# Patient Record
Sex: FEMALE | Race: WHITE | Employment: OTHER | URBAN - METROPOLITAN AREA
[De-identification: names, ages, dates, MRNs, and addresses within clinical notes are randomized per-mention and may not be internally consistent; named-entity substitution may affect disease eponyms.]

---

## 2017-01-04 ENCOUNTER — TELEPHONE (OUTPATIENT)
Dept: INTERNAL MEDICINE CLINIC | Age: 72
End: 2017-01-04

## 2017-01-04 RX ORDER — PIOGLITAZONEHYDROCHLORIDE 15 MG/1
30 TABLET ORAL DAILY
Qty: 60 TABLET | Refills: 0 | Status: SHIPPED | OUTPATIENT
Start: 2017-01-04 | End: 2017-01-25 | Stop reason: SDUPTHER

## 2017-01-04 RX ORDER — SPIRONOLACTONE 50 MG/1
50 TABLET, FILM COATED ORAL DAILY
Qty: 30 TABLET | Refills: 0 | Status: SHIPPED | OUTPATIENT
Start: 2017-01-04 | End: 2017-01-25 | Stop reason: SDUPTHER

## 2017-01-19 RX ORDER — LOSARTAN POTASSIUM 50 MG/1
TABLET ORAL
Qty: 30 TABLET | Refills: 2 | Status: SHIPPED | OUTPATIENT
Start: 2017-01-19 | End: 2017-04-19 | Stop reason: SDUPTHER

## 2017-01-25 ENCOUNTER — OFFICE VISIT (OUTPATIENT)
Dept: INTERNAL MEDICINE CLINIC | Age: 72
End: 2017-01-25

## 2017-01-25 VITALS
BODY MASS INDEX: 45.93 KG/M2 | HEIGHT: 64 IN | DIASTOLIC BLOOD PRESSURE: 80 MMHG | SYSTOLIC BLOOD PRESSURE: 138 MMHG | WEIGHT: 269 LBS

## 2017-01-25 DIAGNOSIS — K21.9 GASTROESOPHAGEAL REFLUX DISEASE WITHOUT ESOPHAGITIS: ICD-10-CM

## 2017-01-25 DIAGNOSIS — F32.A DEPRESSION, UNSPECIFIED DEPRESSION TYPE: ICD-10-CM

## 2017-01-25 DIAGNOSIS — M1A.09X0 CHRONIC GOUT OF MULTIPLE SITES, UNSPECIFIED CAUSE: ICD-10-CM

## 2017-01-25 DIAGNOSIS — M48.061 SPINAL STENOSIS OF LUMBAR REGION: ICD-10-CM

## 2017-01-25 DIAGNOSIS — E11.9 TYPE 2 DIABETES MELLITUS WITHOUT COMPLICATION, WITHOUT LONG-TERM CURRENT USE OF INSULIN (HCC): Primary | ICD-10-CM

## 2017-01-25 DIAGNOSIS — I10 ESSENTIAL HYPERTENSION: ICD-10-CM

## 2017-01-25 DIAGNOSIS — E11.9 TYPE 2 DIABETES MELLITUS WITHOUT COMPLICATION, WITHOUT LONG-TERM CURRENT USE OF INSULIN (HCC): ICD-10-CM

## 2017-01-25 LAB
A/G RATIO: 2 (ref 1.1–2.2)
ALBUMIN SERPL-MCNC: 4.5 G/DL (ref 3.4–5)
ALP BLD-CCNC: 78 U/L (ref 40–129)
ALT SERPL-CCNC: 11 U/L (ref 10–40)
ANION GAP SERPL CALCULATED.3IONS-SCNC: 14 MMOL/L (ref 3–16)
AST SERPL-CCNC: 10 U/L (ref 15–37)
BILIRUB SERPL-MCNC: 0.5 MG/DL (ref 0–1)
BUN BLDV-MCNC: 22 MG/DL (ref 7–20)
CALCIUM SERPL-MCNC: 9.9 MG/DL (ref 8.3–10.6)
CHLORIDE BLD-SCNC: 100 MMOL/L (ref 99–110)
CO2: 25 MMOL/L (ref 21–32)
CREAT SERPL-MCNC: 0.7 MG/DL (ref 0.6–1.2)
GFR AFRICAN AMERICAN: >60
GFR NON-AFRICAN AMERICAN: >60
GLOBULIN: 2.3 G/DL
GLUCOSE BLD-MCNC: 79 MG/DL (ref 70–99)
POTASSIUM SERPL-SCNC: 4.7 MMOL/L (ref 3.5–5.1)
SODIUM BLD-SCNC: 139 MMOL/L (ref 136–145)
TOTAL PROTEIN: 6.8 G/DL (ref 6.4–8.2)

## 2017-01-25 PROCEDURE — G8427 DOCREV CUR MEDS BY ELIG CLIN: HCPCS | Performed by: INTERNAL MEDICINE

## 2017-01-25 PROCEDURE — 3014F SCREEN MAMMO DOC REV: CPT | Performed by: INTERNAL MEDICINE

## 2017-01-25 PROCEDURE — G8399 PT W/DXA RESULTS DOCUMENT: HCPCS | Performed by: INTERNAL MEDICINE

## 2017-01-25 PROCEDURE — 3017F COLORECTAL CA SCREEN DOC REV: CPT | Performed by: INTERNAL MEDICINE

## 2017-01-25 PROCEDURE — 3044F HG A1C LEVEL LT 7.0%: CPT | Performed by: INTERNAL MEDICINE

## 2017-01-25 PROCEDURE — 1036F TOBACCO NON-USER: CPT | Performed by: INTERNAL MEDICINE

## 2017-01-25 PROCEDURE — G8419 CALC BMI OUT NRM PARAM NOF/U: HCPCS | Performed by: INTERNAL MEDICINE

## 2017-01-25 PROCEDURE — 99214 OFFICE O/P EST MOD 30 MIN: CPT | Performed by: INTERNAL MEDICINE

## 2017-01-25 PROCEDURE — 1123F ACP DISCUSS/DSCN MKR DOCD: CPT | Performed by: INTERNAL MEDICINE

## 2017-01-25 PROCEDURE — 1090F PRES/ABSN URINE INCON ASSESS: CPT | Performed by: INTERNAL MEDICINE

## 2017-01-25 PROCEDURE — 4040F PNEUMOC VAC/ADMIN/RCVD: CPT | Performed by: INTERNAL MEDICINE

## 2017-01-25 PROCEDURE — G8484 FLU IMMUNIZE NO ADMIN: HCPCS | Performed by: INTERNAL MEDICINE

## 2017-01-25 RX ORDER — SPIRONOLACTONE 50 MG/1
50 TABLET, FILM COATED ORAL 2 TIMES DAILY
Qty: 1 TABLET | Refills: 0
Start: 2017-01-25 | End: 2017-01-30 | Stop reason: SDUPTHER

## 2017-01-25 RX ORDER — PIOGLITAZONEHYDROCHLORIDE 30 MG/1
30 TABLET ORAL DAILY
Qty: 1 TABLET | Refills: 0
Start: 2017-01-25 | End: 2017-01-30 | Stop reason: SDUPTHER

## 2017-01-25 ASSESSMENT — ENCOUNTER SYMPTOMS
SHORTNESS OF BREATH: 0
ABDOMINAL PAIN: 0
NAUSEA: 0
CHEST TIGHTNESS: 0
COUGH: 1
BLOOD IN STOOL: 0
DIARRHEA: 0
WHEEZING: 0
VOMITING: 0

## 2017-01-26 LAB
ESTIMATED AVERAGE GLUCOSE: 139.9 MG/DL
HBA1C MFR BLD: 6.5 %

## 2017-01-30 RX ORDER — SPIRONOLACTONE 50 MG/1
50 TABLET, FILM COATED ORAL 2 TIMES DAILY
Qty: 60 TABLET | Refills: 0 | Status: SHIPPED | OUTPATIENT
Start: 2017-01-30 | End: 2017-02-28 | Stop reason: SDUPTHER

## 2017-01-30 RX ORDER — PIOGLITAZONEHYDROCHLORIDE 30 MG/1
30 TABLET ORAL DAILY
Qty: 90 TABLET | Refills: 0 | Status: SHIPPED | OUTPATIENT
Start: 2017-01-30 | End: 2017-04-20

## 2017-02-28 RX ORDER — GLIPIZIDE 10 MG/1
TABLET ORAL
Qty: 60 TABLET | Refills: 2 | Status: SHIPPED | OUTPATIENT
Start: 2017-02-28 | End: 2017-04-19 | Stop reason: SDUPTHER

## 2017-02-28 RX ORDER — SPIRONOLACTONE 50 MG/1
TABLET, FILM COATED ORAL
Qty: 60 TABLET | Refills: 1 | Status: SHIPPED | OUTPATIENT
Start: 2017-02-28 | End: 2017-04-19 | Stop reason: SDUPTHER

## 2017-03-01 DIAGNOSIS — R20.0 NUMBNESS OF FINGER: ICD-10-CM

## 2017-03-01 DIAGNOSIS — R20.0 NUMBNESS AND TINGLING OF LEFT THUMB: Primary | ICD-10-CM

## 2017-03-01 DIAGNOSIS — R20.2 NUMBNESS AND TINGLING OF LEFT THUMB: Primary | ICD-10-CM

## 2017-03-03 ENCOUNTER — OFFICE VISIT (OUTPATIENT)
Dept: ORTHOPEDIC SURGERY | Age: 72
End: 2017-03-03

## 2017-03-03 DIAGNOSIS — M18.12 PRIMARY OSTEOARTHRITIS OF FIRST CARPOMETACARPAL JOINT OF LEFT HAND: ICD-10-CM

## 2017-03-03 DIAGNOSIS — M79.642 LEFT HAND PAIN: Primary | ICD-10-CM

## 2017-03-03 DIAGNOSIS — G56.02 CARPAL TUNNEL SYNDROME OF LEFT WRIST: ICD-10-CM

## 2017-03-03 PROCEDURE — 73130 X-RAY EXAM OF HAND: CPT | Performed by: ORTHOPAEDIC SURGERY

## 2017-03-03 PROCEDURE — 3014F SCREEN MAMMO DOC REV: CPT | Performed by: ORTHOPAEDIC SURGERY

## 2017-03-03 PROCEDURE — 1090F PRES/ABSN URINE INCON ASSESS: CPT | Performed by: ORTHOPAEDIC SURGERY

## 2017-03-03 PROCEDURE — 1036F TOBACCO NON-USER: CPT | Performed by: ORTHOPAEDIC SURGERY

## 2017-03-03 PROCEDURE — G8484 FLU IMMUNIZE NO ADMIN: HCPCS | Performed by: ORTHOPAEDIC SURGERY

## 2017-03-03 PROCEDURE — 4040F PNEUMOC VAC/ADMIN/RCVD: CPT | Performed by: ORTHOPAEDIC SURGERY

## 2017-03-03 PROCEDURE — 3017F COLORECTAL CA SCREEN DOC REV: CPT | Performed by: ORTHOPAEDIC SURGERY

## 2017-03-03 PROCEDURE — 99214 OFFICE O/P EST MOD 30 MIN: CPT | Performed by: ORTHOPAEDIC SURGERY

## 2017-03-03 PROCEDURE — G8419 CALC BMI OUT NRM PARAM NOF/U: HCPCS | Performed by: ORTHOPAEDIC SURGERY

## 2017-03-03 PROCEDURE — G8427 DOCREV CUR MEDS BY ELIG CLIN: HCPCS | Performed by: ORTHOPAEDIC SURGERY

## 2017-03-06 ENCOUNTER — TELEPHONE (OUTPATIENT)
Dept: ORTHOPEDIC SURGERY | Age: 72
End: 2017-03-06

## 2017-03-07 RX ORDER — GABAPENTIN 300 MG/1
CAPSULE ORAL
Qty: 120 CAPSULE | Refills: 2 | Status: SHIPPED | OUTPATIENT
Start: 2017-03-07 | End: 2017-04-19 | Stop reason: SDUPTHER

## 2017-03-16 RX ORDER — OMEPRAZOLE 20 MG/1
20 CAPSULE, DELAYED RELEASE ORAL 2 TIMES DAILY
Qty: 60 CAPSULE | Refills: 2 | Status: SHIPPED | OUTPATIENT
Start: 2017-03-16 | End: 2017-12-11

## 2017-03-21 ENCOUNTER — OFFICE VISIT (OUTPATIENT)
Dept: ORTHOPEDIC SURGERY | Age: 72
End: 2017-03-21

## 2017-03-21 VITALS — WEIGHT: 268.96 LBS | HEIGHT: 64 IN | BODY MASS INDEX: 45.92 KG/M2

## 2017-03-21 DIAGNOSIS — M18.12 PRIMARY OSTEOARTHRITIS OF FIRST CARPOMETACARPAL JOINT OF LEFT HAND: ICD-10-CM

## 2017-03-21 DIAGNOSIS — G56.02 CARPAL TUNNEL SYNDROME OF LEFT WRIST: Primary | ICD-10-CM

## 2017-03-21 PROCEDURE — 99213 OFFICE O/P EST LOW 20 MIN: CPT | Performed by: ORTHOPAEDIC SURGERY

## 2017-03-21 PROCEDURE — G8399 PT W/DXA RESULTS DOCUMENT: HCPCS | Performed by: ORTHOPAEDIC SURGERY

## 2017-03-21 PROCEDURE — 1123F ACP DISCUSS/DSCN MKR DOCD: CPT | Performed by: ORTHOPAEDIC SURGERY

## 2017-03-21 PROCEDURE — G8417 CALC BMI ABV UP PARAM F/U: HCPCS | Performed by: ORTHOPAEDIC SURGERY

## 2017-03-21 PROCEDURE — 95908 NRV CNDJ TST 3-4 STUDIES: CPT | Performed by: PHYSICAL MEDICINE & REHABILITATION

## 2017-03-21 PROCEDURE — G8427 DOCREV CUR MEDS BY ELIG CLIN: HCPCS | Performed by: ORTHOPAEDIC SURGERY

## 2017-03-21 PROCEDURE — 3017F COLORECTAL CA SCREEN DOC REV: CPT | Performed by: ORTHOPAEDIC SURGERY

## 2017-03-21 PROCEDURE — 1036F TOBACCO NON-USER: CPT | Performed by: ORTHOPAEDIC SURGERY

## 2017-03-21 PROCEDURE — 3014F SCREEN MAMMO DOC REV: CPT | Performed by: ORTHOPAEDIC SURGERY

## 2017-03-21 PROCEDURE — 4040F PNEUMOC VAC/ADMIN/RCVD: CPT | Performed by: ORTHOPAEDIC SURGERY

## 2017-03-21 PROCEDURE — 1036F TOBACCO NON-USER: CPT | Performed by: PHYSICAL MEDICINE & REHABILITATION

## 2017-03-21 PROCEDURE — 1090F PRES/ABSN URINE INCON ASSESS: CPT | Performed by: ORTHOPAEDIC SURGERY

## 2017-03-21 PROCEDURE — 95886 MUSC TEST DONE W/N TEST COMP: CPT | Performed by: PHYSICAL MEDICINE & REHABILITATION

## 2017-03-21 PROCEDURE — G8484 FLU IMMUNIZE NO ADMIN: HCPCS | Performed by: ORTHOPAEDIC SURGERY

## 2017-03-28 RX ORDER — VERAPAMIL HYDROCHLORIDE 120 MG/1
120 TABLET, FILM COATED ORAL 2 TIMES DAILY
Qty: 60 TABLET | Refills: 2 | Status: SHIPPED | OUTPATIENT
Start: 2017-03-28 | End: 2017-04-19 | Stop reason: SDUPTHER

## 2017-04-10 RX ORDER — PAROXETINE 10 MG/1
TABLET, FILM COATED ORAL
Qty: 30 TABLET | Refills: 2 | Status: SHIPPED | OUTPATIENT
Start: 2017-04-10 | End: 2017-07-17 | Stop reason: SDUPTHER

## 2017-04-10 RX ORDER — ALLOPURINOL 300 MG/1
TABLET ORAL
Qty: 30 TABLET | Refills: 2 | Status: SHIPPED | OUTPATIENT
Start: 2017-04-10 | End: 2017-07-17 | Stop reason: SDUPTHER

## 2017-04-19 RX ORDER — GLIPIZIDE 10 MG/1
TABLET ORAL
Qty: 60 TABLET | Refills: 2 | Status: SHIPPED | OUTPATIENT
Start: 2017-04-19 | End: 2017-09-06 | Stop reason: SDUPTHER

## 2017-04-19 RX ORDER — OMEPRAZOLE 20 MG/1
20 CAPSULE, DELAYED RELEASE ORAL 2 TIMES DAILY
Qty: 30 CAPSULE | Refills: 5 | Status: SHIPPED | OUTPATIENT
Start: 2017-04-19 | End: 2017-11-16

## 2017-04-19 RX ORDER — VERAPAMIL HYDROCHLORIDE 120 MG/1
120 TABLET, FILM COATED ORAL 2 TIMES DAILY
Qty: 60 TABLET | Refills: 2 | Status: SHIPPED | OUTPATIENT
Start: 2017-04-19 | End: 2017-10-25 | Stop reason: SDUPTHER

## 2017-04-19 RX ORDER — GABAPENTIN 300 MG/1
CAPSULE ORAL
Qty: 120 CAPSULE | Refills: 2 | Status: SHIPPED | OUTPATIENT
Start: 2017-04-19 | End: 2018-01-26

## 2017-04-19 RX ORDER — LOSARTAN POTASSIUM 50 MG/1
TABLET ORAL
Qty: 30 TABLET | Refills: 2 | Status: SHIPPED | OUTPATIENT
Start: 2017-04-19 | End: 2017-04-20 | Stop reason: SDUPTHER

## 2017-04-19 RX ORDER — SPIRONOLACTONE 50 MG/1
TABLET, FILM COATED ORAL
Qty: 60 TABLET | Refills: 2 | Status: SHIPPED | OUTPATIENT
Start: 2017-04-19 | End: 2017-04-30 | Stop reason: SDUPTHER

## 2017-04-20 ENCOUNTER — OFFICE VISIT (OUTPATIENT)
Dept: INTERNAL MEDICINE CLINIC | Age: 72
End: 2017-04-20

## 2017-04-20 VITALS
HEIGHT: 64 IN | DIASTOLIC BLOOD PRESSURE: 82 MMHG | SYSTOLIC BLOOD PRESSURE: 156 MMHG | WEIGHT: 277 LBS | HEART RATE: 76 BPM | BODY MASS INDEX: 47.29 KG/M2

## 2017-04-20 DIAGNOSIS — M17.0 PRIMARY OSTEOARTHRITIS OF BOTH KNEES: ICD-10-CM

## 2017-04-20 DIAGNOSIS — I10 ESSENTIAL HYPERTENSION: ICD-10-CM

## 2017-04-20 DIAGNOSIS — F32.A DEPRESSION, UNSPECIFIED DEPRESSION TYPE: ICD-10-CM

## 2017-04-20 DIAGNOSIS — E11.9 TYPE 2 DIABETES MELLITUS WITHOUT COMPLICATION, WITHOUT LONG-TERM CURRENT USE OF INSULIN (HCC): ICD-10-CM

## 2017-04-20 DIAGNOSIS — M1A.09X0 CHRONIC GOUT OF MULTIPLE SITES, UNSPECIFIED CAUSE: ICD-10-CM

## 2017-04-20 DIAGNOSIS — E11.9 TYPE 2 DIABETES MELLITUS WITHOUT COMPLICATION, WITHOUT LONG-TERM CURRENT USE OF INSULIN (HCC): Primary | ICD-10-CM

## 2017-04-20 LAB
A/G RATIO: 2.2 (ref 1.1–2.2)
ALBUMIN SERPL-MCNC: 4.3 G/DL (ref 3.4–5)
ALP BLD-CCNC: 71 U/L (ref 40–129)
ALT SERPL-CCNC: 9 U/L (ref 10–40)
ANION GAP SERPL CALCULATED.3IONS-SCNC: 17 MMOL/L (ref 3–16)
AST SERPL-CCNC: 9 U/L (ref 15–37)
BILIRUB SERPL-MCNC: 0.5 MG/DL (ref 0–1)
BUN BLDV-MCNC: 21 MG/DL (ref 7–20)
CALCIUM SERPL-MCNC: 9.1 MG/DL (ref 8.3–10.6)
CHLORIDE BLD-SCNC: 101 MMOL/L (ref 99–110)
CHOLESTEROL, TOTAL: 179 MG/DL (ref 0–199)
CO2: 24 MMOL/L (ref 21–32)
CREAT SERPL-MCNC: 0.6 MG/DL (ref 0.6–1.2)
GFR AFRICAN AMERICAN: >60
GFR NON-AFRICAN AMERICAN: >60
GLOBULIN: 2 G/DL
GLUCOSE BLD-MCNC: 111 MG/DL (ref 70–99)
HDLC SERPL-MCNC: 52 MG/DL (ref 40–60)
LDL CHOLESTEROL CALCULATED: 104 MG/DL
POTASSIUM SERPL-SCNC: 4.5 MMOL/L (ref 3.5–5.1)
SODIUM BLD-SCNC: 142 MMOL/L (ref 136–145)
TOTAL PROTEIN: 6.3 G/DL (ref 6.4–8.2)
TRIGL SERPL-MCNC: 115 MG/DL (ref 0–150)
VLDLC SERPL CALC-MCNC: 23 MG/DL

## 2017-04-20 PROCEDURE — 3044F HG A1C LEVEL LT 7.0%: CPT | Performed by: INTERNAL MEDICINE

## 2017-04-20 PROCEDURE — G8427 DOCREV CUR MEDS BY ELIG CLIN: HCPCS | Performed by: INTERNAL MEDICINE

## 2017-04-20 PROCEDURE — 4040F PNEUMOC VAC/ADMIN/RCVD: CPT | Performed by: INTERNAL MEDICINE

## 2017-04-20 PROCEDURE — 1123F ACP DISCUSS/DSCN MKR DOCD: CPT | Performed by: INTERNAL MEDICINE

## 2017-04-20 PROCEDURE — 99214 OFFICE O/P EST MOD 30 MIN: CPT | Performed by: INTERNAL MEDICINE

## 2017-04-20 PROCEDURE — 1036F TOBACCO NON-USER: CPT | Performed by: INTERNAL MEDICINE

## 2017-04-20 PROCEDURE — G8399 PT W/DXA RESULTS DOCUMENT: HCPCS | Performed by: INTERNAL MEDICINE

## 2017-04-20 PROCEDURE — 3017F COLORECTAL CA SCREEN DOC REV: CPT | Performed by: INTERNAL MEDICINE

## 2017-04-20 PROCEDURE — G8417 CALC BMI ABV UP PARAM F/U: HCPCS | Performed by: INTERNAL MEDICINE

## 2017-04-20 PROCEDURE — 1090F PRES/ABSN URINE INCON ASSESS: CPT | Performed by: INTERNAL MEDICINE

## 2017-04-20 PROCEDURE — 3014F SCREEN MAMMO DOC REV: CPT | Performed by: INTERNAL MEDICINE

## 2017-04-20 RX ORDER — LOSARTAN POTASSIUM 100 MG/1
TABLET ORAL
Qty: 30 TABLET | Refills: 2 | Status: SHIPPED | OUTPATIENT
Start: 2017-04-20 | End: 2017-07-17 | Stop reason: SDUPTHER

## 2017-04-20 ASSESSMENT — ENCOUNTER SYMPTOMS
SHORTNESS OF BREATH: 0
BLOOD IN STOOL: 0
COUGH: 0
VOMITING: 0
WHEEZING: 0
DIARRHEA: 0
NAUSEA: 0
ABDOMINAL PAIN: 0
CHEST TIGHTNESS: 0

## 2017-04-21 LAB
ACANTHOCYTES: ABNORMAL
ANISOCYTOSIS: ABNORMAL
BASOPHILS ABSOLUTE: 0 K/UL (ref 0–0.2)
BASOPHILS RELATIVE PERCENT: 0.3 %
BURR CELLS: ABNORMAL
EOSINOPHILS ABSOLUTE: 0.3 K/UL (ref 0–0.6)
EOSINOPHILS RELATIVE PERCENT: 3.2 %
ESTIMATED AVERAGE GLUCOSE: 142.7 MG/DL
HBA1C MFR BLD: 6.6 %
HCT VFR BLD CALC: 32.5 % (ref 36–48)
HEMATOLOGY PATH CONSULT: NO
HEMOGLOBIN: 10.1 G/DL (ref 12–16)
LYMPHOCYTES ABSOLUTE: 1.9 K/UL (ref 1–5.1)
LYMPHOCYTES RELATIVE PERCENT: 23.9 %
MCH RBC QN AUTO: 18.9 PG (ref 26–34)
MCHC RBC AUTO-ENTMCNC: 30.9 G/DL (ref 31–36)
MCV RBC AUTO: 61.1 FL (ref 80–100)
MICROCYTES: ABNORMAL
MONOCYTES ABSOLUTE: 0.6 K/UL (ref 0–1.3)
MONOCYTES RELATIVE PERCENT: 7.4 %
NEUTROPHILS ABSOLUTE: 5.2 K/UL (ref 1.7–7.7)
NEUTROPHILS RELATIVE PERCENT: 65.2 %
OVALOCYTES: ABNORMAL
PDW BLD-RTO: 16.8 % (ref 12.4–15.4)
PLATELET # BLD: 229 K/UL (ref 135–450)
PLATELET SLIDE REVIEW: ADEQUATE
PMV BLD AUTO: 9.7 FL (ref 5–10.5)
POIKILOCYTES: ABNORMAL
RBC # BLD: 5.33 M/UL (ref 4–5.2)
SCHISTOCYTES: ABNORMAL
WBC # BLD: 8 K/UL (ref 4–11)

## 2017-04-21 RX ORDER — ATORVASTATIN CALCIUM 10 MG/1
TABLET, FILM COATED ORAL
Qty: 30 TABLET | Refills: 2 | Status: SHIPPED | OUTPATIENT
Start: 2017-04-21 | End: 2017-07-27 | Stop reason: SDUPTHER

## 2017-04-28 RX ORDER — TIOTROPIUM BROMIDE 18 UG/1
CAPSULE ORAL; RESPIRATORY (INHALATION)
Qty: 90 CAPSULE | Refills: 0 | Status: SHIPPED | OUTPATIENT
Start: 2017-04-28 | End: 2018-06-17 | Stop reason: SDUPTHER

## 2017-05-01 RX ORDER — SPIRONOLACTONE 50 MG/1
TABLET, FILM COATED ORAL
Qty: 60 TABLET | Refills: 2 | Status: SHIPPED | OUTPATIENT
Start: 2017-05-01 | End: 2017-08-09 | Stop reason: SDUPTHER

## 2017-07-18 RX ORDER — LOSARTAN POTASSIUM 100 MG/1
TABLET ORAL
Qty: 30 TABLET | Refills: 0 | Status: SHIPPED | OUTPATIENT
Start: 2017-07-18 | End: 2017-08-14 | Stop reason: SDUPTHER

## 2017-07-18 RX ORDER — ALLOPURINOL 300 MG/1
TABLET ORAL
Qty: 30 TABLET | Refills: 0 | Status: SHIPPED | OUTPATIENT
Start: 2017-07-18 | End: 2017-08-14 | Stop reason: SDUPTHER

## 2017-07-18 RX ORDER — PAROXETINE 10 MG/1
TABLET, FILM COATED ORAL
Qty: 30 TABLET | Refills: 0 | Status: SHIPPED | OUTPATIENT
Start: 2017-07-18 | End: 2017-08-14 | Stop reason: SDUPTHER

## 2017-07-27 ENCOUNTER — OFFICE VISIT (OUTPATIENT)
Dept: INTERNAL MEDICINE CLINIC | Age: 72
End: 2017-07-27

## 2017-07-27 VITALS
HEIGHT: 64 IN | WEIGHT: 261 LBS | HEART RATE: 76 BPM | SYSTOLIC BLOOD PRESSURE: 152 MMHG | DIASTOLIC BLOOD PRESSURE: 80 MMHG | BODY MASS INDEX: 44.56 KG/M2

## 2017-07-27 DIAGNOSIS — K21.9 GASTROESOPHAGEAL REFLUX DISEASE WITHOUT ESOPHAGITIS: ICD-10-CM

## 2017-07-27 DIAGNOSIS — F32.A DEPRESSION, UNSPECIFIED DEPRESSION TYPE: ICD-10-CM

## 2017-07-27 DIAGNOSIS — E78.5 HYPERLIPIDEMIA, UNSPECIFIED HYPERLIPIDEMIA TYPE: ICD-10-CM

## 2017-07-27 DIAGNOSIS — I10 ESSENTIAL HYPERTENSION: ICD-10-CM

## 2017-07-27 DIAGNOSIS — M1A.09X0 CHRONIC GOUT OF MULTIPLE SITES, UNSPECIFIED CAUSE: ICD-10-CM

## 2017-07-27 DIAGNOSIS — M48.061 SPINAL STENOSIS OF LUMBAR REGION: ICD-10-CM

## 2017-07-27 DIAGNOSIS — E11.9 TYPE 2 DIABETES MELLITUS WITHOUT COMPLICATION, WITHOUT LONG-TERM CURRENT USE OF INSULIN (HCC): Primary | ICD-10-CM

## 2017-07-27 DIAGNOSIS — E11.9 TYPE 2 DIABETES MELLITUS WITHOUT COMPLICATION, WITHOUT LONG-TERM CURRENT USE OF INSULIN (HCC): ICD-10-CM

## 2017-07-27 LAB
ALBUMIN SERPL-MCNC: 4.3 G/DL (ref 3.4–5)
ALP BLD-CCNC: 85 U/L (ref 40–129)
ALT SERPL-CCNC: 11 U/L (ref 10–40)
ANION GAP SERPL CALCULATED.3IONS-SCNC: 17 MMOL/L (ref 3–16)
AST SERPL-CCNC: 10 U/L (ref 15–37)
BILIRUB SERPL-MCNC: 0.7 MG/DL (ref 0–1)
BILIRUBIN DIRECT: <0.2 MG/DL (ref 0–0.3)
BILIRUBIN, INDIRECT: ABNORMAL MG/DL (ref 0–1)
BUN BLDV-MCNC: 15 MG/DL (ref 7–20)
CALCIUM SERPL-MCNC: 9.4 MG/DL (ref 8.3–10.6)
CHLORIDE BLD-SCNC: 101 MMOL/L (ref 99–110)
CHOLESTEROL, TOTAL: 130 MG/DL (ref 0–199)
CO2: 23 MMOL/L (ref 21–32)
CREAT SERPL-MCNC: 0.6 MG/DL (ref 0.6–1.2)
GFR AFRICAN AMERICAN: >60
GFR NON-AFRICAN AMERICAN: >60
GLUCOSE BLD-MCNC: 149 MG/DL (ref 70–99)
HDLC SERPL-MCNC: 49 MG/DL (ref 40–60)
LDL CHOLESTEROL CALCULATED: 51 MG/DL
POTASSIUM SERPL-SCNC: 4.7 MMOL/L (ref 3.5–5.1)
SODIUM BLD-SCNC: 141 MMOL/L (ref 136–145)
TOTAL PROTEIN: 6.5 G/DL (ref 6.4–8.2)
TRIGL SERPL-MCNC: 151 MG/DL (ref 0–150)
VLDLC SERPL CALC-MCNC: 30 MG/DL

## 2017-07-27 PROCEDURE — 1090F PRES/ABSN URINE INCON ASSESS: CPT | Performed by: INTERNAL MEDICINE

## 2017-07-27 PROCEDURE — G8417 CALC BMI ABV UP PARAM F/U: HCPCS | Performed by: INTERNAL MEDICINE

## 2017-07-27 PROCEDURE — 1036F TOBACCO NON-USER: CPT | Performed by: INTERNAL MEDICINE

## 2017-07-27 PROCEDURE — G8427 DOCREV CUR MEDS BY ELIG CLIN: HCPCS | Performed by: INTERNAL MEDICINE

## 2017-07-27 PROCEDURE — 99214 OFFICE O/P EST MOD 30 MIN: CPT | Performed by: INTERNAL MEDICINE

## 2017-07-27 PROCEDURE — 4040F PNEUMOC VAC/ADMIN/RCVD: CPT | Performed by: INTERNAL MEDICINE

## 2017-07-27 PROCEDURE — 3014F SCREEN MAMMO DOC REV: CPT | Performed by: INTERNAL MEDICINE

## 2017-07-27 PROCEDURE — 3017F COLORECTAL CA SCREEN DOC REV: CPT | Performed by: INTERNAL MEDICINE

## 2017-07-27 PROCEDURE — 3046F HEMOGLOBIN A1C LEVEL >9.0%: CPT | Performed by: INTERNAL MEDICINE

## 2017-07-27 PROCEDURE — 1123F ACP DISCUSS/DSCN MKR DOCD: CPT | Performed by: INTERNAL MEDICINE

## 2017-07-27 PROCEDURE — G8399 PT W/DXA RESULTS DOCUMENT: HCPCS | Performed by: INTERNAL MEDICINE

## 2017-07-27 RX ORDER — AMLODIPINE BESYLATE 5 MG/1
5 TABLET ORAL DAILY
Qty: 30 TABLET | Refills: 2 | Status: SHIPPED | OUTPATIENT
Start: 2017-07-27 | End: 2017-07-27

## 2017-07-27 RX ORDER — ATORVASTATIN CALCIUM 10 MG/1
TABLET, FILM COATED ORAL
Qty: 30 TABLET | Refills: 2 | Status: SHIPPED | OUTPATIENT
Start: 2017-07-27 | End: 2017-12-10 | Stop reason: SDUPTHER

## 2017-07-27 RX ORDER — FLUTICASONE FUROATE AND VILANTEROL 200; 25 UG/1; UG/1
POWDER RESPIRATORY (INHALATION)
COMMUNITY
End: 2018-05-18 | Stop reason: SDUPTHER

## 2017-07-27 ASSESSMENT — ENCOUNTER SYMPTOMS
SHORTNESS OF BREATH: 0
CHEST TIGHTNESS: 0
COUGH: 0
WHEEZING: 0
DIARRHEA: 0
BLOOD IN STOOL: 0
NAUSEA: 0
ABDOMINAL PAIN: 0
VOMITING: 0

## 2017-07-30 LAB — MISCELLANEOUS LAB TEST ORDER: ABNORMAL

## 2017-08-02 ENCOUNTER — TELEPHONE (OUTPATIENT)
Dept: INTERNAL MEDICINE CLINIC | Age: 72
End: 2017-08-02

## 2017-08-14 RX ORDER — ALLOPURINOL 300 MG/1
TABLET ORAL
Qty: 30 TABLET | Refills: 2 | Status: SHIPPED | OUTPATIENT
Start: 2017-08-14 | End: 2017-11-15 | Stop reason: SDUPTHER

## 2017-08-14 RX ORDER — LOSARTAN POTASSIUM 100 MG/1
TABLET ORAL
Qty: 30 TABLET | Refills: 2 | Status: SHIPPED | OUTPATIENT
Start: 2017-08-14 | End: 2017-11-15 | Stop reason: SDUPTHER

## 2017-08-14 RX ORDER — PAROXETINE 10 MG/1
TABLET, FILM COATED ORAL
Qty: 30 TABLET | Refills: 2 | Status: SHIPPED | OUTPATIENT
Start: 2017-08-14 | End: 2017-11-15 | Stop reason: SDUPTHER

## 2017-08-16 ENCOUNTER — TELEPHONE (OUTPATIENT)
Dept: INTERNAL MEDICINE CLINIC | Age: 72
End: 2017-08-16

## 2017-09-06 RX ORDER — GLIPIZIDE 10 MG/1
TABLET ORAL
Qty: 60 TABLET | Refills: 0 | Status: SHIPPED | OUTPATIENT
Start: 2017-09-06 | End: 2017-10-06 | Stop reason: SDUPTHER

## 2017-10-09 RX ORDER — GLIPIZIDE 10 MG/1
TABLET ORAL
Qty: 60 TABLET | Refills: 0 | Status: SHIPPED | OUTPATIENT
Start: 2017-10-09 | End: 2017-11-03 | Stop reason: SDUPTHER

## 2017-10-19 ENCOUNTER — OFFICE VISIT (OUTPATIENT)
Dept: INTERNAL MEDICINE CLINIC | Age: 72
End: 2017-10-19

## 2017-10-19 VITALS
HEART RATE: 80 BPM | WEIGHT: 257 LBS | SYSTOLIC BLOOD PRESSURE: 162 MMHG | HEIGHT: 64 IN | BODY MASS INDEX: 43.87 KG/M2 | DIASTOLIC BLOOD PRESSURE: 82 MMHG

## 2017-10-19 DIAGNOSIS — F32.A DEPRESSION, UNSPECIFIED DEPRESSION TYPE: ICD-10-CM

## 2017-10-19 DIAGNOSIS — M48.061 SPINAL STENOSIS OF LUMBAR REGION WITHOUT NEUROGENIC CLAUDICATION: ICD-10-CM

## 2017-10-19 DIAGNOSIS — I10 ESSENTIAL HYPERTENSION: ICD-10-CM

## 2017-10-19 DIAGNOSIS — K21.9 GASTROESOPHAGEAL REFLUX DISEASE WITHOUT ESOPHAGITIS: ICD-10-CM

## 2017-10-19 DIAGNOSIS — Z23 NEED FOR INFLUENZA VACCINATION: ICD-10-CM

## 2017-10-19 DIAGNOSIS — E11.9 TYPE 2 DIABETES MELLITUS WITHOUT COMPLICATION, WITHOUT LONG-TERM CURRENT USE OF INSULIN (HCC): Primary | ICD-10-CM

## 2017-10-19 DIAGNOSIS — J44.9 CHRONIC OBSTRUCTIVE PULMONARY DISEASE, UNSPECIFIED COPD TYPE (HCC): ICD-10-CM

## 2017-10-19 DIAGNOSIS — D64.9 ANEMIA, UNSPECIFIED TYPE: ICD-10-CM

## 2017-10-19 LAB
BILIRUBIN, POC: NORMAL
BLOOD URINE, POC: NORMAL
CLARITY, POC: NORMAL
COLOR, POC: NORMAL
CREATININE URINE: 33.6 MG/DL (ref 28–259)
GLUCOSE URINE, POC: NORMAL
KETONES, POC: NORMAL
LEUKOCYTE EST, POC: NORMAL
MICROALBUMIN UR-MCNC: <1.2 MG/DL
MICROALBUMIN/CREAT UR-RTO: NORMAL MG/G (ref 0–30)
NITRITE, POC: NORMAL
PH, POC: NORMAL
PROTEIN, POC: NORMAL
SPECIFIC GRAVITY, POC: NORMAL
UROBILINOGEN, POC: NORMAL

## 2017-10-19 PROCEDURE — 99214 OFFICE O/P EST MOD 30 MIN: CPT | Performed by: INTERNAL MEDICINE

## 2017-10-19 PROCEDURE — 1090F PRES/ABSN URINE INCON ASSESS: CPT | Performed by: INTERNAL MEDICINE

## 2017-10-19 PROCEDURE — 3017F COLORECTAL CA SCREEN DOC REV: CPT | Performed by: INTERNAL MEDICINE

## 2017-10-19 PROCEDURE — G8926 SPIRO NO PERF OR DOC: HCPCS | Performed by: INTERNAL MEDICINE

## 2017-10-19 PROCEDURE — 3014F SCREEN MAMMO DOC REV: CPT | Performed by: INTERNAL MEDICINE

## 2017-10-19 PROCEDURE — 3046F HEMOGLOBIN A1C LEVEL >9.0%: CPT | Performed by: INTERNAL MEDICINE

## 2017-10-19 PROCEDURE — 81002 URINALYSIS NONAUTO W/O SCOPE: CPT | Performed by: INTERNAL MEDICINE

## 2017-10-19 PROCEDURE — 90662 IIV NO PRSV INCREASED AG IM: CPT | Performed by: INTERNAL MEDICINE

## 2017-10-19 PROCEDURE — G8427 DOCREV CUR MEDS BY ELIG CLIN: HCPCS | Performed by: INTERNAL MEDICINE

## 2017-10-19 PROCEDURE — 3023F SPIROM DOC REV: CPT | Performed by: INTERNAL MEDICINE

## 2017-10-19 PROCEDURE — 1123F ACP DISCUSS/DSCN MKR DOCD: CPT | Performed by: INTERNAL MEDICINE

## 2017-10-19 PROCEDURE — G0008 ADMIN INFLUENZA VIRUS VAC: HCPCS | Performed by: INTERNAL MEDICINE

## 2017-10-19 PROCEDURE — G8417 CALC BMI ABV UP PARAM F/U: HCPCS | Performed by: INTERNAL MEDICINE

## 2017-10-19 PROCEDURE — G8399 PT W/DXA RESULTS DOCUMENT: HCPCS | Performed by: INTERNAL MEDICINE

## 2017-10-19 PROCEDURE — G8484 FLU IMMUNIZE NO ADMIN: HCPCS | Performed by: INTERNAL MEDICINE

## 2017-10-19 PROCEDURE — 4040F PNEUMOC VAC/ADMIN/RCVD: CPT | Performed by: INTERNAL MEDICINE

## 2017-10-19 PROCEDURE — 1036F TOBACCO NON-USER: CPT | Performed by: INTERNAL MEDICINE

## 2017-10-19 RX ORDER — CARVEDILOL 6.25 MG/1
6.25 TABLET ORAL 2 TIMES DAILY
Qty: 60 TABLET | Refills: 2 | Status: SHIPPED | OUTPATIENT
Start: 2017-10-19 | End: 2018-01-18 | Stop reason: SDUPTHER

## 2017-10-19 RX ORDER — ALBUTEROL SULFATE 90 UG/1
2 AEROSOL, METERED RESPIRATORY (INHALATION) EVERY 6 HOURS PRN
Qty: 1 INHALER | Refills: 2 | Status: SHIPPED | OUTPATIENT
Start: 2017-10-19 | End: 2018-12-26 | Stop reason: SDUPTHER

## 2017-10-19 ASSESSMENT — ENCOUNTER SYMPTOMS
BLOOD IN STOOL: 0
NAUSEA: 0
VOMITING: 0
SHORTNESS OF BREATH: 0
WHEEZING: 0
DIARRHEA: 0
ABDOMINAL PAIN: 0
COUGH: 0
CHEST TIGHTNESS: 0

## 2017-10-19 NOTE — PROGRESS NOTES
Subjective:      Patient ID: Praful Batres is a 67 y.o. female. HPI    She has Type 2 DM,HTN,GERD,spinal stenosis,asthma,gout and depression. Blood sugars are under good control. Has had it since 1999. Patient's BP is controlled on current medications. Depression is stable on paxil. Asthma/COPD- on Breo and proventil  Has been having a cough. Used to see Dr. Stanford Rios. C/o dysuria,increased urinary frequency. No fever,chills. Review of Systems   Constitutional: Negative for fatigue, fever and unexpected weight change. Respiratory: Negative for cough, chest tightness, shortness of breath and wheezing. Cardiovascular: Negative for chest pain, palpitations and leg swelling. Gastrointestinal: Negative for abdominal pain, blood in stool, diarrhea, nausea and vomiting. Genitourinary: Negative for dysuria and hematuria. Neurological: Negative for light-headedness. Objective:   Physical Exam   Constitutional: She is oriented to person, place, and time. She appears well-developed and well-nourished. HENT:   Head: Normocephalic and atraumatic. Eyes: Pupils are equal, round, and reactive to light. Neck: Normal range of motion. Neck supple. No thyromegaly present. Cardiovascular: Normal rate, regular rhythm and normal heart sounds. Exam reveals no gallop and no friction rub. No murmur heard. No carotid bruit   Pulmonary/Chest: Effort normal and breath sounds normal. No respiratory distress. She has no wheezes. She has no rales. She exhibits no tenderness. Abdominal: Soft. Bowel sounds are normal. She exhibits no distension and no mass. There is no tenderness. There is no rebound and no guarding. Musculoskeletal: She exhibits no edema. Neurological: She is alert and oriented to person, place, and time. Assessment:      1.  Type 2 diabetes mellitus without complication, without long-term current use of insulin (HCC)  Hemoglobin A1C    Microalbumin / Creatinine Urine Ratio 2. Essential hypertension  POCT urinalysis dipstick   3. Gastroesophageal reflux disease without esophagitis     4. Depression, unspecified depression type     5. Spinal stenosis of lumbar region without neurogenic claudication     6. Need for influenza vaccination  INFLUENZA, HIGH DOSE, 65 YRS +, IM, PF, PREFILL SYR, 0.5ML (FLUZONE HD)   7. Anemia, unspecified type     8. Chronic obstructive pulmonary disease, unspecified COPD type ([facility]             Plan:        DM- Under good control. Sees opthal every year. Strict diet.      HTN. Blood pressure is high. Continue current meds for now. Cannot take HCTZ 2 to high Ca. Coreg 6.25 mg bid. EKG next visit as she is also on verapamil  Walking. Weight loss. U/a negative    COPD. Stable on Breo and proventil     HLD. continue lipitor. Lipids are good      Gout- continue allopurinol  No recent flareups.      Depression. Stable. On paxil.      Spinal stenosis. Continue gabapentin.      GERD. Stable on PPI.     Anemia. H/o thal trait and B12 def. Check B12 levels. Normal.      Up to date on colonoscopy,mammogram and DEXA.

## 2017-10-20 ENCOUNTER — TELEPHONE (OUTPATIENT)
Dept: INTERNAL MEDICINE CLINIC | Age: 72
End: 2017-10-20

## 2017-10-20 LAB
ESTIMATED AVERAGE GLUCOSE: 151.3 MG/DL
HBA1C MFR BLD: 6.9 %

## 2017-10-20 NOTE — TELEPHONE ENCOUNTER
----- Message from Dedra Simmonds, MD sent at 10/20/2017  2:38 PM EDT -----  Unable to do it as I have already sent it to you  ----- Message -----  From: Luis Fernando Malin  Sent: 10/20/2017   2:28 PM  To: Dedra Simmonds, MD    Pt wanting a1c results released to [facility]

## 2017-10-25 ENCOUNTER — TELEPHONE (OUTPATIENT)
Dept: INTERNAL MEDICINE CLINIC | Age: 72
End: 2017-10-25

## 2017-10-25 RX ORDER — VERAPAMIL HYDROCHLORIDE 120 MG/1
120 TABLET, FILM COATED ORAL 2 TIMES DAILY
Qty: 60 TABLET | Refills: 2 | Status: SHIPPED | OUTPATIENT
Start: 2017-10-25 | End: 2018-02-22

## 2017-11-06 RX ORDER — SPIRONOLACTONE 50 MG/1
TABLET, FILM COATED ORAL
Qty: 60 TABLET | Refills: 0 | Status: SHIPPED | OUTPATIENT
Start: 2017-11-06 | End: 2017-11-16 | Stop reason: SDUPTHER

## 2017-11-06 RX ORDER — GLIPIZIDE 10 MG/1
TABLET ORAL
Qty: 60 TABLET | Refills: 0 | Status: SHIPPED | OUTPATIENT
Start: 2017-11-06 | End: 2017-11-06 | Stop reason: SDUPTHER

## 2017-11-06 RX ORDER — VERAPAMIL HYDROCHLORIDE 120 MG/1
TABLET, FILM COATED ORAL
Qty: 60 TABLET | Refills: 0 | Status: SHIPPED | OUTPATIENT
Start: 2017-11-06 | End: 2017-11-16

## 2017-11-07 RX ORDER — GLIPIZIDE 10 MG/1
TABLET ORAL
Qty: 60 TABLET | Refills: 0 | Status: SHIPPED | OUTPATIENT
Start: 2017-11-07 | End: 2017-11-16 | Stop reason: SDUPTHER

## 2017-11-15 RX ORDER — ALLOPURINOL 300 MG/1
TABLET ORAL
Qty: 30 TABLET | Refills: 2 | Status: SHIPPED | OUTPATIENT
Start: 2017-11-15 | End: 2018-02-19 | Stop reason: SDUPTHER

## 2017-11-15 RX ORDER — PAROXETINE 10 MG/1
TABLET, FILM COATED ORAL
Qty: 30 TABLET | Refills: 2 | Status: SHIPPED | OUTPATIENT
Start: 2017-11-15 | End: 2018-03-21 | Stop reason: SDUPTHER

## 2017-11-15 RX ORDER — LOSARTAN POTASSIUM 100 MG/1
TABLET ORAL
Qty: 30 TABLET | Refills: 2 | Status: SHIPPED | OUTPATIENT
Start: 2017-11-15 | End: 2018-02-19 | Stop reason: SDUPTHER

## 2017-11-16 ENCOUNTER — OFFICE VISIT (OUTPATIENT)
Dept: INTERNAL MEDICINE CLINIC | Age: 72
End: 2017-11-16

## 2017-11-16 VITALS
DIASTOLIC BLOOD PRESSURE: 82 MMHG | HEART RATE: 68 BPM | BODY MASS INDEX: 44.05 KG/M2 | SYSTOLIC BLOOD PRESSURE: 152 MMHG | HEIGHT: 64 IN | WEIGHT: 258 LBS

## 2017-11-16 DIAGNOSIS — J44.9 CHRONIC OBSTRUCTIVE PULMONARY DISEASE, UNSPECIFIED COPD TYPE (HCC): ICD-10-CM

## 2017-11-16 DIAGNOSIS — I10 ESSENTIAL HYPERTENSION: ICD-10-CM

## 2017-11-16 DIAGNOSIS — D64.9 ANEMIA, UNSPECIFIED TYPE: ICD-10-CM

## 2017-11-16 DIAGNOSIS — K21.9 GASTROESOPHAGEAL REFLUX DISEASE WITHOUT ESOPHAGITIS: ICD-10-CM

## 2017-11-16 DIAGNOSIS — E11.9 TYPE 2 DIABETES MELLITUS WITHOUT COMPLICATION, WITHOUT LONG-TERM CURRENT USE OF INSULIN (HCC): Primary | ICD-10-CM

## 2017-11-16 DIAGNOSIS — F32.A DEPRESSION, UNSPECIFIED DEPRESSION TYPE: ICD-10-CM

## 2017-11-16 DIAGNOSIS — M1A.09X0 CHRONIC GOUT OF MULTIPLE SITES, UNSPECIFIED CAUSE: ICD-10-CM

## 2017-11-16 PROCEDURE — G8428 CUR MEDS NOT DOCUMENT: HCPCS | Performed by: INTERNAL MEDICINE

## 2017-11-16 PROCEDURE — 3014F SCREEN MAMMO DOC REV: CPT | Performed by: INTERNAL MEDICINE

## 2017-11-16 PROCEDURE — G8417 CALC BMI ABV UP PARAM F/U: HCPCS | Performed by: INTERNAL MEDICINE

## 2017-11-16 PROCEDURE — G8926 SPIRO NO PERF OR DOC: HCPCS | Performed by: INTERNAL MEDICINE

## 2017-11-16 PROCEDURE — 3017F COLORECTAL CA SCREEN DOC REV: CPT | Performed by: INTERNAL MEDICINE

## 2017-11-16 PROCEDURE — 1036F TOBACCO NON-USER: CPT | Performed by: INTERNAL MEDICINE

## 2017-11-16 PROCEDURE — 3044F HG A1C LEVEL LT 7.0%: CPT | Performed by: INTERNAL MEDICINE

## 2017-11-16 PROCEDURE — G8399 PT W/DXA RESULTS DOCUMENT: HCPCS | Performed by: INTERNAL MEDICINE

## 2017-11-16 PROCEDURE — 1123F ACP DISCUSS/DSCN MKR DOCD: CPT | Performed by: INTERNAL MEDICINE

## 2017-11-16 PROCEDURE — 3023F SPIROM DOC REV: CPT | Performed by: INTERNAL MEDICINE

## 2017-11-16 PROCEDURE — 99214 OFFICE O/P EST MOD 30 MIN: CPT | Performed by: INTERNAL MEDICINE

## 2017-11-16 PROCEDURE — 4040F PNEUMOC VAC/ADMIN/RCVD: CPT | Performed by: INTERNAL MEDICINE

## 2017-11-16 PROCEDURE — 1090F PRES/ABSN URINE INCON ASSESS: CPT | Performed by: INTERNAL MEDICINE

## 2017-11-16 PROCEDURE — G8484 FLU IMMUNIZE NO ADMIN: HCPCS | Performed by: INTERNAL MEDICINE

## 2017-11-16 RX ORDER — VERAPAMIL HYDROCHLORIDE 120 MG/1
TABLET, FILM COATED ORAL
Qty: 60 TABLET | Refills: 11 | Status: SHIPPED | OUTPATIENT
Start: 2017-11-16 | End: 2018-09-17 | Stop reason: SDUPTHER

## 2017-11-16 RX ORDER — SPIRONOLACTONE 50 MG/1
TABLET, FILM COATED ORAL
Qty: 60 TABLET | Refills: 11 | Status: SHIPPED | OUTPATIENT
Start: 2017-11-16 | End: 2018-01-15 | Stop reason: SDUPTHER

## 2017-11-16 RX ORDER — GLIPIZIDE 10 MG/1
TABLET ORAL
Qty: 60 TABLET | Refills: 11 | Status: SHIPPED | OUTPATIENT
Start: 2017-11-16 | End: 2018-11-18 | Stop reason: SDUPTHER

## 2017-11-16 ASSESSMENT — ENCOUNTER SYMPTOMS
COUGH: 0
WHEEZING: 0
ABDOMINAL PAIN: 0
BLOOD IN STOOL: 0
SHORTNESS OF BREATH: 0
VOMITING: 0
CHEST TIGHTNESS: 0
NAUSEA: 0
DIARRHEA: 0

## 2017-11-16 NOTE — PROGRESS NOTES
Subjective:      Patient ID: Shabbir Dorsey is a 67 y.o. female. HPI  She has Type 2 DM,HTN,GERD,spinal stenosis,asthma,gout and depression. Blood sugars are under good control. Has had it since 1999. Patient's BP is controlled on current medications. Depression is stable on paxil. Asthma/COPD- on Breo and proventil.stable. Review of Systems   Constitutional: Negative for fatigue, fever and unexpected weight change. Respiratory: Negative for cough, chest tightness, shortness of breath and wheezing. Cardiovascular: Negative for chest pain, palpitations and leg swelling. Gastrointestinal: Negative for abdominal pain, blood in stool, diarrhea, nausea and vomiting. Genitourinary: Negative for dysuria and hematuria. Neurological: Negative for light-headedness and headaches. Objective:   Physical Exam   Constitutional: She is oriented to person, place, and time. She appears well-developed and well-nourished. HENT:   Head: Normocephalic and atraumatic. Eyes: Pupils are equal, round, and reactive to light. Neck: Normal range of motion. Neck supple. No thyromegaly present. Cardiovascular: Normal rate, regular rhythm and normal heart sounds. Exam reveals no gallop and no friction rub. No murmur heard. No carotid bruit   Pulmonary/Chest: Effort normal and breath sounds normal. No respiratory distress. She has no wheezes. She has no rales. She exhibits no tenderness. Abdominal: Soft. Bowel sounds are normal. She exhibits no distension and no mass. There is no tenderness. There is no rebound and no guarding. Musculoskeletal: She exhibits no edema. Neurological: She is alert and oriented to person, place, and time. Assessment:      1. Type 2 diabetes mellitus without complication, without long-term current use of insulin ([facility])  Hemoglobin A1C   2. Essential hypertension     3. Gastroesophageal reflux disease without esophagitis     4.  Chronic obstructive pulmonary

## 2017-12-11 RX ORDER — ATORVASTATIN CALCIUM 10 MG/1
TABLET, FILM COATED ORAL
Qty: 90 TABLET | Refills: 1 | Status: SHIPPED | OUTPATIENT
Start: 2017-12-11 | End: 2018-07-12 | Stop reason: SDUPTHER

## 2017-12-11 RX ORDER — OMEPRAZOLE 20 MG/1
CAPSULE, DELAYED RELEASE ORAL
Qty: 60 CAPSULE | Refills: 4 | Status: SHIPPED | OUTPATIENT
Start: 2017-12-11 | End: 2018-02-22

## 2018-01-05 ENCOUNTER — TELEPHONE (OUTPATIENT)
Dept: INTERNAL MEDICINE CLINIC | Age: 73
End: 2018-01-05

## 2018-01-05 NOTE — TELEPHONE ENCOUNTER
----- Message from [facility] ([facility]) sent at 1/5/2018  1:41 PM EST -----  Contact: pt [phone]  Needs a new script for a handicap mauraronnelllachelle. She would like it mailed to her please.      Last visit: 11-16-17  Future visit: 2-22-18

## 2018-01-19 RX ORDER — CARVEDILOL 6.25 MG/1
TABLET ORAL
Qty: 180 TABLET | Refills: 0 | Status: SHIPPED | OUTPATIENT
Start: 2018-01-19 | End: 2018-04-20 | Stop reason: SDUPTHER

## 2018-01-24 ENCOUNTER — OFFICE VISIT (OUTPATIENT)
Dept: ORTHOPEDIC SURGERY | Age: 73
End: 2018-01-24

## 2018-01-24 VITALS — WEIGHT: 257.94 LBS | BODY MASS INDEX: 44.04 KG/M2 | HEIGHT: 64 IN

## 2018-01-24 DIAGNOSIS — G56.02 CARPAL TUNNEL SYNDROME OF LEFT WRIST: ICD-10-CM

## 2018-01-24 DIAGNOSIS — M79.642 HAND PAIN, LEFT: Primary | ICD-10-CM

## 2018-01-24 PROCEDURE — 4040F PNEUMOC VAC/ADMIN/RCVD: CPT | Performed by: ORTHOPAEDIC SURGERY

## 2018-01-24 PROCEDURE — 3014F SCREEN MAMMO DOC REV: CPT | Performed by: ORTHOPAEDIC SURGERY

## 2018-01-24 PROCEDURE — G8428 CUR MEDS NOT DOCUMENT: HCPCS | Performed by: ORTHOPAEDIC SURGERY

## 2018-01-24 PROCEDURE — 1090F PRES/ABSN URINE INCON ASSESS: CPT | Performed by: ORTHOPAEDIC SURGERY

## 2018-01-24 PROCEDURE — 99213 OFFICE O/P EST LOW 20 MIN: CPT | Performed by: ORTHOPAEDIC SURGERY

## 2018-01-24 PROCEDURE — 1036F TOBACCO NON-USER: CPT | Performed by: ORTHOPAEDIC SURGERY

## 2018-01-24 PROCEDURE — G8484 FLU IMMUNIZE NO ADMIN: HCPCS | Performed by: ORTHOPAEDIC SURGERY

## 2018-01-24 PROCEDURE — 3017F COLORECTAL CA SCREEN DOC REV: CPT | Performed by: ORTHOPAEDIC SURGERY

## 2018-01-24 PROCEDURE — 1123F ACP DISCUSS/DSCN MKR DOCD: CPT | Performed by: ORTHOPAEDIC SURGERY

## 2018-01-24 PROCEDURE — 73130 X-RAY EXAM OF HAND: CPT | Performed by: ORTHOPAEDIC SURGERY

## 2018-01-24 PROCEDURE — G8399 PT W/DXA RESULTS DOCUMENT: HCPCS | Performed by: ORTHOPAEDIC SURGERY

## 2018-01-24 PROCEDURE — G8417 CALC BMI ABV UP PARAM F/U: HCPCS | Performed by: ORTHOPAEDIC SURGERY

## 2018-01-25 ENCOUNTER — OFFICE VISIT (OUTPATIENT)
Dept: INTERNAL MEDICINE CLINIC | Age: 73
End: 2018-01-25

## 2018-01-25 ENCOUNTER — TELEPHONE (OUTPATIENT)
Dept: ORTHOPEDIC SURGERY | Age: 73
End: 2018-01-25

## 2018-01-25 VITALS
DIASTOLIC BLOOD PRESSURE: 70 MMHG | HEIGHT: 64 IN | RESPIRATION RATE: 14 BRPM | HEART RATE: 70 BPM | BODY MASS INDEX: 42.68 KG/M2 | WEIGHT: 250 LBS | SYSTOLIC BLOOD PRESSURE: 144 MMHG

## 2018-01-25 DIAGNOSIS — M48.061 SPINAL STENOSIS OF LUMBAR REGION WITHOUT NEUROGENIC CLAUDICATION: ICD-10-CM

## 2018-01-25 DIAGNOSIS — K21.9 GASTROESOPHAGEAL REFLUX DISEASE WITHOUT ESOPHAGITIS: ICD-10-CM

## 2018-01-25 DIAGNOSIS — E78.5 HYPERLIPIDEMIA, UNSPECIFIED HYPERLIPIDEMIA TYPE: ICD-10-CM

## 2018-01-25 DIAGNOSIS — J44.9 CHRONIC OBSTRUCTIVE PULMONARY DISEASE, UNSPECIFIED COPD TYPE (HCC): ICD-10-CM

## 2018-01-25 DIAGNOSIS — I10 ESSENTIAL HYPERTENSION: ICD-10-CM

## 2018-01-25 DIAGNOSIS — G56.02 CARPAL TUNNEL SYNDROME OF LEFT WRIST: Primary | ICD-10-CM

## 2018-01-25 DIAGNOSIS — Z01.818 PRE-OPERATIVE EXAMINATION FOR INTERNAL MEDICINE: ICD-10-CM

## 2018-01-25 DIAGNOSIS — M17.0 PRIMARY OSTEOARTHRITIS OF BOTH KNEES: ICD-10-CM

## 2018-01-25 DIAGNOSIS — R09.89 BILATERAL CAROTID BRUITS: ICD-10-CM

## 2018-01-25 DIAGNOSIS — E11.9 TYPE 2 DIABETES MELLITUS WITHOUT COMPLICATION, WITHOUT LONG-TERM CURRENT USE OF INSULIN (HCC): ICD-10-CM

## 2018-01-25 DIAGNOSIS — F32.A DEPRESSION, UNSPECIFIED DEPRESSION TYPE: ICD-10-CM

## 2018-01-25 DIAGNOSIS — D56.9 THALASSEMIA, UNSPECIFIED TYPE: ICD-10-CM

## 2018-01-25 DIAGNOSIS — M1A.09X0 CHRONIC GOUT OF MULTIPLE SITES, UNSPECIFIED CAUSE: ICD-10-CM

## 2018-01-25 PROCEDURE — 1090F PRES/ABSN URINE INCON ASSESS: CPT | Performed by: NURSE PRACTITIONER

## 2018-01-25 PROCEDURE — 3023F SPIROM DOC REV: CPT | Performed by: NURSE PRACTITIONER

## 2018-01-25 PROCEDURE — 4040F PNEUMOC VAC/ADMIN/RCVD: CPT | Performed by: NURSE PRACTITIONER

## 2018-01-25 PROCEDURE — 3014F SCREEN MAMMO DOC REV: CPT | Performed by: NURSE PRACTITIONER

## 2018-01-25 PROCEDURE — G8399 PT W/DXA RESULTS DOCUMENT: HCPCS | Performed by: NURSE PRACTITIONER

## 2018-01-25 PROCEDURE — 3017F COLORECTAL CA SCREEN DOC REV: CPT | Performed by: NURSE PRACTITIONER

## 2018-01-25 PROCEDURE — G8926 SPIRO NO PERF OR DOC: HCPCS | Performed by: NURSE PRACTITIONER

## 2018-01-25 PROCEDURE — 1123F ACP DISCUSS/DSCN MKR DOCD: CPT | Performed by: NURSE PRACTITIONER

## 2018-01-25 PROCEDURE — G8484 FLU IMMUNIZE NO ADMIN: HCPCS | Performed by: NURSE PRACTITIONER

## 2018-01-25 PROCEDURE — 3046F HEMOGLOBIN A1C LEVEL >9.0%: CPT | Performed by: NURSE PRACTITIONER

## 2018-01-25 PROCEDURE — G8417 CALC BMI ABV UP PARAM F/U: HCPCS | Performed by: NURSE PRACTITIONER

## 2018-01-25 PROCEDURE — 99214 OFFICE O/P EST MOD 30 MIN: CPT | Performed by: NURSE PRACTITIONER

## 2018-01-25 PROCEDURE — 1036F TOBACCO NON-USER: CPT | Performed by: NURSE PRACTITIONER

## 2018-01-25 PROCEDURE — G8427 DOCREV CUR MEDS BY ELIG CLIN: HCPCS | Performed by: NURSE PRACTITIONER

## 2018-01-26 ENCOUNTER — PAT TELEPHONE (OUTPATIENT)
Dept: PREADMISSION TESTING | Age: 73
End: 2018-01-26

## 2018-01-26 VITALS — WEIGHT: 250 LBS | BODY MASS INDEX: 42.68 KG/M2 | HEIGHT: 64 IN

## 2018-01-26 RX ORDER — CETIRIZINE HYDROCHLORIDE 10 MG/1
10 TABLET ORAL DAILY
COMMUNITY

## 2018-02-05 ENCOUNTER — HOSPITAL ENCOUNTER (OUTPATIENT)
Dept: SURGERY | Age: 73
Discharge: HOME | End: 2018-02-05
Attending: ORTHOPAEDIC SURGERY | Admitting: ORTHOPAEDIC SURGERY

## 2018-02-05 VITALS
BODY MASS INDEX: 42.68 KG/M2 | WEIGHT: 250 LBS | TEMPERATURE: 97.3 F | HEIGHT: 64 IN | RESPIRATION RATE: 16 BRPM | OXYGEN SATURATION: 98 % | DIASTOLIC BLOOD PRESSURE: 84 MMHG | HEART RATE: 77 BPM | SYSTOLIC BLOOD PRESSURE: 148 MMHG

## 2018-02-05 DIAGNOSIS — G56.02 CARPAL TUNNEL SYNDROME OF LEFT WRIST: Primary | ICD-10-CM

## 2018-02-05 LAB
GLUCOSE BLD-MCNC: 147 MG/DL (ref 70–99)
GLUCOSE BLD-MCNC: 177 MG/DL (ref 70–99)
PERFORMED ON: ABNORMAL
PERFORMED ON: ABNORMAL

## 2018-02-05 RX ORDER — LABETALOL HYDROCHLORIDE 5 MG/ML
5 INJECTION, SOLUTION INTRAVENOUS EVERY 10 MIN PRN
Status: DISCONTINUED | OUTPATIENT
Start: 2018-02-05 | End: 2018-02-06 | Stop reason: HOSPADM

## 2018-02-05 RX ORDER — MEPERIDINE HYDROCHLORIDE 25 MG/ML
12.5 INJECTION INTRAMUSCULAR; INTRAVENOUS; SUBCUTANEOUS EVERY 5 MIN PRN
Status: DISCONTINUED | OUTPATIENT
Start: 2018-02-05 | End: 2018-02-06 | Stop reason: HOSPADM

## 2018-02-05 RX ORDER — SODIUM CHLORIDE, SODIUM LACTATE, POTASSIUM CHLORIDE, CALCIUM CHLORIDE 600; 310; 30; 20 MG/100ML; MG/100ML; MG/100ML; MG/100ML
INJECTION, SOLUTION INTRAVENOUS CONTINUOUS
Status: DISCONTINUED | OUTPATIENT
Start: 2018-02-05 | End: 2018-02-06 | Stop reason: HOSPADM

## 2018-02-05 RX ORDER — MORPHINE SULFATE 10 MG/ML
2 INJECTION, SOLUTION INTRAMUSCULAR; INTRAVENOUS EVERY 5 MIN PRN
Status: DISCONTINUED | OUTPATIENT
Start: 2018-02-05 | End: 2018-02-06 | Stop reason: HOSPADM

## 2018-02-05 RX ORDER — HYDROMORPHONE HCL 110MG/55ML
0.5 PATIENT CONTROLLED ANALGESIA SYRINGE INTRAVENOUS EVERY 5 MIN PRN
Status: DISCONTINUED | OUTPATIENT
Start: 2018-02-05 | End: 2018-02-06 | Stop reason: HOSPADM

## 2018-02-05 RX ORDER — HYDROMORPHONE HCL 110MG/55ML
0.25 PATIENT CONTROLLED ANALGESIA SYRINGE INTRAVENOUS EVERY 5 MIN PRN
Status: DISCONTINUED | OUTPATIENT
Start: 2018-02-05 | End: 2018-02-06 | Stop reason: HOSPADM

## 2018-02-05 RX ORDER — OXYCODONE HYDROCHLORIDE AND ACETAMINOPHEN 5; 325 MG/1; MG/1
2 TABLET ORAL PRN
Status: ACTIVE | OUTPATIENT
Start: 2018-02-05 | End: 2018-02-05

## 2018-02-05 RX ORDER — HYDROCODONE BITARTRATE AND ACETAMINOPHEN 5; 325 MG/1; MG/1
1 TABLET ORAL EVERY 8 HOURS PRN
Qty: 20 TABLET | Refills: 0 | Status: SHIPPED | OUTPATIENT
Start: 2018-02-05 | End: 2018-02-12

## 2018-02-05 RX ORDER — LIDOCAINE HYDROCHLORIDE 10 MG/ML
1 INJECTION, SOLUTION EPIDURAL; INFILTRATION; INTRACAUDAL; PERINEURAL
Status: COMPLETED | OUTPATIENT
Start: 2018-02-05 | End: 2018-02-05

## 2018-02-05 RX ORDER — DIPHENHYDRAMINE HYDROCHLORIDE 50 MG/ML
12.5 INJECTION INTRAMUSCULAR; INTRAVENOUS
Status: ACTIVE | OUTPATIENT
Start: 2018-02-05 | End: 2018-02-05

## 2018-02-05 RX ORDER — ONDANSETRON 2 MG/ML
4 INJECTION INTRAMUSCULAR; INTRAVENOUS
Status: ACTIVE | OUTPATIENT
Start: 2018-02-05 | End: 2018-02-05

## 2018-02-05 RX ORDER — OXYCODONE HYDROCHLORIDE AND ACETAMINOPHEN 5; 325 MG/1; MG/1
1 TABLET ORAL PRN
Status: ACTIVE | OUTPATIENT
Start: 2018-02-05 | End: 2018-02-05

## 2018-02-05 RX ORDER — PROMETHAZINE HYDROCHLORIDE 25 MG/ML
6.25 INJECTION, SOLUTION INTRAMUSCULAR; INTRAVENOUS
Status: ACTIVE | OUTPATIENT
Start: 2018-02-05 | End: 2018-02-05

## 2018-02-05 RX ORDER — MORPHINE SULFATE 10 MG/ML
1 INJECTION, SOLUTION INTRAMUSCULAR; INTRAVENOUS EVERY 5 MIN PRN
Status: DISCONTINUED | OUTPATIENT
Start: 2018-02-05 | End: 2018-02-06 | Stop reason: HOSPADM

## 2018-02-05 RX ORDER — HYDRALAZINE HYDROCHLORIDE 20 MG/ML
5 INJECTION INTRAMUSCULAR; INTRAVENOUS EVERY 10 MIN PRN
Status: DISCONTINUED | OUTPATIENT
Start: 2018-02-05 | End: 2018-02-06 | Stop reason: HOSPADM

## 2018-02-05 RX ORDER — SODIUM CHLORIDE, SODIUM LACTATE, POTASSIUM CHLORIDE, CALCIUM CHLORIDE 600; 310; 30; 20 MG/100ML; MG/100ML; MG/100ML; MG/100ML
INJECTION, SOLUTION INTRAVENOUS
Status: COMPLETED
Start: 2018-02-05 | End: 2018-02-05

## 2018-02-05 RX ORDER — LIDOCAINE HYDROCHLORIDE 10 MG/ML
INJECTION, SOLUTION EPIDURAL; INFILTRATION; INTRACAUDAL; PERINEURAL
Status: COMPLETED
Start: 2018-02-05 | End: 2018-02-05

## 2018-02-05 RX ADMIN — LIDOCAINE HYDROCHLORIDE 0.1 ML: 10 INJECTION, SOLUTION EPIDURAL; INFILTRATION; INTRACAUDAL; PERINEURAL at 06:31

## 2018-02-05 RX ADMIN — SODIUM CHLORIDE, SODIUM LACTATE, POTASSIUM CHLORIDE, CALCIUM CHLORIDE: 600; 310; 30; 20 INJECTION, SOLUTION INTRAVENOUS at 06:34

## 2018-02-05 ASSESSMENT — PAIN DESCRIPTION - DESCRIPTORS: DESCRIPTORS: NUMBNESS

## 2018-02-05 ASSESSMENT — ACTIVITIES OF DAILY LIVING (ADL): EFFECT OF PAIN ON DAILY ACTIVITIES: SLEEPING

## 2018-02-05 ASSESSMENT — PAIN - FUNCTIONAL ASSESSMENT: PAIN_FUNCTIONAL_ASSESSMENT: 0-10

## 2018-02-05 ASSESSMENT — COPD QUESTIONNAIRES: CAT_SEVERITY: MILD

## 2018-02-05 NOTE — ANESTHESIA PRE-OP
Department of Anesthesiology  Preprocedure Note       Name:  Bre Jara   Age:  67 y.o.  :  1945                                          MRN:  [id number]         Date:  2018      Surgeon:    Procedure:    Medications prior to admission:   Prior to Admission medications    Medication Sig Start Date End Date Taking?  Authorizing Provider   cetirizine (ZYRTEC) 10 MG tablet Take 10 mg by mouth daily   Yes Historical Provider, MD   carvedilol (COREG) 6.25 MG tablet TAKE ONE TABLET BY MOUTH TWICE A DAY 18  Yes Carlos Berumen MD   spironolactone (ALDACTONE) 50 MG tablet TAKE ONE TABLET BY MOUTH TWICE A DAY, TAKE A SECOND ONE IF NEEDED 18  Yes Carlos Berumen MD   omeprazole (PRILOSEC) 20 MG delayed release capsule TAKE ONE CAPSULE BY MOUTH TWICE A DAY 17  Yes Carlos Berumen MD   atorvastatin (LIPITOR) 10 MG tablet TAKE ONE TABLET BY MOUTH ONCE NIGHTLY 17  Yes Carlos Berumen MD   glipiZIDE (GLUCOTROL) 10 MG tablet TAKE ONE TABLET BY MOUTH TWICE A DAY BEFORE MEALS 17  Yes Carlos Berumen MD   metFORMIN (GLUCOPHAGE) 1000 MG tablet TAKE ONE TABLET BY MOUTH TWICE A DAY WITH MEALS 17  Yes Carlos Berumen MD   allopurinol (ZYLOPRIM) 300 MG tablet TAKE ONE TABLET BY MOUTH DAILY 11/15/17  Yes Carlos Berumen MD   PARoxetine (PAXIL) 10 MG tablet TAKE ONE TABLET BY MOUTH EVERY MORNING 11/15/17  Yes Carlos Berumen MD   losartan (COZAAR) 100 MG tablet TAKE ONE TABLET BY MOUTH DAILY 11/15/17  Yes Carlos Berumen MD   verapamil (CALAN) 120 MG tablet Take 1 tablet by mouth 2 times daily 10/25/17  Yes Carlos Berumen MD   albuterol sulfate HFA (PROVENTIL HFA) 108 (90 Base) MCG/ACT inhaler Inhale 2 puffs into the lungs every 6 hours as needed for Wheezing 10/19/17  Yes Carlos Berumen MD   Fluticasone Furoate-Vilanterol (BREO ELLIPTA) 200-25 MCG/INH AEPB Inhale into the lungs   Yes Historical Provider, MD   Umeclidinium Bromide (INCRUSE ELLIPTA) 62.5 MCG/INH AEPB Inhale into the lungs   Yes Historical Provider, MD   Cyanocobalamin (VITAMIN B 12 PO) Take 1,000 mcg by mouth daily. Yes Historical Provider, MD   Ascorbic Acid (VITAMIN C) 500 MG CAPS Take  by mouth daily. Yes Historical Provider, MD   vitamin E 400 UNIT capsule Take 400 Units by mouth daily. Yes Historical Provider, MD   Calcium Carbonate-Vitamin D (CALCIUM + D PO) Take  by mouth daily.    Yes Historical Provider, MD   verapamil (CALAN) 120 MG tablet TAKE ONE TABLET BY MOUTH TWICE A DAY 11/16/17   Miryam Quinteros MD   SPIRIVA HANDIHALER 18 MCG inhalation capsule INHALE THE ENTIRE CONTENTS OF 1 CAPSULE ONCE A DAY USING HANDIHALER DEVICE 4/28/17   Miryam Quinteros MD       Current medications:    Current Outpatient Prescriptions   Medication Sig Dispense Refill    cetirizine (ZYRTEC) 10 MG tablet Take 10 mg by mouth daily      carvedilol (COREG) 6.25 MG tablet TAKE ONE TABLET BY MOUTH TWICE A  tablet 0    spironolactone (ALDACTONE) 50 MG tablet TAKE ONE TABLET BY MOUTH TWICE A DAY, TAKE A SECOND ONE IF NEEDED 60 tablet 2    omeprazole (PRILOSEC) 20 MG delayed release capsule TAKE ONE CAPSULE BY MOUTH TWICE A DAY 60 capsule 4    atorvastatin (LIPITOR) 10 MG tablet TAKE ONE TABLET BY MOUTH ONCE NIGHTLY 90 tablet 1    glipiZIDE (GLUCOTROL) 10 MG tablet TAKE ONE TABLET BY MOUTH TWICE A DAY BEFORE MEALS 60 tablet 11    metFORMIN (GLUCOPHAGE) 1000 MG tablet TAKE ONE TABLET BY MOUTH TWICE A DAY WITH MEALS 60 tablet 11    allopurinol (ZYLOPRIM) 300 MG tablet TAKE ONE TABLET BY MOUTH DAILY 30 tablet 2    PARoxetine (PAXIL) 10 MG tablet TAKE ONE TABLET BY MOUTH EVERY MORNING 30 tablet 2    losartan (COZAAR) 100 MG tablet TAKE ONE TABLET BY MOUTH DAILY 30 tablet 2    verapamil (CALAN) 120 MG tablet Take 1 tablet by mouth 2 times daily 60 tablet 2    albuterol sulfate HFA (PROVENTIL HFA) 108 (90 Base) MCG/ACT inhaler Inhale 2 puffs into the lungs every 6 hours as needed for Wheezing 1 Inhaler 2    Fluticasone Furoate-Vilanterol (BREO ELLIPTA) 200-25 MCG/INH AEPB Inhale into the lungs      Umeclidinium Bromide (INCRUSE ELLIPTA) 62.5 MCG/INH AEPB Inhale into the lungs      Cyanocobalamin (VITAMIN B 12 PO) Take 1,000 mcg by mouth daily.  Ascorbic Acid (VITAMIN C) 500 MG CAPS Take  by mouth daily.  vitamin E 400 UNIT capsule Take 400 Units by mouth daily.  Calcium Carbonate-Vitamin D (CALCIUM + D PO) Take  by mouth daily.       verapamil (CALAN) 120 MG tablet TAKE ONE TABLET BY MOUTH TWICE A DAY 60 tablet 11    SPIRIVA HANDIHALER 18 MCG inhalation capsule INHALE THE ENTIRE CONTENTS OF 1 CAPSULE ONCE A DAY USING HANDIHALER DEVICE 90 capsule 0     Current Facility-Administered Medications   Medication Dose Route Frequency Provider Last Rate Last Dose    lactated ringers infusion   Intravenous Continuous Andra Calixto  mL/hr at 02/05/18 1561      vancomycin 1.5 g in dextrose 5% 300 mL IVPB  1,500 mg Intravenous Once Phi Marinelli  mL/hr at 02/05/18 0634 1.5 g at 02/05/18 9934    HYDROmorphone (DILAUDID) injection 0.25 mg  0.25 mg Intravenous Q5 Min PRN Jerry Allan MD        HYDROmorphone (DILAUDID) injection 0.5 mg  0.5 mg Intravenous Q5 Min PRN Jerry Allan MD        morphine (PF) injection 1 mg  1 mg Intravenous Q5 Min PRN Jerry Allan MD        morphine (PF) injection 2 mg  2 mg Intravenous Q5 Min PRN Jerry Allan MD        oxyCODONE-acetaminophen (PERCOCET) 5-325 MG per tablet 1 tablet  1 tablet Oral PRN Jerry Allan MD        Or    oxyCODONE-acetaminophen (PERCOCET) 5-325 MG per tablet 2 tablet  2 tablet Oral PRN Jerry Allan MD        diphenhydrAMINE (BENADRYL) injection 12.5 mg  12.5 mg Intravenous Once PRN Jerry Allan MD        [facility] injection 4 mg  4 mg

## 2018-02-05 NOTE — OP NOTE
Kelli Thakur ([birth year])    Date of Surgery- 2/5/2018    Pre-Op Diagnoses:  1.   left carpal tunnel syndrome  2. Left thumb basal joint arthritis         Post-op Diagnoses:   1. Same  2. Same        Procedure(s) Performed:  1.  left carpal tunnel release, mini open  2. Left thumb basal joint cortisone injection        Surgeon: Marco Gallego MD    Anesthesia Type:   Local/MAC    Blood Loss:   2 cc    Pathology:   None    Complications:   None    Assistant:  None    The left palm and left thumb basal joint were marked in preop holding by Dr Jenaro Elkins after discussing the surgical procedure once again with the patient. All questions and concerns were addressed. Informed consent was on the chart. The patient was brought to the operating and room and placed in the supine position. After the induction of anesthesia a standard time-out was performed. Description of the Procedure: We verified that antibiotics had been given. The left upper extremity was prepped and draped in normal sterile fashion. Formal timeout had been held before the injection and once again after the draping procedure. The upper extremity was exsanguinated and the tourniquet was inflated to 250 mmHg. A standard 1.5 cm incision was made in the mid palm. This was carried down through the subcutaneous tissues and palmar fascia to the transverse carpal ligament. The distal one half of the transverse carpal ligament was incised longitudinally under direct vision using a #15 blade. The carpal tunnel release guide was then placed under direct vision beneath the transverse carpal ligament and slid proximally. The guide was palpated into appropriate alignment longitudinally. Contact with the transverse carpal ligament was maintained throughout passing. The blade was engaged into the guide and the remaining portion of the transverse carpal ligament released completely.   No other abnormalities were

## 2018-02-14 ENCOUNTER — OFFICE VISIT (OUTPATIENT)
Dept: ORTHOPEDIC SURGERY | Age: 73
End: 2018-02-14

## 2018-02-14 VITALS — WEIGHT: 250 LBS | HEIGHT: 64 IN | BODY MASS INDEX: 42.68 KG/M2

## 2018-02-14 DIAGNOSIS — G56.02 CARPAL TUNNEL SYNDROME OF LEFT WRIST: Primary | ICD-10-CM

## 2018-02-14 PROCEDURE — 99024 POSTOP FOLLOW-UP VISIT: CPT | Performed by: ORTHOPAEDIC SURGERY

## 2018-02-14 PROCEDURE — L3908 WHO COCK-UP NONMOLDE PRE OTS: HCPCS | Performed by: ORTHOPAEDIC SURGERY

## 2018-02-14 NOTE — PROGRESS NOTES
Chief Complaint   Patient presents with    Post-Op Check     left carpal tunnel release, left basal joint cortisone injection       HISTORY OF PRESENT ILLNESS:  Stacey Angelucci is a 67 y.o.  patient here for post-op follow up after left carpal tunnel release. Surgery was now 9 days ago. The patient is doing quite well overall. Nocturnal symptoms are gone. Daytime numbness is better but not fully resolved. The palmar sensitivity is present with use of the cane. Overall, preoperative symptoms are much improved    ROS:  ROS neg     MEDICAL HISTORY:  Patient's medications, allergies, past medical, surgical, social and family histories were reviewed and updated as appropriate. PHYSICAL EXAMINATION:  Alert and pleasant, no distress. The left hand and wrist is examined. The wound is healing without signs of infection or dehiscense. Mild swelling noted. There is satisfactory range of motion of the wrist and fingers. Thumb abduction is present. Fingers are well perfused. IMPRESSION AND PLAN:  left carpal tunnel sydrome  Doing well after left carpal tunnel release. We will plan for suture removal and discuss appropriate wound care. We discussed scar and thenar massage, progressive ROM of hand and fingers. Activities may be advanced depending on comfort. If patient desires hand therapy at any point during recovery, we will be happy to place this order. Follow-up In 6-8 weeks to assess improvement. We did fit and provide a gel brace until palmar sensitivity resolves. Procedures    Gel Wrist Lori Colon Brace     Patient was prescribed a Lori Colon Gel Wrist Brace. The left wrist will require stabilization / immobilization from this semi-rigid / rigid orthosis to improve their function. The orthosis will assist in protecting the affected area, provide functional support and facilitate healing.     The patient was educated and fit by a healthcare professional with expert knowledge and

## 2018-02-20 RX ORDER — ALLOPURINOL 300 MG/1
TABLET ORAL
Qty: 30 TABLET | Refills: 0 | Status: SHIPPED | OUTPATIENT
Start: 2018-02-20 | End: 2018-03-21 | Stop reason: SDUPTHER

## 2018-02-20 RX ORDER — LOSARTAN POTASSIUM 100 MG/1
TABLET ORAL
Qty: 30 TABLET | Refills: 0 | Status: SHIPPED | OUTPATIENT
Start: 2018-02-20 | End: 2018-03-21 | Stop reason: SDUPTHER

## 2018-02-22 ENCOUNTER — OFFICE VISIT (OUTPATIENT)
Dept: INTERNAL MEDICINE CLINIC | Age: 73
End: 2018-02-22

## 2018-02-22 VITALS
WEIGHT: 245 LBS | SYSTOLIC BLOOD PRESSURE: 162 MMHG | HEIGHT: 64 IN | BODY MASS INDEX: 41.83 KG/M2 | HEART RATE: 84 BPM | DIASTOLIC BLOOD PRESSURE: 80 MMHG

## 2018-02-22 DIAGNOSIS — F32.A DEPRESSION, UNSPECIFIED DEPRESSION TYPE: ICD-10-CM

## 2018-02-22 DIAGNOSIS — I10 ESSENTIAL HYPERTENSION: ICD-10-CM

## 2018-02-22 DIAGNOSIS — F32.5 MAJOR DEPRESSION, SINGLE EPISODE, IN COMPLETE REMISSION (HCC): ICD-10-CM

## 2018-02-22 DIAGNOSIS — M17.0 PRIMARY OSTEOARTHRITIS OF BOTH KNEES: ICD-10-CM

## 2018-02-22 DIAGNOSIS — E78.5 HYPERLIPIDEMIA, UNSPECIFIED HYPERLIPIDEMIA TYPE: ICD-10-CM

## 2018-02-22 DIAGNOSIS — E66.01 MORBID OBESITY (HCC): ICD-10-CM

## 2018-02-22 DIAGNOSIS — M48.061 SPINAL STENOSIS OF LUMBAR REGION WITHOUT NEUROGENIC CLAUDICATION: ICD-10-CM

## 2018-02-22 DIAGNOSIS — E66.01 MORBID OBESITY WITH BMI OF 40.0-44.9, ADULT (HCC): ICD-10-CM

## 2018-02-22 DIAGNOSIS — E11.9 TYPE 2 DIABETES MELLITUS WITHOUT COMPLICATION, WITHOUT LONG-TERM CURRENT USE OF INSULIN (HCC): Primary | ICD-10-CM

## 2018-02-22 DIAGNOSIS — D64.9 ANEMIA, UNSPECIFIED TYPE: ICD-10-CM

## 2018-02-22 DIAGNOSIS — J45.40 MODERATE PERSISTENT ASTHMA WITHOUT COMPLICATION: ICD-10-CM

## 2018-02-22 PROBLEM — J44.9 CHRONIC OBSTRUCTIVE PULMONARY DISEASE (HCC): Status: RESOLVED | Noted: 2017-11-16 | Resolved: 2018-02-22

## 2018-02-22 PROCEDURE — G8427 DOCREV CUR MEDS BY ELIG CLIN: HCPCS | Performed by: INTERNAL MEDICINE

## 2018-02-22 PROCEDURE — 99214 OFFICE O/P EST MOD 30 MIN: CPT | Performed by: INTERNAL MEDICINE

## 2018-02-22 PROCEDURE — 4040F PNEUMOC VAC/ADMIN/RCVD: CPT | Performed by: INTERNAL MEDICINE

## 2018-02-22 PROCEDURE — G8399 PT W/DXA RESULTS DOCUMENT: HCPCS | Performed by: INTERNAL MEDICINE

## 2018-02-22 PROCEDURE — 1123F ACP DISCUSS/DSCN MKR DOCD: CPT | Performed by: INTERNAL MEDICINE

## 2018-02-22 PROCEDURE — 3014F SCREEN MAMMO DOC REV: CPT | Performed by: INTERNAL MEDICINE

## 2018-02-22 PROCEDURE — 1090F PRES/ABSN URINE INCON ASSESS: CPT | Performed by: INTERNAL MEDICINE

## 2018-02-22 PROCEDURE — 3017F COLORECTAL CA SCREEN DOC REV: CPT | Performed by: INTERNAL MEDICINE

## 2018-02-22 PROCEDURE — 1036F TOBACCO NON-USER: CPT | Performed by: INTERNAL MEDICINE

## 2018-02-22 PROCEDURE — 3046F HEMOGLOBIN A1C LEVEL >9.0%: CPT | Performed by: INTERNAL MEDICINE

## 2018-02-22 PROCEDURE — G8417 CALC BMI ABV UP PARAM F/U: HCPCS | Performed by: INTERNAL MEDICINE

## 2018-02-22 PROCEDURE — G8484 FLU IMMUNIZE NO ADMIN: HCPCS | Performed by: INTERNAL MEDICINE

## 2018-02-22 RX ORDER — METHOCARBAMOL 750 MG/1
750 TABLET, FILM COATED ORAL 3 TIMES DAILY
Qty: 25 TABLET | Refills: 0 | Status: SHIPPED | OUTPATIENT
Start: 2018-02-22 | End: 2018-05-18

## 2018-02-22 ASSESSMENT — ENCOUNTER SYMPTOMS
DIARRHEA: 0
COUGH: 0
BLOOD IN STOOL: 0
NAUSEA: 0
VOMITING: 0
CHEST TIGHTNESS: 0
ABDOMINAL PAIN: 0
WHEEZING: 0
SHORTNESS OF BREATH: 0

## 2018-02-22 NOTE — PROGRESS NOTES
Differential    Basic Metabolic Panel   3. Spinal stenosis of lumbar region without neurogenic claudication     4. Primary osteoarthritis of both knees     5. Chronic obstructive pulmonary disease, unspecified COPD type ([facility]     6. Hyperlipidemia, unspecified hyperlipidemia type     7. Morbid obesity ([facility]     8. Depression, unspecified depression type     9. Anemia, unspecified type             Plan:      DM- Under good control. Sees opthal every year. Strict diet.      HTN. Blood pressure is mildly elevated. Continue current meds for now. Cannot take HCTZ 2 to high Ca. Walking. Weight loss. Mid back pain. Try Robaxin 750 tid prn.     Asthma  Stable on Breo and proventil     HLD. continue lipitor. Lipids are good      Gout- continue allopurinol  No recent flareups.      Depression. Stable. On paxil.      Spinal stenosis. Continue gabapentin.      GERD. Stable on PPI. Taper and d/c       Anemia. H/o thal trait and B12 def. Check B12 levels. Normal.      Up to date on colonoscopy,mammogram and DEXA.

## 2018-02-23 LAB
ANION GAP SERPL CALCULATED.3IONS-SCNC: 17 MMOL/L (ref 3–16)
BASOPHILS ABSOLUTE: 0 K/UL (ref 0–0.2)
BASOPHILS RELATIVE PERCENT: 0.5 %
BUN BLDV-MCNC: 18 MG/DL (ref 7–20)
CALCIUM SERPL-MCNC: 9.2 MG/DL (ref 8.3–10.6)
CHLORIDE BLD-SCNC: 102 MMOL/L (ref 99–110)
CO2: 21 MMOL/L (ref 21–32)
CREAT SERPL-MCNC: 0.7 MG/DL (ref 0.6–1.2)
EOSINOPHILS ABSOLUTE: 0.2 K/UL (ref 0–0.6)
EOSINOPHILS RELATIVE PERCENT: 2.2 %
ESTIMATED AVERAGE GLUCOSE: 171.4 MG/DL
GFR AFRICAN AMERICAN: >60
GFR NON-AFRICAN AMERICAN: >60
GLUCOSE BLD-MCNC: 200 MG/DL (ref 70–99)
HBA1C MFR BLD: 7.6 %
HCT VFR BLD CALC: 33 % (ref 36–48)
HEMATOLOGY PATH CONSULT: NO
HEMOGLOBIN: 10.4 G/DL (ref 12–16)
LYMPHOCYTES ABSOLUTE: 1.7 K/UL (ref 1–5.1)
LYMPHOCYTES RELATIVE PERCENT: 22.4 %
MCH RBC QN AUTO: 19.2 PG (ref 26–34)
MCHC RBC AUTO-ENTMCNC: 31.5 G/DL (ref 31–36)
MCV RBC AUTO: 61 FL (ref 80–100)
MONOCYTES ABSOLUTE: 0.5 K/UL (ref 0–1.3)
MONOCYTES RELATIVE PERCENT: 6.5 %
NEUTROPHILS ABSOLUTE: 5.1 K/UL (ref 1.7–7.7)
NEUTROPHILS RELATIVE PERCENT: 68.4 %
PDW BLD-RTO: 16.5 % (ref 12.4–15.4)
PLATELET # BLD: 270 K/UL (ref 135–450)
PMV BLD AUTO: 10.9 FL (ref 5–10.5)
POTASSIUM SERPL-SCNC: 4.8 MMOL/L (ref 3.5–5.1)
RBC # BLD: 5.41 M/UL (ref 4–5.2)
SODIUM BLD-SCNC: 140 MMOL/L (ref 136–145)
WBC # BLD: 7.4 K/UL (ref 4–11)

## 2018-03-22 RX ORDER — LOSARTAN POTASSIUM 100 MG/1
TABLET ORAL
Qty: 30 TABLET | Refills: 2 | Status: SHIPPED | OUTPATIENT
Start: 2018-03-22 | End: 2018-05-18 | Stop reason: SDUPTHER

## 2018-03-22 RX ORDER — PAROXETINE 10 MG/1
TABLET, FILM COATED ORAL
Qty: 30 TABLET | Refills: 2 | Status: SHIPPED | OUTPATIENT
Start: 2018-03-22 | End: 2018-05-18 | Stop reason: SDUPTHER

## 2018-03-22 RX ORDER — ALLOPURINOL 300 MG/1
TABLET ORAL
Qty: 30 TABLET | Refills: 2 | Status: SHIPPED | OUTPATIENT
Start: 2018-03-22 | End: 2018-05-18 | Stop reason: SDUPTHER

## 2018-03-30 ENCOUNTER — HOSPITAL ENCOUNTER (OUTPATIENT)
Dept: MAMMOGRAPHY | Age: 73
Discharge: HOME | End: 2018-03-30
Attending: INTERNAL MEDICINE | Admitting: INTERNAL MEDICINE

## 2018-03-30 DIAGNOSIS — Z12.31 ENCOUNTER FOR SCREENING MAMMOGRAM FOR BREAST CANCER: ICD-10-CM

## 2018-04-04 ENCOUNTER — OFFICE VISIT (OUTPATIENT)
Dept: ORTHOPEDIC SURGERY | Age: 73
End: 2018-04-04

## 2018-04-04 VITALS — WEIGHT: 244.93 LBS | HEIGHT: 63 IN | BODY MASS INDEX: 43.4 KG/M2

## 2018-04-04 DIAGNOSIS — G56.02 CARPAL TUNNEL SYNDROME OF LEFT WRIST: Primary | ICD-10-CM

## 2018-04-04 PROCEDURE — 20526 THER INJECTION CARP TUNNEL: CPT | Performed by: ORTHOPAEDIC SURGERY

## 2018-04-04 PROCEDURE — 99024 POSTOP FOLLOW-UP VISIT: CPT | Performed by: ORTHOPAEDIC SURGERY

## 2018-05-16 ENCOUNTER — OFFICE VISIT (OUTPATIENT)
Dept: ORTHOPEDIC SURGERY | Age: 73
End: 2018-05-16

## 2018-05-16 DIAGNOSIS — G56.02 CARPAL TUNNEL SYNDROME OF LEFT WRIST: Primary | ICD-10-CM

## 2018-05-16 PROCEDURE — 99024 POSTOP FOLLOW-UP VISIT: CPT | Performed by: ORTHOPAEDIC SURGERY

## 2018-05-18 ENCOUNTER — OFFICE VISIT (OUTPATIENT)
Dept: INTERNAL MEDICINE CLINIC | Age: 73
End: 2018-05-18

## 2018-05-18 VITALS
WEIGHT: 244 LBS | DIASTOLIC BLOOD PRESSURE: 70 MMHG | SYSTOLIC BLOOD PRESSURE: 144 MMHG | HEIGHT: 64 IN | HEART RATE: 72 BPM | BODY MASS INDEX: 41.66 KG/M2

## 2018-05-18 DIAGNOSIS — M1A.09X0 CHRONIC GOUT OF MULTIPLE SITES, UNSPECIFIED CAUSE: ICD-10-CM

## 2018-05-18 DIAGNOSIS — J45.40 MODERATE PERSISTENT ASTHMA WITHOUT COMPLICATION: ICD-10-CM

## 2018-05-18 DIAGNOSIS — E11.9 TYPE 2 DIABETES MELLITUS WITHOUT COMPLICATION, WITHOUT LONG-TERM CURRENT USE OF INSULIN (HCC): ICD-10-CM

## 2018-05-18 DIAGNOSIS — I10 ESSENTIAL HYPERTENSION: ICD-10-CM

## 2018-05-18 DIAGNOSIS — E66.01 MORBID OBESITY (HCC): ICD-10-CM

## 2018-05-18 DIAGNOSIS — E78.5 HYPERLIPIDEMIA, UNSPECIFIED HYPERLIPIDEMIA TYPE: ICD-10-CM

## 2018-05-18 DIAGNOSIS — E11.9 TYPE 2 DIABETES MELLITUS WITHOUT COMPLICATION, WITHOUT LONG-TERM CURRENT USE OF INSULIN (HCC): Primary | ICD-10-CM

## 2018-05-18 LAB
ANION GAP SERPL CALCULATED.3IONS-SCNC: 15 MMOL/L (ref 3–16)
BUN BLDV-MCNC: 22 MG/DL (ref 7–20)
CALCIUM SERPL-MCNC: 9.3 MG/DL (ref 8.3–10.6)
CHLORIDE BLD-SCNC: 103 MMOL/L (ref 99–110)
CO2: 22 MMOL/L (ref 21–32)
CREAT SERPL-MCNC: 0.8 MG/DL (ref 0.6–1.2)
GFR AFRICAN AMERICAN: >60
GFR NON-AFRICAN AMERICAN: >60
GLUCOSE BLD-MCNC: 136 MG/DL (ref 70–99)
POTASSIUM SERPL-SCNC: 5.3 MMOL/L (ref 3.5–5.1)
SODIUM BLD-SCNC: 140 MMOL/L (ref 136–145)

## 2018-05-18 PROCEDURE — G8427 DOCREV CUR MEDS BY ELIG CLIN: HCPCS | Performed by: INTERNAL MEDICINE

## 2018-05-18 PROCEDURE — 1090F PRES/ABSN URINE INCON ASSESS: CPT | Performed by: INTERNAL MEDICINE

## 2018-05-18 PROCEDURE — G8417 CALC BMI ABV UP PARAM F/U: HCPCS | Performed by: INTERNAL MEDICINE

## 2018-05-18 PROCEDURE — 2022F DILAT RTA XM EVC RTNOPTHY: CPT | Performed by: INTERNAL MEDICINE

## 2018-05-18 PROCEDURE — 99214 OFFICE O/P EST MOD 30 MIN: CPT | Performed by: INTERNAL MEDICINE

## 2018-05-18 PROCEDURE — 3017F COLORECTAL CA SCREEN DOC REV: CPT | Performed by: INTERNAL MEDICINE

## 2018-05-18 PROCEDURE — 4040F PNEUMOC VAC/ADMIN/RCVD: CPT | Performed by: INTERNAL MEDICINE

## 2018-05-18 PROCEDURE — G8399 PT W/DXA RESULTS DOCUMENT: HCPCS | Performed by: INTERNAL MEDICINE

## 2018-05-18 PROCEDURE — 3045F PR MOST RECENT HEMOGLOBIN A1C LEVEL 7.0-9.0%: CPT | Performed by: INTERNAL MEDICINE

## 2018-05-18 PROCEDURE — 1123F ACP DISCUSS/DSCN MKR DOCD: CPT | Performed by: INTERNAL MEDICINE

## 2018-05-18 PROCEDURE — 1036F TOBACCO NON-USER: CPT | Performed by: INTERNAL MEDICINE

## 2018-05-18 RX ORDER — FLUTICASONE FUROATE AND VILANTEROL 200; 25 UG/1; UG/1
1 POWDER RESPIRATORY (INHALATION) DAILY
Qty: 1 EACH | Refills: 2 | Status: SHIPPED | OUTPATIENT
Start: 2018-05-18 | End: 2018-07-11 | Stop reason: SDUPTHER

## 2018-05-18 RX ORDER — LOSARTAN POTASSIUM 100 MG/1
TABLET ORAL
Qty: 30 TABLET | Refills: 2 | Status: SHIPPED | OUTPATIENT
Start: 2018-05-18 | End: 2018-06-17 | Stop reason: SDUPTHER

## 2018-05-18 RX ORDER — PAROXETINE 10 MG/1
TABLET, FILM COATED ORAL
Qty: 30 TABLET | Refills: 2 | Status: SHIPPED | OUTPATIENT
Start: 2018-05-18 | End: 2018-06-17 | Stop reason: SDUPTHER

## 2018-05-18 RX ORDER — ALLOPURINOL 300 MG/1
TABLET ORAL
Qty: 30 TABLET | Refills: 2 | Status: SHIPPED | OUTPATIENT
Start: 2018-05-18 | End: 2018-06-17 | Stop reason: SDUPTHER

## 2018-05-18 ASSESSMENT — ENCOUNTER SYMPTOMS
VOMITING: 0
ABDOMINAL PAIN: 0
BLOOD IN STOOL: 0
CHEST TIGHTNESS: 0
WHEEZING: 0
COUGH: 0
DIARRHEA: 0
NAUSEA: 0
SHORTNESS OF BREATH: 0

## 2018-05-19 LAB
ESTIMATED AVERAGE GLUCOSE: 151.3 MG/DL
HBA1C MFR BLD: 6.9 %

## 2018-06-18 RX ORDER — PAROXETINE 10 MG/1
TABLET, FILM COATED ORAL
Qty: 30 TABLET | Refills: 1 | Status: SHIPPED | OUTPATIENT
Start: 2018-06-18 | End: 2019-03-28

## 2018-06-18 RX ORDER — ALLOPURINOL 300 MG/1
TABLET ORAL
Qty: 30 TABLET | Refills: 1 | Status: SHIPPED | OUTPATIENT
Start: 2018-06-18 | End: 2018-09-17 | Stop reason: SDUPTHER

## 2018-06-18 RX ORDER — TIOTROPIUM BROMIDE 18 UG/1
CAPSULE ORAL; RESPIRATORY (INHALATION)
Qty: 90 CAPSULE | Refills: 0 | Status: SHIPPED | OUTPATIENT
Start: 2018-06-18 | End: 2019-06-05

## 2018-06-18 RX ORDER — LOSARTAN POTASSIUM 100 MG/1
TABLET ORAL
Qty: 30 TABLET | Refills: 1 | Status: SHIPPED | OUTPATIENT
Start: 2018-06-18 | End: 2018-09-17 | Stop reason: SDUPTHER

## 2018-07-11 ENCOUNTER — OFFICE VISIT (OUTPATIENT)
Dept: INTERNAL MEDICINE CLINIC | Age: 73
End: 2018-07-11

## 2018-07-11 VITALS
WEIGHT: 239 LBS | HEART RATE: 72 BPM | BODY MASS INDEX: 40.8 KG/M2 | SYSTOLIC BLOOD PRESSURE: 142 MMHG | HEIGHT: 64 IN | DIASTOLIC BLOOD PRESSURE: 70 MMHG

## 2018-07-11 DIAGNOSIS — Z01.818 PRE-OPERATIVE GENERAL PHYSICAL EXAMINATION: ICD-10-CM

## 2018-07-11 DIAGNOSIS — I10 ESSENTIAL HYPERTENSION: ICD-10-CM

## 2018-07-11 DIAGNOSIS — H26.9 CATARACT OF BOTH EYES, UNSPECIFIED CATARACT TYPE: Primary | ICD-10-CM

## 2018-07-11 DIAGNOSIS — E11.9 TYPE 2 DIABETES MELLITUS WITHOUT COMPLICATION, WITHOUT LONG-TERM CURRENT USE OF INSULIN (HCC): ICD-10-CM

## 2018-07-11 PROCEDURE — 99214 OFFICE O/P EST MOD 30 MIN: CPT | Performed by: INTERNAL MEDICINE

## 2018-07-11 PROCEDURE — 1036F TOBACCO NON-USER: CPT | Performed by: INTERNAL MEDICINE

## 2018-07-11 PROCEDURE — 4040F PNEUMOC VAC/ADMIN/RCVD: CPT | Performed by: INTERNAL MEDICINE

## 2018-07-11 PROCEDURE — 3017F COLORECTAL CA SCREEN DOC REV: CPT | Performed by: INTERNAL MEDICINE

## 2018-07-11 PROCEDURE — G8399 PT W/DXA RESULTS DOCUMENT: HCPCS | Performed by: INTERNAL MEDICINE

## 2018-07-11 PROCEDURE — 1101F PT FALLS ASSESS-DOCD LE1/YR: CPT | Performed by: INTERNAL MEDICINE

## 2018-07-11 PROCEDURE — G8417 CALC BMI ABV UP PARAM F/U: HCPCS | Performed by: INTERNAL MEDICINE

## 2018-07-11 PROCEDURE — 1090F PRES/ABSN URINE INCON ASSESS: CPT | Performed by: INTERNAL MEDICINE

## 2018-07-11 PROCEDURE — 1123F ACP DISCUSS/DSCN MKR DOCD: CPT | Performed by: INTERNAL MEDICINE

## 2018-07-11 PROCEDURE — 2022F DILAT RTA XM EVC RTNOPTHY: CPT | Performed by: INTERNAL MEDICINE

## 2018-07-11 PROCEDURE — G8427 DOCREV CUR MEDS BY ELIG CLIN: HCPCS | Performed by: INTERNAL MEDICINE

## 2018-07-11 PROCEDURE — 3044F HG A1C LEVEL LT 7.0%: CPT | Performed by: INTERNAL MEDICINE

## 2018-07-11 RX ORDER — SPIRONOLACTONE 50 MG/1
TABLET, FILM COATED ORAL
Qty: 1 TABLET | Refills: 0
Start: 2018-07-11 | End: 2020-01-13

## 2018-07-11 RX ORDER — CLOTRIMAZOLE AND BETAMETHASONE DIPROPIONATE 10; .64 MG/G; MG/G
CREAM TOPICAL
Qty: 1 TUBE | Refills: 2 | Status: SHIPPED | OUTPATIENT
Start: 2018-07-11 | End: 2020-10-21

## 2018-07-11 RX ORDER — FLUTICASONE FUROATE AND VILANTEROL 200; 25 UG/1; UG/1
1 POWDER RESPIRATORY (INHALATION) DAILY
Qty: 3 EACH | Refills: 1 | Status: SHIPPED | OUTPATIENT
Start: 2018-07-11 | End: 2018-07-25 | Stop reason: SDUPTHER

## 2018-07-11 NOTE — PROGRESS NOTES
Subjective:      Cheryle Curtis is a 67 y.o. female who presents to the office today for a preoperative consultation at the request of surgeon Dr Raul Souza who plans on performing cataract extraction and lens implantation . Planned anesthesia is topical..       Past Medical History:   Diagnosis Date    Anemia     thallasemia minor    Arthritis     osteo-arthritis    Asthma     Back pain     Back pain     Bronchitis chronic     Diabetes mellitus (HCC)     GERD (gastroesophageal reflux disease)     Gout     Hypertension     Melanoma of scalp or neck (HCC)     Osteoarthritis     Osteoporosis     Squamous cell carcinoma, arm      Family History   Problem Relation Age of Onset    Heart Failure Mother     Emphysema Mother     Stroke Mother     Heart Failure Father     Hypertension Father     Heart Disease Father     High Blood Pressure Father     Cancer Maternal Aunt     Cancer Paternal Aunt     Cancer Maternal Grandmother     Asthma Neg Hx     Diabetes Neg Hx      Social History     Social History    Marital status:      Spouse name: N/A    Number of children: N/A    Years of education: N/A     Social History Main Topics    Smoking status: Never Smoker    Smokeless tobacco: Never Used      Comment: exposed to passive smoke for 18 yrs    Alcohol use No    Drug use: No    Sexual activity: No     Other Topics Concern    None     Social History Narrative    None     Current Outpatient Prescriptions   Medication Sig Dispense Refill    Fluticasone Furoate-Vilanterol (BREO ELLIPTA) 200-25 MCG/INH AEPB Inhale 1 puff into the lungs daily 3 each 1    Umeclidinium Bromide (INCRUSE ELLIPTA) 62.5 MCG/INH AEPB Inhale 1 puff daily.  3 each 1    losartan (COZAAR) 100 MG tablet TAKE ONE TABLET BY MOUTH DAILY 30 tablet 1    PARoxetine (PAXIL) 10 MG tablet TAKE ONE TABLET BY MOUTH EVERY MORNING 30 tablet 1    allopurinol (ZYLOPRIM) 300 MG tablet TAKE ONE TABLET BY MOUTH DAILY 30

## 2018-07-12 RX ORDER — ATORVASTATIN CALCIUM 10 MG/1
TABLET, FILM COATED ORAL
Qty: 90 TABLET | Refills: 0 | Status: SHIPPED | OUTPATIENT
Start: 2018-07-12 | End: 2018-10-15 | Stop reason: SDUPTHER

## 2018-07-16 RX ORDER — CARVEDILOL 6.25 MG/1
TABLET ORAL
Qty: 180 TABLET | Refills: 0 | Status: SHIPPED | OUTPATIENT
Start: 2018-07-16 | End: 2018-10-15 | Stop reason: SDUPTHER

## 2018-07-17 ENCOUNTER — ANESTHESIA EVENT (OUTPATIENT)
Dept: OPERATING ROOM | Age: 73
End: 2018-07-17
Payer: MEDICARE

## 2018-07-18 ENCOUNTER — HOSPITAL ENCOUNTER (OUTPATIENT)
Age: 73
Setting detail: OUTPATIENT SURGERY
Discharge: HOME | End: 2018-07-18
Attending: OPHTHALMOLOGY | Admitting: OPHTHALMOLOGY
Payer: MEDICARE

## 2018-07-18 ENCOUNTER — ANESTHESIA (OUTPATIENT)
Dept: OPERATING ROOM | Age: 73
End: 2018-07-18
Payer: MEDICARE

## 2018-07-18 VITALS
TEMPERATURE: 96.8 F | HEIGHT: 64 IN | RESPIRATION RATE: 18 BRPM | SYSTOLIC BLOOD PRESSURE: 137 MMHG | OXYGEN SATURATION: 95 % | BODY MASS INDEX: 40.8 KG/M2 | WEIGHT: 239 LBS | HEART RATE: 70 BPM | DIASTOLIC BLOOD PRESSURE: 88 MMHG

## 2018-07-18 VITALS — DIASTOLIC BLOOD PRESSURE: 75 MMHG | OXYGEN SATURATION: 100 % | SYSTOLIC BLOOD PRESSURE: 144 MMHG

## 2018-07-18 LAB
GLUCOSE BLD-MCNC: 123 MG/DL (ref 70–99)
PERFORMED ON: ABNORMAL

## 2018-07-18 PROCEDURE — 6370000000 HC RX 637 (ALT 250 FOR IP)

## 2018-07-18 PROCEDURE — 2580000003 HC RX 258: Performed by: ANESTHESIOLOGY

## 2018-07-18 PROCEDURE — 6360000002 HC RX W HCPCS

## 2018-07-18 PROCEDURE — 3700000001 HC ADD 15 MINUTES (ANESTHESIA): Performed by: OPHTHALMOLOGY

## 2018-07-18 PROCEDURE — 2580000003 HC RX 258: Performed by: OPHTHALMOLOGY

## 2018-07-18 PROCEDURE — V2632 POST CHMBR INTRAOCULAR LENS: HCPCS | Performed by: OPHTHALMOLOGY

## 2018-07-18 PROCEDURE — 7100000010 HC PHASE II RECOVERY - FIRST 15 MIN: Performed by: OPHTHALMOLOGY

## 2018-07-18 PROCEDURE — 3600000003 HC SURGERY LEVEL 3 BASE: Performed by: OPHTHALMOLOGY

## 2018-07-18 PROCEDURE — 2709999900 HC NON-CHARGEABLE SUPPLY: Performed by: OPHTHALMOLOGY

## 2018-07-18 PROCEDURE — 3700000000 HC ANESTHESIA ATTENDED CARE: Performed by: OPHTHALMOLOGY

## 2018-07-18 PROCEDURE — 2500000003 HC RX 250 WO HCPCS

## 2018-07-18 PROCEDURE — 6360000002 HC RX W HCPCS: Performed by: NURSE ANESTHETIST, CERTIFIED REGISTERED

## 2018-07-18 PROCEDURE — 3600000013 HC SURGERY LEVEL 3 ADDTL 15MIN: Performed by: OPHTHALMOLOGY

## 2018-07-18 PROCEDURE — 7100000011 HC PHASE II RECOVERY - ADDTL 15 MIN: Performed by: OPHTHALMOLOGY

## 2018-07-18 PROCEDURE — 6370000000 HC RX 637 (ALT 250 FOR IP): Performed by: OPHTHALMOLOGY

## 2018-07-18 DEVICE — ACRYSOF(R) IQ ASPHERIC IOL SP ACRYLIC FOLDABLELENS WULTRASERT(TM) DELIVERY SYSTEM UV WBLUE LIGHT FILTER. 13.0MM LENGTH 6.0MM ANTERIORASYMMETRIC BICONVEX OPTIC PLANAR HAPTICS.
Type: IMPLANTABLE DEVICE | Status: FUNCTIONAL
Brand: ACRYSOF ULTRASERT

## 2018-07-18 RX ORDER — PROPOFOL 10 MG/ML
INJECTION, EMULSION INTRAVENOUS PRN
Status: DISCONTINUED | OUTPATIENT
Start: 2018-07-18 | End: 2018-07-18 | Stop reason: SDUPTHER

## 2018-07-18 RX ORDER — MAGNESIUM HYDROXIDE 1200 MG/15ML
LIQUID ORAL PRN
Status: DISCONTINUED | OUTPATIENT
Start: 2018-07-18 | End: 2018-07-18 | Stop reason: HOSPADM

## 2018-07-18 RX ORDER — SODIUM CHLORIDE 0.9 % (FLUSH) 0.9 %
10 SYRINGE (ML) INJECTION EVERY 12 HOURS SCHEDULED
Status: DISCONTINUED | OUTPATIENT
Start: 2018-07-18 | End: 2018-07-18 | Stop reason: HOSPADM

## 2018-07-18 RX ORDER — SODIUM CHLORIDE, SODIUM LACTATE, POTASSIUM CHLORIDE, CALCIUM CHLORIDE 600; 310; 30; 20 MG/100ML; MG/100ML; MG/100ML; MG/100ML
INJECTION, SOLUTION INTRAVENOUS CONTINUOUS
Status: DISCONTINUED | OUTPATIENT
Start: 2018-07-18 | End: 2018-07-18 | Stop reason: HOSPADM

## 2018-07-18 RX ORDER — LIDOCAINE HYDROCHLORIDE 10 MG/ML
1 INJECTION, SOLUTION EPIDURAL; INFILTRATION; INTRACAUDAL; PERINEURAL
Status: DISCONTINUED | OUTPATIENT
Start: 2018-07-18 | End: 2018-07-18 | Stop reason: HOSPADM

## 2018-07-18 RX ORDER — SODIUM CHLORIDE 0.9 % (FLUSH) 0.9 %
10 SYRINGE (ML) INJECTION PRN
Status: DISCONTINUED | OUTPATIENT
Start: 2018-07-18 | End: 2018-07-18 | Stop reason: HOSPADM

## 2018-07-18 RX ADMIN — Medication 0.3 ML: at 08:35

## 2018-07-18 RX ADMIN — SODIUM CHLORIDE, POTASSIUM CHLORIDE, SODIUM LACTATE AND CALCIUM CHLORIDE: 600; 310; 30; 20 INJECTION, SOLUTION INTRAVENOUS at 09:51

## 2018-07-18 RX ADMIN — Medication 0.3 ML: at 08:45

## 2018-07-18 RX ADMIN — PROPOFOL 90 MG: 10 INJECTION, EMULSION INTRAVENOUS at 09:51

## 2018-07-18 RX ADMIN — Medication 0.3 ML: at 08:55

## 2018-07-18 RX ADMIN — SODIUM CHLORIDE, POTASSIUM CHLORIDE, SODIUM LACTATE AND CALCIUM CHLORIDE: 600; 310; 30; 20 INJECTION, SOLUTION INTRAVENOUS at 08:59

## 2018-07-18 ASSESSMENT — PULMONARY FUNCTION TESTS
PIF_VALUE: 0
PIF_VALUE: 1
PIF_VALUE: 0

## 2018-07-18 ASSESSMENT — PAIN - FUNCTIONAL ASSESSMENT: PAIN_FUNCTIONAL_ASSESSMENT: 0-10

## 2018-07-18 ASSESSMENT — PAIN SCALES - GENERAL: PAINLEVEL_OUTOF10: 0

## 2018-07-18 NOTE — H&P
I have examined the patient and reviewed the history and physical and find no relevant changes. I have reviewed with the patient and/or family the risks, benefits, and alternatives to the procedure and they have agreed to proceed.     Carrie Donahue.

## 2018-07-18 NOTE — ANESTHESIA PRE PROCEDURE
HFA (PROVENTIL HFA) 108 (90 Base) MCG/ACT inhaler Inhale 2 puffs into the lungs every 6 hours as needed for Wheezing 10/19/17  Yes Edilberto Benitez MD   Cyanocobalamin (VITAMIN B 12 PO) Take 1,000 mcg by mouth daily. Yes Historical Provider, MD   Ascorbic Acid (VITAMIN C) 500 MG CAPS Take  by mouth daily. Yes Historical Provider, MD   vitamin E 400 UNIT capsule Take 400 Units by mouth daily. Yes Historical Provider, MD   Calcium Carbonate-Vitamin D (CALCIUM + D PO) Take  by mouth daily. Yes Historical Provider, MD   Umeclidinium Bromide (INCRUSE ELLIPTA) 62.5 MCG/INH AEPB Inhale 1 puff daily. 7/11/18   Edilberto Benitez MD   clotrimazole-betamethasone (LOTRISONE) 1-0.05 % cream Apply topically 2 times daily. 7/11/18   Edilberto Benitez MD       Current medications:    Current Facility-Administered Medications   Medication Dose Route Frequency Provider Last Rate Last Dose    lactated ringers infusion   Intravenous Continuous Aurelia Kraft  mL/hr at 07/18/18 0859      sodium chloride flush 0.9 % injection 10 mL  10 mL Intravenous 2 times per day Aurelia Kraft MD        sodium chloride flush 0.9 % injection 10 mL  10 mL Intravenous PRN Aurelia Kraft MD        lidocaine PF 1 % injection 1 mL  1 mL Intradermal Once PRN Aurelia Kraft MD        bupivacaine 0.75% and lidocaine 2% in hylenex injection (10.5 mL)   Intraocular Once Chiara Sears MD        bupivacaine 0.75% and lidocaine 2% in hylenex injection (4.5 mL)   Intraocular Once Chiara Sears MD           Allergies:     Allergies   Allergen Reactions    Hctz [Hydrochlorothiazide]      Increases calcium to unsafe level resulting in parathyroid surgery    Lisinopril Other (See Comments)     cough       Problem List:    Patient Active Problem List   Diagnosis Code    Knee pain M25.569    Chronic urinary tract infection N39.0    Osteoarthritis of both knees M17.0    Essential hypertension

## 2018-07-18 NOTE — PROGRESS NOTES
Discharge instructions reviewed with patient/responsible adult and understanding verbalized. Discharge instructions signed and copies given. Patient discharged home with belongings. Pt up to bathroom with assist. Voided without difficulty.   Assist to car

## 2018-07-18 NOTE — OP NOTE
[facility]                   SouleymaneVijay finch                                  OPERATIVE REPORT    PATIENT NAME: Ghada Graham              :        1945  MED REC NO:   [id number]                          ROOM:  ACCOUNT NO:   [de-identified]                           ADMIT DATE: 2018  PROVIDER:     Yani Ocampo MD    DATE OF PROCEDURE:  2018    PREOPERATIVE DIAGNOSIS:  Cataract, right eye. POSTOPERATIVE DIAGNOSIS:  Cataract, right eye. OPERATION:  Phacoemulsification of the cataract of the right eye with  implant. ANESTHESIA:  General / Monitored Anesthesia Care / Retrobulbar. A 2 mL  retrobulbar block and 10 mL modified [place] block using a 1:1 mixture of  0.75% Marcaine, 4% Xylocaine with epinephrine, and hyaluronidase. SURGICAL INDICATIONS:  The patient has had a painless progressive loss of  vision due to the cataractous degeneration of the lens and for that reason,  surgery is indicated. The patient is having visual problems with current  lifestyle. A new eyeglasses prescription was unable to adequately improve  functional vision. DETAILS OF PROCEDURE:  The patient was taken to the operating room and was  prepped and draped in the usual manner after obtaining satisfactory local  anesthesia. Attention was turned towards the operative eye and a lid speculum was  inserted. A 2.5 mm keratome was used to make a limbal incision at 180  degrees. Viscoat was then placed in the eye to fill the anterior chamber  and a side port incision was made with a Superblade through the clear  cornea. The incision was located about 2 o'clock to the left of the  original incision. A bent needle was then used to make a cut in the  anterior capsule and start a capsulorrhexis tear. This was then grasped  with Utrata forceps and a 360 degree tear was completed.   Belcher dissection  was then done with BSS to separate the

## 2018-07-25 RX ORDER — FLUTICASONE FUROATE AND VILANTEROL 200; 25 UG/1; UG/1
1 POWDER RESPIRATORY (INHALATION) DAILY
Qty: 3 EACH | Refills: 1 | Status: SHIPPED | OUTPATIENT
Start: 2018-07-25 | End: 2019-03-12 | Stop reason: SDUPTHER

## 2018-07-25 RX ORDER — FLUTICASONE FUROATE AND VILANTEROL 200; 25 UG/1; UG/1
1 POWDER RESPIRATORY (INHALATION) DAILY
Qty: 3 EACH | Refills: 1 | Status: SHIPPED | OUTPATIENT
Start: 2018-07-25 | End: 2018-07-25 | Stop reason: CLARIF

## 2018-08-08 ENCOUNTER — OFFICE VISIT (OUTPATIENT)
Dept: INTERNAL MEDICINE CLINIC | Age: 73
End: 2018-08-08

## 2018-08-08 VITALS
DIASTOLIC BLOOD PRESSURE: 76 MMHG | WEIGHT: 236 LBS | HEART RATE: 72 BPM | BODY MASS INDEX: 40.29 KG/M2 | SYSTOLIC BLOOD PRESSURE: 132 MMHG | HEIGHT: 64 IN

## 2018-08-08 DIAGNOSIS — E11.9 TYPE 2 DIABETES MELLITUS WITHOUT COMPLICATION, WITHOUT LONG-TERM CURRENT USE OF INSULIN (HCC): Primary | ICD-10-CM

## 2018-08-08 DIAGNOSIS — E11.9 TYPE 2 DIABETES MELLITUS WITHOUT COMPLICATION, WITHOUT LONG-TERM CURRENT USE OF INSULIN (HCC): ICD-10-CM

## 2018-08-08 DIAGNOSIS — E78.5 HYPERLIPIDEMIA, UNSPECIFIED HYPERLIPIDEMIA TYPE: ICD-10-CM

## 2018-08-08 DIAGNOSIS — D64.9 ANEMIA, UNSPECIFIED TYPE: ICD-10-CM

## 2018-08-08 DIAGNOSIS — I10 ESSENTIAL HYPERTENSION: ICD-10-CM

## 2018-08-08 DIAGNOSIS — J45.40 MODERATE PERSISTENT ASTHMA WITHOUT COMPLICATION: ICD-10-CM

## 2018-08-08 DIAGNOSIS — M1A.09X0 CHRONIC GOUT OF MULTIPLE SITES, UNSPECIFIED CAUSE: ICD-10-CM

## 2018-08-08 DIAGNOSIS — K21.9 GASTROESOPHAGEAL REFLUX DISEASE WITHOUT ESOPHAGITIS: ICD-10-CM

## 2018-08-08 DIAGNOSIS — E66.01 MORBID OBESITY (HCC): ICD-10-CM

## 2018-08-08 PROCEDURE — 1101F PT FALLS ASSESS-DOCD LE1/YR: CPT | Performed by: INTERNAL MEDICINE

## 2018-08-08 PROCEDURE — 1123F ACP DISCUSS/DSCN MKR DOCD: CPT | Performed by: INTERNAL MEDICINE

## 2018-08-08 PROCEDURE — 4040F PNEUMOC VAC/ADMIN/RCVD: CPT | Performed by: INTERNAL MEDICINE

## 2018-08-08 PROCEDURE — G8428 CUR MEDS NOT DOCUMENT: HCPCS | Performed by: INTERNAL MEDICINE

## 2018-08-08 PROCEDURE — G8399 PT W/DXA RESULTS DOCUMENT: HCPCS | Performed by: INTERNAL MEDICINE

## 2018-08-08 PROCEDURE — G8417 CALC BMI ABV UP PARAM F/U: HCPCS | Performed by: INTERNAL MEDICINE

## 2018-08-08 PROCEDURE — 99214 OFFICE O/P EST MOD 30 MIN: CPT | Performed by: INTERNAL MEDICINE

## 2018-08-08 PROCEDURE — 3017F COLORECTAL CA SCREEN DOC REV: CPT | Performed by: INTERNAL MEDICINE

## 2018-08-08 PROCEDURE — 1090F PRES/ABSN URINE INCON ASSESS: CPT | Performed by: INTERNAL MEDICINE

## 2018-08-08 PROCEDURE — 1036F TOBACCO NON-USER: CPT | Performed by: INTERNAL MEDICINE

## 2018-08-08 PROCEDURE — 2022F DILAT RTA XM EVC RTNOPTHY: CPT | Performed by: INTERNAL MEDICINE

## 2018-08-08 PROCEDURE — 3044F HG A1C LEVEL LT 7.0%: CPT | Performed by: INTERNAL MEDICINE

## 2018-08-08 ASSESSMENT — ENCOUNTER SYMPTOMS
CHEST TIGHTNESS: 0
SHORTNESS OF BREATH: 0
DIARRHEA: 0
COUGH: 0
VOMITING: 0
NAUSEA: 0
ABDOMINAL PAIN: 0
WHEEZING: 0
BLOOD IN STOOL: 0

## 2018-08-08 NOTE — PROGRESS NOTES
Subjective:      Patient ID: Francisco Wright is a 75 y.o. female. HPI    She has Type 2 DM,HTN,GERD,spinal stenosis,asthma,gout and depression. Blood sugars are under good control. Has had it since 1999. Patient's BP is controlled on current medications. Depression is stable on paxil. Asthma- on Breo and proventil.stable. She has osteoarthritis and lumbar spinal stenosis. Review of Systems   Constitutional: Negative for fatigue, fever and unexpected weight change. Respiratory: Negative for cough, chest tightness, shortness of breath and wheezing. Cardiovascular: Negative for chest pain, palpitations and leg swelling. Gastrointestinal: Negative for abdominal pain, blood in stool, diarrhea, nausea and vomiting. Genitourinary: Negative for dysuria and hematuria. Neurological: Negative for light-headedness. Objective:   Physical Exam   Constitutional: She is oriented to person, place, and time. She appears well-developed and well-nourished. HENT:   Head: Normocephalic and atraumatic. Eyes: Pupils are equal, round, and reactive to light. Neck: Normal range of motion. Neck supple. No thyromegaly present. Cardiovascular: Normal rate, regular rhythm and normal heart sounds. Exam reveals no gallop and no friction rub. No murmur heard. No carotid bruit   Pulmonary/Chest: Effort normal and breath sounds normal. No respiratory distress. She has no wheezes. She has no rales. She exhibits no tenderness. Abdominal: Soft. Bowel sounds are normal. She exhibits no distension and no mass. There is no tenderness. There is no rebound and no guarding. Musculoskeletal: She exhibits no edema. Neurological: She is alert and oriented to person, place, and time. Assessment:       Diagnosis Orders   1. Type 2 diabetes mellitus without complication, without long-term current use of insulin ([facility])  Hemoglobin A1C   2.  Essential hypertension  CBC Auto Differential    Comprehensive Metabolic

## 2018-08-09 LAB
A/G RATIO: 2.1 (ref 1.1–2.2)
ALBUMIN SERPL-MCNC: 4.5 G/DL (ref 3.4–5)
ALP BLD-CCNC: 79 U/L (ref 40–129)
ALT SERPL-CCNC: 10 U/L (ref 10–40)
ANION GAP SERPL CALCULATED.3IONS-SCNC: 14 MMOL/L (ref 3–16)
AST SERPL-CCNC: 10 U/L (ref 15–37)
BILIRUB SERPL-MCNC: 0.7 MG/DL (ref 0–1)
BUN BLDV-MCNC: 19 MG/DL (ref 7–20)
CALCIUM SERPL-MCNC: 9.8 MG/DL (ref 8.3–10.6)
CHLORIDE BLD-SCNC: 106 MMOL/L (ref 99–110)
CHOLESTEROL, TOTAL: 133 MG/DL (ref 0–199)
CO2: 20 MMOL/L (ref 21–32)
CREAT SERPL-MCNC: 0.7 MG/DL (ref 0.6–1.2)
ESTIMATED AVERAGE GLUCOSE: 142.7 MG/DL
GFR AFRICAN AMERICAN: >60
GFR NON-AFRICAN AMERICAN: >60
GLOBULIN: 2.1 G/DL
GLUCOSE BLD-MCNC: 132 MG/DL (ref 70–99)
HBA1C MFR BLD: 6.6 %
HDLC SERPL-MCNC: 43 MG/DL (ref 40–60)
HEMATOLOGY PATH CONSULT: NORMAL
LDL CHOLESTEROL CALCULATED: 60 MG/DL
POTASSIUM SERPL-SCNC: 5.1 MMOL/L (ref 3.5–5.1)
SODIUM BLD-SCNC: 140 MMOL/L (ref 136–145)
TOTAL PROTEIN: 6.6 G/DL (ref 6.4–8.2)
TRIGL SERPL-MCNC: 149 MG/DL (ref 0–150)
VLDLC SERPL CALC-MCNC: 30 MG/DL

## 2018-08-10 LAB
ANISOCYTOSIS: ABNORMAL
BASOPHILS ABSOLUTE: 0 K/UL (ref 0–0.2)
BASOPHILS RELATIVE PERCENT: 0.3 %
EOSINOPHILS ABSOLUTE: 0.1 K/UL (ref 0–0.6)
EOSINOPHILS RELATIVE PERCENT: 1.3 %
HCT VFR BLD CALC: 33.4 % (ref 36–48)
HEMATOLOGY PATH CONSULT: YES
HEMOGLOBIN: 10.5 G/DL (ref 12–16)
LYMPHOCYTES ABSOLUTE: 1.7 K/UL (ref 1–5.1)
LYMPHOCYTES RELATIVE PERCENT: 20 %
MCH RBC QN AUTO: 19 PG (ref 26–34)
MCHC RBC AUTO-ENTMCNC: 31.4 G/DL (ref 31–36)
MCV RBC AUTO: 60.5 FL (ref 80–100)
MONOCYTES ABSOLUTE: 0.5 K/UL (ref 0–1.3)
MONOCYTES RELATIVE PERCENT: 5.7 %
NEUTROPHILS ABSOLUTE: 6.2 K/UL (ref 1.7–7.7)
NEUTROPHILS RELATIVE PERCENT: 72.7 %
OVALOCYTES: ABNORMAL
PDW BLD-RTO: 16.8 % (ref 12.4–15.4)
PLATELET # BLD: 296 K/UL (ref 135–450)
PMV BLD AUTO: 10.8 FL (ref 5–10.5)
RBC # BLD: 5.53 M/UL (ref 4–5.2)
SCHISTOCYTES: ABNORMAL
WBC # BLD: 8.6 K/UL (ref 4–11)

## 2018-08-10 NOTE — PROGRESS NOTES
Obstructive Sleep Apnea (SORAIDA) Screening     Patient:  Yehuda Florian    YOB: 1945      Medical Record #:  [id number]                     Date:  8/10/2018     1. Are you a loud and/or regular snorer? []  Yes       [x] No    2. Have you been observed to gasp or stop breathing during sleep? []  Yes       [x] No    3. Do you feel tired or groggy upon awakening or do you awaken with a headache?           []  Yes       [] No    4. Are you often tired or fatigued during the wake time hours? []  Yes       [] No    5. Do you fall asleep sitting, reading, watching TV or driving? []  Yes       [] No    6. Do you often have problems with memory or concentration? []  Yes       [] No    **If patient's score is ? 3 they are considered high risk for SORAIDA. Notify the anesthesiologist of the high risk and document in focus note. Note:  If the patient's BMI is more than 35 kg m¯² , has neck circumference > 40 cm, and/or high blood pressure the risk is greater (© [employer], 2006).

## 2018-08-14 ENCOUNTER — ANESTHESIA EVENT (OUTPATIENT)
Dept: OPERATING ROOM | Age: 73
End: 2018-08-14
Payer: MEDICARE

## 2018-08-15 ENCOUNTER — HOSPITAL ENCOUNTER (OUTPATIENT)
Age: 73
Setting detail: OUTPATIENT SURGERY
Discharge: HOME | End: 2018-08-15
Attending: OPHTHALMOLOGY | Admitting: OPHTHALMOLOGY
Payer: MEDICARE

## 2018-08-15 ENCOUNTER — ANESTHESIA (OUTPATIENT)
Dept: OPERATING ROOM | Age: 73
End: 2018-08-15
Payer: MEDICARE

## 2018-08-15 VITALS
SYSTOLIC BLOOD PRESSURE: 140 MMHG | OXYGEN SATURATION: 97 % | HEIGHT: 64 IN | WEIGHT: 240 LBS | TEMPERATURE: 97.2 F | DIASTOLIC BLOOD PRESSURE: 71 MMHG | RESPIRATION RATE: 16 BRPM | BODY MASS INDEX: 40.97 KG/M2 | HEART RATE: 65 BPM

## 2018-08-15 VITALS — DIASTOLIC BLOOD PRESSURE: 76 MMHG | OXYGEN SATURATION: 100 % | SYSTOLIC BLOOD PRESSURE: 138 MMHG

## 2018-08-15 LAB
GLUCOSE BLD-MCNC: 111 MG/DL (ref 70–99)
PERFORMED ON: ABNORMAL

## 2018-08-15 PROCEDURE — 7100000011 HC PHASE II RECOVERY - ADDTL 15 MIN: Performed by: OPHTHALMOLOGY

## 2018-08-15 PROCEDURE — 3700000000 HC ANESTHESIA ATTENDED CARE: Performed by: OPHTHALMOLOGY

## 2018-08-15 PROCEDURE — 3600000013 HC SURGERY LEVEL 3 ADDTL 15MIN: Performed by: OPHTHALMOLOGY

## 2018-08-15 PROCEDURE — 3700000001 HC ADD 15 MINUTES (ANESTHESIA): Performed by: OPHTHALMOLOGY

## 2018-08-15 PROCEDURE — 2580000003 HC RX 258: Performed by: ANESTHESIOLOGY

## 2018-08-15 PROCEDURE — 6360000002 HC RX W HCPCS: Performed by: OPHTHALMOLOGY

## 2018-08-15 PROCEDURE — 6360000002 HC RX W HCPCS: Performed by: NURSE ANESTHETIST, CERTIFIED REGISTERED

## 2018-08-15 PROCEDURE — 7100000010 HC PHASE II RECOVERY - FIRST 15 MIN: Performed by: OPHTHALMOLOGY

## 2018-08-15 PROCEDURE — 2500000003 HC RX 250 WO HCPCS: Performed by: OPHTHALMOLOGY

## 2018-08-15 PROCEDURE — 2580000003 HC RX 258: Performed by: OPHTHALMOLOGY

## 2018-08-15 PROCEDURE — 3600000003 HC SURGERY LEVEL 3 BASE: Performed by: OPHTHALMOLOGY

## 2018-08-15 PROCEDURE — 2709999900 HC NON-CHARGEABLE SUPPLY: Performed by: OPHTHALMOLOGY

## 2018-08-15 PROCEDURE — 6370000000 HC RX 637 (ALT 250 FOR IP)

## 2018-08-15 PROCEDURE — V2632 POST CHMBR INTRAOCULAR LENS: HCPCS | Performed by: OPHTHALMOLOGY

## 2018-08-15 DEVICE — ACRYSOF(R) IQ ASPHERIC IOL SP ACRYLIC FOLDABLELENS WULTRASERT(TM) DELIVERY SYSTEM UV WBLUE LIGHT FILTER. 13.0MM LENGTH 6.0MM ANTERIORASYMMETRIC BICONVEX OPTIC PLANAR HAPTICS.
Type: IMPLANTABLE DEVICE | Status: FUNCTIONAL
Brand: ACRYSOF ULTRASERT

## 2018-08-15 RX ORDER — SODIUM CHLORIDE 0.9 % (FLUSH) 0.9 %
10 SYRINGE (ML) INJECTION PRN
Status: DISCONTINUED | OUTPATIENT
Start: 2018-08-15 | End: 2018-08-15 | Stop reason: HOSPADM

## 2018-08-15 RX ORDER — MAGNESIUM HYDROXIDE 1200 MG/15ML
LIQUID ORAL PRN
Status: DISCONTINUED | OUTPATIENT
Start: 2018-08-15 | End: 2018-08-15 | Stop reason: HOSPADM

## 2018-08-15 RX ORDER — LIDOCAINE HYDROCHLORIDE 10 MG/ML
1 INJECTION, SOLUTION EPIDURAL; INFILTRATION; INTRACAUDAL; PERINEURAL
Status: DISCONTINUED | OUTPATIENT
Start: 2018-08-15 | End: 2018-08-15 | Stop reason: HOSPADM

## 2018-08-15 RX ORDER — SODIUM CHLORIDE 0.9 % (FLUSH) 0.9 %
10 SYRINGE (ML) INJECTION EVERY 12 HOURS SCHEDULED
Status: DISCONTINUED | OUTPATIENT
Start: 2018-08-15 | End: 2018-08-15 | Stop reason: HOSPADM

## 2018-08-15 RX ORDER — PROPOFOL 10 MG/ML
INJECTION, EMULSION INTRAVENOUS PRN
Status: DISCONTINUED | OUTPATIENT
Start: 2018-08-15 | End: 2018-08-15 | Stop reason: SDUPTHER

## 2018-08-15 RX ORDER — SODIUM CHLORIDE, SODIUM LACTATE, POTASSIUM CHLORIDE, CALCIUM CHLORIDE 600; 310; 30; 20 MG/100ML; MG/100ML; MG/100ML; MG/100ML
INJECTION, SOLUTION INTRAVENOUS CONTINUOUS
Status: DISCONTINUED | OUTPATIENT
Start: 2018-08-15 | End: 2018-08-15 | Stop reason: HOSPADM

## 2018-08-15 RX ADMIN — Medication 0.3 ML: at 08:27

## 2018-08-15 RX ADMIN — PROPOFOL 80 MG: 10 INJECTION, EMULSION INTRAVENOUS at 10:07

## 2018-08-15 RX ADMIN — SODIUM CHLORIDE, POTASSIUM CHLORIDE, SODIUM LACTATE AND CALCIUM CHLORIDE: 600; 310; 30; 20 INJECTION, SOLUTION INTRAVENOUS at 08:33

## 2018-08-15 ASSESSMENT — PAIN - FUNCTIONAL ASSESSMENT: PAIN_FUNCTIONAL_ASSESSMENT: 0-10

## 2018-08-15 ASSESSMENT — PULMONARY FUNCTION TESTS
PIF_VALUE: 0

## 2018-08-15 ASSESSMENT — PAIN DESCRIPTION - DESCRIPTORS: DESCRIPTORS: SHARP;SHOOTING

## 2018-08-15 ASSESSMENT — PAIN SCALES - GENERAL: PAINLEVEL_OUTOF10: 0

## 2018-08-15 NOTE — OP NOTE
[facility]                                 OPERATIVE REPORT    PATIENT NAME: Ratna Russell              :        1945  MED REC NO:   [id number]                          ROOM:  ACCOUNT NO:   [de-identified]                           ADMIT DATE: 08/15/2018  PROVIDER:     Danay Castle MD    DATE OF PROCEDURE:  08/15/2018    PREOPERATIVE DIAGNOSIS:  Cataract, left eye. POSTOPERATIVE DIAGNOSIS:  Cataract, left eye. OPERATION:  Phacoemulsification of the cataract of the left eye with a  posterior chamber lens implant. ANESTHESIA:  General / Monitored Anesthesia Care / Retrobulbar. A 2 mL  retrobulbar block and 10 mL modified Greenleaf block using a 1:1 mixture of  0.75% Marcaine, 4% Xylocaine with epinephrine, and hyaluronidase. SURGICAL INDICATIONS:  The patient has had a painless progressive loss of  vision due to the cataractous degeneration of the lens and for that reason,  surgery is indicated. The patient is having visual problems with current  lifestyle. A new eyeglasses prescription was unable to adequately improve  functional vision. DETAILS OF PROCEDURE:  The patient was taken to the operating room and was  prepped and draped in the usual manner after obtaining satisfactory local  anesthesia. Attention was turned towards the operative eye and a lid speculum was  inserted. A 2.5 mm keratome was used to make a limbal incision at 180  degrees. Viscoat was then placed in the eye to fill the anterior chamber  and a side port incision was made with a Superblade through the clear  cornea. The incision was located about 2 o'clock to the left of the  original incision. A bent needle was then used to make a cut in the  anterior capsule and start a capsulorrhexis tear. This was then grasped  with Utrata forceps and a 360 degree tear was completed.   Orion dissection  was then done with

## 2018-08-15 NOTE — ANESTHESIA POSTPROCEDURE EVALUATION
Department of Anesthesiology  Postprocedure Note    Patient: Sarah Thomas  MRN: [id number]  YOB: 1945  Date of evaluation: 8/15/2018  Time:  12:57 PM     Procedure Summary     Date:  08/15/18 Room / Location:  [facility] / [facility]    Anesthesia Start:  1007 Anesthesia Stop:  7581    Procedure:  PHACOEMULSIFICATION OF CATARACT WITH INTRAOCULAR LENS IMPLANT LEFT EYE (Left ) Diagnosis:  (CATARACT LEFT EYE)    Surgeon:  Zurdo Portillo MD Responsible Provider:  Tracey Conde MD    Anesthesia Type:  general ASA Status:  3          Anesthesia Type: general    Amber Phase I: Amber Score: 10    Amber Phase II: Amber Score: 10    Last vitals: Reviewed and per EMR flowsheets.        Anesthesia Post Evaluation    Patient location during evaluation: PACU  Level of consciousness: awake and alert  Airway patency: patent  Nausea & Vomiting: no nausea and no vomiting  Complications: no  Cardiovascular status: blood pressure returned to baseline  Respiratory status: acceptable  Hydration status: euvolemic  Comments: Postoperative Anesthesia Note    Name:    Sarah Thomas  MRN:      [id number]    Patient Vitals in the past 12 hrs:  08/15/18 1042, BP:(!) 140/71, Temp:97.2 °F (36.2 °C), Temp src:Temporal, Pulse:65, Resp:16, SpO2:97 %  08/15/18 0840, BP:132/66  08/15/18 0825, BP:(!) 139/101, Temp:97.2 °F (36.2 °C), Pulse:67, Resp:18, SpO2:95 %, Height:5' 4\" (1.626 m), Weight:240 lb (108.9 kg)     LABS:    CBC  Lab Results       Component                Value               Date/Time                  WBC                      8.6                 08/08/2018 04:27 PM        HGB                      10.5 (L)            08/08/2018 04:27 PM        HCT                      33.4 (L)            08/08/2018 04:27 PM        PLT                      296                 08/08/2018 04:27 PM   RENAL  Lab Results       Component                Value               Date/Time                  NA

## 2018-08-15 NOTE — BRIEF OP NOTE
Brief Postoperative Note  ______________________________________________________________    Patient: Francisco Wright  YOB: 1945  MRN: [id number]  Date of Procedure: 8/15/2018    Pre-Op Diagnosis: CATARACT LEFT EYE    Post-Op Diagnosis: Same       Procedure(s):  PHACOEMULSIFICATION OF CATARACT WITH INTRAOCULAR LENS IMPLANT LEFT EYE    Anesthesia: General    Surgeon(s):  Arleth Frederick MD    Staff:  Scrub Person First: Jesús Mcqueen     Estimated Blood Loss: * No values recorded between 8/15/2018 10:15 AM and 8/15/2018 10:40 AM * None    Complications: None    Specimens:   * No specimens in log *    Implants:    Implant Name Type Inv.  Item Serial No.  Lot No. LRB No. Used   LENS IOL [id number] - [id number] Eye LENS IOL [id number]   Left 1         Drains:      Findings: none    Emerita Gomez MD  Date: 8/15/2018  Time: 10:45 AM

## 2018-08-15 NOTE — ANESTHESIA PRE PROCEDURE
Inhale 2 puffs into the lungs every 6 hours as needed for Wheezing 10/19/17  Yes Daquan Hilliard MD   clotrimazole-betamethasone (LOTRISONE) 1-0.05 % cream Apply topically 2 times daily. 7/11/18   Daquan Hilliard MD   SPIRIVA HANDIHALER 18 MCG inhalation capsule INHALE THE ENTIRE CONTENTS OF 1 CAPSULE ONCE A DAY USING HANDIHALER DEVICE 6/18/18   Daquan Hilliard MD   Cyanocobalamin (VITAMIN B 12 PO) Take 1,000 mcg by mouth daily. Historical Provider, MD   Ascorbic Acid (VITAMIN C) 500 MG CAPS Take  by mouth daily. Historical Provider, MD   vitamin E 400 UNIT capsule Take 400 Units by mouth daily. Historical Provider, MD   Calcium Carbonate-Vitamin D (CALCIUM + D PO) Take  by mouth daily. Historical Provider, MD       Current medications:    Current Facility-Administered Medications   Medication Dose Route Frequency Provider Last Rate Last Dose    lactated ringers infusion   Intravenous Continuous Nallely Gonzales  mL/hr at 08/15/18 8223      sodium chloride flush 0.9 % injection 10 mL  10 mL Intravenous 2 times per day Nallely Gonzales MD        sodium chloride flush 0.9 % injection 10 mL  10 mL Intravenous PRN Nallely Gonzales MD        lidocaine PF 1 % injection 1 mL  1 mL Intradermal Once PRN Nallely Gonzales MD           Allergies: Allergies   Allergen Reactions    Hctz [Hydrochlorothiazide]      Increases calcium to unsafe level resulting in parathyroid surgery    Lisinopril Other (See Comments)     cough       Problem List:    Patient Active Problem List   Diagnosis Code    Knee pain M25.569    Chronic urinary tract infection N39.0    Osteoarthritis of both knees M17.0    Essential hypertension I10    Type 2 diabetes mellitus without complication, without long-term current use of insulin (HCC) E11.9    Gastroesophageal reflux disease without esophagitis K21.9    Chronic gout of multiple sites M1A. 200X0    Depression F32.9    Spinal stenosis of lumbar region M48.061    Anemia D64.9    Carpal tunnel syndrome of left wrist G56.02    Hyperlipidemia E78.5    Morbid obesity (HCC) E66.01    Moderate persistent asthma without complication T16.79       Past Medical History:        Diagnosis Date    Anemia     thallasemia minor    Arthritis     osteo-arthritis    Asthma     Back pain     Back pain     Bronchitis chronic     Diabetes mellitus (HCC)     GERD (gastroesophageal reflux disease)     Gout     Hypertension     Melanoma of scalp or neck (HCC)     Osteoarthritis     Osteoporosis     Squamous cell carcinoma, arm        Past Surgical History:        Procedure Laterality Date    APPENDECTOMY      CARPAL TUNNEL RELEASE Left 02/05/2018    CATARACT REMOVAL WITH IMPLANT Right 07/18/2018    COLONOSCOPY  12/2014    KNEE ARTHROSCOPY Bilateral     PARATHYROIDECTOMY      PARTIAL HYSTERECTOMY      IL REMV CATARACT EXTRACAP,INSERT LENS Right 7/18/2018    PHACOEMULSIFICATION OF CATARACT WITH INTRAOCULAR LENS IMPLANT RIGHT EYE performed by Arleth Frederick MD at [address]         Social History:    Social History   Substance Use Topics    Smoking status: Never Smoker    Smokeless tobacco: Never Used      Comment: exposed to passive smoke for 18 yrs    Alcohol use No                                Counseling given: Not Answered      Vital Signs (Current):   Vitals:    08/10/18 1153 08/15/18 0825 08/15/18 0840   BP:  (!) 139/101 132/66   Pulse:  67    Resp:  18    Temp:  97.2 °F (36.2 °C)    SpO2:  95%    Weight: 236 lb (107 kg) 240 lb (108.9 kg)    Height: 5' 4\" (1.626 m) 5' 4\" (1.626 m)                                               BP Readings from Last 3 Encounters:   08/15/18 132/66   08/08/18 132/76   07/18/18 (!) 144/75       NPO Status: Time of last liquid consumption: 2130                        Time of last solid consumption: 2000                        Date of last liquid consumption: 08/14/18                        Date of last solid food consumption: 08/14/18    BMI:   Wt Readings from Last 3 Encounters:   08/15/18 240 lb (108.9 kg)   08/08/18 236 lb (107 kg)   07/18/18 239 lb (108.4 kg)     Body mass index is 41.2 kg/m². CBC:   Lab Results   Component Value Date    WBC 8.6 08/08/2018    RBC 5.53 08/08/2018    HGB 10.5 08/08/2018    HCT 33.4 08/08/2018    MCV 60.5 08/08/2018    RDW 16.8 08/08/2018     08/08/2018       CMP:   Lab Results   Component Value Date     08/08/2018    K 5.1 08/08/2018     08/08/2018    CO2 20 08/08/2018    BUN 19 08/08/2018    CREATININE 0.7 08/08/2018    GFRAA >60 08/08/2018    GFRAA 57 05/05/2013    AGRATIO 2.1 08/08/2018    LABGLOM >60 08/08/2018    GLUCOSE 132 08/08/2018    PROT 6.6 08/08/2018    CALCIUM 9.8 08/08/2018    BILITOT 0.7 08/08/2018    ALKPHOS 79 08/08/2018    AST 10 08/08/2018    ALT 10 08/08/2018       POC Tests: No results for input(s): POCGLU, POCNA, POCK, POCCL, POCBUN, POCHEMO, POCHCT in the last 72 hours. Coags:   Lab Results   Component Value Date    PROTIME 11.1 01/06/2015    INR 1.03 01/06/2015       HCG (If Applicable): No results found for: PREGTESTUR, PREGSERUM, HCG, HCGQUANT     ABGs:   Lab Results   Component Value Date    PHART 7.416 01/06/2015    PO2ART 99.8 01/06/2015    CXV7UPW 36.2 01/06/2015    WRP1UNA 22.7 01/06/2015    BEART -1.4 01/06/2015    O4BWWHHV 97.7 01/06/2015        Type & Screen (If Applicable):  No results found for: LABABO, LABRH    Anesthesia Evaluation    Airway: Mallampati: III  TM distance: >3 FB   Neck ROM: full  Mouth opening: > = 3 FB Dental:          Pulmonary:   (+) asthma:                            Cardiovascular:    (+) hypertension:,                   Neuro/Psych:   (+) neuromuscular disease:, psychiatric history:            GI/Hepatic/Renal:   (+) GERD:,           Endo/Other:    (+) Diabetes, .                  Abdominal:           Vascular:

## 2018-08-15 NOTE — PROGRESS NOTES
Pt did dial wash to operative side of face on arrival    Pre and post operative expectations and routines explained to patient, verbalized understanding.     POCT      Blood Glucose:111        (Normal Range 70-99)    PT:      (Normal Range 9.8 - 13)    INR:      (Normal Range 0.86-1.14)

## 2018-09-17 RX ORDER — VERAPAMIL HYDROCHLORIDE 120 MG/1
TABLET, FILM COATED ORAL
Qty: 180 TABLET | Refills: 0 | Status: SHIPPED | OUTPATIENT
Start: 2018-09-17 | End: 2018-12-26 | Stop reason: SDUPTHER

## 2018-09-17 RX ORDER — ALLOPURINOL 300 MG/1
TABLET ORAL
Qty: 90 TABLET | Refills: 0 | Status: SHIPPED | OUTPATIENT
Start: 2018-09-17 | End: 2018-12-26 | Stop reason: SDUPTHER

## 2018-09-17 RX ORDER — LOSARTAN POTASSIUM 100 MG/1
TABLET ORAL
Qty: 90 TABLET | Refills: 0 | Status: SHIPPED | OUTPATIENT
Start: 2018-09-17 | End: 2018-12-26 | Stop reason: SDUPTHER

## 2018-10-16 RX ORDER — ATORVASTATIN CALCIUM 10 MG/1
TABLET, FILM COATED ORAL
Qty: 90 TABLET | Refills: 0 | Status: SHIPPED | OUTPATIENT
Start: 2018-10-16 | End: 2019-01-22 | Stop reason: SDUPTHER

## 2018-10-16 RX ORDER — CARVEDILOL 6.25 MG/1
TABLET ORAL
Qty: 180 TABLET | Refills: 0 | Status: SHIPPED | OUTPATIENT
Start: 2018-10-16 | End: 2019-01-22 | Stop reason: SDUPTHER

## 2018-11-01 ENCOUNTER — OFFICE VISIT (OUTPATIENT)
Dept: INTERNAL MEDICINE CLINIC | Age: 73
End: 2018-11-01

## 2018-11-01 VITALS
HEART RATE: 72 BPM | SYSTOLIC BLOOD PRESSURE: 158 MMHG | WEIGHT: 238 LBS | BODY MASS INDEX: 40.63 KG/M2 | HEIGHT: 64 IN | DIASTOLIC BLOOD PRESSURE: 80 MMHG

## 2018-11-01 DIAGNOSIS — E78.5 HYPERLIPIDEMIA, UNSPECIFIED HYPERLIPIDEMIA TYPE: ICD-10-CM

## 2018-11-01 DIAGNOSIS — Z23 NEED FOR INFLUENZA VACCINATION: ICD-10-CM

## 2018-11-01 DIAGNOSIS — E66.01 MORBID OBESITY (HCC): ICD-10-CM

## 2018-11-01 DIAGNOSIS — E11.9 TYPE 2 DIABETES MELLITUS WITHOUT COMPLICATION, WITHOUT LONG-TERM CURRENT USE OF INSULIN (HCC): Primary | ICD-10-CM

## 2018-11-01 DIAGNOSIS — D64.9 ANEMIA, UNSPECIFIED TYPE: ICD-10-CM

## 2018-11-01 DIAGNOSIS — I10 ESSENTIAL HYPERTENSION: ICD-10-CM

## 2018-11-01 DIAGNOSIS — J45.40 MODERATE PERSISTENT ASTHMA WITHOUT COMPLICATION: ICD-10-CM

## 2018-11-01 PROCEDURE — G8399 PT W/DXA RESULTS DOCUMENT: HCPCS | Performed by: INTERNAL MEDICINE

## 2018-11-01 PROCEDURE — 4040F PNEUMOC VAC/ADMIN/RCVD: CPT | Performed by: INTERNAL MEDICINE

## 2018-11-01 PROCEDURE — G8427 DOCREV CUR MEDS BY ELIG CLIN: HCPCS | Performed by: INTERNAL MEDICINE

## 2018-11-01 PROCEDURE — 3044F HG A1C LEVEL LT 7.0%: CPT | Performed by: INTERNAL MEDICINE

## 2018-11-01 PROCEDURE — 1036F TOBACCO NON-USER: CPT | Performed by: INTERNAL MEDICINE

## 2018-11-01 PROCEDURE — 3017F COLORECTAL CA SCREEN DOC REV: CPT | Performed by: INTERNAL MEDICINE

## 2018-11-01 PROCEDURE — 99214 OFFICE O/P EST MOD 30 MIN: CPT | Performed by: INTERNAL MEDICINE

## 2018-11-01 PROCEDURE — 1123F ACP DISCUSS/DSCN MKR DOCD: CPT | Performed by: INTERNAL MEDICINE

## 2018-11-01 PROCEDURE — 2022F DILAT RTA XM EVC RTNOPTHY: CPT | Performed by: INTERNAL MEDICINE

## 2018-11-01 PROCEDURE — 90662 IIV NO PRSV INCREASED AG IM: CPT | Performed by: INTERNAL MEDICINE

## 2018-11-01 PROCEDURE — G0008 ADMIN INFLUENZA VIRUS VAC: HCPCS | Performed by: INTERNAL MEDICINE

## 2018-11-01 PROCEDURE — 1090F PRES/ABSN URINE INCON ASSESS: CPT | Performed by: INTERNAL MEDICINE

## 2018-11-01 PROCEDURE — G8417 CALC BMI ABV UP PARAM F/U: HCPCS | Performed by: INTERNAL MEDICINE

## 2018-11-01 PROCEDURE — G8482 FLU IMMUNIZE ORDER/ADMIN: HCPCS | Performed by: INTERNAL MEDICINE

## 2018-11-01 PROCEDURE — 1101F PT FALLS ASSESS-DOCD LE1/YR: CPT | Performed by: INTERNAL MEDICINE

## 2018-11-01 ASSESSMENT — ENCOUNTER SYMPTOMS
COUGH: 0
WHEEZING: 0
CHEST TIGHTNESS: 0
VOMITING: 0
BLOOD IN STOOL: 0
DIARRHEA: 0
ABDOMINAL PAIN: 0
SHORTNESS OF BREATH: 0
NAUSEA: 0

## 2018-11-01 NOTE — PROGRESS NOTES
hyperlipidemia type     5. Need for influenza vaccination     6. Moderate persistent asthma without complication     7. Anemia, unspecified type             Plan:      DM- Under good control. Sees opthal every year. Strict diet.      HTN. Blood pressure is elevated today  Continue current meds for now  Cannot take HCTZ 2 to high Ca. Walking. Weight loss.     Asthma  Stable on Breo and proventil     HLD. continue lipitor. Lipids are good      Gout- continue allopurinol  No recent flareups.      Depression. Stable. On paxil.      Spinal stenosis. Continue gabapentin.      GERD. Stable on PPI. Now takes once daily      Anemia. H/o thal trait and B12 def.      Up to date on colonoscopy,mammogram and DEXA.         Romana Passe, MD

## 2018-11-19 RX ORDER — GLIPIZIDE 10 MG/1
TABLET ORAL
Qty: 180 TABLET | Refills: 10 | Status: SHIPPED | OUTPATIENT
Start: 2018-11-19 | End: 2020-02-03

## 2018-11-21 ENCOUNTER — TELEPHONE (OUTPATIENT)
Dept: INTERNAL MEDICINE CLINIC | Age: 73
End: 2018-11-21

## 2018-12-31 RX ORDER — ALLOPURINOL 300 MG/1
TABLET ORAL
Qty: 90 TABLET | Refills: 0 | Status: SHIPPED | OUTPATIENT
Start: 2018-12-31 | End: 2019-05-14 | Stop reason: SDUPTHER

## 2018-12-31 RX ORDER — LOSARTAN POTASSIUM 100 MG/1
TABLET ORAL
Qty: 90 TABLET | Refills: 0 | Status: SHIPPED | OUTPATIENT
Start: 2018-12-31 | End: 2019-05-14 | Stop reason: SDUPTHER

## 2018-12-31 RX ORDER — VERAPAMIL HYDROCHLORIDE 120 MG/1
TABLET, FILM COATED ORAL
Qty: 180 TABLET | Refills: 0 | Status: SHIPPED | OUTPATIENT
Start: 2018-12-31 | End: 2019-03-27 | Stop reason: SDUPTHER

## 2018-12-31 RX ORDER — PAROXETINE 10 MG/1
TABLET, FILM COATED ORAL
Qty: 90 TABLET | Refills: 0 | Status: SHIPPED | OUTPATIENT
Start: 2018-12-31 | End: 2019-03-27 | Stop reason: SDUPTHER

## 2018-12-31 RX ORDER — OMEPRAZOLE 20 MG/1
CAPSULE, DELAYED RELEASE ORAL
Qty: 60 CAPSULE | Refills: 2 | Status: SHIPPED | OUTPATIENT
Start: 2018-12-31 | End: 2019-12-02 | Stop reason: SDUPTHER

## 2019-01-18 ENCOUNTER — TELEPHONE (OUTPATIENT)
Dept: INTERNAL MEDICINE CLINIC | Age: 74
End: 2019-01-18

## 2019-01-23 RX ORDER — ATORVASTATIN CALCIUM 10 MG/1
TABLET, FILM COATED ORAL
Qty: 90 TABLET | Refills: 0 | Status: SHIPPED | OUTPATIENT
Start: 2019-01-23 | End: 2019-04-29 | Stop reason: SDUPTHER

## 2019-01-23 RX ORDER — CARVEDILOL 6.25 MG/1
TABLET ORAL
Qty: 180 TABLET | Refills: 0 | Status: SHIPPED | OUTPATIENT
Start: 2019-01-23 | End: 2019-04-29 | Stop reason: SDUPTHER

## 2019-03-01 ENCOUNTER — OFFICE VISIT (OUTPATIENT)
Dept: INTERNAL MEDICINE CLINIC | Age: 74
End: 2019-03-01

## 2019-03-01 VITALS
WEIGHT: 230 LBS | HEART RATE: 76 BPM | HEIGHT: 64 IN | SYSTOLIC BLOOD PRESSURE: 136 MMHG | BODY MASS INDEX: 39.27 KG/M2 | DIASTOLIC BLOOD PRESSURE: 72 MMHG

## 2019-03-01 DIAGNOSIS — J45.40 MODERATE PERSISTENT ASTHMA WITHOUT COMPLICATION: ICD-10-CM

## 2019-03-01 DIAGNOSIS — F32.5 MAJOR DEPRESSIVE DISORDER WITH SINGLE EPISODE, IN FULL REMISSION (HCC): ICD-10-CM

## 2019-03-01 DIAGNOSIS — E66.01 MORBID OBESITY (HCC): ICD-10-CM

## 2019-03-01 DIAGNOSIS — E78.5 HYPERLIPIDEMIA ASSOCIATED WITH TYPE 2 DIABETES MELLITUS (HCC): ICD-10-CM

## 2019-03-01 DIAGNOSIS — E11.9 TYPE 2 DIABETES MELLITUS WITHOUT COMPLICATION, WITHOUT LONG-TERM CURRENT USE OF INSULIN (HCC): ICD-10-CM

## 2019-03-01 DIAGNOSIS — M1A.09X0 CHRONIC GOUT OF MULTIPLE SITES, UNSPECIFIED CAUSE: ICD-10-CM

## 2019-03-01 DIAGNOSIS — Z00.00 ROUTINE GENERAL MEDICAL EXAMINATION AT A HEALTH CARE FACILITY: Primary | ICD-10-CM

## 2019-03-01 DIAGNOSIS — I10 ESSENTIAL HYPERTENSION: ICD-10-CM

## 2019-03-01 DIAGNOSIS — K21.9 GASTROESOPHAGEAL REFLUX DISEASE WITHOUT ESOPHAGITIS: ICD-10-CM

## 2019-03-01 DIAGNOSIS — E11.69 HYPERLIPIDEMIA ASSOCIATED WITH TYPE 2 DIABETES MELLITUS (HCC): ICD-10-CM

## 2019-03-01 LAB
CREATININE URINE: 129 MG/DL (ref 28–259)
MICROALBUMIN UR-MCNC: 3.3 MG/DL
MICROALBUMIN/CREAT UR-RTO: 25.6 MG/G (ref 0–30)

## 2019-03-01 PROCEDURE — G8482 FLU IMMUNIZE ORDER/ADMIN: HCPCS | Performed by: INTERNAL MEDICINE

## 2019-03-01 PROCEDURE — 3046F HEMOGLOBIN A1C LEVEL >9.0%: CPT | Performed by: INTERNAL MEDICINE

## 2019-03-01 PROCEDURE — G0438 PPPS, INITIAL VISIT: HCPCS | Performed by: INTERNAL MEDICINE

## 2019-03-01 PROCEDURE — 4040F PNEUMOC VAC/ADMIN/RCVD: CPT | Performed by: INTERNAL MEDICINE

## 2019-03-02 LAB
ESTIMATED AVERAGE GLUCOSE: 134.1 MG/DL
HBA1C MFR BLD: 6.3 %

## 2019-03-28 RX ORDER — PAROXETINE 10 MG/1
TABLET, FILM COATED ORAL
Qty: 90 TABLET | Refills: 0 | Status: SHIPPED | OUTPATIENT
Start: 2019-03-28 | End: 2019-06-07 | Stop reason: SDUPTHER

## 2019-03-28 RX ORDER — VERAPAMIL HYDROCHLORIDE 120 MG/1
TABLET, FILM COATED ORAL
Qty: 180 TABLET | Refills: 0 | Status: SHIPPED | OUTPATIENT
Start: 2019-03-28 | End: 2019-06-07 | Stop reason: SDUPTHER

## 2019-04-01 ENCOUNTER — TELEPHONE (OUTPATIENT)
Dept: INTERNAL MEDICINE CLINIC | Age: 74
End: 2019-04-01

## 2019-04-01 NOTE — TELEPHONE ENCOUNTER
----- Message from Jacek Parker MD sent at 4/1/2019  5:07 PM EDT -----  Contact: Patient [phone]  We can stay on the same   ----- Message -----  From: Kanchan Connell MA  Sent: 4/1/2019   4:57 PM  To: Jacek Parker MD    Patient wanting to know if you can switch her losartan to something else because of all the recalls?    Ramakrishna Blandon

## 2019-04-16 ENCOUNTER — OFFICE VISIT (OUTPATIENT)
Dept: ORTHOPEDIC SURGERY | Age: 74
End: 2019-04-16
Payer: MEDICARE

## 2019-04-16 VITALS — BODY MASS INDEX: 39.26 KG/M2 | HEIGHT: 64 IN | WEIGHT: 229.94 LBS | RESPIRATION RATE: 10 BRPM

## 2019-04-16 DIAGNOSIS — M17.0 PRIMARY OSTEOARTHRITIS OF BOTH KNEES: Primary | ICD-10-CM

## 2019-04-16 DIAGNOSIS — R52 PAIN: ICD-10-CM

## 2019-04-16 PROCEDURE — 3017F COLORECTAL CA SCREEN DOC REV: CPT | Performed by: ORTHOPAEDIC SURGERY

## 2019-04-16 PROCEDURE — G8427 DOCREV CUR MEDS BY ELIG CLIN: HCPCS | Performed by: ORTHOPAEDIC SURGERY

## 2019-04-16 PROCEDURE — G8399 PT W/DXA RESULTS DOCUMENT: HCPCS | Performed by: ORTHOPAEDIC SURGERY

## 2019-04-16 PROCEDURE — 1123F ACP DISCUSS/DSCN MKR DOCD: CPT | Performed by: ORTHOPAEDIC SURGERY

## 2019-04-16 PROCEDURE — 99213 OFFICE O/P EST LOW 20 MIN: CPT | Performed by: ORTHOPAEDIC SURGERY

## 2019-04-16 PROCEDURE — 1090F PRES/ABSN URINE INCON ASSESS: CPT | Performed by: ORTHOPAEDIC SURGERY

## 2019-04-16 PROCEDURE — G8417 CALC BMI ABV UP PARAM F/U: HCPCS | Performed by: ORTHOPAEDIC SURGERY

## 2019-04-16 PROCEDURE — 1036F TOBACCO NON-USER: CPT | Performed by: ORTHOPAEDIC SURGERY

## 2019-04-16 PROCEDURE — 20610 DRAIN/INJ JOINT/BURSA W/O US: CPT | Performed by: ORTHOPAEDIC SURGERY

## 2019-04-16 PROCEDURE — 4040F PNEUMOC VAC/ADMIN/RCVD: CPT | Performed by: ORTHOPAEDIC SURGERY

## 2019-04-16 NOTE — PROGRESS NOTES
KNEE VISIT      HISTORY OF PRESENT ILLNESS    Kelli Caal is a 75 y.o. female who presents for evaluation of bilateral knee pain. She been treated for osteoarthritis in the past with Visco supplements. She's had difficulty over last 2 and half years because of arthritic degeneration and wasn't sure what she should do at this time. She's had no interval injuries. ROS    Well documented in patient history form dated 4/16/19  All other ROS negative except for above.     Past Surgical history    Past Surgical History:   Procedure Laterality Date    APPENDECTOMY      CARPAL TUNNEL RELEASE Left 02/05/2018    CATARACT REMOVAL WITH IMPLANT Right 07/18/2018    CATARACT REMOVAL WITH IMPLANT Left 08/15/2018    COLONOSCOPY  12/2014    KNEE ARTHROSCOPY Bilateral     PARATHYROIDECTOMY      PARTIAL HYSTERECTOMY      MO REMV CATARACT EXTRACAP,INSERT LENS Right 7/18/2018    PHACOEMULSIFICATION OF CATARACT WITH INTRAOCULAR LENS IMPLANT RIGHT EYE performed by Jeimy Stone MD at [address] Left 8/15/2018    PHACOEMULSIFICATION OF CATARACT WITH INTRAOCULAR LENS IMPLANT LEFT EYE performed by Jeimy Stone MD at [place] [address] ENDOSCOPY         PAST MEDICAL    Past Medical History:   Diagnosis Date    Anemia     thallasemia minor    Arthritis     osteo-arthritis    Asthma     Back pain     Back pain     Bronchitis chronic     Diabetes mellitus ([address]     GERD (gastroesophageal reflux disease)     Gout     Hypertension     Melanoma of scalp or neck (HCC)     Osteoarthritis     Osteoporosis     Squamous cell carcinoma, arm        Allergies    Allergies   Allergen Reactions    Hctz [Hydrochlorothiazide]      Increases calcium to unsafe level resulting in parathyroid surgery    Lisinopril Other (See Comments)     cough       Meds    Current Outpatient Medications   Medication Sig Dispense Refill    verapamil (CALAN) 120 MG tablet TAKE ONE TABLET BY MOUTH TWICE A  tablet 0    metFORMIN (GLUCOPHAGE) 1000 MG tablet TAKE ONE TABLET BY MOUTH TWICE A DAY WITH MEALS 180 tablet 0    PARoxetine (PAXIL) 10 MG tablet TAKE ONE TABLET BY MOUTH EVERY MORNING 90 tablet 0    BREO ELLIPTA 200-25 MCG/INH AEPB INHALE ONE DOSE BY MOUTH DAILY 3 each 0    Umeclidinium Bromide (INCRUSE ELLIPTA) 62.5 MCG/INH AEPB Inhale 1 puff daily. 3 each 0    atorvastatin (LIPITOR) 10 MG tablet TAKE ONE TABLET BY MOUTH ONCE NIGHTLY 90 tablet 0    carvedilol (COREG) 6.25 MG tablet TAKE ONE TABLET BY MOUTH TWICE A  tablet 0    allopurinol (ZYLOPRIM) 300 MG tablet TAKE ONE TABLET BY MOUTH DAILY 90 tablet 0    omeprazole (PRILOSEC) 20 MG delayed release capsule TAKE ONE CAPSULE BY MOUTH TWICE A DAY 60 capsule 2    PROAIR  (90 Base) MCG/ACT inhaler INHALE TWO PUFFS BY MOUTH EVERY 6 HOURS AS NEEDED FOR WHEEZING 1 Inhaler 1    losartan (COZAAR) 100 MG tablet TAKE ONE TABLET BY MOUTH DAILY 90 tablet 0    glipiZIDE (GLUCOTROL) 10 MG tablet TAKE ONE TABLET BY MOUTH TWICE A DAY BEFORE MEALS 180 tablet 10    clotrimazole-betamethasone (LOTRISONE) 1-0.05 % cream Apply topically 2 times daily. 1 Tube 2    spironolactone (ALDACTONE) 50 MG tablet Once daily 1 tablet 0    SPIRIVA HANDIHALER 18 MCG inhalation capsule INHALE THE ENTIRE CONTENTS OF 1 CAPSULE ONCE A DAY USING HANDIHALER DEVICE 90 capsule 0    cetirizine (ZYRTEC) 10 MG tablet Take 10 mg by mouth daily      Cyanocobalamin (VITAMIN B 12 PO) Take 1,000 mcg by mouth daily.  Ascorbic Acid (VITAMIN C) 500 MG CAPS Take  by mouth daily.  vitamin E 400 UNIT capsule Take 400 Units by mouth daily.  Calcium Carbonate-Vitamin D (CALCIUM + D PO) Take  by mouth daily. No current facility-administered medications for this visit.         Social    Social History     Socioeconomic History    Marital status:      Spouse name: Not on file    Number of children: Not on file    Years of education: Not on file    Highest education level: Not on file   Occupational History    Not on file   Social Needs    Financial resource strain: Not on file    Food insecurity:     Worry: Not on file     Inability: Not on file    Transportation needs:     Medical: Not on file     Non-medical: Not on file   Tobacco Use    Smoking status: Never Smoker    Smokeless tobacco: Never Used    Tobacco comment: exposed to passive smoke for 18 yrs   Substance and Sexual Activity    Alcohol use: No    Drug use: No    Sexual activity: Never   Lifestyle    Physical activity:     Days per week: Not on file     Minutes per session: Not on file    Stress: Not on file   Relationships    Social connections:     Talks on phone: Not on file     Gets together: Not on file     Attends [employer] service: Not on file     Active member of club or organization: Not on file     Attends meetings of clubs or organizations: Not on file     Relationship status: Not on file    Intimate partner violence:     Fear of current or ex partner: Not on file     Emotionally abused: Not on file     Physically abused: Not on file     Forced sexual activity: Not on file   Other Topics Concern    Not on file   Social History Narrative    Not on file       Family HISTORY    Family History   Problem Relation Age of Onset    Heart Failure Mother     Emphysema Mother     Stroke Mother     Heart Failure Father     Hypertension Father     Heart Disease Father     High Blood Pressure Father     Cancer Maternal Aunt     Cancer Paternal Aunt     Cancer Maternal Grandmother     Asthma Neg Hx     Diabetes Neg Hx        PHYSICAL EXAM    Vital Signs:  Resp 10   Ht 5' 4.02\" (1.626 m)   Wt 229 lb 15 oz (104.3 kg)   BMI 39.45 kg/m²   General Appearance:  Obese body habitus with a BMI of 39 Alert and oriented to person, place, and time. Affect:  Normal.   Gait: Antalgic. Good balance and coordination. Skin:  Intact.    Sensation: Intact. Strength:  Intact. Reflexes:  Intact. Pulses:  Intact. Knee Exam:    Effusion:  Negative    Range of Motion Right Left   Extension -5 -5   Flexion 110 110     Provocative Test Right Left    Positive Negative Positive Negative   Anterior drawer [] [x] [] [x]   Lachman [] [x] [] [x]   Posterior drawer [] [x] [] [x]   Varus testing [] [x] [] [x]   Valgus testing [x] [] [x] []   Joint line tenderness [x] [] [x] []     Additional Exam Comments:  Her neurocirculatory lymphatic exam is otherwise normal and symmetric to both lower extremities. She does have medial joint line tenderness in both knees with crepitus in both knees and pain with valgus and varus stress. IMAGING STUDIES    X-rays 3 views of her left knee which includes an AP view of the right knee in both of tunnel and AP projections weightbearing demonstrate grade 4 osteoarthritis which is tricompartmental    IMPRESSION    Osteoarthritis of both knees. Grade 4    PLAN      1. Conservative care options including physical therapy, NSAIDs, bracing, and activity modification were discussed. 2.  The indications for therapeutic injections were discussed. 3.  The indications for additional imaging studies were discussed. 4.  After considering the various options discussed, the patient elected to pursue a course that includes starting Visco supplementation with cortisone today. She says she cannot have surgery because of her home situation at this time. HISTORY OF PRESENT ILLNESS: Patient returns today for the first of a series of three Euflexxa injections done to the left knee that was performed under a sterile Betadine prep, using ethyl chloride as a topical refrigerant, for a diagnosis of osteoarthritis. The injection of 2 ml of Euflexxa was done from an anterolateral joint line approach using a 25-gauge needle. It was done without incident and the patient tolerated it well.   PLAN: The patient should return next week to obtain their second out of a series of three injections of Euflexxa. Recommendation is for a cortisone injection into the left knee. After informed consent was received from the patient, the left knee was injected with 1 mL( 40mg) Depo-Medrol and 4 mL  of 0.25% Marcaine in the syringe from an anterolateral joint line approach, using a 25-gauge needle, under sterile Betadine prep, using ethyl chloride as a topical refrigerant, for a diagnosis of osteoarthritis. The patient appeared to tolerate it well. The patient should return here periodically as needed. HISTORY OF PRESENT ILLNESS: Patient returns today for the first of a series of three Euflexxa injections done to the right knee that was performed under a sterile Betadine prep, using ethyl chloride as a topical refrigerant, for a diagnosis of osteoarthritis. The injection of 2 ml of Euflexxa was done from an anterolateral joint line approach using a 25-gauge needle. It was done without incident and the patient tolerated it well. PLAN: The patient should return next week to obtain their second out of a series of three injections of Euflexxa. Recommendation is for a cortisone injection into the right knee. After informed consent was received from the patient, the right knee was injected with 1 mL(40mg)Depo-Medrol and 4 mL of 0.25% Marcaine  in the syringe from an anterolateral joint line approach, using a 25-gauge needle, under sterile Betadine prep, using ethyl chloride as a topical refrigerant, for a diagnosis of osteoarthritis. The patient appeared to tolerate it well. The patient should return here periodically as needed.

## 2019-04-29 RX ORDER — CARVEDILOL 6.25 MG/1
TABLET ORAL
Qty: 180 TABLET | Refills: 0 | Status: SHIPPED | OUTPATIENT
Start: 2019-04-29 | End: 2019-07-18 | Stop reason: SDUPTHER

## 2019-04-29 RX ORDER — ATORVASTATIN CALCIUM 10 MG/1
TABLET, FILM COATED ORAL
Qty: 90 TABLET | Refills: 0 | Status: SHIPPED | OUTPATIENT
Start: 2019-04-29 | End: 2019-07-18 | Stop reason: SDUPTHER

## 2019-04-30 ENCOUNTER — OFFICE VISIT (OUTPATIENT)
Dept: ORTHOPEDIC SURGERY | Age: 74
End: 2019-04-30
Payer: MEDICARE

## 2019-04-30 DIAGNOSIS — M17.0 PRIMARY OSTEOARTHRITIS OF BOTH KNEES: Primary | ICD-10-CM

## 2019-04-30 PROCEDURE — 99999 PR OFFICE/OUTPT VISIT,PROCEDURE ONLY: CPT | Performed by: ORTHOPAEDIC SURGERY

## 2019-04-30 PROCEDURE — 20610 DRAIN/INJ JOINT/BURSA W/O US: CPT | Performed by: ORTHOPAEDIC SURGERY

## 2019-05-07 ENCOUNTER — OFFICE VISIT (OUTPATIENT)
Dept: ORTHOPEDIC SURGERY | Age: 74
End: 2019-05-07
Payer: MEDICARE

## 2019-05-07 DIAGNOSIS — M17.0 PRIMARY OSTEOARTHRITIS OF BOTH KNEES: Primary | ICD-10-CM

## 2019-05-07 PROCEDURE — 20610 DRAIN/INJ JOINT/BURSA W/O US: CPT | Performed by: ORTHOPAEDIC SURGERY

## 2019-05-07 NOTE — PROGRESS NOTES
HISTORY OF PRESENT ILLNESS: Patient returns today for their third out of a series of three Euflexxa injections done to the left knee that was performed under a sterile Betadine prep, using ethyl chloride as topical refrigerant, for a diagnosis of osteoarthritis. The injection of 2 ml of Euflexxa was done from an anterolateral joint line approach using a 25-gauge needle. It was done without incident and was tolerated well. PLAN: The patient should return in two months for followup if needed. HISTORY OF PRESENT ILLNESS: Patient returns today for their third out of a series of three Euflexxa injections done to the right knee that was performed under a sterile Betadine prep, using ethyl chloride as topical refrigerant, for a diagnosis of osteoarthritis. The injection of 2 ml of Euflexxa was done from an anterolateral joint line approach using a 25-gauge needle. It was done without incident and was tolerated well. PLAN: The patient should return in two months for followup if needed.

## 2019-05-14 RX ORDER — ALLOPURINOL 300 MG/1
TABLET ORAL
Qty: 90 TABLET | Refills: 0 | Status: SHIPPED | OUTPATIENT
Start: 2019-05-14 | End: 2019-12-02 | Stop reason: SDUPTHER

## 2019-05-14 RX ORDER — LOSARTAN POTASSIUM 100 MG/1
TABLET ORAL
Qty: 90 TABLET | Refills: 0 | Status: SHIPPED | OUTPATIENT
Start: 2019-05-14 | End: 2019-09-14 | Stop reason: SDUPTHER

## 2019-05-16 PROBLEM — R52 PAIN: Status: RESOLVED | Noted: 2019-04-16 | Resolved: 2019-05-16

## 2019-06-05 ENCOUNTER — HOSPITAL ENCOUNTER (OUTPATIENT)
Dept: OPERATING ROOM | Age: 74
Setting detail: OUTPATIENT SURGERY
Discharge: HOME | End: 2019-06-05
Payer: MEDICARE

## 2019-06-05 VITALS — SYSTOLIC BLOOD PRESSURE: 153 MMHG | DIASTOLIC BLOOD PRESSURE: 67 MMHG | HEART RATE: 62 BPM

## 2019-06-05 PROCEDURE — 6370000000 HC RX 637 (ALT 250 FOR IP): Performed by: OPHTHALMOLOGY

## 2019-06-05 PROCEDURE — 66821 AFTER CATARACT LASER SURGERY: CPT

## 2019-06-05 RX ORDER — PREDNISOLONE ACETATE 10 MG/ML
1 SUSPENSION/ DROPS OPHTHALMIC
Status: COMPLETED | OUTPATIENT
Start: 2019-06-05 | End: 2019-06-05

## 2019-06-05 RX ORDER — TROPICAMIDE 10 MG/ML
1 SOLUTION/ DROPS OPHTHALMIC EVERY 5 MIN PRN
Status: DISCONTINUED | OUTPATIENT
Start: 2019-06-05 | End: 2019-06-06 | Stop reason: HOSPADM

## 2019-06-05 RX ORDER — PROPARACAINE HYDROCHLORIDE 5 MG/ML
1 SOLUTION/ DROPS OPHTHALMIC
Status: COMPLETED | OUTPATIENT
Start: 2019-06-05 | End: 2019-06-05

## 2019-06-05 RX ORDER — PHENYLEPHRINE HCL 2.5 %
1 DROPS OPHTHALMIC (EYE) EVERY 5 MIN PRN
Status: DISCONTINUED | OUTPATIENT
Start: 2019-06-05 | End: 2019-06-06 | Stop reason: HOSPADM

## 2019-06-05 RX ORDER — DOXYCYCLINE HYCLATE 100 MG/1
100 CAPSULE ORAL 2 TIMES DAILY
COMMUNITY
Start: 2019-05-26 | End: 2019-06-06 | Stop reason: ALTCHOICE

## 2019-06-05 RX ADMIN — TROPICAMIDE 1 DROP: 10 SOLUTION/ DROPS OPHTHALMIC at 13:23

## 2019-06-05 RX ADMIN — PROPARACAINE HYDROCHLORIDE 1 DROP: 5 SOLUTION/ DROPS OPHTHALMIC at 14:12

## 2019-06-05 RX ADMIN — APRACLONIDINE HYDROCHLORIDE 1 DROP: 10 SOLUTION/ DROPS OPHTHALMIC at 13:15

## 2019-06-05 RX ADMIN — TROPICAMIDE 1 DROP: 10 SOLUTION/ DROPS OPHTHALMIC at 13:14

## 2019-06-05 RX ADMIN — PREDNISOLONE ACETATE 1 DROP: 10 SUSPENSION/ DROPS OPHTHALMIC at 14:13

## 2019-06-05 RX ADMIN — PHENYLEPHRINE HYDROCHLORIDE 1 DROP: 25 SOLUTION/ DROPS OPHTHALMIC at 13:23

## 2019-06-05 RX ADMIN — APRACLONIDINE HYDROCHLORIDE 1 DROP: 10 SOLUTION/ DROPS OPHTHALMIC at 14:13

## 2019-06-05 RX ADMIN — PHENYLEPHRINE HYDROCHLORIDE 1 DROP: 25 SOLUTION/ DROPS OPHTHALMIC at 13:14

## 2019-06-06 ENCOUNTER — HOSPITAL ENCOUNTER (EMERGENCY)
Age: 74
Discharge: HOME | End: 2019-06-06
Attending: EMERGENCY MEDICINE
Payer: MEDICARE

## 2019-06-06 ENCOUNTER — APPOINTMENT (OUTPATIENT)
Dept: CT IMAGING | Age: 74
End: 2019-06-06
Payer: MEDICARE

## 2019-06-06 VITALS
BODY MASS INDEX: 39.27 KG/M2 | OXYGEN SATURATION: 99 % | DIASTOLIC BLOOD PRESSURE: 81 MMHG | TEMPERATURE: 98 F | HEIGHT: 64 IN | WEIGHT: 230 LBS | HEART RATE: 71 BPM | SYSTOLIC BLOOD PRESSURE: 154 MMHG | RESPIRATION RATE: 14 BRPM
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
## 2019-06-06 DIAGNOSIS — M54.2 NECK PAIN: Primary | ICD-10-CM

## 2019-06-06 LAB
A/G RATIO: 1.7 (ref 1.1–2.2)
ALBUMIN SERPL-MCNC: 4.5 G/DL (ref 3.4–5)
ALP BLD-CCNC: 84 U/L (ref 40–129)
ALT SERPL-CCNC: 8 U/L (ref 10–40)
ANION GAP SERPL CALCULATED.3IONS-SCNC: 12 MMOL/L (ref 3–16)
AST SERPL-CCNC: 10 U/L (ref 15–37)
BASOPHILS ABSOLUTE: 0 K/UL (ref 0–0.2)
BASOPHILS RELATIVE PERCENT: 0.3 %
BILIRUB SERPL-MCNC: 0.9 MG/DL (ref 0–1)
BUN BLDV-MCNC: 19 MG/DL (ref 7–20)
CALCIUM SERPL-MCNC: 10.1 MG/DL (ref 8.3–10.6)
CHLORIDE BLD-SCNC: 106 MMOL/L (ref 99–110)
CO2: 24 MMOL/L (ref 21–32)
CREAT SERPL-MCNC: 0.7 MG/DL (ref 0.6–1.2)
EOSINOPHILS ABSOLUTE: 0.1 K/UL (ref 0–0.6)
EOSINOPHILS RELATIVE PERCENT: 0.5 %
GFR AFRICAN AMERICAN: >60
GFR NON-AFRICAN AMERICAN: >60
GLOBULIN: 2.6 G/DL
GLUCOSE BLD-MCNC: 111 MG/DL (ref 70–99)
HCT VFR BLD CALC: 34 % (ref 36–48)
HEMATOLOGY PATH CONSULT: YES
HEMOGLOBIN: 10.9 G/DL (ref 12–16)
HYPOCHROMIA: ABNORMAL
LYMPHOCYTES ABSOLUTE: 1.5 K/UL (ref 1–5.1)
LYMPHOCYTES RELATIVE PERCENT: 14.5 %
MCH RBC QN AUTO: 19.3 PG (ref 26–34)
MCHC RBC AUTO-ENTMCNC: 32 G/DL (ref 31–36)
MCV RBC AUTO: 60.3 FL (ref 80–100)
MICROCYTES: ABNORMAL
MONOCYTES ABSOLUTE: 0.8 K/UL (ref 0–1.3)
MONOCYTES RELATIVE PERCENT: 8.2 %
NEUTROPHILS ABSOLUTE: 7.6 K/UL (ref 1.7–7.7)
NEUTROPHILS RELATIVE PERCENT: 76.5 %
OVALOCYTES: ABNORMAL
PDW BLD-RTO: 16.9 % (ref 12.4–15.4)
PLATELET # BLD: 232 K/UL (ref 135–450)
PLATELET SLIDE REVIEW: ADEQUATE
PMV BLD AUTO: 9.3 FL (ref 5–10.5)
POTASSIUM SERPL-SCNC: 4.3 MMOL/L (ref 3.5–5.1)
RBC # BLD: 5.63 M/UL (ref 4–5.2)
SLIDE REVIEW: ABNORMAL
SODIUM BLD-SCNC: 142 MMOL/L (ref 136–145)
TOTAL PROTEIN: 7.1 G/DL (ref 6.4–8.2)
WBC # BLD: 10 K/UL (ref 4–11)

## 2019-06-06 PROCEDURE — 99284 EMERGENCY DEPT VISIT MOD MDM: CPT

## 2019-06-06 PROCEDURE — 86618 LYME DISEASE ANTIBODY: CPT

## 2019-06-06 PROCEDURE — 72125 CT NECK SPINE W/O DYE: CPT

## 2019-06-06 PROCEDURE — 6360000002 HC RX W HCPCS: Performed by: EMERGENCY MEDICINE

## 2019-06-06 PROCEDURE — 80053 COMPREHEN METABOLIC PANEL: CPT

## 2019-06-06 PROCEDURE — 85025 COMPLETE CBC W/AUTO DIFF WBC: CPT

## 2019-06-06 PROCEDURE — 70450 CT HEAD/BRAIN W/O DYE: CPT

## 2019-06-06 PROCEDURE — 96374 THER/PROPH/DIAG INJ IV PUSH: CPT

## 2019-06-06 RX ORDER — ORPHENADRINE CITRATE 30 MG/ML
60 INJECTION INTRAMUSCULAR; INTRAVENOUS ONCE
Status: COMPLETED | OUTPATIENT
Start: 2019-06-06 | End: 2019-06-06

## 2019-06-06 RX ORDER — CYCLOBENZAPRINE HCL 5 MG
5 TABLET ORAL 3 TIMES DAILY PRN
Qty: 20 TABLET | Refills: 0 | Status: SHIPPED | OUTPATIENT
Start: 2019-06-06 | End: 2019-06-16

## 2019-06-06 RX ORDER — HYDROCODONE BITARTRATE AND ACETAMINOPHEN 5; 325 MG/1; MG/1
1 TABLET ORAL EVERY 6 HOURS PRN
Qty: 10 TABLET | Refills: 0 | Status: SHIPPED | OUTPATIENT
Start: 2019-06-06 | End: 2019-06-10

## 2019-06-06 RX ADMIN — ORPHENADRINE CITRATE 60 MG: 30 INJECTION INTRAMUSCULAR; INTRAVENOUS at 18:03

## 2019-06-06 ASSESSMENT — PAIN DESCRIPTION - LOCATION: LOCATION: HEAD;NECK

## 2019-06-06 ASSESSMENT — PAIN SCALES - GENERAL
PAINLEVEL_OUTOF10: 8
PAINLEVEL_OUTOF10: 0

## 2019-06-06 ASSESSMENT — PAIN DESCRIPTION - PAIN TYPE: TYPE: ACUTE PAIN

## 2019-06-06 NOTE — ED PROVIDER NOTES
[facility]  ED  eMERGENCYdEPARTMENT eNCOUnter      Pt Name: Calin Ledezma  MRN: [id number]  Harleygfmable 1945  Date of evaluation: 6/6/2019  Provider:Nicholas Lonne Prader, MD    [address] [place] [address]       Chief Complaint   Patient presents with    Headache     may 24 found a tick and pulled it out of head. next day had a lump on head and went to urgent care. for the last few years occasionally gets spasms in neck/head. monday had head and neck spasms and tuesday it hurt all day. then last night started with headache in back of her head and neck pain/stiffness.  Neck Pain         HISTORY OF PRESENT ILLNESS    Kelli Young is a 75 y.o. female who presents to the emergency department with headache. On May 24, patient found a tick on the top of her head, pulled it out.  2 days later went to  and was started on 10 days of doxycycline. She never had a rash. No fevers. No weakness. Over the past 2 days she's had gradually worsening neck and posterior head pain. Hurts worse when she turns her neck. No confusion. No numbness or weakness. Walking normally. Energy is normal.  Pain is 8 out of 10 in severity, bilateral paracervical upper. Nursing Notes were reviewed. REVIEW OF SYSTEMS       Review of Systems    10 point review of systems was performed and was negative exceptas specifically noted in the HPI.       PAST MEDICAL HISTORY     Past Medical History:   Diagnosis Date    Anemia     thallasemia minor    Arthritis     osteo-arthritis    Asthma     Back pain     Back pain     Bronchitis chronic     Diabetes mellitus (HCC)     GERD (gastroesophageal reflux disease)     Gout     Hypertension     Melanoma of scalp or neck (HCC)     Osteoarthritis     Osteoporosis     Squamous cell carcinoma, arm          SURGICAL HISTORY       Past Surgical History:   Procedure Laterality Date    APPENDECTOMY      CARPAL TUNNEL RELEASE Left 02/05/2018    CATARACT REMOVAL WITH IMPLANT Right 07/18/2018    CATARACT REMOVAL WITH IMPLANT Left 08/15/2018    COLONOSCOPY  12/2014    KNEE ARTHROSCOPY Bilateral     PARATHYROIDECTOMY      PARTIAL HYSTERECTOMY      MO REMV CATARACT EXTRACAP,INSERT LENS Right 7/18/2018    PHACOEMULSIFICATION OF CATARACT WITH INTRAOCULAR LENS IMPLANT RIGHT EYE performed by Savannah Butcher MD at [address] Left 8/15/2018    PHACOEMULSIFICATION OF CATARACT WITH INTRAOCULAR LENS IMPLANT LEFT EYE performed by Savannah Butcher MD at [address] [place] [address]       Previous Medications    ALLOPURINOL (ZYLOPRIM) 300 MG TABLET    TAKE ONE TABLET BY MOUTH DAILY    ASCORBIC ACID (VITAMIN C) 500 MG CAPS    Take  by mouth daily. ATORVASTATIN (LIPITOR) 10 MG TABLET    TAKE ONE TABLET BY MOUTH ONCE NIGHTLY    BREO ELLIPTA 200-25 MCG/INH AEPB    INHALE ONE DOSE BY MOUTH DAILY    CALCIUM CARBONATE-VITAMIN D (CALCIUM + D PO)    Take  by mouth daily. CARVEDILOL (COREG) 6.25 MG TABLET    TAKE ONE TABLET BY MOUTH TWICE A DAY    CETIRIZINE (ZYRTEC) 10 MG TABLET    Take 10 mg by mouth daily    CLOTRIMAZOLE-BETAMETHASONE (LOTRISONE) 1-0.05 % CREAM    Apply topically 2 times daily. COENZYME Q10 (CO Q 10 PO)    Take by mouth    CYANOCOBALAMIN (VITAMIN B 12 PO)    Take 1,000 mcg by mouth daily.     GLIPIZIDE (GLUCOTROL) 10 MG TABLET    TAKE ONE TABLET BY MOUTH TWICE A DAY BEFORE MEALS    LOSARTAN (COZAAR) 100 MG TABLET    TAKE ONE TABLET BY MOUTH DAILY    METFORMIN (GLUCOPHAGE) 1000 MG TABLET    TAKE ONE TABLET BY MOUTH TWICE A DAY WITH MEALS    OMEPRAZOLE (PRILOSEC) 20 MG DELAYED RELEASE CAPSULE    TAKE ONE CAPSULE BY MOUTH TWICE A DAY    PAROXETINE (PAXIL) 10 MG TABLET    TAKE ONE TABLET BY MOUTH EVERY MORNING    PROAIR  (90 BASE) MCG/ACT INHALER    INHALE TWO PUFFS BY MOUTH EVERY 6 HOURS AS NEEDED FOR WHEEZING    SPIRONOLACTONE (ALDACTONE) 50 MG TABLET    Once daily UMECLIDINIUM BROMIDE (INCRUSE ELLIPTA) 62.5 MCG/INH AEPB    INHALE ONE PUFF BY MOUTH DAILY    VERAPAMIL (CALAN) 120 MG TABLET    TAKE ONE TABLET BY MOUTH TWICE A DAY    VITAMIN E 400 UNIT CAPSULE    Take 400 Units by mouth daily.        ALLERGIES     Hctz [hydrochlorothiazide] and Lisinopril    FAMILY HISTORY       Family History   Problem Relation Age of Onset    Heart Failure Mother     Emphysema Mother     Stroke Mother     Heart Failure Father     Hypertension Father     Heart Disease Father     High Blood Pressure Father     Cancer Maternal Aunt     Cancer Paternal Aunt     Cancer Maternal Grandmother     Asthma Neg Hx     Diabetes Neg Hx           SOCIAL HISTORY       Social History     Socioeconomic History    Marital status:      Spouse name: None    Number of children: None    Years of education: None    Highest education level: None   Occupational History    None   Social Needs    Financial resource strain: None    Food insecurity:     Worry: None     Inability: None    Transportation needs:     Medical: None     Non-medical: None   Tobacco Use    Smoking status: Passive Smoke Exposure - Never Smoker    Smokeless tobacco: Never Used    Tobacco comment: exposed to passive smoke for 18 yrs   Substance and Sexual Activity    Alcohol use: No    Drug use: No    Sexual activity: Never   Lifestyle    Physical activity:     Days per week: None     Minutes per session: None    Stress: None   Relationships    Social connections:     Talks on phone: None     Gets together: None     Attends [employer] witness service: None     Active member of club or organization: None     Attends meetings of clubs or organizations: None     Relationship status: None    Intimate partner violence:     Fear of current or ex partner: None     Emotionally abused: None     Physically abused: None     Forced sexual activity: None   Other Topics Concern    None   Social History Narrative    None SCREENINGS   @FLOW([id number])@         PHYSICAL EXAM       ED Triage Vitals   BP Temp Temp Source Pulse Resp SpO2 Height Weight   06/06/19 1402 06/06/19 1402 06/06/19 1402 06/06/19 1402 06/06/19 1402 06/06/19 1402 06/06/19 1406 06/06/19 1406   130/69 98 °F (36.7 °C) Oral 70 17 95 % 5' 4\" (1.626 m) 230 lb (104.3 kg)       Physical Exam  General appearance: Alert, cooperative, no distress, appears stated age. Head:  Normocephalic, without obvious abnormality, atraumatic. HEENT: Mucous membranes moist.  Neck: Limited range of motion of the neck both with rotation and flexion/extension, tenderness to palpation paraspinal muscles at the upper cervical spine. Lungs: No respiratory distress  Cardiovasular: Perfusing extremities  Abdomen: Nontender, no guarding  Extremities: Atraumatic, full ROM  Skin: No rashes or lesions to exposed skin  Neurologic: Alert and oriented x3, motor grossly normal, clear speech      DIAGNOSTIC RESULTS     EKG:     RADIOLOGY:   Non-plain film images such as CT, Ultrasound and MRI are read by the radiologist.Plain radiographic images are visualized and preliminarily interpreted by the emergency physician with the below findings:        Interpretation per the Radiologist below, if available at the time of this note:    CT Cervical Spine WO Contrast   Final Result   1. No acute abnormality. 2.  Facet arthropathy throughout the cervical spine. CT Head WO Contrast   Final Result   No acute intracranial abnormality.                EDBEDSIDE ULTRASOUND:   Performed by Ольга Willett - none    LABS:  Labs Reviewed   CBC WITH AUTO DIFFERENTIAL - Abnormal; Notable for the following components:       Result Value    RBC 5.63 (*)     Hemoglobin 10.9 (*)     Hematocrit 34.0 (*)     MCV 60.3 (*)     MCH 19.3 (*)     RDW 16.9 (*)     Microcytes 1+ (*)     Hypochromia 2+ (*)     Ovalocytes 2+ (*)     All other components within normal limits    Narrative:     Performed at:  [facility]) - excluding separately reportable procedures. There was a high probability of clinically significant/life threatening deteriorationin the patient's condition which required my urgent intervention. CONSULTS:  None     PROCEDURES:  Unless otherwise noted below, none     Procedures    FINAL IMPRESSION      1. Neck pain    2. Tick bite, initial encounter          DISPOSITION/PLAN   DISPOSITION Decision To Discharge 06/06/2019 07:41:52 PM      PATIENT REFERRED TO:  Caden Cooper MD  [facility] [address] [place] [address]  [phone]    Go to   on [address]:  New Prescriptions    CYCLOBENZAPRINE (FLEXERIL) 5 MG TABLET    Take 1 tablet by mouth 3 times daily as needed for Muscle spasms    HYDROCODONE-ACETAMINOPHEN (NORCO) 5-325 MG PER TABLET    Take 1 tablet by mouth every 6 hours as needed for Pain for up to 3 days.           (Please note that portions of this note were completed with a voicerecognition program.  Efforts were made to edit the dictations but occasionally words are mis-transcribed.)    Roosevelt Jamison MD (electronically signed)  Attending Emergency Physician           Roosevelt Jamison MD  06/06/19 1943

## 2019-06-06 NOTE — OP NOTE
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 Елена Osman M.D.

## 2019-06-07 LAB — HEMATOLOGY PATH CONSULT: NORMAL

## 2019-06-07 RX ORDER — PAROXETINE 10 MG/1
TABLET, FILM COATED ORAL
Qty: 90 TABLET | Refills: 0 | Status: SHIPPED | OUTPATIENT
Start: 2019-06-07 | End: 2019-09-14 | Stop reason: SDUPTHER

## 2019-06-07 RX ORDER — VERAPAMIL HYDROCHLORIDE 120 MG/1
TABLET, FILM COATED ORAL
Qty: 180 TABLET | Refills: 0 | Status: SHIPPED | OUTPATIENT
Start: 2019-06-07 | End: 2019-09-14 | Stop reason: SDUPTHER

## 2019-06-09 ENCOUNTER — HOSPITAL ENCOUNTER (EMERGENCY)
Age: 74
Discharge: HOME | End: 2019-06-09
Attending: EMERGENCY MEDICINE
Payer: MEDICARE

## 2019-06-09 ENCOUNTER — APPOINTMENT (OUTPATIENT)
Dept: GENERAL RADIOLOGY | Age: 74
End: 2019-06-09
Payer: MEDICARE

## 2019-06-09 VITALS
HEIGHT: 64 IN | BODY MASS INDEX: 39.27 KG/M2 | RESPIRATION RATE: 16 BRPM | OXYGEN SATURATION: 98 % | DIASTOLIC BLOOD PRESSURE: 83 MMHG | HEART RATE: 79 BPM | WEIGHT: 230 LBS | SYSTOLIC BLOOD PRESSURE: 145 MMHG | TEMPERATURE: 98.2 F

## 2019-06-09 DIAGNOSIS — K59.00 CONSTIPATION, UNSPECIFIED CONSTIPATION TYPE: Primary | ICD-10-CM

## 2019-06-09 LAB — LYME, EIA: 0.05 LIV (ref 0–1.2)

## 2019-06-09 PROCEDURE — 51701 INSERT BLADDER CATHETER: CPT

## 2019-06-09 PROCEDURE — 74018 RADEX ABDOMEN 1 VIEW: CPT

## 2019-06-09 PROCEDURE — 99283 EMERGENCY DEPT VISIT LOW MDM: CPT

## 2019-06-09 RX ORDER — BISACODYL 10 MG
10 SUPPOSITORY, RECTAL RECTAL DAILY
Qty: 30 SUPPOSITORY | Refills: 0 | Status: SHIPPED | OUTPATIENT
Start: 2019-06-09 | End: 2019-07-09

## 2019-06-09 NOTE — ED TRIAGE NOTES
Chief Complaint   Patient presents with    Constipation     Pt brought in by EMS for constipation. Pt reports that she has not had a BM for the last 3-4 days. Pt states that she did take some Exlax and tried to have a BM around 2200. Pt states that when she went to wipe \"things were hanging out. \" Pt reports no urine output since 2200 last night. Vince light within reach. Will continue to monitor.

## 2019-06-09 NOTE — ED NOTES
Pt reports having a substantial bowel movement and return of normal urinary function. Pt reports complete improvement of s/s. Findings and plan of care discussed with patient at bedside by provider. Pt verbalizes understanding of instructions as discussed by provider. Written discharge instructions, rxx  1 provided, pt in agreement with plan of care, denies other needs at this time. Pt independently ambulatory to exit to wait for her daughter who is coming to pick her up.      Deondre Caballero RN  06/09/19 2517

## 2019-06-09 NOTE — ED PROVIDER NOTES
Triage Chief Complaint:    Constipation (Pt brought in by EMS for constipation. Pt reports that she has not had a BM for the last 3-4 days. Pt states that she did take some Exlax and tried to have a BM around 2200. Pt states that when she went to wipe \"things were hanging out. \" Pt reports no urine output since 2200 last night. )    [place]:  Marcel Meza is a 75 y.o. female that presents to emergency Department with complaints of possible rectal prolapse. The patient states that she's never had this happen before. Patient states she does suffer from constipation. Patient states she was on the toilet trying to have a bowel movement, but is unable to have a successful movement. The patient states that she went to wipe, and said that she felt lumps that had not been there before. The patient states that she was concerned about the possibility prolapse, and call for 911. The patient denies any actual pain. Patient states that she still having difficulty passing her stool. Patient states that there is occasional diarrhea that does leak. ROS:  At least 10 systems reviewed and otherwise acutely negative except as in the [address]     Past Medical History:   Diagnosis Date    Anemia     thallasemia minor    Arthritis     osteo-arthritis    Asthma     Back pain     Back pain     Bronchitis chronic     Diabetes mellitus (HCC)     GERD (gastroesophageal reflux disease)     Gout     Hypertension     Melanoma of scalp or neck (HCC)     Osteoarthritis     Osteoporosis     Squamous cell carcinoma, arm      Past Surgical History:   Procedure Laterality Date    APPENDECTOMY      CARPAL TUNNEL RELEASE Left 02/05/2018    CATARACT REMOVAL WITH IMPLANT Right 07/18/2018    CATARACT REMOVAL WITH IMPLANT Left 08/15/2018    COLONOSCOPY  12/2014    KNEE ARTHROSCOPY Bilateral     PARATHYROIDECTOMY      PARTIAL HYSTERECTOMY      IA REMV CATARACT EXTRACAP,INSERT LENS Right 7/18/2018    PHACOEMULSIFICATION OF CATARACT WITH INTRAOCULAR LENS IMPLANT RIGHT EYE performed by Jesus Gonsalves MD at [address] Left 8/15/2018    PHACOEMULSIFICATION OF CATARACT WITH INTRAOCULAR LENS IMPLANT LEFT EYE performed by Jesus Gonsalves MD at [address]       Family History   Problem Relation Age of Onset    Heart Failure Mother     Emphysema Mother     Stroke Mother     Heart Failure Father     Hypertension Father     Heart Disease Father     High Blood Pressure Father     Cancer Maternal Aunt     Cancer Paternal Aunt     Cancer Maternal Grandmother     Asthma Neg Hx     Diabetes Neg Hx      Social History     Socioeconomic History    Marital status:      Spouse name: Not on file    Number of children: Not on file    Years of education: Not on file    Highest education level: Not on file   Occupational History    Not on file   Social Needs    Financial resource strain: Not on file    Food insecurity:     Worry: Not on file     Inability: Not on file    Transportation needs:     Medical: Not on file     Non-medical: Not on file   Tobacco Use    Smoking status: Passive Smoke Exposure - Never Smoker    Smokeless tobacco: Never Used    Tobacco comment: exposed to passive smoke for 18 yrs   Substance and Sexual Activity    Alcohol use: No    Drug use: No    Sexual activity: Never   Lifestyle    Physical activity:     Days per week: Not on file     Minutes per session: Not on file    Stress: Not on file   Relationships    Social connections:     Talks on phone: Not on file     Gets together: Not on file     Attends Zoroastrian service: Not on file     Active member of club or organization: Not on file     Attends meetings of clubs or organizations: Not on file     Relationship status: Not on file    Intimate partner violence:     Fear of current or ex partner: Not on file     Emotionally abused: Not on file     Physically abused: Not on file     Forced sexual activity: Not on file   Other Topics Concern    Not on file   Social History Narrative    Not on file     No current facility-administered medications for this encounter. Current Outpatient Medications   Medication Sig Dispense Refill    bisacodyl (BISAC-EVAC) 10 MG suppository Place 1 suppository rectally daily 30 suppository 0    BREO ELLIPTA 200-25 MCG/INH AEPB INHALE ONE DOSE BY MOUTH DAILY 3 each 0    metFORMIN (GLUCOPHAGE) 1000 MG tablet TAKE ONE TABLET BY MOUTH TWICE A DAY WITH MEALS 180 tablet 0    verapamil (CALAN) 120 MG tablet TAKE ONE TABLET BY MOUTH TWICE A  tablet 0    PARoxetine (PAXIL) 10 MG tablet TAKE ONE TABLET BY MOUTH EVERY MORNING 90 tablet 0    Coenzyme Q10 (CO Q 10 PO) Take by mouth      cyclobenzaprine (FLEXERIL) 5 MG tablet Take 1 tablet by mouth 3 times daily as needed for Muscle spasms 20 tablet 0    HYDROcodone-acetaminophen (NORCO) 5-325 MG per tablet Take 1 tablet by mouth every 6 hours as needed for Pain for up to 3 days.  10 tablet 0    losartan (COZAAR) 100 MG tablet TAKE ONE TABLET BY MOUTH DAILY 90 tablet 0    allopurinol (ZYLOPRIM) 300 MG tablet TAKE ONE TABLET BY MOUTH DAILY 90 tablet 0    Umeclidinium Bromide (INCRUSE ELLIPTA) 62.5 MCG/INH AEPB INHALE ONE PUFF BY MOUTH DAILY 3 each 0    atorvastatin (LIPITOR) 10 MG tablet TAKE ONE TABLET BY MOUTH ONCE NIGHTLY 90 tablet 0    carvedilol (COREG) 6.25 MG tablet TAKE ONE TABLET BY MOUTH TWICE A  tablet 0    omeprazole (PRILOSEC) 20 MG delayed release capsule TAKE ONE CAPSULE BY MOUTH TWICE A DAY (Patient taking differently: TAKE ONE CAPSULE BY MOUTH daily) 60 capsule 2    PROAIR  (90 Base) MCG/ACT inhaler INHALE TWO PUFFS BY MOUTH EVERY 6 HOURS AS NEEDED FOR WHEEZING 1 Inhaler 1    glipiZIDE (GLUCOTROL) 10 MG tablet TAKE ONE TABLET BY MOUTH TWICE A DAY BEFORE MEALS 180 tablet 10    clotrimazole-betamethasone (LOTRISONE) 1-0.05 % cream Apply topically 2 times daily. 1 Tube 2    spironolactone (ALDACTONE) 50 MG tablet Once daily (Patient taking differently: Take 50 mg by mouth daily Once daily) 1 tablet 0    cetirizine (ZYRTEC) 10 MG tablet Take 10 mg by mouth daily      Cyanocobalamin (VITAMIN B 12 PO) Take 1,000 mcg by mouth daily.  Ascorbic Acid (VITAMIN C) 500 MG CAPS Take  by mouth daily.  vitamin E 400 UNIT capsule Take 400 Units by mouth daily.  Calcium Carbonate-Vitamin D (CALCIUM + D PO) Take  by mouth daily. Allergies   Allergen Reactions    Hctz [Hydrochlorothiazide]      Increases calcium to unsafe level resulting in parathyroid surgery    Lisinopril Other (See Comments)     cough       Nursing Notes Reviewed    Physical Exam:  ED Triage Vitals [06/09/19 0521]   BP Temp Temp Source Pulse Resp SpO2 Height Weight   (!) 159/102 98.2 °F (36.8 °C) Oral 80 16 98 % 5' 4\" (1.626 m) 230 lb (104.3 kg)     GENERAL APPEARANCE: Awake and alert. Cooperative. No acute distress. HEAD:Normocephalic. Atraumatic. EYES: EOM's grossly intact. Sclera anicteric. ENT: Mucous membranes are moist. Tolerates saliva. No trismus. NECK: Supple. No meningismus. Trachea midline. HEART: RRR. LUNGS: Respirations unlabored. CTAB  ABDOMEN: Soft. Non-tender. No guarding or rebound. Rectal examination demonstrates slight hemorrhoids noted bilaterally, but no rectal prolapse or thrombosed hemorrhoids. EXTREMITIES: No acute deformities. SKIN: Warm and dry. NEUROLOGICAL: No gross facial drooping. Moves all 4 extremities spontaneously. Physical Exam    I have reviewed and interpreted all of the currently availablelab results from this visit (if applicable):  No results found for this visit on 06/09/19.      Radiographs (if obtained):  [] The following radiograph was interpreted by myself in the absence of a radiologist:  [x] Radiologist's Report Reviewed:  XR ABDOMEN (KUB) (SINGLE AP VIEW)   Final Result   Nonspecific but probably nonobstructive bowel gas pattern. EKG (if obtained): (All EKG's are interpreted by myself in theabsence of a cardiologist)    Procedures    MDM:  After my initial evaluation, the patient does not have a prolapsed rectum, nor does she have any significant thrombosed hemorrhoids. I am going to send the patient for a KUB to rule out the possibility of obstructive pattern, and also to see where the stool is that she is having trouble passing. Stools throughout the colon, so I do feel that the patient will most likely require milk of magnesia to help move her bowels. Patient however during her stay here in the having a very large bowel movement, now feels much better. I did advise the patient on possibly using milk of magnesia and a more regular basis nor to keep her self regular. Clinical Impression:  1.  Constipation, unspecified constipation type      (Please note that portions of this note [employer] been completed with a voice recognition program. Efforts were made to edit the dictations but occasionally words are mis-transcribed.)    MD Irineo Urias MD  06/09/19 9680

## 2019-06-10 ENCOUNTER — OFFICE VISIT (OUTPATIENT)
Dept: INTERNAL MEDICINE CLINIC | Age: 74
End: 2019-06-10

## 2019-06-10 VITALS — BODY MASS INDEX: 38.93 KG/M2 | HEIGHT: 64 IN | WEIGHT: 228 LBS

## 2019-06-10 DIAGNOSIS — D64.9 ANEMIA, UNSPECIFIED TYPE: ICD-10-CM

## 2019-06-10 DIAGNOSIS — J45.40 MODERATE PERSISTENT ASTHMA WITHOUT COMPLICATION: ICD-10-CM

## 2019-06-10 DIAGNOSIS — Z78.0 POSTMENOPAUSAL STATE: ICD-10-CM

## 2019-06-10 DIAGNOSIS — I10 ESSENTIAL HYPERTENSION: ICD-10-CM

## 2019-06-10 DIAGNOSIS — E11.9 TYPE 2 DIABETES MELLITUS WITHOUT COMPLICATION, WITHOUT LONG-TERM CURRENT USE OF INSULIN (HCC): Primary | ICD-10-CM

## 2019-06-10 DIAGNOSIS — M48.061 SPINAL STENOSIS OF LUMBAR REGION WITHOUT NEUROGENIC CLAUDICATION: ICD-10-CM

## 2019-06-10 DIAGNOSIS — E66.01 MORBID OBESITY (HCC): ICD-10-CM

## 2019-06-10 DIAGNOSIS — F32.5 MAJOR DEPRESSIVE DISORDER WITH SINGLE EPISODE, IN FULL REMISSION (HCC): ICD-10-CM

## 2019-06-10 PROCEDURE — 1123F ACP DISCUSS/DSCN MKR DOCD: CPT | Performed by: INTERNAL MEDICINE

## 2019-06-10 PROCEDURE — 4040F PNEUMOC VAC/ADMIN/RCVD: CPT | Performed by: INTERNAL MEDICINE

## 2019-06-10 PROCEDURE — G8427 DOCREV CUR MEDS BY ELIG CLIN: HCPCS | Performed by: INTERNAL MEDICINE

## 2019-06-10 PROCEDURE — G8399 PT W/DXA RESULTS DOCUMENT: HCPCS | Performed by: INTERNAL MEDICINE

## 2019-06-10 PROCEDURE — G8417 CALC BMI ABV UP PARAM F/U: HCPCS | Performed by: INTERNAL MEDICINE

## 2019-06-10 PROCEDURE — 1090F PRES/ABSN URINE INCON ASSESS: CPT | Performed by: INTERNAL MEDICINE

## 2019-06-10 PROCEDURE — 99214 OFFICE O/P EST MOD 30 MIN: CPT | Performed by: INTERNAL MEDICINE

## 2019-06-10 PROCEDURE — 3044F HG A1C LEVEL LT 7.0%: CPT | Performed by: INTERNAL MEDICINE

## 2019-06-10 PROCEDURE — 2022F DILAT RTA XM EVC RTNOPTHY: CPT | Performed by: INTERNAL MEDICINE

## 2019-06-10 PROCEDURE — 3017F COLORECTAL CA SCREEN DOC REV: CPT | Performed by: INTERNAL MEDICINE

## 2019-06-10 PROCEDURE — 1036F TOBACCO NON-USER: CPT | Performed by: INTERNAL MEDICINE

## 2019-06-10 RX ORDER — FLUTICASONE FUROATE AND VILANTEROL 200; 25 UG/1; UG/1
POWDER RESPIRATORY (INHALATION)
Qty: 3 EACH | Refills: 3 | Status: SHIPPED | OUTPATIENT
Start: 2019-06-10 | End: 2020-01-07 | Stop reason: SDUPTHER

## 2019-06-10 ASSESSMENT — ENCOUNTER SYMPTOMS
SHORTNESS OF BREATH: 0
ABDOMINAL PAIN: 0
DIARRHEA: 0
COUGH: 0
NAUSEA: 0
WHEEZING: 0
BLOOD IN STOOL: 0
VOMITING: 0
CHEST TIGHTNESS: 0

## 2019-06-10 NOTE — PROGRESS NOTES
Kelli received counseling on the following healthy behaviors: nutrition  Reviewed prior labs and health maintenance  Continue current medications, diet and exercise. Discussed use, benefit, and side effects of prescribed medications. Barriers to medication compliance addressed. Patient given educational materials - see patient instructions  Was a self-tracking handout given in paper form or via [employer]? Yes    Requested Prescriptions      No prescriptions requested or ordered in this encounter       All patient questions answered. Patient voiced understanding. Quality Measures    Body mass index is 39.14 kg/m². Elevated. Weight control planned discussed Healthy diet and regular exercise. . Blood pressure is normal. Treatment plan consists of No treatment change needed. Fall Risk 3/1/2019 2/22/2019 10/19/2016   2 or more falls in past year? no no no   Fall with injury in past year? no no no     The patient does not have a history of falls. I did , complete a risk assessment for falls. A plan of care for falls No Treatment plan indicated    Lab Results   Component Value Date    LDLCALC 60 08/08/2018    (goal LDL reduction with dx if diabetes is 50% LDL reduction)    No flowsheet data found.   Interpretation of Total Score Depression Severity: 1-4 = Minimal depression, 5-9 = Mild depression, 10-14 = Moderate depression, 15-19 = Moderately severe depression, 20-27 = Severe depression

## 2019-06-10 NOTE — PROGRESS NOTES
Subjective:      Patient ID: Betito Pritchett is a 75 y.o. female. HPI    She has Type 2 DM,HTN,GERD,spinal stenosis,asthma,gout and depression. Blood sugars are under good control. Has had it since 1999. Patient's BP is controlled on current medications. Depression is stable on paxil. Asthma- on Breo and proventil.stable. She has osteoarthritis and lumbar spinal stenosis. Review of Systems   Constitutional: Negative for fatigue, fever and unexpected weight change. Respiratory: Negative for cough, chest tightness, shortness of breath and wheezing. Cardiovascular: Negative for chest pain, palpitations and leg swelling. Gastrointestinal: Negative for abdominal pain, blood in stool, diarrhea, nausea and vomiting. Genitourinary: Negative for dysuria and hematuria. Neurological: Negative for light-headedness. Objective:   Physical Exam   Constitutional: She is oriented to person, place, and time. She appears well-developed and well-nourished. HENT:   Head: Normocephalic and atraumatic. Eyes: Pupils are equal, round, and reactive to light. Neck: Normal range of motion. Neck supple. No thyromegaly present. Cardiovascular: Normal rate, regular rhythm and normal heart sounds. Exam reveals no gallop and no friction rub. No murmur heard. No carotid bruit   Pulmonary/Chest: Effort normal and breath sounds normal. No respiratory distress. She has no wheezes. She has no rales. She exhibits no tenderness. Abdominal: Soft. Bowel sounds are normal. She exhibits no distension and no mass. There is no tenderness. There is no rebound and no guarding. Musculoskeletal: She exhibits no edema. Neurological: She is alert and oriented to person, place, and time. Assessment:       Diagnosis Orders   1. Type 2 diabetes mellitus without complication, without long-term current use of insulin ([facility])  Hemoglobin A1C     DIABETES FOOT EXAM   2. Essential hypertension     3.  Major depressive disorder with single episode, in full remission ([address]     4. Morbid obesity ([address]     5. Spinal stenosis of lumbar region without neurogenic claudication     6. Moderate persistent asthma without complication     7. Anemia, unspecified type     8. Postmenopausal state  DEXA AXIAL SKELETON W VERTEBRAL FX ASST           Plan:        DM- Under good control. Sees opthal every year. Strict diet.      HTN. Blood pressure is good. Continue current meds . Cannot take HCTZ 2 to high Ca. Walking. Weight loss.     Asthma  Stable on Breo and proventil     HLD. continue lipitor. Lipids are good      Gout- continue allopurinol  No recent flareups.      Depression. Stable. On paxil.      GERD. Stable on PPI. Now takes once daily      Anemia. H/o thal trait and B12 def.      Up to date on colonoscopy,mammogram and DEXA.           Logan Jimenez MD

## 2019-06-10 NOTE — PATIENT INSTRUCTIONS
Patient Self-Management Goal for Health Maintenance  Goal: I will schedule a yearly preventative care visit.   Barriers: none  Plan for overcoming my barriers: N/A  Confidence: 10/10  Anticipated Goal Completion Date: 09/10/19

## 2019-06-10 NOTE — PROGRESS NOTES
Chronic Disease Visit Information    BP Readings from Last 3 Encounters:   06/09/19 (!) 145/83   06/06/19 (!) 154/81   06/05/19 (!) 153/67          Hemoglobin A1C (%)   Date Value   03/01/2019 6.3   08/08/2018 6.6   05/18/2018 6.9     Microscopic Examination (no units)   Date Value   08/04/2016 YES     Microalbumin, Random Urine (mg/dL)   Date Value   03/01/2019 3.30 (H)     LDL Calculated (mg/dL)   Date Value   08/08/2018 60     HDL (mg/dL)   Date Value   08/08/2018 43     BUN (mg/dL)   Date Value   06/06/2019 19     CREATININE (mg/dL)   Date Value   06/06/2019 0.7     Glucose (mg/dL)   Date Value   06/06/2019 111 (H)            Have you changed or started any medications since your last visit including any over-the-counter medicines, vitamins, or herbal medicines? yes - started Co Q 10)   Are you having any side effects from any of your medications? -  no  Have you stopped taking any of your medications? Is so, why? -  no    Have you seen any other physician or provider since your last visit? No  Have you had any other diagnostic tests since your last visit? Yes - Records Obtained Dr will review in care everywhere  Have you been seen in the emergency room and/or had an admission to a hospital since we last saw you? Yes - Records Obtained Dr will review in care everywhere  Have you had your annual diabetic retinal (eye) exam? Yes - Records Obtained  Have you had your routine dental cleaning in the past 6 months? no    Have you activated your [employer] account? If not, what are your barriers?  Yes     Patient Care Team:  Luke Mcardle, MD as PCP - Sebastian Kurtz MD as PCP - [facility] Provider  Naeem Zapata MD (Orthopedic Surgery)  Shruthi Faith MD as Consulting Physician (Physical Medicine and Rehab)  Teresa Alvarado MD as Surgeon (Orthopedic Surgery)         Medical History Review  Past Medical, Family, and Social History reviewed and does not contribute to the patient presenting condition    Health Maintenance   Topic Date Due    Shingles Vaccine (1 of 2) 07/15/1994    Diabetic foot exam  05/18/2019    Diabetic retinal exam  05/22/2019    Lipid screen  08/08/2019    A1C test (Diabetic or Prediabetic)  03/01/2020    Diabetic microalbuminuria test  03/01/2020    Breast cancer screen  03/30/2020    Potassium monitoring  06/06/2020    Creatinine monitoring  06/06/2020    Colon cancer screen colonoscopy  12/12/2024    DTaP/Tdap/Td vaccine (2 - Td) 11/11/2025    DEXA (modify frequency per FRAX score)  Completed    Flu vaccine  Completed    Pneumococcal 65+ years Vaccine  Completed

## 2019-06-12 ENCOUNTER — HOSPITAL ENCOUNTER (OUTPATIENT)
Dept: OPERATING ROOM | Age: 74
Setting detail: OUTPATIENT SURGERY
Discharge: HOME | End: 2019-06-12
Payer: MEDICARE

## 2019-06-12 VITALS
SYSTOLIC BLOOD PRESSURE: 141 MMHG | HEART RATE: 71 BPM | RESPIRATION RATE: 16 BRPM | OXYGEN SATURATION: 97 % | DIASTOLIC BLOOD PRESSURE: 68 MMHG

## 2019-06-12 PROCEDURE — 66821 AFTER CATARACT LASER SURGERY: CPT

## 2019-06-12 PROCEDURE — 6370000000 HC RX 637 (ALT 250 FOR IP): Performed by: OPHTHALMOLOGY

## 2019-06-12 RX ORDER — PREDNISOLONE ACETATE 10 MG/ML
1 SUSPENSION/ DROPS OPHTHALMIC ONCE
Status: COMPLETED | OUTPATIENT
Start: 2019-06-12 | End: 2019-06-12

## 2019-06-12 RX ORDER — PROPARACAINE HYDROCHLORIDE 5 MG/ML
1 SOLUTION/ DROPS OPHTHALMIC ONCE
Status: COMPLETED | OUTPATIENT
Start: 2019-06-12 | End: 2019-06-12

## 2019-06-12 RX ORDER — PHENYLEPHRINE HCL 2.5 %
1 DROPS OPHTHALMIC (EYE)
Status: COMPLETED | OUTPATIENT
Start: 2019-06-12 | End: 2019-06-12

## 2019-06-12 RX ORDER — TROPICAMIDE 10 MG/ML
1 SOLUTION/ DROPS OPHTHALMIC
Status: COMPLETED | OUTPATIENT
Start: 2019-06-12 | End: 2019-06-12

## 2019-06-12 RX ADMIN — TROPICAMIDE 1 DROP: 10 SOLUTION/ DROPS OPHTHALMIC at 14:57

## 2019-06-12 RX ADMIN — TROPICAMIDE 1 DROP: 10 SOLUTION/ DROPS OPHTHALMIC at 14:49

## 2019-06-12 RX ADMIN — PHENYLEPHRINE HYDROCHLORIDE 1 DROP: 25 SOLUTION/ DROPS OPHTHALMIC at 14:44

## 2019-06-12 RX ADMIN — APRACLONIDINE HYDROCHLORIDE 1 DROP: 10 SOLUTION/ DROPS OPHTHALMIC at 14:53

## 2019-06-12 RX ADMIN — PROPARACAINE HYDROCHLORIDE 1 DROP: 5 SOLUTION/ DROPS OPHTHALMIC at 15:23

## 2019-06-12 RX ADMIN — PHENYLEPHRINE HYDROCHLORIDE 1 DROP: 25 SOLUTION/ DROPS OPHTHALMIC at 15:01

## 2019-06-12 RX ADMIN — PREDNISOLONE ACETATE 1 DROP: 10 SUSPENSION/ DROPS OPHTHALMIC at 15:24

## 2019-06-13 LAB — MISCELLANEOUS LAB TEST ORDER: ABNORMAL

## 2019-06-13 NOTE — OP NOTE
OPERATIVE NOTE        Surgery Date:   06/12/2019       Pre-operative Diagnosis:   Cataract Secondary  366.53    Procedure:   YAG Capsulotomy left eye    Anesthesia:  Topical    Complications:  None    The patient tolerated the procedure well and will follow up as an outpatient in my office as scheduled. Ellison Closs Erenest San, M.D.

## 2019-06-18 ENCOUNTER — HOSPITAL ENCOUNTER (OUTPATIENT)
Dept: MAMMOGRAPHY | Age: 74
Discharge: HOME | End: 2019-06-18
Payer: MEDICARE

## 2019-06-18 ENCOUNTER — HOSPITAL ENCOUNTER (OUTPATIENT)
Dept: GENERAL RADIOLOGY | Age: 74
Discharge: HOME | End: 2019-06-18
Payer: MEDICARE

## 2019-06-18 DIAGNOSIS — Z12.31 ENCOUNTER FOR SCREENING MAMMOGRAM FOR BREAST CANCER: ICD-10-CM

## 2019-06-18 DIAGNOSIS — Z78.0 POSTMENOPAUSAL STATE: ICD-10-CM

## 2019-06-18 PROCEDURE — 77063 BREAST TOMOSYNTHESIS BI: CPT

## 2019-06-18 PROCEDURE — 77080 DXA BONE DENSITY AXIAL: CPT

## 2019-06-19 RX ORDER — ALENDRONATE SODIUM 70 MG/1
70 TABLET ORAL
Qty: 5 TABLET | Refills: 2 | Status: SHIPPED | OUTPATIENT
Start: 2019-06-19 | End: 2019-09-14 | Stop reason: SDUPTHER

## 2019-06-19 NOTE — TELEPHONE ENCOUNTER
----- Message from Bria Pritchett MD sent at 6/19/2019 10:34 AM EDT -----  Bone density scan shows osteoporosis . Start fosomax x70 mg weekly # 5 2 rf .   Continue calcium and vit D

## 2019-07-19 RX ORDER — ATORVASTATIN CALCIUM 10 MG/1
TABLET, FILM COATED ORAL
Qty: 90 TABLET | Refills: 0 | Status: SHIPPED | OUTPATIENT
Start: 2019-07-19 | End: 2019-10-22 | Stop reason: SDUPTHER

## 2019-07-19 RX ORDER — CARVEDILOL 6.25 MG/1
TABLET ORAL
Qty: 180 TABLET | Refills: 0 | Status: SHIPPED | OUTPATIENT
Start: 2019-07-19 | End: 2019-10-22 | Stop reason: SDUPTHER

## 2019-07-19 RX ORDER — SPIRONOLACTONE 50 MG/1
TABLET, FILM COATED ORAL
Qty: 60 TABLET | Refills: 10 | Status: SHIPPED | OUTPATIENT
Start: 2019-07-19 | End: 2020-01-13

## 2019-09-16 RX ORDER — VERAPAMIL HYDROCHLORIDE 120 MG/1
TABLET, FILM COATED ORAL
Qty: 180 TABLET | Refills: 0 | Status: SHIPPED | OUTPATIENT
Start: 2019-09-16 | End: 2019-12-02 | Stop reason: SDUPTHER

## 2019-09-16 RX ORDER — LOSARTAN POTASSIUM 100 MG/1
TABLET ORAL
Qty: 90 TABLET | Refills: 0 | Status: SHIPPED | OUTPATIENT
Start: 2019-09-16 | End: 2019-12-02 | Stop reason: SDUPTHER

## 2019-09-16 RX ORDER — ALENDRONATE SODIUM 70 MG/1
TABLET ORAL
Qty: 4 TABLET | Refills: 1 | Status: SHIPPED | OUTPATIENT
Start: 2019-09-16 | End: 2019-11-06 | Stop reason: SDUPTHER

## 2019-09-16 RX ORDER — PAROXETINE 10 MG/1
TABLET, FILM COATED ORAL
Qty: 90 TABLET | Refills: 0 | Status: SHIPPED | OUTPATIENT
Start: 2019-09-16 | End: 2019-12-02 | Stop reason: SDUPTHER

## 2019-09-23 ENCOUNTER — OFFICE VISIT (OUTPATIENT)
Dept: INTERNAL MEDICINE CLINIC | Age: 74
End: 2019-09-23

## 2019-09-23 VITALS
HEIGHT: 64 IN | DIASTOLIC BLOOD PRESSURE: 72 MMHG | SYSTOLIC BLOOD PRESSURE: 138 MMHG | BODY MASS INDEX: 39.09 KG/M2 | WEIGHT: 229 LBS

## 2019-09-23 DIAGNOSIS — E66.01 MORBID OBESITY (HCC): ICD-10-CM

## 2019-09-23 DIAGNOSIS — I10 ESSENTIAL HYPERTENSION: ICD-10-CM

## 2019-09-23 DIAGNOSIS — E78.5 HYPERLIPIDEMIA ASSOCIATED WITH TYPE 2 DIABETES MELLITUS (HCC): ICD-10-CM

## 2019-09-23 DIAGNOSIS — J45.40 MODERATE PERSISTENT ASTHMA WITHOUT COMPLICATION: ICD-10-CM

## 2019-09-23 DIAGNOSIS — D64.9 ANEMIA, UNSPECIFIED TYPE: ICD-10-CM

## 2019-09-23 DIAGNOSIS — E11.9 TYPE 2 DIABETES MELLITUS WITHOUT COMPLICATION, WITHOUT LONG-TERM CURRENT USE OF INSULIN (HCC): Primary | ICD-10-CM

## 2019-09-23 DIAGNOSIS — K21.9 GASTROESOPHAGEAL REFLUX DISEASE WITHOUT ESOPHAGITIS: ICD-10-CM

## 2019-09-23 DIAGNOSIS — F32.5 MAJOR DEPRESSIVE DISORDER WITH SINGLE EPISODE, IN FULL REMISSION (HCC): ICD-10-CM

## 2019-09-23 DIAGNOSIS — Z23 NEED FOR INFLUENZA VACCINATION: ICD-10-CM

## 2019-09-23 DIAGNOSIS — E11.69 HYPERLIPIDEMIA ASSOCIATED WITH TYPE 2 DIABETES MELLITUS (HCC): ICD-10-CM

## 2019-09-23 DIAGNOSIS — E11.9 TYPE 2 DIABETES MELLITUS WITHOUT COMPLICATION, WITHOUT LONG-TERM CURRENT USE OF INSULIN (HCC): ICD-10-CM

## 2019-09-23 LAB
A/G RATIO: 2.6 (ref 1.1–2.2)
ALBUMIN SERPL-MCNC: 4.6 G/DL (ref 3.4–5)
ALP BLD-CCNC: 73 U/L (ref 40–129)
ALT SERPL-CCNC: 7 U/L (ref 10–40)
ANION GAP SERPL CALCULATED.3IONS-SCNC: 15 MMOL/L (ref 3–16)
AST SERPL-CCNC: 9 U/L (ref 15–37)
BILIRUB SERPL-MCNC: 0.5 MG/DL (ref 0–1)
BUN BLDV-MCNC: 25 MG/DL (ref 7–20)
CALCIUM SERPL-MCNC: 9.7 MG/DL (ref 8.3–10.6)
CHLORIDE BLD-SCNC: 105 MMOL/L (ref 99–110)
CHOLESTEROL, TOTAL: 115 MG/DL (ref 0–199)
CO2: 21 MMOL/L (ref 21–32)
CREAT SERPL-MCNC: 0.8 MG/DL (ref 0.6–1.2)
GFR AFRICAN AMERICAN: >60
GFR NON-AFRICAN AMERICAN: >60
GLOBULIN: 1.8 G/DL
GLUCOSE BLD-MCNC: 80 MG/DL (ref 70–99)
HDLC SERPL-MCNC: 43 MG/DL (ref 40–60)
LDL CHOLESTEROL CALCULATED: 48 MG/DL
POTASSIUM SERPL-SCNC: 5.5 MMOL/L (ref 3.5–5.1)
SODIUM BLD-SCNC: 141 MMOL/L (ref 136–145)
TOTAL PROTEIN: 6.4 G/DL (ref 6.4–8.2)
TRIGL SERPL-MCNC: 122 MG/DL (ref 0–150)
VLDLC SERPL CALC-MCNC: 24 MG/DL

## 2019-09-23 PROCEDURE — 90662 IIV NO PRSV INCREASED AG IM: CPT | Performed by: INTERNAL MEDICINE

## 2019-09-23 PROCEDURE — 3044F HG A1C LEVEL LT 7.0%: CPT | Performed by: INTERNAL MEDICINE

## 2019-09-23 PROCEDURE — 1123F ACP DISCUSS/DSCN MKR DOCD: CPT | Performed by: INTERNAL MEDICINE

## 2019-09-23 PROCEDURE — G0008 ADMIN INFLUENZA VIRUS VAC: HCPCS | Performed by: INTERNAL MEDICINE

## 2019-09-23 PROCEDURE — G8399 PT W/DXA RESULTS DOCUMENT: HCPCS | Performed by: INTERNAL MEDICINE

## 2019-09-23 PROCEDURE — 4040F PNEUMOC VAC/ADMIN/RCVD: CPT | Performed by: INTERNAL MEDICINE

## 2019-09-23 PROCEDURE — G8427 DOCREV CUR MEDS BY ELIG CLIN: HCPCS | Performed by: INTERNAL MEDICINE

## 2019-09-23 PROCEDURE — 1090F PRES/ABSN URINE INCON ASSESS: CPT | Performed by: INTERNAL MEDICINE

## 2019-09-23 PROCEDURE — 1036F TOBACCO NON-USER: CPT | Performed by: INTERNAL MEDICINE

## 2019-09-23 PROCEDURE — G8417 CALC BMI ABV UP PARAM F/U: HCPCS | Performed by: INTERNAL MEDICINE

## 2019-09-23 PROCEDURE — 2022F DILAT RTA XM EVC RTNOPTHY: CPT | Performed by: INTERNAL MEDICINE

## 2019-09-23 PROCEDURE — 99214 OFFICE O/P EST MOD 30 MIN: CPT | Performed by: INTERNAL MEDICINE

## 2019-09-23 PROCEDURE — 3017F COLORECTAL CA SCREEN DOC REV: CPT | Performed by: INTERNAL MEDICINE

## 2019-09-23 ASSESSMENT — ENCOUNTER SYMPTOMS
ABDOMINAL PAIN: 0
COUGH: 0
SHORTNESS OF BREATH: 0
DIARRHEA: 0
WHEEZING: 0
NAUSEA: 0
CHEST TIGHTNESS: 0
BLOOD IN STOOL: 0
VOMITING: 0

## 2019-09-23 NOTE — PROGRESS NOTES
vaccination  INFLUENZA, HIGH DOSE, 65 YRS +, IM, PF, PREFILL SYR, 0.5ML (FLUZONE HD)   3. Hyperlipidemia associated with type 2 diabetes mellitus (HCC)  Lipid Panel   4. Morbid obesity ([facility]     5. Essential hypertension  Comprehensive Metabolic Panel   6. Major depressive disorder with single episode, in full remission ([facility]     7. Anemia, unspecified type     8. Moderate persistent asthma without complication     9. Gastroesophageal reflux disease without esophagitis             Plan:     DM- Under good control. Sees opthal every year. Strict diet.      HTN. Blood pressure is good. Continue current meds .  Cannot take HCTZ 2 to high Ca. Walking. Weight loss.     Asthma  Stable on Breo and proventil     HLD. continue lipitor. Check lipids      Gout- continue allopurinol  No recent flareups.      Depression. Stable. On paxil.      GERD. Stable on PPI. Now takes once daily      Anemia. H/o thal trait and B12 def.      Up to date on colonoscopy,mammogram and DEXA.         Imer Rios MD

## 2019-09-24 DIAGNOSIS — E87.5 HYPERKALEMIA: Primary | ICD-10-CM

## 2019-09-24 LAB
ESTIMATED AVERAGE GLUCOSE: 134.1 MG/DL
HBA1C MFR BLD: 6.3 %

## 2019-09-26 ENCOUNTER — HOSPITAL ENCOUNTER (OUTPATIENT)
Age: 74
Discharge: HOME | End: 2019-09-26
Payer: MEDICARE

## 2019-09-26 DIAGNOSIS — E87.5 HYPERKALEMIA: ICD-10-CM

## 2019-09-26 LAB — POTASSIUM SERPL-SCNC: 4.8 MMOL/L (ref 3.5–5.1)

## 2019-09-26 PROCEDURE — 84132 ASSAY OF SERUM POTASSIUM: CPT

## 2019-09-26 PROCEDURE — 36415 COLL VENOUS BLD VENIPUNCTURE: CPT

## 2019-10-01 ENCOUNTER — TELEPHONE (OUTPATIENT)
Dept: INTERNAL MEDICINE CLINIC | Age: 74
End: 2019-10-01

## 2019-10-23 RX ORDER — CARVEDILOL 6.25 MG/1
TABLET ORAL
Qty: 180 TABLET | Refills: 0 | Status: SHIPPED | OUTPATIENT
Start: 2019-10-23 | End: 2020-01-13

## 2019-10-23 RX ORDER — ATORVASTATIN CALCIUM 10 MG/1
TABLET, FILM COATED ORAL
Qty: 90 TABLET | Refills: 0 | Status: SHIPPED | OUTPATIENT
Start: 2019-10-23 | End: 2020-01-13

## 2019-11-06 RX ORDER — ALENDRONATE SODIUM 70 MG/1
TABLET ORAL
Qty: 4 TABLET | Refills: 0 | Status: SHIPPED | OUTPATIENT
Start: 2019-11-06 | End: 2019-12-06 | Stop reason: SDUPTHER

## 2019-12-04 RX ORDER — VERAPAMIL HYDROCHLORIDE 120 MG/1
TABLET, FILM COATED ORAL
Qty: 180 TABLET | Refills: 0 | Status: SHIPPED | OUTPATIENT
Start: 2019-12-04 | End: 2020-03-04

## 2019-12-04 RX ORDER — PAROXETINE 10 MG/1
TABLET, FILM COATED ORAL
Qty: 90 TABLET | Refills: 0 | Status: SHIPPED | OUTPATIENT
Start: 2019-12-04 | End: 2020-03-04

## 2019-12-04 RX ORDER — LOSARTAN POTASSIUM 100 MG/1
TABLET ORAL
Qty: 90 TABLET | Refills: 0 | Status: SHIPPED | OUTPATIENT
Start: 2019-12-04 | End: 2020-03-04

## 2019-12-04 RX ORDER — OMEPRAZOLE 20 MG/1
CAPSULE, DELAYED RELEASE ORAL
Qty: 180 CAPSULE | Refills: 0 | Status: SHIPPED | OUTPATIENT
Start: 2019-12-04 | End: 2020-07-16 | Stop reason: SDUPTHER

## 2019-12-04 RX ORDER — ALLOPURINOL 300 MG/1
TABLET ORAL
Qty: 90 TABLET | Refills: 0 | Status: SHIPPED | OUTPATIENT
Start: 2019-12-04 | End: 2020-03-04

## 2019-12-06 RX ORDER — ALENDRONATE SODIUM 70 MG/1
TABLET ORAL
Qty: 4 TABLET | Refills: 2 | Status: SHIPPED | OUTPATIENT
Start: 2019-12-06 | End: 2020-03-02

## 2020-01-07 RX ORDER — FLUTICASONE FUROATE AND VILANTEROL 200; 25 UG/1; UG/1
POWDER RESPIRATORY (INHALATION)
Qty: 3 EACH | Refills: 0 | Status: SHIPPED | OUTPATIENT
Start: 2020-01-07 | End: 2020-04-08

## 2020-01-13 ENCOUNTER — OFFICE VISIT (OUTPATIENT)
Dept: INTERNAL MEDICINE CLINIC | Age: 75
End: 2020-01-13

## 2020-01-13 VITALS
DIASTOLIC BLOOD PRESSURE: 78 MMHG | BODY MASS INDEX: 40.12 KG/M2 | HEIGHT: 64 IN | WEIGHT: 235 LBS | SYSTOLIC BLOOD PRESSURE: 138 MMHG

## 2020-01-13 DIAGNOSIS — E11.9 TYPE 2 DIABETES MELLITUS WITHOUT COMPLICATION, WITHOUT LONG-TERM CURRENT USE OF INSULIN (HCC): ICD-10-CM

## 2020-01-13 LAB
ANION GAP SERPL CALCULATED.3IONS-SCNC: 14 MMOL/L (ref 3–16)
BUN BLDV-MCNC: 19 MG/DL (ref 7–20)
CALCIUM SERPL-MCNC: 9.3 MG/DL (ref 8.3–10.6)
CHLORIDE BLD-SCNC: 101 MMOL/L (ref 99–110)
CO2: 23 MMOL/L (ref 21–32)
CREAT SERPL-MCNC: 0.6 MG/DL (ref 0.6–1.2)
GFR AFRICAN AMERICAN: >60
GFR NON-AFRICAN AMERICAN: >60
GLUCOSE BLD-MCNC: 138 MG/DL (ref 70–99)
POTASSIUM SERPL-SCNC: 4.8 MMOL/L (ref 3.5–5.1)
SODIUM BLD-SCNC: 138 MMOL/L (ref 136–145)

## 2020-01-13 PROCEDURE — 99214 OFFICE O/P EST MOD 30 MIN: CPT | Performed by: INTERNAL MEDICINE

## 2020-01-13 PROCEDURE — 1090F PRES/ABSN URINE INCON ASSESS: CPT | Performed by: INTERNAL MEDICINE

## 2020-01-13 PROCEDURE — G8399 PT W/DXA RESULTS DOCUMENT: HCPCS | Performed by: INTERNAL MEDICINE

## 2020-01-13 PROCEDURE — 1123F ACP DISCUSS/DSCN MKR DOCD: CPT | Performed by: INTERNAL MEDICINE

## 2020-01-13 PROCEDURE — 3046F HEMOGLOBIN A1C LEVEL >9.0%: CPT | Performed by: INTERNAL MEDICINE

## 2020-01-13 PROCEDURE — 2022F DILAT RTA XM EVC RTNOPTHY: CPT | Performed by: INTERNAL MEDICINE

## 2020-01-13 PROCEDURE — 1036F TOBACCO NON-USER: CPT | Performed by: INTERNAL MEDICINE

## 2020-01-13 PROCEDURE — G8427 DOCREV CUR MEDS BY ELIG CLIN: HCPCS | Performed by: INTERNAL MEDICINE

## 2020-01-13 PROCEDURE — G8482 FLU IMMUNIZE ORDER/ADMIN: HCPCS | Performed by: INTERNAL MEDICINE

## 2020-01-13 PROCEDURE — 3017F COLORECTAL CA SCREEN DOC REV: CPT | Performed by: INTERNAL MEDICINE

## 2020-01-13 PROCEDURE — 4040F PNEUMOC VAC/ADMIN/RCVD: CPT | Performed by: INTERNAL MEDICINE

## 2020-01-13 PROCEDURE — G8417 CALC BMI ABV UP PARAM F/U: HCPCS | Performed by: INTERNAL MEDICINE

## 2020-01-13 RX ORDER — ACETAMINOPHEN 500 MG
500 TABLET ORAL EVERY 6 HOURS PRN
COMMUNITY

## 2020-01-13 RX ORDER — CARVEDILOL 6.25 MG/1
TABLET ORAL
Qty: 180 TABLET | Refills: 0 | Status: SHIPPED | OUTPATIENT
Start: 2020-01-13 | End: 2020-04-08

## 2020-01-13 RX ORDER — TURMERIC/TURMERIC EXT/PEPR EXT 900-100 MG
CAPSULE ORAL 2 TIMES DAILY
COMMUNITY
End: 2021-03-01

## 2020-01-13 RX ORDER — ATORVASTATIN CALCIUM 10 MG/1
TABLET, FILM COATED ORAL
Qty: 90 TABLET | Refills: 0 | Status: SHIPPED | OUTPATIENT
Start: 2020-01-13 | End: 2020-04-08

## 2020-01-13 ASSESSMENT — ENCOUNTER SYMPTOMS
ABDOMINAL PAIN: 0
VOMITING: 0
CHEST TIGHTNESS: 0
COUGH: 0
SHORTNESS OF BREATH: 0
BLOOD IN STOOL: 0
DIARRHEA: 0
WHEEZING: 0
NAUSEA: 0

## 2020-01-13 NOTE — PROGRESS NOTES
Hemoglobin S3L    Basic Metabolic Panel   2. Hyperlipidemia associated with type 2 diabetes mellitus ([address]     3. Essential hypertension     4. Morbid obesity ([facility]     5. Major depressive disorder with single episode, in full remission ([facility]     6. Moderate persistent asthma without complication     7. Primary osteoarthritis of both knees             Plan:      DM- Under good control. Sees opthal every year. Strict diet.      HTN. Blood pressure is good. Continue current meds .  Cannot take HCTZ 2 to high Ca. Aldactone stopped for high K   Walking. Weight loss.     Asthma  Stable on Breo and proventil     HLD. continue lipitor. Lipids are good       Gout- continue allopurinol  No recent flareups.      Depression. Stable. On paxil.      GERD. Stable on PPI. Now takes once daily      Anemia. H/o thal trait and B12 def.      Up to date on colonoscopy,mammogram and DEXA.         Omar Cesar MD

## 2020-01-14 LAB
ESTIMATED AVERAGE GLUCOSE: 134.1 MG/DL
HBA1C MFR BLD: 6.3 %

## 2020-02-03 RX ORDER — GLIPIZIDE 10 MG/1
TABLET ORAL
Qty: 180 TABLET | Refills: 0 | Status: SHIPPED | OUTPATIENT
Start: 2020-02-03 | End: 2020-05-07

## 2020-03-04 RX ORDER — PAROXETINE 10 MG/1
TABLET, FILM COATED ORAL
Qty: 90 TABLET | Refills: 0 | Status: SHIPPED
Start: 2020-03-04 | End: 2020-05-11 | Stop reason: DRUGHIGH

## 2020-03-04 RX ORDER — ALENDRONATE SODIUM 70 MG/1
TABLET ORAL
Qty: 12 TABLET | Refills: 0 | Status: SHIPPED | OUTPATIENT
Start: 2020-03-04 | End: 2020-06-01

## 2020-03-04 RX ORDER — VERAPAMIL HYDROCHLORIDE 120 MG/1
TABLET, FILM COATED ORAL
Qty: 180 TABLET | Refills: 0 | Status: SHIPPED | OUTPATIENT
Start: 2020-03-04 | End: 2020-06-01

## 2020-03-04 RX ORDER — LOSARTAN POTASSIUM 100 MG/1
TABLET ORAL
Qty: 90 TABLET | Refills: 0 | Status: SHIPPED | OUTPATIENT
Start: 2020-03-04 | End: 2020-06-01

## 2020-03-04 RX ORDER — ALLOPURINOL 300 MG/1
TABLET ORAL
Qty: 90 TABLET | Refills: 0 | Status: SHIPPED | OUTPATIENT
Start: 2020-03-04 | End: 2020-10-01

## 2020-04-08 RX ORDER — ATORVASTATIN CALCIUM 10 MG/1
TABLET, FILM COATED ORAL
Qty: 90 TABLET | Refills: 0 | Status: SHIPPED | OUTPATIENT
Start: 2020-04-08 | End: 2020-07-06

## 2020-04-08 RX ORDER — CARVEDILOL 6.25 MG/1
TABLET ORAL
Qty: 180 TABLET | Refills: 0 | Status: SHIPPED | OUTPATIENT
Start: 2020-04-08 | End: 2020-07-06

## 2020-05-07 RX ORDER — GLIPIZIDE 10 MG/1
TABLET ORAL
Qty: 180 TABLET | Refills: 0 | Status: SHIPPED | OUTPATIENT
Start: 2020-05-07 | End: 2020-08-05

## 2020-05-07 RX ORDER — UMECLIDINIUM 62.5 UG/1
AEROSOL, POWDER ORAL
Qty: 3 EACH | Refills: 0 | Status: SHIPPED | OUTPATIENT
Start: 2020-05-07 | End: 2020-06-04 | Stop reason: SDUPTHER

## 2020-05-11 ENCOUNTER — TELEPHONE (OUTPATIENT)
Dept: INTERNAL MEDICINE CLINIC | Age: 75
End: 2020-05-11

## 2020-05-11 RX ORDER — PAROXETINE HYDROCHLORIDE 20 MG/1
20 TABLET, FILM COATED ORAL DAILY
Qty: 30 TABLET | Refills: 2 | Status: SHIPPED | OUTPATIENT
Start: 2020-05-11 | End: 2020-07-16 | Stop reason: DRUGHIGH

## 2020-05-11 NOTE — TELEPHONE ENCOUNTER
----- Message from Benjamín Ruiz MD sent at 5/11/2020  3:17 PM EDT -----  Contact: [phone]  Increase to 20 mg daily # 30 2 rf   ----- Message -----  From: Dayron Fisher  Sent: 5/11/2020   3:00 PM EDT  To: Benjamín Ruiz MD    Pt called because she takes PARoxetine (PAXIL) 10 MG tablet and she had been more depressed lately. She believes its due to the virus. She states that she has been crying over nonsense and she has done this in the past. Her old doctor used to increase her dosage to 20mg than when she started feeling better decrease it again. She wanted to know if you would be able to increase it to 20mg. She is not able to do a televisit.      [facility]   [phone]    Past appt-1/13/2020  Future appt-7/16/2020

## 2020-06-01 RX ORDER — ALENDRONATE SODIUM 70 MG/1
TABLET ORAL
Qty: 12 TABLET | Refills: 0 | Status: SHIPPED | OUTPATIENT
Start: 2020-06-01 | End: 2020-09-01

## 2020-06-01 RX ORDER — VERAPAMIL HYDROCHLORIDE 120 MG/1
TABLET, FILM COATED ORAL
Qty: 180 TABLET | Refills: 0 | Status: SHIPPED | OUTPATIENT
Start: 2020-06-01 | End: 2020-09-01

## 2020-06-01 RX ORDER — LOSARTAN POTASSIUM 100 MG/1
TABLET ORAL
Qty: 90 TABLET | Refills: 0 | Status: SHIPPED | OUTPATIENT
Start: 2020-06-01 | End: 2020-09-01

## 2020-06-04 ENCOUNTER — TELEPHONE (OUTPATIENT)
Dept: INTERNAL MEDICINE CLINIC | Age: 75
End: 2020-06-04

## 2020-06-04 RX ORDER — UMECLIDINIUM 62.5 UG/1
AEROSOL, POWDER ORAL
Qty: 3 EACH | Refills: 0 | Status: SHIPPED | OUTPATIENT
Start: 2020-06-04 | End: 2021-02-08

## 2020-07-06 RX ORDER — CARVEDILOL 6.25 MG/1
TABLET ORAL
Qty: 180 TABLET | Refills: 0 | Status: SHIPPED | OUTPATIENT
Start: 2020-07-06 | End: 2020-10-01

## 2020-07-06 RX ORDER — ATORVASTATIN CALCIUM 10 MG/1
TABLET, FILM COATED ORAL
Qty: 90 TABLET | Refills: 0 | Status: SHIPPED | OUTPATIENT
Start: 2020-07-06 | End: 2020-10-01

## 2020-07-16 ENCOUNTER — OFFICE VISIT (OUTPATIENT)
Dept: INTERNAL MEDICINE CLINIC | Age: 75
End: 2020-07-16

## 2020-07-16 VITALS
HEART RATE: 72 BPM | DIASTOLIC BLOOD PRESSURE: 78 MMHG | SYSTOLIC BLOOD PRESSURE: 140 MMHG | BODY MASS INDEX: 41.66 KG/M2 | HEIGHT: 64 IN | WEIGHT: 244 LBS

## 2020-07-16 DIAGNOSIS — E11.9 TYPE 2 DIABETES MELLITUS WITHOUT COMPLICATION, WITHOUT LONG-TERM CURRENT USE OF INSULIN (HCC): ICD-10-CM

## 2020-07-16 DIAGNOSIS — I10 ESSENTIAL HYPERTENSION: ICD-10-CM

## 2020-07-16 DIAGNOSIS — M1A.09X0 CHRONIC GOUT OF MULTIPLE SITES, UNSPECIFIED CAUSE: ICD-10-CM

## 2020-07-16 LAB
ANION GAP SERPL CALCULATED.3IONS-SCNC: 19 MMOL/L (ref 3–16)
BASOPHILS ABSOLUTE: 0 K/UL (ref 0–0.2)
BASOPHILS RELATIVE PERCENT: 0.4 %
BUN BLDV-MCNC: 19 MG/DL (ref 7–20)
CALCIUM SERPL-MCNC: 9.3 MG/DL (ref 8.3–10.6)
CHLORIDE BLD-SCNC: 104 MMOL/L (ref 99–110)
CO2: 18 MMOL/L (ref 21–32)
CREAT SERPL-MCNC: 0.7 MG/DL (ref 0.6–1.2)
EOSINOPHILS ABSOLUTE: 0.1 K/UL (ref 0–0.6)
EOSINOPHILS RELATIVE PERCENT: 2.1 %
GFR AFRICAN AMERICAN: >60
GFR NON-AFRICAN AMERICAN: >60
GLUCOSE BLD-MCNC: 140 MG/DL (ref 70–99)
HCT VFR BLD CALC: 33.8 % (ref 36–48)
HEMATOLOGY PATH CONSULT: NO
HEMOGLOBIN: 10.5 G/DL (ref 12–16)
LYMPHOCYTES ABSOLUTE: 1.5 K/UL (ref 1–5.1)
LYMPHOCYTES RELATIVE PERCENT: 23.7 %
MCH RBC QN AUTO: 19.3 PG (ref 26–34)
MCHC RBC AUTO-ENTMCNC: 31.2 G/DL (ref 31–36)
MCV RBC AUTO: 61.7 FL (ref 80–100)
MONOCYTES ABSOLUTE: 0.6 K/UL (ref 0–1.3)
MONOCYTES RELATIVE PERCENT: 9.3 %
NEUTROPHILS ABSOLUTE: 4.2 K/UL (ref 1.7–7.7)
NEUTROPHILS RELATIVE PERCENT: 64.5 %
PDW BLD-RTO: 16.7 % (ref 12.4–15.4)
PLATELET # BLD: 185 K/UL (ref 135–450)
PMV BLD AUTO: 9.5 FL (ref 5–10.5)
POTASSIUM SERPL-SCNC: 4.6 MMOL/L (ref 3.5–5.1)
RBC # BLD: 5.47 M/UL (ref 4–5.2)
SODIUM BLD-SCNC: 141 MMOL/L (ref 136–145)
URIC ACID, SERUM: 6.7 MG/DL (ref 2.6–6)
WBC # BLD: 6.5 K/UL (ref 4–11)

## 2020-07-16 PROCEDURE — 1123F ACP DISCUSS/DSCN MKR DOCD: CPT | Performed by: INTERNAL MEDICINE

## 2020-07-16 PROCEDURE — G0439 PPPS, SUBSEQ VISIT: HCPCS | Performed by: INTERNAL MEDICINE

## 2020-07-16 PROCEDURE — 4040F PNEUMOC VAC/ADMIN/RCVD: CPT | Performed by: INTERNAL MEDICINE

## 2020-07-16 RX ORDER — PAROXETINE 10 MG/1
10 TABLET, FILM COATED ORAL DAILY
Qty: 1 TABLET | Refills: 0 | Status: SHIPPED
Start: 2020-07-16 | End: 2020-08-05

## 2020-07-16 RX ORDER — OMEPRAZOLE 20 MG/1
CAPSULE, DELAYED RELEASE ORAL
Qty: 180 CAPSULE | Refills: 1 | Status: SHIPPED | OUTPATIENT
Start: 2020-07-16 | End: 2021-03-04 | Stop reason: SDUPTHER

## 2020-07-16 ASSESSMENT — PATIENT HEALTH QUESTIONNAIRE - PHQ9
SUM OF ALL RESPONSES TO PHQ QUESTIONS 1-9: 0
SUM OF ALL RESPONSES TO PHQ QUESTIONS 1-9: 0

## 2020-07-16 ASSESSMENT — LIFESTYLE VARIABLES: HOW OFTEN DO YOU HAVE A DRINK CONTAINING ALCOHOL: 0

## 2020-07-16 NOTE — PROGRESS NOTES
Gena Alexander MD   allopurinol (ZYLOPRIM) 300 MG tablet TAKE ONE TABLET BY MOUTH DAILY  Cecil Qureshi MD   Black Pepper-Turmeric (TURMERIC CURCUMIN) 5-1000 MG CAPS Take by mouth 2 times daily  Historical Provider, MD   acetaminophen (TYLENOL) 500 MG tablet Take 500 mg by mouth every 6 hours as needed for Pain  Historical Provider, MD   blood glucose test strips (ASCENSIA AUTODISC VI;ONE TOUCH ULTRA TEST VI) strip Used to test once daily. DX: E11.9  Cecil Qureshi MD   omeprazole (PRILOSEC) 20 MG delayed release capsule TAKE ONE CAPSULE BY MOUTH TWICE Tonia Ennis MD   Coenzyme Q10 (CO Q 10 PO) Take by mouth  Historical Provider, MD   PROAIR  (90 Base) MCG/ACT inhaler INHALE TWO PUFFS BY MOUTH EVERY 6 HOURS AS NEEDED FOR WHEEZING  Regina Quijano MD   clotrimazole-betamethasone (LOTRISONE) 1-0.05 % cream Apply topically 2 times daily. Cecil Qureshi MD   cetirizine (ZYRTEC) 10 MG tablet Take 10 mg by mouth daily  Historical Provider, MD   Cyanocobalamin (VITAMIN B 12 PO) Take 1,000 mcg by mouth daily. Historical Provider, MD   Ascorbic Acid (VITAMIN C) 500 MG CAPS Take  by mouth daily. Historical Provider, MD   vitamin E 400 UNIT capsule Take 400 Units by mouth daily. Historical Provider, MD   Calcium Carbonate-Vitamin D (CALCIUM + D PO) Take  by mouth daily.   Historical Provider, MD       Past Medical History:   Diagnosis Date    Anemia     thallasemia minor    Arthritis     osteo-arthritis    Asthma     Back pain     Back pain     Bronchitis chronic     Diabetes mellitus ([address]     GERD (gastroesophageal reflux disease)     Gout     Hypertension     Melanoma of scalp or neck (HCC)     Osteoarthritis     Osteoporosis     Squamous cell carcinoma, arm     Thal trait        Past Surgical History:   Procedure Laterality Date    APPENDECTOMY      CARPAL TUNNEL RELEASE Left 02/05/2018    CATARACT REMOVAL WITH IMPLANT Right 07/18/2018    CATARACT REMOVAL WITH IMPLANT Left 08/15/2018    COLONOSCOPY  12/2014    KNEE ARTHROSCOPY Bilateral     PARATHYROIDECTOMY      PARTIAL HYSTERECTOMY      SC XCAPSL CTRC RMVL INSJ IO LENS PROSTH W/O ECP Right 7/18/2018    PHACOEMULSIFICATION OF CATARACT WITH INTRAOCULAR LENS IMPLANT RIGHT EYE [facility]ding [facility] [address] [facility] [address] INSJ IO LENS PROSTH W/O ECP Left 8/15/2018    PHACOEMULSIFICATION OF CATARACT WITH INTRAOCULAR LENS IMPLANT LEFT EYE performed by Ralph Redding MD at [address] ENDOSCOPY         Family History   Problem Relation Age of Onset    Heart Failure Mother     Emphysema Mother     Stroke Mother     Heart Failure Father     Hypertension Father     Heart Disease Father     High Blood Pressure Father     Cancer Maternal Aunt     Cancer Paternal Aunt     Cancer Maternal Grandmother     Asthma Neg Hx     Diabetes Neg Hx        CareTeam (Including outside providers/suppliers regularly involved in providing care):   Patient Care Team:  Bere Stevenson MD as PCP - Flo Bella MD as PCP - [address] [place] [address]  [facility]  Estephania Heath MD (Orthopedic Surgery)  Gilberto Dexter MD as Consulting Physician (Physical Medicine and Rehab)  Ayah Oquendo MD as Surgeon (Orthopedic Surgery)    Wt Readings from Last 3 Encounters:   07/16/20 244 lb (110.7 kg)   01/13/20 235 lb (106.6 kg)   09/23/19 229 lb (103.9 kg)     Vitals:    07/16/20 1048   BP: (!) 140/78   Pulse: 72   Weight: 244 lb (110.7 kg)   Height: 5' 4\" (1.626 m)     Body mass index is 41.88 kg/m². Based upon direct observation of the patient, evaluation of cognition reveals recent and remote memory intact.     General Appearance: alert and oriented to person, place and time, well developed and well- nourished, in no acute distress  Skin: warm and dry, no rash or erythema  Head: normocephalic and atraumatic  Pulmonary/Chest: clear to auscultation bilaterally- no wheezes, rales or rhonchi, normal air movement, no respiratory distress  Cardiovascular: normal rate, regular rhythm, normal S1 and S2, no murmurs, rubs, clicks, or gallops, distal pulses intact, no carotid bruits  Abdomen: soft, non-tender, non-distended, normal bowel sounds, no masses or organomegaly  Extremities: no cyanosis, clubbing or edema      Patient's complete Health Risk Assessment and screening values have been reviewed and are found in Flowsheets. The following problems were reviewed today and where indicated follow up appointments were made and/or referrals ordered. Positive Risk Factor Screenings with Interventions:     General Health:  General  In general, how would you say your health is?: Fair  In the past 7 days, have you experienced any of the following? New or Increased Pain, New or Increased Fatigue, Loneliness, Social Isolation, Stress or Anger?: None of These  Do you get the social and emotional support that you need?: Yes  Do you have a Living Will?: (!) No  General Health Risk Interventions:  · No Living Will: info provided     Health Habits/Nutrition:  Health Habits/Nutrition  Do you exercise for at least 20 minutes 2-3 times per week?: (!) No  Have you lost any weight without trying in the past 3 months?: No  Do you eat fewer than 2 meals per day?: No  Have you seen a dentist within the past year? : (just hasn't been. patient is afraid of the dentist)  Body mass index is 41.88 kg/m².   Health Habits/Nutrition Interventions:  · Inadequate physical activity:  patient is not ready to increase his/her physical activity level at this time    Hearing/Vision:  No exam data present  Hearing/Vision  Do you or your family notice any trouble with your hearing?: No  Do you have difficulty driving, watching TV, or doing any of your daily activities because of your eyesight?: No  Have you had an eye exam within the past year?: (!) No  Hearing/Vision Interventions:  · Vision concerns:  patient encouraged to make appointment with his/her eye specialist    Personalized Preventive Plan   Current Health Maintenance Status  Immunization History   Administered Date(s) Administered    Influenza A ([id number]) Vaccine IM 10/29/2010    Influenza Vaccine, unspecified formulation 10/15/2016    Influenza Virus Vaccine 10/20/2011    Influenza, High Dose (Fluzone 65 yrs and older) 10/19/2017, 11/01/2018, 09/23/2019    Pneumococcal Conjugate 13-valent ([id number]) 10/15/2015    Pneumococcal Polysaccharide ([id number]) 09/28/2012    Tdap (Boostrix, Adacel) 11/11/2015    Tetanus 07/14/2016        Health Maintenance   Topic Date Due    Shingles Vaccine (1 of 2) 07/15/1994    Annual Wellness Visit (AWV)  05/29/2019    Flu vaccine (1) 09/01/2020    Lipid screen  09/23/2020    Potassium monitoring  01/13/2021    Creatinine monitoring  01/13/2021    DTaP/Tdap/Td vaccine (3 - Td) 07/14/2026    DEXA (modify frequency per FRAX score)  Completed    Pneumococcal 65+ years Vaccine  Completed    Hepatitis A vaccine  Aged Out    Hib vaccine  Aged Out    Meningococcal (ACWY) vaccine  Aged Out     Recommendations for [employer] Due: see orders and patient instructions/AVS.  . Recommended screening schedule for the next 5-10 years is provided to the patient in written form: see Patient Instructions/AVS.    Maricarmen Guzman was seen today for medicare awv. Diagnoses and all orders for this visit:    Routine general medical examination at a health care facility    Type 2 diabetes mellitus without complication, without long-term current use of insulin (HCC)  -     Hemoglobin A1C; Future    Hyperlipidemia associated with type 2 diabetes mellitus (HCC)    Moderate persistent asthma without complication    Essential hypertension  -     CBC Auto Differential; Future  -     Basic Metabolic Panel;  Future    Major depressive disorder with single episode, in full remission ([facility] [address]    Chronic gout of multiple sites, unspecified cause  -     Uric Acid; Future    Other orders  -     blood glucose test strips (ASCENSIA AUTODISC VI;ONE TOUCH ULTRA TEST VI) strip; Used to test once daily. DX: E11.9  -     omeprazole (PRILOSEC) 20 MG delayed release capsule; TAKE ONE CAPSULE BY MOUTH TWICE A DAY  -     PARoxetine (PAXIL) 10 MG tablet; Take 1 tablet by mouth daily        AWV done    DM- Stable. Sees opthal every year. Strict diet.      HTN. Blood pressure is good. Continue current meds .  Cannot take HCTZ 2 to high Ca. Aldactone stopped for high K   Walking. Weight loss.     Asthma  Stable on Breo and proventil     HLD. continue lipitor. Lipids are good       Gout- continue allopurinol  No recent flareups.      Depression. Stable. On paxil 10 mg daily       GERD. Stable on PPI. Now takes once daily      Anemia. H/o thal trait and B12 def.      Up to date on colonoscopy,mammogram and DEXA. Advance Care Planning   Advanced Care Planning: Discussed the patients choices for care and treatment in case of a health event that adversely affects decision-making abilities. Also discussed the patients long-term treatment options. Reviewed with the patient the [address] [place] [address] of [address] [place] [address] Declaration forms  Reviewed the process of designating a competent adult as an Agent (or -in-fact) that could take make health care decisions for the patient if incompetent. Patient was asked to complete the declaration forms, either acknowledge the forms by a public notary or an eligible witness and provide a signed copy to the practice office.   Time spent (minutes): 2

## 2020-07-17 ENCOUNTER — TELEPHONE (OUTPATIENT)
Dept: INTERNAL MEDICINE CLINIC | Age: 75
End: 2020-07-17

## 2020-07-17 LAB
ESTIMATED AVERAGE GLUCOSE: 139.9 MG/DL
HBA1C MFR BLD: 6.5 %

## 2020-07-22 ENCOUNTER — TELEPHONE (OUTPATIENT)
Dept: INTERNAL MEDICINE CLINIC | Age: 75
End: 2020-07-22

## 2020-07-22 NOTE — TELEPHONE ENCOUNTER
----- Message from Sharon Simmons MD sent at 7/22/2020  2:13 PM EDT -----  Contact: pt- [phone]  She should go back  ----- Message -----  From: Anitha Brown  Sent: 7/22/2020   1:27 PM EDT  To: Sharon Simmons MD Dr v pt- her last blood work showed her uric acid was high- she had been off of the allopurinol for 2wks -wanted to know if she should go back on this since the results were high-she said she didn't remember why she went off of it ?-please let her know at [phone] appt- 7-16-20-next appt- 10-21-20-

## 2020-07-30 DIAGNOSIS — I10 ESSENTIAL HYPERTENSION: ICD-10-CM

## 2020-07-30 LAB
ANION GAP SERPL CALCULATED.3IONS-SCNC: 14 MMOL/L (ref 3–16)
BUN BLDV-MCNC: 18 MG/DL (ref 7–20)
CALCIUM SERPL-MCNC: 9.4 MG/DL (ref 8.3–10.6)
CHLORIDE BLD-SCNC: 104 MMOL/L (ref 99–110)
CO2: 22 MMOL/L (ref 21–32)
CREAT SERPL-MCNC: 0.7 MG/DL (ref 0.6–1.2)
GFR AFRICAN AMERICAN: >60
GFR NON-AFRICAN AMERICAN: >60
GLUCOSE BLD-MCNC: 202 MG/DL (ref 70–99)
POTASSIUM SERPL-SCNC: 4.5 MMOL/L (ref 3.5–5.1)
SODIUM BLD-SCNC: 140 MMOL/L (ref 136–145)

## 2020-07-31 ENCOUNTER — TELEPHONE (OUTPATIENT)
Dept: INTERNAL MEDICINE CLINIC | Age: 75
End: 2020-07-31

## 2020-07-31 NOTE — TELEPHONE ENCOUNTER
----- Message from Lisa Cesar MD sent at 7/31/2020  3:07 PM EDT -----  Contact: [phone]  It doesn't have t obe rechecked   ----- Message -----  From: Ansley Fisher  Sent: 7/31/2020   2:56 PM EDT  To: Lisa Cesar MD    Pt called stating that she was in 2 weeks ago and her uric acid was up. She was back in yesterday and checked her results on my chart and her uric acid was not checked. She wanted to know if she needs to have her uric acid rechecked. Please advise.     [phone]

## 2020-07-31 NOTE — TELEPHONE ENCOUNTER
----- Message from Elsa Ferrer MD sent at 7/31/2020  4:04 PM EDT -----  Contact: [phone]  Yes   ----- Message -----  From: Ezra Larose  Sent: 7/31/2020   3:17 PM EDT  To: Elsa Ferrer MD    Pt wants to know if she can go back on the metformin?  ----- Message -----  From: Elsa Ferrer MD  Sent: 7/31/2020   3:07 PM EDT  To: Ezra Larose    It doesn't have t obe rechecked   ----- Message -----  From: Wing Fisher  Sent: 7/31/2020   2:56 PM EDT  To: Elsa Ferrer MD    Pt called stating that she was in 2 weeks ago and her uric acid was up. She was back in yesterday and checked her results on my chart and her uric acid was not checked. She wanted to know if she needs to have her uric acid rechecked. Please advise.     [phone]

## 2020-08-05 RX ORDER — PAROXETINE 10 MG/1
TABLET, FILM COATED ORAL
Qty: 60 TABLET | Refills: 0 | Status: SHIPPED | OUTPATIENT
Start: 2020-08-05 | End: 2020-10-01

## 2020-08-05 RX ORDER — GLIPIZIDE 10 MG/1
TABLET ORAL
Qty: 180 TABLET | Refills: 0 | Status: SHIPPED | OUTPATIENT
Start: 2020-08-05 | End: 2020-11-02

## 2020-09-01 RX ORDER — VERAPAMIL HYDROCHLORIDE 120 MG/1
TABLET, FILM COATED ORAL
Qty: 180 TABLET | Refills: 0 | Status: SHIPPED | OUTPATIENT
Start: 2020-09-01 | End: 2020-11-30

## 2020-09-01 RX ORDER — ALENDRONATE SODIUM 70 MG/1
TABLET ORAL
Qty: 12 TABLET | Refills: 0 | Status: SHIPPED | OUTPATIENT
Start: 2020-09-01 | End: 2020-11-30

## 2020-09-01 RX ORDER — LOSARTAN POTASSIUM 100 MG/1
TABLET ORAL
Qty: 90 TABLET | Refills: 0 | Status: SHIPPED | OUTPATIENT
Start: 2020-09-01 | End: 2020-11-30

## 2020-10-21 ENCOUNTER — OFFICE VISIT (OUTPATIENT)
Dept: INTERNAL MEDICINE CLINIC | Age: 75
End: 2020-10-21

## 2020-10-21 VITALS
SYSTOLIC BLOOD PRESSURE: 138 MMHG | BODY MASS INDEX: 41.66 KG/M2 | HEIGHT: 64 IN | TEMPERATURE: 97.8 F | WEIGHT: 244 LBS | DIASTOLIC BLOOD PRESSURE: 78 MMHG | HEART RATE: 72 BPM

## 2020-10-21 DIAGNOSIS — E78.5 HYPERLIPIDEMIA ASSOCIATED WITH TYPE 2 DIABETES MELLITUS (HCC): ICD-10-CM

## 2020-10-21 DIAGNOSIS — I10 ESSENTIAL HYPERTENSION: ICD-10-CM

## 2020-10-21 DIAGNOSIS — E11.69 HYPERLIPIDEMIA ASSOCIATED WITH TYPE 2 DIABETES MELLITUS (HCC): ICD-10-CM

## 2020-10-21 DIAGNOSIS — E11.9 TYPE 2 DIABETES MELLITUS WITHOUT COMPLICATION, WITHOUT LONG-TERM CURRENT USE OF INSULIN (HCC): ICD-10-CM

## 2020-10-21 LAB
A/G RATIO: 2.5 (ref 1.1–2.2)
ALBUMIN SERPL-MCNC: 4.2 G/DL (ref 3.4–5)
ALP BLD-CCNC: 66 U/L (ref 40–129)
ALT SERPL-CCNC: 12 U/L (ref 10–40)
ANION GAP SERPL CALCULATED.3IONS-SCNC: 13 MMOL/L (ref 3–16)
AST SERPL-CCNC: 13 U/L (ref 15–37)
BASOPHILS ABSOLUTE: 0 K/UL (ref 0–0.2)
BASOPHILS RELATIVE PERCENT: 0.2 %
BILIRUB SERPL-MCNC: 0.7 MG/DL (ref 0–1)
BUN BLDV-MCNC: 20 MG/DL (ref 7–20)
CALCIUM SERPL-MCNC: 9.5 MG/DL (ref 8.3–10.6)
CHLORIDE BLD-SCNC: 106 MMOL/L (ref 99–110)
CHOLESTEROL, TOTAL: 123 MG/DL (ref 0–199)
CO2: 24 MMOL/L (ref 21–32)
CREAT SERPL-MCNC: 0.6 MG/DL (ref 0.6–1.2)
EOSINOPHILS ABSOLUTE: 0.1 K/UL (ref 0–0.6)
EOSINOPHILS RELATIVE PERCENT: 1.5 %
GFR AFRICAN AMERICAN: >60
GFR NON-AFRICAN AMERICAN: >60
GLOBULIN: 1.7 G/DL
GLUCOSE BLD-MCNC: 145 MG/DL (ref 70–99)
HCT VFR BLD CALC: 31.5 % (ref 36–48)
HDLC SERPL-MCNC: 50 MG/DL (ref 40–60)
HEMATOLOGY PATH CONSULT: NO
HEMOGLOBIN: 9.9 G/DL (ref 12–16)
LDL CHOLESTEROL CALCULATED: 50 MG/DL
LYMPHOCYTES ABSOLUTE: 1.3 K/UL (ref 1–5.1)
LYMPHOCYTES RELATIVE PERCENT: 20.1 %
MCH RBC QN AUTO: 18.8 PG (ref 26–34)
MCHC RBC AUTO-ENTMCNC: 31.5 G/DL (ref 31–36)
MCV RBC AUTO: 59.6 FL (ref 80–100)
MICROCYTES: ABNORMAL
MONOCYTES ABSOLUTE: 0.5 K/UL (ref 0–1.3)
MONOCYTES RELATIVE PERCENT: 8 %
NEUTROPHILS ABSOLUTE: 4.6 K/UL (ref 1.7–7.7)
NEUTROPHILS RELATIVE PERCENT: 70.2 %
OVALOCYTES: ABNORMAL
PDW BLD-RTO: 17 % (ref 12.4–15.4)
PLATELET # BLD: 199 K/UL (ref 135–450)
PLATELET SLIDE REVIEW: ADEQUATE
PMV BLD AUTO: 9.3 FL (ref 5–10.5)
POTASSIUM SERPL-SCNC: 4.7 MMOL/L (ref 3.5–5.1)
RBC # BLD: 5.29 M/UL (ref 4–5.2)
SCHISTOCYTES: ABNORMAL
SLIDE REVIEW: ABNORMAL
SODIUM BLD-SCNC: 143 MMOL/L (ref 136–145)
TOTAL PROTEIN: 5.9 G/DL (ref 6.4–8.2)
TRIGL SERPL-MCNC: 115 MG/DL (ref 0–150)
VLDLC SERPL CALC-MCNC: 23 MG/DL
WBC # BLD: 6.6 K/UL (ref 4–11)

## 2020-10-21 PROCEDURE — G8484 FLU IMMUNIZE NO ADMIN: HCPCS | Performed by: INTERNAL MEDICINE

## 2020-10-21 PROCEDURE — G8417 CALC BMI ABV UP PARAM F/U: HCPCS | Performed by: INTERNAL MEDICINE

## 2020-10-21 PROCEDURE — G0008 ADMIN INFLUENZA VIRUS VAC: HCPCS | Performed by: INTERNAL MEDICINE

## 2020-10-21 PROCEDURE — 90662 IIV NO PRSV INCREASED AG IM: CPT | Performed by: INTERNAL MEDICINE

## 2020-10-21 PROCEDURE — 4040F PNEUMOC VAC/ADMIN/RCVD: CPT | Performed by: INTERNAL MEDICINE

## 2020-10-21 PROCEDURE — G8427 DOCREV CUR MEDS BY ELIG CLIN: HCPCS | Performed by: INTERNAL MEDICINE

## 2020-10-21 PROCEDURE — 1123F ACP DISCUSS/DSCN MKR DOCD: CPT | Performed by: INTERNAL MEDICINE

## 2020-10-21 PROCEDURE — G8399 PT W/DXA RESULTS DOCUMENT: HCPCS | Performed by: INTERNAL MEDICINE

## 2020-10-21 PROCEDURE — 99214 OFFICE O/P EST MOD 30 MIN: CPT | Performed by: INTERNAL MEDICINE

## 2020-10-21 PROCEDURE — 1090F PRES/ABSN URINE INCON ASSESS: CPT | Performed by: INTERNAL MEDICINE

## 2020-10-21 PROCEDURE — 1036F TOBACCO NON-USER: CPT | Performed by: INTERNAL MEDICINE

## 2020-10-21 RX ORDER — ALBUTEROL SULFATE 90 UG/1
AEROSOL, METERED RESPIRATORY (INHALATION)
Qty: 1 INHALER | Refills: 2 | Status: SHIPPED | OUTPATIENT
Start: 2020-10-21 | End: 2021-11-12 | Stop reason: SDUPTHER

## 2020-10-21 ASSESSMENT — ENCOUNTER SYMPTOMS
DIARRHEA: 0
VOMITING: 0
BLOOD IN STOOL: 0
CHEST TIGHTNESS: 0
NAUSEA: 0
COUGH: 0
SHORTNESS OF BREATH: 0
WHEEZING: 0
ABDOMINAL PAIN: 0

## 2020-10-21 NOTE — PROGRESS NOTES
Subjective:      Patient ID: Phillip Gaytan is a 67 y.o. female. HPI  She has Type 2 DM,HTN,GERD,spinal stenosis,asthma,gout and depression. Blood sugars are under good control. Has had it since 1999. Patient's BP is controlled on current medications. Depression is stable on paxil. Asthma- mod persistent- on Breo and proventil.stable. She has osteoarthritis and lumbar spinal stenosis. Review of Systems   Constitutional: Negative for fatigue, fever and unexpected weight change. Respiratory: Negative for cough, chest tightness, shortness of breath and wheezing. Cardiovascular: Negative for chest pain, palpitations and leg swelling. Gastrointestinal: Negative for abdominal pain, blood in stool, diarrhea, nausea and vomiting. Genitourinary: Negative for dysuria and hematuria. Neurological: Negative for light-headedness. Objective:   Physical Exam  Constitutional:       Appearance: She is well-developed. HENT:      Head: Normocephalic and atraumatic. Eyes:      Pupils: Pupils are equal, round, and reactive to light. Neck:      Musculoskeletal: Normal range of motion and neck supple. Thyroid: No thyromegaly. Cardiovascular:      Rate and Rhythm: Normal rate and regular rhythm. Heart sounds: Normal heart sounds. No murmur. No friction rub. No gallop. Comments: No carotid bruit  Pulmonary:      Effort: Pulmonary effort is normal. No respiratory distress. Breath sounds: Normal breath sounds. No wheezing or rales. Chest:      Chest wall: No tenderness. Abdominal:      General: Bowel sounds are normal. There is no distension. Palpations: Abdomen is soft. There is no mass. Tenderness: There is no abdominal tenderness. There is no guarding or rebound. Neurological:      Mental Status: She is alert and oriented to person, place, and time. Assessment:       Diagnosis Orders   1.  Type 2 diabetes mellitus without complication, without long-term

## 2020-10-22 LAB
ESTIMATED AVERAGE GLUCOSE: 157.1 MG/DL
HBA1C MFR BLD: 7.1 %

## 2020-11-02 RX ORDER — GLIPIZIDE 10 MG/1
TABLET ORAL
Qty: 180 TABLET | Refills: 0 | Status: SHIPPED | OUTPATIENT
Start: 2020-11-02 | End: 2021-02-02

## 2020-11-20 ENCOUNTER — TELEPHONE (OUTPATIENT)
Dept: INTERNAL MEDICINE CLINIC | Age: 75
End: 2020-11-20

## 2020-11-20 NOTE — TELEPHONE ENCOUNTER
----- Message from Socrates Soto sent at 11/20/2020  1:59 PM EST -----  Contact: Poncho Patel [phone]  Patient states she had lab work done on last visit and has been trying to see results in her MyChart and they are not there. How can she get a copy of the results?  Thank you tsh

## 2020-11-30 RX ORDER — LOSARTAN POTASSIUM 100 MG/1
TABLET ORAL
Qty: 90 TABLET | Refills: 0 | Status: SHIPPED | OUTPATIENT
Start: 2020-11-30 | End: 2021-03-04 | Stop reason: SDUPTHER

## 2020-11-30 RX ORDER — VERAPAMIL HYDROCHLORIDE 120 MG/1
TABLET, FILM COATED ORAL
Qty: 180 TABLET | Refills: 0 | Status: SHIPPED | OUTPATIENT
Start: 2020-11-30 | End: 2021-03-04 | Stop reason: SDUPTHER

## 2020-11-30 RX ORDER — ALENDRONATE SODIUM 70 MG/1
TABLET ORAL
Qty: 12 TABLET | Refills: 0 | Status: SHIPPED | OUTPATIENT
Start: 2020-11-30 | End: 2021-03-04 | Stop reason: SDUPTHER

## 2021-01-25 ENCOUNTER — OFFICE VISIT (OUTPATIENT)
Dept: ORTHOPEDIC SURGERY | Age: 76
End: 2021-01-25
Payer: MEDICARE

## 2021-01-25 ENCOUNTER — TELEPHONE (OUTPATIENT)
Dept: ORTHOPEDIC SURGERY | Age: 76
End: 2021-01-25

## 2021-01-25 VITALS — BODY MASS INDEX: 41.67 KG/M2 | HEIGHT: 64 IN | RESPIRATION RATE: 16 BRPM | WEIGHT: 244.05 LBS

## 2021-01-25 DIAGNOSIS — M54.16 LUMBAR RADICULITIS: ICD-10-CM

## 2021-01-25 DIAGNOSIS — M48.061 SPINAL STENOSIS OF LUMBAR REGION WITHOUT NEUROGENIC CLAUDICATION: ICD-10-CM

## 2021-01-25 DIAGNOSIS — M54.50 LOW BACK PAIN, UNSPECIFIED BACK PAIN LATERALITY, UNSPECIFIED CHRONICITY, UNSPECIFIED WHETHER SCIATICA PRESENT: Primary | ICD-10-CM

## 2021-01-25 PROCEDURE — G8427 DOCREV CUR MEDS BY ELIG CLIN: HCPCS | Performed by: PHYSICIAN ASSISTANT

## 2021-01-25 PROCEDURE — 4040F PNEUMOC VAC/ADMIN/RCVD: CPT | Performed by: PHYSICIAN ASSISTANT

## 2021-01-25 PROCEDURE — G8399 PT W/DXA RESULTS DOCUMENT: HCPCS | Performed by: PHYSICIAN ASSISTANT

## 2021-01-25 PROCEDURE — 1123F ACP DISCUSS/DSCN MKR DOCD: CPT | Performed by: PHYSICIAN ASSISTANT

## 2021-01-25 PROCEDURE — 1090F PRES/ABSN URINE INCON ASSESS: CPT | Performed by: PHYSICIAN ASSISTANT

## 2021-01-25 PROCEDURE — L0642 LO SAG RI AN/POS PNL PRE OTS: HCPCS | Performed by: PHYSICIAN ASSISTANT

## 2021-01-25 PROCEDURE — G8484 FLU IMMUNIZE NO ADMIN: HCPCS | Performed by: PHYSICIAN ASSISTANT

## 2021-01-25 PROCEDURE — 99204 OFFICE O/P NEW MOD 45 MIN: CPT | Performed by: PHYSICIAN ASSISTANT

## 2021-01-25 PROCEDURE — G8417 CALC BMI ABV UP PARAM F/U: HCPCS | Performed by: PHYSICIAN ASSISTANT

## 2021-01-25 PROCEDURE — 1036F TOBACCO NON-USER: CPT | Performed by: PHYSICIAN ASSISTANT

## 2021-01-25 RX ORDER — METHYLPREDNISOLONE 4 MG/1
TABLET ORAL
Qty: 1 KIT | Refills: 0 | Status: SHIPPED | OUTPATIENT
Start: 2021-01-25 | End: 2021-01-31

## 2021-01-25 RX ORDER — GABAPENTIN 300 MG/1
300 CAPSULE ORAL 2 TIMES DAILY PRN
Qty: 60 CAPSULE | Refills: 0 | Status: SHIPPED | OUTPATIENT
Start: 2021-01-25 | End: 2021-06-10

## 2021-01-25 NOTE — PROGRESS NOTES
New Patient: SPINE    CHIEF COMPLAINT:    Chief Complaint   Patient presents with    Back Pain       HISTORY OF PRESENT ILLNESS:                The patient is a 67 y.o. female history of DM, thalassemia minor, melanoma, osteoporosis, last seen in 2016 here for worsening low back and right leg pain. She reports a 1 year history of aching atraumatic low back pain and a 3-month history of pain radiating into the right anterior lateral thigh to the knee. Symptoms are increased with any walking standing or activity. Relief with resting. Conservative care includes Tylenol, ice, heat. She has a history of ARTEMIO's in 2015 with significant benefit and prior treatment with gabapentin with benefit and no significant side effects. She denies any progressive extremity weakness or recent bowel or bladder dysfunction. Denies any recent injury or trauma. Denies any recent fever chills or infections. She also states that episodically she will have some left-sided neck and trap discomfort typically with cervical range of motion. Denies any distal upper extremity radiating pain. Denies any progressive numbness tingling or weakness. Pain Assessment  Location of Pain: Back  Severity of Pain: 2  Quality of Pain: Sharp, Dull, Aching  Duration of Pain: Persistent  Frequency of Pain: Constant  Aggravating Factors: Stairs, Walking, Standing, Squatting, Kneeling, Exercise, Straightening, Stretching, Bending  Limiting Behavior: Yes  Relieving Factors: Rest  Result of Injury: No  Work-Related Injury: No  Are there other pain locations you wish to document?: No    The pain assessment was noted & reviewed in the medical record today.      Current/Past Treatment:   · Physical Therapy: Past  · Chiropractic:     · Injection:   With benefit  6/23/15: Arsen L5/S1 TX ARTEMIO  10/6/15: Rt L5 TX ARTEMIO   Medications:            NSAIDS: GI upset            Muscle relaxer:              Steriods:              Neuropathic medications: Gabapentin Opioids:            Other:   · Surgery/Consult:    Past Medical History: Medical history form was reviewed today & scanned into the media tab  Past Medical History:   Diagnosis Date    Anemia     thallasemia minor    Arthritis     osteo-arthritis    Asthma     Back pain     Back pain     Bronchitis chronic     Diabetes mellitus (HCC)     GERD (gastroesophageal reflux disease)     Gout     Hypertension     Melanoma of scalp or neck (HCC)     Osteoarthritis     Osteoporosis     Squamous cell carcinoma, arm     Thal trait       Past Surgical History:     Past Surgical History:   Procedure Laterality Date    APPENDECTOMY      CARPAL TUNNEL RELEASE Left 02/05/2018    CATARACT REMOVAL WITH IMPLANT Right 07/18/2018    CATARACT REMOVAL WITH IMPLANT Left 08/15/2018    COLONOSCOPY  12/2014    KNEE ARTHROSCOPY Bilateral     PARATHYROIDECTOMY      PARTIAL HYSTERECTOMY      DC XCAPSL CTRC RMVL INSJ IO LENS PROSTH W/O ECP Right 7/18/2018    PHACOEMULSIFICATION OF CATARACT WITH INTRAOCULAR LENS IMPLANT RIGHT EYE performed by Yue Coello MD at [address] [place] [address] Drive IO LENS PROSTH W/O ECP Left 8/15/2018    PHACOEMULSIFICATION OF CATARACT WITH INTRAOCULAR LENS IMPLANT LEFT EYE performed by Yue Coello MD at [place] [address] ENDOSCOPY       Current Medications:     Current Outpatient Medications:     BREO ELLIPTA 200-25 MCG/INH AEPB inhaler, INHALE ONE DOSE BY MOUTH DAILY, Disp: 1 each, Rfl: 0    carvedilol (COREG) 6.25 MG tablet, TAKE ONE TABLET BY MOUTH TWICE A DAY, Disp: 60 tablet, Rfl: 0    atorvastatin (LIPITOR) 10 MG tablet, TAKE ONE TABLET BY MOUTH ONCE NIGHTLY, Disp: 30 tablet, Rfl: 0    allopurinol (ZYLOPRIM) 300 MG tablet, TAKE ONE TABLET BY MOUTH DAILY, Disp: 30 tablet, Rfl: 0    metFORMIN (GLUCOPHAGE) 1000 MG tablet, TAKE ONE TABLET BY MOUTH TWICE A DAY WITH A MEAL, Disp: 60 tablet, Rfl: 0   verapamil (CALAN) 120 MG tablet, TAKE ONE TABLET BY MOUTH TWICE A DAY, Disp: 180 tablet, Rfl: 0    losartan (COZAAR) 100 MG tablet, TAKE ONE TABLET BY MOUTH DAILY, Disp: 90 tablet, Rfl: 0    alendronate (FOSAMAX) 70 MG tablet, TAKE 1 TABLET BY MOUTH ONCE WEEKLY ON AN EMPTY STOMACH BEFORE BREAKFAST. REMAIN UPRIGHT FOR 30 MINUTES & TAKE WITH 8 OUNCES OF WATER, Disp: 12 tablet, Rfl: 0    glipiZIDE (GLUCOTROL) 10 MG tablet, TAKE ONE TABLET BY MOUTH TWICE A DAY BEFORE MEALS, Disp: 180 tablet, Rfl: 0    albuterol sulfate HFA (PROAIR HFA) 108 (90 Base) MCG/ACT inhaler, INHALE TWO PUFFS BY MOUTH EVERY 6 HOURS AS NEEDED FOR WHEEZING, Disp: 1 Inhaler, Rfl: 2    PARoxetine (PAXIL) 10 MG tablet, TAKE ONE TABLET BY MOUTH EVERY MORNING, Disp: 180 tablet, Rfl: 0    blood glucose test strips (ASCENSIA AUTODISC VI;ONE TOUCH ULTRA TEST VI) strip, Used to test once daily. DX: E11.9, Disp: 100 each, Rfl: 3    omeprazole (PRILOSEC) 20 MG delayed release capsule, TAKE ONE CAPSULE BY MOUTH TWICE A DAY, Disp: 180 capsule, Rfl: 1    Umeclidinium Bromide (INCRUSE ELLIPTA) 62.5 MCG/INH AEPB, INHALE ONE PUFF BY MOUTH DAILY, Disp: 3 each, Rfl: 0    Black Pepper-Turmeric (TURMERIC CURCUMIN) 5-1000 MG CAPS, Take by mouth 2 times daily, Disp: , Rfl:     acetaminophen (TYLENOL) 500 MG tablet, Take 500 mg by mouth every 6 hours as needed for Pain, Disp: , Rfl:     Coenzyme Q10 (CO Q 10 PO), Take by mouth, Disp: , Rfl:     cetirizine (ZYRTEC) 10 MG tablet, Take 10 mg by mouth daily, Disp: , Rfl:     Cyanocobalamin (VITAMIN B 12 PO), Take 1,000 mcg by mouth daily. , Disp: , Rfl:     Ascorbic Acid (VITAMIN C) 500 MG CAPS, Take  by mouth daily. , Disp: , Rfl:     vitamin E 400 UNIT capsule, Take 400 Units by mouth daily. , Disp: , Rfl:     Calcium Carbonate-Vitamin D (CALCIUM + D PO), Take  by mouth daily. , Disp: , Rfl:   Allergies:  Hctz [hydrochlorothiazide] and Lisinopril  Social History: reports that she is a non-smoker but has been exposed to tobacco smoke. She has never used smokeless tobacco. She reports that she does not drink alcohol or use drugs. Family History:   Family History   Problem Relation Age of Onset    Heart Failure Mother     Emphysema Mother     Stroke Mother     Heart Failure Father     Hypertension Father     Heart Disease Father     High Blood Pressure Father     Cancer Maternal Aunt     Cancer Paternal Aunt     Cancer Maternal Grandmother     Asthma Neg Hx     Diabetes Neg Hx        REVIEW OF SYSTEMS: Full ROS noted & scanned   CONSTITUTIONAL: Denies unexplained weight loss, fevers, chills or fatigue  NEUROLOGICAL: Denies unsteady gait or progressive weakness  MUSCULOSKELETAL: admits joint swelling or redness  PSYCHOLOGICAL: Denies anxiety, admits depression   SKIN: Denies skin changes, delayed healing, rash, itching   HEMATOLOGIC: Denies easy bleeding admits bruising  ENDOCRINE: Denies excessive thirst, urination, heat/cold  RESPIRATORY: Denies current dyspnea, cough  GI: Denies nausea, vomiting, diarrhea   : Denies bowel or bladder issues       PHYSICAL EXAM:    Vitals: Resp. rate 16, height 5' 4.02\" (1.626 m), weight 244 lb 0.8 oz (110.7 kg). GENERAL EXAM:  · General Apparence: Patient is adequately groomed with no evidence of malnutrition. · Orientation: The patient is oriented to time, place and person. · Mood & Affect:The patient's mood and affect are appropriate   · Lymphatic: The lymphatic examination bilaterally reveals all areas to be without enlargement or induration  · Sensation: Sensation is intact without deficit  CERVICAL EXAMINATION:  · Inspection: Local inspection shows no step-off or bruising. Cervical alignment is normal.     · Palpation: No evidence of tenderness at the midline.   Positive tenderness to palpation left trap  · Range of Motion: Intact flexion mild to moderate loss extension and lateral rotation · Strength: 5/5 bilateral upper extremities   · Special Tests:    ·   Spurling's, L'Hermitte's & Mason's negative bilaterally. · Skin:There are no rashes, ulcerations or lesions in right & left upper extremities. · Reflexes: Bilaterally triceps, biceps and brachioradialis are 1+. Clonus absent bilaterally at the feet. · Additional Examinations:       · RIGHT UPPER EXTREMITY:  Inspection/examination of the right upper extremity does not show any tenderness, deformity or injury. Range of motion is full. There is no gross instability. There are no rashes, ulcerations or lesions. Strength and tone are normal.  · LEFT UPPER EXTREMITY: Inspection/examination of the left upper extremity does not show any tenderness, deformity or injury. Range of motion is full. There is no gross instability. There are no rashes, ulcerations or lesions. Strength and tone are normal.    LUMBAR/SACRAL EXAMINATION:  · Inspection: Local inspection shows no step-off or bruising. Kyphosis   · Palpation:   No evidence of tenderness at the midline. · Range of Motion: Moderate loss both flexion and extension more pain with extension  · Strength:   Strength testing is 5/5 in all muscle groups tested. Exception right quads 5-/5  · Special Tests:   Straight leg raise and crossed SLR negative. Leg length and pelvis level.  0 out of 5 Kirstie's signs. · Skin: There are no rashes, ulcerations or lesions. · Reflexes: Reflexes are symmetrically trace with reinforcement at the patellar and ankle tendons. Clonus absent bilaterally at the feet. · Gait & station: Forward flexed with cane  · Additional Examinations:   · RIGHT LOWER EXTREMITY: Inspection/examination of the right lower extremity does not show any tenderness, deformity or injury. Range of motion is full. There is no gross instability. There are no rashes, ulcerations or lesions.     · · LEFT LOWER EXTREMITY:  Inspection/examination of the left lower extremity does not show any tenderness, deformity or injury. Range of motion is full. There is no gross instability. There are no rashes, ulcerations or lesions. Strength and tone are normal.    Diagnostic Testin views lumbar spine 2021 severe bone-on-bone DDD L5-S1, L4-5 spondylolisthesis, likely degenerative endplate changes L4, mild superior endplate compression deformity L4, T12 wedging, multilevel facet arthropathy, right SI sclerosis    Lumbar MRI scan and report reviewed from 2015 showing progressed mod-severe central stenosis L3 4, & L4 5, severe stenosis L5-S1, multilevel DDD    Hemoglobin A1c .1      Cervical CT   1.  No acute abnormality.       2.  Facet arthropathy throughout the cervical spine. Impression:  1) Chronic LBP, 3mo recurrent right sciatica  2) Mod-severe multilevel central stenosis, right SI sclerosis   3) DM, melanoma, osteoporosis  4) H/o ESIs w/benefit  5) ORT=2  6) Chronic episodic neck pain, cervical spondylosis       Plan:   1) Lumbar MRI   2) MDP  3) Quick draw brace  4) Gabapentin 300mg I po BID PRN   5) F/u to review L MRI         Procedures    Breg Quick Draw Lumbar Brace     Patient was prescribed an [employer] back brace. The lumbar spine will require stabilization / immobilization from this semi-rigid / rigid orthosis to improve their function. The orthosis will assist in protecting the affected area, provide functional support and facilitate healing.  This orthosis is required for the following reasons:    Reduce pain by restricting mobility of the trunk  Facilitate healing following an injury to the spine or related soft tissues  Facilitate healing following a surgical procedure on the spine or related soft tissue  Support weak spinal muscles The patient was educated and fit by a healthcare professional with expert knowledge and specialization in brace application while under the direct supervision of the physician. Verbal and written instructions for the use of and application of this item were provided. They were instructed to contact the office immediately should the brace result in increased pain, decreased sensation, increased swelling or worsening of the condition.          [facility]

## 2021-02-03 RX ORDER — GLIPIZIDE 10 MG/1
TABLET ORAL
Qty: 60 TABLET | Refills: 0 | Status: SHIPPED | OUTPATIENT
Start: 2021-02-03 | End: 2021-03-04 | Stop reason: SDUPTHER

## 2021-02-03 RX ORDER — PAROXETINE HYDROCHLORIDE 20 MG/1
TABLET, FILM COATED ORAL
Qty: 30 TABLET | Refills: 1 | Status: SHIPPED | OUTPATIENT
Start: 2021-02-03 | End: 2021-03-04 | Stop reason: SDUPTHER

## 2021-02-03 RX ORDER — ATORVASTATIN CALCIUM 10 MG/1
TABLET, FILM COATED ORAL
Qty: 30 TABLET | Refills: 0 | Status: SHIPPED | OUTPATIENT
Start: 2021-02-03 | End: 2021-03-04 | Stop reason: SDUPTHER

## 2021-02-03 RX ORDER — CARVEDILOL 6.25 MG/1
TABLET ORAL
Qty: 60 TABLET | Refills: 0 | Status: SHIPPED | OUTPATIENT
Start: 2021-02-03 | End: 2021-03-04 | Stop reason: SDUPTHER

## 2021-02-03 RX ORDER — ALLOPURINOL 300 MG/1
TABLET ORAL
Qty: 30 TABLET | Refills: 0 | Status: SHIPPED | OUTPATIENT
Start: 2021-02-03 | End: 2021-03-04 | Stop reason: SDUPTHER

## 2021-02-08 ENCOUNTER — HOSPITAL ENCOUNTER (OUTPATIENT)
Dept: MRI IMAGING | Age: 76
Discharge: HOME | End: 2021-02-08
Payer: MEDICARE

## 2021-02-08 DIAGNOSIS — M54.16 LUMBAR RADICULITIS: ICD-10-CM

## 2021-02-08 DIAGNOSIS — M48.061 SPINAL STENOSIS OF LUMBAR REGION WITHOUT NEUROGENIC CLAUDICATION: ICD-10-CM

## 2021-02-08 PROCEDURE — 72148 MRI LUMBAR SPINE W/O DYE: CPT

## 2021-02-12 ENCOUNTER — IMMUNIZATION (OUTPATIENT)
Dept: PRIMARY CARE CLINIC | Age: 76
End: 2021-02-12
Payer: MEDICARE

## 2021-02-12 PROCEDURE — 0011A COVID-19, MODERNA VACCINE 100MCG/0.5ML DOSE: CPT | Performed by: FAMILY MEDICINE

## 2021-02-12 PROCEDURE — 91301 COVID-19, MODERNA VACCINE 100MCG/0.5ML DOSE: CPT | Performed by: FAMILY MEDICINE

## 2021-03-01 ENCOUNTER — OFFICE VISIT (OUTPATIENT)
Dept: ORTHOPEDIC SURGERY | Age: 76
End: 2021-03-01
Payer: MEDICARE

## 2021-03-01 VITALS — BODY MASS INDEX: 41.66 KG/M2 | HEIGHT: 64 IN | WEIGHT: 244 LBS

## 2021-03-01 DIAGNOSIS — M54.50 LOW BACK PAIN, UNSPECIFIED BACK PAIN LATERALITY, UNSPECIFIED CHRONICITY, UNSPECIFIED WHETHER SCIATICA PRESENT: Primary | ICD-10-CM

## 2021-03-01 DIAGNOSIS — M48.061 SPINAL STENOSIS OF LUMBAR REGION WITHOUT NEUROGENIC CLAUDICATION: ICD-10-CM

## 2021-03-01 DIAGNOSIS — M43.16 LUMBAR ADJACENT SEGMENT DISEASE WITH SPONDYLOLISTHESIS: ICD-10-CM

## 2021-03-01 DIAGNOSIS — M54.16 LUMBAR RADICULITIS: ICD-10-CM

## 2021-03-01 DIAGNOSIS — M51.36 LUMBAR ADJACENT SEGMENT DISEASE WITH SPONDYLOLISTHESIS: ICD-10-CM

## 2021-03-01 PROCEDURE — G8484 FLU IMMUNIZE NO ADMIN: HCPCS | Performed by: PHYSICIAN ASSISTANT

## 2021-03-01 PROCEDURE — 1090F PRES/ABSN URINE INCON ASSESS: CPT | Performed by: PHYSICIAN ASSISTANT

## 2021-03-01 PROCEDURE — 4040F PNEUMOC VAC/ADMIN/RCVD: CPT | Performed by: PHYSICIAN ASSISTANT

## 2021-03-01 PROCEDURE — G8427 DOCREV CUR MEDS BY ELIG CLIN: HCPCS | Performed by: PHYSICIAN ASSISTANT

## 2021-03-01 PROCEDURE — G8417 CALC BMI ABV UP PARAM F/U: HCPCS | Performed by: PHYSICIAN ASSISTANT

## 2021-03-01 PROCEDURE — 1123F ACP DISCUSS/DSCN MKR DOCD: CPT | Performed by: PHYSICIAN ASSISTANT

## 2021-03-01 PROCEDURE — G8399 PT W/DXA RESULTS DOCUMENT: HCPCS | Performed by: PHYSICIAN ASSISTANT

## 2021-03-01 PROCEDURE — 1036F TOBACCO NON-USER: CPT | Performed by: PHYSICIAN ASSISTANT

## 2021-03-01 PROCEDURE — 99214 OFFICE O/P EST MOD 30 MIN: CPT | Performed by: PHYSICIAN ASSISTANT

## 2021-03-01 RX ORDER — GABAPENTIN 300 MG/1
300 CAPSULE ORAL 2 TIMES DAILY
Qty: 60 CAPSULE | Refills: 2 | Status: SHIPPED | OUTPATIENT
Start: 2021-03-01 | End: 2021-06-10

## 2021-03-03 ENCOUNTER — TELEPHONE (OUTPATIENT)
Dept: ORTHOPEDIC SURGERY | Age: 76
End: 2021-03-03

## 2021-03-04 ENCOUNTER — OFFICE VISIT (OUTPATIENT)
Dept: INTERNAL MEDICINE CLINIC | Age: 76
End: 2021-03-04

## 2021-03-04 VITALS
DIASTOLIC BLOOD PRESSURE: 78 MMHG | WEIGHT: 242 LBS | HEIGHT: 64 IN | SYSTOLIC BLOOD PRESSURE: 138 MMHG | BODY MASS INDEX: 41.32 KG/M2 | TEMPERATURE: 97.4 F

## 2021-03-04 DIAGNOSIS — I10 ESSENTIAL HYPERTENSION: Primary | ICD-10-CM

## 2021-03-04 DIAGNOSIS — E11.9 TYPE 2 DIABETES MELLITUS WITHOUT COMPLICATION, WITHOUT LONG-TERM CURRENT USE OF INSULIN (HCC): ICD-10-CM

## 2021-03-04 DIAGNOSIS — E78.5 HYPERLIPIDEMIA ASSOCIATED WITH TYPE 2 DIABETES MELLITUS (HCC): ICD-10-CM

## 2021-03-04 DIAGNOSIS — E11.69 HYPERLIPIDEMIA ASSOCIATED WITH TYPE 2 DIABETES MELLITUS (HCC): ICD-10-CM

## 2021-03-04 DIAGNOSIS — I10 ESSENTIAL HYPERTENSION: ICD-10-CM

## 2021-03-04 DIAGNOSIS — J45.40 MODERATE PERSISTENT ASTHMA WITHOUT COMPLICATION: ICD-10-CM

## 2021-03-04 DIAGNOSIS — D56.3 THALASSEMIA TRAIT: ICD-10-CM

## 2021-03-04 DIAGNOSIS — E53.8 VITAMIN B 12 DEFICIENCY: ICD-10-CM

## 2021-03-04 DIAGNOSIS — M1A.09X0 CHRONIC GOUT OF MULTIPLE SITES, UNSPECIFIED CAUSE: ICD-10-CM

## 2021-03-04 DIAGNOSIS — K21.9 GASTROESOPHAGEAL REFLUX DISEASE WITHOUT ESOPHAGITIS: ICD-10-CM

## 2021-03-04 DIAGNOSIS — M48.061 SPINAL STENOSIS OF LUMBAR REGION WITHOUT NEUROGENIC CLAUDICATION: ICD-10-CM

## 2021-03-04 LAB
BASOPHILS ABSOLUTE: 0 K/UL (ref 0–0.2)
BASOPHILS RELATIVE PERCENT: 0.2 %
EOSINOPHILS ABSOLUTE: 0.1 K/UL (ref 0–0.6)
EOSINOPHILS RELATIVE PERCENT: 1.7 %
HCT VFR BLD CALC: 30 % (ref 36–48)
HEMATOLOGY PATH CONSULT: NO
HEMOGLOBIN: 9.3 G/DL (ref 12–16)
LYMPHOCYTES ABSOLUTE: 1.2 K/UL (ref 1–5.1)
LYMPHOCYTES RELATIVE PERCENT: 23.1 %
MCH RBC QN AUTO: 18.8 PG (ref 26–34)
MCHC RBC AUTO-ENTMCNC: 31 G/DL (ref 31–36)
MCV RBC AUTO: 60.7 FL (ref 80–100)
MONOCYTES ABSOLUTE: 0.5 K/UL (ref 0–1.3)
MONOCYTES RELATIVE PERCENT: 8.5 %
NEUTROPHILS ABSOLUTE: 3.6 K/UL (ref 1.7–7.7)
NEUTROPHILS RELATIVE PERCENT: 66.5 %
PDW BLD-RTO: 16.6 % (ref 12.4–15.4)
PLATELET # BLD: 258 K/UL (ref 135–450)
PLATELET SLIDE REVIEW: ADEQUATE
PMV BLD AUTO: 11.5 FL (ref 5–10.5)
RBC # BLD: 4.94 M/UL (ref 4–5.2)
TSH REFLEX: 2.1 UIU/ML (ref 0.27–4.2)
WBC # BLD: 5.4 K/UL (ref 4–11)

## 2021-03-04 PROCEDURE — 1090F PRES/ABSN URINE INCON ASSESS: CPT | Performed by: INTERNAL MEDICINE

## 2021-03-04 PROCEDURE — G8427 DOCREV CUR MEDS BY ELIG CLIN: HCPCS | Performed by: INTERNAL MEDICINE

## 2021-03-04 PROCEDURE — G8399 PT W/DXA RESULTS DOCUMENT: HCPCS | Performed by: INTERNAL MEDICINE

## 2021-03-04 PROCEDURE — 1123F ACP DISCUSS/DSCN MKR DOCD: CPT | Performed by: INTERNAL MEDICINE

## 2021-03-04 PROCEDURE — 4040F PNEUMOC VAC/ADMIN/RCVD: CPT | Performed by: INTERNAL MEDICINE

## 2021-03-04 PROCEDURE — 99214 OFFICE O/P EST MOD 30 MIN: CPT | Performed by: INTERNAL MEDICINE

## 2021-03-04 PROCEDURE — G8484 FLU IMMUNIZE NO ADMIN: HCPCS | Performed by: INTERNAL MEDICINE

## 2021-03-04 PROCEDURE — G8417 CALC BMI ABV UP PARAM F/U: HCPCS | Performed by: INTERNAL MEDICINE

## 2021-03-04 PROCEDURE — 1036F TOBACCO NON-USER: CPT | Performed by: INTERNAL MEDICINE

## 2021-03-04 RX ORDER — UMECLIDINIUM 62.5 UG/1
AEROSOL, POWDER ORAL
Qty: 3 EACH | Refills: 0 | Status: SHIPPED | OUTPATIENT
Start: 2021-03-04 | End: 2021-05-13 | Stop reason: SDUPTHER

## 2021-03-04 RX ORDER — GLIPIZIDE 10 MG/1
TABLET ORAL
Qty: 180 TABLET | Refills: 0 | Status: SHIPPED | OUTPATIENT
Start: 2021-03-04 | End: 2021-05-05

## 2021-03-04 RX ORDER — PAROXETINE 10 MG/1
TABLET, FILM COATED ORAL
Qty: 90 TABLET | Refills: 0 | Status: SHIPPED | OUTPATIENT
Start: 2021-03-04 | End: 2021-05-05

## 2021-03-04 RX ORDER — LOSARTAN POTASSIUM 100 MG/1
TABLET ORAL
Qty: 90 TABLET | Refills: 0 | Status: SHIPPED | OUTPATIENT
Start: 2021-03-04 | End: 2021-05-05

## 2021-03-04 RX ORDER — VERAPAMIL HYDROCHLORIDE 120 MG/1
TABLET, FILM COATED ORAL
Qty: 180 TABLET | Refills: 0 | Status: SHIPPED | OUTPATIENT
Start: 2021-03-04 | End: 2021-05-05

## 2021-03-04 RX ORDER — ALENDRONATE SODIUM 70 MG/1
TABLET ORAL
Qty: 12 TABLET | Refills: 0 | Status: SHIPPED | OUTPATIENT
Start: 2021-03-04 | End: 2021-05-03

## 2021-03-04 RX ORDER — ALLOPURINOL 300 MG/1
TABLET ORAL
Qty: 90 TABLET | Refills: 0 | Status: SHIPPED | OUTPATIENT
Start: 2021-03-04 | End: 2021-05-05

## 2021-03-04 RX ORDER — CARVEDILOL 6.25 MG/1
TABLET ORAL
Qty: 180 TABLET | Refills: 0 | Status: SHIPPED | OUTPATIENT
Start: 2021-03-04 | End: 2021-05-05

## 2021-03-04 RX ORDER — OMEPRAZOLE 20 MG/1
CAPSULE, DELAYED RELEASE ORAL
Qty: 180 CAPSULE | Refills: 0 | Status: SHIPPED | OUTPATIENT
Start: 2021-03-04 | End: 2021-08-26

## 2021-03-04 RX ORDER — ATORVASTATIN CALCIUM 10 MG/1
TABLET, FILM COATED ORAL
Qty: 90 TABLET | Refills: 0 | Status: SHIPPED | OUTPATIENT
Start: 2021-03-04 | End: 2021-05-05

## 2021-03-04 ASSESSMENT — ENCOUNTER SYMPTOMS
ABDOMINAL PAIN: 0
BACK PAIN: 1
CHEST TIGHTNESS: 0
NAUSEA: 0
DIARRHEA: 0
COUGH: 0
WHEEZING: 0
VOMITING: 0
BLOOD IN STOOL: 0
SHORTNESS OF BREATH: 0

## 2021-03-04 NOTE — PROGRESS NOTES
Kelli Thakur (:  1945) is a 67 y.o. female,Established patient, here for evaluation of the following chief complaint(s):  Follow-up      ASSESSMENT/PLAN:     Diagnosis Orders   1. Essential hypertension  CBC Auto Differential   2. Type 2 diabetes mellitus without complication, without long-term current use of insulin (HCC)  Hemoglobin A1C   3. Hyperlipidemia associated with type 2 diabetes mellitus (HCC)  TSH with Reflex   4. Spinal stenosis of lumbar region without neurogenic claudication     5. Chronic gout of multiple sites, unspecified cause     6. Gastroesophageal reflux disease without esophagitis     7. Moderate persistent asthma without complication     8. Vitamin B 12 deficiency  Vitamin B12   9. Thalassemia trait           DM- Stable. continue metformin and  glipizide   Strict diet.      HTN. Blood pressure is good. Continue current meds .  Cannot take HCTZ 2 to high Ca. Aldactone stopped for high K   Walking. Weight loss.     Asthma  Stable on Breo and proventil     HLD. continue lipitor. Lipids are good       Gout- continue allopurinol  No recent flareups.      Depression. Stable. On paxil 10 mg daily       GERD. Stable on PPI. Now takes once daily    Spinal stenosis  Sees pain physician   takes gabapentin      Anemia. H/o thal trait and B12 def. Check CBC, B 12 levels       Up to date on colonoscopy,mammogram and DEXA. SUBJECTIVE/OBJECTIVE:  HPI    She has Type 2 DM,HTN,GERD,spinal stenosis,asthma,gout and depression. Blood sugars are under good control. Has had it since . Patient's BP is controlled on current medications. Depression is stable on paxil. Asthma- mod persistent- on Breo and proventil.stable. She has osteoarthritis and severe lumbar spinal stenosis. Review of Systems   Constitutional: Negative for fatigue, fever and unexpected weight change. Respiratory: Negative for cough, chest tightness, shortness of breath and wheezing. Cardiovascular: Negative for chest pain, palpitations and leg swelling. Gastrointestinal: Negative for abdominal pain, blood in stool, diarrhea, nausea and vomiting. Genitourinary: Negative for dysuria and hematuria. Musculoskeletal: Positive for back pain. Neurological: Negative for light-headedness. Physical Exam  Constitutional:       Appearance: She is well-developed. HENT:      Head: Normocephalic and atraumatic. Eyes:      Pupils: Pupils are equal, round, and reactive to light. Neck:      Musculoskeletal: Normal range of motion and neck supple. Thyroid: No thyromegaly. Cardiovascular:      Rate and Rhythm: Normal rate and regular rhythm. Heart sounds: Normal heart sounds. No murmur. No friction rub. No gallop. Comments: No carotid bruit  Pulmonary:      Effort: Pulmonary effort is normal. No respiratory distress. Breath sounds: Normal breath sounds. No wheezing or rales. Chest:      Chest wall: No tenderness. Abdominal:      General: Bowel sounds are normal. There is no distension. Palpations: Abdomen is soft. There is no mass. Tenderness: There is no abdominal tenderness. There is no guarding or rebound. Neurological:      Mental Status: She is alert and oriented to person, place, and time. An electronic signature was used to authenticate this note.     --Shabana Duval MD

## 2021-03-05 ENCOUNTER — TELEPHONE (OUTPATIENT)
Dept: INTERNAL MEDICINE CLINIC | Age: 76
End: 2021-03-05

## 2021-03-05 DIAGNOSIS — D64.9 ANEMIA, UNSPECIFIED TYPE: ICD-10-CM

## 2021-03-05 DIAGNOSIS — D64.9 ANEMIA, UNSPECIFIED TYPE: Primary | ICD-10-CM

## 2021-03-05 LAB
ESTIMATED AVERAGE GLUCOSE: 139.9 MG/DL
FERRITIN: 44.7 NG/ML (ref 15–150)
HBA1C MFR BLD: 6.5 %
IRON SATURATION: 29 % (ref 15–50)
IRON: 79 UG/DL (ref 37–145)
TOTAL IRON BINDING CAPACITY: 269 UG/DL (ref 260–445)
VITAMIN B-12: 803 PG/ML (ref 211–911)

## 2021-03-05 NOTE — TELEPHONE ENCOUNTER
----- Message from Debbie Carbajal MD sent at 3/5/2021  9:01 AM EST -----  Add iron studies, ferritin

## 2021-03-12 ENCOUNTER — IMMUNIZATION (OUTPATIENT)
Dept: PRIMARY CARE CLINIC | Age: 76
End: 2021-03-12
Payer: MEDICARE

## 2021-03-12 PROCEDURE — 0012A COVID-19, MODERNA VACCINE 100MCG/0.5ML DOSE: CPT | Performed by: FAMILY MEDICINE

## 2021-03-12 PROCEDURE — 91301 COVID-19, MODERNA VACCINE 100MCG/0.5ML DOSE: CPT | Performed by: FAMILY MEDICINE

## 2021-04-12 ENCOUNTER — OFFICE VISIT (OUTPATIENT)
Dept: ORTHOPEDIC SURGERY | Age: 76
End: 2021-04-12
Payer: MEDICARE

## 2021-04-12 VITALS — HEIGHT: 64 IN | WEIGHT: 235 LBS | BODY MASS INDEX: 40.12 KG/M2

## 2021-04-12 DIAGNOSIS — M48.061 SPINAL STENOSIS OF LUMBAR REGION WITHOUT NEUROGENIC CLAUDICATION: Primary | ICD-10-CM

## 2021-04-12 DIAGNOSIS — M51.36 LUMBAR ADJACENT SEGMENT DISEASE WITH SPONDYLOLISTHESIS: ICD-10-CM

## 2021-04-12 DIAGNOSIS — M54.16 LUMBAR RADICULITIS: ICD-10-CM

## 2021-04-12 DIAGNOSIS — M43.16 LUMBAR ADJACENT SEGMENT DISEASE WITH SPONDYLOLISTHESIS: ICD-10-CM

## 2021-04-12 PROCEDURE — G8417 CALC BMI ABV UP PARAM F/U: HCPCS | Performed by: PHYSICIAN ASSISTANT

## 2021-04-12 PROCEDURE — G8427 DOCREV CUR MEDS BY ELIG CLIN: HCPCS | Performed by: PHYSICIAN ASSISTANT

## 2021-04-12 PROCEDURE — 1123F ACP DISCUSS/DSCN MKR DOCD: CPT | Performed by: PHYSICIAN ASSISTANT

## 2021-04-12 PROCEDURE — G8399 PT W/DXA RESULTS DOCUMENT: HCPCS | Performed by: PHYSICIAN ASSISTANT

## 2021-04-12 PROCEDURE — 4040F PNEUMOC VAC/ADMIN/RCVD: CPT | Performed by: PHYSICIAN ASSISTANT

## 2021-04-12 PROCEDURE — 1036F TOBACCO NON-USER: CPT | Performed by: PHYSICIAN ASSISTANT

## 2021-04-12 PROCEDURE — 1090F PRES/ABSN URINE INCON ASSESS: CPT | Performed by: PHYSICIAN ASSISTANT

## 2021-04-12 PROCEDURE — 99213 OFFICE O/P EST LOW 20 MIN: CPT | Performed by: PHYSICIAN ASSISTANT

## 2021-04-12 NOTE — PROGRESS NOTES
Follow-up: SPINE    CHIEF COMPLAINT:    Chief Complaint   Patient presents with    Back Pain     F/U LUMBAR PAIN       HISTORY OF PRESENT ILLNESS:                The patient is a 67 y.o. female history of DM, thalassemia minor, melanoma, osteoporosis, here to follow-up after right L5-S1 LESI #1 3/30/2021 (Dr. Alfredo Saxena) for worsening low back and right leg pain. She reports a 1 year history of aching atraumatic low back pain and a 4.5-month history of pain radiating into the right anterolateral thigh to the knee with tingling. Symptoms were increased with any walking standing or activity. Relief with resting. She currently reports 60 to 80% improvement since the injection with improved functionality. She still has episodic right low back pain typically with bending or transition. Other conservative care includes Tylenol, ice, heat, MDP, gabapentin 300mg BID PRN. She denies any progressive extremity weakness or recent bowel or bladder dysfunction. Denies any recent fever chills or infections. Denies any side effects of the procedure. Current/Past Treatment:   · Physical Therapy: Past  · Chiropractic:     · Injection:   With benefit  6/23/15: Arsen L5/S1 TX ARTEMIO  10/6/15: Rt L5 TX ARTEMIO     3/30/2021 right L5-S1 LESI, Dr. Maryellen Tellez to 80% improved  Medications:            NSAIDS: GI upset            Muscle relaxer:              Steriods:              Neuropathic medications: Gabapentin             Opioids:            Other:   · Surgery/Consult: no    Function-Does the pain medication improve your ability to do:   · Personal care: Yes  · Housework: Yes   · Physical activity: Yes  · Social activity: Yes  · Improve quality of family life: Yes    Pain Scale: 1-10  With Meds:   3/10  Pain Scale: 1-10 Without Meds: 10+    Potential aberrant drug-related behavior:  · Aberrant behavior identified? NO  · Potential aberrant behavior identified? NO  · Reports loss are stolen prescriptions?  NO  · Insist on certain medications by name? NO  · Purposeful oversedation? NO  · Increased dose without authorization? NO    Objective Goals: Physically and mentally function, carry on ADLs and achieve quality of life     Rationale for medication choice and dosage: To improve physical functionality, perform ADLS and achieve quality of life.     Pain f/u information:  · ORT=2    Past Medical History: Medical history form was reviewed today & scanned into the media tab  Past Medical History:   Diagnosis Date    Anemia     thallasemia minor    Arthritis     osteo-arthritis    Asthma     Back pain     Back pain     Bronchitis chronic     Diabetes mellitus (HCC)     GERD (gastroesophageal reflux disease)     Gout     Hypertension     Melanoma of scalp or neck (HCC)     Osteoarthritis     Osteoporosis     Squamous cell carcinoma, arm     Thal trait       Past Surgical History:     Past Surgical History:   Procedure Laterality Date    APPENDECTOMY      CARPAL TUNNEL RELEASE Left 02/05/2018    CATARACT REMOVAL WITH IMPLANT Right 07/18/2018    CATARACT REMOVAL WITH IMPLANT Left 08/15/2018    COLONOSCOPY  12/2014    KNEE ARTHROSCOPY Bilateral     PARATHYROIDECTOMY      PARTIAL HYSTERECTOMY      AR XCAPSL CTRC RMVL INSJ IO LENS PROSTH W/O ECP Right 7/18/2018    PHACOEMULSIFICATION OF CATARACT WITH INTRAOCULAR LENS IMPLANT RIGHT EYE performed by Cristian Vasquez MD at [address] [place] [address]  RMVL INSJ IO LENS PROSTH W/O ECP Left 8/15/2018    PHACOEMULSIFICATION OF CATARACT WITH INTRAOCULAR LENS IMPLANT LEFT EYE performed by Cristian Vasquez MD at [facility] ENDOSCOPY       Current Medications:     Current Outpatient Medications:     atorvastatin (LIPITOR) 10 MG tablet, TAKE ONE TABLET BY MOUTH ONCE NIGHTLY, Disp: 90 tablet, Rfl: 0    allopurinol (ZYLOPRIM) 300 MG tablet, TAKE ONE TABLET BY MOUTH DAILY, Disp: 90 tablet, Rfl: 0    alendronate (FOSAMAX) 70 MG tablet, TAKE 1 TABLET BY MOUTH ONCE WEEKLY ON AN EMPTY STOMACH BEFORE BREAKFAST. REMAIN UPRIGHT FOR 30 MINUTES & TAKE WITH 8 OUNCES OF WATER, Disp: 12 tablet, Rfl: 0    carvedilol (COREG) 6.25 MG tablet, TAKE ONE TABLET BY MOUTH TWICE A DAY, Disp: 180 tablet, Rfl: 0    glipiZIDE (GLUCOTROL) 10 MG tablet, TAKE ONE TABLET BY MOUTH TWICE A DAY BEFORE MEALS, Disp: 180 tablet, Rfl: 0    losartan (COZAAR) 100 MG tablet, TAKE ONE TABLET BY MOUTH DAILY, Disp: 90 tablet, Rfl: 0    metFORMIN (GLUCOPHAGE) 1000 MG tablet, TAKE ONE TABLET BY MOUTH TWICE A DAY WITH A MEAL, Disp: 180 tablet, Rfl: 0    PARoxetine (PAXIL) 10 MG tablet, TAKE ONE TABLET BY MOUTH DAILY, Disp: 90 tablet, Rfl: 0    verapamil (CALAN) 120 MG tablet, TAKE ONE TABLET BY MOUTH TWICE A DAY, Disp: 180 tablet, Rfl: 0    Umeclidinium Bromide (INCRUSE ELLIPTA) 62.5 MCG/INH AEPB, INHALE ONE PUFF BY MOUTH DAILY, Disp: 3 each, Rfl: 0    Fluticasone furoate-vilanterol (BREO ELLIPTA) 200-25 MCG/INH AEPB inhaler, INHALE ONE DOSE BY MOUTH DAILY, Disp: 3 each, Rfl: 0    omeprazole (PRILOSEC) 20 MG delayed release capsule, TAKE ONE CAPSULE BY MOUTH TWICE A DAY, Disp: 180 capsule, Rfl: 0    TURMERIC PO, Take by mouth, Disp: , Rfl:     gabapentin (NEURONTIN) 300 MG capsule, Take 1 capsule by mouth 2 times daily for 30 days. , Disp: 60 capsule, Rfl: 2    gabapentin (NEURONTIN) 300 MG capsule, Take 1 capsule by mouth 2 times daily as needed (prn) for up to 30 days. Intended supply: 30 days, Disp: 60 capsule, Rfl: 0    albuterol sulfate HFA (PROAIR HFA) 108 (90 Base) MCG/ACT inhaler, INHALE TWO PUFFS BY MOUTH EVERY 6 HOURS AS NEEDED FOR WHEEZING, Disp: 1 Inhaler, Rfl: 2    blood glucose test strips (ASCENSIA AUTODISC VI;ONE TOUCH ULTRA TEST VI) strip, Used to test once daily.  DX: E11.9, Disp: 100 each, Rfl: 3    acetaminophen (TYLENOL) 500 MG tablet, Take 500 mg by mouth every 6 hours as needed for Pain, Disp: , Rfl:     Coenzyme Q10 (CO Q 10 PO), Take by mouth, Disp: , Rfl:     cetirizine (ZYRTEC) 10 MG tablet, Take 10 mg by mouth daily, Disp: , Rfl:     Cyanocobalamin (VITAMIN B 12 PO), Take 1,000 mcg by mouth daily. , Disp: , Rfl:     Ascorbic Acid (VITAMIN C) 500 MG CAPS, Take  by mouth daily. , Disp: , Rfl:     vitamin E 400 UNIT capsule, Take 400 Units by mouth daily. , Disp: , Rfl:     Calcium Carbonate-Vitamin D (CALCIUM + D PO), Take  by mouth daily. , Disp: , Rfl:   Allergies:  Hctz [hydrochlorothiazide] and Lisinopril  Social History:    reports that she is a non-smoker but has been exposed to tobacco smoke. She has never used smokeless tobacco. She reports that she does not drink alcohol or use drugs. Family History:   Family History   Problem Relation Age of Onset    Heart Failure Mother     Emphysema Mother     Stroke Mother     Heart Failure Father     Hypertension Father     Heart Disease Father     High Blood Pressure Father     Cancer Maternal Aunt     Cancer Paternal Aunt     Cancer Maternal Grandmother     Asthma Neg Hx     Diabetes Neg Hx        REVIEW OF SYSTEMS: Full ROS noted & scanned   CONSTITUTIONAL: Denies unexplained weight loss, fevers, chills or fatigue  NEUROLOGICAL: Denies unsteady gait or progressive weakness      PHYSICAL EXAM:    Vitals: Height 5' 4\" (1.626 m), weight 235 lb (106.6 kg). Pain today 3/10    GENERAL EXAM:  · General Apparence: Patient is adequately groomed with no evidence of malnutrition. · Orientation: The patient is oriented to time, place and person. · Mood & Affect:The patient's mood and affect are appropriate   · Lymphatic: The lymphatic examination bilaterally reveals all areas to be without enlargement or induration  · Sensation: Sensation is intact without deficit    LUMBAR/SACRAL EXAMINATION:  · Inspection: Local inspection shows no step-off or bruising. Kyphosis   · Palpation:   No evidence of tenderness at the midline.   · Range of Motion: Able to sit forward flexed without pain  · Strength:   Strength testing is 5/5 in all muscle groups tested. Exception right quads 5-/5  · Special Tests:   Straight leg raise and crossed SLR negative. Leg length and pelvis level.  0 out of 5 Kirstie's signs. · Skin: There are no rashes, ulcerations or lesions. · Reflexes: Reflexes are symmetrically trace with reinforcement at the patellar and ankle tendons. Clonus absent bilaterally at the feet. · Gait & station: Forward flexed with cane  · Additional Examinations: Right hip: Intact range of motion without pain nontender to bursa  · RIGHT LOWER EXTREMITY: Inspection/examination of the right lower extremity does not show any tenderness, deformity or injury. Range of motion is full. There is no gross instability. There are no rashes, ulcerations or lesions. ·   · LEFT LOWER EXTREMITY:  Inspection/examination of the left lower extremity does not show any tenderness, deformity or injury. Range of motion is full. There is no gross instability. There are no rashes, ulcerations or lesions. Strength and tone are normal.    Diagnostic Testing:    Lumbar MRI scan report independently reviewed from February 2021 showing L3-4 and L4-5 spondylolisthesis with moderate to severe central stenosis, multilevel DDD and facet arthropathy with varying degrees of foraminal stenosis. ,  Right L2-3 facet arthrosis with bone marrow edema.  (Large right sided fluid signal possibly related to renal cyst--informed to discuss w/PCP.   Prior CT abdomen and pelvis showed stable renal cyst)    2 views lumbar spine 1/25/2021 severe bone-on-bone DDD L5-S1, L4-5 spondylolisthesis, likely degenerative endplate changes L4, mild superior endplate compression deformity L4, T12 wedging, multilevel facet arthropathy, right SI sclerosis    Lumbar MRI scan and report reviewed from 7/22/2015 showing progressed mod-severe central stenosis L3 4, & L4 5, severe stenosis L5-S1, multilevel DDD    Hemoglobin A1c October 20 27.1      Cervical CT 2019  1.  No acute abnormality.       2.  Facet arthropathy throughout the cervical spine. Impression:  1) Chronic LBP, 4.5 mo recurrent right sciatica  2) L3-4, L4-5 spondylolisthesis with moderate to severe central stenosis   3) DM, melanoma, osteoporosis  4) H/o ESIs w/benefit  5) ORT=2  6) Chronic episodic neck pain, cervical spondylosis       Plan:   1) PT for HEP  2) Discussed calling for repeat right L5-S1 LESI #2 if symptoms return or worsen.   3) Cont Gabapentin PRN  4) Her brace was adjusted today      [facility]

## 2021-05-03 RX ORDER — ALENDRONATE SODIUM 70 MG/1
TABLET ORAL
Qty: 12 TABLET | Refills: 0 | Status: SHIPPED | OUTPATIENT
Start: 2021-05-03 | End: 2021-06-14

## 2021-05-05 RX ORDER — CARVEDILOL 6.25 MG/1
TABLET ORAL
Qty: 180 TABLET | Refills: 0 | Status: SHIPPED | OUTPATIENT
Start: 2021-05-05 | End: 2021-06-16

## 2021-05-05 RX ORDER — LOSARTAN POTASSIUM 100 MG/1
TABLET ORAL
Qty: 90 TABLET | Refills: 0 | Status: SHIPPED | OUTPATIENT
Start: 2021-05-05 | End: 2021-06-16

## 2021-05-05 RX ORDER — PAROXETINE 10 MG/1
TABLET, FILM COATED ORAL
Qty: 90 TABLET | Refills: 0 | Status: SHIPPED | OUTPATIENT
Start: 2021-05-05 | End: 2021-06-16

## 2021-05-05 RX ORDER — VERAPAMIL HYDROCHLORIDE 120 MG/1
TABLET, FILM COATED ORAL
Qty: 180 TABLET | Refills: 0 | Status: SHIPPED | OUTPATIENT
Start: 2021-05-05 | End: 2021-05-17 | Stop reason: DRUGHIGH

## 2021-05-05 RX ORDER — GLIPIZIDE 10 MG/1
TABLET ORAL
Qty: 180 TABLET | Refills: 0 | Status: SHIPPED | OUTPATIENT
Start: 2021-05-05 | End: 2021-06-16

## 2021-05-05 RX ORDER — ATORVASTATIN CALCIUM 10 MG/1
TABLET, FILM COATED ORAL
Qty: 90 TABLET | Refills: 0 | Status: SHIPPED | OUTPATIENT
Start: 2021-05-05 | End: 2021-06-16

## 2021-05-05 RX ORDER — ALLOPURINOL 300 MG/1
TABLET ORAL
Qty: 90 TABLET | Refills: 0 | Status: SHIPPED | OUTPATIENT
Start: 2021-05-05 | End: 2021-06-16

## 2021-05-11 ENCOUNTER — OFFICE VISIT (OUTPATIENT)
Dept: ORTHOPEDIC SURGERY | Age: 76
End: 2021-05-11
Payer: MEDICARE

## 2021-05-11 VITALS — WEIGHT: 235 LBS | HEIGHT: 64 IN | BODY MASS INDEX: 40.12 KG/M2

## 2021-05-11 DIAGNOSIS — M17.0 PRIMARY OSTEOARTHRITIS OF BOTH KNEES: Primary | ICD-10-CM

## 2021-05-11 PROCEDURE — 20610 DRAIN/INJ JOINT/BURSA W/O US: CPT | Performed by: ORTHOPAEDIC SURGERY

## 2021-05-11 RX ORDER — METHYLPREDNISOLONE ACETATE 40 MG/ML
80 INJECTION, SUSPENSION INTRA-ARTICULAR; INTRALESIONAL; INTRAMUSCULAR; SOFT TISSUE ONCE
Status: COMPLETED | OUTPATIENT
Start: 2021-05-11 | End: 2021-05-11

## 2021-05-11 RX ORDER — BUPIVACAINE HYDROCHLORIDE 2.5 MG/ML
14 INJECTION, SOLUTION INFILTRATION; PERINEURAL ONCE
Status: COMPLETED | OUTPATIENT
Start: 2021-05-11 | End: 2021-05-11

## 2021-05-11 RX ORDER — HYALURONATE SODIUM 10 MG/ML
40 SYRINGE (ML) INTRAARTICULAR ONCE
Status: COMPLETED | OUTPATIENT
Start: 2021-05-11 | End: 2021-05-11

## 2021-05-11 RX ADMIN — METHYLPREDNISOLONE ACETATE 80 MG: 40 INJECTION, SUSPENSION INTRA-ARTICULAR; INTRALESIONAL; INTRAMUSCULAR; SOFT TISSUE at 10:25

## 2021-05-11 RX ADMIN — BUPIVACAINE HYDROCHLORIDE 35 MG: 2.5 INJECTION, SOLUTION INFILTRATION; PERINEURAL at 10:24

## 2021-05-11 RX ADMIN — Medication 40 MG: at 10:25

## 2021-05-11 NOTE — PROGRESS NOTES
She presents today requesting Euflexxa and cortisone injections to both knees for diagnosis of osteoarthritis      HISTORY OF PRESENT ILLNESS: Patient returns today for the first of a series of three Euflexxa injections done to the right knee that was performed under a sterile Betadine prep, using ethyl chloride as a topical refrigerant, for a diagnosis of osteoarthritis. The injection of 2 ml of Euflexxa was done from an anterolateral joint line approach using a 25-gauge needle. It was done without incident and the patient tolerated it well. PLAN: The patient should return next week to obtain their second out of a series of three injections of Euflexxa. Encounter Diagnosis   Name Primary?  Primary osteoarthritis of both knees Yes      Orders Placed This Encounter   Procedures    PA ARTHROCENTESIS ASPIR&/INJ MAJOR JT/BURSA W/O US      Recommendation is for a cortisone injection into the right knee. After informed consent was received from the patient, the right knee was injected with 1 mL(40mg)Depo-Medrol and 4 mL of 0.25% Marcaine  in the syringe from an anterolateral joint line approach, using a 25-gauge needle, under sterile Betadine prep, using ethyl chloride as a topical refrigerant, for a diagnosis of osteoarthritis. The patient appeared to tolerate it well. The patient should return here periodically as needed. Encounter Diagnosis   Name Primary?  Primary osteoarthritis of both knees Yes        Orders Placed This Encounter   Procedures    PA ARTHROCENTESIS ASPIR&/INJ MAJOR JT/BURSA W/O US         HISTORY OF PRESENT ILLNESS: Patient returns today for the first of a series of three Euflexxa injections done to the left knee that was performed under a sterile Betadine prep, using ethyl chloride as a topical refrigerant, for a diagnosis of osteoarthritis. The injection of 2 ml of Euflexxa was done from an anterolateral joint line approach using a 25-gauge needle.  It was done without incident and the patient tolerated it well. PLAN: The patient should return next week to obtain their second out of a series of three injections of Euflexxa. Encounter Diagnosis   Name Primary?  Primary osteoarthritis of both knees Yes        Orders Placed This Encounter   Procedures    NV ARTHROCENTESIS ASPIR&/INJ MAJOR JT/BURSA W/O US      Recommendation is for a cortisone injection into the left knee. After informed consent was received from the patient, the left knee was injected with 1 mL( 40mg) Depo-Medrol and 4 mL  of 0.25% Marcaine in the syringe from an anterolateral joint line approach, using a 25-gauge needle, under sterile Betadine prep, using ethyl chloride as a topical refrigerant, for a diagnosis of osteoarthritis. The patient appeared to tolerate it well. The patient should return here periodically as needed. Encounter Diagnosis   Name Primary?     Primary osteoarthritis of both knees Yes        Orders Placed This Encounter   Procedures    NV ARTHROCENTESIS ASPIR&/INJ MAJOR JT/BURSA W/O US

## 2021-05-13 ENCOUNTER — TELEPHONE (OUTPATIENT)
Dept: INTERNAL MEDICINE CLINIC | Age: 76
End: 2021-05-13

## 2021-05-13 RX ORDER — UMECLIDINIUM 62.5 UG/1
AEROSOL, POWDER ORAL
Qty: 3 EACH | Refills: 3 | Status: SHIPPED | OUTPATIENT
Start: 2021-05-13 | End: 2022-03-16 | Stop reason: SDUPTHER

## 2021-05-13 NOTE — TELEPHONE ENCOUNTER
----- Message from Sue Bagley sent at 5/13/2021  3:40 PM EDT -----  Patient called and would like a refill on 2 of her medications. Requesting refills on Breo Ellipta 200-25 mcg and requesting Incruse Ellipta 62.5 mcg. She is no longer using [facility]. She is using [facility] and their telephone number is [phone].  She said any questions feel free to call her at [phone]

## 2021-05-17 RX ORDER — VERAPAMIL HYDROCHLORIDE 240 MG/1
240 TABLET, FILM COATED, EXTENDED RELEASE ORAL DAILY
Qty: 90 TABLET | Refills: 0 | Status: SHIPPED | OUTPATIENT
Start: 2021-05-17 | End: 2021-08-03

## 2021-05-18 ENCOUNTER — OFFICE VISIT (OUTPATIENT)
Dept: ORTHOPEDIC SURGERY | Age: 76
End: 2021-05-18
Payer: MEDICARE

## 2021-05-18 DIAGNOSIS — M17.0 PRIMARY OSTEOARTHRITIS OF BOTH KNEES: Primary | ICD-10-CM

## 2021-05-18 PROCEDURE — 20610 DRAIN/INJ JOINT/BURSA W/O US: CPT | Performed by: ORTHOPAEDIC SURGERY

## 2021-05-18 RX ORDER — HYALURONATE SODIUM 10 MG/ML
40 SYRINGE (ML) INTRAARTICULAR ONCE
Status: COMPLETED | OUTPATIENT
Start: 2021-05-18 | End: 2021-05-18

## 2021-05-18 RX ADMIN — Medication 40 MG: at 15:15

## 2021-05-18 NOTE — PROGRESS NOTES
She presents today requesting Euflexxa and cortisone injections to both knees for diagnosis of osteoarthritis      HISTORY OF PRESENT ILLNESS: Patient returns today for the second of a series of three Euflexxa injections done to the right and left  knee that was performed under a sterile Betadine prep, using ethyl chloride as a topical refrigerant, for a diagnosis of osteoarthritis. The injection of 2 ml of Euflexxa was done from an anterolateral joint line approach using a 25-gauge needle. It was done without incident and the patient tolerated it well. PLAN: The patient should return next week to obtain their third out of a series of three injections of Euflexxa.

## 2021-05-25 ENCOUNTER — OFFICE VISIT (OUTPATIENT)
Dept: ORTHOPEDIC SURGERY | Age: 76
End: 2021-05-25
Payer: MEDICARE

## 2021-05-25 DIAGNOSIS — M17.0 PRIMARY OSTEOARTHRITIS OF BOTH KNEES: Primary | ICD-10-CM

## 2021-05-25 PROCEDURE — 20610 DRAIN/INJ JOINT/BURSA W/O US: CPT | Performed by: ORTHOPAEDIC SURGERY

## 2021-05-25 RX ORDER — HYALURONATE SODIUM 10 MG/ML
40 SYRINGE (ML) INTRAARTICULAR ONCE
Status: COMPLETED | OUTPATIENT
Start: 2021-05-25 | End: 2021-05-25

## 2021-05-25 RX ADMIN — Medication 40 MG: at 11:36

## 2021-05-25 NOTE — PROGRESS NOTES
HISTORY OF PRESENT ILLNESS: Patient returns today for their third out of a series of three Euflexxa injections done to the right and left knee that was performed under a sterile Betadine prep, using ethyl chloride as topical refrigerant, for a diagnosis of osteoarthritis. The injection of 2 ml of Euflexxa was done from an anterolateral joint line approach using a 25-gauge needle. It was done without incident and was tolerated well. PLAN: The patient should return in two months for followup if needed. Encounter Diagnosis   Name Primary?  Primary osteoarthritis of both knees Yes      No orders of the defined types were placed in this encounter.

## 2021-06-10 ENCOUNTER — OFFICE VISIT (OUTPATIENT)
Dept: INTERNAL MEDICINE CLINIC | Age: 76
End: 2021-06-10

## 2021-06-10 ENCOUNTER — HOSPITAL ENCOUNTER (OUTPATIENT)
Dept: MAMMOGRAPHY | Age: 76
Discharge: HOME | End: 2021-06-10
Payer: MEDICARE

## 2021-06-10 VITALS
SYSTOLIC BLOOD PRESSURE: 142 MMHG | HEART RATE: 64 BPM | HEIGHT: 64 IN | WEIGHT: 236 LBS | BODY MASS INDEX: 40.29 KG/M2 | DIASTOLIC BLOOD PRESSURE: 78 MMHG

## 2021-06-10 DIAGNOSIS — E11.9 TYPE 2 DIABETES MELLITUS WITHOUT COMPLICATION, WITHOUT LONG-TERM CURRENT USE OF INSULIN (HCC): Primary | ICD-10-CM

## 2021-06-10 DIAGNOSIS — M1A.09X0 CHRONIC GOUT OF MULTIPLE SITES, UNSPECIFIED CAUSE: ICD-10-CM

## 2021-06-10 DIAGNOSIS — Z12.31 SCREENING MAMMOGRAM, ENCOUNTER FOR: ICD-10-CM

## 2021-06-10 DIAGNOSIS — E11.9 TYPE 2 DIABETES MELLITUS WITHOUT COMPLICATION, WITHOUT LONG-TERM CURRENT USE OF INSULIN (HCC): ICD-10-CM

## 2021-06-10 DIAGNOSIS — F32.5 MAJOR DEPRESSIVE DISORDER WITH SINGLE EPISODE, IN FULL REMISSION (HCC): ICD-10-CM

## 2021-06-10 DIAGNOSIS — I10 ESSENTIAL HYPERTENSION: ICD-10-CM

## 2021-06-10 DIAGNOSIS — K21.9 GASTROESOPHAGEAL REFLUX DISEASE WITHOUT ESOPHAGITIS: ICD-10-CM

## 2021-06-10 DIAGNOSIS — E89.2 S/P PARATHYROIDECTOMY (HCC): ICD-10-CM

## 2021-06-10 DIAGNOSIS — C43.59 MALIGNANT MELANOMA OF TORSO EXCLUDING BREAST (HCC): ICD-10-CM

## 2021-06-10 DIAGNOSIS — E66.01 MORBID OBESITY (HCC): ICD-10-CM

## 2021-06-10 DIAGNOSIS — E78.5 HYPERLIPIDEMIA ASSOCIATED WITH TYPE 2 DIABETES MELLITUS (HCC): ICD-10-CM

## 2021-06-10 DIAGNOSIS — E11.69 HYPERLIPIDEMIA ASSOCIATED WITH TYPE 2 DIABETES MELLITUS (HCC): ICD-10-CM

## 2021-06-10 LAB
ANION GAP SERPL CALCULATED.3IONS-SCNC: 12 MMOL/L (ref 3–16)
BUN BLDV-MCNC: 17 MG/DL (ref 7–20)
CALCIUM SERPL-MCNC: 9.7 MG/DL (ref 8.3–10.6)
CHLORIDE BLD-SCNC: 104 MMOL/L (ref 99–110)
CO2: 23 MMOL/L (ref 21–32)
CREAT SERPL-MCNC: 0.7 MG/DL (ref 0.6–1.2)
GFR AFRICAN AMERICAN: >60
GFR NON-AFRICAN AMERICAN: >60
GLUCOSE BLD-MCNC: 127 MG/DL (ref 70–99)
POTASSIUM SERPL-SCNC: 4.7 MMOL/L (ref 3.5–5.1)
SODIUM BLD-SCNC: 139 MMOL/L (ref 136–145)

## 2021-06-10 PROCEDURE — 1090F PRES/ABSN URINE INCON ASSESS: CPT | Performed by: INTERNAL MEDICINE

## 2021-06-10 PROCEDURE — G8399 PT W/DXA RESULTS DOCUMENT: HCPCS | Performed by: INTERNAL MEDICINE

## 2021-06-10 PROCEDURE — G8427 DOCREV CUR MEDS BY ELIG CLIN: HCPCS | Performed by: INTERNAL MEDICINE

## 2021-06-10 PROCEDURE — 1123F ACP DISCUSS/DSCN MKR DOCD: CPT | Performed by: INTERNAL MEDICINE

## 2021-06-10 PROCEDURE — G8417 CALC BMI ABV UP PARAM F/U: HCPCS | Performed by: INTERNAL MEDICINE

## 2021-06-10 PROCEDURE — 77063 BREAST TOMOSYNTHESIS BI: CPT

## 2021-06-10 PROCEDURE — 99214 OFFICE O/P EST MOD 30 MIN: CPT | Performed by: INTERNAL MEDICINE

## 2021-06-10 PROCEDURE — 4040F PNEUMOC VAC/ADMIN/RCVD: CPT | Performed by: INTERNAL MEDICINE

## 2021-06-10 PROCEDURE — 1036F TOBACCO NON-USER: CPT | Performed by: INTERNAL MEDICINE

## 2021-06-10 RX ORDER — CLOTRIMAZOLE AND BETAMETHASONE DIPROPIONATE 10; .64 MG/G; MG/G
CREAM TOPICAL
Qty: 1 TUBE | Refills: 1 | Status: SHIPPED | OUTPATIENT
Start: 2021-06-10 | End: 2022-10-06

## 2021-06-10 ASSESSMENT — ENCOUNTER SYMPTOMS
SHORTNESS OF BREATH: 0
NAUSEA: 0
CHEST TIGHTNESS: 0
COUGH: 0
VOMITING: 0
WHEEZING: 0
ABDOMINAL PAIN: 0
BLOOD IN STOOL: 0
DIARRHEA: 0

## 2021-06-11 ENCOUNTER — TELEPHONE (OUTPATIENT)
Dept: MAMMOGRAPHY | Age: 76
End: 2021-06-11

## 2021-06-11 ENCOUNTER — TELEPHONE (OUTPATIENT)
Dept: INTERNAL MEDICINE CLINIC | Age: 76
End: 2021-06-11

## 2021-06-11 LAB
ESTIMATED AVERAGE GLUCOSE: 145.6 MG/DL
HBA1C MFR BLD: 6.7 %

## 2021-06-16 RX ORDER — LOSARTAN POTASSIUM 100 MG/1
TABLET ORAL
Qty: 90 TABLET | Refills: 0 | Status: SHIPPED | OUTPATIENT
Start: 2021-06-16 | End: 2021-09-07

## 2021-06-16 RX ORDER — ALLOPURINOL 300 MG/1
TABLET ORAL
Qty: 90 TABLET | Refills: 0 | Status: SHIPPED | OUTPATIENT
Start: 2021-06-16 | End: 2021-09-07

## 2021-06-16 RX ORDER — GLIPIZIDE 10 MG/1
TABLET ORAL
Qty: 180 TABLET | Refills: 0 | Status: SHIPPED | OUTPATIENT
Start: 2021-06-16 | End: 2021-09-07

## 2021-06-16 RX ORDER — CARVEDILOL 6.25 MG/1
TABLET ORAL
Qty: 180 TABLET | Refills: 0 | Status: SHIPPED | OUTPATIENT
Start: 2021-06-16 | End: 2021-09-07

## 2021-06-16 RX ORDER — PAROXETINE 10 MG/1
TABLET, FILM COATED ORAL
Qty: 90 TABLET | Refills: 0 | Status: SHIPPED | OUTPATIENT
Start: 2021-06-16 | End: 2021-09-07

## 2021-06-16 RX ORDER — ATORVASTATIN CALCIUM 10 MG/1
TABLET, FILM COATED ORAL
Qty: 90 TABLET | Refills: 0 | Status: SHIPPED | OUTPATIENT
Start: 2021-06-16 | End: 2021-09-07

## 2021-07-08 NOTE — PROGRESS NOTES
HISTORY OF PRESENT ILLNESS: Patient returns today for their second out of a series of three Euflexxa injections done to the left knee that was performed under a sterile Betadine prep, using ethyl chloride as a topical refrigerant, for a diagnosis of osteoarthritis. The injection of 2 ml of Euflexxa was done from an anterolateral joint line approach using a 25-gauge needle. It was done without incident and tolerated it well. PLAN: The patient should return next week to obtain their third out of a series of three injections of Euflexxa. HISTORY OF PRESENT ILLNESS: Patient returns today for their second out of a series of three Euflexxa injections done to the right knee that was performed under a sterile Betadine prep, using ethyl chloride as a topical refrigerant, for a diagnosis of osteoarthritis. The injection of 2 ml of Euflexxa was done from an anterolateral joint line approach using a 25-gauge needle. It was done without incident and tolerated it well. PLAN: The patient should return next week to obtain their third out of a series of three injections of Euflexxa. MD notes reviewed, met with patients brother, Dr. Moncada, he states that he is waiting for MRI results and to see if patient makes any progress, but he is considering possible hospice placement if no improvement , Dr. Moncada states he would likely use [facility] inpatient unit in [place].

## 2021-08-26 RX ORDER — OMEPRAZOLE 20 MG/1
CAPSULE, DELAYED RELEASE ORAL
Qty: 180 CAPSULE | Refills: 0 | Status: SHIPPED | OUTPATIENT
Start: 2021-08-26 | End: 2021-12-17

## 2021-09-07 RX ORDER — LOSARTAN POTASSIUM 100 MG/1
TABLET ORAL
Qty: 90 TABLET | Refills: 3 | Status: SHIPPED | OUTPATIENT
Start: 2021-09-07

## 2021-09-07 RX ORDER — GLIPIZIDE 10 MG/1
TABLET ORAL
Qty: 180 TABLET | Refills: 3 | Status: SHIPPED | OUTPATIENT
Start: 2021-09-07

## 2021-09-07 RX ORDER — CARVEDILOL 6.25 MG/1
TABLET ORAL
Qty: 180 TABLET | Refills: 3 | Status: ON HOLD | OUTPATIENT
Start: 2021-09-07 | End: 2022-06-30 | Stop reason: SDUPTHER

## 2021-09-07 RX ORDER — ALLOPURINOL 300 MG/1
TABLET ORAL
Qty: 90 TABLET | Refills: 3 | Status: SHIPPED | OUTPATIENT
Start: 2021-09-07

## 2021-09-07 RX ORDER — ATORVASTATIN CALCIUM 10 MG/1
TABLET, FILM COATED ORAL
Qty: 90 TABLET | Refills: 3 | Status: SHIPPED | OUTPATIENT
Start: 2021-09-07

## 2021-09-07 RX ORDER — PAROXETINE 10 MG/1
TABLET, FILM COATED ORAL
Qty: 90 TABLET | Refills: 3 | Status: SHIPPED | OUTPATIENT
Start: 2021-09-07

## 2021-09-17 ASSESSMENT — LIFESTYLE VARIABLES
AUDIT-C TOTAL SCORE: INCOMPLETE
AUDIT TOTAL SCORE: INCOMPLETE
HOW OFTEN DO YOU HAVE A DRINK CONTAINING ALCOHOL: NEVER
HOW OFTEN DO YOU HAVE A DRINK CONTAINING ALCOHOL: 0

## 2021-09-17 ASSESSMENT — PATIENT HEALTH QUESTIONNAIRE - PHQ9
1. LITTLE INTEREST OR PLEASURE IN DOING THINGS: 0
SUM OF ALL RESPONSES TO PHQ9 QUESTIONS 1 & 2: 0
SUM OF ALL RESPONSES TO PHQ QUESTIONS 1-9: 0
2. FEELING DOWN, DEPRESSED OR HOPELESS: 0

## 2021-09-23 ENCOUNTER — OFFICE VISIT (OUTPATIENT)
Dept: INTERNAL MEDICINE CLINIC | Age: 76
End: 2021-09-23

## 2021-09-23 VITALS
BODY MASS INDEX: 39.27 KG/M2 | DIASTOLIC BLOOD PRESSURE: 82 MMHG | HEIGHT: 64 IN | SYSTOLIC BLOOD PRESSURE: 160 MMHG | WEIGHT: 230 LBS | HEART RATE: 88 BPM

## 2021-09-23 DIAGNOSIS — E11.9 TYPE 2 DIABETES MELLITUS WITHOUT COMPLICATION, WITHOUT LONG-TERM CURRENT USE OF INSULIN (HCC): ICD-10-CM

## 2021-09-23 DIAGNOSIS — E78.5 HYPERLIPIDEMIA ASSOCIATED WITH TYPE 2 DIABETES MELLITUS (HCC): ICD-10-CM

## 2021-09-23 DIAGNOSIS — F32.5 MAJOR DEPRESSIVE DISORDER WITH SINGLE EPISODE, IN FULL REMISSION (HCC): ICD-10-CM

## 2021-09-23 DIAGNOSIS — D56.3 THALASSEMIA TRAIT: ICD-10-CM

## 2021-09-23 DIAGNOSIS — Z00.00 ROUTINE GENERAL MEDICAL EXAMINATION AT A HEALTH CARE FACILITY: Primary | ICD-10-CM

## 2021-09-23 DIAGNOSIS — E66.01 MORBID OBESITY (HCC): ICD-10-CM

## 2021-09-23 DIAGNOSIS — J45.40 MODERATE PERSISTENT ASTHMA WITHOUT COMPLICATION: ICD-10-CM

## 2021-09-23 DIAGNOSIS — M48.061 SPINAL STENOSIS OF LUMBAR REGION WITHOUT NEUROGENIC CLAUDICATION: ICD-10-CM

## 2021-09-23 DIAGNOSIS — I10 ESSENTIAL HYPERTENSION: ICD-10-CM

## 2021-09-23 DIAGNOSIS — M51.9 LUMBAR DISC DISEASE: ICD-10-CM

## 2021-09-23 DIAGNOSIS — E11.69 HYPERLIPIDEMIA ASSOCIATED WITH TYPE 2 DIABETES MELLITUS (HCC): ICD-10-CM

## 2021-09-23 DIAGNOSIS — E53.8 VITAMIN B 12 DEFICIENCY: ICD-10-CM

## 2021-09-23 DIAGNOSIS — Z23 NEED FOR INFLUENZA VACCINATION: ICD-10-CM

## 2021-09-23 LAB
BILIRUBIN, POC: NORMAL
BLOOD URINE, POC: NORMAL
CLARITY, POC: NORMAL
COLOR, POC: NORMAL
GLUCOSE URINE, POC: NORMAL
KETONES, POC: NORMAL
LEUKOCYTE EST, POC: NORMAL
NITRITE, POC: NORMAL
PH, POC: NORMAL
PROTEIN, POC: NORMAL
SPECIFIC GRAVITY, POC: NORMAL
UROBILINOGEN, POC: NORMAL

## 2021-09-23 PROCEDURE — 1123F ACP DISCUSS/DSCN MKR DOCD: CPT | Performed by: INTERNAL MEDICINE

## 2021-09-23 PROCEDURE — G0439 PPPS, SUBSEQ VISIT: HCPCS | Performed by: INTERNAL MEDICINE

## 2021-09-23 PROCEDURE — 4040F PNEUMOC VAC/ADMIN/RCVD: CPT | Performed by: INTERNAL MEDICINE

## 2021-09-23 PROCEDURE — 81002 URINALYSIS NONAUTO W/O SCOPE: CPT | Performed by: INTERNAL MEDICINE

## 2021-09-23 PROCEDURE — 90662 IIV NO PRSV INCREASED AG IM: CPT | Performed by: INTERNAL MEDICINE

## 2021-09-23 PROCEDURE — G0008 ADMIN INFLUENZA VIRUS VAC: HCPCS | Performed by: INTERNAL MEDICINE

## 2021-09-23 NOTE — PROGRESS NOTES
Medicare Annual Wellness Visit  Name: Mumtaz Cardenas Date: 2021   MRN: [id number] Sex: Female   Age: 67 y.o. Ethnicity: Non- / Non    : 1945 Race: White (non-)      Ken Wood is here for Plays.IO for behavioral, psychosocial and functional/safety risks, and cognitive dysfunction are all negative except as indicated below. These results, as well as other patient data from the [address] form, are documented in Flowsheets linked to this Encounter. Allergies   Allergen Reactions    Hctz [Hydrochlorothiazide]      Increases calcium to unsafe level resulting in parathyroid surgery    Lisinopril Other (See Comments)     cough       Prior to Visit Medications    Medication Sig Taking? Authorizing Provider   metFORMIN (GLUCOPHAGE) 1000 MG tablet TAKE 1 TABLET BY MOUTH  TWICE DAILY WITH MEALS  Regina Quijano MD   PARoxetine (PAXIL) 10 MG tablet TAKE 1 TABLET BY MOUTH  DAILY  Regina Quijano MD   atorvastatin (LIPITOR) 10 MG tablet TAKE 1 TABLET BY MOUTH AT  NIGHT   MD Allan   allopurinol (ZYLOPRIM) 300 MG tablet TAKE 1 TABLET BY MOUTH  DAILY  Regina Quijano MD   losartan (COZAAR) 100 MG tablet TAKE 1 TABLET BY MOUTH  DAILY  Regina Quijano MD   glipiZIDE (GLUCOTROL) 10 MG tablet TAKE 1 TABLET BY MOUTH  TWICE DAILY BEFORE MEALS  Regina Quijano MD   carvedilol (COREG) 6.25 MG tablet TAKE 1 TABLET BY MOUTH  TWICE DAILY   MD Allan   omeprazole (PRILOSEC) 20 MG delayed release capsule TAKE ONE CAPSULE BY MOUTH TWICE A DAY   MD Allan   verapamil (CALAN SR) 240 MG extended release tablet TAKE 1 TABLET BY MOUTH  DAILY  Regina Quijano MD   alendronate (FOSAMAX) 70 MG tablet TAKE 1 TABLET BY MOUTH ONCE WEEKLY ON AN EMPTY STOMACH  BEFORE BREAKFAST.  REMAIN  UPRIGHT FOR 30 MINUTES AND  TAKE WITH 8 OUNCES OF WATER Donald Lemus MD   clotrimazole-betamethasone (LOTRISONE) 1-0.05 % cream Apply topically 2 times daily. Donaldsharona Lemus MD   Fluticasone furoate-vilanterol (BREO ELLIPTA) 200-25 MCG/INH AEPB inhaler INHALE ONE DOSE BY MOUTH DAILY  Donaldsharona Lemus MD   Umeclidinium Bromide (INCRUSE ELLIPTA) 62.5 MCG/INH AEPB INHALE ONE PUFF BY MOUTH DAILY  Sunday MD Allan   TURMERIC PO Take by mouth  Historical Provider, MD   albuterol sulfate HFA (PROAIR HFA) 108 (90 Base) MCG/ACT inhaler INHALE TWO PUFFS BY MOUTH EVERY 6 HOURS AS NEEDED FOR WHEEZING  Regina Quijano MD   blood glucose test strips (ASCENSIA AUTODISC VI;ONE TOUCH ULTRA TEST VI) strip Used to test once daily. DX: E11.9  Donaldsharona Lemus MD   acetaminophen (TYLENOL) 500 MG tablet Take 500 mg by mouth every 6 hours as needed for Pain  Historical Provider, MD   Coenzyme Q10 (CO Q 10 PO) Take by mouth  Historical Provider, MD   cetirizine (ZYRTEC) 10 MG tablet Take 10 mg by mouth daily  Historical Provider, MD   Cyanocobalamin (VITAMIN B 12 PO) Take 1,000 mcg by mouth daily. Historical Provider, MD   Ascorbic Acid (VITAMIN C) 500 MG CAPS Take  by mouth daily. Historical Provider, MD   vitamin E 400 UNIT capsule Take 400 Units by mouth daily. Historical Provider, MD   Calcium Carbonate-Vitamin D (CALCIUM + D PO) Take  by mouth daily.   Historical Provider, MD       Past Medical History:   Diagnosis Date    Anemia     thallasemia minor    Arthritis     osteo-arthritis    Asthma     Back pain     Back pain     Bronchitis chronic     Diabetes mellitus ([address]     GERD (gastroesophageal reflux disease)     Gout     Hypertension     Melanoma of scalp or neck (HCC)     Osteoarthritis     Osteoporosis     Squamous cell carcinoma, arm     Thal trait        Past Surgical History:   Procedure Laterality Date    APPENDECTOMY      CARPAL TUNNEL RELEASE Left 02/05/2018    CATARACT REMOVAL WITH IMPLANT Right 07/18/2018    CATARACT REMOVAL WITH IMPLANT Left 08/15/2018    COLONOSCOPY  12/2014    HYSTERECTOMY      KNEE ARTHROSCOPY Bilateral     PARATHYROIDECTOMY      PARTIAL HYSTERECTOMY      SC XCAPSL CTRC RMVL INSJ IO LENS PROSTH W/O ECP Right 7/18/2018    PHACOEMULSIFICATION OF CATARACT WITH INTRAOCULAR LENS IMPLANT RIGHT EYE performed by Caryn Taveras MD [address] RMVL INSJ IO LENS PROSTH W/O ECP Left 8/15/2018    PHACOEMULSIFICATION OF CATARACT WITH INTRAOCULAR LENS IMPLANT LEFT EYE performed by Caryn Taveras MD at [address] ENDOSCOPY         Family History   Problem Relation Age of Onset    Heart Failure Mother     Emphysema Mother     Stroke Mother     Heart Failure Father     Hypertension Father     Heart Disease Father     High Blood Pressure Father     Cancer Maternal Aunt     Cancer Paternal Aunt     Cancer Maternal Grandmother     Asthma Neg Hx     Diabetes Neg Hx        CareTeam (Including outside providers/suppliers regularly involved in providing care):   Patient Care Team:  Emili Peacock MD as PCP - Sylvester Morales MD as PCP - [facility] Empaneled Provider  Adriel De Oliveira MD (Orthopedic Surgery)  Kierra Rizzo MD as Consulting Physician (Physical Medicine and Rehab)  Jade Velez MD as Surgeon (Orthopedic Surgery)    Wt Readings from Last 3 Encounters:   09/23/21 230 lb (104.3 kg)   06/10/21 236 lb (107 kg)   05/11/21 235 lb (106.6 kg)     Vitals:    09/23/21 1256   Weight: 230 lb (104.3 kg)   Height: 5' 4\" (1.626 m)     Body mass index is 39.48 kg/m². Based upon direct observation of the patient, evaluation of cognition reveals recent and remote memory intact.     General Appearance: alert and oriented to person, place and time, well developed and well- nourished, in no acute distress  Skin: warm and dry, no rash or erythema  Head: normocephalic and atraumatic  Eyes: pupils equal, round, and reactive to light, extraocular eye movements intact, conjunctivae normal  ENT: tympanic membrane, external ear and ear canal normal bilaterally, nose without deformity  Neck: supple and non-tender without mass, no thyromegaly or thyroid nodules, no cervical lymphadenopathy  Pulmonary/Chest: clear to auscultation bilaterally- no wheezes, rales or rhonchi, normal air movement, no respiratory distress  Cardiovascular: normal rate, regular rhythm, normal S1 and S2, no murmurs, rubs, clicks, or gallops, distal pulses intact, no carotid bruits  Abdomen: soft, non-tender, non-distended, normal bowel sounds, no masses or organomegaly  Extremities: no cyanosis, clubbing or edema      Patient's complete Health Risk Assessment and screening values have been reviewed and are found in Flowsheets. The following problems were reviewed today and where indicated follow up appointments were made and/or referrals ordered. Positive Risk Factor Screenings with Interventions:          General Health and ACP:  General  In general, how would you say your health is?: Fair  In the past 7 days, have you experienced any of the following?  New or Increased Pain, New or Increased Fatigue, Loneliness, Social Isolation, Stress or Anger?: (!) New or Increased Pain  Do you get the social and emotional support that you need?: Yes  Do you have a Living Will?: (!) No  Advance Directives     Power of  Living Will ACP-Advance Directive ACP-Power of     Not on File Not on File Not on File Filed      General Health Risk Interventions:  · Pain issues: joint pains- seeing ortho soon    Health Habits/Nutrition:  Health Habits/Nutrition  Do you exercise for at least 20 minutes 2-3 times per week?: (!) No  Have you lost any weight without trying in the past 3 months?: No  Do you eat only one meal per day?: No  Have you seen the dentist within the past year?: (!) No  Body mass index: (!) 39.48  Health Habits/Nutrition Interventions:  · Inadequate physical activity:  patient is not ready to increase his/her physical activity level at this time     Safety:  Safety  Do you have working smoke detectors?: Yes  Have all throw rugs been removed or fastened?: (!) No  Do you have non-slip mats or surfaces in all bathtubs/showers?: (!) No  Do all of your stairways have a railing or banister?: Yes  Are your doorways, halls and stairs free of clutter?: Yes  Do you always fasten your seatbelt when you are in a car?: Yes  Safety Interventions:  · Home safety tips provided     Personalized Preventive Plan   Current Health Maintenance Status  Immunization History   Administered Date(s) Administered    COVID-19, Moderna, PF, 100mcg/0.5mL 02/12/2021, 03/12/2021    Influenza A (M1Y4-32) Vaccine IM 10/29/2010    Influenza Vaccine, unspecified formulation 10/15/2016    Influenza Virus Vaccine 10/20/2011    Influenza, High Dose (Fluzone 65 yrs and older) 10/19/2017, 11/01/2018, 09/23/2019    Influenza, High-dose, Quadv, 65 yrs +, IM (Fluzone) 10/21/2020, 09/23/2021    Pneumococcal Conjugate 13-valent ([id number]) 10/15/2015    Pneumococcal Polysaccharide (Wguzescsv22) 09/28/2012    Tdap (Boostrix, Adacel) 11/11/2015    Tetanus 07/14/2016        Health Maintenance   Topic Date Due    Annual Wellness Visit (AWV)  Never done    Shingles Vaccine (1 of 2) 10/21/2021 (Originally 7/15/1994)    Lipid screen  10/21/2021    Potassium monitoring  06/10/2022    Creatinine monitoring  06/10/2022    DTaP/Tdap/Td vaccine (3 - Td or Tdap) 07/14/2026    DEXA (modify frequency per FRAX score)  Completed    Flu vaccine  Completed    Pneumococcal 65+ years Vaccine  Completed    COVID-19 Vaccine  Completed    Hepatitis A vaccine  Aged Out    Hib vaccine  Aged Out    Meningococcal (ACWY) vaccine  Aged Out    Hepatitis C screen  Discontinued     Recommendations for Preventive Services Due: see orders and patient instructions/AVS.  . Recommended screening schedule for the next 5-10 years is provided to the patient in written form: see Patient Instructions/AVS.    Tamica Marsh was seen today for medicare awv. Diagnoses and all orders for this visit:    Routine general medical examination at a health care facility  -     TSH with Reflex; Future    Need for influenza vaccination  -     INFLUENZA, HIGH-DOSE, QUADV, 72 YRS +, IM, PF, 0.7ML (FLUZONE)    Type 2 diabetes mellitus without complication, without long-term current use of insulin (HCC)  -     Hemoglobin A1C; Future    Hyperlipidemia associated with type 2 diabetes mellitus (HCC)    Essential hypertension  -     POCT urinalysis dipstick  -     CBC Auto Differential; Future  -     Comprehensive Metabolic Panel; Future    Morbid obesity ([facility] [address]    Spinal stenosis of lumbar region without neurogenic claudication    Thalassemia trait    Vitamin B 12 deficiency    Moderate persistent asthma without complication    Lumbar disc disease    Major depressive disorder with single episode, in full remission ([facility] [address]             DM- Stable. continue metformin and  glipizide   Strict diet.      HTN. Blood pressure is elevated today   Continue calan,coreg,losartan for now   She says she did not take her BP meds today  Cannot take HCTZ 2 to high Ca. Aldactone stopped for high K   Walking. Weight loss.     Asthma  Stable on Breo,incruse and proventil     HLD. continue lipitor. Lipids are good     Diarrhea  Advised probiotics       Gout- continue allopurinol  No recent flareups.      Depression. Stable. Continue  paxil 10 mg daily       GERD. Stable on PPI. Now takes once daily- slowly going to come off      Lumbar Spinal stenosis  nav knee OA  Going to see ortho      Anemia.   2 to  thal trait     Osteoporosis  Continue fosamax,calcium and vit D     Advance Care Planning   Advanced Care Planning: Discussed the patients choices for care and treatment in case of a health event that adversely affects decision-making abilities. Also discussed the patients long-term treatment options. Reviewed with the patient the [facility] [place] [facility] Declaration forms  Reviewed the process of designating a competent adult as an Agent (or -in-fact) that could take make health care decisions for the patient if incompetent. Patient was asked to complete the declaration forms, either acknowledge the forms by a public notary or an eligible witness and provide a signed copy to the practice office.   Time spent (minutes): 2

## 2021-10-04 ENCOUNTER — TELEPHONE (OUTPATIENT)
Dept: ORTHOPEDIC SURGERY | Age: 76
End: 2021-10-04

## 2021-10-04 NOTE — TELEPHONE ENCOUNTER
General Question     Subject: WOULD LIKE TO SCHEDULE EUFLEXXA.  PLEASE CALL BACK AFTER 3 TO VERIFY APPROVAL  Patient Glenn Lawson  Contact Number: [phone]

## 2021-11-12 ENCOUNTER — TELEPHONE (OUTPATIENT)
Dept: INTERNAL MEDICINE CLINIC | Age: 76
End: 2021-11-12

## 2021-11-12 RX ORDER — ALBUTEROL SULFATE 90 UG/1
AEROSOL, METERED RESPIRATORY (INHALATION)
Qty: 1 EACH | Refills: 2 | Status: SHIPPED | OUTPATIENT
Start: 2021-11-12

## 2021-11-12 NOTE — TELEPHONE ENCOUNTER
----- Message from Abdirahman Shell MD sent at 11/12/2021  2:17 PM EST -----  Contact: Ladarius Chatman [phone]  [id number] [address]   ----- Message -----  From: Lisa Jesus  Sent: 11/12/2021   1:27 PM EST  To: Abdirahman Shell MD    Patient needs new scripts for:    blood glucose test strips (ASCENSIA AUTODISC VI;ONE TOUCH ULTRA TEST VI) strip     albuterol sulfate HFA (PROAIR HFA) 108 (90 Base) MCG/ACT inhaler    Luke North

## 2021-11-30 ENCOUNTER — OFFICE VISIT (OUTPATIENT)
Dept: ORTHOPEDIC SURGERY | Age: 76
End: 2021-11-30
Payer: MEDICARE

## 2021-11-30 VITALS — WEIGHT: 230 LBS | BODY MASS INDEX: 39.27 KG/M2 | HEIGHT: 64 IN

## 2021-11-30 DIAGNOSIS — M17.0 PRIMARY OSTEOARTHRITIS OF BOTH KNEES: Primary | ICD-10-CM

## 2021-11-30 PROCEDURE — 20610 DRAIN/INJ JOINT/BURSA W/O US: CPT | Performed by: ORTHOPAEDIC SURGERY

## 2021-11-30 RX ORDER — BUPIVACAINE HYDROCHLORIDE 2.5 MG/ML
14 INJECTION, SOLUTION INFILTRATION; PERINEURAL ONCE
Status: COMPLETED | OUTPATIENT
Start: 2021-11-30 | End: 2021-11-30

## 2021-11-30 RX ORDER — METHYLPREDNISOLONE ACETATE 40 MG/ML
80 INJECTION, SUSPENSION INTRA-ARTICULAR; INTRALESIONAL; INTRAMUSCULAR; SOFT TISSUE ONCE
Status: COMPLETED | OUTPATIENT
Start: 2021-11-30 | End: 2021-11-30

## 2021-11-30 RX ADMIN — METHYLPREDNISOLONE ACETATE 80 MG: 40 INJECTION, SUSPENSION INTRA-ARTICULAR; INTRALESIONAL; INTRAMUSCULAR; SOFT TISSUE at 14:10

## 2021-11-30 RX ADMIN — BUPIVACAINE HYDROCHLORIDE 35 MG: 2.5 INJECTION, SOLUTION INFILTRATION; PERINEURAL at 14:10

## 2021-11-30 NOTE — PROGRESS NOTES
She presents today requesting viscosupplementation and cortisone into both knees for diagnosis of osteoarthritis    Recommendation is for viscosupplementation using Durolane. The left and Right knee were injected with 3 ml of Durolane from an anterolateral joint line approach using a 22-gauge needle under sterile Betadine prep, using ethyl chloride as a topical refrigerant, for a diagnosis of osteoarthritis. The patient appeared to tolerate it well. The patient should return here in 2 months. Recommendation is for a cortisone injection into the right knee. After informed consent was received from the patient, the right knee was injected with 1 mL(40mg)Depo-Medrol and 4 mL of 0.25% Marcaine  in the syringe from an anterolateral joint line approach, using a 25-gauge needle, under sterile Betadine prep, using ethyl chloride as a topical refrigerant, for a diagnosis of osteoarthritis. The patient appeared to tolerate it well. The patient should return here periodically as needed. Encounter Diagnosis   Name Primary?  Primary osteoarthritis of both knees Yes        No orders of the defined types were placed in this encounter. Recommendation is for a cortisone injection into the left knee. After informed consent was received from the patient, the left knee was injected with 1 mL( 40mg) Depo-Medrol and 4 mL  of 0.25% Marcaine in the syringe from an anterolateral joint line approach, using a 25-gauge needle, under sterile Betadine prep, using ethyl chloride as a topical refrigerant, for a diagnosis of osteoarthritis. The patient appeared to tolerate it well. The patient should return here periodically as needed. Encounter Diagnosis   Name Primary?  Primary osteoarthritis of both knees Yes        No orders of the defined types were placed in this encounter.

## 2021-12-17 RX ORDER — OMEPRAZOLE 20 MG/1
CAPSULE, DELAYED RELEASE ORAL
Qty: 180 CAPSULE | Refills: 0 | Status: SHIPPED | OUTPATIENT
Start: 2021-12-17 | End: 2022-06-16

## 2022-03-16 ENCOUNTER — HOSPITAL ENCOUNTER (OUTPATIENT)
Age: 77
Discharge: HOME | End: 2022-03-16
Payer: MEDICARE

## 2022-03-16 ENCOUNTER — OFFICE VISIT (OUTPATIENT)
Dept: INTERNAL MEDICINE CLINIC | Age: 77
End: 2022-03-16

## 2022-03-16 VITALS
HEART RATE: 72 BPM | HEIGHT: 64 IN | BODY MASS INDEX: 39.27 KG/M2 | SYSTOLIC BLOOD PRESSURE: 156 MMHG | DIASTOLIC BLOOD PRESSURE: 82 MMHG | WEIGHT: 230 LBS

## 2022-03-16 DIAGNOSIS — E11.69 HYPERLIPIDEMIA ASSOCIATED WITH TYPE 2 DIABETES MELLITUS (HCC): ICD-10-CM

## 2022-03-16 DIAGNOSIS — E11.9 TYPE 2 DIABETES MELLITUS WITHOUT COMPLICATION, WITHOUT LONG-TERM CURRENT USE OF INSULIN (HCC): ICD-10-CM

## 2022-03-16 DIAGNOSIS — E89.2 S/P PARATHYROIDECTOMY (HCC): ICD-10-CM

## 2022-03-16 DIAGNOSIS — I10 ESSENTIAL HYPERTENSION: ICD-10-CM

## 2022-03-16 DIAGNOSIS — F32.5 MAJOR DEPRESSIVE DISORDER WITH SINGLE EPISODE, IN FULL REMISSION (HCC): ICD-10-CM

## 2022-03-16 DIAGNOSIS — M1A.09X0 CHRONIC GOUT OF MULTIPLE SITES, UNSPECIFIED CAUSE: ICD-10-CM

## 2022-03-16 DIAGNOSIS — E78.5 HYPERLIPIDEMIA ASSOCIATED WITH TYPE 2 DIABETES MELLITUS (HCC): ICD-10-CM

## 2022-03-16 DIAGNOSIS — K21.9 GASTROESOPHAGEAL REFLUX DISEASE WITHOUT ESOPHAGITIS: ICD-10-CM

## 2022-03-16 DIAGNOSIS — C43.59 MALIGNANT MELANOMA OF TORSO EXCLUDING BREAST (HCC): ICD-10-CM

## 2022-03-16 DIAGNOSIS — E66.01 SEVERE OBESITY (BMI 35.0-39.9) WITH COMORBIDITY (HCC): ICD-10-CM

## 2022-03-16 DIAGNOSIS — E11.9 TYPE 2 DIABETES MELLITUS WITHOUT COMPLICATION, WITHOUT LONG-TERM CURRENT USE OF INSULIN (HCC): Primary | ICD-10-CM

## 2022-03-16 LAB
ANION GAP SERPL CALCULATED.3IONS-SCNC: 16 MMOL/L (ref 3–16)
BUN BLDV-MCNC: 17 MG/DL (ref 7–20)
CALCIUM SERPL-MCNC: 9.9 MG/DL (ref 8.3–10.6)
CHLORIDE BLD-SCNC: 104 MMOL/L (ref 99–110)
CO2: 21 MMOL/L (ref 21–32)
CREAT SERPL-MCNC: 0.7 MG/DL (ref 0.6–1.2)
GFR AFRICAN AMERICAN: >60
GFR NON-AFRICAN AMERICAN: >60
GLUCOSE BLD-MCNC: 123 MG/DL (ref 70–99)
POTASSIUM SERPL-SCNC: 4.5 MMOL/L (ref 3.5–5.1)
SODIUM BLD-SCNC: 141 MMOL/L (ref 136–145)

## 2022-03-16 PROCEDURE — G8399 PT W/DXA RESULTS DOCUMENT: HCPCS | Performed by: INTERNAL MEDICINE

## 2022-03-16 PROCEDURE — 1123F ACP DISCUSS/DSCN MKR DOCD: CPT | Performed by: INTERNAL MEDICINE

## 2022-03-16 PROCEDURE — 1090F PRES/ABSN URINE INCON ASSESS: CPT | Performed by: INTERNAL MEDICINE

## 2022-03-16 PROCEDURE — 99214 OFFICE O/P EST MOD 30 MIN: CPT | Performed by: INTERNAL MEDICINE

## 2022-03-16 PROCEDURE — G8427 DOCREV CUR MEDS BY ELIG CLIN: HCPCS | Performed by: INTERNAL MEDICINE

## 2022-03-16 PROCEDURE — G8484 FLU IMMUNIZE NO ADMIN: HCPCS | Performed by: INTERNAL MEDICINE

## 2022-03-16 PROCEDURE — 80048 BASIC METABOLIC PNL TOTAL CA: CPT

## 2022-03-16 PROCEDURE — 83036 HEMOGLOBIN GLYCOSYLATED A1C: CPT

## 2022-03-16 PROCEDURE — 1036F TOBACCO NON-USER: CPT | Performed by: INTERNAL MEDICINE

## 2022-03-16 PROCEDURE — 4040F PNEUMOC VAC/ADMIN/RCVD: CPT | Performed by: INTERNAL MEDICINE

## 2022-03-16 PROCEDURE — 36415 COLL VENOUS BLD VENIPUNCTURE: CPT

## 2022-03-16 PROCEDURE — G8417 CALC BMI ABV UP PARAM F/U: HCPCS | Performed by: INTERNAL MEDICINE

## 2022-03-16 RX ORDER — UMECLIDINIUM 62.5 UG/1
AEROSOL, POWDER ORAL
Qty: 1 EACH | Refills: 5 | Status: SHIPPED | OUTPATIENT
Start: 2022-03-16

## 2022-03-16 RX ORDER — GABAPENTIN 300 MG/1
300 CAPSULE ORAL 2 TIMES DAILY
Qty: 60 CAPSULE | Refills: 2 | Status: SHIPPED | OUTPATIENT
Start: 2022-03-16 | End: 2022-09-12

## 2022-03-16 RX ORDER — TERAZOSIN 2 MG/1
2 CAPSULE ORAL NIGHTLY
Qty: 30 CAPSULE | Refills: 2 | Status: SHIPPED | OUTPATIENT
Start: 2022-03-16 | End: 2022-06-16

## 2022-03-16 ASSESSMENT — ENCOUNTER SYMPTOMS
VOMITING: 0
BLOOD IN STOOL: 0
COUGH: 0
WHEEZING: 0
CHEST TIGHTNESS: 0
SHORTNESS OF BREATH: 0
NAUSEA: 0
ABDOMINAL PAIN: 0
DIARRHEA: 0

## 2022-03-16 NOTE — PROGRESS NOTES
Kelli Thakur (:  1945) is a 67 y.o. female,Established patient, here for evaluation of the following chief complaint(s):  No chief complaint on file. ASSESSMENT/PLAN:      Diagnosis Orders   1. Type 2 diabetes mellitus without complication, without long-term current use of insulin ([facility] [address]     2. Major depressive disorder with single episode, in full remission ([facility] [address]     3. Hyperlipidemia associated with type 2 diabetes mellitus ([facility] [address]     4. S/P parathyroidectomy ([facility]     5. Malignant melanoma of torso excluding breast ([facility] [address]     6. Essential hypertension     7. Gastroesophageal reflux disease without esophagitis     8. Chronic gout of multiple sites, unspecified cause     9. Severe obesity (BMI 35.0-39. 9) with comorbidity ([facility]           DM- Stable. continue metformin and  glipizide   Strict diet. Check A 1 c       HTN. Blood pressure is elevated today   Continue calan,coreg,losartan for now   Cannot take HCTZ 2 to high Ca. Aldactone stopped for high K   Walking. Weight loss. Add terazosin 2 mg hs - warned about first dose hypotension and light headedness      Asthma  Stable on Breo,incruse and proventil     HLD. continue lipitor. Lipids are good      Diarrhea  Advised probiotics       Gout- continue allopurinol  No recent flareups.      Depression. Stable.   Continue paxil 10 mg daily       GERD. Stable on PPI.     Lumbar Spinal stenosis  nav knee OA  Has seen  Ortho  Also sees pain physician  gabapentin      Anemia. 2 to  thal trait     Osteoporosis  Continue fosamax,calcium and vit D    Subjective   SUBJECTIVE/OBJECTIVE:  HPI  She has Type 2 DM,HTN,GERD,spinal stenosis,asthma,gout and depression. Blood sugars are under good control. Has had DM since . Patient's BP is controlled on current medications. Depression is stable on paxil. Asthma- mod persistent- on Breo,incruse  and proventil.stable. She has osteoarthritis and severe lumbar spinal stenosis. seeing pain physician. Now on gabapentin  Has osteoposis- on fosamax. Seeing ortho for knee arthritis- gets cortisone shots     Review of Systems   Constitutional: Negative for fatigue, fever and unexpected weight change. Respiratory: Negative for cough, chest tightness, shortness of breath and wheezing. Cardiovascular: Negative for chest pain, palpitations and leg swelling. Gastrointestinal: Negative for abdominal pain, blood in stool, diarrhea, nausea and vomiting. Genitourinary: Negative for dysuria and hematuria. Neurological: Negative for light-headedness. Objective   Physical Exam  Constitutional:       Appearance: She is well-developed. HENT:      Head: Normocephalic and atraumatic. Eyes:      Pupils: Pupils are equal, round, and reactive to light. Neck:      Thyroid: No thyromegaly. Cardiovascular:      Rate and Rhythm: Normal rate and regular rhythm. Heart sounds: Normal heart sounds. No murmur heard. No friction rub. No gallop. Comments: No carotid bruit  Pulmonary:      Effort: Pulmonary effort is normal. No respiratory distress. Breath sounds: Normal breath sounds. No wheezing or rales. Chest:      Chest wall: No tenderness. Abdominal:      General: Bowel sounds are normal. There is no distension. Palpations: Abdomen is soft. There is no mass. Tenderness: There is no abdominal tenderness. There is no guarding or rebound. Musculoskeletal:      Cervical back: Normal range of motion and neck supple. Neurological:      Mental Status: She is alert and oriented to person, place, and time. An electronic signature was used to authenticate this note.     --Дмитрий Shea MD

## 2022-03-17 LAB
ESTIMATED AVERAGE GLUCOSE: 157.1 MG/DL
HBA1C MFR BLD: 7.1 %

## 2022-03-23 RX ORDER — ALENDRONATE SODIUM 70 MG/1
TABLET ORAL
Qty: 12 TABLET | Refills: 0 | Status: SHIPPED | OUTPATIENT
Start: 2022-03-23 | End: 2022-06-06

## 2022-05-20 RX ORDER — VERAPAMIL HYDROCHLORIDE 240 MG/1
240 TABLET, FILM COATED, EXTENDED RELEASE ORAL DAILY
Qty: 90 TABLET | Refills: 0 | Status: ON HOLD | OUTPATIENT
Start: 2022-05-20 | End: 2022-06-30 | Stop reason: HOSPADM

## 2022-06-06 RX ORDER — ALENDRONATE SODIUM 70 MG/1
TABLET ORAL
Qty: 12 TABLET | Refills: 0 | Status: SHIPPED | OUTPATIENT
Start: 2022-06-06

## 2022-06-16 RX ORDER — TERAZOSIN 2 MG/1
CAPSULE ORAL
Qty: 30 CAPSULE | Refills: 2 | Status: SHIPPED | OUTPATIENT
Start: 2022-06-16

## 2022-06-16 RX ORDER — OMEPRAZOLE 20 MG/1
CAPSULE, DELAYED RELEASE ORAL
Qty: 180 CAPSULE | Refills: 0 | Status: SHIPPED | OUTPATIENT
Start: 2022-06-16

## 2022-06-19 ENCOUNTER — APPOINTMENT (OUTPATIENT)
Dept: CT IMAGING | Age: 77
DRG: 480 | End: 2022-06-19
Payer: MEDICARE

## 2022-06-19 ENCOUNTER — APPOINTMENT (OUTPATIENT)
Dept: GENERAL RADIOLOGY | Age: 77
DRG: 480 | End: 2022-06-19
Payer: MEDICARE

## 2022-06-19 ENCOUNTER — HOSPITAL ENCOUNTER (INPATIENT)
Age: 77
LOS: 11 days | Discharge: NURSING, REHAB OR LONG-TERM CARE | DRG: 480 | End: 2022-06-30
Attending: STUDENT IN AN ORGANIZED HEALTH CARE EDUCATION/TRAINING PROGRAM | Admitting: INTERNAL MEDICINE
Payer: MEDICARE

## 2022-06-19 DIAGNOSIS — K92.2 GASTROINTESTINAL HEMORRHAGE, UNSPECIFIED GASTROINTESTINAL HEMORRHAGE TYPE: ICD-10-CM

## 2022-06-19 DIAGNOSIS — D64.9 ANEMIA, UNSPECIFIED TYPE: ICD-10-CM

## 2022-06-19 DIAGNOSIS — S72.141A CLOSED 2-PART INTERTROCHANTERIC FRACTURE OF RIGHT FEMUR, INITIAL ENCOUNTER (HCC): Primary | ICD-10-CM

## 2022-06-19 DIAGNOSIS — S72.002A HIP FRACTURE REQUIRING OPERATIVE REPAIR, LEFT, CLOSED, INITIAL ENCOUNTER (HCC): ICD-10-CM

## 2022-06-19 DIAGNOSIS — S72.001B: ICD-10-CM

## 2022-06-19 LAB
A/G RATIO: 2.3 (ref 1.1–2.2)
ALBUMIN SERPL-MCNC: 4.3 G/DL (ref 3.4–5)
ALP BLD-CCNC: 68 U/L (ref 40–129)
ALT SERPL-CCNC: 10 U/L (ref 10–40)
ANION GAP SERPL CALCULATED.3IONS-SCNC: 13 MMOL/L (ref 3–16)
ANISOCYTOSIS: ABNORMAL
AST SERPL-CCNC: 11 U/L (ref 15–37)
BASOPHILS ABSOLUTE: 0 K/UL (ref 0–0.2)
BASOPHILS RELATIVE PERCENT: 0.3 %
BILIRUB SERPL-MCNC: 0.8 MG/DL (ref 0–1)
BUN BLDV-MCNC: 21 MG/DL (ref 7–20)
CALCIUM SERPL-MCNC: 9.5 MG/DL (ref 8.3–10.6)
CHLORIDE BLD-SCNC: 104 MMOL/L (ref 99–110)
CO2: 22 MMOL/L (ref 21–32)
CREAT SERPL-MCNC: 0.6 MG/DL (ref 0.6–1.2)
EOSINOPHILS ABSOLUTE: 0 K/UL (ref 0–0.6)
EOSINOPHILS RELATIVE PERCENT: 0.2 %
GFR AFRICAN AMERICAN: >60
GFR NON-AFRICAN AMERICAN: >60
GLUCOSE BLD-MCNC: 210 MG/DL (ref 70–99)
GLUCOSE BLD-MCNC: 217 MG/DL (ref 70–99)
HCT VFR BLD CALC: 28.5 % (ref 36–48)
HEMATOLOGY PATH CONSULT: YES
HEMOGLOBIN: 8.9 G/DL (ref 12–16)
HYPOCHROMIA: ABNORMAL
LYMPHOCYTES ABSOLUTE: 0.7 K/UL (ref 1–5.1)
LYMPHOCYTES RELATIVE PERCENT: 6.8 %
MCH RBC QN AUTO: 18.7 PG (ref 26–34)
MCHC RBC AUTO-ENTMCNC: 31.3 G/DL (ref 31–36)
MCV RBC AUTO: 59.7 FL (ref 80–100)
MICROCYTES: ABNORMAL
MONOCYTES ABSOLUTE: 0.7 K/UL (ref 0–1.3)
MONOCYTES RELATIVE PERCENT: 6.7 %
NEUTROPHILS ABSOLUTE: 8.8 K/UL (ref 1.7–7.7)
NEUTROPHILS RELATIVE PERCENT: 86 %
OVALOCYTES: ABNORMAL
PDW BLD-RTO: 16.9 % (ref 12.4–15.4)
PERFORMED ON: ABNORMAL
PLATELET # BLD: 161 K/UL (ref 135–450)
PLATELET SLIDE REVIEW: ADEQUATE
PMV BLD AUTO: 9.5 FL (ref 5–10.5)
POTASSIUM REFLEX MAGNESIUM: 4.1 MMOL/L (ref 3.5–5.1)
RBC # BLD: 4.77 M/UL (ref 4–5.2)
SARS-COV-2, NAAT: NOT DETECTED
SCHISTOCYTES: ABNORMAL
SLIDE REVIEW: ABNORMAL
SODIUM BLD-SCNC: 139 MMOL/L (ref 136–145)
TARGET CELLS: ABNORMAL
TOTAL CK: 38 U/L (ref 26–192)
TOTAL PROTEIN: 6.2 G/DL (ref 6.4–8.2)
WBC # BLD: 10.3 K/UL (ref 4–11)

## 2022-06-19 PROCEDURE — 6370000000 HC RX 637 (ALT 250 FOR IP): Performed by: INTERNAL MEDICINE

## 2022-06-19 PROCEDURE — 99285 EMERGENCY DEPT VISIT HI MDM: CPT

## 2022-06-19 PROCEDURE — 85025 COMPLETE CBC W/AUTO DIFF WBC: CPT

## 2022-06-19 PROCEDURE — 75635 CT ANGIO ABDOMINAL ARTERIES: CPT

## 2022-06-19 PROCEDURE — 87635 SARS-COV-2 COVID-19 AMP PRB: CPT

## 2022-06-19 PROCEDURE — 2580000003 HC RX 258: Performed by: STUDENT IN AN ORGANIZED HEALTH CARE EDUCATION/TRAINING PROGRAM

## 2022-06-19 PROCEDURE — 94640 AIRWAY INHALATION TREATMENT: CPT

## 2022-06-19 PROCEDURE — 6360000004 HC RX CONTRAST MEDICATION: Performed by: STUDENT IN AN ORGANIZED HEALTH CARE EDUCATION/TRAINING PROGRAM

## 2022-06-19 PROCEDURE — 36415 COLL VENOUS BLD VENIPUNCTURE: CPT

## 2022-06-19 PROCEDURE — 6360000002 HC RX W HCPCS: Performed by: STUDENT IN AN ORGANIZED HEALTH CARE EDUCATION/TRAINING PROGRAM

## 2022-06-19 PROCEDURE — 93005 ELECTROCARDIOGRAM TRACING: CPT | Performed by: INTERNAL MEDICINE

## 2022-06-19 PROCEDURE — 73552 X-RAY EXAM OF FEMUR 2/>: CPT

## 2022-06-19 PROCEDURE — 94761 N-INVAS EAR/PLS OXIMETRY MLT: CPT

## 2022-06-19 PROCEDURE — 6360000002 HC RX W HCPCS: Performed by: INTERNAL MEDICINE

## 2022-06-19 PROCEDURE — 82550 ASSAY OF CK (CPK): CPT

## 2022-06-19 PROCEDURE — 96374 THER/PROPH/DIAG INJ IV PUSH: CPT

## 2022-06-19 PROCEDURE — 70450 CT HEAD/BRAIN W/O DYE: CPT

## 2022-06-19 PROCEDURE — 1200000000 HC SEMI PRIVATE

## 2022-06-19 PROCEDURE — 80053 COMPREHEN METABOLIC PANEL: CPT

## 2022-06-19 PROCEDURE — 70486 CT MAXILLOFACIAL W/O DYE: CPT

## 2022-06-19 RX ORDER — SODIUM CHLORIDE 9 MG/ML
1000 INJECTION, SOLUTION INTRAVENOUS CONTINUOUS
Status: DISCONTINUED | OUTPATIENT
Start: 2022-06-19 | End: 2022-06-21

## 2022-06-19 RX ORDER — ALBUTEROL SULFATE 90 UG/1
2 AEROSOL, METERED RESPIRATORY (INHALATION) EVERY 4 HOURS PRN
Status: DISCONTINUED | OUTPATIENT
Start: 2022-06-19 | End: 2022-06-30 | Stop reason: HOSPADM

## 2022-06-19 RX ORDER — SODIUM CHLORIDE 9 MG/ML
INJECTION, SOLUTION INTRAVENOUS PRN
Status: DISCONTINUED | OUTPATIENT
Start: 2022-06-19 | End: 2022-06-30 | Stop reason: HOSPADM

## 2022-06-19 RX ORDER — ONDANSETRON 2 MG/ML
4 INJECTION INTRAMUSCULAR; INTRAVENOUS EVERY 6 HOURS PRN
Status: DISCONTINUED | OUTPATIENT
Start: 2022-06-19 | End: 2022-06-30 | Stop reason: HOSPADM

## 2022-06-19 RX ORDER — SODIUM CHLORIDE 0.9 % (FLUSH) 0.9 %
5-40 SYRINGE (ML) INJECTION PRN
Status: DISCONTINUED | OUTPATIENT
Start: 2022-06-19 | End: 2022-06-30 | Stop reason: HOSPADM

## 2022-06-19 RX ORDER — DOXAZOSIN 2 MG/1
4 TABLET ORAL DAILY
Status: DISCONTINUED | OUTPATIENT
Start: 2022-06-19 | End: 2022-06-21

## 2022-06-19 RX ORDER — VERAPAMIL HYDROCHLORIDE 240 MG/1
240 TABLET, FILM COATED, EXTENDED RELEASE ORAL DAILY
Status: DISCONTINUED | OUTPATIENT
Start: 2022-06-19 | End: 2022-06-21

## 2022-06-19 RX ORDER — ENOXAPARIN SODIUM 100 MG/ML
30 INJECTION SUBCUTANEOUS 2 TIMES DAILY
Status: DISCONTINUED | OUTPATIENT
Start: 2022-06-19 | End: 2022-06-21

## 2022-06-19 RX ORDER — SODIUM CHLORIDE 0.9 % (FLUSH) 0.9 %
5-40 SYRINGE (ML) INJECTION EVERY 12 HOURS SCHEDULED
Status: DISCONTINUED | OUTPATIENT
Start: 2022-06-19 | End: 2022-06-30 | Stop reason: HOSPADM

## 2022-06-19 RX ORDER — ATORVASTATIN CALCIUM 10 MG/1
10 TABLET, FILM COATED ORAL NIGHTLY
Status: DISCONTINUED | OUTPATIENT
Start: 2022-06-19 | End: 2022-06-30 | Stop reason: HOSPADM

## 2022-06-19 RX ORDER — ALBUTEROL SULFATE 90 UG/1
2 AEROSOL, METERED RESPIRATORY (INHALATION) 2 TIMES DAILY
Status: DISCONTINUED | OUTPATIENT
Start: 2022-06-20 | End: 2022-06-20

## 2022-06-19 RX ORDER — ALLOPURINOL 100 MG/1
200 TABLET ORAL DAILY
Status: DISCONTINUED | OUTPATIENT
Start: 2022-06-19 | End: 2022-06-27

## 2022-06-19 RX ORDER — CETIRIZINE HYDROCHLORIDE 10 MG/1
10 TABLET ORAL DAILY
Status: DISCONTINUED | OUTPATIENT
Start: 2022-06-19 | End: 2022-06-30 | Stop reason: HOSPADM

## 2022-06-19 RX ORDER — MORPHINE SULFATE 4 MG/ML
4 INJECTION, SOLUTION INTRAMUSCULAR; INTRAVENOUS ONCE
Status: COMPLETED | OUTPATIENT
Start: 2022-06-19 | End: 2022-06-19

## 2022-06-19 RX ORDER — BUDESONIDE AND FORMOTEROL FUMARATE DIHYDRATE 80; 4.5 UG/1; UG/1
2 AEROSOL RESPIRATORY (INHALATION) 2 TIMES DAILY
Status: DISCONTINUED | OUTPATIENT
Start: 2022-06-19 | End: 2022-06-21

## 2022-06-19 RX ORDER — POLYETHYLENE GLYCOL 3350 17 G/17G
17 POWDER, FOR SOLUTION ORAL DAILY PRN
Status: DISCONTINUED | OUTPATIENT
Start: 2022-06-19 | End: 2022-06-23 | Stop reason: DRUGHIGH

## 2022-06-19 RX ORDER — CARVEDILOL 6.25 MG/1
6.25 TABLET ORAL 2 TIMES DAILY
Status: DISCONTINUED | OUTPATIENT
Start: 2022-06-19 | End: 2022-06-21

## 2022-06-19 RX ORDER — PAROXETINE HYDROCHLORIDE 20 MG/1
20 TABLET, FILM COATED ORAL DAILY
Status: DISCONTINUED | OUTPATIENT
Start: 2022-06-19 | End: 2022-06-30 | Stop reason: HOSPADM

## 2022-06-19 RX ORDER — ALBUTEROL SULFATE 90 UG/1
2 AEROSOL, METERED RESPIRATORY (INHALATION) EVERY 6 HOURS PRN
Status: DISCONTINUED | OUTPATIENT
Start: 2022-06-19 | End: 2022-06-19

## 2022-06-19 RX ORDER — 0.9 % SODIUM CHLORIDE 0.9 %
500 INTRAVENOUS SOLUTION INTRAVENOUS ONCE
Status: COMPLETED | OUTPATIENT
Start: 2022-06-19 | End: 2022-06-19

## 2022-06-19 RX ORDER — LOSARTAN POTASSIUM 100 MG/1
100 TABLET ORAL DAILY
Status: DISCONTINUED | OUTPATIENT
Start: 2022-06-19 | End: 2022-06-27

## 2022-06-19 RX ORDER — ONDANSETRON 4 MG/1
4 TABLET, ORALLY DISINTEGRATING ORAL EVERY 8 HOURS PRN
Status: DISCONTINUED | OUTPATIENT
Start: 2022-06-19 | End: 2022-06-30 | Stop reason: HOSPADM

## 2022-06-19 RX ORDER — GABAPENTIN 300 MG/1
300 CAPSULE ORAL 2 TIMES DAILY
Status: DISCONTINUED | OUTPATIENT
Start: 2022-06-19 | End: 2022-06-27

## 2022-06-19 RX ORDER — PANTOPRAZOLE SODIUM 40 MG/1
40 TABLET, DELAYED RELEASE ORAL
Status: DISCONTINUED | OUTPATIENT
Start: 2022-06-20 | End: 2022-06-21

## 2022-06-19 RX ORDER — HYDRALAZINE HYDROCHLORIDE 25 MG/1
25 TABLET, FILM COATED ORAL EVERY 6 HOURS PRN
Status: DISCONTINUED | OUTPATIENT
Start: 2022-06-19 | End: 2022-06-21

## 2022-06-19 RX ORDER — MORPHINE SULFATE 2 MG/ML
2 INJECTION, SOLUTION INTRAMUSCULAR; INTRAVENOUS
Status: DISCONTINUED | OUTPATIENT
Start: 2022-06-19 | End: 2022-06-21

## 2022-06-19 RX ADMIN — IOPAMIDOL 100 ML: 755 INJECTION, SOLUTION INTRAVENOUS at 14:23

## 2022-06-19 RX ADMIN — MORPHINE SULFATE 2 MG: 2 INJECTION, SOLUTION INTRAMUSCULAR; INTRAVENOUS at 17:59

## 2022-06-19 RX ADMIN — SODIUM CHLORIDE 500 ML: 9 INJECTION, SOLUTION INTRAVENOUS at 17:09

## 2022-06-19 RX ADMIN — SODIUM CHLORIDE 1000 ML: 9 INJECTION, SOLUTION INTRAVENOUS at 17:09

## 2022-06-19 RX ADMIN — CARVEDILOL 6.25 MG: 6.25 TABLET, FILM COATED ORAL at 20:09

## 2022-06-19 RX ADMIN — MORPHINE SULFATE 4 MG: 4 INJECTION, SOLUTION INTRAMUSCULAR; INTRAVENOUS at 14:51

## 2022-06-19 RX ADMIN — GABAPENTIN 300 MG: 300 CAPSULE ORAL at 20:09

## 2022-06-19 RX ADMIN — Medication 2 PUFF: at 19:43

## 2022-06-19 RX ADMIN — ATORVASTATIN CALCIUM 10 MG: 10 TABLET, FILM COATED ORAL at 20:10

## 2022-06-19 RX ADMIN — Medication 2 PUFF: at 19:44

## 2022-06-19 ASSESSMENT — PAIN DESCRIPTION - ORIENTATION: ORIENTATION: RIGHT

## 2022-06-19 ASSESSMENT — PAIN DESCRIPTION - LOCATION: LOCATION: HIP

## 2022-06-19 ASSESSMENT — PAIN SCALES - GENERAL
PAINLEVEL_OUTOF10: 10
PAINLEVEL_OUTOF10: 10

## 2022-06-19 ASSESSMENT — PAIN DESCRIPTION - DESCRIPTORS: DESCRIPTORS: SHARP;SHOOTING

## 2022-06-19 ASSESSMENT — PAIN DESCRIPTION - PAIN TYPE: TYPE: ACUTE PAIN

## 2022-06-19 ASSESSMENT — PAIN - FUNCTIONAL ASSESSMENT: PAIN_FUNCTIONAL_ASSESSMENT: PREVENTS OR INTERFERES WITH ALL ACTIVE AND SOME PASSIVE ACTIVITIES

## 2022-06-19 ASSESSMENT — PAIN DESCRIPTION - FREQUENCY: FREQUENCY: CONTINUOUS

## 2022-06-19 NOTE — PROGRESS NOTES
Consult has been called to Dr. Palomo Thornton on 6/19/22.  Spoke with perfect served dr Palomo Thornton . 6:33 PM    Babs Garcia  6/19/2022

## 2022-06-19 NOTE — ED PROVIDER NOTES
[address] [facility] emergency department      CHIEF COMPLAINT   Hip Injury       HISTORY OF PRESENT ILLNESS  Kelli Ervin is a 67 y.o. female with a past medical history of hypertension, diabetes, hyperlipidemia, lumbar disc disease, who presents to the ED complaining of evaluation after trauma. Mechanism of injury: 7a, mechanical fall due to abnormal walking due to pain in left foot that started on Friday. She denies any trauma preceding that pain. Hit chin, no LOC, not on blood thinners, denies vomiting. Does report some chin pain. Patient reports pain in her right femur. Severe pain with any attempt to move. She denies numbness. She is unable to ambulate. Old records reviewed: No pertinent information noted. No other complaints, modifying factors or associated symptoms. I have reviewed the following from the nursing documentation.     Past Medical History:   Diagnosis Date    Anemia     thallasemia minor    Arthritis     osteo-arthritis    Asthma     Back pain     Back pain     Bronchitis chronic     Diabetes mellitus (HCC)     GERD (gastroesophageal reflux disease)     Gout     Hypertension     Melanoma of scalp or neck (HCC)     Osteoarthritis     Osteoporosis     Squamous cell carcinoma, arm     Thal trait      Past Surgical History:   Procedure Laterality Date    APPENDECTOMY      CARPAL TUNNEL RELEASE Left 02/05/2018    CATARACT REMOVAL WITH IMPLANT Right 07/18/2018    CATARACT REMOVAL WITH IMPLANT Left 08/15/2018    COLONOSCOPY  12/2014    HYSTERECTOMY (CERVIX STATUS UNKNOWN)      KNEE ARTHROSCOPY Bilateral     PARATHYROIDECTOMY      PARTIAL HYSTERECTOMY (CERVIX NOT REMOVED)      AL XCAPSL CTRC RMVL INSJ IO LENS PROSTH W/O ECP Right 7/18/2018    PHACOEMULSIFICATION OF CATARACT WITH INTRAOCULAR LENS IMPLANT RIGHT EYE performed by Jimena Saldivar MD at [address] RMVL INSJ IO LENS PROSTH W/O ECP Left 8/15/2018    PHACOEMULSIFICATION OF CATARACT WITH INTRAOCULAR LENS IMPLANT LEFT EYE performed by Arleen Drew MD at [email or web address]       Family History   Problem Relation Age of Onset    Heart Failure Mother     Emphysema Mother     Stroke Mother     Heart Failure Father     Hypertension Father     Heart Disease Father     High Blood Pressure Father     Cancer Maternal Aunt     Cancer Paternal Aunt     Cancer Maternal Grandmother     Asthma Neg Hx     Diabetes Neg Hx      Social History     Socioeconomic History    Marital status:      Spouse name: Not on file    Number of children: Not on file    Years of education: Not on file    Highest education level: Not on file   Occupational History    Not on file   Tobacco Use    Smoking status: Passive Smoke Exposure - Never Smoker    Smokeless tobacco: Never Used    Tobacco comment: exposed to passive smoke for 18 yrs   Vaping Use    Vaping Use: Never used   Substance and Sexual Activity    Alcohol use: No    Drug use: No    Sexual activity: Never   Other Topics Concern    Not on file   Social History Narrative    Not on file     Social Determinants of Health     Financial Resource Strain:     Difficulty of Paying Living Expenses: Not on file   Food Insecurity:     Worried About Running Out of Food in the Last Year: Not on file    Eyad of Food in the Last Year: Not on file   Transportation Needs:     Lack of Transportation (Medical): Not on file    Lack of Transportation (Non-Medical):  Not on file   Physical Activity:     Days of Exercise per Week: Not on file    Minutes of Exercise per Session: Not on file   Stress:     Feeling of Stress : Not on file   Social Connections:     Frequency of Communication with Friends and Family: Not on file    Frequency of Social Gatherings with Friends and Family: Not on file    Attends Cheondoism Services: Not on file    Active Member of Clubs or Organizations: Not on file    Attends Club or Organization Meetings: Not on file    Marital Status: Not on file   Intimate Partner Violence:     Fear of Current or Ex-Partner: Not on file    Emotionally Abused: Not on file    Physically Abused: Not on file    Sexually Abused: Not on file   Housing Stability:     Unable to Pay for Housing in the Last Year: Not on file    Number of Eliud in the Last Year: Not on file    Unstable Housing in the Last Year: Not on file     No current facility-administered medications for this encounter. Current Outpatient Medications   Medication Sig Dispense Refill    terazosin (HYTRIN) 2 MG capsule TAKE ONE CAPSULE BY MOUTH ONCE NIGHTLY 30 capsule 2    omeprazole (PRILOSEC) 20 MG delayed release capsule TAKE ONE CAPSULE BY MOUTH TWICE A  capsule 0    alendronate (FOSAMAX) 70 MG tablet TAKE 1 TABLET BY MOUTH  WEEKLY WITH 8 OZ OF PLAIN  WATER 30 MINUTES BEFORE  FIRST FOOD, DRINK OR MEDS. STAY UPRIGHT FOR 30 MINS 12 tablet 0    verapamil (CALAN SR) 240 MG extended release tablet TAKE 1 TABLET BY MOUTH  DAILY 90 tablet 0    Fluticasone furoate-vilanterol (BREO ELLIPTA) 200-25 MCG/INH AEPB inhaler INHALE ONE DOSE BY MOUTH DAILY 1 each 5    Umeclidinium Bromide (INCRUSE ELLIPTA) 62.5 MCG/INH AEPB INHALE ONE PUFF BY MOUTH DAILY 1 each 5    gabapentin (NEURONTIN) 300 MG capsule Take 1 capsule by mouth 2 times daily for 180 days. Intended supply: 90 days 60 capsule 2    blood glucose test strips (ASCENSIA AUTODISC VI;ONE TOUCH ULTRA TEST VI) strip Used to test once daily.  DX: E11.9 100 each 3    albuterol sulfate HFA (PROAIR HFA) 108 (90 Base) MCG/ACT inhaler INHALE TWO PUFFS BY MOUTH EVERY 6 HOURS AS NEEDED FOR WHEEZING 1 each 2    metFORMIN (GLUCOPHAGE) 1000 MG tablet TAKE 1 TABLET BY MOUTH  TWICE DAILY WITH MEALS 180 tablet 3    PARoxetine (PAXIL) 10 MG tablet TAKE 1 TABLET BY MOUTH  DAILY 90 tablet 3    atorvastatin (LIPITOR) 10 MG tablet TAKE 1 TABLET BY MOUTH AT  NIGHT 90 tablet 3    allopurinol (ZYLOPRIM) 300 MG tablet TAKE 1 TABLET BY MOUTH  DAILY 90 tablet 3    losartan (COZAAR) 100 MG tablet TAKE 1 TABLET BY MOUTH  DAILY 90 tablet 3    glipiZIDE (GLUCOTROL) 10 MG tablet TAKE 1 TABLET BY MOUTH  TWICE DAILY BEFORE MEALS 180 tablet 3    carvedilol (COREG) 6.25 MG tablet TAKE 1 TABLET BY MOUTH  TWICE DAILY 180 tablet 3    clotrimazole-betamethasone (LOTRISONE) 1-0.05 % cream Apply topically 2 times daily. 1 Tube 1    TURMERIC PO Take by mouth      acetaminophen (TYLENOL) 500 MG tablet Take 500 mg by mouth every 6 hours as needed for Pain      Coenzyme Q10 (CO Q 10 PO) Take by mouth      cetirizine (ZYRTEC) 10 MG tablet Take 10 mg by mouth daily      Cyanocobalamin (VITAMIN B 12 PO) Take 1,000 mcg by mouth daily.  Ascorbic Acid (VITAMIN C) 500 MG CAPS Take  by mouth daily.  vitamin E 400 UNIT capsule Take 400 Units by mouth daily.  Calcium Carbonate-Vitamin D (CALCIUM + D PO) Take  by mouth daily. Allergies   Allergen Reactions    Hctz [Hydrochlorothiazide]      Increases calcium to unsafe level resulting in parathyroid surgery    Lisinopril Other (See Comments)     cough       REVIEW OF SYSTEMS  All systems reviewed, pertinent positives per HPI otherwise noted to be negative. PHYSICAL EXAM  GENERAL APPEARANCE: Kelli Thakur is in no acute respiratory distress. Awake and alert. Answers questions appropriately. VITAL SIGNS: BP (!) 189/91   Pulse 97   Temp 97.9 °F (36.6 °C) (Oral)   Resp 19   Ht 5' 3\" (1.6 m)   Wt 224 lb (101.6 kg)   SpO2 98%   BMI 39.68 kg/m²   HEENT: Normocephalic, traumatic (bruising on chin with tenderness to palpation). No Hands sign or Raccoon Eyes. No depressed skull fractures. Extraocular muscles are intact. Pupils equal round and reactive to light. Conjunctivas are pink. Negative scleral icterus. No epistaxis. No septal hematomas. No hemotympanum. Mucous membranes are moist. Tongue in the midline.  Pharynx without erythema or exudates. No uvular deviation. NECK: Nontender and supple. No stridor or meningismus. Trachea in the midine. Cervical spine is non-tender to palpation in the midline. No abrasions. CHEST: Nontender to palpation. Clear to auscultation bilaterally. No rales, rhonchi, or wheezing. No abrasions. HEART: Regular rate and rhythm. No murmurs, gallops or rubs. Strong and equal pulses in the upper and lower extremities. ABDOMEN: Soft, nontender, nondistended, positive bowel sounds, no palpable pulsatile masses. No abrasions. MUSCULOSKELETAL: Right lower extremity shortened and externally rotated. Skin is cooler to the touch than the left lower extremity. Pulses are present but weaker than right lower extremity. Cervical, thoracic, and lumbosacral spines are non-tender to palpation. Theres no edema, bruising, or step-offs. Upper and lower extremities have no deformities and they are non-tender to palpation. The calves are nontender to palpation. Active range of motion. Negative bilateral straight leg raises. NEUROLOGICAL: Right lower extremity shortened and externally rotated, tenderness to palpation over the right femur. Awake, alert and oriented x 3. Power intact in the upper and lower extremities. Sensation is intact to light touch in the upper and lower extremities. GCS 15  IMMUNOLOGICAL: No palpable lymphadenopathy or lymphatic streaking. DERMATOLOGIC: No petechiae, rashes, or ecchymoses. No lacerations or abrasions. LABS  I have reviewed all labs for this visit.    Results for orders placed or performed during the hospital encounter of 06/19/22   COVID-19, Rapid    Specimen: Nasopharyngeal Swab   Result Value Ref Range    SARS-CoV-2, NAAT Not Detected Not Detected   CBC with Auto Differential   Result Value Ref Range    WBC 10.3 4.0 - 11.0 K/uL    RBC 4.77 4.00 - 5.20 M/uL    Hemoglobin 8.9 (L) 12.0 - 16.0 g/dL    Hematocrit 28.5 (L) 36.0 - 48.0 %    MCV 59.7 (L) 80.0 - 100.0 fL    MCH 18.7 (L) 26.0 - 34.0 pg    MCHC 31.3 31.0 - 36.0 g/dL    RDW 16.9 (H) 12.4 - 15.4 %    Platelets 845 815 - 111 K/uL    MPV 9.5 5.0 - 10.5 fL    PLATELET SLIDE REVIEW Adequate     SLIDE REVIEW see below     Path Consult Yes     Neutrophils % 86.0 %    Lymphocytes % 6.8 %    Monocytes % 6.7 %    Eosinophils % 0.2 %    Basophils % 0.3 %    Neutrophils Absolute 8.8 (H) 1.7 - 7.7 K/uL    Lymphocytes Absolute 0.7 (L) 1.0 - 5.1 K/uL    Monocytes Absolute 0.7 0.0 - 1.3 K/uL    Eosinophils Absolute 0.0 0.0 - 0.6 K/uL    Basophils Absolute 0.0 0.0 - 0.2 K/uL    Anisocytosis 2+ (A)     Microcytes 3+ (A)     Hypochromia 2+ (A)     Schistocytes 3+ (A)     Ovalocytes 3+ (A)     Target Cells Occasional (A)    Comprehensive Metabolic Panel w/ Reflex to MG   Result Value Ref Range    Sodium 139 136 - 145 mmol/L    Potassium reflex Magnesium 4.1 3.5 - 5.1 mmol/L    Chloride 104 99 - 110 mmol/L    CO2 22 21 - 32 mmol/L    Anion Gap 13 3 - 16    Glucose 210 (H) 70 - 99 mg/dL    BUN 21 (H) 7 - 20 mg/dL    CREATININE 0.6 0.6 - 1.2 mg/dL    GFR Non-African American >60 >60    GFR African American >60 >60    Calcium 9.5 8.3 - 10.6 mg/dL    Total Protein 6.2 (L) 6.4 - 8.2 g/dL    Albumin 4.3 3.4 - 5.0 g/dL    Albumin/Globulin Ratio 2.3 (H) 1.1 - 2.2    Total Bilirubin 0.8 0.0 - 1.0 mg/dL    Alkaline Phosphatase 68 40 - 129 U/L    ALT 10 10 - 40 U/L    AST 11 (L) 15 - 37 U/L   CK   Result Value Ref Range    Total CK 38 26 - 192 U/L         ECG  The Ekg interpreted by me shows  sinus tachycardia, wanr=777  Axis is   Left axis deviation  QTc is  within an acceptable range  Intervals and Durations are unremarkable. ST Segments: nonspecific changes  No significant change from prior EKG dated 1/6/15        RADIOLOGY    CTA ABDOMINAL AORTA W BILAT RUNOFF W CONTRAST   Final Result   1. Traumatic, acute right hip intertrochanteric fracture with varus   angulation and small surrounding hematoma.    2. No pseudoaneurysm or active extravasation is identified adjacent to the   fracture. 3. Aortoiliac inflow is widely patent. There is a mild amount of fibro   calcified plaque. 4. Femoropopliteal segments are patent bilaterally, noting fibro calcified   plaque. There is 3 vessel runoff bilaterally. However, on the right side   there is only 1 primary runoff vessel, the posterior tibial.   5. No acute abdominopelvic abnormality. Hepatic steatosis. CT FACIAL BONES WO CONTRAST   Final Result   No acute intracranial abnormality. No acute facial fractures. CT Head WO Contrast   Final Result   No acute intracranial abnormality. No acute facial fractures. XR FEMUR RIGHT (MIN 2 VIEWS)   Final Result   Traumatic, acute intertrochanteric fracture of the right hip. Advanced osteoarthritis of the knee. Moderate-sized knee joint effusion. ED COURSE / MDM  Patient seen and evaluated. Old records reviewed and pertinent information included in HPI. Labs and imaging reviewed and results discussed with patient. Overall well appearing patient, in no acute distress, presenting for right hip pain after mechanical fall. Physical exam remarkable for right lower extremity externally rotated and shortened. Also cooler with decreased pulses compared to left lower extremity but pulses are still present. Also evidence of facial trauma. Differential diagnosis includes but is not limited to: Hip fracture, hip dislocation, vascular injury, intracranial hemorrhage, facial fracture, mechanical fall    Is this patient to be included in the SEP-1 Core Measure due to severe sepsis or septic shock? No   Exclusion criteria - the patient is NOT to be included for SEP-1 Core Measure due to:  2+ SIRS criteria are not met      Workup showed:    Imaging remarkable for right femur fracture. No evidence of vascular injury. No evidence of intracranial trauma or facial fracture.   No other injuries identified on history or exam.    ED Course as of 06/20/22 2134   [place] Jun 19, 2022   1553 COVID swab negative [ER]   1554 Anemia to 8.9. Patient denies any bleeding. No leukocytosis or thrombocytopenia [ER]   1554 CK within normal limits, low suspicion for rhabdomyolysis [ER]   1554 No electrolyte abnormalities or evidence of significant kidney dysfunction [ER]   1555 Liver function testing unremarkable [ER]   1555 The Ekg interpreted by me shows  sinus tachycardia, tdgq=635  Axis is   Left axis deviation  QTc is  within an acceptable range  Intervals and Durations are unremarkable. ST Segments: nonspecific changes  No significant change from prior EKG dated 1/6/15 [ER]   1556 Orthopedics consulted, recommended n.p.o. at midnight. [ER]      ED Course User Index  [ER] Sin Gonzalez MD          During the patient's ED course, the patient was given:  Medications   0.9 % sodium chloride infusion (has no administration in time range)   morphine sulfate (PF) injection 4 mg (4 mg IntraVENous Given 6/19/22 1451)      At this time, do feel the patient requires admission for further work-up and management. Discussed the patient with hospital team, patient to be admitted to [facility]. CLINICAL IMPRESSION  1. Closed 2-part intertrochanteric fracture of right femur, initial encounter ([facility] [address]    2. Anemia, unspecified type    3. Hip fx, right, open type I or II, initial encounter ([facility] [address]        Blood pressure (!) 149/80, pulse 98, temperature 99.2 °F (37.3 °C), temperature source Oral, resp. rate 18, height 5' 3\" (1.6 m), weight 223 lb 8 oz (101.4 kg), SpO2 96 %, not currently breastfeeding. DISPOSITION  Kelli Thakur was admitted in stable condition. DISCLAIMER: This chart was created using Dragon dictation software. Efforts were made by me to ensure accuracy, however some errors may be present due to limitations of this technology and occasionally words are not transcribed correctly.           Sin Gonzalez MD  06/20/22 [address]

## 2022-06-19 NOTE — PROGRESS NOTES
Patient admitted to room 222 from ER. Side rails up x2. Bed is locked an in lowest position. Call light placed in patient reach. Explained to patient the routine of the hospital, including but not limited to lab work, vital signs, hourly rounding, etc. Care plans and education updated, CHG wipes completed at time of admission. BP (!) 190/107   Pulse (!) 104   Temp 98.7 °F (37.1 °C) (Oral)   Resp 18   Ht 5' 3\" (1.6 m)   Wt 223 lb 8 oz (101.4 kg)   SpO2 96%   BMI 39.59 kg/m²     Most recent set of vitals as shown. Patient has an LDA that does require CHG wipes, including possible a surgery incision, bacon catheter, or a central line. 4 Eyes Skin Assessment     The patient is being assess for   Admission    I agree that 2 RN's have performed a thorough Head to Toe Skin Assessment on the patient. ALL assessment sites listed below have been assessed. Areas assessed for pressure by both nurses:   [x]   Head, Face, and Ears   [x]   Shoulders, Back, and Chest, Abdomen  [x]   Arms, Elbows, and Hands   [x]   Coccyx, Sacrum, and Ischium  [x]   Legs, Feet, and Heels        Skin Assessed Under all Medical Devices by both nurses:  n/a            Scattered bruising, burn to left index finger  All Mepilex Borders were peeled back and area peeked at by both nurses:  No: n/a  Please list where Mepilex Borders are located:  n/a             **SHARE this note so that the co-signing nurse is able to place an eSignature**    Co-signer eSignature: Electronically signed by Binh Howell RN on 3/14/43 at 6:33 PM EDT    Does the Patient have Skin Breakdown related to pressure?   No            Mauricio Prevention initiated:  Yes   Wound Care Orders initiated:  No      WOC nurse consulted for Pressure Injury (Stage 3,4, Unstageable, DTI, NWPT, Complex wounds)and New or Established Ostomies:  No      Primary Nurse eSignature: Electronically signed by Xochitl Ulrich RN on 6/19/22 at 6:33 PM EDT      Bedside Mobility Assessment Tool (BMAT):     Assessment Level 1- Sit and Shake    1. From a semi-reclined position, ask patient to sit up and rotate to a seated position at the side of the bed. Can use the bedrail. 2. Ask patient to reach out and grab your hand and shake making sure patient reaches across his/her midline. Fail- Patient is unable to perform tasks, patient is MOBILITY LEVEL 1. Assessment Level 2- Stretch and Point   1. With patient in seated position at the side of the bed, have patient place both feet on the floor (or stool) with knees no higher than hips. 2. Ask patient to stretch one leg and straighten the knee, then bend the ankle/flex and point the toes. If appropriate, repeat with the other leg. Fail- Patient is unable to complete task. Patient is MOBILITY LEVEL 2. Assessment Level 3- Stand   1. Ask patient to elevate off the bed or chair (seated to standing) using an assistive device (cane, bedrail). 2. Patient should be able to raise buttocks off be and hold for a count of five. May repeat once. Fail- Patient unable to demonstrate standing stability. Patient is MOBILITY LEVEL 3. Assessment Level 4- Walk   1. Ask patient to march in place at bedside. 2. Then ask patient to advance step and return each foot. Some medical conditions may render a patient from stepping backwards, use your best clinical judgement. Fail- Patient not able to complete tasks OR requires use of assistive device. Patient is MOBILITY LEVEL 3. Mobility Level- 1    Patient is not able to demonstrate the ability to move from a reclining position to an upright position within the recliner due to right hip fracture.

## 2022-06-19 NOTE — PLAN OF CARE
Ortho Plan of Care    Received a call regarding the patient's right hip IT fracture after a fall earlier today. History of DM and chronic anemia.     Plan  - NPO after midnight  - IM evaluation and risk stratification please, greatly appreciated  - Plan for surgical fixation tomorrow pending OR availability, R hip IMN  - Full consult to follow    Davina Cardenas DO  06/19/22 3:46 PM

## 2022-06-19 NOTE — ED NOTES
[phone] Call placed to orthopedic surgery spoke to [place], to set up consult with Dr. Treasure Watters  06/19/22 1539 [phone] consult completed with call back from Dr. Candis Oseguera spoke with Dr. Katlyn Rios  06/19/22 [phone]

## 2022-06-19 NOTE — ED NOTES
Rosa Mariar inserted bacon catheter using sterile technique at this time. Patient tolerated well and 400mL of yellow and clear urine was returned.       Andreas Merida RN  06/19/22 9474

## 2022-06-19 NOTE — PROGRESS NOTES
RT Inhaler-Nebulizer Bronchodilator Protocol Note    There is a bronchodilator order in the chart from a provider indicating to follow the RT Bronchodilator Protocol and there is an Initiate RT Inhaler-Nebulizer Bronchodilator Protocol order as well (see protocol at bottom of note). CXR Findings:  No results found. The findings from the last RT Protocol Assessment were as follows:   History Pulmonary Disease: Chronic pulmonary disease  Respiratory Pattern: Regular pattern and RR 12-20 bpm  Breath Sounds: Slightly diminished and/or crackles  Cough: Strong, spontaneous, non-productive  Indication for Bronchodilator Therapy: Decreased or absent breath sounds  Bronchodilator Assessment Score: 4    Aerosolized bronchodilator medication orders have been revised according to the RT Inhaler-Nebulizer Bronchodilator Protocol below. Respiratory Therapist to perform RT Therapy Protocol Assessment initially then follow the protocol. Repeat RT Therapy Protocol Assessment PRN for score 0-3 or on second treatment, BID, and PRN for scores above 3. No Indications - adjust the frequency to every 6 hours PRN wheezing or bronchospasm, if no treatments needed after 48 hours then discontinue using Per Protocol order mode. If indication present, adjust the RT bronchodilator orders based on the Bronchodilator Assessment Score as indicated below. Use Inhaler orders unless patient has one or more of the following: on home nebulizer, not able to hold breath for 10 seconds, is not alert and oriented, cannot activate and use MDI correctly, or respiratory rate 25 breaths per minute or more, then use the equivalent nebulizer order(s) with same Frequency and PRN reasons based on the score. If a patient is on this medication at home then do not decrease Frequency below that used at home.     0-3 - enter or revise RT bronchodilator order(s) to equivalent RT Bronchodilator order with Frequency of every 4 hours PRN for wheezing or increased work of breathing using Per Protocol order mode. 4-6 - enter or revise RT Bronchodilator order(s) to two equivalent RT bronchodilator orders with one order with BID Frequency and one order with Frequency of every 4 hours PRN wheezing or increased work of breathing using Per Protocol order mode. 7-10 - enter or revise RT Bronchodilator order(s) to two equivalent RT bronchodilator orders with one order with TID Frequency and one order with Frequency of every 4 hours PRN wheezing or increased work of breathing using Per Protocol order mode. 11-13 - enter or revise RT Bronchodilator order(s) to one equivalent RT bronchodilator order with QID Frequency and an Albuterol order with Frequency of every 4 hours PRN wheezing or increased work of breathing using Per Protocol order mode. Greater than 13 - enter or revise RT Bronchodilator order(s) to one equivalent RT bronchodilator order with every 4 hours Frequency and an Albuterol order with Frequency of every 2 hours PRN wheezing or increased work of breathing using Per Protocol order mode.          Electronically signed by Jordyn Carbajal RCP on 6/19/2022 at 7:50 PM

## 2022-06-20 ENCOUNTER — ANESTHESIA (OUTPATIENT)
Dept: OPERATING ROOM | Age: 77
DRG: 480 | End: 2022-06-20
Payer: MEDICARE

## 2022-06-20 ENCOUNTER — APPOINTMENT (OUTPATIENT)
Dept: GENERAL RADIOLOGY | Age: 77
DRG: 480 | End: 2022-06-20
Payer: MEDICARE

## 2022-06-20 ENCOUNTER — ANESTHESIA EVENT (OUTPATIENT)
Dept: OPERATING ROOM | Age: 77
DRG: 480 | End: 2022-06-20
Payer: MEDICARE

## 2022-06-20 PROBLEM — S72.141A CLOSED DISPLACED INTERTROCHANTERIC FRACTURE OF RIGHT FEMUR (HCC): Status: ACTIVE | Noted: 2022-06-19

## 2022-06-20 LAB
ABO/RH: NORMAL
ANION GAP SERPL CALCULATED.3IONS-SCNC: 9 MMOL/L (ref 3–16)
ANISOCYTOSIS: ABNORMAL
ANTIBODY SCREEN: NORMAL
BANDED NEUTROPHILS RELATIVE PERCENT: 1 % (ref 0–7)
BASOPHILS ABSOLUTE: 0 K/UL (ref 0–0.2)
BASOPHILS RELATIVE PERCENT: 0 %
BILIRUBIN URINE: NEGATIVE
BLOOD, URINE: ABNORMAL
BUN BLDV-MCNC: 14 MG/DL (ref 7–20)
CALCIUM SERPL-MCNC: 8.7 MG/DL (ref 8.3–10.6)
CHLORIDE BLD-SCNC: 105 MMOL/L (ref 99–110)
CLARITY: ABNORMAL
CO2: 26 MMOL/L (ref 21–32)
COLOR: YELLOW
COMMENT UA: ABNORMAL
CREAT SERPL-MCNC: 0.6 MG/DL (ref 0.6–1.2)
EKG ATRIAL RATE: 101 BPM
EKG DIAGNOSIS: NORMAL
EKG P AXIS: 31 DEGREES
EKG P-R INTERVAL: 158 MS
EKG Q-T INTERVAL: 344 MS
EKG QRS DURATION: 96 MS
EKG QTC CALCULATION (BAZETT): 446 MS
EKG R AXIS: -55 DEGREES
EKG T AXIS: 77 DEGREES
EKG VENTRICULAR RATE: 101 BPM
EOSINOPHILS ABSOLUTE: 0.2 K/UL (ref 0–0.6)
EOSINOPHILS RELATIVE PERCENT: 2 %
FERRITIN: 100 NG/ML (ref 15–150)
FOLATE: 13.05 NG/ML (ref 4.78–24.2)
GFR AFRICAN AMERICAN: >60
GFR NON-AFRICAN AMERICAN: >60
GLUCOSE BLD-MCNC: 143 MG/DL (ref 70–99)
GLUCOSE BLD-MCNC: 152 MG/DL (ref 70–99)
GLUCOSE BLD-MCNC: 156 MG/DL (ref 70–99)
GLUCOSE BLD-MCNC: 172 MG/DL (ref 70–99)
GLUCOSE BLD-MCNC: 184 MG/DL (ref 70–99)
GLUCOSE URINE: NEGATIVE MG/DL
HCT VFR BLD CALC: 24.8 % (ref 36–48)
HEMATOLOGY PATH CONSULT: NO
HEMATOLOGY PATH CONSULT: NORMAL
HEMOGLOBIN: 7.9 G/DL (ref 12–16)
HYPOCHROMIA: ABNORMAL
IRON SATURATION: 9 % (ref 15–50)
IRON: 20 UG/DL (ref 37–145)
KETONES, URINE: 15 MG/DL
LEUKOCYTE ESTERASE, URINE: ABNORMAL
LYMPHOCYTES ABSOLUTE: 1.2 K/UL (ref 1–5.1)
LYMPHOCYTES RELATIVE PERCENT: 16 %
MCH RBC QN AUTO: 18.9 PG (ref 26–34)
MCHC RBC AUTO-ENTMCNC: 31.7 G/DL (ref 31–36)
MCV RBC AUTO: 59.6 FL (ref 80–100)
MICROSCOPIC EXAMINATION: YES
MONOCYTES ABSOLUTE: 0.4 K/UL (ref 0–1.3)
MONOCYTES RELATIVE PERCENT: 5 %
NEUTROPHILS ABSOLUTE: 6 K/UL (ref 1.7–7.7)
NEUTROPHILS RELATIVE PERCENT: 76 %
NITRITE, URINE: POSITIVE
OVALOCYTES: ABNORMAL
PDW BLD-RTO: 17 % (ref 12.4–15.4)
PERFORMED ON: ABNORMAL
PH UA: 5.5 (ref 5–8)
PLATELET # BLD: 272 K/UL (ref 135–450)
PLATELET SLIDE REVIEW: ADEQUATE
PMV BLD AUTO: 12.2 FL (ref 5–10.5)
POIKILOCYTES: ABNORMAL
POLYCHROMASIA: ABNORMAL
POTASSIUM REFLEX MAGNESIUM: 4.3 MMOL/L (ref 3.5–5.1)
PROTEIN UA: 30 MG/DL
RBC # BLD: 4.16 M/UL (ref 4–5.2)
RBC UA: ABNORMAL /HPF (ref 0–4)
SLIDE REVIEW: ABNORMAL
SODIUM BLD-SCNC: 140 MMOL/L (ref 136–145)
SPECIFIC GRAVITY UA: >=1.03 (ref 1–1.03)
TOTAL IRON BINDING CAPACITY: 215 UG/DL (ref 260–445)
URIC ACID, SERUM: 4.1 MG/DL (ref 2.6–6)
URINE TYPE: ABNORMAL
UROBILINOGEN, URINE: 0.2 E.U./DL
VITAMIN B-12: 377 PG/ML (ref 211–911)
WBC # BLD: 7.8 K/UL (ref 4–11)
WBC UA: >100 /HPF (ref 0–5)

## 2022-06-20 PROCEDURE — 6370000000 HC RX 637 (ALT 250 FOR IP): Performed by: STUDENT IN AN ORGANIZED HEALTH CARE EDUCATION/TRAINING PROGRAM

## 2022-06-20 PROCEDURE — 73502 X-RAY EXAM HIP UNI 2-3 VIEWS: CPT

## 2022-06-20 PROCEDURE — 6360000002 HC RX W HCPCS: Performed by: STUDENT IN AN ORGANIZED HEALTH CARE EDUCATION/TRAINING PROGRAM

## 2022-06-20 PROCEDURE — 2700000000 HC OXYGEN THERAPY PER DAY

## 2022-06-20 PROCEDURE — 87186 SC STD MICRODIL/AGAR DIL: CPT

## 2022-06-20 PROCEDURE — 7100000001 HC PACU RECOVERY - ADDTL 15 MIN: Performed by: STUDENT IN AN ORGANIZED HEALTH CARE EDUCATION/TRAINING PROGRAM

## 2022-06-20 PROCEDURE — 71045 X-RAY EXAM CHEST 1 VIEW: CPT

## 2022-06-20 PROCEDURE — 82728 ASSAY OF FERRITIN: CPT

## 2022-06-20 PROCEDURE — 3209999900 FLUORO FOR SURGICAL PROCEDURES

## 2022-06-20 PROCEDURE — 2580000003 HC RX 258: Performed by: STUDENT IN AN ORGANIZED HEALTH CARE EDUCATION/TRAINING PROGRAM

## 2022-06-20 PROCEDURE — 86900 BLOOD TYPING SEROLOGIC ABO: CPT

## 2022-06-20 PROCEDURE — 2500000003 HC RX 250 WO HCPCS

## 2022-06-20 PROCEDURE — 2580000003 HC RX 258: Performed by: NURSE ANESTHETIST, CERTIFIED REGISTERED

## 2022-06-20 PROCEDURE — 94640 AIRWAY INHALATION TREATMENT: CPT

## 2022-06-20 PROCEDURE — 2720000010 HC SURG SUPPLY STERILE: Performed by: STUDENT IN AN ORGANIZED HEALTH CARE EDUCATION/TRAINING PROGRAM

## 2022-06-20 PROCEDURE — 6370000000 HC RX 637 (ALT 250 FOR IP): Performed by: INTERNAL MEDICINE

## 2022-06-20 PROCEDURE — 83550 IRON BINDING TEST: CPT

## 2022-06-20 PROCEDURE — 7100000000 HC PACU RECOVERY - FIRST 15 MIN: Performed by: STUDENT IN AN ORGANIZED HEALTH CARE EDUCATION/TRAINING PROGRAM

## 2022-06-20 PROCEDURE — 36415 COLL VENOUS BLD VENIPUNCTURE: CPT

## 2022-06-20 PROCEDURE — 2500000003 HC RX 250 WO HCPCS: Performed by: ANESTHESIOLOGY

## 2022-06-20 PROCEDURE — 3700000001 HC ADD 15 MINUTES (ANESTHESIA): Performed by: STUDENT IN AN ORGANIZED HEALTH CARE EDUCATION/TRAINING PROGRAM

## 2022-06-20 PROCEDURE — 73630 X-RAY EXAM OF FOOT: CPT

## 2022-06-20 PROCEDURE — 93010 ELECTROCARDIOGRAM REPORT: CPT | Performed by: INTERNAL MEDICINE

## 2022-06-20 PROCEDURE — 85025 COMPLETE CBC W/AUTO DIFF WBC: CPT

## 2022-06-20 PROCEDURE — 6360000002 HC RX W HCPCS: Performed by: ANESTHESIOLOGY

## 2022-06-20 PROCEDURE — C1713 ANCHOR/SCREW BN/BN,TIS/BN: HCPCS | Performed by: STUDENT IN AN ORGANIZED HEALTH CARE EDUCATION/TRAINING PROGRAM

## 2022-06-20 PROCEDURE — 83540 ASSAY OF IRON: CPT

## 2022-06-20 PROCEDURE — 6360000002 HC RX W HCPCS

## 2022-06-20 PROCEDURE — 2500000003 HC RX 250 WO HCPCS: Performed by: NURSE ANESTHETIST, CERTIFIED REGISTERED

## 2022-06-20 PROCEDURE — 87086 URINE CULTURE/COLONY COUNT: CPT

## 2022-06-20 PROCEDURE — 6360000002 HC RX W HCPCS: Performed by: NURSE ANESTHETIST, CERTIFIED REGISTERED

## 2022-06-20 PROCEDURE — 84550 ASSAY OF BLOOD/URIC ACID: CPT

## 2022-06-20 PROCEDURE — 0QS606Z REPOSITION RIGHT UPPER FEMUR WITH INTRAMEDULLARY INTERNAL FIXATION DEVICE, OPEN APPROACH: ICD-10-PCS | Performed by: STUDENT IN AN ORGANIZED HEALTH CARE EDUCATION/TRAINING PROGRAM

## 2022-06-20 PROCEDURE — 3600000014 HC SURGERY LEVEL 4 ADDTL 15MIN: Performed by: STUDENT IN AN ORGANIZED HEALTH CARE EDUCATION/TRAINING PROGRAM

## 2022-06-20 PROCEDURE — 6360000002 HC RX W HCPCS: Performed by: INTERNAL MEDICINE

## 2022-06-20 PROCEDURE — 80048 BASIC METABOLIC PNL TOTAL CA: CPT

## 2022-06-20 PROCEDURE — 81001 URINALYSIS AUTO W/SCOPE: CPT

## 2022-06-20 PROCEDURE — 82746 ASSAY OF FOLIC ACID SERUM: CPT

## 2022-06-20 PROCEDURE — 2500000003 HC RX 250 WO HCPCS: Performed by: STUDENT IN AN ORGANIZED HEALTH CARE EDUCATION/TRAINING PROGRAM

## 2022-06-20 PROCEDURE — 3700000000 HC ANESTHESIA ATTENDED CARE: Performed by: STUDENT IN AN ORGANIZED HEALTH CARE EDUCATION/TRAINING PROGRAM

## 2022-06-20 PROCEDURE — 86923 COMPATIBILITY TEST ELECTRIC: CPT

## 2022-06-20 PROCEDURE — 86901 BLOOD TYPING SEROLOGIC RH(D): CPT

## 2022-06-20 PROCEDURE — 99223 1ST HOSP IP/OBS HIGH 75: CPT | Performed by: STUDENT IN AN ORGANIZED HEALTH CARE EDUCATION/TRAINING PROGRAM

## 2022-06-20 PROCEDURE — 87088 URINE BACTERIA CULTURE: CPT

## 2022-06-20 PROCEDURE — 94761 N-INVAS EAR/PLS OXIMETRY MLT: CPT

## 2022-06-20 PROCEDURE — 99223 1ST HOSP IP/OBS HIGH 75: CPT | Performed by: INTERNAL MEDICINE

## 2022-06-20 PROCEDURE — 3600000004 HC SURGERY LEVEL 4 BASE: Performed by: STUDENT IN AN ORGANIZED HEALTH CARE EDUCATION/TRAINING PROGRAM

## 2022-06-20 PROCEDURE — 1200000000 HC SEMI PRIVATE

## 2022-06-20 PROCEDURE — 82607 VITAMIN B-12: CPT

## 2022-06-20 PROCEDURE — P9016 RBC LEUKOCYTES REDUCED: HCPCS

## 2022-06-20 PROCEDURE — 2709999900 HC NON-CHARGEABLE SUPPLY: Performed by: STUDENT IN AN ORGANIZED HEALTH CARE EDUCATION/TRAINING PROGRAM

## 2022-06-20 PROCEDURE — 2780000010 HC IMPLANT OTHER: Performed by: STUDENT IN AN ORGANIZED HEALTH CARE EDUCATION/TRAINING PROGRAM

## 2022-06-20 PROCEDURE — 86850 RBC ANTIBODY SCREEN: CPT

## 2022-06-20 PROCEDURE — C9290 INJ, BUPIVACAINE LIPOSOME: HCPCS | Performed by: STUDENT IN AN ORGANIZED HEALTH CARE EDUCATION/TRAINING PROGRAM

## 2022-06-20 PROCEDURE — C1769 GUIDE WIRE: HCPCS | Performed by: STUDENT IN AN ORGANIZED HEALTH CARE EDUCATION/TRAINING PROGRAM

## 2022-06-20 PROCEDURE — 27245 TREAT THIGH FRACTURE: CPT | Performed by: STUDENT IN AN ORGANIZED HEALTH CARE EDUCATION/TRAINING PROGRAM

## 2022-06-20 PROCEDURE — A4217 STERILE WATER/SALINE, 500 ML: HCPCS | Performed by: STUDENT IN AN ORGANIZED HEALTH CARE EDUCATION/TRAINING PROGRAM

## 2022-06-20 DEVICE — IMPLANTABLE DEVICE: Type: IMPLANTABLE DEVICE | Site: HIP | Status: FUNCTIONAL

## 2022-06-20 DEVICE — SCREW BNE L40MM DIA5MM ST TIB LT GRN TI ST CANN LOK FULL: Type: IMPLANTABLE DEVICE | Site: HIP | Status: FUNCTIONAL

## 2022-06-20 RX ORDER — MIDAZOLAM HYDROCHLORIDE 1 MG/ML
1 INJECTION INTRAMUSCULAR; INTRAVENOUS EVERY 5 MIN PRN
Status: DISCONTINUED | OUTPATIENT
Start: 2022-06-20 | End: 2022-06-20 | Stop reason: HOSPADM

## 2022-06-20 RX ORDER — ONDANSETRON 2 MG/ML
INJECTION INTRAMUSCULAR; INTRAVENOUS PRN
Status: DISCONTINUED | OUTPATIENT
Start: 2022-06-20 | End: 2022-06-20 | Stop reason: SDUPTHER

## 2022-06-20 RX ORDER — PROPOFOL 10 MG/ML
INJECTION, EMULSION INTRAVENOUS PRN
Status: DISCONTINUED | OUTPATIENT
Start: 2022-06-20 | End: 2022-06-20 | Stop reason: SDUPTHER

## 2022-06-20 RX ORDER — ONDANSETRON 2 MG/ML
4 INJECTION INTRAMUSCULAR; INTRAVENOUS EVERY 10 MIN PRN
Status: DISCONTINUED | OUTPATIENT
Start: 2022-06-20 | End: 2022-06-20 | Stop reason: HOSPADM

## 2022-06-20 RX ORDER — ROCURONIUM BROMIDE 10 MG/ML
INJECTION, SOLUTION INTRAVENOUS PRN
Status: DISCONTINUED | OUTPATIENT
Start: 2022-06-20 | End: 2022-06-20 | Stop reason: SDUPTHER

## 2022-06-20 RX ORDER — INSULIN LISPRO 100 [IU]/ML
0-6 INJECTION, SOLUTION INTRAVENOUS; SUBCUTANEOUS EVERY 4 HOURS
Status: DISCONTINUED | OUTPATIENT
Start: 2022-06-20 | End: 2022-06-22

## 2022-06-20 RX ORDER — ACETAMINOPHEN 650 MG/1
650 SUPPOSITORY RECTAL ONCE
Status: COMPLETED | OUTPATIENT
Start: 2022-06-20 | End: 2022-06-20

## 2022-06-20 RX ORDER — OXYCODONE HYDROCHLORIDE 5 MG/1
10 TABLET ORAL PRN
Status: DISCONTINUED | OUTPATIENT
Start: 2022-06-20 | End: 2022-06-20 | Stop reason: HOSPADM

## 2022-06-20 RX ORDER — OXYCODONE HYDROCHLORIDE 5 MG/1
5 TABLET ORAL EVERY 4 HOURS PRN
Status: DISCONTINUED | OUTPATIENT
Start: 2022-06-20 | End: 2022-06-27

## 2022-06-20 RX ORDER — DEXTROSE MONOHYDRATE 50 MG/ML
100 INJECTION, SOLUTION INTRAVENOUS PRN
Status: DISCONTINUED | OUTPATIENT
Start: 2022-06-20 | End: 2022-06-30 | Stop reason: HOSPADM

## 2022-06-20 RX ORDER — ESMOLOL HYDROCHLORIDE 10 MG/ML
INJECTION INTRAVENOUS PRN
Status: DISCONTINUED | OUTPATIENT
Start: 2022-06-20 | End: 2022-06-20 | Stop reason: SDUPTHER

## 2022-06-20 RX ORDER — MORPHINE SULFATE 2 MG/ML
2 INJECTION, SOLUTION INTRAMUSCULAR; INTRAVENOUS EVERY 4 HOURS PRN
Status: DISCONTINUED | OUTPATIENT
Start: 2022-06-20 | End: 2022-06-21

## 2022-06-20 RX ORDER — OXYCODONE HYDROCHLORIDE 5 MG/1
10 TABLET ORAL EVERY 4 HOURS PRN
Status: DISCONTINUED | OUTPATIENT
Start: 2022-06-20 | End: 2022-06-27

## 2022-06-20 RX ORDER — DIPHENHYDRAMINE HYDROCHLORIDE 50 MG/ML
12.5 INJECTION INTRAMUSCULAR; INTRAVENOUS
Status: DISCONTINUED | OUTPATIENT
Start: 2022-06-20 | End: 2022-06-20 | Stop reason: HOSPADM

## 2022-06-20 RX ORDER — SODIUM CHLORIDE 0.9 % (FLUSH) 0.9 %
5-40 SYRINGE (ML) INJECTION PRN
Status: DISCONTINUED | OUTPATIENT
Start: 2022-06-20 | End: 2022-06-20 | Stop reason: HOSPADM

## 2022-06-20 RX ORDER — OXYCODONE HYDROCHLORIDE 5 MG/1
5 TABLET ORAL PRN
Status: DISCONTINUED | OUTPATIENT
Start: 2022-06-20 | End: 2022-06-20 | Stop reason: HOSPADM

## 2022-06-20 RX ORDER — SODIUM CHLORIDE 9 MG/ML
25 INJECTION, SOLUTION INTRAVENOUS PRN
Status: DISCONTINUED | OUTPATIENT
Start: 2022-06-20 | End: 2022-06-20 | Stop reason: HOSPADM

## 2022-06-20 RX ORDER — HYDRALAZINE HYDROCHLORIDE 20 MG/ML
5 INJECTION INTRAMUSCULAR; INTRAVENOUS
Status: DISCONTINUED | OUTPATIENT
Start: 2022-06-20 | End: 2022-06-20 | Stop reason: HOSPADM

## 2022-06-20 RX ORDER — MAGNESIUM HYDROXIDE 1200 MG/15ML
LIQUID ORAL CONTINUOUS PRN
Status: COMPLETED | OUTPATIENT
Start: 2022-06-20 | End: 2022-06-20

## 2022-06-20 RX ORDER — SODIUM CHLORIDE 0.9 % (FLUSH) 0.9 %
5-40 SYRINGE (ML) INJECTION EVERY 12 HOURS SCHEDULED
Status: DISCONTINUED | OUTPATIENT
Start: 2022-06-20 | End: 2022-06-20 | Stop reason: HOSPADM

## 2022-06-20 RX ORDER — MEPERIDINE HYDROCHLORIDE 25 MG/ML
12.5 INJECTION INTRAMUSCULAR; INTRAVENOUS; SUBCUTANEOUS EVERY 5 MIN PRN
Status: DISCONTINUED | OUTPATIENT
Start: 2022-06-20 | End: 2022-06-20 | Stop reason: HOSPADM

## 2022-06-20 RX ORDER — FENTANYL CITRATE 50 UG/ML
INJECTION, SOLUTION INTRAMUSCULAR; INTRAVENOUS PRN
Status: DISCONTINUED | OUTPATIENT
Start: 2022-06-20 | End: 2022-06-20 | Stop reason: SDUPTHER

## 2022-06-20 RX ORDER — LIDOCAINE HYDROCHLORIDE 20 MG/ML
INJECTION, SOLUTION INFILTRATION; PERINEURAL PRN
Status: DISCONTINUED | OUTPATIENT
Start: 2022-06-20 | End: 2022-06-20 | Stop reason: SDUPTHER

## 2022-06-20 RX ORDER — SODIUM CHLORIDE, SODIUM LACTATE, POTASSIUM CHLORIDE, CALCIUM CHLORIDE 600; 310; 30; 20 MG/100ML; MG/100ML; MG/100ML; MG/100ML
INJECTION, SOLUTION INTRAVENOUS CONTINUOUS PRN
Status: DISCONTINUED | OUTPATIENT
Start: 2022-06-20 | End: 2022-06-20 | Stop reason: SDUPTHER

## 2022-06-20 RX ADMIN — SUGAMMADEX 200 MG: 100 INJECTION, SOLUTION INTRAVENOUS at 18:41

## 2022-06-20 RX ADMIN — GABAPENTIN 300 MG: 300 CAPSULE ORAL at 21:13

## 2022-06-20 RX ADMIN — FENTANYL CITRATE 25 MCG: 50 INJECTION INTRAMUSCULAR; INTRAVENOUS at 17:49

## 2022-06-20 RX ADMIN — FENTANYL CITRATE 25 MCG: 50 INJECTION INTRAMUSCULAR; INTRAVENOUS at 17:35

## 2022-06-20 RX ADMIN — ACETAMINOPHEN 650 MG: 650 SUPPOSITORY RECTAL at 16:30

## 2022-06-20 RX ADMIN — ROCURONIUM BROMIDE 50 MG: 10 INJECTION, SOLUTION INTRAVENOUS at 17:23

## 2022-06-20 RX ADMIN — MORPHINE SULFATE 2 MG: 2 INJECTION, SOLUTION INTRAMUSCULAR; INTRAVENOUS at 02:56

## 2022-06-20 RX ADMIN — ESMOLOL HYDROCHLORIDE 30 MG: 10 INJECTION, SOLUTION INTRAVENOUS at 18:06

## 2022-06-20 RX ADMIN — Medication 2 PUFF: at 08:41

## 2022-06-20 RX ADMIN — Medication 2 PUFF: at 08:43

## 2022-06-20 RX ADMIN — TIOTROPIUM BROMIDE INHALATION SPRAY 2 PUFF: 3.12 SPRAY, METERED RESPIRATORY (INHALATION) at 08:42

## 2022-06-20 RX ADMIN — CEFAZOLIN 2000 MG: 2 INJECTION, POWDER, FOR SOLUTION INTRAMUSCULAR; INTRAVENOUS at 17:10

## 2022-06-20 RX ADMIN — CARVEDILOL 6.25 MG: 6.25 TABLET, FILM COATED ORAL at 21:13

## 2022-06-20 RX ADMIN — ATORVASTATIN CALCIUM 10 MG: 10 TABLET, FILM COATED ORAL at 21:13

## 2022-06-20 RX ADMIN — SODIUM CHLORIDE, POTASSIUM CHLORIDE, SODIUM LACTATE AND CALCIUM CHLORIDE: 600; 310; 30; 20 INJECTION, SOLUTION INTRAVENOUS at 17:18

## 2022-06-20 RX ADMIN — FENTANYL CITRATE 25 MCG: 50 INJECTION INTRAMUSCULAR; INTRAVENOUS at 18:01

## 2022-06-20 RX ADMIN — PANTOPRAZOLE SODIUM 40 MG: 40 TABLET, DELAYED RELEASE ORAL at 06:14

## 2022-06-20 RX ADMIN — ENOXAPARIN SODIUM 30 MG: 100 INJECTION SUBCUTANEOUS at 21:13

## 2022-06-20 RX ADMIN — INSULIN LISPRO 1 UNITS: 100 INJECTION, SOLUTION INTRAVENOUS; SUBCUTANEOUS at 21:19

## 2022-06-20 RX ADMIN — LIDOCAINE HYDROCHLORIDE 20 MG: 20 INJECTION, SOLUTION INFILTRATION; PERINEURAL at 17:23

## 2022-06-20 RX ADMIN — FENTANYL CITRATE 25 MCG: 50 INJECTION INTRAMUSCULAR; INTRAVENOUS at 17:29

## 2022-06-20 RX ADMIN — MORPHINE SULFATE 2 MG: 2 INJECTION, SOLUTION INTRAMUSCULAR; INTRAVENOUS at 09:05

## 2022-06-20 RX ADMIN — PROPOFOL 80 MG: 10 INJECTION, EMULSION INTRAVENOUS at 17:23

## 2022-06-20 RX ADMIN — ONDANSETRON HYDROCHLORIDE 4 MG: 2 INJECTION, SOLUTION INTRAMUSCULAR; INTRAVENOUS at 18:36

## 2022-06-20 RX ADMIN — SODIUM CHLORIDE, PRESERVATIVE FREE 10 ML: 5 INJECTION INTRAVENOUS at 21:14

## 2022-06-20 RX ADMIN — SODIUM CHLORIDE 1000 ML: 9 INJECTION, SOLUTION INTRAVENOUS at 02:53

## 2022-06-20 RX ADMIN — HYDROMORPHONE HYDROCHLORIDE 0.5 MG: 1 INJECTION, SOLUTION INTRAMUSCULAR; INTRAVENOUS; SUBCUTANEOUS at 19:12

## 2022-06-20 ASSESSMENT — PAIN SCALES - GENERAL
PAINLEVEL_OUTOF10: 3
PAINLEVEL_OUTOF10: 0
PAINLEVEL_OUTOF10: 5
PAINLEVEL_OUTOF10: 10
PAINLEVEL_OUTOF10: 5

## 2022-06-20 ASSESSMENT — PAIN DESCRIPTION - DESCRIPTORS
DESCRIPTORS: OTHER (COMMENT)
DESCRIPTORS: ACHING
DESCRIPTORS: ACHING;SORE;PRESSURE
DESCRIPTORS: ACHING;SHARP

## 2022-06-20 ASSESSMENT — PAIN DESCRIPTION - ORIENTATION
ORIENTATION: RIGHT

## 2022-06-20 ASSESSMENT — PAIN DESCRIPTION - LOCATION
LOCATION: LEG
LOCATION: HIP
LOCATION: HIP
LOCATION: KNEE;LEG
LOCATION: HIP

## 2022-06-20 ASSESSMENT — PAIN DESCRIPTION - ONSET: ONSET: ON-GOING

## 2022-06-20 ASSESSMENT — PAIN DESCRIPTION - PAIN TYPE
TYPE: SURGICAL PAIN
TYPE: OTHER (COMMENT)

## 2022-06-20 ASSESSMENT — PAIN DESCRIPTION - FREQUENCY
FREQUENCY: OTHER (COMMENT)
FREQUENCY: CONTINUOUS

## 2022-06-20 NOTE — CONSULTS
Consultant: Tamiko Zapata DO  From: Dr. Greg Stephen  Reason: R hip pain    Chief Complaint   Patient presents with    Hip Injury     Patient fell this am going to the bathroom. Patient states that her R leg from her hip to her knee. Patient's R leg is noticed to be shortned and rotated. History of Present Illness      Kelli Rivera is a 67 y.o. female who presents with RIGHT hip pain. Patient sustained a mechanical fall resulting in the injury. Her foot got tangled under her leg when she turned away from the sink. She reports that her left foot contributed to the fall due to a few days of worsening left foot pain, no injury. Unfortunately the patient sustained a R hip IT fx from the fall. She lives alone but has family in the area. She denies numbness down the leg. She is a diabetic. She denies blood thinner use. She does have chronic anemia from thalassemia minor.        Baseline functional level: independent ambulator        Past History       Past Medical History:   Diagnosis Date    Anemia     thallasemia minor    Arthritis     osteo-arthritis    Asthma     Back pain     Back pain     Bronchitis chronic     Diabetes mellitus (HCC)     GERD (gastroesophageal reflux disease)     Gout     Hypertension     Melanoma of scalp or neck (HCC)     Osteoarthritis     Osteoporosis     Squamous cell carcinoma, arm     Thal trait      Past Surgical History:   Procedure Laterality Date    APPENDECTOMY      CARPAL TUNNEL RELEASE Left 02/05/2018    CATARACT REMOVAL WITH IMPLANT Right 07/18/2018    CATARACT REMOVAL WITH IMPLANT Left 08/15/2018    COLONOSCOPY  12/2014    HYSTERECTOMY (CERVIX STATUS UNKNOWN)      KNEE ARTHROSCOPY Bilateral     PARATHYROIDECTOMY      PARTIAL HYSTERECTOMY (CERVIX NOT REMOVED)      TX XCAPSL CTRC RMVL INSJ IO LENS PROSTH W/O ECP Right 7/18/2018    PHACOEMULSIFICATION OF CATARACT WITH INTRAOCULAR LENS IMPLANT RIGHT EYE performed by Vandana Carbajal MD at [facility] OR    IL XCAPSL CTRC RMVL INSJ IO LENS PROSTH W/O ECP Left 8/15/2018    PHACOEMULSIFICATION OF CATARACT WITH INTRAOCULAR LENS IMPLANT LEFT EYE performed by Jacquelyn Jules MD at [address] [place] [address]       Family History   Problem Relation Age of Onset    Heart Failure Mother     Emphysema Mother     Stroke Mother     Heart Failure Father     Hypertension Father     Heart Disease Father     High Blood Pressure Father     Cancer Maternal Aunt     Cancer Paternal Aunt     Cancer Maternal Grandmother     Asthma Neg Hx     Diabetes Neg Hx      Social History     Tobacco Use    Smoking status: Passive Smoke Exposure - Never Smoker    Smokeless tobacco: Never Used    Tobacco comment: exposed to passive smoke for 18 yrs   Substance Use Topics    Alcohol use: No      No current facility-administered medications on file prior to encounter. Current Outpatient Medications on File Prior to Encounter   Medication Sig Dispense Refill    terazosin (HYTRIN) 2 MG capsule TAKE ONE CAPSULE BY MOUTH ONCE NIGHTLY 30 capsule 2    omeprazole (PRILOSEC) 20 MG delayed release capsule TAKE ONE CAPSULE BY MOUTH TWICE A  capsule 0    alendronate (FOSAMAX) 70 MG tablet TAKE 1 TABLET BY MOUTH  WEEKLY WITH 8 OZ OF PLAIN  WATER 30 MINUTES BEFORE  FIRST FOOD, DRINK OR MEDS. STAY UPRIGHT FOR 30 MINS 12 tablet 0    verapamil (CALAN SR) 240 MG extended release tablet TAKE 1 TABLET BY MOUTH  DAILY 90 tablet 0    Fluticasone furoate-vilanterol (BREO ELLIPTA) 200-25 MCG/INH AEPB inhaler INHALE ONE DOSE BY MOUTH DAILY 1 each 5    Umeclidinium Bromide (INCRUSE ELLIPTA) 62.5 MCG/INH AEPB INHALE ONE PUFF BY MOUTH DAILY 1 each 5    gabapentin (NEURONTIN) 300 MG capsule Take 1 capsule by mouth 2 times daily for 180 days. Intended supply: 90 days 60 capsule 2    blood glucose test strips (ASCENSIA AUTODISC VI;ONE TOUCH ULTRA TEST VI) strip Used to test once daily.  DX: E11.9 100 each 3    albuterol sulfate HFA (PROAIR HFA) 108 (90 Base) MCG/ACT inhaler INHALE TWO PUFFS BY MOUTH EVERY 6 HOURS AS NEEDED FOR WHEEZING 1 each 2    metFORMIN (GLUCOPHAGE) 1000 MG tablet TAKE 1 TABLET BY MOUTH  TWICE DAILY WITH MEALS 180 tablet 3    PARoxetine (PAXIL) 10 MG tablet TAKE 1 TABLET BY MOUTH  DAILY 90 tablet 3    atorvastatin (LIPITOR) 10 MG tablet TAKE 1 TABLET BY MOUTH AT  NIGHT 90 tablet 3    allopurinol (ZYLOPRIM) 300 MG tablet TAKE 1 TABLET BY MOUTH  DAILY 90 tablet 3    losartan (COZAAR) 100 MG tablet TAKE 1 TABLET BY MOUTH  DAILY 90 tablet 3    glipiZIDE (GLUCOTROL) 10 MG tablet TAKE 1 TABLET BY MOUTH  TWICE DAILY BEFORE MEALS 180 tablet 3    carvedilol (COREG) 6.25 MG tablet TAKE 1 TABLET BY MOUTH  TWICE DAILY 180 tablet 3    clotrimazole-betamethasone (LOTRISONE) 1-0.05 % cream Apply topically 2 times daily. 1 Tube 1    TURMERIC PO Take by mouth      acetaminophen (TYLENOL) 500 MG tablet Take 500 mg by mouth every 6 hours as needed for Pain      Coenzyme Q10 (CO Q 10 PO) Take by mouth      cetirizine (ZYRTEC) 10 MG tablet Take 10 mg by mouth daily      Cyanocobalamin (VITAMIN B 12 PO) Take 1,000 mcg by mouth daily.  Ascorbic Acid (VITAMIN C) 500 MG CAPS Take  by mouth daily.  vitamin E 400 UNIT capsule Take 400 Units by mouth daily.  Calcium Carbonate-Vitamin D (CALCIUM + D PO) Take  by mouth daily. Hctz [hydrochlorothiazide] and Lisinopril    Review of Systems      10-point ROS is negative other than what's mentioned in HPI. Physical Exam    Based off 1997 Exam Criteria    BP (!) 167/87   Pulse 97   Temp 99 °F (37.2 °C) (Oral)   Resp 16   Ht 5' 3\" (1.6 m)   Wt 223 lb 8 oz (101.4 kg)   SpO2 93%   BMI 39.59 kg/m²      Constitutional:       General: He is not in acute distress. Appearance: Normal appearance. Cardiovascular:      Rate and Rhythm: Normal rate and regular rhythm. Pulses: Normal pulses.    Pulmonary:      Effort: Pulmonary effort is normal. No respiratory distress. Neurological:      Mental Status: Patient is alert and oriented to person, place, and time. Mental status is at baseline. Musculoskeletal:  R Hip: Leg resting short and ER, tender to palpation at hip. Compartments soft. EHL/FHL/TA/GS complex motor intact. Sural, saphenous, superificial/deep peroneal, and plantar nerve distribution sensation grossly intact. Dorsalis pedis/posterior tibial pulses 2+ with BCR. L foot exam: No swelling or ecchymosis noted. tender to palpation about the plantar aspect of the medial midfoot. Pain with midfoot varus and valgus stress        Otherwise extremities atraumatic    Imaging       Right Hip  Radiographs: R hip IT fracture with mild shortening    XR L foot demonstrates OA changes throughout the midfoot. No obvious lisfranc injury or gapping. Awaiting final read      Assessment and Plan      I discussed with Mimi Khan that her history, symptoms, signs and imaging are most consistent with   S72.141A Displaced intertrochanteric hip fracture  Osteoporosis/Osteopenia    We reviewed the natural history of these conditions and treatment options ranging from conservative measures to surgical options. This is clearly an operative injury due to its acute nature and level of disability associated with nonoperative treatment. I think she is an appropriate candidate for surgery due to her ongoing symptoms. The procedure would be  79303 Open Treatment Pertrochanteric Femur fracture w/ intramedullary nail    Perioperative considerations include: Optimization by Hospitalist - completed, Coagulopathy and Pre-operative clearance from medical subspecialty. We reviewed the risks, benefits, alternatives of this approach.  We discussed risks including, but not limited to, bleeding, pain, infection, scarring, damage to the neurovascular structures, blood clots, pulmonary embolus, stiffness, implant instability or loosening, implant failure, incomplete relief of pain, and incomplete return of function. Patient is at particularly higher risk perioperatively due to age, frailty, medical comorbidities, thalassemia, diabetes. Plan for OR this afternoon for R hip IMN. NPO. Abx OCTOR. Type and screen as the patient is chronically anemic (Hg 7.9 this morning). We also reviewed the surgical details, expected recovery, and rehabilitation (6-9 months). She expressed understanding and will undergo preoperative medical evaluation and optimization. Osteoporosis/osteopenia management:  I discussed the patient's postoperative fracture care. Patient likely has osteoporosis/osteopenia evidenced by the current fragility fracture from low-energy trauma. Patient is at additional risk for having another break. We reviewed evidence based guidelines. I recommend endocrinology work-up and follow-up. Patient needs extensive bone health work-up/DEXA scan to determine any concomitant risks or predisposing factors worsening osteoporosis/osteopenia. I recommend calcium and vitamin D supplementation in the meantime. She may be a candidate for additional bone density modifying medications. Electronically signed by Maggie Sin DO on 6/20/2022 at 12:27 PM  This dictation was generated by voice recognition computer software. Although all attempts are made to edit the dictation for accuracy, there may be errors in the transcription that are not intended.

## 2022-06-20 NOTE — FLOWSHEET NOTE
06/20/22 0845   Vital Signs   Temp 99 °F (37.2 °C)   Temp Source Oral   Heart Rate 97   Heart Rate Source Monitor   Resp 16   BP (!) 167/87   BP Location Left upper arm   BP Method Automatic   MAP (Calculated) 113.67   Patient Position Semi fowlers   Level of Consciousness Alert (0)   MEWS Score 1   Pain Assessment   Pain Assessment 0-10   Pain Level 5   Oxygen Therapy   SpO2 93 %   O2 Device None (Room air)   Shift assessment complete. See flow sheet. Scheduled medications given, See MAR. Head to toe complete. Vital signs logged and active bowel sounds in all 4 quadrants. Pt awake in bed. Po medications not given due to NPO status. Stat lock replaced. No further needs noted at this time. Call light and bedside table within reach. Bed in lowest position, wheels locked and side rails up x2.      Amanda Leyva RN

## 2022-06-20 NOTE — ANESTHESIA PRE PROCEDURE
Department of Anesthesiology  Preprocedure Note       Name:  Aurora Loving   Age:  67 y.o.  :  1945                                          MRN:  [id number]         Date:  2022      Surgeon: Julia Cesar):  Caitlin Cotter DO    Procedure: Procedure(s):  RIGHT INTRAMEDULLARY NAILING    Medications prior to admission:   Prior to Admission medications    Medication Sig Start Date End Date Taking? Authorizing Provider   terazosin (HYTRIN) 2 MG capsule TAKE ONE CAPSULE BY MOUTH ONCE NIGHTLY 22   Radha Rod MD   omeprazole (PRILOSEC) 20 MG delayed release capsule TAKE ONE CAPSULE BY MOUTH TWICE A DAY 22   Radha Rod MD   alendronate (FOSAMAX) 70 MG tablet TAKE 1 TABLET BY MOUTH  WEEKLY WITH 8 OZ OF PLAIN  WATER 30 MINUTES BEFORE  FIRST FOOD, DRINK OR MEDS. STAY UPRIGHT FOR 30 MINS 22   Radha Rod MD   verapamil (CALAN SR) 240 MG extended release tablet TAKE 1 TABLET BY MOUTH  DAILY 22   Radha Rod MD   Fluticasone furoate-vilanterol (BREO ELLIPTA) 200-25 MCG/INH AEPB inhaler INHALE ONE DOSE BY MOUTH DAILY 3/16/22   Radha Rod MD   Umeclidinium Bromide (INCRUSE ELLIPTA) 62.5 MCG/INH AEPB INHALE ONE PUFF BY MOUTH DAILY 3/16/22   Radha Rod MD   gabapentin (NEURONTIN) 300 MG capsule Take 1 capsule by mouth 2 times daily for 180 days. Intended supply: 90 days 3/16/22 9/12/22  Radha Rod MD   blood glucose test strips (ASCENSIA AUTODISC VI;ONE TOUCH ULTRA TEST VI) strip Used to test once daily.  DX: E11.9 3/16/22   Radha Rod MD   albuterol sulfate HFA (PROAIR HFA) 108 (90 Base) MCG/ACT inhaler INHALE TWO PUFFS BY MOUTH EVERY 6 HOURS AS NEEDED FOR WHEEZING 21   Radha Rod MD   metFORMIN (GLUCOPHAGE) 1000 MG tablet TAKE 1 TABLET BY MOUTH  TWICE DAILY WITH MEALS 21   Radha Rod MD   PARoxetine (PAXIL) 10 MG tablet TAKE 1 TABLET BY MOUTH  DAILY 9/7/21   Tato Lorenzo MD   atorvastatin (LIPITOR) 10 MG tablet TAKE 1 TABLET BY MOUTH AT  NIGHT 9/7/21   Tato Lorenzo MD   allopurinol (ZYLOPRIM) 300 MG tablet TAKE 1 TABLET BY MOUTH  DAILY 9/7/21   Tato Lorenzo MD   losartan (COZAAR) 100 MG tablet TAKE 1 TABLET BY MOUTH  DAILY 9/7/21   Tato Lorenzo MD   glipiZIDE (GLUCOTROL) 10 MG tablet TAKE 1 TABLET BY MOUTH  TWICE DAILY BEFORE MEALS 9/7/21   Tato Lorenzo MD   carvedilol (COREG) 6.25 MG tablet TAKE 1 TABLET BY MOUTH  TWICE DAILY 9/7/21   Tato Lorenzo MD   clotrimazole-betamethasone (LOTRISONE) 1-0.05 % cream Apply topically 2 times daily. 6/10/21   Tato Lorenzo MD   TURMERIC PO Take by mouth    Historical Provider, MD   acetaminophen (TYLENOL) 500 MG tablet Take 500 mg by mouth every 6 hours as needed for Pain    Historical Provider, MD   Coenzyme Q10 (CO Q 10 PO) Take by mouth    Historical Provider, MD   cetirizine (ZYRTEC) 10 MG tablet Take 10 mg by mouth daily    Historical Provider, MD   Cyanocobalamin (VITAMIN B 12 PO) Take 1,000 mcg by mouth daily. Historical Provider, MD   Ascorbic Acid (VITAMIN C) 500 MG CAPS Take  by mouth daily. Historical Provider, MD   vitamin E 400 UNIT capsule Take 400 Units by mouth daily. Historical Provider, MD   Calcium Carbonate-Vitamin D (CALCIUM + D PO) Take  by mouth daily.     Historical Provider, MD       Current medications:    Current Facility-Administered Medications   Medication Dose Route Frequency Provider Last Rate Last Admin    glucose chewable tablet 16 g  4 tablet Oral PRN Claybon Melena, PA        dextrose bolus 10% 125 mL  125 mL IntraVENous PRN Claybon Melmichael, PA        Or    dextrose bolus 10% 250 mL  250 mL IntraVENous PRN Claybon Melmichael, PA        glucagon (rDNA) injection 1 mg  1 mg IntraMUSCular PRN Claybon Marianela, PA        dextrose 5 % solution  100 mL/hr IntraVENous PRN Yann Huston        insulin lispro (HUMALOG) injection vial 0-6 Units  0-6 Units SubCUTAneous Q4H Yann Huston        acetaminophen (TYLENOL) suppository 505 mg  505 mg Rectal Once Steffanie Berman MD        allopurinol (ZYLOPRIM) tablet 200 mg  200 mg Oral Daily Negro Barry MD        atorvastatin (LIPITOR) tablet 10 mg  10 mg Oral Nightly Negro Barry MD   10 mg at 06/19/22 2010    carvedilol (COREG) tablet 6.25 mg  6.25 mg Oral BID Negro Barry MD   6.25 mg at 06/19/22 2009    cetirizine (ZYRTEC) tablet 10 mg  10 mg Oral Daily Negro Barry MD        budesonide-formoterol (SYMBICORT) 80-4.5 MCG/ACT inhaler 2 puff  2 puff Inhalation BID Ngero Barry MD   2 puff at 06/20/22 0843    gabapentin (NEURONTIN) capsule 300 mg  300 mg Oral BID Negro Barry MD   300 mg at 06/19/22 2009    losartan (COZAAR) tablet 100 mg  100 mg Oral Daily Negro Barry MD        metFORMIN (GLUCOPHAGE) tablet 1,000 mg  1,000 mg Oral BID WC Negro Barry MD        pantoprazole (PROTONIX) tablet 40 mg  40 mg Oral QAM AC Negro Barry MD   40 mg at 06/20/22 1078    doxazosin (CARDURA) tablet 4 mg  4 mg Oral Daily Negro Barry MD        PARoxetine (PAXIL) tablet 20 mg  20 mg Oral Daily Negro Barry MD        tiotropium (SPIRIVA RESPIMAT) 2.5 MCG/ACT inhaler 2 puff  2 puff Inhalation Daily Negro Barry MD   2 puff at 06/20/22 0842    verapamil (CALAN SR) extended release tablet 240 mg  240 mg Oral Daily Negro Barry MD        sodium chloride flush 0.9 % injection 5-40 mL  5-40 mL IntraVENous 2 times per day Negro Barry MD        sodium chloride flush 0.9 % injection 5-40 mL  5-40 mL IntraVENous PRN Negro Barry MD        0.9 % sodium chloride infusion   IntraVENous PRN MD Sharri Mckee ondansetron (ZOFRAN-ODT) disintegrating tablet 4 mg  4 mg Oral Q8H PRN Juan José Redding MD        Or    ondansetron (ZOFRAN) injection 4 mg  4 mg IntraVENous Q6H PRN Juan José Redding MD        polyethylene glycol (GLYCOLAX) packet 17 g  17 g Oral Daily PRN Juan José Redding MD        enoxaparin Sodium (LOVENOX) injection 30 mg  30 mg SubCUTAneous BID Juan José Redding MD        morphine (PF) injection 2 mg  2 mg IntraVENous Q3H PRN Juan José Redding MD   2 mg at 06/20/22 0905    hydrALAZINE (APRESOLINE) tablet 25 mg  25 mg Oral Q6H PRN Juan José Redidng MD        0.9 % sodium chloride infusion  1,000 mL IntraVENous Continuous Milan Shirley  mL/hr at 06/20/22 0253 1,000 mL at 06/20/22 0253    albuterol sulfate HFA (PROVENTIL;VENTOLIN;PROAIR) 108 (90 Base) MCG/ACT inhaler 2 puff  2 puff Inhalation Q4H PRN Marylin Correia MD           Allergies: Allergies   Allergen Reactions    Hctz [Hydrochlorothiazide]      Increases calcium to unsafe level resulting in parathyroid surgery    Lisinopril Other (See Comments)     cough       Problem List:    Patient Active Problem List   Diagnosis Code    Knee pain M25.569    Chronic urinary tract infection N39.0    Osteoarthritis of both knees M17.0    Benign essential HTN I10    Type 2 diabetes mellitus without complication, without long-term current use of insulin (HCC) E11.9    Gastroesophageal reflux disease without esophagitis K21.9    Chronic gout of multiple sites M1A. 93W9    Spinal stenosis of lumbar region M48.061    Anemia, chronic disease D63.8    Carpal tunnel syndrome of left wrist G56.02    Morbid obesity (HCC) E66.01    Moderate persistent asthma without complication F22.49    Hyperlipidemia associated with type 2 diabetes mellitus (HCC) E11.69, E78.5    Major depressive disorder with single episode, in full remission ([facility] F32.5    Asthma J45.909    Diabetes ([facility] E11.9    DJD (degenerative joint disease) M19.90    Edema R60.9    Gout M10.9    Lumbar disc disease M51.9    Melanoma ([facility] C43.9    Vitamin B 12 deficiency E53.8    Osteoporosis M81.0    S/P hysterectomy Z90.710    S/P parathyroidectomy ([facility] E89.2    Sciatic nerve pain M54.30    Thalassemia trait D56.3    Hip fracture requiring operative repair, left, closed, initial encounter ([facility] S72.002A       Past Medical History:        Diagnosis Date    Anemia     thallasemia minor    Arthritis     osteo-arthritis    Asthma     Back pain     Back pain     Bronchitis chronic     Diabetes mellitus ([facility]     GERD (gastroesophageal reflux disease)     Gout     Hypertension     Melanoma of scalp or neck (HCC)     Osteoarthritis     Osteoporosis     Squamous cell carcinoma, arm     Thal trait        Past Surgical History:        Procedure Laterality Date    APPENDECTOMY      CARPAL TUNNEL RELEASE Left 02/05/2018    CATARACT REMOVAL WITH IMPLANT Right 07/18/2018    CATARACT REMOVAL WITH IMPLANT Left 08/15/2018    COLONOSCOPY  12/2014    HYSTERECTOMY (CERVIX STATUS UNKNOWN)      KNEE ARTHROSCOPY Bilateral     PARATHYROIDECTOMY      PARTIAL HYSTERECTOMY (CERVIX NOT REMOVED)      HI XCAPSL CTRC RMVL INSJ IO LENS PROSTH W/O ECP Right 7/18/2018    PHACOEMULSIFICATION OF CATARACT WITH INTRAOCULAR LENS IMPLANT RIGHT EYE performed by Marian Taveras MD at [address] [place] [address]  RMVL INSJ IO LENS PROSTH W/O ECP Left 8/15/2018    PHACOEMULSIFICATION OF CATARACT WITH INTRAOCULAR LENS IMPLANT LEFT EYE performed by Marian Taveras MD at [place] [address]         Social History:    Social History     Tobacco Use    Smoking status: Passive Smoke Exposure - Never Smoker    Smokeless tobacco: Never Used    Tobacco comment: exposed to passive smoke for 18 yrs   Substance Use Topics    Alcohol use:  No                                Counseling given: Not Answered  Comment: exposed to passive smoke for 18 yrs      Vital Signs (Current):   Vitals:    06/20/22 0845 06/20/22 0905 06/20/22 0935 06/20/22 1436   BP: (!) 167/87   (!) 154/83   Pulse: 97   (!) 104   Resp: 16 16 16 16   Temp: 99 °F (37.2 °C)   (!) 100.8 °F (38.2 °C)   TempSrc: Oral   Oral   SpO2: 93%   90%   Weight:       Height:                                                  BP Readings from Last 3 Encounters:   06/20/22 (!) 154/83   03/16/22 (!) 156/82   09/23/21 (!) 160/82       NPO Status:                                                                                 BMI:   Wt Readings from Last 3 Encounters:   06/19/22 223 lb 8 oz (101.4 kg)   03/16/22 230 lb (104.3 kg)   11/30/21 230 lb (104.3 kg)     Body mass index is 39.59 kg/m².     CBC:   Lab Results   Component Value Date    WBC 7.8 06/20/2022    RBC 4.16 06/20/2022    HGB 7.9 06/20/2022    HCT 24.8 06/20/2022    MCV 59.6 06/20/2022    RDW 17.0 06/20/2022     06/20/2022       CMP:   Lab Results   Component Value Date     06/20/2022    K 4.3 06/20/2022     06/20/2022    CO2 26 06/20/2022    BUN 14 06/20/2022    CREATININE 0.6 06/20/2022    GFRAA >60 06/20/2022    GFRAA 57 05/05/2013    AGRATIO 2.3 06/19/2022    LABGLOM >60 06/20/2022    GLUCOSE 143 06/20/2022    PROT 6.2 06/19/2022    CALCIUM 8.7 06/20/2022    BILITOT 0.8 06/19/2022    ALKPHOS 68 06/19/2022    AST 11 06/19/2022    ALT 10 06/19/2022       POC Tests:   Recent Labs     06/20/22  1156   POCGLU 152*       Coags:   Lab Results   Component Value Date    PROTIME 11.1 01/06/2015    INR 1.03 01/06/2015       HCG (If Applicable): No results found for: PREGTESTUR, PREGSERUM, HCG, HCGQUANT     ABGs:   Lab Results   Component Value Date    PHART 7.416 01/06/2015    PO2ART 99.8 01/06/2015    FFT4DIC 36.2 01/06/2015    OTM4HQM 22.7 01/06/2015    BEART -1.4 01/06/2015    T5KDBXJC 97.7 01/06/2015        Type & Screen (If Applicable):  No results found for: LABABO, LABRH    Drug/Infectious Status (If Applicable):  No results found for: HIV, HEPCAB    COVID-19 Screening (If Applicable):   Lab Results   Component Value Date    COVID19 Not Detected 06/19/2022           Anesthesia Evaluation  Patient summary reviewed and Nursing notes reviewed no history of anesthetic complications:   Airway: Mallampati: II  TM distance: >3 FB   Neck ROM: full  Mouth opening: > = 3 FB   Dental: normal exam         Pulmonary:   (+) asthma:                            Cardiovascular:    (+) hypertension:,                   Neuro/Psych:   (+) neuromuscular disease:, psychiatric history:            GI/Hepatic/Renal:   (+) GERD: well controlled, morbid obesity     (-) liver disease and no renal disease       Endo/Other:    (+) DiabetesType II DM, , .                 Abdominal:             Vascular: negative vascular ROS. Other Findings:           Anesthesia Plan      general     ASA 3     (I discussed with the patient the risks and benefits of PIV, general anesthesia, IV Narcotics, PACU. All questions were answered the patient agrees with the plan)  Induction: intravenous. MIPS: Prophylactic antiemetics administered. Anesthetic plan and risks discussed with patient. Plan discussed with CRNA.                     Joleen Galloway MD   6/20/2022

## 2022-06-20 NOTE — PROGRESS NOTES
RT Inhaler-Nebulizer Bronchodilator Protocol Note    There is a bronchodilator order in the chart from a provider indicating to follow the RT Bronchodilator Protocol and there is an Initiate RT Inhaler-Nebulizer Bronchodilator Protocol order as well (see protocol at bottom of note). CXR Findings:  No results found. The findings from the last RT Protocol Assessment were as follows:   History Pulmonary Disease: (P) Chronic pulmonary disease  Respiratory Pattern: (P) Regular pattern and RR 12-20 bpm  Breath Sounds: (P) Slightly diminished and/or crackles  Cough: (P) Strong, spontaneous, non-productive  Indication for Bronchodilator Therapy: (P) Decreased or absent breath sounds  Bronchodilator Assessment Score: (P) 4    Aerosolized bronchodilator medication orders have been revised according to the RT Inhaler-Nebulizer Bronchodilator Protocol below. Respiratory Therapist to perform RT Therapy Protocol Assessment initially then follow the protocol. Repeat RT Therapy Protocol Assessment PRN for score 0-3 or on second treatment, BID, and PRN for scores above 3. No Indications - adjust the frequency to every 6 hours PRN wheezing or bronchospasm, if no treatments needed after 48 hours then discontinue using Per Protocol order mode. If indication present, adjust the RT bronchodilator orders based on the Bronchodilator Assessment Score as indicated below. Use Inhaler orders unless patient has one or more of the following: on home nebulizer, not able to hold breath for 10 seconds, is not alert and oriented, cannot activate and use MDI correctly, or respiratory rate 25 breaths per minute or more, then use the equivalent nebulizer order(s) with same Frequency and PRN reasons based on the score. If a patient is on this medication at home then do not decrease Frequency below that used at home.     0-3 - enter or revise RT bronchodilator order(s) to equivalent RT Bronchodilator order with Frequency of every 4 hours PRN for wheezing or increased work of breathing using Per Protocol order mode. 4-6 - enter or revise RT Bronchodilator order(s) to two equivalent RT bronchodilator orders with one order with BID Frequency and one order with Frequency of every 4 hours PRN wheezing or increased work of breathing using Per Protocol order mode. 7-10 - enter or revise RT Bronchodilator order(s) to two equivalent RT bronchodilator orders with one order with TID Frequency and one order with Frequency of every 4 hours PRN wheezing or increased work of breathing using Per Protocol order mode. 11-13 - enter or revise RT Bronchodilator order(s) to one equivalent RT bronchodilator order with QID Frequency and an Albuterol order with Frequency of every 4 hours PRN wheezing or increased work of breathing using Per Protocol order mode. Greater than 13 - enter or revise RT Bronchodilator order(s) to one equivalent RT bronchodilator order with every 4 hours Frequency and an Albuterol order with Frequency of every 2 hours PRN wheezing or increased work of breathing using Per Protocol order mode.          Electronically signed by Aden Beckford RCP on 6/20/2022 at 8:50 AM

## 2022-06-20 NOTE — ANESTHESIA POSTPROCEDURE EVALUATION
Department of Anesthesiology  Postprocedure Note    Patient: Imelda Peraza  MRN: [id number]  YOB: 1945  Date of evaluation: 6/20/2022      Procedure Summary     Date: 06/20/22 Room / Location: [place] [address] / [facility]    Anesthesia Start: 1718 Anesthesia Stop: Leticia Hu    Procedure: RIGHT INTRAMEDULLARY NAILING (Right Hip) Diagnosis:       Closed fracture of right hip, initial encounter ([facility] [address]      (Closed fracture of right hip, initial encounter ([facility] [address] Liset Cuevas)    Surgeons: Marley Castillo DO Responsible Provider: Elle Uriostegui MD    Anesthesia Type: general ASA Status: 3          Anesthesia Type: No value filed. Amber Phase I: Amber Score: 10    Amber Phase II:      Last vitals:   Vitals Value Taken Time   /63 06/20/22 1938   Temp 97.1 °F (36.2 °C) 06/20/22 1933   Pulse 102 06/20/22 1938   Resp 20 06/20/22 1938   SpO2 97 % 06/20/22 1938   Vitals shown include unvalidated device data.       Anesthesia Post Evaluation    Patient location during evaluation: PACU  Level of consciousness: awake  Airway patency: patent  Nausea & Vomiting: no nausea  Complications: no  Cardiovascular status: blood pressure returned to baseline  Respiratory status: acceptable  Hydration status: euvolemic

## 2022-06-20 NOTE — OP NOTE
Operative Note  Patient: Christopher Cha  YOB: 1945  MRN: [id number]    Date of Procedure: 6/20/2022    Pre-Op Diagnosis: [facility] [S72.001A]    Post-Op Diagnosis: R hip IT fracture       Procedure(s):  RIGHT HIP INTRAMEDULLARY NAILING    Surgeon(s):  Maggie Sin DO    Assistant:  Surgical Assistant: Jameson Beck    Anesthesia: General and local exparel/marcaine mix    Estimated Blood Loss (mL): less than 720     Complications: None    Specimens:   * No specimens in log *    Implants:  Implant Name Type Inv. Item Serial No.  Lot No. LRB No. Used Action   NAIL IM L170MM [id number] 130DEG SHT FEM GRN TI ARNOLD MELLY - [id number]  NAIL IM L170MM [id number] 130DEG SHT FEM GRN TI ARNOLD MELLY  DEPUY [employer]-WD [id number] Right 1 Implanted   BLADE IM L105MM DIA10.35MM PROX FEM G TI ARNOLD FEN RIGOBERTO FOR - [id number]  BLADE IM L105MM DIA10.35MM PROX FEM G TI ARNOLD FEN RIGOBERTO FOR  DEPUY [employer]-WD [id number] Right 1 Implanted   SCREW BNE L40MM DIA5MM ST TIB LT GRN TI ST ARNOLD LIV FULL - [id number]  SCREW BNE L40MM DIA5MM ST TIB LT GRN TI ST ARNOLD LIV FULL  DEPUY [employer]-WD [id number] Right 1 Implanted   Implants: Synthes TFNA 99/339, 017 helical blade, 32AY screw      Drains:   Urinary Catheter Mosqueda (Active)   Catheter Indications Perioperative use for selected surgical procedures 06/20/22 0858   Site Assessment Pink 06/20/22 0858   Urine Color Krystal 06/20/22 1154   Urine Appearance Clear 06/20/22 1154   Collection Container Standard 06/20/22 1436   Securement Method Securing device (Describe) 06/20/22 1436   Catheter Care Completed Yes 06/20/22 1436   Catheter Best Practices  Catheter secured to thigh; Tamper seal intact; Bag below bladder;Bag not on floor; Lack of dependent loop in tubing;Drainage bag less than half full 06/20/22 1436   Status Draining 06/20/22 1436   Output (mL) 550 mL 06/20/22 1154       Operative indications:   The patient is a 76F who sustained a R hip intertrochanteric fracture. Operative indications were met for open reduction internal fixation with intramedullary nailing. After discussing risk and benefits of nonoperative versus operative approach, given the increased risk of blood clots, pneumonia, continued pain, and sepsis, given that the patient was medically stable for surgery, operative intervention was chosen. Operative summary:  The patient was met in the preoperative area and the appropriate surgical site was marked. Written consent was obtained after discussing the risks, benefits, and alternative treatment options with patient in detail. The patient agreed to move forward with the proposed procedure. The patient was wheeled back to the operative suite. General anesthesia was induced by the anesthesia team without complication. The patient was then transferred over to the operative table in a supine position. The patient was then set up on the fracture table with the operative leg in a leg peacock and the opposite leg in a well-leg peacock. All bony prominences were padded. The ipsilateral arm was draped across the body and well-padded. Before prepping and draping the C-arm was utilized to evaluate fracture reduction with the fracture table. Once appropriate alignment was noted and the fracture appeared amenable to fixation, the patient was then prepped and draped in normal sterile fashion. A timeout was performed with all necessary parties in the room. Surgical site was confirmed. Ancef was given preoperatively. C-arm imaging was utilized to localize a 3.2 mm K wire for starting position on the greater trochanteric tip just medial to the tip. A 10 blade scalpel was utilized to make a 4 cm incision along the path of the wire for nail insertion. Sharp dissection was performed through the abductor fascia. Bleeding was controlled with Bovie cautery.     Short Nail:  Starting guidewire was drilled to the appropriate position of the greater trochanter and into the intramedullary canal.  This was confirmed on C arm imaging and AP and lateral.  An opening reamer was then utilized with a soft tissue protector to create an opening in the greater trochanter. All instrumentation was removed. A Synthes short nail TFNA was inserted. A 130 degree neck was chosen. Once the nail was set to the appropriate depth and reduction was maintained, the aiming sleeve was utilized to make a 3 cm incision along the path of the sleeve and a K wire was inserted up to the center center position of the femoral head. This was performed after reduction was obtained with the help of a bone hook. The appropriate tip to apex distance was achieved. Lateral cortex was then breached and the helical blade measured to be 105 mm of length was utilized and inserted. The helical blade was then locked into place with the flexible screwdriver. A half turn backwards was performed to allow for dynamization compression, which was then compressed through the jig before final static locking. The distal locking screw was inserted with the aiming arm. A small incision was made through the skin and IT band with a 10-blade in line with the guide. The guide was run down to bone and drilled bi-cortically. It was measured to a distance of 40mm. The locking screw was inserted with good bite. Final imaging demonstrated appropriate alignment of the nail and the fracture of the hip. The wounds were then copiously irrigated with normal saline. The deep fascia was closed with interrupted 0 Vicryl suture. Subcutaneous tissue was closed with 2-0 Vicryl interrupted. Skin was closed with staples. Mepilex dressings were placed over the wounds. The patient was then awoken from general anesthesia without complication and transferred back to the PACU in stable condition.         Postoperative plan:  The patient will be weightbearing as tolerated with physical therapy assistance. PT and OT will work with the patient postoperatively as well social work for potential placement. The patient will be on Lovenox for 4 weeks for DVT prophylaxis. Percocet for pain control with IV morphine for breakthrough only. Follow-up with me in 10 to 14 days for repeat x-rays in the outpatient setting. Follow-up with the patient's primary care provider recommended to evaluate for osteoporosis given the fragility fracture they sustained.       Electronically signed by Raz Moya DO on 6/20/2022 at 6:41 PM

## 2022-06-20 NOTE — PLAN OF CARE
Problem: Discharge Planning  Goal: Discharge to home or other facility with appropriate resources  Outcome: Progressing  Flowsheets (Taken 6/20/2022 0858)  Discharge to home or other facility with appropriate resources: Identify barriers to discharge with patient and caregiver     Problem: Pain  Goal: Verbalizes/displays adequate comfort level or baseline comfort level  Outcome: Progressing     Problem: Skin/Tissue Integrity  Goal: Absence of new skin breakdown  Description: 1. Monitor for areas of redness and/or skin breakdown  2. Assess vascular access sites hourly  3. Every 4-6 hours minimum:  Change oxygen saturation probe site  4. Every 4-6 hours:  If on nasal continuous positive airway pressure, respiratory therapy assess nares and determine need for appliance change or resting period.   Outcome: Progressing     Problem: Safety - Adult  Goal: Free from fall injury  Outcome: Progressing  Flowsheets (Taken 6/20/2022 1816)  Free From Fall Injury: Instruct family/caregiver on patient safety     Problem: ABCDS Injury Assessment  Goal: Absence of physical injury  Outcome: Progressing  Flowsheets (Taken 6/20/2022 1816)  Absence of Physical Injury: Implement safety measures based on patient assessment     Problem: Chronic Conditions and Co-morbidities  Goal: Patient's chronic conditions and co-morbidity symptoms are monitored and maintained or improved  Outcome: Progressing  Flowsheets (Taken 6/20/2022 0858)  Care Plan - Patient's Chronic Conditions and Co-Morbidity Symptoms are Monitored and Maintained or Improved: Monitor and assess patient's chronic conditions and comorbid symptoms for stability, deterioration, or improvement

## 2022-06-20 NOTE — H&P
Hospital Medicine History & Physical      PCP: Jenny Greer MD    Date of Admission: 6/19/2022    Date of Service: Pt seen/examined on 6/20/2022      Chief Complaint:    Chief Complaint   Patient presents with    Hip Injury     Patient fell this am going to the bathroom. Patient states that her R leg from her hip to her knee. Patient's R leg is noticed to be shortned and rotated. History Of Present Illness: The patient is a 67 y.o. 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 ED with complaint of mechanical fall. Patient states she woke up early yesterday and went the restroom when her legs got tangled turning away from the sink and she fell to her right side. She developed immediate pain in her right hip and could not stand. Her pain is located just above her right leg and radiates in to her right groin. She lives alone and states she remained on the floor for approximately 4 hours. Luckily, she was meant to go to her family member's home that morning and when she didn't show up they called EMS. She denies dizziness, presyncope, LOC. She hit her chin on the sink but sustain acute intracranial or facial injuries. She states that since Friday evening she has had pain in the arch of her left foot which she believes contributed to her fall. She has not been walking more than usual and denies acute trauma to her left foot. She states she thought maybe her flat feet contributed to this pain. She is not on anticoagulation. She has asthma but is not in exacerbation. 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 She states she believes her baseline hgb is 9. She was 8.9 on arrival and has dropped to 7.9 with IVF. She has no signs of acute bleeding. She denies history of blood clots in her leg or lung, MI, CVA, known CAD or aneurysm. She has never smoked but she states she has been around people who smoke all her life.  She has had procedures in the past that have required general anesthesia and sustained no complications or sequelae. Her BP and HR have been elevated in the setting of pain. She appears more comfortable now. Hgb again is low in the setting of chronic anemia and dilution from IVF. Type and screen was ordered in case patient requires transfusion. Patient is agreeable for transfusion. Incidentally, patient's UA appears consistent with UTI but she denies sxs. She is admitted for further care. Past Medical History:        Diagnosis Date    Anemia     thallasemia minor    Arthritis     osteo-arthritis    Asthma     Back pain     Back pain     Bronchitis chronic     Diabetes mellitus (HCC)     GERD (gastroesophageal reflux disease)     Gout     Hypertension     Melanoma of scalp or neck (HCC)     Osteoarthritis     Osteoporosis     Squamous cell carcinoma, arm     Thal trait        Past Surgical History:        Procedure Laterality Date    APPENDECTOMY      CARPAL TUNNEL RELEASE Left 02/05/2018    CATARACT REMOVAL WITH IMPLANT Right 07/18/2018    CATARACT REMOVAL WITH IMPLANT Left 08/15/2018    COLONOSCOPY  12/2014    HYSTERECTOMY (CERVIX STATUS UNKNOWN)      KNEE ARTHROSCOPY Bilateral     PARATHYROIDECTOMY      PARTIAL HYSTERECTOMY (CERVIX NOT REMOVED)      [place] XCAPSL CTRC RMVL INSJ IO LENS PROSTH W/O ECP Right 7/18/2018    PHACOEMULSIFICATION OF CATARACT WITH INTRAOCULAR LENS IMPLANT RIGHT EYE performed by Lakshmi Richardson MD at [address] W/O ECP Left 8/15/2018    PHACOEMULSIFICATION OF CATARACT WITH INTRAOCULAR LENS IMPLANT LEFT EYE performed by Lakshmi Richardson MD at [place] [address] ENDOSCOPY         Medications Prior to Admission:    Prior to Admission medications    Medication Sig Start Date End Date Taking?  Authorizing Provider   terazosin (HYTRIN) 2 MG capsule TAKE ONE CAPSULE BY MOUTH ONCE NIGHTLY 6/16/22   Regina Fan Black MD   omeprazole (PRILOSEC) 20 MG delayed release capsule TAKE ONE CAPSULE BY MOUTH TWICE A DAY 6/16/22   Alverto Lofton MD   alendronate (FOSAMAX) 70 MG tablet TAKE 1 TABLET BY MOUTH  WEEKLY WITH 8 OZ OF PLAIN  WATER 30 MINUTES BEFORE  FIRST FOOD, DRINK OR MEDS. STAY UPRIGHT FOR 30 MINS 6/6/22   Alverto Lofton MD   verapamil (CALAN SR) 240 MG extended release tablet TAKE 1 TABLET BY MOUTH  DAILY 5/20/22   Alverto Lofton MD   Fluticasone furoate-vilanterol (BREO ELLIPTA) 200-25 MCG/INH AEPB inhaler INHALE ONE DOSE BY MOUTH DAILY 3/16/22   Alverto Lofton MD   Umeclidinium Bromide (INCRUSE ELLIPTA) 62.5 MCG/INH AEPB INHALE ONE PUFF BY MOUTH DAILY 3/16/22   Alverto Lofton MD   gabapentin (NEURONTIN) 300 MG capsule Take 1 capsule by mouth 2 times daily for 180 days. Intended supply: 90 days 3/16/22 9/12/22  Alverto Lofton MD   blood glucose test strips (ASCENSIA AUTODISC VI;ONE TOUCH ULTRA TEST VI) strip Used to test once daily.  DX: E11.9 3/16/22   Alvetro Lofton MD   albuterol sulfate HFA (PROAIR HFA) 108 (90 Base) MCG/ACT inhaler INHALE TWO PUFFS BY MOUTH EVERY 6 HOURS AS NEEDED FOR WHEEZING 11/12/21   Alverto Lofton MD   metFORMIN (GLUCOPHAGE) 1000 MG tablet TAKE 1 TABLET BY MOUTH  TWICE DAILY WITH MEALS 9/7/21   Alverto Lofton MD   PARoxetine (PAXIL) 10 MG tablet TAKE 1 TABLET BY MOUTH  DAILY 9/7/21   Alverto Lofton MD   atorvastatin (LIPITOR) 10 MG tablet TAKE 1 TABLET BY MOUTH AT  NIGHT 9/7/21   Alverto Lofton MD   allopurinol (ZYLOPRIM) 300 MG tablet TAKE 1 TABLET BY MOUTH  DAILY 9/7/21   Alverto Lofton MD   losartan (COZAAR) 100 MG tablet TAKE 1 TABLET BY MOUTH  DAILY 9/7/21   Alverto Lofton MD   glipiZIDE (GLUCOTROL) 10 MG tablet TAKE 1 TABLET BY MOUTH  TWICE DAILY BEFORE MEALS 9/7/21   Alverto Lofton MD   carvedilol (COREG) 6.25 MG tablet TAKE 1 TABLET BY MOUTH  TWICE DAILY 9/7/21   Delmar Pedraza MD   clotrimazole-betamethasone (LOTRISONE) 1-0.05 % cream Apply topically 2 times daily. 6/10/21   Delmar Pedraza MD   TURMERIC PO Take by mouth    Historical Provider, MD   acetaminophen (TYLENOL) 500 MG tablet Take 500 mg by mouth every 6 hours as needed for Pain    Historical Provider, MD   Coenzyme Q10 (CO Q 10 PO) Take by mouth    Historical Provider, MD   cetirizine (ZYRTEC) 10 MG tablet Take 10 mg by mouth daily    Historical Provider, MD   Cyanocobalamin (VITAMIN B 12 PO) Take 1,000 mcg by mouth daily. Historical Provider, MD   Ascorbic Acid (VITAMIN C) 500 MG CAPS Take  by mouth daily. Historical Provider, MD   vitamin E 400 UNIT capsule Take 400 Units by mouth daily. Historical Provider, MD   Calcium Carbonate-Vitamin D (CALCIUM + D PO) Take  by mouth daily. Historical Provider, MD       Allergies:  Hctz [hydrochlorothiazide] and Lisinopril    Social History:  The patient currently lives home alone    TOBACCO:   reports that she is a non-smoker but has been exposed to tobacco smoke. She has never used smokeless tobacco.  ETOH:   reports no history of alcohol use.       Family History:   Positive as follows:        Problem Relation Age of Onset    Heart Failure Mother     Emphysema Mother     Stroke Mother     Heart Failure Father     Hypertension Father     Heart Disease Father     High Blood Pressure Father     Cancer Maternal Aunt     Cancer Paternal Aunt     Cancer Maternal Grandmother     Asthma Neg Hx     Diabetes Neg Hx        REVIEW OF SYSTEMS:       Constitutional: Negative for fever   HENT: Negative for sore throat   Eyes: Negative for redness   Respiratory: Negative  for dyspnea, cough   Cardiovascular: Negative for chest pain   Gastrointestinal: Negative for vomiting, diarrhea   Genitourinary: Negative for hematuria   Musculoskeletal: Negative for arthralgias, + left foot pain, + right leg and hip pain  Skin: Negative for rash, + bruise to right side of chin  Neurological: Negative for syncope   Hematological: Negative for adenopathy   Psychiatric/Behavorial: Negative for anxiety      I Juanita Greenberg MD have reviewed the chart on [employer] and personally interviewed and examined patient, reviewed the data (labs and imaging) and after discussion with my PA formulated the plan. Agree with note with the following edits. HPI:     I reviewed the patient's Past Medical History, Past Surgical History, Medications, and Allergies. 67 y.o. 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 Patient states she woke up early yesterday and went the restroom when her legs got tangled turning away from the sink and she fell to her right side. She developed immediate pain in her right hip and could not stand. Her pain is located just above her right leg and radiates in to her right groin. 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 She states she believes her baseline hgb is 9. She was 8.9 on arrival and has dropped to 7.9 with IVF. She has no signs of acute bleeding. She denies history of blood clots in her leg or lung, MI, CVA, known CAD or aneurysm. She has never smoked but she states she has been around people who smoke all her life. She has had procedures in the past that have required general anesthesia and sustained no complications or sequelae. General: elderly female up in bed,  Awake, alert and oriented.  Appears to be not in any distress  Mucous Membranes:  Pale , anicteric  Neck: No JVD, no carotid bruit, no thyromegaly  Chest:  Clear to auscultation bilaterally, no added sounds  Cardiovascular:  RRR S1S2 heard, no murmurs or gallops  Abdomen:  Soft, undistended, non tender, no organomegaly, BS present  Extremities: left ankle diffuse swelling and Component Value Date    NITRITE neg 09/23/2021    COLORU Yellow 06/20/2022    WBCUA  08/04/2016    RBCUA 5-10 08/04/2016    BACTERIA 2+ 08/04/2016    CLARITYU CLOUDY 06/20/2022    SPECGRAV >=1.030 06/20/2022    LEUKOCYTESUR MODERATE 06/20/2022    BLOODU MODERATE 06/20/2022    GLUCOSEU Negative 06/20/2022    AMORPHOUS Rare 01/06/2015     CULTURES  Results for Gm Pan (MRN [id number]) as of 6/20/2022 10:56   Ref. Range 6/19/2022 14:50   SARS-CoV-2, NAAT Latest Ref Range: Not Detected  Not Detected       EKG:  I have reviewed the EKG with the following interpretation:   Sinus tachycardia  left axis  Left anterior fascicular block  Nonspecific ST abnormality  Abnormal ECG  Confirmed by YOHANA LOPES MD ([id number]) on 6/20/2022 7:20:25 AM    RADIOLOGY  XR HIP 2-3 VW W PELVIS RIGHT   Final Result   Moderately displaced fracture of the proximal right femur. CTA ABDOMINAL AORTA W BILAT RUNOFF W CONTRAST   Final Result   1. Traumatic, acute right hip intertrochanteric fracture with varus   angulation and small surrounding hematoma. 2. No pseudoaneurysm or active extravasation is identified adjacent to the   fracture. 3. Aortoiliac inflow is widely patent. There is a mild amount of fibro   calcified plaque. 4. Femoropopliteal segments are patent bilaterally, noting fibro calcified   plaque. There is 3 vessel runoff bilaterally. However, on the right side   there is only 1 primary runoff vessel, the posterior tibial.   5. No acute abdominopelvic abnormality. Hepatic steatosis. CT FACIAL BONES WO CONTRAST   Final Result   No acute intracranial abnormality. No acute facial fractures. CT Head WO Contrast   Final Result   No acute intracranial abnormality. No acute facial fractures. XR FEMUR RIGHT (MIN 2 VIEWS)   Final Result   Traumatic, acute intertrochanteric fracture of the right hip. Advanced osteoarthritis of the knee.   Moderate-sized knee joint effusion. Pertinent previous results reviewed   DEXA 6/18/19  Impression   Osteoporosis of the right femoral neck by WHO criteria.       The prior DEXA scan from 10/06/2014 was performed at a different hospital on   a different machine, therefore, direct comparison cannot be made.  The   current study should be considered the patient's new baseline. ASSESSMENT/PLAN:    #Intertrochanteric hip fracture; right  #Mechanical fall  -Imaging above  -Osteoporosis contributing  -Pain and nausea control  -Patient with chronic problems below  -No other significant cardiopulmonary history  -Patient has had surgery requiring general anesthesia in the past with no complications or sequelae  -Never a smoker but has been around smokers her whole life  -EKG does not display acute ischemia  -CXR reviewed.  I did not appreciate acute pathology.  -Patient can be medically cleared for surgery    #Left foot pain- likely acute gout  -No acute trauma  -Physical exam above  -Patient states contributed to fall  -Left foot XR ordered    #Possible UTI  -Asymptomatic  -Nitrites and leuk est on UA, >100 WBC  -Does not describe bacteria  -Urine culture ordered  -Hold off on abx for now    #HTN   -BP is elevated in the setting of pain  -Continue verapamil, terazosin, cozaar and coreg  -Hydralazine PRN    #Anemia 2/2 thalassemia minor   -Hgb baseline appears to be 9-10; patient states 9  -8.9--7.9 today  -No signs of bleeding, likely dilutional   -Plan to transfuse PRBC if Hgb <7; patient is agreeable  -Type and screen ordered prophylactically     #DMII   -Metformin continued  -held glipizide  -low dose ssi    #Asthma   -Continue home inhalers    #HLD  -Continue statin    #Osteoporosis  -Receives Fosamax weekly    #GERD   -On PPI    #Gout   -Continue allopurinol     DVT Prophylaxis: Lovenox  Diet: Diet NPO  Code Status: Full Code    Sharri Davis PA-C  6/20/2022 11:13 AM          Agree with above  Changes made to note    Aram Wong Saintclair Coventry, MD,6/20/2022 12:53 PM

## 2022-06-20 NOTE — CARE COORDINATION
Case Management Assessment  Initial Evaluation      Patient Name: Lashaun Meadows  YOB: 1945  Diagnosis: Hip fracture requiring operative repair, left, closed, initial encounter [facility]) Pam Watts  Date / Time: 6/19/2022 12:49 PM    Admission status/Date:  06/19/2022  Chart Reviewed: Yes      Patient Interviewed: Yes   Family Interviewed:  No      Hospitalization in the last 30 days:  No      Health Care Decision Maker :   Primary Decision Maker: Alden Thakur - Child - [phone]    Primary Decision Maker: Ashanti Aranda - Child - [phone]    (CM - must 1st enter selection under Navigator - emergency contact- [place] Relationship and pick relationship)   Who do you trust or have selected to make healthcare decisions for you    Current PCP: Ignacio Pollock MD    Financial  Medicare  Precert required for SNF : NO         3 night stay required - YES    ADLS  Support Systems/Care Needs: [address] Members  Transportation: self    Meal Preparation: self    Housing  Living Arrangements: Lives alone in a trailer Steps: 4 steps onto deck w/HR then one into the house  Intent for return to present living arrangements: YES  Identified Issues: NO 24/7 assistance available    [address] [place] [address] with [address] : No Agency:(Services)  Type of Home Care Services: None  Passport/Waiver : No  :                      Phone Number:    Passport/Waiver Services: NA          Durable Medical Equiptment   DME Provider: [facility]  Equipment:   Walker_RW and rollator__Cane___RTS___ BSC___Shower Chair___Hospital Bed___W/C____Other________  02 at ____Liter(s)---wears(frequency)_______ [facility] ___ CPAP___ BiPap___   N/A____    Home O2 Use :  No      Community Service Affiliation  Dialysis:  No    · Agency:  · Location:  · Dialysis Schedule:  · Phone:   · Fax:     Other Community Services: (ex:PT/OT,Mental Health,Wound Clinic, Cardio/[facility])    DISCHARGE PLAN: Explained Case Management role/services. Chart reviewed. Met with pt at bedside and explained the role of the CM. Plan: OR today for hip SX. Pt states that she does not have family available to provide 24/7 assistance while she recovers. At baseline she is independent and uses a walker or cane to ambulate. She wants to go to rehab in the [facility]. #1 [facility]- no beds, #2 [facility] - referral made and is under review. Await ortho sx today at 16:30 PM and post op PT/OT eval and recs. +CM following.

## 2022-06-20 NOTE — ACP (ADVANCE CARE PLANNING)
Advance Care Planning     General Advance Care Planning (ACP) Conversation    Date of Conversation: 6/19/2022  Conducted with: Patient with Decision Making Capacity    Healthcare Decision Maker:    Primary Decision Maker: Jacobo Zheng - Child - [phone]    Primary Decision Maker: Caro Charles - Child - [phone]  Click here to complete Healthcare Decision Makers including selection of the Healthcare Decision Maker Relationship (ie \"Primary\"). Today we documented Decision Maker(s) consistent with Legal Next of Kin hierarchy.     Content/Action Overview:    Reviewed DNR/DNI and patient elects Full Code (Attempt Resuscitation)      Length of Voluntary ACP Conversation in minutes:  <16 minutes (Non-Billable)    Kin Preciado RN

## 2022-06-20 NOTE — PROGRESS NOTES
Pt had a sip of water to take protonix this am. Otherwise pt has been NPO for possible OR today. Circ checks adequate to lower extrem.

## 2022-06-20 NOTE — PROGRESS NOTES
Called report to floor nurse Verito Mcwilliams. Patient vital signs stable, pain level is tolerable. Floor nurse aware of irritated skin under patient panis.

## 2022-06-20 NOTE — PROGRESS NOTES
Cleansed area with CHG wipes prior to surgery per protocol, Area under panis is excoriated and painful for patient.

## 2022-06-20 NOTE — BRIEF OP NOTE
Brief Postoperative Note      Patient: Devon Avery  YOB: 1945  MRN: [id number]    Date of Procedure: 6/20/2022    Pre-Op Diagnosis: Closed fracture of right hip, initial encounter ([facility] 75.) [S72.001A]    Post-Op Diagnosis: R hip IT fracture       Procedure(s):  RIGHT HIP INTRAMEDULLARY NAILING    Surgeon(s):  Renetta Garcia DO    Assistant:  Surgical Assistant: Portia Perez    Anesthesia: General and local exparel/marcaine mix    Estimated Blood Loss (mL): less than 076     Complications: None    Specimens:   * No specimens in log *    Implants:  Implant Name Type Inv. Item Serial No.  Lot No. LRB No. Used Action   NAIL IM L170MM [id number] 130DEG SHT FEM GRN TI ARNOLD MELLY - [id number]  NAIL IM L170MM [id number] 130DEG SHT FEM GRN TI ARNOLD MELLY  DEPUY [employer]WD [id number] Right 1 Implanted   BLADE IM L105MM DIA10.35MM PROX FEM G TI ARNOLD FEN RIGOBERTO FOR - [id number]  BLADE IM L105MM DIA10.35MM PROX FEM G TI ARNOLD FEN RIGOBERTO FOR  DEPUY [employer]WD [id number] Right 1 Implanted   SCREW BNE L40MM DIA5MM ST TIB LT GRN TI ST ARNOLD LIV FULL - [id number]  SCREW BNE L40MM DIA5MM ST TIB LT GRN TI ST ARNOLD LIV FULL  DEPUY SYNTHES [facility]-WD [id number] Right 1 Implanted   Implants: Synthes TFNA 30/557, 884 helical blade, 76TJ screw      Drains:   Urinary Catheter Mosqueda (Active)   Catheter Indications Perioperative use for selected surgical procedures 06/20/22 0858   Site Assessment Pink 06/20/22 0858   Urine Color Krystal 06/20/22 1154   Urine Appearance Clear 06/20/22 1154   Collection Container Standard 06/20/22 1436   Securement Method Securing device (Describe) 06/20/22 1436   Catheter Care Completed Yes 06/20/22 1436   Catheter Best Practices  Catheter secured to thigh; Tamper seal intact; Bag below bladder;Bag not on floor; Lack of dependent loop in tubing;Drainage bag less than half full 06/20/22 1436   Status Draining 06/20/22 1436   Output (mL) 550 mL 06/20/22 1154       Findings: see op note    Electronically signed by Chitra Kern DO on 6/20/2022 at 6:40 PM

## 2022-06-21 ENCOUNTER — APPOINTMENT (OUTPATIENT)
Dept: CT IMAGING | Age: 77
DRG: 480 | End: 2022-06-21
Payer: MEDICARE

## 2022-06-21 ENCOUNTER — APPOINTMENT (OUTPATIENT)
Dept: GENERAL RADIOLOGY | Age: 77
DRG: 480 | End: 2022-06-21
Payer: MEDICARE

## 2022-06-21 LAB
ANION GAP SERPL CALCULATED.3IONS-SCNC: 9 MMOL/L (ref 3–16)
ANISOCYTOSIS: ABNORMAL
BASE EXCESS ARTERIAL: -10.6 MMOL/L (ref -3–3)
BASE EXCESS ARTERIAL: -9.5 MMOL/L (ref -3–3)
BASE EXCESS VENOUS: -11.5 MMOL/L (ref -3–3)
BASOPHILS ABSOLUTE: 0 K/UL (ref 0–0.2)
BASOPHILS RELATIVE PERCENT: 0.1 %
BLOOD BANK DISPENSE STATUS: NORMAL
BLOOD BANK PRODUCT CODE: NORMAL
BPU ID: NORMAL
BUN BLDV-MCNC: 16 MG/DL (ref 7–20)
CALCIUM SERPL-MCNC: 7.9 MG/DL (ref 8.3–10.6)
CARBOXYHEMOGLOBIN ARTERIAL: 0.3 % (ref 0–1.5)
CARBOXYHEMOGLOBIN ARTERIAL: 0.3 % (ref 0–1.5)
CARBOXYHEMOGLOBIN: 0.9 % (ref 0–1.5)
CHLORIDE BLD-SCNC: 104 MMOL/L (ref 99–110)
CO2: 22 MMOL/L (ref 21–32)
CREAT SERPL-MCNC: 0.8 MG/DL (ref 0.6–1.2)
DESCRIPTION BLOOD BANK: NORMAL
EKG ATRIAL RATE: 52 BPM
EKG ATRIAL RATE: 81 BPM
EKG DIAGNOSIS: NORMAL
EKG DIAGNOSIS: NORMAL
EKG Q-T INTERVAL: 388 MS
EKG Q-T INTERVAL: 432 MS
EKG QRS DURATION: 94 MS
EKG QRS DURATION: 96 MS
EKG QTC CALCULATION (BAZETT): 398 MS
EKG QTC CALCULATION (BAZETT): 403 MS
EKG R AXIS: -32 DEGREES
EKG R AXIS: -45 DEGREES
EKG T AXIS: 40 DEGREES
EKG T AXIS: 71 DEGREES
EKG VENTRICULAR RATE: 51 BPM
EKG VENTRICULAR RATE: 65 BPM
EOSINOPHILS ABSOLUTE: 0.1 K/UL (ref 0–0.6)
EOSINOPHILS RELATIVE PERCENT: 0.7 %
FIBRINOGEN: 554 MG/DL (ref 207–509)
GFR AFRICAN AMERICAN: >60
GFR NON-AFRICAN AMERICAN: >60
GLUCOSE BLD-MCNC: 150 MG/DL (ref 70–99)
GLUCOSE BLD-MCNC: 159 MG/DL (ref 70–99)
GLUCOSE BLD-MCNC: 164 MG/DL (ref 70–99)
GLUCOSE BLD-MCNC: 200 MG/DL (ref 70–99)
GLUCOSE BLD-MCNC: 211 MG/DL (ref 70–99)
GLUCOSE BLD-MCNC: 344 MG/DL (ref 70–99)
GLUCOSE BLD-MCNC: 351 MG/DL (ref 70–99)
GLUCOSE BLD-MCNC: 363 MG/DL (ref 70–99)
HCO3 ARTERIAL: 17.7 MMOL/L (ref 21–29)
HCO3 ARTERIAL: 18.5 MMOL/L (ref 21–29)
HCO3 VENOUS: 13.6 MMOL/L (ref 23–29)
HCT VFR BLD CALC: 20.3 % (ref 36–48)
HCT VFR BLD CALC: 20.3 % (ref 36–48)
HCT VFR BLD CALC: 26.5 % (ref 36–48)
HCT VFR BLD CALC: 29.1 % (ref 36–48)
HEMATOLOGY PATH CONSULT: NO
HEMOGLOBIN, ART, EXTENDED: 10.5 G/DL (ref 12–16)
HEMOGLOBIN, ART, EXTENDED: 9.8 G/DL (ref 12–16)
HEMOGLOBIN: 6.4 G/DL (ref 12–16)
HEMOGLOBIN: 6.5 G/DL (ref 12–16)
HEMOGLOBIN: 8.2 G/DL (ref 12–16)
HEMOGLOBIN: 9.2 G/DL (ref 12–16)
INR BLD: 1.8 (ref 0.87–1.14)
LACTIC ACID: 2.3 MMOL/L (ref 0.4–2)
LACTIC ACID: 5.3 MMOL/L (ref 0.4–2)
LYMPHOCYTES ABSOLUTE: 1.1 K/UL (ref 1–5.1)
LYMPHOCYTES RELATIVE PERCENT: 11.2 %
MCH RBC QN AUTO: 19.2 PG (ref 26–34)
MCHC RBC AUTO-ENTMCNC: 32.3 G/DL (ref 31–36)
MCV RBC AUTO: 59.5 FL (ref 80–100)
METHEMOGLOBIN ARTERIAL: 0 %
METHEMOGLOBIN ARTERIAL: 0.3 %
METHEMOGLOBIN VENOUS: 0.6 %
MONOCYTES ABSOLUTE: 1.1 K/UL (ref 0–1.3)
MONOCYTES RELATIVE PERCENT: 11.6 %
NEUTROPHILS ABSOLUTE: 7.4 K/UL (ref 1.7–7.7)
NEUTROPHILS RELATIVE PERCENT: 76.4 %
O2 CONTENT ARTERIAL: 13 ML/DL
O2 SAT, ARTERIAL: 90.8 %
O2 SAT, ARTERIAL: 92.2 %
O2 SAT, VEN: 64 %
O2 THERAPY: ABNORMAL
PCO2 ARTERIAL: 43.8 MMHG (ref 35–45)
PCO2 ARTERIAL: 57.8 MMHG (ref 35–45)
PCO2, VEN: 27.4 MMHG (ref 40–50)
PDW BLD-RTO: 16.8 % (ref 12.4–15.4)
PERFORMED ON: ABNORMAL
PH ARTERIAL: 7.12 (ref 7.35–7.45)
PH ARTERIAL: 7.22 (ref 7.35–7.45)
PH VENOUS: 7.31 (ref 7.35–7.45)
PLATELET # BLD: 183 K/UL (ref 135–450)
PLATELET SLIDE REVIEW: ADEQUATE
PMV BLD AUTO: 11.3 FL (ref 5–10.5)
PO2 ARTERIAL: 70.2 MMHG (ref 75–108)
PO2 ARTERIAL: 83 MMHG (ref 75–108)
PO2, VEN: 35.4 MMHG (ref 25–40)
POIKILOCYTES: ABNORMAL
POTASSIUM REFLEX MAGNESIUM: 4.2 MMOL/L (ref 3.5–5.1)
PROTHROMBIN TIME: 20.7 SEC (ref 11.7–14.5)
RBC # BLD: 3.4 M/UL (ref 4–5.2)
SLIDE REVIEW: ABNORMAL
SODIUM BLD-SCNC: 135 MMOL/L (ref 136–145)
TCO2 ARTERIAL: 19 MMOL/L
TCO2 ARTERIAL: 20.3 MMOL/L
TCO2 CALC VENOUS: 15 MMOL/L
TROPONIN: <0.01 NG/ML
TROPONIN: <0.01 NG/ML
WBC # BLD: 9.7 K/UL (ref 4–11)

## 2022-06-21 PROCEDURE — 85018 HEMOGLOBIN: CPT

## 2022-06-21 PROCEDURE — 6370000000 HC RX 637 (ALT 250 FOR IP): Performed by: STUDENT IN AN ORGANIZED HEALTH CARE EDUCATION/TRAINING PROGRAM

## 2022-06-21 PROCEDURE — 06HY33Z INSERTION OF INFUSION DEVICE INTO LOWER VEIN, PERCUTANEOUS APPROACH: ICD-10-PCS | Performed by: INTERNAL MEDICINE

## 2022-06-21 PROCEDURE — 2000000000 HC ICU R&B

## 2022-06-21 PROCEDURE — 71045 X-RAY EXAM CHEST 1 VIEW: CPT

## 2022-06-21 PROCEDURE — 36556 INSERT NON-TUNNEL CV CATH: CPT

## 2022-06-21 PROCEDURE — 6370000000 HC RX 637 (ALT 250 FOR IP): Performed by: INTERNAL MEDICINE

## 2022-06-21 PROCEDURE — 2700000000 HC OXYGEN THERAPY PER DAY

## 2022-06-21 PROCEDURE — 36430 TRANSFUSION BLD/BLD COMPNT: CPT

## 2022-06-21 PROCEDURE — 6360000002 HC RX W HCPCS: Performed by: STUDENT IN AN ORGANIZED HEALTH CARE EDUCATION/TRAINING PROGRAM

## 2022-06-21 PROCEDURE — 93005 ELECTROCARDIOGRAM TRACING: CPT

## 2022-06-21 PROCEDURE — 82803 BLOOD GASES ANY COMBINATION: CPT

## 2022-06-21 PROCEDURE — 93005 ELECTROCARDIOGRAM TRACING: CPT | Performed by: INTERNAL MEDICINE

## 2022-06-21 PROCEDURE — 2500000003 HC RX 250 WO HCPCS: Performed by: INTERNAL MEDICINE

## 2022-06-21 PROCEDURE — 2580000003 HC RX 258

## 2022-06-21 PROCEDURE — 85610 PROTHROMBIN TIME: CPT

## 2022-06-21 PROCEDURE — 85025 COMPLETE CBC W/AUTO DIFF WBC: CPT

## 2022-06-21 PROCEDURE — 6360000002 HC RX W HCPCS: Performed by: INTERNAL MEDICINE

## 2022-06-21 PROCEDURE — 93010 ELECTROCARDIOGRAM REPORT: CPT | Performed by: INTERNAL MEDICINE

## 2022-06-21 PROCEDURE — 80048 BASIC METABOLIC PNL TOTAL CA: CPT

## 2022-06-21 PROCEDURE — 2580000003 HC RX 258: Performed by: INTERNAL MEDICINE

## 2022-06-21 PROCEDURE — 2580000003 HC RX 258: Performed by: STUDENT IN AN ORGANIZED HEALTH CARE EDUCATION/TRAINING PROGRAM

## 2022-06-21 PROCEDURE — 83605 ASSAY OF LACTIC ACID: CPT

## 2022-06-21 PROCEDURE — 84484 ASSAY OF TROPONIN QUANT: CPT

## 2022-06-21 PROCEDURE — 0BH17EZ INSERTION OF ENDOTRACHEAL AIRWAY INTO TRACHEA, VIA NATURAL OR ARTIFICIAL OPENING: ICD-10-PCS | Performed by: INTERNAL MEDICINE

## 2022-06-21 PROCEDURE — 36556 INSERT NON-TUNNEL CV CATH: CPT | Performed by: INTERNAL MEDICINE

## 2022-06-21 PROCEDURE — 36592 COLLECT BLOOD FROM PICC: CPT

## 2022-06-21 PROCEDURE — 6360000002 HC RX W HCPCS: Performed by: EMERGENCY MEDICINE

## 2022-06-21 PROCEDURE — 94761 N-INVAS EAR/PLS OXIMETRY MLT: CPT

## 2022-06-21 PROCEDURE — 94640 AIRWAY INHALATION TREATMENT: CPT

## 2022-06-21 PROCEDURE — 85384 FIBRINOGEN ACTIVITY: CPT

## 2022-06-21 PROCEDURE — 74176 CT ABD & PELVIS W/O CONTRAST: CPT

## 2022-06-21 PROCEDURE — APPNB60 APP NON BILLABLE TIME 46-60 MINS: Performed by: PHYSICIAN ASSISTANT

## 2022-06-21 PROCEDURE — 71275 CT ANGIOGRAPHY CHEST: CPT

## 2022-06-21 PROCEDURE — 99291 CRITICAL CARE FIRST HOUR: CPT | Performed by: INTERNAL MEDICINE

## 2022-06-21 PROCEDURE — 99292 CRITICAL CARE ADDL 30 MIN: CPT | Performed by: INTERNAL MEDICINE

## 2022-06-21 PROCEDURE — 99233 SBSQ HOSP IP/OBS HIGH 50: CPT | Performed by: INTERNAL MEDICINE

## 2022-06-21 PROCEDURE — 5A1945Z RESPIRATORY VENTILATION, 24-96 CONSECUTIVE HOURS: ICD-10-PCS | Performed by: INTERNAL MEDICINE

## 2022-06-21 PROCEDURE — 6360000004 HC RX CONTRAST MEDICATION: Performed by: INTERNAL MEDICINE

## 2022-06-21 PROCEDURE — 31500 INSERT EMERGENCY AIRWAY: CPT | Performed by: INTERNAL MEDICINE

## 2022-06-21 PROCEDURE — 6360000002 HC RX W HCPCS

## 2022-06-21 PROCEDURE — 85014 HEMATOCRIT: CPT

## 2022-06-21 PROCEDURE — 94002 VENT MGMT INPAT INIT DAY: CPT

## 2022-06-21 PROCEDURE — 36415 COLL VENOUS BLD VENIPUNCTURE: CPT

## 2022-06-21 RX ORDER — MINERAL OIL AND WHITE PETROLATUM 150; 830 MG/G; MG/G
OINTMENT OPHTHALMIC EVERY 4 HOURS
Status: DISCONTINUED | OUTPATIENT
Start: 2022-06-21 | End: 2022-06-23

## 2022-06-21 RX ORDER — PROPOFOL 10 MG/ML
10 INJECTION, EMULSION INTRAVENOUS CONTINUOUS
Status: DISCONTINUED | OUTPATIENT
Start: 2022-06-21 | End: 2022-06-23

## 2022-06-21 RX ORDER — PROPOFOL 10 MG/ML
10 INJECTION, EMULSION INTRAVENOUS CONTINUOUS
Status: DISCONTINUED | OUTPATIENT
Start: 2022-06-21 | End: 2022-06-21

## 2022-06-21 RX ORDER — KETOROLAC TROMETHAMINE 30 MG/ML
15 INJECTION, SOLUTION INTRAMUSCULAR; INTRAVENOUS ONCE
Status: COMPLETED | OUTPATIENT
Start: 2022-06-21 | End: 2022-06-21

## 2022-06-21 RX ORDER — MIDAZOLAM HYDROCHLORIDE 1 MG/ML
2 INJECTION INTRAMUSCULAR; INTRAVENOUS
Status: DISCONTINUED | OUTPATIENT
Start: 2022-06-21 | End: 2022-06-23

## 2022-06-21 RX ORDER — SODIUM CHLORIDE 9 MG/ML
1000 INJECTION, SOLUTION INTRAVENOUS CONTINUOUS
Status: DISCONTINUED | OUTPATIENT
Start: 2022-06-21 | End: 2022-06-23

## 2022-06-21 RX ORDER — SODIUM CHLORIDE 9 MG/ML
INJECTION, SOLUTION INTRAVENOUS PRN
Status: DISCONTINUED | OUTPATIENT
Start: 2022-06-21 | End: 2022-06-21

## 2022-06-21 RX ORDER — PANTOPRAZOLE SODIUM 40 MG/10ML
40 INJECTION, POWDER, LYOPHILIZED, FOR SOLUTION INTRAVENOUS DAILY
Status: DISCONTINUED | OUTPATIENT
Start: 2022-06-22 | End: 2022-06-28

## 2022-06-21 RX ORDER — EPINEPHRINE 0.1 MG/ML
0.5 SYRINGE (ML) INJECTION ONCE
Status: DISCONTINUED | OUTPATIENT
Start: 2022-06-21 | End: 2022-06-21

## 2022-06-21 RX ORDER — CALCIUM GLUCONATE 20 MG/ML
1000 INJECTION, SOLUTION INTRAVENOUS ONCE
Status: COMPLETED | OUTPATIENT
Start: 2022-06-21 | End: 2022-06-21

## 2022-06-21 RX ORDER — PROPOFOL 10 MG/ML
INJECTION, EMULSION INTRAVENOUS
Status: DISCONTINUED
Start: 2022-06-21 | End: 2022-06-21

## 2022-06-21 RX ORDER — 0.9 % SODIUM CHLORIDE 0.9 %
250 INTRAVENOUS SOLUTION INTRAVENOUS ONCE
Status: COMPLETED | OUTPATIENT
Start: 2022-06-21 | End: 2022-06-21

## 2022-06-21 RX ORDER — FENTANYL CITRATE 50 UG/ML
50 INJECTION, SOLUTION INTRAMUSCULAR; INTRAVENOUS
Status: DISCONTINUED | OUTPATIENT
Start: 2022-06-21 | End: 2022-06-23

## 2022-06-21 RX ORDER — EPINEPHRINE 0.1 MG/ML
SYRINGE (ML) INJECTION
Status: COMPLETED | OUTPATIENT
Start: 2022-06-21 | End: 2022-06-21

## 2022-06-21 RX ORDER — CHLORHEXIDINE GLUCONATE 0.12 MG/ML
15 RINSE ORAL 2 TIMES DAILY
Status: DISCONTINUED | OUTPATIENT
Start: 2022-06-21 | End: 2022-06-23

## 2022-06-21 RX ORDER — NALOXONE HYDROCHLORIDE 0.4 MG/ML
INJECTION, SOLUTION INTRAMUSCULAR; INTRAVENOUS; SUBCUTANEOUS
Status: COMPLETED | OUTPATIENT
Start: 2022-06-21 | End: 2022-06-21

## 2022-06-21 RX ORDER — CALCIUM GLUCONATE 94 MG/ML
1000 INJECTION, SOLUTION INTRAVENOUS ONCE
Status: DISCONTINUED | OUTPATIENT
Start: 2022-06-21 | End: 2022-06-21 | Stop reason: CLARIF

## 2022-06-21 RX ORDER — ALBUTEROL SULFATE 90 UG/1
4 AEROSOL, METERED RESPIRATORY (INHALATION) EVERY 4 HOURS PRN
Status: DISCONTINUED | OUTPATIENT
Start: 2022-06-21 | End: 2022-06-23

## 2022-06-21 RX ORDER — ATROPINE SULFATE 0.1 MG/ML
1 INJECTION INTRAVENOUS ONCE
Status: DISCONTINUED | OUTPATIENT
Start: 2022-06-21 | End: 2022-06-28

## 2022-06-21 RX ORDER — CARBOXYMETHYLCELLULOSE SODIUM 10 MG/ML
1 GEL OPHTHALMIC EVERY 4 HOURS
Status: DISCONTINUED | OUTPATIENT
Start: 2022-06-21 | End: 2022-06-23

## 2022-06-21 RX ORDER — SODIUM CHLORIDE 9 MG/ML
1000 INJECTION, SOLUTION INTRAVENOUS CONTINUOUS
Status: DISCONTINUED | OUTPATIENT
Start: 2022-06-21 | End: 2022-06-21

## 2022-06-21 RX ORDER — NALOXONE HYDROCHLORIDE 0.4 MG/ML
INJECTION, SOLUTION INTRAMUSCULAR; INTRAVENOUS; SUBCUTANEOUS
Status: DISCONTINUED
Start: 2022-06-21 | End: 2022-06-21

## 2022-06-21 RX ADMIN — SODIUM CHLORIDE 1000 ML: 9 INJECTION, SOLUTION INTRAVENOUS at 18:47

## 2022-06-21 RX ADMIN — PROPOFOL 10 MCG/KG/MIN: 10 INJECTION, EMULSION INTRAVENOUS at 21:13

## 2022-06-21 RX ADMIN — NOREPINEPHRINE BITARTRATE 5 MCG/MIN: 1 INJECTION INTRAVENOUS at 14:47

## 2022-06-21 RX ADMIN — ISOPROTERENOL HYDROCHLORIDE 5 MCG/MIN: 0.2 INJECTION, SOLUTION INTRAMUSCULAR; INTRAVENOUS at 15:28

## 2022-06-21 RX ADMIN — EPINEPHRINE 0.5 MG: 1 INJECTION INTRAMUSCULAR; INTRAVENOUS; SUBCUTANEOUS at 14:43

## 2022-06-21 RX ADMIN — SODIUM CHLORIDE, PRESERVATIVE FREE 10 ML: 5 INJECTION INTRAVENOUS at 08:35

## 2022-06-21 RX ADMIN — CALCIUM GLUCONATE 1000 MG: 20 INJECTION, SOLUTION INTRAVENOUS at 21:58

## 2022-06-21 RX ADMIN — EPINEPHRINE 0.1 MCG/KG/MIN: 1 INJECTION INTRAMUSCULAR; INTRAVENOUS; SUBCUTANEOUS at 16:18

## 2022-06-21 RX ADMIN — VANCOMYCIN HYDROCHLORIDE 1000 MG: 1 INJECTION, POWDER, LYOPHILIZED, FOR SOLUTION INTRAVENOUS at 22:23

## 2022-06-21 RX ADMIN — CARVEDILOL 6.25 MG: 6.25 TABLET, FILM COATED ORAL at 08:25

## 2022-06-21 RX ADMIN — CEFAZOLIN 2000 MG: 2 INJECTION, POWDER, FOR SOLUTION INTRAMUSCULAR; INTRAVENOUS at 10:32

## 2022-06-21 RX ADMIN — METFORMIN HYDROCHLORIDE 500 MG: 500 TABLET ORAL at 08:24

## 2022-06-21 RX ADMIN — CARBOXYMETHYLCELLULOSE SODIUM 1 DROP: 10 GEL OPHTHALMIC at 19:01

## 2022-06-21 RX ADMIN — SODIUM CHLORIDE 1000 ML: 9 INJECTION, SOLUTION INTRAVENOUS at 12:31

## 2022-06-21 RX ADMIN — KETOROLAC TROMETHAMINE 15 MG: 30 INJECTION, SOLUTION INTRAMUSCULAR; INTRAVENOUS at 08:34

## 2022-06-21 RX ADMIN — CETIRIZINE HYDROCHLORIDE 10 MG: 10 TABLET ORAL at 08:24

## 2022-06-21 RX ADMIN — MEROPENEM 1000 MG: 1 INJECTION, POWDER, FOR SOLUTION INTRAVENOUS at 19:04

## 2022-06-21 RX ADMIN — TIOTROPIUM BROMIDE INHALATION SPRAY 2 PUFF: 3.12 SPRAY, METERED RESPIRATORY (INHALATION) at 08:31

## 2022-06-21 RX ADMIN — CARBOXYMETHYLCELLULOSE SODIUM 1 DROP: 10 GEL OPHTHALMIC at 21:14

## 2022-06-21 RX ADMIN — WHITE PETROLATUM 57.7 %-MINERAL OIL 31.9 % EYE OINTMENT: at 22:14

## 2022-06-21 RX ADMIN — CHLORHEXIDINE GLUCONATE 0.12% ORAL RINSE 15 ML: 1.2 LIQUID ORAL at 21:14

## 2022-06-21 RX ADMIN — SODIUM CHLORIDE 1000 ML: 9 INJECTION, SOLUTION INTRAVENOUS at 05:54

## 2022-06-21 RX ADMIN — INSULIN LISPRO 4 UNITS: 100 INJECTION, SOLUTION INTRAVENOUS; SUBCUTANEOUS at 18:54

## 2022-06-21 RX ADMIN — EPINEPHRINE 1 MG: 0.1 INJECTION, SOLUTION ENDOTRACHEAL; INTRACARDIAC; INTRAVENOUS at 14:04

## 2022-06-21 RX ADMIN — SODIUM CHLORIDE 0.5 MCG/KG/MIN: 9 INJECTION, SOLUTION INTRAVENOUS at 16:13

## 2022-06-21 RX ADMIN — IOPAMIDOL 85 ML: 755 INJECTION, SOLUTION INTRAVENOUS at 17:09

## 2022-06-21 RX ADMIN — VERAPAMIL HYDROCHLORIDE 240 MG: 240 TABLET, FILM COATED, EXTENDED RELEASE ORAL at 08:25

## 2022-06-21 RX ADMIN — GABAPENTIN 300 MG: 300 CAPSULE ORAL at 08:24

## 2022-06-21 RX ADMIN — CEFAZOLIN 2000 MG: 2 INJECTION, POWDER, FOR SOLUTION INTRAMUSCULAR; INTRAVENOUS at 01:07

## 2022-06-21 RX ADMIN — WHITE PETROLATUM 57.7 %-MINERAL OIL 31.9 % EYE OINTMENT: at 19:01

## 2022-06-21 RX ADMIN — PANTOPRAZOLE SODIUM 40 MG: 40 TABLET, DELAYED RELEASE ORAL at 05:55

## 2022-06-21 RX ADMIN — ENOXAPARIN SODIUM 30 MG: 100 INJECTION SUBCUTANEOUS at 08:25

## 2022-06-21 RX ADMIN — PAROXETINE HYDROCHLORIDE 20 MG: 20 TABLET, FILM COATED ORAL at 08:25

## 2022-06-21 RX ADMIN — INSULIN LISPRO 5 UNITS: 100 INJECTION, SOLUTION INTRAVENOUS; SUBCUTANEOUS at 21:17

## 2022-06-21 RX ADMIN — NALOXONE HYDROCHLORIDE 1 MG: 0.4 INJECTION, SOLUTION INTRAMUSCULAR; INTRAVENOUS; SUBCUTANEOUS at 13:28

## 2022-06-21 RX ADMIN — Medication 2 PUFF: at 08:31

## 2022-06-21 RX ADMIN — INSULIN LISPRO 1 UNITS: 100 INJECTION, SOLUTION INTRAVENOUS; SUBCUTANEOUS at 04:38

## 2022-06-21 RX ADMIN — ONDANSETRON HYDROCHLORIDE 4 MG: 2 INJECTION, SOLUTION INTRAMUSCULAR; INTRAVENOUS at 14:43

## 2022-06-21 RX ADMIN — ALLOPURINOL 200 MG: 100 TABLET ORAL at 08:25

## 2022-06-21 RX ADMIN — INSULIN LISPRO 5 UNITS: 100 INJECTION, SOLUTION INTRAVENOUS; SUBCUTANEOUS at 13:38

## 2022-06-21 RX ADMIN — SODIUM CHLORIDE 250 ML: 9 INJECTION, SOLUTION INTRAVENOUS at 12:36

## 2022-06-21 RX ADMIN — INSULIN LISPRO 2 UNITS: 100 INJECTION, SOLUTION INTRAVENOUS; SUBCUTANEOUS at 01:12

## 2022-06-21 ASSESSMENT — PULMONARY FUNCTION TESTS
PIF_VALUE: 18
PIF_VALUE: 19
PIF_VALUE: 18
PIF_VALUE: 17
PIF_VALUE: 18
PIF_VALUE: 21
PIF_VALUE: 18
PIF_VALUE: 19

## 2022-06-21 ASSESSMENT — PAIN SCALES - GENERAL
PAINLEVEL_OUTOF10: 0
PAINLEVEL_OUTOF10: 5
PAINLEVEL_OUTOF10: 0

## 2022-06-21 NOTE — DISCHARGE INSTR - COC
Continuity of Care Form    Patient Name: Satinder Hackett   :  [id number]  MRN:  [id number]    Admit date:  2022  Discharge date:  22    Code Status Order: Full Code   Advance Directives:   [facility] Documentation       Date/Time Healthcare Directive Type of Healthcare Directive Copy in [address] Agent's Name Healthcare Agent's Phone Number    [phone] No, patient does not have an advance directive for healthcare treatment  Alden GILLESPIE Trinhscott Crabtree too -- -- -- -- --            Admitting Physician:  Papito Austin MD  PCP: Natalie Renae MD    Discharging Nurse: [facility] Unit/Room#: 6646/2148-52  Discharging Unit Phone Number: [phone]    Emergency Contact:   Extended Emergency Contact Information  Primary Emergency Contact: Coral Maxine[address] Phone: [phone]  Relation: Child  Secondary Emergency Contact: Donaldo Nascimento [facility]. Phone: [phone]  Relation: Child   needed?  No    Past Surgical History:  Past Surgical History:   Procedure Laterality Date    APPENDECTOMY      CARPAL TUNNEL RELEASE Left 2018    CATARACT REMOVAL WITH IMPLANT Right 2018    CATARACT REMOVAL WITH IMPLANT Left 08/15/2018    COLONOSCOPY  2014    FEMUR FRACTURE SURGERY Right 2022    RIGHT INTRAMEDULLARY NAILING performed by Davina Cardenas DO at [facility] ([address] [place] [address])      KNEE ARTHROSCOPY Bilateral     PARATHYROIDECTOMY      PARTIAL HYSTERECTOMY (CERVIX NOT REMOVED)      CO XCAPSL CTRC RMVL INSJ IO LENS PROSTH W/O ECP Right 2018    PHACOEMULSIFICATION OF CATARACT WITH INTRAOCULAR LENS IMPLANT RIGHT EYE performed by Ksenia John MD at [address] [place] [address] W/O ECP Left 8/15/2018    PHACOEMULSIFICATION OF CATARACT WITH INTRAOCULAR LENS IMPLANT LEFT EYE performed by Ksenia John MD at [facility] OR    UPPER GASTROINTESTINAL ENDOSCOPY         Immunization History:   Immunization History   Administered Date(s) Administered    COVID-19, Viry Ellis, Primary or Immunocompromised, PF, 100mcg/0.5mL 02/12/2021, 03/12/2021    Influenza A (T7O7-12) Vaccine IM 10/29/2010    Influenza Vaccine, unspecified formulation 10/15/2016    Influenza Virus Vaccine 10/20/2011    Influenza, High Dose (Fluzone 65 yrs and older) 10/19/2017, 11/01/2018, 09/23/2019    Influenza, High-dose, Quadv, 65 yrs +, IM (Fluzone) 10/21/2020, 09/23/2021    Pneumococcal Conjugate 13-valent (Bzbwdqx32) 10/15/2015    Pneumococcal Polysaccharide (Dtqivqbzf16) 09/28/2012    Tdap (Boostrix, Adacel) 11/11/2015    Tetanus 07/14/2016       Active Problems:  Patient Active Problem List   Diagnosis Code    Knee pain M25.569    Chronic urinary tract infection N39.0    Osteoarthritis of both knees M17.0    Benign essential HTN I10    Type 2 diabetes mellitus without complication, without long-term current use of insulin (HCC) E11.9    Gastroesophageal reflux disease without esophagitis K21.9    Chronic gout of multiple sites M1A. 08U9    Spinal stenosis of lumbar region M48.061    Anemia, chronic disease D63.8    Carpal tunnel syndrome of left wrist G56.02    Morbid obesity (HCC) E66.01    Moderate persistent asthma without complication O91.98    Hyperlipidemia associated with type 2 diabetes mellitus (HCC) E11.69, E78.5    Major depressive disorder with single episode, in full remission ([facility] F32.5    Asthma J45.909    Diabetes ([facility] E11.9    DJD (degenerative joint disease) M19.90    Edema R60.9    Gout M10.9    Lumbar disc disease M51.9    Melanoma ([address] C43.9    Vitamin B 12 deficiency E53.8    Osteoporosis M81.0    S/P hysterectomy Z90.710    S/P parathyroidectomy ([address] E89.2    Sciatic nerve pain M54.30    Thalassemia trait D56.3    Hip fracture requiring operative repair, left, closed, initial encounter ([facility] S72.002A    Acute blood loss anemia D62 Closed 2-part intertrochanteric fracture of proximal end of right femur ([address] S72.141A       Isolation/Infection:   Isolation            No Isolation          Patient Infection Status       None to display            Nurse Assessment:  Last Vital Signs: /70   Pulse (!) 102   Temp 99.2 °F (37.3 °C) (Oral)   Resp 16   Ht 5' 3\" (1.6 m)   Wt 223 lb 8 oz (101.4 kg)   SpO2 94%   Breastfeeding No   BMI 39.59 kg/m²     Last documented pain score (0-10 scale): Pain Level: 5  Last Weight:   Wt Readings from Last 1 Encounters:   06/19/22 223 lb 8 oz (101.4 kg)     Mental Status:  oriented and alert    IV Access:  - None    Nursing Mobility/ADLs:  Walking   Dependent  Transfer  Dependent  Bathing  Dependent  Dressing  Dependent  Toileting  Dependent  Feeding  Assisted  Med Admin  Assisted  Med Delivery   whole    Wound Care Documentation and Therapy:  Incision 06/20/22 Femoral Right;Lateral (Active)   Dressing Status Clean;Dry; Intact 06/20/22 2010   Dressing/Treatment Other (comment) 06/20/22 1834   Closure Adhesive bandage 06/20/22 1907   Margins Approximated 06/20/22 1907   Drainage Amount None 06/20/22 2010   Odor None 06/20/22 2010   Number of days: 0        Elimination:  Continence: Bowel: No  Bladder: No  Urinary Catheter: None   Colostomy/Ileostomy/Ileal Conduit: No       Date of Last BM: 6/30/22    Intake/Output Summary (Last 24 hours) at 6/21/2022 1146  Last data filed at 6/21/2022 0239  Gross per 24 hour   Intake --   Output 1100 ml   Net -1100 ml     I/O last 3 completed shifts: In: 946 [I.V.:946]  Out: 3700 [employer] [Urine:4850]    Safety Concerns: At Risk for Falls    Impairments/Disabilities:      Vision    Nutrition Therapy:  Current Nutrition Therapy:   - Oral Diet:  Carb Control 4 carbs/meal (1800kcals/day)    Routes of Feeding: Oral  Liquids:  Thin Liquids  Daily Fluid Restriction: no  Last Modified Barium Swallow with Video (Video Swallowing Test): not done    Treatments at the Time of Hospital Discharge:   Respiratory Treatments:   Oxygen Therapy:  is on oxygen at 1 L/min per nasal cannula. Ventilator:    - No ventilator support    Rehab Therapies: Physical Therapy and Occupational Therapy  Weight Bearing Status/Restrictions: No weight bearing restrictions  Other Medical Equipment (for information only, NOT a DME order): Other Treatments:     Patient's personal belongings (please select all that are sent with patient):  Glasses    RN SIGNATURE:  Electronically signed by Augie Henderson RN on 6/30/22 at 1:19 PM EDT    CASE MANAGEMENT/SOCIAL WORK SECTION    Inpatient Status Date: 06/19/2022    Readmission Risk Assessment Score:  Readmission Risk              Risk of Unplanned Readmission:  23           Discharging to Facility/ Agency   Name: Name: [facility]        Address: [address] [facility] [place] [facility]  Phone: [phone]  Fax: [phone]     Dialysis Facility (if applicable)   Name:  Address:  Dialysis Schedule:  Phone:  Fax:    / signature: Electronically signed by Yandel Pardo RN on 6/30/22 at 2:54 PM EDT    PHYSICIAN SECTION    Prognosis: {Prognosis:9667240513}    Condition at Discharge: [address] Joseph Patient Condition:[id number]}    Rehab Potential (if transferring to Rehab): {Prognosis:2147174407}    Recommended Follow-up, Labs or Other Treatments After Discharge:    PT/OT-weightbearing as tolerated to right lower extremity with assistive device  Follow-up with Dr. Susan Huizar 2 weeks postoperatively. Call [address] [place] [address] orthopedics at [phone] to schedule appointment or with any questions                 Physician Certification: I certify the above information and transfer of Kalyn Corbett  is necessary for the continuing treatment of the diagnosis listed and that she requires [facility] for less 30 days.      Update Admission H&P: No change in H&P    PHYSICIAN SIGNATURE:  Electronically signed by Wenceslao Feilz MD on 6/30/22 at 11:51 AM EDT

## 2022-06-21 NOTE — PROGRESS NOTES
RT Inhaler-Nebulizer Bronchodilator Protocol Note    There is a bronchodilator order in the chart from a provider indicating to follow the RT Bronchodilator Protocol and there is an Initiate RT Inhaler-Nebulizer Bronchodilator Protocol order as well (see protocol at bottom of note). CXR Findings:  XR CHEST PORTABLE    Result Date: 6/20/2022  No acute cardiopulmonary disease. The findings from the last RT Protocol Assessment were as follows:   History Pulmonary Disease: (P) Chronic pulmonary disease  Respiratory Pattern: (P) Regular pattern and RR 12-20 bpm  Breath Sounds: (P) Clear breath sounds  Cough: (P) Strong, spontaneous, non-productive  Indication for Bronchodilator Therapy: (P) On home bronchodilators  Bronchodilator Assessment Score: (P) 2    Aerosolized bronchodilator medication orders have been revised according to the RT Inhaler-Nebulizer Bronchodilator Protocol below. Respiratory Therapist to perform RT Therapy Protocol Assessment initially then follow the protocol. Repeat RT Therapy Protocol Assessment PRN for score 0-3 or on second treatment, BID, and PRN for scores above 3. No Indications - adjust the frequency to every 6 hours PRN wheezing or bronchospasm, if no treatments needed after 48 hours then discontinue using Per Protocol order mode. If indication present, adjust the RT bronchodilator orders based on the Bronchodilator Assessment Score as indicated below. Use Inhaler orders unless patient has one or more of the following: on home nebulizer, not able to hold breath for 10 seconds, is not alert and oriented, cannot activate and use MDI correctly, or respiratory rate 25 breaths per minute or more, then use the equivalent nebulizer order(s) with same Frequency and PRN reasons based on the score. If a patient is on this medication at home then do not decrease Frequency below that used at home.     0-3 - enter or revise RT bronchodilator order(s) to equivalent RT Bronchodilator order with Frequency of every 4 hours PRN for wheezing or increased work of breathing using Per Protocol order mode. 4-6 - enter or revise RT Bronchodilator order(s) to two equivalent RT bronchodilator orders with one order with BID Frequency and one order with Frequency of every 4 hours PRN wheezing or increased work of breathing using Per Protocol order mode. 7-10 - enter or revise RT Bronchodilator order(s) to two equivalent RT bronchodilator orders with one order with TID Frequency and one order with Frequency of every 4 hours PRN wheezing or increased work of breathing using Per Protocol order mode. 11-13 - enter or revise RT Bronchodilator order(s) to one equivalent RT bronchodilator order with QID Frequency and an Albuterol order with Frequency of every 4 hours PRN wheezing or increased work of breathing using Per Protocol order mode. Greater than 13 - enter or revise RT Bronchodilator order(s) to one equivalent RT bronchodilator order with every 4 hours Frequency and an Albuterol order with Frequency of every 2 hours PRN wheezing or increased work of breathing using Per Protocol order mode.          Electronically signed by Ivonne Constantino RCP on 6/21/2022 at 8:36 AM

## 2022-06-21 NOTE — PLAN OF CARE
Problem: Discharge Planning  Goal: Discharge to home or other facility with appropriate resources  6/20/2022 2208 by Irine Hodgkins, RN  Outcome: Progressing  Flowsheets (Taken 6/20/2022 2010)  Discharge to home or other facility with appropriate resources: Identify barriers to discharge with patient and caregiver  6/20/2022 1816 by Damon Zamora RN  Outcome: Progressing  Flowsheets (Taken 6/20/2022 7159)  Discharge to home or other facility with appropriate resources: Identify barriers to discharge with patient and caregiver     Problem: Pain  Goal: Verbalizes/displays adequate comfort level or baseline comfort level  6/20/2022 2208 by Irine Hodgkins, RN  Outcome: Progressing  6/20/2022 1816 by Damon Zamora RN  Outcome: Progressing     Problem: Skin/Tissue Integrity  Goal: Absence of new skin breakdown  Description: 1. Monitor for areas of redness and/or skin breakdown  2. Assess vascular access sites hourly  3. Every 4-6 hours minimum:  Change oxygen saturation probe site  4. Every 4-6 hours:  If on nasal continuous positive airway pressure, respiratory therapy assess nares and determine need for appliance change or resting period.   6/20/2022 2208 by Irine Hodgkins, RN  Outcome: Progressing  6/20/2022 1816 by Damon Zamora RN  Outcome: Progressing     Problem: Safety - Adult  Goal: Free from fall injury  6/20/2022 2208 by Irine Hodgkins, RN  Outcome: Progressing  Flowsheets (Taken 6/20/2022 2207)  Free From Fall Injury: Instruct family/caregiver on patient safety  6/20/2022 1816 by Damon Zamora RN  Outcome: Progressing  Flowsheets (Taken 6/20/2022 1816)  Free From Fall Injury: Instruct family/caregiver on patient safety     Problem: ABCDS Injury Assessment  Goal: Absence of physical injury  6/20/2022 2208 by Irine Hodgkins, RN  Outcome: Progressing  6/20/2022 1816 by Damon Zamora RN  Outcome: Progressing  Flowsheets (Taken 6/20/2022 1816)  Absence of Physical Injury: Implement safety measures based on patient assessment     Problem: Chronic Conditions and Co-morbidities  Goal: Patient's chronic conditions and co-morbidity symptoms are monitored and maintained or improved  6/20/2022 2208 by Coleman Gupta RN  Outcome: Progressing  Flowsheets (Taken 6/20/2022 2010)  Care Plan - Patient's Chronic Conditions and Co-Morbidity Symptoms are Monitored and Maintained or Improved: Monitor and assess patient's chronic conditions and comorbid symptoms for stability, deterioration, or improvement  6/20/2022 1816 by Aylin Cantor RN  Outcome: Progressing  Flowsheets (Taken 6/20/2022 0858)  Care Plan - Patient's Chronic Conditions and Co-Morbidity Symptoms are Monitored and Maintained or Improved: Monitor and assess patient's chronic conditions and comorbid symptoms for stability, deterioration, or improvement

## 2022-06-21 NOTE — PROGRESS NOTES
Order for rapid sequence intubation obtained. Patient pre-oxygenated to with 15L/ heated high flow nasal cannula to a saturation 92 %, BP 71/45 (51), HR 56.    20 mg of Etomidate ordered and administered at 2:51 PM  100 mg of Succinylcholine ordered and administered at 2:52 PM    1u pRBCs running wide open and levophed 10 mcg/min,     Recent Labs     06/19/22  1320 06/20/22  0523 06/21/22  0612    140 135*   K 4.1 4.3 4.2   BUN 21* 14 16   CREATININE 0.6 0.6 0.8         Dr. Jae Mcnair and Chinedu VEE inserted a number  7.5 ET tube. The ET tube is secured at 22 cm measured at the patients lip line. Breath sounds are equal bilaterally. There is a positive color change noted in the colorimetric CO2 detector. RT connected the patient to a ventilator. See orders for ventilator orders. OG tube inserted to a depth of 60 cm. Ausculation of air bolus is positive. To CLWS. POST Intubation ORDERS    ABG ordered in 1 hours  Portable Chest x-ray ordered to confirm placement of the patients ET tube and gastric tube. Bilateral wrist restraints ordered and initiated. Pulses present distal to restraints. Propofol drip ordered for sedation. See EMAR and orders. See physician note to follow.  Electronically signed by Cheryle Mckeon RN on 6/21/2022 at 2:51 PM     Electronically signed by Judy Norwood RN on 6/21/2022 at 5:31 PM

## 2022-06-21 NOTE — CONSULTS
Patient is being seen at the request of Dr. 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 She received 1 unit PRBC today and then this afternoon was noted to hyve lowe blood pressure. She was poorly responsive & received IV fluids & was planned to have 1 additional unit PRBC. She subsequently had second event less than 1 hour later with LOC and hypotension. She was given 1 mg epinephrine for hypotension and transferred to ICU. On my arrival to ICU, patient was on 100% NRB with saturations of 90-92%. She had emesis X 3. She remained hypotensive and was given additional epinephrine and a levophed gtt was started. She remained bradycardic and atropine was given and an isoproterenol gtt was started. Despite these interventions she remained hypotensive and bradycardic, so additional epinephrine was given IVP and then an epinephrine gtt was started. During this same period of time, her saturations worsened and she required emergent intubation. She was paralyzed for intubation and then a dose of vecuronium was given due to dyssynchrony as the succinylcholine wore off. I started a nimbex infusion. She was sedated throughout with propofol. I d/w radiology and the IR nurse re: need for urgent imaging as soon as stable to travel. If she has PE, will require filter.      PAST MEDICAL HISTORY:  Past Medical History:   Diagnosis Date    Anemia     thallasemia minor    Arthritis     osteo-arthritis    Asthma     Back pain     Back pain     Bronchitis chronic     Diabetes mellitus (HCC)     GERD (gastroesophageal reflux disease)     Gout     Hypertension     Melanoma of scalp or neck (HCC)     Osteoarthritis     Osteoporosis     Squamous cell carcinoma, arm     Thal trait      PAST SURGICAL HISTORY:  Past Surgical History:   Procedure Laterality Date    APPENDECTOMY      CARPAL TUNNEL RELEASE Left 02/05/2018    CATARACT REMOVAL WITH IMPLANT Right 07/18/2018    CATARACT REMOVAL WITH IMPLANT Left 08/15/2018    COLONOSCOPY  12/2014    FEMUR FRACTURE SURGERY Right 6/20/2022    RIGHT INTRAMEDULLARY NAILING performed by Tamiko Zapata DO at [address] [facility] ([address] [place] [address])      KNEE ARTHROSCOPY Bilateral     PARATHYROIDECTOMY      PARTIAL HYSTERECTOMY (CERVIX NOT REMOVED)      MO XCAPSL CTRC RMVL INSJ IO LENS PROSTH W/O ECP Right 7/18/2018    PHACOEMULSIFICATION OF CATARACT WITH INTRAOCULAR LENS IMPLANT RIGHT EYE performed by Vandana Carbajal MD at [address] W/O ECP Left 8/15/2018    PHACOEMULSIFICATION OF CATARACT WITH INTRAOCULAR LENS IMPLANT LEFT EYE performed by Vandana Carbajal MD at [address] ENDOSCOPY         FAMILY HISTORY:  family history includes Cancer in her maternal aunt, maternal grandmother, and paternal aunt; Emphysema in her mother; Heart Disease in her father; Heart Failure in her father and mother; High Blood Pressure in her father; Hypertension in her father; Stroke in her mother. SOCIAL HISTORY:   reports that she is a non-smoker but has been exposed to tobacco smoke.  She has never used smokeless tobacco.    Scheduled Meds:   metFORMIN  500 mg Oral BID WC    naloxone        cefTRIAXone (ROCEPHIN) IV  1,000 mg IntraVENous Q24H    insulin lispro  0-6 Units SubCUTAneous Q4H    allopurinol  200 mg Oral Daily    atorvastatin  10 mg Oral Nightly    [Held by provider] carvedilol  6.25 mg Oral BID    cetirizine  10 mg Oral Daily    budesonide-formoterol  2 puff Inhalation BID    gabapentin  300 mg Oral BID    [Held by provider] losartan  100 mg Oral Daily    pantoprazole  40 mg Oral QAM AC    PARoxetine  20 mg Oral Daily    tiotropium  2 puff Inhalation Daily    [Held by provider] verapamil  240 mg Oral Daily    sodium chloride flush  5-40 mL IntraVENous 2 times per day    enoxaparin  30 mg SubCUTAneous BID     Continuous Infusions:   sodium chloride      sodium chloride 1,000 mL (06/21/22 1231)    sodium chloride      dextrose      sodium chloride       PRN Meds:  sodium chloride, sodium chloride, glucose, dextrose bolus **OR** dextrose bolus, glucagon (rDNA), dextrose, oxyCODONE **OR** oxyCODONE, morphine, sodium chloride flush, sodium chloride, ondansetron **OR** ondansetron, polyethylene glycol, morphine, hydrALAZINE, albuterol sulfate HFA    ALLERGIES:  Patient is allergic to hctz [hydrochlorothiazide] and lisinopril. REVIEW OF SYSTEMS:  Unable to obtain because the patient is non-communicative     PHYSICAL EXAM:  Blood pressure (!) 149/69, pulse 63, temperature 97.5 °F (36.4 °C), temperature source Oral, resp. rate 18, height 5' 3\" (1.6 m), weight 223 lb 8 oz (101.4 kg), SpO2 100 %, not currently breastfeeding.' on vent  General: intubated, ill appearing    ENT: Pharynx with ETT. Resp: No crackles. No wheezing. CV: S1, S2. + edema  GI: NT, ND, +BS  Skin: Warm and dry. Neuro:  Sedated & paralyzed   MuscSkel: surgical site looks good w/o hematoma or bruising    LABS:  CBC:   Recent Labs     06/19/22  1320 06/20/22  0523 06/21/22  0612   WBC 10.3 7.8 9.7   HGB 8.9* 7.9* 6.5*   HCT 28.5* 24.8* 20.3*   MCV 59.7* 59.6* 59.5*    272 183     BMP:   Recent Labs     06/19/22  1320 06/20/22  0523 06/21/22  0612    140 135*   K 4.1 4.3 4.2    105 104   CO2 22 26 22   BUN 21* 14 16   CREATININE 0.6 0.6 0.8     LIVER PROFILE:   Recent Labs     06/19/22  1320   AST 11*   ALT 10   BILITOT 0.8   ALKPHOS 68     PT/INR: No results for input(s): PROTIME, INR in the last 72 hours. APTT: No results for input(s): APTT in the last 72 hours.   UA:  Recent Labs     06/20/22  1030   COLORU Yellow   PHUR 5.5   WBCUA >100*   RBCUA see below*   CLARITYU CLOUDY*   SPECGRAV >=1.030   LEUKOCYTESUR MODERATE*   UROBILINOGEN 0.2   BILIRUBINUR Negative   BLOODU MODERATE*   GLUCOSEU Negative     No results for input(s): PHART, WSJ5OMK, PO2ART in the last 72 hours. Cultures:      6/19/22 SARS-CoV-2 NAAT negative  6/20/22 Urine E coli 100K     Chest imaging was reviewed by me and showed:   6/21/22 CXR ETT okay     ASSESSMENT:  · Acute hypoxemic respiratory failure   · Syncope  · Shock: septic v cardiogenic v hypovolemic   · Acute blood loss anemia   · Acute coagulopathy   · Post op RIGHT IM hip nailing on 6/20/22  · Thalassemia minor  · DM  · Asthma  · Gout    PLAN:  Mechanical ventilation as per my orders. The ventilator was adjusted by me at the bedside for unstable, life threatening respiratory failure. IV Propofol for sedation, target RASS -4-5 & do not titrate down while on paralytic. Fentanyl PRN pain, gtt as needed  Nimbex for ventilator synchrony, daily interruption to ensure adequate sedation  Head of bed 30 degrees or higher at all times  · D/C coreg, cardura, verapamil  · D/C lovenox  Ensure adequate IV access - CVC placed by me   Follow serial hemoglobin; transfuse to keep hemoglobin greater than 7  Correct coagulopathy - 2 U FFP ordered  Inhaled bronchodilators   Merrem/Vanc pending cultures   SSI    Prophylaxis: DVT - SCD; MRSA - mupirocin; GI - on protonix now IV  I d/w two adult children  I d/w Dr. Trey Ferris      Total critical care time caring for this patient with life threatening, unstable organ failure, including direct patient contact, management of life support systems, review of data including imaging and labs, discussions with other team members and physicians is 95 minutes so far today, excluding procedures.

## 2022-06-21 NOTE — PROGRESS NOTES
RAPID response called overhead. 1328: Patient has a BP 73/55, 2 IV boluses started at this time. Patient is renny on the monitor. Narcan 1 mg administered at this time. BG was 363. Patient currently on a NRB. 1332: ABG ordered at this time. 1335: 1 U prbc at this time as well as stat H/H.   1336: 84/46, patient is more alert at this time able to follow small commands and nod her head. 1338: Rapid ended. Patient placed on telemetry with continuous pulse ox.  4L NC

## 2022-06-21 NOTE — FLOWSHEET NOTE
06/21/22 1222   Vital Signs   Temp 97.5 °F (36.4 °C)   Temp Source Oral   Heart Rate 62   Resp 18   BP (!) 78/48   BP Location Left upper arm   BP Method Automatic   MAP (Calculated) 58   Patient Position High fowlers   Level of Consciousness Alert (0)   MEWS Score 3   Pain Assessment   Pain Assessment None - Denies Pain   Oxygen Therapy   SpO2 92 %   O2 Device Nasal cannula   O2 Flow Rate (L/min) 2 L/min   Blood transfusion completed. VS listed above. [address] notified of BP and 250ml bolus started. Pt A/Ox4 and talking to RN.

## 2022-06-21 NOTE — CARE COORDINATION
Order to social work acknowledged. Pt transferred to ICU today. PT/OT will need to be ordered when appropriate as pt has been accepted at [facility] per notes. CM will follow. Please notify CM if needs or concerns arise.      Angel Phoenix MSW, SUMIT

## 2022-06-21 NOTE — PROGRESS NOTES
IM Progress Note    Admit Date:  6/19/2022  2    Interval history:  S.p IM nailing of right hip   Drop in h.h noted  BP stable    Subjective:  Ms. Dave Chris seen up in bed, feels ok today . No pain without moving right knee   Was on 1.5 L o2 overnight but now off    Reports legs are weak from laying in bed for 3 days      Objective:   /72   Pulse 99   Temp 99.4 °F (37.4 °C) (Oral)   Resp 18   Ht 5' 3\" (1.6 m)   Wt 223 lb 8 oz (101.4 kg)   SpO2 94%   Breastfeeding No   BMI 39.59 kg/m²     Intake/Output Summary (Last 24 hours) at 6/21/2022 2110  Last data filed at 6/21/2022 0239  Gross per 24 hour   Intake --   Output 1100 ml   Net -1100 ml       Physical Exam:      General: elderly female up in bed,  Awake, alert and oriented. Appears to be not in any distress  Mucous Membranes:  Pale , anicteric  Neck: No JVD, no carotid bruit, no thyromegaly  Chest:  Clear to auscultation bilaterally, no added sounds  Cardiovascular:  RRR S1S2 heard, no murmurs or gallops  Abdomen:  Soft, undistended, non tender, no organomegaly, BS present  Extremities: left ankle diffuse swelling and tenderness noted  Right hip dressing dry , bilateral knee arthritis with limited movements to both LE with weakness noted   No edema or cyanosis.  Distal pulses well felt  Neurological : grossly normal    Medications:   Scheduled Medications:    metFORMIN  500 mg Oral BID WC    insulin lispro  0-6 Units SubCUTAneous Q4H    ceFAZolin  2,000 mg IntraVENous Q8H    allopurinol  200 mg Oral Daily    atorvastatin  10 mg Oral Nightly    carvedilol  6.25 mg Oral BID    cetirizine  10 mg Oral Daily    budesonide-formoterol  2 puff Inhalation BID    gabapentin  300 mg Oral BID    [Held by provider] losartan  100 mg Oral Daily    pantoprazole  40 mg Oral QAM AC    PARoxetine  20 mg Oral Daily    tiotropium  2 puff Inhalation Daily    verapamil  240 mg Oral Daily    sodium chloride flush  5-40 mL IntraVENous 2 times per day    enoxaparin  30 mg SubCUTAneous BID     I   sodium chloride      dextrose      sodium chloride      sodium chloride 1,000 mL (06/21/22 0554)     sodium chloride, glucose, dextrose bolus **OR** dextrose bolus, glucagon (rDNA), dextrose, oxyCODONE **OR** oxyCODONE, morphine, sodium chloride flush, sodium chloride, ondansetron **OR** ondansetron, polyethylene glycol, morphine, hydrALAZINE, albuterol sulfate HFA    Lab Data:  Recent Labs     06/19/22  1320 06/20/22  0523 06/21/22  0612   WBC 10.3 7.8 9.7   HGB 8.9* 7.9* 6.5*   HCT 28.5* 24.8* 20.3*   MCV 59.7* 59.6* 59.5*    272  --      Recent Labs     06/19/22  1320 06/20/22  0523 06/21/22  0612    140 135*   K 4.1 4.3 4.2    105 104   CO2 22 26 22   BUN 21* 14 16   CREATININE 0.6 0.6 0.8     Recent Labs     06/19/22  1320   CKTOTAL 38       Coagulation:   Lab Results   Component Value Date    INR 1.03 01/06/2015     Cardiac markers:   Lab Results   Component Value Date    CKTOTAL 38 06/19/2022    TROPONINI <0.01 08/04/2016         Lab Results   Component Value Date    ALT 10 06/19/2022    AST 11 (L) 06/19/2022    ALKPHOS 68 06/19/2022    BILITOT 0.8 06/19/2022       Lab Results   Component Value Date    INR 1.03 01/06/2015    PROTIME 11.1 01/06/2015       Radiology      EKG:  I have reviewed the EKG with the following interpretation:   Sinus tachycardia  left axis  Left anterior fascicular block  Nonspecific ST abnormality  Abnormal ECG  Confirmed by YOHANA LOPES MD ([id number]) on 6/20/2022 7:20:25 AM     RADIOLOGY  XR HIP 2-3 VW W PELVIS RIGHT   Final Result   Moderately displaced fracture of the proximal right femur.           CTA ABDOMINAL AORTA W BILAT RUNOFF W CONTRAST   Final Result   1. Traumatic, acute right hip intertrochanteric fracture with varus   angulation and small surrounding hematoma. 2. No pseudoaneurysm or active extravasation is identified adjacent to the   fracture. 3. Aortoiliac inflow is widely patent.   There is a mild amount of fibro   calcified plaque. 4. Femoropopliteal segments are patent bilaterally, noting fibro calcified   plaque. There is 3 vessel runoff bilaterally. However, on the right side   there is only 1 primary runoff vessel, the posterior tibial.   5. No acute abdominopelvic abnormality. Hepatic steatosis.         CT FACIAL BONES WO CONTRAST   Final Result   No acute intracranial abnormality.       No acute facial fractures.           CT Head WO Contrast   Final Result   No acute intracranial abnormality.       No acute facial fractures.           XR FEMUR RIGHT (MIN 2 VIEWS)   Final Result   Traumatic, acute intertrochanteric fracture of the right hip.       Advanced osteoarthritis of the knee.   Moderate-sized knee joint effusion.                 Pertinent previous results reviewed   DEXA 6/18/19  Impression   Osteoporosis of the right femoral neck by WHO criteria.       The prior DEXA scan from 10/06/2014 was performed at a different hospital on   a different machine, therefore, direct comparison cannot be made.  The   current study should be considered the patient's new baseline.            ASSESSMENT/PLAN:     #Intertrochanteric hip fracture; right  #Mechanical fall    -Osteoporosis contributing   - admitted to medical floor for surgical mx and pain control  - ortho surgery consulted , s.p IM nailing of right hip POD 1  - pain control to avoid drowsiness  - dvt prophylaxis  - early ambulation , start PT    #Left foot pain- likely acute gout  -No acute fracture per imaging   - uric acid wnl  - can start nsaids with colchicine      #Possible UTI  -Asymptomatic  -Nitrites and leuk est on UA, >100 WBC  -Does not describe bacteria  -Urine culture ordered  -Hold off on abx for now     #HTN   -BP is elevated in the setting of pain  -Continue verapamil, terazosin, cozaar and coreg- adjust as needed  -Hydralazine PRN     #ACute blood loss anemia  - expected with major fractures and surgery   - also has

## 2022-06-21 NOTE — PROGRESS NOTES
Patient intubated by Dr. Keena Shabazz at this time. 7.5ett secured with Cherie at 23cm @ lip blanca.         06/21/22 1215   NICU Vent Information   $Ventilation $Initial Day   Ventilation Started Yes   Ventilation Day(s) 1   Skin Assessment Clean, dry, & intact   Vent Type 980   Vent Mode AC/VC   Vt (Set, mL) 330 mL   Vt Exhaled 342 mL   Resp Rate (Set) 16 bmp   Rate Measured 16 br/min   Minute Volume 5.49 Liters   Peak Flow 50 L/min   Pressure Support 0 cmH20   FiO2  100 %   Peak Inspiratory Pressure 21 cmH2O   I:E Ratio 1:4.20   Sensitivity 3   High Peep/I Pressure 0   PEEP/CPAP (cmH2O) 5   I Time/ I Time % 0 s   Mean Airway Pressure 8 cmH20   Cough/Sputum   Sputum How Obtained None   Breath Sounds   Right Upper Lobe Diminished   Right Middle Lobe Diminished   Right Lower Lobe Diminished   Left Upper Lobe Diminished   Left Lower Lobe Diminished   Additional Respiratory Assessments   Heart Rate (!) 48   Resp 15   SpO2 95 %   Position Semi-Phipps's   Humidification Source HME   Vent Alarm Settings   High Pressure  40 cmH2O   Low Minute Volume Alarm 3.5 L/min   Low Exhaled Vt  250 mL   RR High 40 br/min   Apnea (Secs) 20 secs   Ambu Bag With Mask At Bedside Yes   NICU Ventilator Associated Pneumonia Bundle   Elevation of Head of Bed Yes

## 2022-06-21 NOTE — PROCEDURES
Procedure: Nontunneled central venous access, left femoral    Indication: invasive hemodynamic monitoring, frequent blood draws, ensure stable IV access    Informed Consent: was obtained from family     Time Out: taken    Procedure: Sterile prep with chlorhexidine. Full maximum sterile field/barrier technique was followed (with cap and mask and sterile gown and large sterile sheet and hand hygeine and 2% chlorhexidine). Aqueous lidocaine anesthetic. Direct ultrasound visualization of the left femoral vein, with placement of central venous catheter using modified seldinger technique. During the initial attempt, I was unable to pass the catheter over the wire. I removed the wire and catheter. I applied pressure until all bleeding stopped. I subsequently repeated direct ultrasound visualization of the femoral vein and successfully placed the femoral central venous catheter using a modified Seldinger technique. Good dark venous blood from all 3 lumens. Positioning is confirmed with ultrasound. No immediate complication.     Recommendation: remove central line at earliest time feasible to mitigate infectious risks

## 2022-06-21 NOTE — CARE COORDINATION
INTERDISCIPLINARY PLAN OF CARE CONFERENCE    Date/Time: 6/21/2022 11:47 AM  Completed by: Castro Ecsobar RN, Case Management      Patient Name:  Mireya Petersen  YOB: 1945  Admitting Diagnosis: Hip fracture requiring operative repair, left, closed, initial encounter [facility]) Abdirahman Dahl     Admit Date/Time:  6/19/2022 12:49 PM    Chart reviewed. Interdisciplinary team contacted or reviewed plan related to patient progress and discharge plans. Disciplines included Case Management, Nursing, and Dietitian. Current Status: IP 09/19/2022  PT/OT recommendation for discharge plan of care: PENDING    Expected D/C Disposition:  Skilled nursing facility  Confirmed plan with patient   Discharge Plan Comments:  Plan: Accepted at [facility] for rehab. Await PT/OT eval and recs. +medicare- no pre-cert.  +CM following    Home O2 in place on admit: No  Pt informed of need to bring portable home O2 tank on day of discharge for nursing to connect prior to leaving:  No  Verbalized agreement/Understanding:  Not Indicated

## 2022-06-21 NOTE — SIGNIFICANT EVENT
Patient found unresponsive. Rapid response called 1327    Hypotensive SBP 73s/55s. Bradycardic high 40s low 50s. BG in 363s. Response to painful stimuli. Moving all extremities. 1mg Narcan given with no change. 2L bolus ordered. BP is responding. Received 1 PRBCs this am. 1 more unit ordered. Trending HH q6hr. Placed on non-re breather, weaned to 5L   EKG with bradycardia, low amplitude pwaves, no blocks appreciated.   STAT troponin pending    Edilson Heading ALEENA  6/21/2022 1:42 PM

## 2022-06-21 NOTE — PROGRESS NOTES
Occupational Therapy/Physical Therapy  Orders received, chart reviewed. Patient with hypotension and rapid response this pm, transferred to ICU due to status. Will need new orders for therapy services when appropriate.   Marbin Randolph, OTR/L [id number]  Boyd Mckeon, MS PT, # [id number]

## 2022-06-21 NOTE — CODE DOCUMENTATION
1401: Rapid/code called over head  Patient has a pulse and is breathing on her own. BP is 70s/40s. 1mg of EPI administered by CORBIN Serna.     1408: /69. Abd distended. TX to ICU at this time.

## 2022-06-21 NOTE — FLOWSHEET NOTE
BP (!) 146/76   Pulse (!) 108   Temp 99.2 °F (37.3 °C) (Oral)   Resp 22   Ht 5' 3\" (1.6 m)   Wt 223 lb 8 oz (101.4 kg)   SpO2 91%   Breastfeeding No   BMI 39.59 kg/m²     Pt arrived to unit from PACU in stable condition. Shift assessment complete; see flowsheets. Pt AOx4 resting in bed. Respirations even and unlabored. SpO2 91% on 2L per NC. Pt denies any further needs at this time. Call light in reach, bed locked in lowest position. Bed alarm in place for patient safety.

## 2022-06-21 NOTE — CONSULTS
Pharmacy Note  Vancomycin Consult    Ling Santamaria is a 67 y.o. female started on Vancomycin for Aspiration PNA; consult received from Dr. Radha Muñoz to manage therapy. Also receiving the following antibiotics: Merrem. Allergies:  Hctz [hydrochlorothiazide] and Lisinopril       Recent Labs     06/20/22  0523 06/21/22  0612   CREATININE 0.6 0.8       Recent Labs     06/20/22  0523 06/21/22  0612   WBC 7.8 9.7       Estimated Creatinine Clearance: 67 mL/min (based on SCr of 0.8 mg/dL). Intake/Output Summary (Last 24 hours) at 6/21/2022 1824  Last data filed at 6/21/2022 1802  Gross per 24 hour   Intake 2863.8 ml   Output 550 ml   Net 2313.8 ml       Wt Readings from Last 1 Encounters:   06/19/22 223 lb 8 oz (101.4 kg)         Body mass index is 39.59 kg/m². Culture Date      Source                       Results      Loading dose (critically ill or in ICU, require dialysis or renal replacement therapy): Vancomycin 25 mg/kg IVPB x 1 (maximum 2500 mg). Maintenance dose: 15 mg/kg (maximum: 2000 mg/dose and 4500 mg/day) starting at the next dosing interval determined by renal function  Pulse dose: fluctuating renal function, MARVIN, ESRD   Goal Vancomycin trough: 10-15 mcg/mL or 15-20 mcg/mL   Goal Vancomycin AUC: 400-600     Assessment/Plan:  Will start Vancomycin  1000 mg IV every 12 hours. Calculated AUC 5111. Vancomycin trough ordered for 6/23 @ 0630. Timing of trough level will be determined based on culture results, renal function, and clinical response. Thank you for the consult.

## 2022-06-21 NOTE — PROGRESS NOTES
Department of Orthopedic Surgery  Physician Assistant   Progress Note    Subjective:       Systemic or Specific Complaints:Pain Control    Objective:     Patient Vitals for the past 24 hrs:   BP Temp Temp src Pulse Resp SpO2   06/21/22 0830 135/70 99.2 °F (37.3 °C) Oral (!) 102 16 94 %   06/21/22 0802 139/74 99.2 °F (37.3 °C) Oral 99 16 92 %   06/21/22 0604 121/72 99.4 °F (37.4 °C) Oral 99 18 94 %   06/21/22 0239 122/73 97.9 °F (36.6 °C) Oral 99 16 94 %   06/20/22 2255 126/66 99.3 °F (37.4 °C) Oral (!) 104 18 92 %   06/20/22 2158 (!) 146/82 98.7 °F (37.1 °C) Oral 89 18 97 %   06/20/22 2110 (!) 149/80 99.2 °F (37.3 °C) Oral 98 18 96 %   06/20/22 2003 (!) 146/76 99.2 °F (37.3 °C) Oral (!) 108 22 91 %   06/20/22 1935 136/63 -- -- (!) 102 13 97 %   06/20/22 1933 136/63 97.1 °F (36.2 °C) Temporal (!) 104 11 98 %   06/20/22 1932 136/63 -- -- (!) 101 11 97 %   06/20/22 1931 136/63 -- -- (!) 102 10 97 %   06/20/22 1930 (!) 146/65 -- -- (!) 103 22 95 %   06/20/22 1929 (!) 160/65 -- -- (!) 103 18 98 %   06/20/22 1925 (!) 160/65 -- -- (!) 106 20 97 %   06/20/22 1920 (!) 160/65 -- -- (!) 104 17 97 %   06/20/22 1915 (!) 154/70 -- -- (!) 105 16 96 %   06/20/22 1910 (!) 154/70 -- -- (!) 109 16 96 %   06/20/22 1905 (!) 154/70 -- -- (!) 103 20 95 %   06/20/22 1854 (!) 156/64 97.2 °F (36.2 °C) Temporal (!) 108 26 95 %   06/20/22 1615 -- (!) 100.7 °F (38.2 °C) Oral -- -- --   06/20/22 1436 (!) 154/83 (!) 100.8 °F (38.2 °C) Oral (!) 104 16 90 %       General: alert, appears stated age and cooperative   Wound: Wound clean and dry no evidence of infection. , No Drainage, Wound Intact and Positive for Edema   Motion: Painful range of Motion in affected extremity   DVT Exam: No evidence of DVT seen on physical exam.     Additional exam: sensation intact to touch in the right leg. Moving foot and ankle without problem. Good distal pulses. Thigh swollen but soft.     Data Review  CBC:   Lab Results   Component Value Date    WBC 9.7 06/21/2022    RBC 3.40 06/21/2022    HGB 6.5 06/21/2022    HCT 20.3 06/21/2022     06/21/2022       Renal:   Lab Results   Component Value Date     06/21/2022    K 4.2 06/21/2022     06/21/2022    CO2 22 06/21/2022    BUN 16 06/21/2022    CREATININE 0.8 06/21/2022    GLUCOSE 150 06/21/2022    CALCIUM 7.9 06/21/2022            Assessment:      right hip IM nail, Acute blood loss anemia - expected after surgery. Will monitor Hgb  . Plan:      1:  Continue PT/OT as able. WBAT. Lovenox for DVT prophy.   Will transfuse 1 unit PRBC today and recheck Hgb in AM.  2:  Continue Deep venous thrombosis prophylaxis  3:  Continue Pain Control    MARIUSZ Alcaraz

## 2022-06-21 NOTE — PROCEDURES
ENDOTRACHEAL INTUBATION    INDICATION: Life threatening respiratory failure    TIME OUT: taken    SEDATION: etomidate and succinylcholine    PROCEDURE: Using video laryngoscopy, the vocal cords were well visualized and a 7.5 mm endotracheal tube was place directly through the cords. Good breath sounds auscultated bilaterally without sounds over abdomen. Good return of CO2 on the monitor. CXR shows adequate positioning.

## 2022-06-21 NOTE — PLAN OF CARE
Problem: Discharge Planning  Goal: Discharge to home or other facility with appropriate resources  Outcome: Progressing  Flowsheets (Taken 6/21/2022 0820)  Discharge to home or other facility with appropriate resources: Identify barriers to discharge with patient and caregiver     Problem: Pain  Goal: Verbalizes/displays adequate comfort level or baseline comfort level  Outcome: Progressing     Problem: Skin/Tissue Integrity  Goal: Absence of new skin breakdown  Description: 1. Monitor for areas of redness and/or skin breakdown  2. Assess vascular access sites hourly  3. Every 4-6 hours minimum:  Change oxygen saturation probe site  4. Every 4-6 hours:  If on nasal continuous positive airway pressure, respiratory therapy assess nares and determine need for appliance change or resting period. Outcome: Progressing     Problem: Safety - Adult  Goal: Free from fall injury  Outcome: Progressing  Flowsheets (Taken 6/21/2022 1525)  Free From Fall Injury: Instruct family/caregiver on patient safety     Problem: ABCDS Injury Assessment  Goal: Absence of physical injury  Outcome: Progressing  Flowsheets (Taken 6/21/2022 1525)  Absence of Physical Injury: Implement safety measures based on patient assessment     Problem: Chronic Conditions and Co-morbidities  Goal: Patient's chronic conditions and co-morbidity symptoms are monitored and maintained or improved  Outcome: Progressing  Flowsheets (Taken 6/21/2022 0820)  Care Plan - Patient's Chronic Conditions and Co-Morbidity Symptoms are Monitored and Maintained or Improved: Monitor and assess patient's chronic conditions and comorbid symptoms for stability, deterioration, or improvement     Problem: Safety - Medical Restraint  Goal: Remains free of injury from restraints (Restraint for Interference with Medical Device)  Description: INTERVENTIONS:  1.  Determine that other, less restrictive measures have been tried or would not be effective before applying the restraint  2. Evaluate the patient's condition at the time of restraint application  3. Inform patient/family regarding the reason for restraint  4.  Q2H: Monitor safety, psychosocial status, comfort, nutrition and hydration  Outcome: Progressing

## 2022-06-22 ENCOUNTER — APPOINTMENT (OUTPATIENT)
Dept: GENERAL RADIOLOGY | Age: 77
DRG: 480 | End: 2022-06-22
Payer: MEDICARE

## 2022-06-22 LAB
ANION GAP SERPL CALCULATED.3IONS-SCNC: 12 MMOL/L (ref 3–16)
BASE EXCESS ARTERIAL: -5.6 MMOL/L (ref -3–3)
BASE EXCESS ARTERIAL: -7.6 MMOL/L (ref -3–3)
BASOPHILS ABSOLUTE: 0 K/UL (ref 0–0.2)
BASOPHILS RELATIVE PERCENT: 0.1 %
BUN BLDV-MCNC: 26 MG/DL (ref 7–20)
CALCIUM SERPL-MCNC: 7.2 MG/DL (ref 8.3–10.6)
CARBOXYHEMOGLOBIN ARTERIAL: 0.1 % (ref 0–1.5)
CARBOXYHEMOGLOBIN ARTERIAL: 0.3 % (ref 0–1.5)
CHLORIDE BLD-SCNC: 101 MMOL/L (ref 99–110)
CO2: 19 MMOL/L (ref 21–32)
CREAT SERPL-MCNC: 1.9 MG/DL (ref 0.6–1.2)
EKG ATRIAL RATE: 48 BPM
EKG DIAGNOSIS: NORMAL
EKG P AXIS: 2 DEGREES
EKG P-R INTERVAL: 174 MS
EKG Q-T INTERVAL: 452 MS
EKG QRS DURATION: 114 MS
EKG QTC CALCULATION (BAZETT): 403 MS
EKG R AXIS: -44 DEGREES
EKG T AXIS: 73 DEGREES
EKG VENTRICULAR RATE: 48 BPM
EOSINOPHILS ABSOLUTE: 0.1 K/UL (ref 0–0.6)
EOSINOPHILS RELATIVE PERCENT: 0.5 %
GFR AFRICAN AMERICAN: 31
GFR NON-AFRICAN AMERICAN: 26
GLUCOSE BLD-MCNC: 109 MG/DL (ref 70–99)
GLUCOSE BLD-MCNC: 196 MG/DL (ref 70–99)
GLUCOSE BLD-MCNC: 208 MG/DL (ref 70–99)
GLUCOSE BLD-MCNC: 284 MG/DL (ref 70–99)
GLUCOSE BLD-MCNC: 345 MG/DL (ref 70–99)
GLUCOSE BLD-MCNC: 88 MG/DL (ref 70–99)
HCO3 ARTERIAL: 20 MMOL/L (ref 21–29)
HCO3 ARTERIAL: 20.5 MMOL/L (ref 21–29)
HCT VFR BLD CALC: 23.9 % (ref 36–48)
HCT VFR BLD CALC: 27.9 % (ref 36–48)
HEMOGLOBIN, ART, EXTENDED: 10.2 G/DL (ref 12–16)
HEMOGLOBIN, ART, EXTENDED: 9.9 G/DL (ref 12–16)
HEMOGLOBIN: 7.8 G/DL (ref 12–16)
HEMOGLOBIN: 9.2 G/DL (ref 12–16)
LACTIC ACID: 1.5 MMOL/L (ref 0.4–2)
LACTIC ACID: 1.9 MMOL/L (ref 0.4–2)
LV EF: 60 %
LVEF MODALITY: NORMAL
LYMPHOCYTES ABSOLUTE: 1.9 K/UL (ref 1–5.1)
LYMPHOCYTES RELATIVE PERCENT: 16.8 %
MCH RBC QN AUTO: 21.9 PG (ref 26–34)
MCHC RBC AUTO-ENTMCNC: 32.9 G/DL (ref 31–36)
MCV RBC AUTO: 66.8 FL (ref 80–100)
METHEMOGLOBIN ARTERIAL: 0 %
METHEMOGLOBIN ARTERIAL: 0.1 %
MONOCYTES ABSOLUTE: 1 K/UL (ref 0–1.3)
MONOCYTES RELATIVE PERCENT: 9.1 %
NEUTROPHILS ABSOLUTE: 8.4 K/UL (ref 1.7–7.7)
NEUTROPHILS RELATIVE PERCENT: 73.5 %
O2 CONTENT ARTERIAL: 13 ML/DL
O2 SAT, ARTERIAL: 63.8 %
O2 SAT, ARTERIAL: 94.1 %
O2 THERAPY: ABNORMAL
O2 THERAPY: ABNORMAL
ORGANISM: ABNORMAL
PCO2 ARTERIAL: 42.9 MMHG (ref 35–45)
PCO2 ARTERIAL: 50.1 MMHG (ref 35–45)
PDW BLD-RTO: 24.8 % (ref 12.4–15.4)
PERFORMED ON: ABNORMAL
PERFORMED ON: NORMAL
PH ARTERIAL: 7.22 (ref 7.35–7.45)
PH ARTERIAL: 7.3 (ref 7.35–7.45)
PLATELET # BLD: 189 K/UL (ref 135–450)
PMV BLD AUTO: 10.5 FL (ref 5–10.5)
PO2 ARTERIAL: 39.6 MMHG (ref 75–108)
PO2 ARTERIAL: 77.3 MMHG (ref 75–108)
POTASSIUM REFLEX MAGNESIUM: 4.5 MMOL/L (ref 3.5–5.1)
RBC # BLD: 4.17 M/UL (ref 4–5.2)
SODIUM BLD-SCNC: 132 MMOL/L (ref 136–145)
TCO2 ARTERIAL: 21.5 MMOL/L
TCO2 ARTERIAL: 21.8 MMOL/L
TROPONIN: <0.01 NG/ML
TROPONIN: <0.01 NG/ML
URINE CULTURE, ROUTINE: ABNORMAL
VANCOMYCIN RANDOM: 14 UG/ML
WBC # BLD: 11.4 K/UL (ref 4–11)

## 2022-06-22 PROCEDURE — C9113 INJ PANTOPRAZOLE SODIUM, VIA: HCPCS | Performed by: INTERNAL MEDICINE

## 2022-06-22 PROCEDURE — 6360000002 HC RX W HCPCS: Performed by: INTERNAL MEDICINE

## 2022-06-22 PROCEDURE — 6370000000 HC RX 637 (ALT 250 FOR IP): Performed by: INTERNAL MEDICINE

## 2022-06-22 PROCEDURE — 80048 BASIC METABOLIC PNL TOTAL CA: CPT

## 2022-06-22 PROCEDURE — 97535 SELF CARE MNGMENT TRAINING: CPT

## 2022-06-22 PROCEDURE — 99233 SBSQ HOSP IP/OBS HIGH 50: CPT | Performed by: INTERNAL MEDICINE

## 2022-06-22 PROCEDURE — 2700000000 HC OXYGEN THERAPY PER DAY

## 2022-06-22 PROCEDURE — 94761 N-INVAS EAR/PLS OXIMETRY MLT: CPT

## 2022-06-22 PROCEDURE — 82803 BLOOD GASES ANY COMBINATION: CPT

## 2022-06-22 PROCEDURE — 2580000003 HC RX 258: Performed by: INTERNAL MEDICINE

## 2022-06-22 PROCEDURE — 99291 CRITICAL CARE FIRST HOUR: CPT | Performed by: INTERNAL MEDICINE

## 2022-06-22 PROCEDURE — APPNB60 APP NON BILLABLE TIME 46-60 MINS: Performed by: PHYSICIAN ASSISTANT

## 2022-06-22 PROCEDURE — 2500000003 HC RX 250 WO HCPCS: Performed by: INTERNAL MEDICINE

## 2022-06-22 PROCEDURE — 36415 COLL VENOUS BLD VENIPUNCTURE: CPT

## 2022-06-22 PROCEDURE — 97162 PT EVAL MOD COMPLEX 30 MIN: CPT

## 2022-06-22 PROCEDURE — 83036 HEMOGLOBIN GLYCOSYLATED A1C: CPT

## 2022-06-22 PROCEDURE — 6370000000 HC RX 637 (ALT 250 FOR IP): Performed by: STUDENT IN AN ORGANIZED HEALTH CARE EDUCATION/TRAINING PROGRAM

## 2022-06-22 PROCEDURE — 36600 WITHDRAWAL OF ARTERIAL BLOOD: CPT

## 2022-06-22 PROCEDURE — 84484 ASSAY OF TROPONIN QUANT: CPT

## 2022-06-22 PROCEDURE — 93306 TTE W/DOPPLER COMPLETE: CPT

## 2022-06-22 PROCEDURE — 83605 ASSAY OF LACTIC ACID: CPT

## 2022-06-22 PROCEDURE — 2580000003 HC RX 258: Performed by: STUDENT IN AN ORGANIZED HEALTH CARE EDUCATION/TRAINING PROGRAM

## 2022-06-22 PROCEDURE — 97110 THERAPEUTIC EXERCISES: CPT

## 2022-06-22 PROCEDURE — 89220 SPUTUM SPECIMEN COLLECTION: CPT

## 2022-06-22 PROCEDURE — 85025 COMPLETE CBC W/AUTO DIFF WBC: CPT

## 2022-06-22 PROCEDURE — 85018 HEMOGLOBIN: CPT

## 2022-06-22 PROCEDURE — 93010 ELECTROCARDIOGRAM REPORT: CPT | Performed by: INTERNAL MEDICINE

## 2022-06-22 PROCEDURE — 71045 X-RAY EXAM CHEST 1 VIEW: CPT

## 2022-06-22 PROCEDURE — 85014 HEMATOCRIT: CPT

## 2022-06-22 PROCEDURE — 87070 CULTURE OTHR SPECIMN AEROBIC: CPT

## 2022-06-22 PROCEDURE — 94003 VENT MGMT INPAT SUBQ DAY: CPT

## 2022-06-22 PROCEDURE — 80202 ASSAY OF VANCOMYCIN: CPT

## 2022-06-22 PROCEDURE — 97166 OT EVAL MOD COMPLEX 45 MIN: CPT

## 2022-06-22 PROCEDURE — 87205 SMEAR GRAM STAIN: CPT

## 2022-06-22 PROCEDURE — 2000000000 HC ICU R&B

## 2022-06-22 RX ORDER — INSULIN LISPRO 100 [IU]/ML
0-18 INJECTION, SOLUTION INTRAVENOUS; SUBCUTANEOUS EVERY 4 HOURS
Status: DISCONTINUED | OUTPATIENT
Start: 2022-06-22 | End: 2022-06-23

## 2022-06-22 RX ORDER — DEXTROSE MONOHYDRATE 50 MG/ML
100 INJECTION, SOLUTION INTRAVENOUS PRN
Status: DISCONTINUED | OUTPATIENT
Start: 2022-06-22 | End: 2022-06-22 | Stop reason: SDUPTHER

## 2022-06-22 RX ADMIN — VANCOMYCIN HYDROCHLORIDE 500 MG: 500 INJECTION, POWDER, LYOPHILIZED, FOR SOLUTION INTRAVENOUS at 20:35

## 2022-06-22 RX ADMIN — CARBOXYMETHYLCELLULOSE SODIUM 1 DROP: 10 GEL OPHTHALMIC at 13:59

## 2022-06-22 RX ADMIN — MEROPENEM 1000 MG: 1 INJECTION, POWDER, FOR SOLUTION INTRAVENOUS at 22:49

## 2022-06-22 RX ADMIN — CARBOXYMETHYLCELLULOSE SODIUM 1 DROP: 10 GEL OPHTHALMIC at 09:18

## 2022-06-22 RX ADMIN — MEROPENEM 1000 MG: 1 INJECTION, POWDER, FOR SOLUTION INTRAVENOUS at 03:06

## 2022-06-22 RX ADMIN — CARBOXYMETHYLCELLULOSE SODIUM 1 DROP: 10 GEL OPHTHALMIC at 18:45

## 2022-06-22 RX ADMIN — MEROPENEM 1000 MG: 1 INJECTION, POWDER, FOR SOLUTION INTRAVENOUS at 09:21

## 2022-06-22 RX ADMIN — MUPIROCIN: 20 OINTMENT TOPICAL at 20:41

## 2022-06-22 RX ADMIN — SODIUM CHLORIDE 1000 ML: 9 INJECTION, SOLUTION INTRAVENOUS at 13:59

## 2022-06-22 RX ADMIN — MUPIROCIN: 20 OINTMENT TOPICAL at 10:45

## 2022-06-22 RX ADMIN — CARBOXYMETHYLCELLULOSE SODIUM 1 DROP: 10 GEL OPHTHALMIC at 06:29

## 2022-06-22 RX ADMIN — WHITE PETROLATUM 57.7 %-MINERAL OIL 31.9 % EYE OINTMENT: at 13:59

## 2022-06-22 RX ADMIN — WHITE PETROLATUM 57.7 %-MINERAL OIL 31.9 % EYE OINTMENT: at 09:31

## 2022-06-22 RX ADMIN — PAROXETINE HYDROCHLORIDE 20 MG: 20 TABLET, FILM COATED ORAL at 09:18

## 2022-06-22 RX ADMIN — VANCOMYCIN HYDROCHLORIDE 1000 MG: 1 INJECTION, POWDER, LYOPHILIZED, FOR SOLUTION INTRAVENOUS at 09:26

## 2022-06-22 RX ADMIN — WHITE PETROLATUM 57.7 %-MINERAL OIL 31.9 % EYE OINTMENT: at 18:46

## 2022-06-22 RX ADMIN — INSULIN LISPRO 4 UNITS: 100 INJECTION, SOLUTION INTRAVENOUS; SUBCUTANEOUS at 00:22

## 2022-06-22 RX ADMIN — INSULIN LISPRO 3 UNITS: 100 INJECTION, SOLUTION INTRAVENOUS; SUBCUTANEOUS at 12:27

## 2022-06-22 RX ADMIN — SODIUM CHLORIDE, PRESERVATIVE FREE 10 ML: 5 INJECTION INTRAVENOUS at 20:40

## 2022-06-22 RX ADMIN — PROPOFOL 20 MCG/KG/MIN: 10 INJECTION, EMULSION INTRAVENOUS at 14:56

## 2022-06-22 RX ADMIN — PROPOFOL 10 MCG/KG/MIN: 10 INJECTION, EMULSION INTRAVENOUS at 06:28

## 2022-06-22 RX ADMIN — CARBOXYMETHYLCELLULOSE SODIUM 1 DROP: 10 GEL OPHTHALMIC at 22:49

## 2022-06-22 RX ADMIN — FENTANYL CITRATE 50 MCG: 50 INJECTION, SOLUTION INTRAMUSCULAR; INTRAVENOUS at 14:12

## 2022-06-22 RX ADMIN — NOREPINEPHRINE BITARTRATE 20 MCG/MIN: 1 INJECTION INTRAVENOUS at 01:12

## 2022-06-22 RX ADMIN — CETIRIZINE HYDROCHLORIDE 10 MG: 10 TABLET ORAL at 09:18

## 2022-06-22 RX ADMIN — CARBOXYMETHYLCELLULOSE SODIUM 1 DROP: 10 GEL OPHTHALMIC at 02:00

## 2022-06-22 RX ADMIN — WHITE PETROLATUM 57.7 %-MINERAL OIL 31.9 % EYE OINTMENT: at 03:05

## 2022-06-22 RX ADMIN — PANTOPRAZOLE SODIUM 40 MG: 40 INJECTION, POWDER, LYOPHILIZED, FOR SOLUTION INTRAVENOUS at 09:17

## 2022-06-22 RX ADMIN — INSULIN LISPRO 6 UNITS: 100 INJECTION, SOLUTION INTRAVENOUS; SUBCUTANEOUS at 09:31

## 2022-06-22 RX ADMIN — EPINEPHRINE 10 MCG/MIN: 1 INJECTION INTRAMUSCULAR; INTRAVENOUS; SUBCUTANEOUS at 00:25

## 2022-06-22 RX ADMIN — CHLORHEXIDINE GLUCONATE 0.12% ORAL RINSE 15 ML: 1.2 LIQUID ORAL at 09:17

## 2022-06-22 RX ADMIN — INSULIN LISPRO 3 UNITS: 100 INJECTION, SOLUTION INTRAVENOUS; SUBCUTANEOUS at 04:48

## 2022-06-22 RX ADMIN — ATORVASTATIN CALCIUM 10 MG: 10 TABLET, FILM COATED ORAL at 20:40

## 2022-06-22 RX ADMIN — SODIUM CHLORIDE, PRESERVATIVE FREE 10 ML: 5 INJECTION INTRAVENOUS at 09:18

## 2022-06-22 RX ADMIN — WHITE PETROLATUM 57.7 %-MINERAL OIL 31.9 % EYE OINTMENT: at 10:50

## 2022-06-22 RX ADMIN — CHLORHEXIDINE GLUCONATE 0.12% ORAL RINSE 15 ML: 1.2 LIQUID ORAL at 20:40

## 2022-06-22 ASSESSMENT — PULMONARY FUNCTION TESTS
PIF_VALUE: 38
PIF_VALUE: 23
PIF_VALUE: 21
PIF_VALUE: 18
PIF_VALUE: 17
PIF_VALUE: 22
PIF_VALUE: 28
PIF_VALUE: 24

## 2022-06-22 ASSESSMENT — PAIN SCALES - GENERAL
PAINLEVEL_OUTOF10: 0

## 2022-06-22 NOTE — PROGRESS NOTES
Comprehensive Nutrition Assessment    Type and Reason for Visit:  Initial,Consult (consult for TF ordering and management)    Nutrition Recommendations/Plan:   1. Start TF today - ADULT TUBE FEEDING; Orogastric; Peptide-Based High-Protein formula - Vital High-Protein with a goal rate of 40 ml/hr x 20 hours. Start with 20 ml/hr and increase by 20 ml every 4 hours, as tolerated by patient, until goal rate can be achieved and maintained. Water flushes, 30 ml every 4 hours for tube patency. 2. Monitor TF start, rate, intake, and tolerance + water flushes. 3. Monitor vent status, sedation type/amount (propofol is currently infusing at 20 mcg x 24 hours which = 329 kcals from lipids), TG checks, and plan of care. 4. Monitor nutrition-related labs, bowel function, and weight trends. Malnutrition Assessment:  Malnutrition Status:   At risk for malnutrition (06/22/22 1136)    Context:  Acute Illness     Findings of the 6 clinical characteristics of malnutrition:  Energy Intake:  Mild decrease in energy intake   Weight Loss:  No significant weight loss     Body Fat Loss:  No significant body fat loss     Muscle Mass Loss:  No significant muscle mass loss    Fluid Accumulation:  No significant fluid accumulation     Strength:  Not Performed    Nutrition Assessment:    patient is nutritionally compromised AEB patient presented with R hip pain (+ fracture) after a fall at home and hip surgery on 6/20/22 + rapid response x 2 on 6/21/22 and code blue called x 1 on 6/21/22 so patient was intubated and transferred to ICU; she is at risk for further compromise d/t NPO status, altered nutrition-related labs, and surgical wound on R hip; will start TF today    Nutrition Related Findings:    patient is intubated and sedated with 20 mcg propofol at this time; patient repsonded to Dr. Gasper Bansal physical exam during rounds this am by grimacing and pulling away; patient's family members were at bedside this am; she is s/p R hip surgery on 6/20/22; she was intubated on 6/21/22 after rapid response x 2 and code blue x 1 were called Wound Type: Surgical Incision (surgical incision on R hip from surgery on 6/20/22)       Current Nutrition Intake & Therapies:    Average Meal Intake: NPO  Average Supplements Intake: NPO  Current Tube Feeding (TF) Orders:  · Feeding Route: Orogastric  · Formula: Peptide Based High Protein  · Schedule: Continuous  · Feeding Regimen: Vital high-Protein with a goal rate of 40 ml/hr x 20 hours  · Additives/Modulars: None  · Water Flushes: 30 ml water flushes every 4 hours for tube patency  · Current TF & Flush Orders Provides: TF to be started today  · Goal TF & Flush Orders Provides: Vital High-Protein with a goal rate of 40 ml/hr x 20 hours = 800 ml TV, 800 kcals, 70 g protein, and 669 ml free water + 30 ml water flushes every 4 hours for tube patency      Anthropometric Measures:  Height: 5' 3\" (160 cm)  Ideal Body Weight (IBW): 115 lbs (52 kg)    Admission Body Weight: 223 lb 8 oz (101.4 kg) (obtained on 6/19/22; actual weight)  Current Body Weight: 229 lb 4.5 oz (104 kg) (obtained on 6/22/22; actual weight), 199.4 % IBW.  Weight Source: Bed Scale  Current BMI (kg/m2): 40.6  Usual Body Weight: 223 lb 8 oz (101.4 kg) (obtained on 6/19/22; actual weight)  % Weight Change (Calculated): 2.6  Weight Adjustment For: No Adjustment                 BMI Categories: Obese Class 3 (BMI 40.0 or greater) (40.61)    Estimated Daily Nutrient Needs:  Energy Requirements Based On: Kcal/kg  Weight Used for Energy Requirements: Current  Energy (kcal/day): 832 - 1144 kcals based on 8-11 kcals/kg/CBW  Weight Used for Protein Requirements: Ideal  Protein (g/day): 130 - 140 g protein based on 2.5-2.7 g/kg/IBW  Method Used for Fluid Requirements: 1 ml/kcal  Fluid (ml/day): 832-1144 ml    Nutrition Diagnosis:   · Inadequate oral intake related to inadequate protein-energy intake,impaired respiratory function,acute injury/trauma as evidenced by NPO or clear liquid status due to medical condition,intubation,nutrition support - enteral nutrition,wounds,lab values      Nutrition Interventions:   Food and/or Nutrient Delivery: Continue NPO,Start Tube Feeding  Nutrition Education/Counseling: No recommendation at this time  Coordination of Nutrition Care: Continue to monitor while inpatient,Interdisciplinary Rounds  Plan of Care discussed with: ICU Team    Goals:  Previous Goal Met:  (N/A)  Goals: Initiate nutrition support       Nutrition Monitoring and Evaluation:   Behavioral-Environmental Outcomes: None Identified  Food/Nutrient Intake Outcomes: Enteral Nutrition Intake/Tolerance,IVF Intake  Physical Signs/Symptoms Outcomes: Biochemical Data,GI Status,Hemodynamic Status,Nutrition Focused Physical Findings,Skin,Weight    Discharge Planning:     Too soon to determine     Yeu Dugan, [address] [place] [address], LD  Contact: [phone]

## 2022-06-22 NOTE — PROGRESS NOTES
2115 - propofol decreased to 10 mcg due to pt RASS -3, does not follow commands, no gag no cough, PERRLA. 2130 - Held Propofol for sedation vacation so that pt can be properly sedated. 2200 - pt eyes open, very calm, follows simple commands. When asked if she was in pain, pt nodded her head no. Explained to pt what was going on, and that writer was going to restart sedation to help her rest, pt nodded her head yes.   Restarted Propofol at 10

## 2022-06-22 NOTE — CARE COORDINATION
INTERDISCIPLINARY PLAN OF CARE CONFERENCE    Date/Time: 6/22/2022 10:38 AM  Completed by: Yfn Jang RN, Case Management      Patient Name:  Rozina Gonzalez  YOB: 1945  Admitting Diagnosis: Hip fracture requiring operative repair, left, closed, initial encounter [facility]) Odalys Cotter     Admit Date/Time:  6/19/2022 12:49 PM    Chart reviewed. Interdisciplinary team contacted or reviewed plan related to patient progress and discharge plans. Disciplines included Case Management, Nursing, and Dietitian. Current Status:ongoing  PT/OT recommendation for discharge plan of care: SNF--awaiting a re-eval from PT/OT as t came to ICU after code on 6/21    Expected D/C Disposition:  TBD  Confirmed plan with patient and/or family Yes confirmed with: (name) family at bedside  Met with:family at bedside  Discharge Plan Comments: Reviewed chart. Role of discharge planner explained and patient's family verbalized understanding. Pt was transferred from [facility] to ICU yesterday (6/21) after a code and is now on a vent. Family at bedside. [facility], but given the change in events, PT/OT to re-eval pt and then will discuss plan of care after eval and after pt comes off of vent. Pt's kidney function is worse per Dr. Pillo Juares, but pt is making urine. Will continue to follow.        Home O2 in place on admit: No  Pt informed of need to bring portable home O2 tank on day of discharge for nursing to connect prior to leaving:  Not Indicated  Verbalized agreement/Understanding:  Not Indicated

## 2022-06-22 NOTE — CONSULTS
[address]                 [address] [address]                                  CONSULTATION    PATIENT NAME: Mary Cuba              :        1945  MED REC NO:   [id number]                          ROOM:       4931  ACCOUNT NO:   [de-identified]                           ADMIT DATE: 2022  PROVIDER:     Ying Weber MD    CONSULT DATE:  2022    REASON FOR CONSULTATION:  Loss of consciousness, hypotension, and  bradycardia. HISTORY OF PRESENT ILLNESS:  A 76-year-old female who was admitted to  the hospital with a hip fracture. She underwent a repair on 2022. On 2022, one day postop, she had an acute event as described. It  is reported that she developed rapid onset of hypotension with  associated bradycardia and shortness of breath. She deteriorated and  developed respiratory failure and required intubation. She received  multiple doses of atropine and isoproterenol drip. She received IV  fluid resuscitation. The episode lasted for several hours and gradually  resolved. It appeared to be poorly precipitated by severe anemia. She  has not had symptoms like this before. She has not had any recurrence. I spoke with the family, they reported that the patient prior to  admission had been relatively healthy and stable from cardiac  standpoint. She does not get chest pain or shortness of breath to their  knowledge. PAST MEDICAL HISTORY:  1. Thalassemia minor. 2.  Diabetes. 3.  Hyperlipidemia. 4.  Hypertension. 5.  Asthma. SOCIAL HISTORY:  She does not smoke. FAMILY HISTORY:  Positive for heart disease. REVIEW OF SYSTEMS:  Unable to obtain except as mentioned in history of  present illness. ALLERGIES:  See list in the chart, which I have reviewed. MEDICATIONS:  See list in the chart, which I have reviewed.     PHYSICAL EXAMINATION:  VITAL SIGNS:  Blood pressure is 130/52, heart rate 69, respirations 25,  temperature 98.6. She is 98% saturated on ventilator. GENERAL:  Well-developed, well-nourished female, currently sedated and  intubated. HEENT:  Normocephalic and atraumatic. Oropharynx with ET tube in place. NECK:  Supple. CHEST:  Coarse breath sounds. CARDIAC:  Regular S1 and S2. There is no S3 or S4 gallop. There is no  significant murmur. Jugular venous pressure appears to be normal.  ABDOMEN:  Soft, with diminished breath sounds. EXTREMITIES:  Trace edema. Peripheral pulses intact. NEUROLOGIC:  Currently unresponsive due to sedation. SKIN:  Warm and dry. Capillary refill normal.    DIAGNOSTIC STUDIES AND LABORATORY DATA:  EKG, sinus bradycardia, left  axis deviation, nonspecific intraventricular conduction delay,  nonspecific ST segment abnormalities. Troponin is less than 0.01,  creatinine is 1.9. Hemoglobin 9.2, was as low as 6.4. COVID not  detected on 06/19/2022. Chest x-ray, atelectasis, cardiomegaly, no  significant airspace disease. IMPRESSION:  1. Hypotension, appears to have been precipitated by severe anemia. It  is unclear what precipitated the rapid decline. She is now improved on  pressor support and fluid resuscitation. Associated bradycardia  suggested possible vagal component. 2.  Bradycardia. Initially unresponsive to atropine and isoproterenol. Etiology is unclear. Associated hypotension suggests possible vagal  component. 3.  Loss of consciousness. 4.  Respiratory failure. 5.  Anemia, severe. 6.  Acute kidney injury. 7.  Status post hip fracture repair on 06/20/2022.  8.  Thalassemia minor. 9.  Diabetes. 10.  Hyperlipidemia. 11.  History of hypertension. 12.  Asthma. 13.  Ejection fraction of 50% in 2010. RECOMMENDATIONS:  1.  Echocardiogram.  2.  Continue fluid resuscitation. 3.  Red blood cell transfusion as needed.   4.  Continue pressor support and isoproterenol and titrate as necessary to maintain  adequate blood pressure and heart rate. 5.  We will reassess when she recovers from her acute event to determine if  any further cardiac testing is warranted. A 40 minutes of critical care time was spent with this patient.         Jae James MD    D: 06/22/2022 16:22:03       T: 06/22/2022 17:47:27     [id number]  Job#: [id number]     Doc#: [id number]    CC:

## 2022-06-22 NOTE — PROGRESS NOTES
Department of Orthopedic Surgery  Physician Assistant   Progress Note    Subjective:       Systemic or Specific Complaints: became unresponsive yesterday and was transferred to ICU and now intubated. Received 2u PRBC and Hgb stable today. Son in room today.     Objective:     Patient Vitals for the past 24 hrs:   BP Temp Temp src Pulse Resp SpO2 Weight   06/22/22 1000 (!) 120/50 -- -- 64 26 98 % --   06/22/22 0900 (!) 140/52 -- -- 64 17 98 % --   06/22/22 0800 (!) 137/53 -- -- 66 26 98 % --   06/22/22 0700 (!) 125/48 98.4 °F (36.9 °C) Bladder 64 26 98 % --   06/22/22 0630 (!) 130/52 -- -- 63 26 98 % --   06/22/22 0600 (!) 129/50 -- -- 65 26 98 % --   06/22/22 0530 (!) 133/55 -- -- 69 24 97 % --   06/22/22 0500 (!) 163/59 -- -- 68 21 98 % 229 lb 4.5 oz (104 kg)   06/22/22 0430 (!) 145/59 98.5 °F (36.9 °C) Bladder 62 26 98 % --   06/22/22 0330 (!) 142/58 -- -- 63 26 97 % --   06/22/22 0300 (!) 168/68 -- -- 61 28 -- --   06/22/22 0258 -- -- -- 63 28 99 % --   06/22/22 0000 (!) 154/59 99 °F (37.2 °C) Bladder 65 26 96 % --   06/21/22 2312 -- -- -- 64 26 97 % --   06/21/22 2300 (!) 158/58 -- -- 67 26 97 % --   06/21/22 2200 (!) 185/66 -- -- 68 23 96 % --   06/21/22 2100 (!) 137/54 -- -- 57 26 95 % --   06/21/22 2045 (!) 136/53 -- -- 58 26 94 % --   06/21/22 2030 (!) 150/54 99.8 °F (37.7 °C) Rectal 63 26 95 % --   06/21/22 2000 (!) 127/52 -- -- 55 (!) 0 98 % --   06/21/22 1930 (!) 130/50 -- -- 56 14 98 % --   06/21/22 1900 (!) 124/46 -- -- 50 (!) 8 97 % --   06/21/22 1800 (!) 87/45 -- -- (!) 46 12 92 % --   06/21/22 1722 (!) 80/46 -- -- (!) 42 (!) 9 93 % --   06/21/22 1600 104/60 97.8 °F (36.6 °C) Axillary 53 16 94 % --   06/21/22 1510 (!) 92/44 -- -- 62 30 100 % --   06/21/22 1500 (!) 64/50 -- -- (!) 45 10 95 % --   06/21/22 1459 -- -- -- (!) 48 15 95 % --   06/21/22 1444 (!) 62/41 97.7 °F (36.5 °C) -- 59 26 90 % --   06/21/22 1409 (!) 149/69 -- -- -- -- -- --   06/21/22 1407 (!) 79/52 -- -- -- -- -- --   06/21/22 1405 -- -- -- 61 -- -- --   06/21/22 1404 (!) 79/52 -- -- -- -- -- --   06/21/22 1402 -- -- -- -- -- 100 % --   06/21/22 1402 -- -- -- 50 -- -- --   06/21/22 1402 (!) 64/48 -- -- -- -- -- --   06/21/22 1345 (!) 69/47 -- -- (!) 48 18 -- --   06/21/22 1336 (!) 84/46 -- -- 51 18 100 % --   06/21/22 1333 -- -- -- 50 -- 97 % --   06/21/22 1332 (!) 80/54 -- -- -- -- -- --   06/21/22 1331 -- -- -- -- -- 98 % --   06/21/22 1330 (!) 73/55 -- -- -- -- -- --   06/21/22 1328 -- -- -- 50 -- -- --   06/21/22 1222 (!) 78/48 97.5 °F (36.4 °C) Oral 62 18 92 % --       General: intubated   Wound: Wound clean and dry no evidence of infection. , No Drainage, Wound Intact and Positive for Edema   Motion: Intubated and unable toassess   DVT Exam: No evidence of DVT seen on physical exam.     Additional exam:  Good distal pulses. Thigh swollen but soft. Data Review  CBC:   Lab Results   Component Value Date    WBC 11.4 06/22/2022    RBC 4.17 06/22/2022    HGB 9.2 06/22/2022    HCT 27.9 06/22/2022     06/22/2022       Renal:   Lab Results   Component Value Date     06/22/2022    K 4.5 06/22/2022     06/22/2022    CO2 19 06/22/2022    BUN 26 06/22/2022    CREATININE 1.9 06/22/2022    GLUCOSE 284 06/22/2022    CALCIUM 7.2 06/22/2022        CTPA: negative for PE    Assessment:      right hip IM nail  . Plan:      1:  Continue IM/ICU recs. Can resume PT/OT once medically stable. Hip surgical site and leg look okay today. 2:  Continue Deep venous thrombosis prophylaxis  3:  Continue Pain Control    MARIUSZ Chand         I have reviewed the above history, exam, assessment, and plan. I agree with the findings noted above. R hip wound looks stable and soft. Appropriate bruising noted but no severe swelling or tension to the skin or compartments. No complications noted to the hip or hardware on the CT scan, no obvious hematoma noted. Hg stable over last 2 checks at 9.2.      Standard blood loss was noted during the surgery, with good hemostasis of the wounds before closure. She initially was doing well clinically post-op before her decompensation yesterday afternoon, per evaluation from my PA. Will continue to closely follow.      Electronically signed by Obed Levine DO on 6/22/2022 at 3:19 PM

## 2022-06-22 NOTE — PROGRESS NOTES
06/21/22 2312   NICU Vent Information   Vent Type 980   Vent Mode AC/VC   Vt (Set, mL) 330 mL   Vt Exhaled 334 mL   Resp Rate (Set) 26 bmp   Rate Measured 27 br/min   Minute Volume 9.41 Liters   Peak Flow 50 L/min   Pressure Support 0 cmH20   FiO2  60 %  (decreased to 50%)   Peak Inspiratory Pressure 18 cmH2O   I:E Ratio 1:2.20   Sensitivity 3   High Peep/I Pressure 0   PEEP/CPAP (cmH2O) 5   I Time/ I Time % 0 s   Mean Airway Pressure 9.19 cmH20   Plateau Pressure 15 [id number]   Cough/Sputum   Sputum How Obtained Endotracheal;Suctioned   Cough Non-productive   Sputum Amount None   Breath Sounds   Right Upper Lobe Diminished   Right Middle Lobe Diminished   Right Lower Lobe Diminished   Left Upper Lobe Diminished   Left Lower Lobe Diminished   Additional Respiratory Assessments   Heart Rate 64   Resp 26   SpO2 97 %   Position Semi-Phipps's   Humidification Source HME   Vent Alarm Settings   High Pressure  40 cmH2O   Low Minute Volume Alarm 3.5 L/min   Low Exhaled Vt  250 mL   Vt High  1100 mL   RR High 40 br/min   Apnea (Secs) 20 secs   Ambu Bag With Mask At Bedside Yes   ETT (adult)   Placement Date: 06/21/22   Airway Tube Size: 7.5 mm  Location: Oral   Secured At 22 cm   Measured From Lips   ETT Placement Center   Secured By Commercial tube peacock   Site Assessment Dry

## 2022-06-22 NOTE — PROGRESS NOTES
06/22/22 0258   NICU Vent Information   Vent Type 980   Vent Mode AC/VC   Vt (Set, mL) 330 mL   Vt Exhaled 352 mL   Resp Rate (Set) 26 bmp   Rate Measured 26 br/min   Minute Volume 9.02 Liters   Peak Flow 50 L/min   Pressure Support 0 cmH20   FiO2  50 %   Peak Inspiratory Pressure 18 cmH2O   I:E Ratio 1:2.20   Sensitivity 3   High Peep/I Pressure 0   PEEP/CPAP (cmH2O) 5   I Time/ I Time % 0 s   Mean Airway Pressure 8.8 cmH20   Plateau Pressure 12 [id number]   Cough/Sputum   Sputum How Obtained Endotracheal;Suctioned   Cough Non-productive   Sputum Amount None   Breath Sounds   Right Upper Lobe Diminished   Right Middle Lobe Diminished   Right Lower Lobe Diminished   Left Upper Lobe Diminished   Left Lower Lobe Diminished   Additional Respiratory Assessments   Heart Rate 63   Resp 28   SpO2 99 %   Position Semi-Phipps's   Humidification Source HME   Vent Alarm Settings   High Pressure  40 cmH2O   Low Minute Volume Alarm 3.5 L/min   Low Exhaled Vt  250 mL   Vt High  1100 mL   RR High 40 br/min   Apnea (Secs) 20 secs   Ambu Bag With Mask At Bedside Yes   ETT (adult)   Placement Date: 06/21/22   Airway Tube Size: 7.5 mm  Location: Oral   Secured At 22 cm   Measured From Lips   ETT Placement Center   Secured By Commercial tube peacock   Site Assessment Dry

## 2022-06-22 NOTE — PROGRESS NOTES
Inpatient Occupational Therapy  Evaluation and Treatment    Unit: ICU  Date:  6/22/2022  Patient Name:    Isabel Kent  Admitting diagnosis:  Hip fracture requiring operative repair, left, closed, initial encounter [facility]) Eugenia Toscano  Admit Date:  6/19/2022  Precautions/Restrictions/WB Status/ Lines/ Wounds/ Oxygen:    Fall risk, Bed/chair alarm, Lines -IV, Mosqueda catheter and Mechanical ventilation, OG tube (capped), femoral CVL, Telemetry and Continuous pulse oximetry, WBAT RLE, HECTOR off-weighting boots, bilat wrist restraints  Treatment Time:  1010-1647   Treatment Number: 1   Timed code treatment minutes 30 minutes   Total Treatment minutes:  40 minutes    Patient Goals for Therapy:  \" not stated  \"      Discharge Recommendations: SNF  DME needs for discharge: defer to facility       Therapy recommendations for staff:   Assists of two for bed mobility    History of Present Illness: per H&P    76 y. o. female with DMII, GERD, Asthma, HTN, HLD, osteoporosis, anemia 2/2 thalassemia minor and gout who presented to [facility] with complaint of mechanical fall. Patient states she woke up early yesterday and went the restroom when her legs got tangled turning away from the sink and she fell to her right side. She developed immediate pain in her right hip and could not stand. Her pain is located just above her right leg and radiates in to her right groin. She lives alone and states she remained on the floor for approximately 4 hours. Luckily, she was meant to go to her family member's home that morning and when she didn't show up they called EMS. She denies dizziness, presyncope, LOC. She hit her chin on the sink but sustain acute intracranial or facial injuries. She states that since Friday evening she has had pain in the arch of her left foot which she believes contributed to her fall. She has not been walking more than usual and denies acute trauma to her left foot.  She states she thought maybe her flat feet contributed to this pain. She is not on anticoagulation. She has asthma but is not in exacerbation. 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 She states she believes her baseline hgb is 9. She was 8.9 on arrival and has dropped to 7.9 with IVF. She has no signs of acute bleeding. She denies history of blood clots in her leg or lung, MI, CVA, known CAD or aneurysm. She has never smoked but she states she has been around people who smoke all her life. She has had procedures in the past that have required general anesthesia and sustained no complications or sequelae. Per Dr. Franco Hernandez note 6/22/22:  \"Ms. Thakur was poorly responsive yesterday and was receiving IV fluid boluses for hypotension.  She is planned to have additional 1 unit of red cells. She had another event of poor responsiveness and possible syncope.  Was associated with hypotension.  She was given 1 mg epinephrine for hypotension and transferred to the ICU  Continue to be hypotensive in the ICU.  Given another dose of epinephrine and started epinephrine drip.  Also placed on Levophed drip. She had to be intubated and started on mechanical ventilation as her saturation worsened. \"    Home Health S4 Level Recommendation:  NA  AM-PAC Score:      Preadmission Environment    Pt. Lives Alone  Home environment:    mobile home/trailer  Steps to enter first floor: 3-4 steps to enter and hand rail unilateral. Family reports she can ascend/descend indep but \"it's getting harder for her\". Steps to second floor: N/A  Bathroom: walk in shower and standard height commode  Equipment owned: [facility]     Preadmission Status:  Pt. Able to drive: Yes  Pt Fully independent with ADLs: Yes  Pt. Required assistance from family for: Independent PTA  Pt. independent for transfers and gait and walked with Cane; walker PRN.   History of falls Yes - reason for admission, but no other recent previous h/o falls     Pain  No-indications of pain   Pts family state the pt enjoys TV, Facebook, her cat Abigal, and used to enjoy gardening       Cognition    A&O Pt opened eyes on and off during eval, pt shaking head yes and no in response to questions    Able to follow 1 step commands inconsistently  Pt attempting to communicate by using her fingers to write in the air to communicate   Subjective  Patient lying supine in bed with family at bedside. Pt agreeable to this OT eval & tx. Upper Extremity ROM:  Pt is left handed per pt   WFL-active assist     Upper Extremity Strength: Through observation at least 3/5     Upper Extremity Sensation    WFL    Upper Extremity Proprioception:  WFL    Coordination and Tone  NT    Balance  Functional Sitting Balance:  NT  Functional Standing Balance:NT    Bed mobility:    Supine to sit:   Not Tested  Sit to supine:   Not Tested  Rolling:    Not Tested  Scooting in sitting:  Not Tested  Scooting to head of bed:   Not Tested    Bridging:   Not Tested    Transfers:    Sit to stand:  Not Tested  Stand to sit:  Not Tested  Bed to chair:   Not Tested  Standard toilet: Not Tested  Bed to [facility]:  Not Tested    Dressing:      UE:   Not Tested  LE:    Total A    Bathing:    UE:  Max A  to wipe face when pt requested   LE:  Total A    Eating:   Not Tested    Toileting:  Not Tested    Activity Tolerance   Pt completed therapy session with apparent frustration with limited ability to communicate     Positioning Needs:   UE restraints applied back on pt     Exercise / Activities Initiated:   Hand flex/ext:  x10  Wrist flex/ext:  x10  Forearm sup/pronation:  x10  Elbow flex/ext  Shoulder flex/ext:  x10  Shoulder shrugs:  x10  Shoulder rolls CW/CCW: x10    Patient/Family Education:   Role of OT to pt and family     Assessment of Deficits: Pt seen for Occupational therapy evaluation in acute care setting. Pt demonstrated decreased Activity tolerance, ADLs, Balance , Bed mobility and Transfers.  Pt functioning below baseline and will likely benefit from skilled occupational therapy services to maximize safety and independence. Goal(s) : To be met in 3 Visits:  1). Bed to toilet/BSC: Mod A  and 2 persons with RW     To be met in 5 Visits:  1). Supine to/from Sit:  Mod A   2). Upper Body Bathing:   Min A   3). Lower Body Bathing: Mod A   4). Upper Body Dressing:  Min A   5). Lower Body Dressing: Mod A   6). Pt to demonstrate UE exs x 15 reps with minimal cues    Rehabilitation Potential:  Fair for goals listed above. Strengths for achieving goals include: Pt cooperative  Barriers to achieving goals include:  Complexity of condition     Plan: To be seen 3-5 x/wk while in acute care setting for therapeutic exercises, bed mobility, transfers, dressing, bathing, family/patient education, ADL/IADL retraining, energy conservation training.      Noni Wright OTR/L [id number]          If patient discharges from this facility prior to next visit, this note will serve as the Discharge Summary

## 2022-06-22 NOTE — PROGRESS NOTES
IM Progress Note    Admit Date:  6/19/2022  3    Interval history:  S.p IM nailing of right hip   Drop in h.h noted  BP stable    Subjective:  Ms. Mary Gilbert was poorly responsive yesterday and was receiving IV fluid boluses for hypotension. She is planned to have additional 1 unit of red cells. She had another event of poor responsiveness and possible syncope. Was associated with hypotension. She was given 1 mg epinephrine for hypotension and transferred to the ICU  Continue to be hypotensive in the ICU. Given another dose of epinephrine and started epinephrine drip. Also placed on Levophed drip. She had to be intubated and started on mechanical ventilation as her saturation worsened. Objective:   BP (!) 130/52   Pulse 69   Temp 98.6 °F (37 °C) (Bladder)   Resp 25   Ht 5' 3\" (1.6 m)   Wt 229 lb 4.5 oz (104 kg)   SpO2 98%   Breastfeeding No   BMI 40.61 kg/m²       Intake/Output Summary (Last 24 hours) at 6/22/2022 1345  Last data filed at 6/22/2022 0500  Gross per 24 hour   Intake 4549.74 ml   Output 945 ml   Net 3604.74 ml       Physical Exam:      General:  Sedated, intubated  Mucous Membranes:  Pale , anicteric  Neck: No JVD, no carotid bruit, no thyromegaly  Chest:  Clear to auscultation bilaterally, no added sounds  Cardiovascular:  RRR S1S2 heard, no murmurs or gallops  Abdomen:  Soft, undistended, non tender, no organomegaly, BS present  Extremities: left ankle diffuse swelling and tenderness noted  Right hip dressing dry , bilateral knee arthritis with limited movements to both LE with weakness noted   No edema or cyanosis.  Distal pulses well felt  Neurological : Sedated    Medications:   Scheduled Medications:    insulin lispro  0-18 Units SubCUTAneous Q4H    mupirocin   Nasal BID    vancomycin (VANCOCIN) intermittent dosing (placeholder)   Other RX Placeholder    meropenem  1,000 mg IntraVENous Q12H    atropine  1 mg IntraVENous Once    carboxymethylcellulose PF  1 drop Both Eyes 06/19/2022    ALKPHOS 68 06/19/2022    BILITOT 0.8 06/19/2022       Lab Results   Component Value Date    INR 1.80 (H) 06/21/2022    INR 1.03 01/06/2015    PROTIME 20.7 (H) 06/21/2022    PROTIME 11.1 01/06/2015       Radiology      EKG:  I have reviewed the EKG with the following interpretation:   Sinus tachycardia  left axis  Left anterior fascicular block  Nonspecific ST abnormality  Abnormal ECG  Confirmed by YOHANA LOPES MD [id number]) on 6/20/2022 7:20:25 AM     RADIOLOGY  XR HIP 2-3 VW W PELVIS RIGHT   Final Result   Moderately displaced fracture of the proximal right femur.           CTA ABDOMINAL AORTA W BILAT RUNOFF W CONTRAST   Final Result   1. Traumatic, acute right hip intertrochanteric fracture with varus   angulation and small surrounding hematoma. 2. No pseudoaneurysm or active extravasation is identified adjacent to the   fracture. 3. Aortoiliac inflow is widely patent. There is a mild amount of fibro   calcified plaque. 4. Femoropopliteal segments are patent bilaterally, noting fibro calcified   plaque. There is 3 vessel runoff bilaterally. However, on the right side   there is only 1 primary runoff vessel, the posterior tibial.   5. No acute abdominopelvic abnormality. Hepatic steatosis.         CT FACIAL BONES WO CONTRAST   Final Result   No acute intracranial abnormality.       No acute facial fractures.           CT Head WO Contrast   Final Result   No acute intracranial abnormality.       No acute facial fractures.           XR FEMUR RIGHT (MIN 2 VIEWS)   Final Result   Traumatic, acute intertrochanteric fracture of the right hip.       Advanced osteoarthritis of the knee.   Moderate-sized knee joint effusion.                 Pertinent previous results reviewed   DEXA 6/18/19  Impression   Osteoporosis of the right femoral neck by WHO criteria.       The prior DEXA scan from 10/06/2014 was performed at a different hospital on   a different machine, therefore, direct comparison cannot be made.  The   current study should be considered the patient's new baseline.            ASSESSMENT/PLAN:     Shock. Syncope. Septic versus cardiogenic versus hypovolemic. Aggressive hydration with IV fluids. Currently on epinephrine and Levophed drip. Was transfused 3 units of red cells. Cultures sent. Echocardiogram  Start on IV meropenem and vancomycin pending cultures    Acute hypoxic respiratory failure. Critical care physician following. Currently on the vent.     #Intertrochanteric hip fracture; right  #Mechanical fall    -Osteoporosis contributing   - admitted to medical floor for surgical mx and pain control  - ortho surgery consulted , s.p IM nailing of right hip POD 1  - pain control to avoid drowsiness  - dvt prophylaxis  - early ambulation , start PT    #Left foot pain- likely acute gout  -No acute fracture per imaging   - uric acid wnl  - can start nsaids with colchicine         #HTN   -BP initially  elevated in the setting of pain  -Continued on  verapamil, terazosin, cozaar and coreg- adjust as needed  -Hydralazine PRN  All antihypertensive medications currently held given severe hypotension.     #ACute blood loss anemia  - expected with major fractures and surgery   - also has chronicAnemia 2/2 thalassemia minor   -Hgb baseline appears to be 9-10; patient states 9  -8.9--7.9 >6.5  A total of 3 units of red cells have been transfused.       #DMII   -Metformin continued  -held glipizide  -low dose ssi     #Asthma   -Continue home inhalers     #HLD  -Continue statin     #Osteoporosis  -Receives Fosamax weekly- holding     #GERD   -On PPI     #Gout   -Continue allopurinol      DVT Prophylaxis: Lovenox  Diet: Diabetic   Code Status: Full Code         Natalie Renae MD, 6/22/2022 1:45 PM

## 2022-06-22 NOTE — CONSULTS
[id number]    Resp failure  LOC  Hypotension  Bradycardia  Anemia  MARVIN  S/P hip fx repair 6/20/22  Thalassemia minor  DM  HLD  H/O HTN  Asthma  EF 50% 2010    Echo  Pressor support - wean off as tolerated   Fluids  RBCs      Due to the high probability of clinically significant life threating deterioration of the patient's condition that required my urgent intervention, a total critical care time 40 minutes was used. This time excludes any time that may have been spent performing procedures. This includes but not limited to vital sign monitoring, telemetry monitoring, continuous pulse oximety, IV medication, clinical response to the IV medications, documentation time , consultation time, interpretation of lab data, review of nursing notes and old record review.

## 2022-06-22 NOTE — PROGRESS NOTES
Pulmonary & Critical Care Medicine ICU Progress Note    CC: ORIF R hip, syncope post op D#2 with severe hypoxemia and shock     Events of Last 24 hours:   3 units PRBC  Syncope  Shock requiring high-dose vasopressors  Hypoxemia requiring intubation  Heart block/severe bradycardia, subsequently sinus bradycardia  CTPA without PE  2 units FFP  Received glucagon and calcium for potential CCB toxicity    Invasive Lines:   Left femoral CVC 2022    MV: 2022    Resp Rate (Set): 26 bmp/Vt (Set, mL): 330 mL/ /FiO2 : 40 %  Recent Labs     22  0315 22  0350   PHART 7.219* 7.297*   [id number] 50.1* 42.9   PO2ART 39.6* 77.3       IV:   norepinephrine 10 mcg/min (22)    cisatracurium (NIMBEX) infusion Stopped (22)    EPINEPHrine infusion 6 mcg/min (22)    propofol 10 mcg/kg/min (22)    sodium chloride Stopped (22)    dextrose      sodium chloride         Vitals:  Blood pressure (!) 130/52, pulse 63, temperature 98.5 °F (36.9 °C), temperature source Bladder, resp. rate 26, height 5' 3\" (1.6 m), weight 229 lb 4.5 oz (104 kg), SpO2 98 %, not currently breastfeeding. on vent  Temp  Av.6 °F (37 °C)  Min: 97.5 °F (36.4 °C)  Max: 99.8 °F (37.7 °C)    Intake/Output Summary (Last 24 hours) at 2022 0719  Last data filed at 2022 0500  Gross per 24 hour   Intake 4549.74 ml   Output 945 ml   Net 3604.74 ml     EXAM:  General: intubated, ill appearing    ENT: Pharynx with ETT. Resp: No crackles. No wheezing. CV: S1, S2. +edema  GI: NT, ND, +BS  Skin: Warm and dry. Neuro: PERRL. Sedated, not following commands.  Patellar reflexes are symmetric     Scheduled Meds:   atropine  1 mg IntraVENous Once    carboxymethylcellulose PF  1 drop Both Eyes Q4H    And    artificial tears   Both Eyes Q4H    chlorhexidine  15 mL Mouth/Throat BID    pantoprazole  40 mg IntraVENous Daily    meropenem  1,000 mg IntraVENous Q8H    vancomycin  1,000 mg IntraVENous Q12H    glucagon (rDNA)  5 mg IntraVENous Once    insulin lispro  0-6 Units SubCUTAneous Q4H    [Held by provider] allopurinol  200 mg Oral Daily    atorvastatin  10 mg Oral Nightly    cetirizine  10 mg Oral Daily    [Held by provider] gabapentin  300 mg Oral BID    [Held by provider] losartan  100 mg Oral Daily    PARoxetine  20 mg Oral Daily    sodium chloride flush  5-40 mL IntraVENous 2 times per day     PRN Meds:  albuterol sulfate HFA **AND** ipratropium, fentanNYL, midazolam, perflutren lipid microspheres, glucose, dextrose bolus **OR** dextrose bolus, glucagon (rDNA), dextrose, [Held by provider] oxyCODONE **OR** [Held by provider] oxyCODONE, sodium chloride flush, sodium chloride, ondansetron **OR** ondansetron, polyethylene glycol, albuterol sulfate HFA    Results:  CBC:   Recent Labs     06/20/22  0523 06/20/22  0523 06/21/22  0612 06/21/22  1357 06/21/22  1534 06/21/22  1850 06/22/22  0315   WBC 7.8  --  9.7  --   --   --  11.4*   HGB 7.9*   < > 6.5*   < > 8.2* 9.2* 9.2*   HCT 24.8*   < > 20.3*   < > 26.5* 29.1* 27.9*   MCV 59.6*  --  59.5*  --   --   --  66.8*     --  183  --   --   --  189    < > = values in this interval not displayed. BMP:   Recent Labs     06/20/22  0523 06/21/22  0612 06/22/22  0315    135* 132*   K 4.3 4.2 4.5    104 101   CO2 26 22 19*   BUN 14 16 26*   CREATININE 0.6 0.8 1.9*     LIVER PROFILE:   Recent Labs     06/19/22  1320   AST 11*   ALT 10   BILITOT 0.8   ALKPHOS 68     Cultures:      6/19/22 SARS-CoV-2 NAAT negative  6/20/22 Urine E coli 100K     Chest imaging was reviewed by me and showed:   6/21/22 CXR ETT okay     ASSESSMENT:  · Shock: septic v cardiogenic v hypovolemic v transfusion reaction - overall, it is entirely unclear to me what was the etiology of her rapid severe progressive respiratory and cardiovascular failure.   It appears her decompensation began approximately 1 hour after completing blood administration, but I am unclear of the 2 are related. She also received verapamil and Coreg prior to event, but these are home medications. · Acute hypoxemic respiratory failure   · Acute kidney failure/ATN  · Syncope  · Acute blood loss anemia   · Acute coagulopathy   · Post op RIGHT IM hip nailing on 6/20/22  · Thalassemia minor  · DM with hyperglycemia  · Asthma  · Gout    PLAN:  Mechanical ventilation as per my orders. The ventilator was adjusted by me at the bedside for unstable, life threatening respiratory failure. IV Propofol for sedation, target RASS -1 to -2. Fentanyl PRN pain, gtt as needed  Nimbex has been off since 8 PM last night  Follow serial hemoglobin  Merrem/Vanc D#2 pending cultures   Epinephrine and levophed infusions for MAP 60, wean epi first   SSI - change to high dose, may require insulin infusion  Cardiology was consulted for heart block/bradycardia - I d/w Dr. Horace Aguilar  ECHO is pending   Prophylaxis: DVT - SCD; MRSA - mupirocin; GI - on protonix IV  TF  I d/w three adult children today  I d/w Dr. Steven Arguello yesterday evening and again this morning     Total critical care time caring for this patient with life threatening, unstable organ failure, including direct patient contact, management of life support systems, review of data including imaging and labs, discussions with other team members and physicians is 40 minutes so far today, excluding procedures.

## 2022-06-22 NOTE — PROGRESS NOTES
Pt still able to answer yes and no questions. Denies pain or discomfort. IV infusing per MAR. Panda patent to bsd.

## 2022-06-22 NOTE — PROGRESS NOTES
Inpatient Physical Therapy Evaluation and Treatment    Unit: ICU  Date:  6/22/2022  Patient Name:    Fish Loomis  Admitting diagnosis:  Hip fracture requiring operative repair, left, closed[facility]  Admit Date:  6/19/2022  Precautions/Restrictions/WB Status/ Lines/ Wounds/ Oxygen: Fall risk, Bed/chair alarm, Lines -IV, Mosqueda catheter and Mechanical ventilation, OG tube (capped), femoral CVL, Telemetry and Continuous pulse oximetry, WBAT RLE, HECTOR off-weighting boots, bilat wrist restraints. Treatment Time:  8581-9797  Treatment Number:  1   Timed Code Treatment Minutes: 20 minutes  Total Treatment Minutes:  30  minutes    Patient Goals for Therapy: Pt is currently sedated and unable to contribute. Family present and confirmed intention to go to rehab. Discharge Recommendations: SNF  DME needs for discharge: defer to facility       Therapy recommendation for EMS Transport: requires transport by cot due to increased difficulty /c transfers 2/2 sedation and mechanical ventilation    Therapy recommendations for staff:   Assist of 1 bed level activity    History of Present Illness: Per H&P 6/20/22: \"The patient is a 67 y.o. female with DMII, GERD, Asthma, HTN, HLD, osteoporosis, anemia 2/2 thalassemia minor and gout who presented to [facility] ED with complaint of mechanical fall. Patient states she woke up early yesterday and went the restroom when her legs got tangled turning away from the sink and she fell to her right side. She developed immediate pain in her right hip and could not stand. Her pain is located just above her right leg and radiates in to her right groin. She lives alone and states she remained on the floor for approximately 4 hours. Luckily, she was meant to go to her family member's home that morning and when she didn't show up they called EMS. She denies dizziness, presyncope, LOC. She hit her chin on the sink but sustain acute intracranial or facial injuries. She states that since Friday evening she has had pain in the arch of her left foot which she believes contributed to her fall. She has not been walking more than usual and denies acute trauma to her left foot. She states she thought maybe her flat feet contributed to this pain. She is not on anticoagulation. She has asthma but is not in exacerbation. 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 She states she believes her baseline hgb is 9. She was 8.9 on arrival and has dropped to 7.9 with IVF. She has no signs of acute bleeding. She denies history of blood clots in her leg or lung, MI, CVA, known CAD or aneurysm. She has never smoked but she states she has been around people who smoke all her life. She has had procedures in the past that have required general anesthesia and sustained no complications or sequelae.      Her BP and HR have been elevated in the setting of pain. She appears more comfortable now. Hgb again is low in the setting of chronic anemia and dilution from IVF. Type and screen was ordered in case patient requires transfusion. Patient is agreeable for transfusion. Incidentally, patient's UA appears consistent with UTI but she denies sxs. She is admitted for further care. \"     Per Dr. Rebecca Willams note 6/22/22:  \"Ms. Thakur was poorly responsive yesterday and was receiving IV fluid boluses for hypotension. She is planned to have additional 1 unit of red cells. She had another event of poor responsiveness and possible syncope. Was associated with hypotension. She was given 1 mg epinephrine for hypotension and transferred to the ICU  Continue to be hypotensive in the ICU. Given another dose of epinephrine and started epinephrine drip. Also placed on Levophed drip. She had to be intubated and started on mechanical ventilation as her saturation worsened. \"    Home Health S4 Level Recommendation:  NA  AM-PAC Mobility Score       AM-PAC Inpatient Mobility without Stair Climbing Raw Score : 5    Preadmission Environment   Pt. Lives Alone  Home environment:  mobile home/trailer  Steps to enter first floor: 3-4 steps to enter and hand rail unilateral. Family reports she can ascend/descend indep but \"it's getting harder for her\". Steps to second floor: N/A  Bathroom: walk in shower and standard height commode  Equipment owned:  and [facility]    Preadmission Status:  Pt. Able to drive: Yes  Pt Fully independent with ADLs: Yes  Pt. Required assistance from family for: Independent PTA  Pt. independent for transfers and gait and walked with Cane; walker PRN. History of falls Yes - reason for admission, but no other recent previous h/o falls     Pain   Yes - noted by wincing in face during movement of R hip  Location: R hip  Rating: unknown  Pain Medicine Status: Received pain med prior to tx    Cognition    A&O x0   Able to follow 1 step commands inconsistently   Pt is sedated and on mechanical ventilation. Pt was unable to respond to yes/no questions at beginning of session, but increased responsiveness at end via responding to yes/no questions /c head shaking/nodding and movement in BLE on cue. Did not open eyes despite cuing. Subjective  Patient lying supine in bed with family at bedside. Pt agreeable to this PT eval & tx. Upper Extremity ROM/Strength  Please see OT evaluation. Lower Extremity ROM / Strength   AROM WFL: No  ROM limitations: lacking R ankle DF to neutral and PF to ~15-20 deg    Formal strength testing deferred 2/2 sedation and mechanical ventilation status. Initally 0/5 BLE throughout; at end of session pt self-initiated gravity minimized ROM in bilat ankles and hip IR/ER. Could not elicit active quad contraction despite verbal/tactile cuing. Lower Extremity Sensation    NT    Lower Extremity Proprioception:   NT    Coordination and Tone  NT    Balance deferred 2/2 decreased responsiveness from sedation and mechanical ventilation status. Sitting:  Not tested;  Not Tested  Comments: NA    Standing: Not tested; Not Tested  Comments: NA    Bed Mobility deferred 2/2 decreased responsiveness from sedation and mechanical ventilation status. Supine to Sit:    Not Tested  Sit to Supine:   Not Tested  Rolling:   Not Tested  Scooting in sitting: Not Tested  Scooting in supine:  Not Tested    Transfer Training deferred 2/2 decreased responsiveness from sedation and mechanical ventilation status. Sit to stand:   Not Tested  Stand to sit:   Not Tested  Bed to Chair:   Not Tested with use of N/A    Gait gait deferred due to deferred 2/2 decreased responsiveness from sedation and mechanical ventilation status. ; pt ambulated 0 ft. Stair Training deferred, pt unsafe/ not appropriate to complete stairs at this time    Activity Tolerance   Pt completed therapy session with Pain noted with movement of R Hip - noted /c facial wincing /c PROM. Vitals WFL throughout. Positioning Needs   Pt in bed, in ICU monitoring, positioned in proper neutral alignment and pressure relief provided. Call light provided and all needs within reach    Exercises Initiated  all completed bilaterally unless indicated and completed in supine position  PROM: ankle DF/PF/Eversion/Inversion, knee flex/ext, hip flex/abd/add/IR   Comment: increased pain /c R hip flex  Other  None. Patient/Family Education   Pt's family educated on role of inpatient PT, POC, importance of continued activity, DC recommendations, procedure completed and the restrictions associated with that. Assessment  Pt seen for Physical Therapy evaluation in acute care setting. Pt's PLOF includes living alone being IND /c ADLs, transfers and amb /c cane prn. Pt presents today under sedation and mechanical ventilation which limited evaluative findings. Subjective interview reported by  present family. Treatment included PROM on BLE.  Throughout beginning of session, pt was unresponsive to yes/no questions/commands, but by the end of the session, the pt was able to follow single step commands intermittently and displayed ability to move ankle in all ROM and hip into minimal IR /s assistance. Recommending SNF upon discharge as patient functioning well below baseline, demonstrates good rehab potential and unable to return home due to inability to negotiate stairs to enter home/bedroom/bathroom, burden of care beyond caregiver ability and home environment not conducive to patient recovery. Goals : To be met in 3 visits:  1). Independent with LE Ex x 10 reps    To be met in 6 visits:  1). Supine to/from sit: CGA  2). Sit to/from stand: Min A   3). Bed to chair: Min A   4). Gait: Ambulate 50 ft.  with  CGA and use of rolling walker (RW)  5). Tolerate B LE exercises 3 sets of 10-15 reps    Rehabilitation Potential: Fair  Strengths for achieving goals include:   PLOF, Family Support and Pt cooperative   Barriers to achieving goals include:    Complexity of condition, Pain and Weakness    Plan    To be seen 3-5 x / week  while in acute care setting for therapeutic exercises, bed mobility, transfers, progressive gait training, balance training, and family/patient education. Signature: Mahi Diaz, SPT  Co-signature: Noemí Mc, PT, DPT    If patient discharges from this facility prior to next visit, this note will serve as the Discharge Summary.

## 2022-06-23 ENCOUNTER — APPOINTMENT (OUTPATIENT)
Dept: GENERAL RADIOLOGY | Age: 77
DRG: 480 | End: 2022-06-23
Payer: MEDICARE

## 2022-06-23 LAB
A/G RATIO: 1.9 (ref 1.1–2.2)
ALBUMIN SERPL-MCNC: 2.6 G/DL (ref 3.4–5)
ALP BLD-CCNC: 119 U/L (ref 40–129)
ALT SERPL-CCNC: 536 U/L (ref 10–40)
ANION GAP SERPL CALCULATED.3IONS-SCNC: 7 MMOL/L (ref 3–16)
ANION GAP SERPL CALCULATED.3IONS-SCNC: 9 MMOL/L (ref 3–16)
ANISOCYTOSIS: ABNORMAL
AST SERPL-CCNC: 259 U/L (ref 15–37)
BASE EXCESS ARTERIAL: -7 MMOL/L (ref -3–3)
BASOPHILS ABSOLUTE: 0 K/UL (ref 0–0.2)
BASOPHILS ABSOLUTE: 0 K/UL (ref 0–0.2)
BASOPHILS RELATIVE PERCENT: 0.2 %
BASOPHILS RELATIVE PERCENT: 0.4 %
BILIRUB SERPL-MCNC: 0.7 MG/DL (ref 0–1)
BUN BLDV-MCNC: 24 MG/DL (ref 7–20)
BUN BLDV-MCNC: 25 MG/DL (ref 7–20)
CALCIUM IONIZED: 1.04 MMOL/L (ref 1.12–1.32)
CALCIUM SERPL-MCNC: 7.3 MG/DL (ref 8.3–10.6)
CALCIUM SERPL-MCNC: 7.3 MG/DL (ref 8.3–10.6)
CARBOXYHEMOGLOBIN ARTERIAL: 0.3 % (ref 0–1.5)
CHLORIDE BLD-SCNC: 111 MMOL/L (ref 99–110)
CHLORIDE BLD-SCNC: 112 MMOL/L (ref 99–110)
CO2: 21 MMOL/L (ref 21–32)
CO2: 21 MMOL/L (ref 21–32)
CREAT SERPL-MCNC: 1.4 MG/DL (ref 0.6–1.2)
CREAT SERPL-MCNC: 1.5 MG/DL (ref 0.6–1.2)
EOSINOPHILS ABSOLUTE: 0.1 K/UL (ref 0–0.6)
EOSINOPHILS ABSOLUTE: 0.1 K/UL (ref 0–0.6)
EOSINOPHILS RELATIVE PERCENT: 1.8 %
EOSINOPHILS RELATIVE PERCENT: 2.5 %
ESTIMATED AVERAGE GLUCOSE: 125.5 MG/DL
GFR AFRICAN AMERICAN: 41
GFR AFRICAN AMERICAN: 44
GFR NON-AFRICAN AMERICAN: 34
GFR NON-AFRICAN AMERICAN: 36
GLUCOSE BLD-MCNC: 147 MG/DL (ref 70–99)
GLUCOSE BLD-MCNC: 147 MG/DL (ref 70–99)
GLUCOSE BLD-MCNC: 155 MG/DL (ref 70–99)
GLUCOSE BLD-MCNC: 156 MG/DL (ref 70–99)
GLUCOSE BLD-MCNC: 166 MG/DL (ref 70–99)
GLUCOSE BLD-MCNC: 184 MG/DL (ref 70–99)
GLUCOSE BLD-MCNC: 221 MG/DL (ref 70–99)
GLUCOSE BLD-MCNC: 231 MG/DL (ref 70–99)
HAPTOGLOBIN: 223 MG/DL (ref 30–200)
HAPTOGLOBIN: 241 MG/DL (ref 30–200)
HBA1C MFR BLD: 6 %
HCO3 ARTERIAL: 17.4 MMOL/L (ref 21–29)
HCT VFR BLD CALC: 21.9 % (ref 36–48)
HCT VFR BLD CALC: 22 % (ref 36–48)
HCT VFR BLD CALC: 22.2 % (ref 36–48)
HCT VFR BLD CALC: 22.9 % (ref 36–48)
HEMATOLOGY PATH CONSULT: NO
HEMOGLOBIN, ART, EXTENDED: 7.8 G/DL (ref 12–16)
HEMOGLOBIN: 7.2 G/DL (ref 12–16)
HEMOGLOBIN: 7.2 G/DL (ref 12–16)
HEMOGLOBIN: 7.3 G/DL (ref 12–16)
HYPOCHROMIA: ABNORMAL
IMMATURE RETIC FRACT: 0.48 (ref 0.21–0.37)
LACTATE DEHYDROGENASE: 303 U/L (ref 100–190)
LACTATE DEHYDROGENASE: 305 U/L (ref 100–190)
LACTIC ACID: 0.9 MMOL/L (ref 0.4–2)
LYMPHOCYTES ABSOLUTE: 0.6 K/UL (ref 1–5.1)
LYMPHOCYTES ABSOLUTE: 0.7 K/UL (ref 1–5.1)
LYMPHOCYTES RELATIVE PERCENT: 10.2 %
LYMPHOCYTES RELATIVE PERCENT: 10.9 %
MAGNESIUM: 1.4 MG/DL (ref 1.8–2.4)
MCH RBC QN AUTO: 21.8 PG (ref 26–34)
MCH RBC QN AUTO: 22.4 PG (ref 26–34)
MCHC RBC AUTO-ENTMCNC: 32.7 G/DL (ref 31–36)
MCHC RBC AUTO-ENTMCNC: 33.5 G/DL (ref 31–36)
MCV RBC AUTO: 66.6 FL (ref 80–100)
MCV RBC AUTO: 67 FL (ref 80–100)
METHEMOGLOBIN ARTERIAL: 0.3 %
MONOCYTES ABSOLUTE: 0.6 K/UL (ref 0–1.3)
MONOCYTES ABSOLUTE: 0.7 K/UL (ref 0–1.3)
MONOCYTES RELATIVE PERCENT: 10.9 %
MONOCYTES RELATIVE PERCENT: 11 %
NEUTROPHILS ABSOLUTE: 4.4 K/UL (ref 1.7–7.7)
NEUTROPHILS ABSOLUTE: 4.6 K/UL (ref 1.7–7.7)
NEUTROPHILS RELATIVE PERCENT: 75.9 %
NEUTROPHILS RELATIVE PERCENT: 76.2 %
O2 SAT, ARTERIAL: 95.9 %
O2 THERAPY: ABNORMAL
PCO2 ARTERIAL: 30.3 MMHG (ref 35–45)
PDW BLD-RTO: 25.4 % (ref 12.4–15.4)
PDW BLD-RTO: 25.6 % (ref 12.4–15.4)
PERFORMED ON: ABNORMAL
PH ARTERIAL: 7.38 (ref 7.35–7.45)
PH VENOUS: 7.36 (ref 7.35–7.45)
PHOSPHORUS: 2.4 MG/DL (ref 2.5–4.9)
PLATELET # BLD: 173 K/UL (ref 135–450)
PLATELET # BLD: 195 K/UL (ref 135–450)
PLATELET SLIDE REVIEW: ADEQUATE
PLATELET SLIDE REVIEW: ADEQUATE
PMV BLD AUTO: 10.9 FL (ref 5–10.5)
PMV BLD AUTO: 9.6 FL (ref 5–10.5)
PO2 ARTERIAL: 80.6 MMHG (ref 75–108)
POIKILOCYTES: ABNORMAL
POTASSIUM REFLEX MAGNESIUM: 4.1 MMOL/L (ref 3.5–5.1)
POTASSIUM SERPL-SCNC: 4 MMOL/L (ref 3.5–5.1)
PRO-BNP: 1173 PG/ML (ref 0–449)
RBC # BLD: 3.28 M/UL (ref 4–5.2)
RBC # BLD: 3.29 M/UL (ref 4–5.2)
RETICULOCYTE ABSOLUTE COUNT: 0.03 M/UL (ref 0.02–0.1)
RETICULOCYTE COUNT PCT: 0.79 % (ref 0.5–2.18)
SLIDE REVIEW: ABNORMAL
SLIDE REVIEW: ABNORMAL
SODIUM BLD-SCNC: 140 MMOL/L (ref 136–145)
SODIUM BLD-SCNC: 141 MMOL/L (ref 136–145)
TCO2 ARTERIAL: 18.3 MMOL/L
TOTAL PROTEIN: 4 G/DL (ref 6.4–8.2)
TROPONIN: <0.01 NG/ML
TSH REFLEX: 0.56 UIU/ML (ref 0.27–4.2)
VANCOMYCIN TROUGH: 14.2 UG/ML (ref 10–20)
WBC # BLD: 5.8 K/UL (ref 4–11)
WBC # BLD: 6 K/UL (ref 4–11)

## 2022-06-23 PROCEDURE — 71045 X-RAY EXAM CHEST 1 VIEW: CPT

## 2022-06-23 PROCEDURE — 85014 HEMATOCRIT: CPT

## 2022-06-23 PROCEDURE — 6360000002 HC RX W HCPCS: Performed by: INTERNAL MEDICINE

## 2022-06-23 PROCEDURE — 2580000003 HC RX 258: Performed by: STUDENT IN AN ORGANIZED HEALTH CARE EDUCATION/TRAINING PROGRAM

## 2022-06-23 PROCEDURE — 82330 ASSAY OF CALCIUM: CPT

## 2022-06-23 PROCEDURE — 2580000003 HC RX 258: Performed by: INTERNAL MEDICINE

## 2022-06-23 PROCEDURE — 83605 ASSAY OF LACTIC ACID: CPT

## 2022-06-23 PROCEDURE — 84100 ASSAY OF PHOSPHORUS: CPT

## 2022-06-23 PROCEDURE — 80053 COMPREHEN METABOLIC PANEL: CPT

## 2022-06-23 PROCEDURE — 82803 BLOOD GASES ANY COMBINATION: CPT

## 2022-06-23 PROCEDURE — 84238 ASSAY NONENDOCRINE RECEPTOR: CPT

## 2022-06-23 PROCEDURE — 83010 ASSAY OF HAPTOGLOBIN QUANT: CPT

## 2022-06-23 PROCEDURE — 84443 ASSAY THYROID STIM HORMONE: CPT

## 2022-06-23 PROCEDURE — 83880 ASSAY OF NATRIURETIC PEPTIDE: CPT

## 2022-06-23 PROCEDURE — 93005 ELECTROCARDIOGRAM TRACING: CPT | Performed by: INTERNAL MEDICINE

## 2022-06-23 PROCEDURE — 94761 N-INVAS EAR/PLS OXIMETRY MLT: CPT

## 2022-06-23 PROCEDURE — 85018 HEMOGLOBIN: CPT

## 2022-06-23 PROCEDURE — 36415 COLL VENOUS BLD VENIPUNCTURE: CPT

## 2022-06-23 PROCEDURE — C9113 INJ PANTOPRAZOLE SODIUM, VIA: HCPCS | Performed by: INTERNAL MEDICINE

## 2022-06-23 PROCEDURE — 2700000000 HC OXYGEN THERAPY PER DAY

## 2022-06-23 PROCEDURE — 83615 LACTATE (LD) (LDH) ENZYME: CPT

## 2022-06-23 PROCEDURE — 2000000000 HC ICU R&B

## 2022-06-23 PROCEDURE — 83020 HEMOGLOBIN ELECTROPHORESIS: CPT

## 2022-06-23 PROCEDURE — 84484 ASSAY OF TROPONIN QUANT: CPT

## 2022-06-23 PROCEDURE — 6370000000 HC RX 637 (ALT 250 FOR IP): Performed by: STUDENT IN AN ORGANIZED HEALTH CARE EDUCATION/TRAINING PROGRAM

## 2022-06-23 PROCEDURE — 99232 SBSQ HOSP IP/OBS MODERATE 35: CPT | Performed by: INTERNAL MEDICINE

## 2022-06-23 PROCEDURE — 6370000000 HC RX 637 (ALT 250 FOR IP): Performed by: INTERNAL MEDICINE

## 2022-06-23 PROCEDURE — 94003 VENT MGMT INPAT SUBQ DAY: CPT

## 2022-06-23 PROCEDURE — 2500000003 HC RX 250 WO HCPCS: Performed by: INTERNAL MEDICINE

## 2022-06-23 PROCEDURE — 85025 COMPLETE CBC W/AUTO DIFF WBC: CPT

## 2022-06-23 PROCEDURE — 99291 CRITICAL CARE FIRST HOUR: CPT | Performed by: INTERNAL MEDICINE

## 2022-06-23 PROCEDURE — 99233 SBSQ HOSP IP/OBS HIGH 50: CPT | Performed by: INTERNAL MEDICINE

## 2022-06-23 PROCEDURE — 80202 ASSAY OF VANCOMYCIN: CPT

## 2022-06-23 PROCEDURE — 85045 AUTOMATED RETICULOCYTE COUNT: CPT

## 2022-06-23 PROCEDURE — 83735 ASSAY OF MAGNESIUM: CPT

## 2022-06-23 RX ORDER — MORPHINE SULFATE 2 MG/ML
2 INJECTION, SOLUTION INTRAMUSCULAR; INTRAVENOUS
Status: DISCONTINUED | OUTPATIENT
Start: 2022-06-23 | End: 2022-06-30 | Stop reason: HOSPADM

## 2022-06-23 RX ORDER — INSULIN LISPRO 100 [IU]/ML
0-6 INJECTION, SOLUTION INTRAVENOUS; SUBCUTANEOUS NIGHTLY
Status: DISCONTINUED | OUTPATIENT
Start: 2022-06-23 | End: 2022-06-30 | Stop reason: HOSPADM

## 2022-06-23 RX ORDER — CALCIUM GLUCONATE 20 MG/ML
1000 INJECTION, SOLUTION INTRAVENOUS ONCE
Status: COMPLETED | OUTPATIENT
Start: 2022-06-23 | End: 2022-06-24

## 2022-06-23 RX ORDER — SODIUM CHLORIDE 9 MG/ML
1000 INJECTION, SOLUTION INTRAVENOUS CONTINUOUS
Status: DISCONTINUED | OUTPATIENT
Start: 2022-06-23 | End: 2022-06-23

## 2022-06-23 RX ORDER — MAGNESIUM SULFATE 1 G/100ML
1000 INJECTION INTRAVENOUS PRN
Status: DISCONTINUED | OUTPATIENT
Start: 2022-06-23 | End: 2022-06-30 | Stop reason: HOSPADM

## 2022-06-23 RX ORDER — POLYETHYLENE GLYCOL 3350 17 G/17G
17 POWDER, FOR SOLUTION ORAL 2 TIMES DAILY
Status: DISCONTINUED | OUTPATIENT
Start: 2022-06-23 | End: 2022-06-30 | Stop reason: HOSPADM

## 2022-06-23 RX ORDER — METOPROLOL TARTRATE 5 MG/5ML
2.5 INJECTION INTRAVENOUS EVERY 6 HOURS PRN
Status: DISCONTINUED | OUTPATIENT
Start: 2022-06-23 | End: 2022-06-30 | Stop reason: HOSPADM

## 2022-06-23 RX ORDER — INSULIN LISPRO 100 [IU]/ML
0-12 INJECTION, SOLUTION INTRAVENOUS; SUBCUTANEOUS
Status: DISCONTINUED | OUTPATIENT
Start: 2022-06-23 | End: 2022-06-30 | Stop reason: HOSPADM

## 2022-06-23 RX ORDER — METOPROLOL TARTRATE 5 MG/5ML
2.5 INJECTION INTRAVENOUS EVERY 6 HOURS
Status: DISCONTINUED | OUTPATIENT
Start: 2022-06-23 | End: 2022-06-24

## 2022-06-23 RX ADMIN — INSULIN LISPRO 2 UNITS: 100 INJECTION, SOLUTION INTRAVENOUS; SUBCUTANEOUS at 12:01

## 2022-06-23 RX ADMIN — INSULIN LISPRO 3 UNITS: 100 INJECTION, SOLUTION INTRAVENOUS; SUBCUTANEOUS at 00:30

## 2022-06-23 RX ADMIN — WHITE PETROLATUM 57.7 %-MINERAL OIL 31.9 % EYE OINTMENT: at 07:53

## 2022-06-23 RX ADMIN — DOXYCYCLINE 100 MG: 100 INJECTION, POWDER, LYOPHILIZED, FOR SOLUTION INTRAVENOUS at 06:40

## 2022-06-23 RX ADMIN — INSULIN LISPRO 3 UNITS: 100 INJECTION, SOLUTION INTRAVENOUS; SUBCUTANEOUS at 04:59

## 2022-06-23 RX ADMIN — CARBOXYMETHYLCELLULOSE SODIUM 1 DROP: 10 GEL OPHTHALMIC at 06:45

## 2022-06-23 RX ADMIN — IRON SUCROSE: 20 INJECTION, SOLUTION INTRAVENOUS at 10:55

## 2022-06-23 RX ADMIN — PROPOFOL 10 MCG/KG/MIN: 10 INJECTION, EMULSION INTRAVENOUS at 07:12

## 2022-06-23 RX ADMIN — PROPOFOL 10 MCG/KG/MIN: 10 INJECTION, EMULSION INTRAVENOUS at 00:17

## 2022-06-23 RX ADMIN — DOXYCYCLINE 100 MG: 100 INJECTION, POWDER, LYOPHILIZED, FOR SOLUTION INTRAVENOUS at 21:52

## 2022-06-23 RX ADMIN — SODIUM CHLORIDE 1000 ML: 9 INJECTION, SOLUTION INTRAVENOUS at 09:38

## 2022-06-23 RX ADMIN — INSULIN LISPRO 2 UNITS: 100 INJECTION, SOLUTION INTRAVENOUS; SUBCUTANEOUS at 22:36

## 2022-06-23 RX ADMIN — SODIUM CHLORIDE 1000 ML: 9 INJECTION, SOLUTION INTRAVENOUS at 00:13

## 2022-06-23 RX ADMIN — POLYETHYLENE GLYCOL (3350) 17 G: 17 POWDER, FOR SOLUTION ORAL at 21:43

## 2022-06-23 RX ADMIN — ATORVASTATIN CALCIUM 10 MG: 10 TABLET, FILM COATED ORAL at 21:43

## 2022-06-23 RX ADMIN — WHITE PETROLATUM 57.7 %-MINERAL OIL 31.9 % EYE OINTMENT: at 00:00

## 2022-06-23 RX ADMIN — WHITE PETROLATUM 57.7 %-MINERAL OIL 31.9 % EYE OINTMENT: at 04:00

## 2022-06-23 RX ADMIN — CALCIUM GLUCONATE 1000 MG: 20 INJECTION, SOLUTION INTRAVENOUS at 23:25

## 2022-06-23 RX ADMIN — INSULIN LISPRO 2 UNITS: 100 INJECTION, SOLUTION INTRAVENOUS; SUBCUTANEOUS at 16:48

## 2022-06-23 RX ADMIN — CARBOXYMETHYLCELLULOSE SODIUM 1 DROP: 10 GEL OPHTHALMIC at 02:30

## 2022-06-23 RX ADMIN — MUPIROCIN: 20 OINTMENT TOPICAL at 23:26

## 2022-06-23 RX ADMIN — SODIUM PHOSPHATE, MONOBASIC, MONOHYDRATE 15 MMOL: 276; 142 INJECTION, SOLUTION INTRAVENOUS at 23:15

## 2022-06-23 RX ADMIN — CHLORHEXIDINE GLUCONATE 0.12% ORAL RINSE 15 ML: 1.2 LIQUID ORAL at 07:53

## 2022-06-23 RX ADMIN — MEROPENEM 1000 MG: 1 INJECTION, POWDER, FOR SOLUTION INTRAVENOUS at 09:40

## 2022-06-23 RX ADMIN — MUPIROCIN: 20 OINTMENT TOPICAL at 07:54

## 2022-06-23 RX ADMIN — MEROPENEM 1000 MG: 1 INJECTION, POWDER, FOR SOLUTION INTRAVENOUS at 21:49

## 2022-06-23 RX ADMIN — SODIUM CHLORIDE, PRESERVATIVE FREE 10 ML: 5 INJECTION INTRAVENOUS at 07:53

## 2022-06-23 RX ADMIN — PAROXETINE HYDROCHLORIDE 20 MG: 20 TABLET, FILM COATED ORAL at 07:53

## 2022-06-23 RX ADMIN — MAGNESIUM SULFATE HEPTAHYDRATE 1000 MG: 1 INJECTION, SOLUTION INTRAVENOUS at 22:34

## 2022-06-23 RX ADMIN — CETIRIZINE HYDROCHLORIDE 10 MG: 10 TABLET ORAL at 07:53

## 2022-06-23 RX ADMIN — CARBOXYMETHYLCELLULOSE SODIUM 1 DROP: 10 GEL OPHTHALMIC at 09:43

## 2022-06-23 RX ADMIN — PANTOPRAZOLE SODIUM 40 MG: 40 INJECTION, POWDER, LYOPHILIZED, FOR SOLUTION INTRAVENOUS at 07:53

## 2022-06-23 RX ADMIN — METOPROLOL TARTRATE 2.5 MG: 5 INJECTION INTRAVENOUS at 21:42

## 2022-06-23 RX ADMIN — INSULIN LISPRO 2 UNITS: 100 INJECTION, SOLUTION INTRAVENOUS; SUBCUTANEOUS at 08:05

## 2022-06-23 RX ADMIN — VANCOMYCIN HYDROCHLORIDE 1000 MG: 1 INJECTION, POWDER, LYOPHILIZED, FOR SOLUTION INTRAVENOUS at 07:57

## 2022-06-23 RX ADMIN — SODIUM CHLORIDE, PRESERVATIVE FREE 10 ML: 5 INJECTION INTRAVENOUS at 23:26

## 2022-06-23 RX ADMIN — FENTANYL CITRATE 50 MCG: 50 INJECTION, SOLUTION INTRAMUSCULAR; INTRAVENOUS at 04:58

## 2022-06-23 ASSESSMENT — PAIN SCALES - GENERAL
PAINLEVEL_OUTOF10: 0
PAINLEVEL_OUTOF10: 0
PAINLEVEL_OUTOF10: 3
PAINLEVEL_OUTOF10: 0

## 2022-06-23 ASSESSMENT — PAIN DESCRIPTION - ORIENTATION: ORIENTATION: RIGHT

## 2022-06-23 ASSESSMENT — PAIN - FUNCTIONAL ASSESSMENT: PAIN_FUNCTIONAL_ASSESSMENT: PREVENTS OR INTERFERES SOME ACTIVE ACTIVITIES AND ADLS

## 2022-06-23 ASSESSMENT — PULMONARY FUNCTION TESTS
PIF_VALUE: 32
PIF_VALUE: 11
PIF_VALUE: 22

## 2022-06-23 ASSESSMENT — PAIN DESCRIPTION - LOCATION: LOCATION: HIP

## 2022-06-23 ASSESSMENT — PAIN DESCRIPTION - DESCRIPTORS: DESCRIPTORS: ACHING

## 2022-06-23 NOTE — PLAN OF CARE
Problem: Pain  Goal: Verbalizes/displays adequate comfort level or baseline comfort level  Outcome: Progressing     Problem: Skin/Tissue Integrity  Goal: Absence of new skin breakdown  Description: 1. Monitor for areas of redness and/or skin breakdown  2. Assess vascular access sites hourly  3. Every 4-6 hours minimum:  Change oxygen saturation probe site  4. Every 4-6 hours:  If on nasal continuous positive airway pressure, respiratory therapy assess nares and determine need for appliance change or resting period. Outcome: Progressing     Problem: Safety - Adult  Goal: Free from fall injury  Outcome: Progressing     Problem: Chronic Conditions and Co-morbidities  Goal: Patient's chronic conditions and co-morbidity symptoms are monitored and maintained or improved  Outcome: Progressing     Problem: Safety - Medical Restraint  Goal: Remains free of injury from restraints (Restraint for Interference with Medical Device)  Description: INTERVENTIONS:  1. Determine that other, less restrictive measures have been tried or would not be effective before applying the restraint  2. Evaluate the patient's condition at the time of restraint application  3. Inform patient/family regarding the reason for restraint  4.  Q2H: Monitor safety, psychosocial status, comfort, nutrition and hydration  Outcome: Progressing

## 2022-06-23 NOTE — PROGRESS NOTES
Vancomycin Day # 3  Current dose = Pulse dosing per daily vancomycin levels. BUN/SRCR 25/1.5      Est CrCl = 36 ml/min  WBC   6            Tmax 100.9  Vancomycin random level this am = 14.2 mcg/ml  Will give vancomycin 1000 mg x1 dose today and recheck random level tomorrow am.  Pharmacy will continue to obtain daily random vancomycin levels and redose as appropriate.

## 2022-06-23 NOTE — PROGRESS NOTES
sleeves currently well hemoglobin stabilizes, followed by IM/ICU  3:  Continue Pain Control- oxycodone held due to resp depression.    4: PT/OT eval recommending SNF, case management following  5: Discharge pending medical stability    Yann Trinidad

## 2022-06-23 NOTE — CONSULTS
7/18/2018     PHACOEMULSIFICATION OF CATARACT WITH INTRAOCULAR LENS IMPLANT RIGHT EYE [facility]  [facility] INSJ IO LENS PROSTH W/O ECP Left 8/15/2018     PHACOEMULSIFICATION OF CATARACT WITH INTRAOCULAR LENS IMPLANT LEFT EYE performed by Rhonda Bains MD at [facility]                SOCIAL HISTORY:      Social History               Socioeconomic History    Marital status:        Spouse name: Not on file    Number of children: Not on file    Years of education: Not on file    Highest education level: Not on file   Occupational History    Not on file   Tobacco Use    Smoking status: Passive Smoke Exposure - Never Smoker    Smokeless tobacco: Never Used    Tobacco comment: exposed to passive smoke for 18 yrs   Vaping Use    Vaping Use: Never used   Substance and Sexual Activity    Alcohol use: No    Drug use: No    Sexual activity: Never   Other Topics Concern    Not on file   Social History Narrative    Not on file      Social Determinants of Health          Financial Resource Strain:     Difficulty of Paying Living Expenses: Not on file   Food Insecurity:     Worried About Running Out of Food in the Last Year: Not on file    Eyad of Food in the Last Year: Not on file   Transportation Needs:     Lack of Transportation (Medical): Not on file    Lack of Transportation (Non-Medical):  Not on file   Physical Activity:     Days of Exercise per Week: Not on file    Minutes of Exercise per Session: Not on file   Stress:     Feeling of Stress : Not on file   Social Connections:     Frequency of Communication with Friends and Family: Not on file    Frequency of Social Gatherings with Friends and Family: Not on file    Attends Yazdanism Services: Not on file    Active Member of Clubs or Organizations: Not on file    Attends Club or Organization Meetings: Not on file    Marital Status: Not on file Intimate Partner Violence:     Fear of Current or Ex-Partner: Not on file    Emotionally Abused: Not on file    Physically Abused: Not on file    Sexually Abused: Not on file   Housing Stability:     Unable to Pay for Housing in the Last Year: Not on file    Number of [place] in the Last Year: Not on file    Unstable Housing in the Last Year: Not on file            FAMILY HISTORY:      Family History         Family History   Problem Relation Age of Onset    Heart Failure Mother      Emphysema Mother      Stroke Mother      Heart Failure Father      Hypertension Father      Heart Disease Father      High Blood Pressure Father      Cancer Maternal Aunt      Cancer Paternal Aunt      Cancer Maternal Grandmother      Asthma Neg Hx      Diabetes Neg Hx              ALLERGIES:            Allergies as of 06/19/2022 - Fully Reviewed 06/19/2022   Allergen Reaction Noted    Hctz [hydrochlorothiazide]   04/20/2017    Lisinopril Other (See Comments) 10/19/2016         MEDICATIONS:      No current facility-administered medications on file prior to encounter.             Current Outpatient Medications on File Prior to Encounter   Medication Sig Dispense Refill    terazosin (HYTRIN) 2 MG capsule TAKE ONE CAPSULE BY MOUTH ONCE NIGHTLY 30 capsule 2    omeprazole (PRILOSEC) 20 MG delayed release capsule TAKE ONE CAPSULE BY MOUTH TWICE A  capsule 0    alendronate (FOSAMAX) 70 MG tablet TAKE 1 TABLET BY MOUTH  WEEKLY WITH 8 OZ OF PLAIN  WATER 30 MINUTES BEFORE  FIRST FOOD, DRINK OR MEDS.   STAY UPRIGHT FOR 30 MINS 12 tablet 0    verapamil (CALAN SR) 240 MG extended release tablet TAKE 1 TABLET BY MOUTH  DAILY 90 tablet 0    Fluticasone furoate-vilanterol (BREO ELLIPTA) 200-25 MCG/INH AEPB inhaler INHALE ONE DOSE BY MOUTH DAILY 1 each 5    Umeclidinium Bromide (INCRUSE ELLIPTA) 62.5 MCG/INH AEPB INHALE ONE PUFF BY MOUTH DAILY 1 each 5    gabapentin (NEURONTIN) 300 MG capsule Take 1 capsule by mouth 2 times daily for 180 days. Intended supply: 90 days 60 capsule 2    blood glucose test strips (ASCENSIA AUTODISC VI;ONE TOUCH ULTRA TEST VI) strip Used to test once daily. DX: E11.9 100 each 3    albuterol sulfate HFA (PROAIR HFA) 108 (90 Base) MCG/ACT inhaler INHALE TWO PUFFS BY MOUTH EVERY 6 HOURS AS NEEDED FOR WHEEZING 1 each 2    metFORMIN (GLUCOPHAGE) 1000 MG tablet TAKE 1 TABLET BY MOUTH  TWICE DAILY WITH MEALS 180 tablet 3    PARoxetine (PAXIL) 10 MG tablet TAKE 1 TABLET BY MOUTH  DAILY 90 tablet 3    atorvastatin (LIPITOR) 10 MG tablet TAKE 1 TABLET BY MOUTH AT  NIGHT 90 tablet 3    allopurinol (ZYLOPRIM) 300 MG tablet TAKE 1 TABLET BY MOUTH  DAILY 90 tablet 3    losartan (COZAAR) 100 MG tablet TAKE 1 TABLET BY MOUTH  DAILY 90 tablet 3    glipiZIDE (GLUCOTROL) 10 MG tablet TAKE 1 TABLET BY MOUTH  TWICE DAILY BEFORE MEALS 180 tablet 3    carvedilol (COREG) 6.25 MG tablet TAKE 1 TABLET BY MOUTH  TWICE DAILY 180 tablet 3    clotrimazole-betamethasone (LOTRISONE) 1-0.05 % cream Apply topically 2 times daily.  1 Tube 1    TURMERIC PO Take by mouth        acetaminophen (TYLENOL) 500 MG tablet Take 500 mg by mouth every 6 hours as needed for Pain        Coenzyme Q10 (CO Q 10 PO) Take by mouth        cetirizine (ZYRTEC) 10 MG tablet Take 10 mg by mouth daily        Cyanocobalamin (VITAMIN B 12 PO) Take 1,000 mcg by mouth daily.        Ascorbic Acid (VITAMIN C) 500 MG CAPS Take  by mouth daily.        vitamin E 400 UNIT capsule Take 400 Units by mouth daily.        Calcium Carbonate-Vitamin D (CALCIUM + D PO) Take  by mouth daily.          REVIEW OF SYSTEMS:        Unable to assess.      PHYSICAL EXAM:            Vitals:     06/23/22 0900   BP: (!) 145/64   Pulse: 91   Resp: 19   Temp:     SpO2: 97%         General appearance: alert and cooperative  Head: Normocephalic, without obvious abnormality, atraumatic  Neck: No palpable lymphadenopathy in supraclavicular or cervical chains  Lungs: Clear to auscultation bilaterally, no audible rales, wheezes or crackles  Heart: Regular rate and rhythm, S1, S2 normal  Abdomen: Soft, non-tender; bowel sounds normal; no masses,  no organomegaly  Extremities: without cyanosis, clubbing, edema or asymmetry  Skin: No jaundice, purpura or petechiae        LABS:            Lab Results   Component Value Date     WBC 6.0 06/23/2022     HGB 7.2 (L) 06/23/2022     HCT 21.9 (L) 06/23/2022     MCV 66.6 (L) 06/23/2022      06/23/2022               Lab Results   Component Value Date     GLUCOSE 155 (H) 06/23/2022     BUN 25 (H) 06/23/2022     CREATININE 1.5 (H) 06/23/2022     K 4.1 06/23/2022               Lab Results   Component Value Date     ALKPHOS 68 06/19/2022     ALT 10 06/19/2022     AST 11 (L) 06/19/2022     BILITOT 0.8 06/19/2022     BILIDIR <0.2 07/27/2017     PROT 6.2 (L) 06/19/2022               Lab Results   Component Value Date     IRON 20 (L) 06/20/2022     TIBC 215 (L) 06/20/2022     FERRITIN 100.0 06/20/2022         IMAGING:      Echo Complete  Result Date: 6/22/2022  Conclusions   Summary  LV systolic function is normal with EF estimated 60%. No regional wall motion abnormalities are noted. There is mild concentric left ventricular hypertrophy. Grade I diastolic dysfunction with normal filling pressure. Mild biatrial enlargement. Mild posterior mitral annular calcification is present. The maximal transaortic velocity is 2.22m/s which gives peak pressure  gradient= 21mmHg and mean pressure gradient= 12mmHg which is c/w mild aortic  stenosis. Mild tricuspid and pulmonic regurgitation. Systolic pulmonary artery pressure (SPAP) estimated at 56 mmHg (RAP 15  mmHg), c/w moderate pulm HTN.    Signature   ------------------------------------------------------------------       CT ABDOMEN PELVIS WO CONTRAST Additional Contrast? None  Result Date: 6/21/2022  No evidence for pulmonary embolism Considerable subsegmental atelectatic changes the lung bases without separate consolidation of airspace disease clearly evident or pleural effusion Right upper lobe 12 mm noncalcified pulmonary nodule with attention on follow-up CT considered within 3-6 months especially if there is high risk smoking history or high risk stratification No acute findings of the abdomen or pelvis identified      XR FEMUR RIGHT (MIN 2 VIEWS)  Result Date: 6/19/2022  Traumatic, acute intertrochanteric fracture of the right hip. Advanced osteoarthritis of the knee. Moderate-sized knee joint effusion.      XR FOOT LEFT (MIN 3 VIEWS)  Result Date: 6/20/2022  Degenerative changes in the left foot with no acute abnormality.      CT Head WO Contrast  Result Date: 6/19/2022  No acute intracranial abnormality. No acute facial fractures.      CT FACIAL BONES WO CONTRAST  Result Date: 6/19/2022  No acute intracranial abnormality. No acute facial fractures.      XR CHEST PORTABLE  Result Date: 6/23/2022  No significant interval improvement. Bibasilar atelectasis versus infiltrates. Trace pleural effusions.      XR CHEST PORTABLE  Result Date: 6/22/2022  ET tube in satisfactory position 4.6 cm above the terrance. Bibasilar atelectasis. Cardiomegaly.      XR CHEST PORTABLE  Result Date: 6/21/2022  1. Endotracheal tube and enteric tubes properly placed. 2.  Mild left basilar airspace disease and probable trace left pleural effusion.      XR CHEST PORTABLE  Result Date: 6/20/2022  No acute cardiopulmonary disease.      CTA ABDOMINAL AORTA W BILAT RUNOFF W CONTRAST  Result Date: 6/19/2022  1. Traumatic, acute right hip intertrochanteric fracture with varus angulation and small surrounding hematoma. 2. No pseudoaneurysm or active extravasation is identified adjacent to the fracture. 3. Aortoiliac inflow is widely patent. There is a mild amount of fibro calcified plaque. 4. Femoropopliteal segments are patent bilaterally, noting fibro calcified plaque. There is 3 vessel runoff bilaterally.   However, on the right side there is only 1 primary runoff vessel, the posterior tibial. 5. No acute abdominopelvic abnormality. Hepatic steatosis.      CTA PULMONARY W CONTRAST  Result Date: 6/21/2022  No evidence for pulmonary embolism Considerable subsegmental atelectatic changes the lung bases without separate consolidation of airspace disease clearly evident or pleural effusion Right upper lobe 12 mm noncalcified pulmonary nodule with attention on follow-up CT considered within 3-6 months especially if there is high risk smoking history or high risk stratification No acute findings of the abdomen or pelvis identified      FLUORO FOR SURGICAL PROCEDURES  Result Date: 6/20/2022  Intraprocedural fluoroscopic spot images as above. See separate procedure report for more information.      XR HIP 2-3 VW W PELVIS RIGHT  Result Date: 6/20/2022  1. Status post ORIF of the right hip intertrochanteric fracture.   No immediate complication.      XR HIP 2-3 VW W PELVIS RIGHT  Result Date: 6/20/2022  Moderately displaced fracture of the proximal right femur.         ASSESSMENT:           Problem List Items Addressed This Visit           Closed 2-part intertrochanteric fracture of proximal end of right femur (HCC) - Primary      Relevant Medications      oxyCODONE (ROXICODONE) immediate release tablet 5 mg      oxyCODONE (ROXICODONE) immediate release tablet 10 mg      fentaNYL (SUBLIMAZE) injection 50 mcg      Anemia                Other Visit Diagnoses      Hip fx, right, open type I or II, initial encounter (HCC)         Relevant Medications     morphine sulfate (PF) injection 4 mg (Completed)     acetaminophen (TYLENOL) suppository 650 mg (Completed)     oxyCODONE (ROXICODONE) immediate release tablet 5 mg     oxyCODONE (ROXICODONE) immediate release tablet 10 mg     ketorolac (TORADOL) injection 15 mg (Completed)     fentaNYL (SUBLIMAZE) injection 50 mcg     Other Relevant Orders     FLUORO FOR SURGICAL PROCEDURES (Completed)                  PLAN:      1.  Microcytic Anemia     - suspect multifactorial  - low MCV could indicate underlying hemoglobinopathy - thalassemia trait listed on pmh  - also suspect blood loss from hip fracture and surgery   - also has iron deficiency based on recent iron studies 6/20/22  - agree with transfusion support to keep Hgb > 7  - will check soluble transferrin receptor and Hgb electrophoresis  - empirically started venofer based on iron studies     Loco Chandler MD

## 2022-06-23 NOTE — PROGRESS NOTES
1422  Shift assessment, completed, see flow sheet. Pt remains intubated and on light sedation, currently alert/calm and following commands. Intubated and sedated on AC # 7.5 ETT, at 22 LL. 26 / 330 / 35% / 5. SR 87, /49 (67), SpO2 98%. Respirations are easy, even, and unlabored. Bilateral lung sounds are diminished. OG in place at 60, with TF at 40 mL/hr, 0ml residual.      L Femoral and PIV x1 remain patent with sites and dressings C/D/I with Propofol & NS infusing. [place] in place and patent with STAT lock. Bilateral soft wrist restraints in place for pt safety. Call light within reach. Bed in lowest position. Bed alarm on. Will continue to monitor. [id number]  Care rounds completed with Dr. Layne Ford and multidisciplinary team. Reviewed labs, meds, VS, assessment, & plan of care for today. See dictated note and new orders for details. New orders received:  -decrease NS infusion to 75ml/hr.  -may extubate pt following rounds this AM.    1010  Pt extubated at this time with assist by RT with RN at bedside. Pt tolerated well. BSWR d/c'd at this time. Pt placed on 2L via NC for comfort. Spoke with Maisha Mcdowell (pt's son) on the phone following extubation and updated him on pt's condition. 1214  Reassessment completed, see flowsheets. VSS. All ICU lines and monitoring remain in place. Bed locked in lowest position. Call light within reach. 1430  Pt tolerated ice chips and applesauce w/o difficulty at this time. Bedside swallow eval completed. Diet order placed for pt at this time. 1624  Reassessment completed, see flowsheets, unchanged from prior. VSS. All ICU lines and monitoring remain in place. Bed locked in lowest position. Call light within reach. Family remains present at bedside visiting. 1848  Pt noted to start having irregular heart rate with heart rate ranging from 120's-160's. STAT EKG order placed and obtained.  Call placed out for Dr. Layne Ford to be updated on pt's condition and for him to review EKG results. Awaiting return call at this time.

## 2022-06-23 NOTE — CONSULTS
HEMATOLOGY/ONCOLOGY CONSULTATION:     6/23/2022 10:00 AM    REASON FOR CONSULT: Anemia    PROVIDERS:  Sonia Jordan MD    CHIEF COMPLAINT:     Chief Complaint   Patient presents with    Hip Injury     Patient fell this am going to the bathroom. Patient states that her R leg from her hip to her knee. Patient's R leg is noticed to be shortned and rotated. HISTORY OF PRESENT ILLNESS:     HPI:    67 WF with pmh DM2, melanoma and HTN. She also has a reported pmh of thalassemia trait (although I do not see a previous Hgb electrophoresis but she does have chronic microcytosis). Pt was admitted 6/20/22 after a fall. She broke her hip and is now POD 4 s/p surgery. She has remained in the ICU intubated and on pressor support. Her Hgb has dropped and she has been transfused 3 units PRBC.      PAST MEDICAL HISTORY:     Past Medical History:   Diagnosis Date    Anemia     thallasemia minor    Arthritis     osteo-arthritis    Asthma     Back pain     Back pain     Bronchitis chronic     Diabetes mellitus (HCC)     GERD (gastroesophageal reflux disease)     Gout     Hypertension     Melanoma of scalp or neck (HCC)     Osteoarthritis     Osteoporosis     Squamous cell carcinoma, arm     Thal trait        PAST SURGICAL HISTORY:        Past Surgical History:   Procedure Laterality Date    APPENDECTOMY      CARPAL TUNNEL RELEASE Left 02/05/2018    CATARACT REMOVAL WITH IMPLANT Right 07/18/2018    CATARACT REMOVAL WITH IMPLANT Left 08/15/2018    COLONOSCOPY  12/2014    FEMUR FRACTURE SURGERY Right 6/20/2022    RIGHT INTRAMEDULLARY NAILING performed by Chitra Kern DO at [facility] (CERVIX STATUS UNKNOWN)      KNEE ARTHROSCOPY Bilateral     PARATHYROIDECTOMY      PARTIAL HYSTERECTOMY (CERVIX NOT REMOVED)      OR XCAPSL CTRC RMVL INSJ IO LENS PROSTH W/O ECP Right 7/18/2018    PHACOEMULSIFICATION OF CATARACT WITH INTRAOCULAR LENS IMPLANT RIGHT EYE performed by Stanford Rosado Mechelle Schultz MD at [address] [facility]  RMVL INSJ IO LENS PROSTH W/O ECP Left 8/15/2018    PHACOEMULSIFICATION OF CATARACT WITH INTRAOCULAR LENS IMPLANT LEFT EYE performed by Norm Claudio MD at [address] ENDOSCOPY         SOCIAL HISTORY:     Social History     Socioeconomic History    Marital status:      Spouse name: Not on file    Number of children: Not on file    Years of education: Not on file    Highest education level: Not on file   Occupational History    Not on file   Tobacco Use    Smoking status: Passive Smoke Exposure - Never Smoker    Smokeless tobacco: Never Used    Tobacco comment: exposed to passive smoke for 18 yrs   Vaping Use    Vaping Use: Never used   Substance and Sexual Activity    Alcohol use: No    Drug use: No    Sexual activity: Never   Other Topics Concern    Not on file   Social History Narrative    Not on file     Social Determinants of Health     Financial Resource Strain:     Difficulty of Paying Living Expenses: Not on file   Food Insecurity:     Worried About Running Out of Food in the Last Year: Not on file    Eyad of Food in the Last Year: Not on file   Transportation Needs:     Lack of Transportation (Medical): Not on file    Lack of Transportation (Non-Medical):  Not on file   Physical Activity:     Days of Exercise per Week: Not on file    Minutes of Exercise per Session: Not on file   Stress:     Feeling of Stress : Not on file   Social Connections:     Frequency of Communication with Friends and Family: Not on file    Frequency of Social Gatherings with Friends and Family: Not on file    Attends Zoroastrian Services: Not on file    Active Member of Clubs or Organizations: Not on file    Attends Club or Organization Meetings: Not on file    Marital Status: Not on file   Intimate Partner Violence:     Fear of Current or Ex-Partner: Not on file    Emotionally Abused: Not on file    Physically Abused: Not on file    Sexually Abused: Not on file   Housing Stability:     Unable to Pay for Housing in the Last Year: Not on file    Number of Places Lived in the Last Year: Not on file    Unstable Housing in the Last Year: Not on file       FAMILY HISTORY:     Family History   Problem Relation Age of Onset    Heart Failure Mother     Emphysema Mother     Stroke Mother     Heart Failure Father     Hypertension Father     Heart Disease Father     High Blood Pressure Father     Cancer Maternal Aunt     Cancer Paternal Aunt     Cancer Maternal Grandmother     Asthma Neg Hx     Diabetes Neg Hx        ALLERGIES:     Allergies as of 06/19/2022 - Fully Reviewed 06/19/2022   Allergen Reaction Noted    Hctz [hydrochlorothiazide]  04/20/2017    Lisinopril Other (See Comments) 10/19/2016       MEDICATIONS:     No current facility-administered medications on file prior to encounter. Current Outpatient Medications on File Prior to Encounter   Medication Sig Dispense Refill    terazosin (HYTRIN) 2 MG capsule TAKE ONE CAPSULE BY MOUTH ONCE NIGHTLY 30 capsule 2    omeprazole (PRILOSEC) 20 MG delayed release capsule TAKE ONE CAPSULE BY MOUTH TWICE A  capsule 0    alendronate (FOSAMAX) 70 MG tablet TAKE 1 TABLET BY MOUTH  WEEKLY WITH 8 OZ OF PLAIN  WATER 30 MINUTES BEFORE  FIRST FOOD, DRINK OR MEDS. STAY UPRIGHT FOR 30 MINS 12 tablet 0    verapamil (CALAN SR) 240 MG extended release tablet TAKE 1 TABLET BY MOUTH  DAILY 90 tablet 0    Fluticasone furoate-vilanterol (BREO ELLIPTA) 200-25 MCG/INH AEPB inhaler INHALE ONE DOSE BY MOUTH DAILY 1 each 5    Umeclidinium Bromide (INCRUSE ELLIPTA) 62.5 MCG/INH AEPB INHALE ONE PUFF BY MOUTH DAILY 1 each 5    gabapentin (NEURONTIN) 300 MG capsule Take 1 capsule by mouth 2 times daily for 180 days. Intended supply: 90 days 60 capsule 2    blood glucose test strips (ASCENSIA AUTODISC VI;ONE TOUCH ULTRA TEST VI) strip Used to test once daily.  DX: E11.9 100 each 3    albuterol sulfate HFA (PROAIR HFA) 108 (90 Base) MCG/ACT inhaler INHALE TWO PUFFS BY MOUTH EVERY 6 HOURS AS NEEDED FOR WHEEZING 1 each 2    metFORMIN (GLUCOPHAGE) 1000 MG tablet TAKE 1 TABLET BY MOUTH  TWICE DAILY WITH MEALS 180 tablet 3    PARoxetine (PAXIL) 10 MG tablet TAKE 1 TABLET BY MOUTH  DAILY 90 tablet 3    atorvastatin (LIPITOR) 10 MG tablet TAKE 1 TABLET BY MOUTH AT  NIGHT 90 tablet 3    allopurinol (ZYLOPRIM) 300 MG tablet TAKE 1 TABLET BY MOUTH  DAILY 90 tablet 3    losartan (COZAAR) 100 MG tablet TAKE 1 TABLET BY MOUTH  DAILY 90 tablet 3    glipiZIDE (GLUCOTROL) 10 MG tablet TAKE 1 TABLET BY MOUTH  TWICE DAILY BEFORE MEALS 180 tablet 3    carvedilol (COREG) 6.25 MG tablet TAKE 1 TABLET BY MOUTH  TWICE DAILY 180 tablet 3    clotrimazole-betamethasone (LOTRISONE) 1-0.05 % cream Apply topically 2 times daily. 1 Tube 1    TURMERIC PO Take by mouth      acetaminophen (TYLENOL) 500 MG tablet Take 500 mg by mouth every 6 hours as needed for Pain      Coenzyme Q10 (CO Q 10 PO) Take by mouth      cetirizine (ZYRTEC) 10 MG tablet Take 10 mg by mouth daily      Cyanocobalamin (VITAMIN B 12 PO) Take 1,000 mcg by mouth daily.  Ascorbic Acid (VITAMIN C) 500 MG CAPS Take  by mouth daily.  vitamin E 400 UNIT capsule Take 400 Units by mouth daily.  Calcium Carbonate-Vitamin D (CALCIUM + D PO) Take  by mouth daily. REVIEW OF SYSTEMS:       Unable to assess.      PHYSICAL EXAM:       Vitals:    06/23/22 0900   BP: (!) 145/64   Pulse: 91   Resp: 19   Temp:    SpO2: 97%       General appearance: alert and cooperative  Head: Normocephalic, without obvious abnormality, atraumatic  Neck: No palpable lymphadenopathy in supraclavicular or cervical chains  Lungs: Clear to auscultation bilaterally, no audible rales, wheezes or crackles  Heart: Regular rate and rhythm, S1, S2 normal  Abdomen: Soft, non-tender; bowel sounds normal; no masses,  no organomegaly  Extremities: without cyanosis, acute findings of the abdomen or pelvis identified     XR FEMUR RIGHT (MIN 2 VIEWS)  Result Date: 6/19/2022  Traumatic, acute intertrochanteric fracture of the right hip. Advanced osteoarthritis of the knee. Moderate-sized knee joint effusion. XR FOOT LEFT (MIN 3 VIEWS)  Result Date: 6/20/2022  Degenerative changes in the left foot with no acute abnormality. CT Head WO Contrast  Result Date: 6/19/2022  No acute intracranial abnormality. No acute facial fractures. CT FACIAL BONES WO CONTRAST  Result Date: 6/19/2022  No acute intracranial abnormality. No acute facial fractures. XR CHEST PORTABLE  Result Date: 6/23/2022  No significant interval improvement. Bibasilar atelectasis versus infiltrates. Trace pleural effusions. XR CHEST PORTABLE  Result Date: 6/22/2022  ET tube in satisfactory position 4.6 cm above the terrance. Bibasilar atelectasis. Cardiomegaly. XR CHEST PORTABLE  Result Date: 6/21/2022  1. Endotracheal tube and enteric tubes properly placed. 2.  Mild left basilar airspace disease and probable trace left pleural effusion. XR CHEST PORTABLE  Result Date: 6/20/2022  No acute cardiopulmonary disease. CTA ABDOMINAL AORTA W BILAT RUNOFF W CONTRAST  Result Date: 6/19/2022  1. Traumatic, acute right hip intertrochanteric fracture with varus angulation and small surrounding hematoma. 2. No pseudoaneurysm or active extravasation is identified adjacent to the fracture. 3. Aortoiliac inflow is widely patent. There is a mild amount of fibro calcified plaque. 4. Femoropopliteal segments are patent bilaterally, noting fibro calcified plaque. There is 3 vessel runoff bilaterally. However, on the right side there is only 1 primary runoff vessel, the posterior tibial. 5. No acute abdominopelvic abnormality. Hepatic steatosis.      CTA PULMONARY W CONTRAST  Result Date: 6/21/2022  No evidence for pulmonary embolism Considerable subsegmental atelectatic changes the lung bases without separate consolidation of airspace disease clearly evident or pleural effusion Right upper lobe 12 mm noncalcified pulmonary nodule with attention on follow-up CT considered within 3-6 months especially if there is high risk smoking history or high risk stratification No acute findings of the abdomen or pelvis identified     FLUORO FOR SURGICAL PROCEDURES  Result Date: 6/20/2022  Intraprocedural fluoroscopic spot images as above. See separate procedure report for more information. XR HIP 2-3 VW W PELVIS RIGHT  Result Date: 6/20/2022  1. Status post ORIF of the right hip intertrochanteric fracture. No immediate complication. XR HIP 2-3 VW W PELVIS RIGHT  Result Date: 6/20/2022  Moderately displaced fracture of the proximal right femur. ASSESSMENT:     Problem List Items Addressed This Visit     Closed 2-part intertrochanteric fracture of proximal end of right femur ([address] - Primary    Relevant Medications    oxyCODONE (ROXICODONE) immediate release tablet 5 mg    oxyCODONE (ROXICODONE) immediate release tablet 10 mg    fentaNYL (SUBLIMAZE) injection 50 mcg    Anemia      Other Visit Diagnoses     Hip fx, right, open type I or II, initial encounter (HCC)        Relevant Medications    morphine sulfate (PF) injection 4 mg (Completed)    acetaminophen (TYLENOL) suppository 650 mg (Completed)    oxyCODONE (ROXICODONE) immediate release tablet 5 mg    oxyCODONE (ROXICODONE) immediate release tablet 10 mg    ketorolac (TORADOL) injection 15 mg (Completed)    fentaNYL (SUBLIMAZE) injection 50 mcg    Other Relevant Orders    FLUORO FOR SURGICAL PROCEDURES (Completed)                PLAN:     1.  Microcytic Anemia    - suspect multifactorial  - low MCV could indicate underlying hemoglobinopathy - thalassemia trait listed on pmh  - also suspect blood loss from hip fracture and surgery   - also has iron deficiency based on recent iron studies 6/20/22  - agree with transfusion support to keep Hgb > 7  - will check soluble transferrin receptor and Hgb electrophoresis  - empirically started venofer based on iron studies    Diana Mcfadden MD

## 2022-06-23 NOTE — CARE COORDINATION
INTERDISCIPLINARY PLAN OF CARE CONFERENCE    Date/Time: 6/23/2022 2:47 PM  Completed by: SUMIT Blackwell  Case Management    Patient Name:  Chester Baumann  YOB: 1945  Admitting Diagnosis: Hip fracture requiring operative repair, left, closed, initial encounter [facility]) Bishnu Ramirez     Admit Date/Time:  6/19/2022 12:49 PM    Chart reviewed. Interdisciplinary team contacted or reviewed plan related to patient progress and discharge plans. Disciplines included Case Management, Nursing, and Dietitian. Current Status:ongoing   PT/OT recommendation for discharge plan of care: SNF    Expected D/C Disposition:  Skilled nursing facility    Discharge Plan Comments: Chart review completed. Pt was extubated this AM per notes. Called and spoke with Vida Mao at [facility] who states she will review pt's chart as she is now extubated and call writer back on if they are still able to accept pt.      Pt has traditional Medicare and won't require a precert    Home O2 in place on admit: No    Addendum at 3:24pm: Received call from Vida Mao at [facility] stating they are able to accept and will need to know if pt will discharge on IV ABX

## 2022-06-23 NOTE — PROGRESS NOTES
Patient assessment complete, lung sounds clear to auscultation, small thick creamy secretions when suctioned, patient on ventilator FiO2 35% with PEEP of 5, propofol infusing, patient awakens easily, calm, follows directions, tries to communicate, not pulling at lines, restraints on to protect airway. Bowel sounds active. [place] slightly cloudy urine. RLE warm, full sensation, patient able to move foot to command, mipilex in place to right hip, scant drainage noted. Tube feeding initiated through OG tube. Denies needs, call light in reach, will continue to monitor.

## 2022-06-23 NOTE — PROGRESS NOTES
Patient doing well, awakens easily when nurse in room, repositioned, communicating with writing on board with nurse. Propofol infusing at 20mcg/min. ATB infusing, call light given and explained. Will continue to monitor.

## 2022-06-23 NOTE — PROGRESS NOTES
[facility], [place], operated by [facility] Daily Progress Note      Admit Date:  6/19/2022    Subjective:  Ms. Celso Yu is being seen today for f/u hypotension, bradycardia, and ECHO results. She was extubated this AM and feels fine without complaints other than hoarse voice. Objective:   BP (!) 145/64   Pulse 91   Temp 99.4 °F (37.4 °C) (Bladder)   Resp 19   Ht 5' 3\" (1.6 m)   Wt 229 lb 4.5 oz (104 kg)   SpO2 97%   Breastfeeding No   BMI 40.61 kg/m²     Intake/Output Summary (Last 24 hours) at 6/23/2022 0928  Last data filed at 6/23/2022 0456  Gross per 24 hour   Intake 2348. 3 ml   Output 3675 ml   Net -1326.7 ml       TELEMETRY: Sinus 95bpm    Physical Exam:  General:  Awake, alert, NAD  Skin:  Warm and dry  Neck:  JVD none  Chest:  Clear to auscultation, respiration easy  Cardiovascular:  RRR S1S2; +soft RICK  Abdomen:  Soft NT  Extremities:  no edema    Medications:    doxycycline (VIBRAMYCIN) IV  100 mg IntraVENous Q12H    insulin lispro  0-12 Units SubCUTAneous TID WC    insulin lispro  0-6 Units SubCUTAneous Nightly    mupirocin   Nasal BID    vancomycin (VANCOCIN) intermittent dosing (placeholder)   Other RX Placeholder    meropenem  1,000 mg IntraVENous Q12H    atropine  1 mg IntraVENous Once    carboxymethylcellulose PF  1 drop Both Eyes Q4H    And    artificial tears   Both Eyes Q4H    chlorhexidine  15 mL Mouth/Throat BID    pantoprazole  40 mg IntraVENous Daily    [Held by provider] allopurinol  200 mg Oral Daily    atorvastatin  10 mg Oral Nightly    cetirizine  10 mg Oral Daily    [Held by provider] gabapentin  300 mg Oral BID    [Held by provider] losartan  100 mg Oral Daily    PARoxetine  20 mg Oral Daily    sodium chloride flush  5-40 mL IntraVENous 2 times per day      sodium chloride      norepinephrine 10 mcg/min (06/22/22 0534)    EPINEPHrine infusion 6 mcg/min (06/22/22 0534)    propofol 10 mcg/kg/min (06/23/22 0712)    dextrose      sodium chloride       albuterol sulfate HFA **AND** ipratropium, fentanNYL, midazolam, perflutren lipid microspheres, glucose, dextrose bolus **OR** dextrose bolus, glucagon (rDNA), dextrose, [Held by provider] oxyCODONE **OR** [Held by provider] oxyCODONE, sodium chloride flush, sodium chloride, ondansetron **OR** ondansetron, polyethylene glycol, albuterol sulfate HFA    Lab Data:  CBC:   Recent Labs     06/21/22  0612 06/21/22  1357 06/22/22  0315 06/22/22  1840 06/23/22  0530   WBC 9.7  --  11.4*  --  6.0   HGB 6.5*   < > 9.2* 7.8* 7.2*   HCT 20.3*   < > 27.9* 23.9* 21.9*   MCV 59.5*  --  66.8*  --  66.6*     --  189  --  173    < > = values in this interval not displayed. BMP:   Recent Labs     06/21/22  0612 06/22/22  0315 06/23/22  0530   * 132* 140   K 4.2 4.5 4.1    101 112*   CO2 22 19* 21   BUN 16 26* 25*   CREATININE 0.8 1.9* 1.5*     LIVER PROFILE: No results for input(s): AST, ALT, LIPASE, BILIDIR, BILITOT, ALKPHOS in the last 72 hours. Invalid input(s): AMYLASE,  ALB  PT/INR:   Recent Labs     06/21/22  1534   PROTIME 20.7*   INR 1.80*     CT imaging 6/21/22  Impression   No evidence for pulmonary embolism       Considerable subsegmental atelectatic changes the lung bases without separate   consolidation of airspace disease clearly evident or pleural effusion       Right upper lobe 12 mm noncalcified pulmonary nodule with attention on   follow-up CT considered within 3-6 months especially if there is high risk   smoking history or high risk stratification       No acute findings of the abdomen or pelvis identified             ECHO 6/22/22 Summary  (EF=50% in 3/10 study)   LV systolic function is normal with EF estimated 60%. No regional wall motion abnormalities are noted. There is mild concentric left ventricular hypertrophy. Grade I diastolic dysfunction with normal filling pressure. Mild biatrial enlargement. Mild posterior mitral annular calcification is present.    The maximal transaortic velocity is 2.22m/s which gives peak pressure   gradient= 21mmHg and mean pressure gradient= 12mmHg which is c/w mild aortic stenosis. Mild tricuspid and pulmonic regurgitation. Systolic pulmonary artery pressure (SPAP) estimated at 56 mmHg (RAP 15mmHg), c/w moderate pulm HTN. s.p IM nailing of right hip POD 3  Assessment:    1. Right hip fx:   -s/p mechanical fall; no LOC or concern for syncope; she remembers losing balance and falling   -POD#3 s/p IM nailing repair of right hip   -routine post-op care by ortho and IM docs    2. Hypotension/shock and bradycardia   -possibly related to worsened anemia   -I have low clinical suspicion for any cardiac etiology contributing; non-cardiogenic shock   -ruled out for MI and EKG NSR without any concerning EKG changes   -hemodynamically stable now   -ECHO 6/22/22 I personally reviewed and normal EF=60% with mild AS---no concerning findings   -No need for further cardiac testing at this time. 3. Anemia:   -hx thalassemia minor   -H/H drop 6/20    -s/p 3U PRBC   -H/H initially improved and now decreased again   -heme/onc consulted   -further w/u per ICU/IM team      No further cardiac recs or testing. Signing off. Call if further issues.  Thanks      Patient Active Problem List    Diagnosis Date Noted    Bradycardia     Hypotension due to hypovolemia     Acute blood loss anemia     Closed 2-part intertrochanteric fracture of proximal end of right femur ([address]     Shock ([address]     Acute hypoxemic respiratory failure (HCC)     Hip fracture requiring operative repair, left, closed, initial encounter ([address] 06/19/2022    Hyperlipidemia associated with type 2 diabetes mellitus ([address] 03/01/2019    Major depressive disorder with single episode, in full remission ([address] 03/01/2019    Morbid obesity ([address] 02/22/2018    Moderate persistent asthma without complication 68/75/2662    Carpal tunnel syndrome of left wrist 03/03/2017    Benign essential HTN 10/19/2016  Type 2 diabetes mellitus without complication, without long-term current use of insulin ([facility] 10/19/2016    Gastroesophageal reflux disease without esophagitis 10/19/2016    Chronic gout of multiple sites 10/19/2016    Spinal stenosis of lumbar region 10/19/2016    Anemia 10/19/2016    Lumbar disc disease 07/11/2015    Melanoma ([facility] 07/11/2015    S/P hysterectomy 07/11/2015    Thalassemia trait 07/11/2015    Asthma 06/04/2015    Diabetes ([facility] 06/04/2015    DJD (degenerative joint disease) 06/04/2015    Edema 06/04/2015    Gout 06/04/2015    Vitamin B 12 deficiency 06/04/2015    Osteoporosis 06/04/2015    Knee pain 07/28/2014    Chronic urinary tract infection 07/28/2014    Osteoarthritis of both knees 07/28/2014    Sciatic nerve pain 11/16/2012    S/P parathyroidectomy ([facility] 07/11/1995     Lincoln Rico MD, MD 6/23/2022 9:28 AM

## 2022-06-23 NOTE — PROGRESS NOTES
06/22/22 2346   NICU Vent Information   Vent Type 980   Vent Mode AC/VC   Vt (Set, mL) 330 mL   Vt Exhaled 407 mL   Resp Rate (Set) 26 bmp   Rate Measured 30 br/min   Minute Volume 10.5 Liters   Peak Flow 70 L/min   Pressure Support 0 cmH20   FiO2  35 %   Peak Inspiratory Pressure 28 cmH2O   I:E Ratio 1:3.40   Sensitivity 3   High Peep/I Pressure 0   PEEP/CPAP (cmH2O) 5   I Time/ I Time % 0 s   Mean Airway Pressure 11 cmH20   Cough/Sputum   Sputum How Obtained Endotracheal;Suctioned   Cough Productive   Sputum Amount Small   Sputum Color Creamy   Tenacity Thick   Breath Sounds   Right Upper Lobe Diminished   Right Middle Lobe Diminished   Right Lower Lobe Diminished   Left Upper Lobe Diminished   Left Lower Lobe Diminished   Additional Respiratory Assessments   Heart Rate 97   Resp 25   SpO2 97 %   Position Semi-Phipps's   Humidification Source HME   Vent Alarm Settings   High Pressure  40 cmH2O   Low Minute Volume Alarm 3.5 L/min   Low Exhaled Vt  250 mL   Vt High  1100 mL   RR High 40 br/min   Apnea (Secs) 20 secs   Ambu Bag With Mask At Bedside Yes   ETT (adult)   Placement Date: 06/21/22   Airway Tube Size: 7.5 mm  Location: Oral   Secured At 22 cm   Measured From Lips   ETT Placement Right   Secured By Commercial tube peacock   Site Assessment Dry

## 2022-06-23 NOTE — PROGRESS NOTES
Pulmonary & Critical Care Medicine ICU Progress Note    CC: ORIF R hip, syncope post op D#2 with severe hypoxemia and shock     Events of Last 24 hours:   Low-grade fevers  Pressors titrated off    Invasive Lines:   Left femoral CVC 2022    MV: 2022  Vent Mode: AC/VC Resp Rate (Set): 26 bmp/Vt (Set, mL): 330 mL/ /FiO2 : 35 %  Recent Labs     22  0315 22  0350   PHART 7.219* 7.297*   [id number] 50.1* 42.9   PO2ART 39.6* 77.3       IV:   norepinephrine 10 mcg/min (22)    EPINEPHrine infusion 6 mcg/min (22)    propofol 10 mcg/kg/min (22)    sodium chloride Stopped (22)    dextrose      sodium chloride         Vitals:  Blood pressure (!) 130/55, pulse 95, temperature 100.3 °F (37.9 °C), temperature source Bladder, resp. rate 22, height 5' 3\" (1.6 m), weight 229 lb 4.5 oz (104 kg), SpO2 96 %, not currently breastfeeding. on vent  Temp  Av.8 °F (37.7 °C)  Min: 98.4 °F (36.9 °C)  Max: 100.9 °F (38.3 °C)    Intake/Output Summary (Last 24 hours) at 2022 0622  Last data filed at 2022 0456  Gross per 24 hour   Intake 2348. 3 ml   Output 3675 ml   Net -1326.7 ml     EXAM:  General: intubated, ill appearing    ENT: Pharynx with ETT. Resp: No crackles. No wheezing. CV: S1, S2. +edema  GI: NT, ND, +BS  Skin: Warm and dry. Neuro: PERRL. Sedated, not following commands.  Patellar reflexes are symmetric     Scheduled Meds:   insulin lispro  0-18 Units SubCUTAneous Q4H    mupirocin   Nasal BID    vancomycin (VANCOCIN) intermittent dosing (placeholder)   Other RX Placeholder    meropenem  1,000 mg IntraVENous Q12H    atropine  1 mg IntraVENous Once    carboxymethylcellulose PF  1 drop Both Eyes Q4H    And    artificial tears   Both Eyes Q4H    chlorhexidine  15 mL Mouth/Throat BID    pantoprazole  40 mg IntraVENous Daily    [Held by provider] allopurinol  200 mg Oral Daily    atorvastatin  10 mg Oral Nightly    cetirizine  10 mg Oral Daily    [Held by provider] gabapentin  300 mg Oral BID    [Held by provider] losartan  100 mg Oral Daily    PARoxetine  20 mg Oral Daily    sodium chloride flush  5-40 mL IntraVENous 2 times per day     PRN Meds:  albuterol sulfate HFA **AND** ipratropium, fentanNYL, midazolam, perflutren lipid microspheres, glucose, dextrose bolus **OR** dextrose bolus, glucagon (rDNA), dextrose, [Held by provider] oxyCODONE **OR** [Held by provider] oxyCODONE, sodium chloride flush, sodium chloride, ondansetron **OR** ondansetron, polyethylene glycol, albuterol sulfate HFA    Results:  CBC:   Recent Labs     06/21/22  0612 06/21/22  1357 06/22/22  0315 06/22/22  1840 06/23/22  0530   WBC 9.7  --  11.4*  --  6.0   HGB 6.5*   < > 9.2* 7.8* 7.2*   HCT 20.3*   < > 27.9* 23.9* 21.9*   MCV 59.5*  --  66.8*  --  66.6*     --  189  --   --     < > = values in this interval not displayed. BMP:   Recent Labs     06/21/22  0612 06/22/22  0315   * 132*   K 4.2 4.5    101   CO2 22 19*   BUN 16 26*   CREATININE 0.8 1.9*     LIVER PROFILE:   No results for input(s): AST, ALT, LIPASE, BILIDIR, BILITOT, ALKPHOS in the last 72 hours. Invalid input(s): AMYLASE,  ALB  Cultures:      6/19/22 SARS-CoV-2 NAAT negative  6/20/22 Urine E coli 100K pansensitive    Chest imaging was reviewed by me and showed:   6/22/22 CXR ETT okay     Echo 6/22/2022 EF 60%, mild LVH, grade 1 diastolic dysfunction, SPAP 56, mild AS    ASSESSMENT:  · Shock: septic v cardiogenic v hypovolemic v transfusion reaction v CCB/BB toxicity - overal unclear etiology of her rapid severe progressive respiratory and cardiovascular failure  · Acute hypoxemic respiratory failure   · Acute kidney failure/ATN  · Syncope  · Acute blood loss anemia   · Acute coagulopathy   · Post op RIGHT IM hip nailing on 6/20/22  · Thalassemia minor  · DM with hyperglycemia  · Asthma  · Gout    PLAN:  Mechanical ventilation as per my orders.   The ventilator was adjusted by me at the bedside for unstable, life threatening respiratory failure. IV Propofol for sedation, target RASS -1 to -2. Fentanyl PRN pain, gtt as needed  Follow serial hemoglobin    F/U electrolytes    Merrem/Vanc D#3, add atypical coverage given persistent low grade fevers  SSI - change to medium dose   Cardiology has seen  TF  Prophylaxis: DVT - SCD; MRSA - mupirocin; GI - on protonix IV     Total critical care time caring for this patient with life threatening, unstable organ failure, including direct patient contact, management of life support systems, review of data including imaging and labs, discussions with other team members and physicians is 31 minutes so far today, excluding procedures.

## 2022-06-23 NOTE — PROGRESS NOTES
IM Progress Note    Admit Date:  6/19/2022  4    Interval history:  S.p IM nailing of right hip   Drop in h.h noted  BP stable    Subjective:  Ms. Quentin Osborn was poorly responsive 6/21  and was receiving IV fluid boluses for hypotension. She is planned to have additional 1 unit of red cells. She had another event of poor responsiveness and possible syncope. Was associated with hypotension. She was given 1 mg epinephrine for hypotension and transferred to the ICU  Continue to be hypotensive in the ICU. Given another dose of epinephrine and started epinephrine drip. Also placed on Levophed drip. She had to be intubated and started on mechanical ventilation as her saturation worsened. 6/23  Now off all pressors. Doing well on SBT. Awake and following commands  Drop in H&H again today  No obvious bleeding    Objective:   BP (!) 147/62   Pulse 95   Temp 99.3 °F (37.4 °C) (Bladder)   Resp 19   Ht 5' 3\" (1.6 m)   Wt 229 lb 4.5 oz (104 kg)   SpO2 100%   Breastfeeding No   BMI 40.61 kg/m²       Intake/Output Summary (Last 24 hours) at 6/23/2022 1312  Last data filed at 6/23/2022 0456  Gross per 24 hour   Intake 2348. 3 ml   Output 3675 ml   Net -1326.7 ml       Physical Exam:      General:  Awake, in to  Mucous Membranes:  Pale , anicteric  Neck: No JVD, no carotid bruit, no thyromegaly  Chest:  Clear to auscultation bilaterally, no added sounds  Cardiovascular:  RRR S1S2 heard, no murmurs or gallops  Abdomen:  Soft, undistended, non tender, no organomegaly, BS present  Extremities: left ankle diffuse swelling and tenderness noted  Right hip dressing dry , bilateral knee arthritis with limited movements to both LE with weakness noted   No edema or cyanosis.  Distal pulses well felt  Neurological : Awake, following commands    Medications:   Scheduled Medications:    doxycycline (VIBRAMYCIN) IV  100 mg IntraVENous Q12H    insulin lispro  0-12 Units SubCUTAneous TID     insulin lispro  0-6 Units SubCUTAneous Nightly    IVPB builder   IntraVENous Q24H    mupirocin   Nasal BID    vancomycin (VANCOCIN) intermittent dosing (placeholder)   Other RX Placeholder    meropenem  1,000 mg IntraVENous Q12H    atropine  1 mg IntraVENous Once    pantoprazole  40 mg IntraVENous Daily    [Held by provider] allopurinol  200 mg Oral Daily    atorvastatin  10 mg Oral Nightly    cetirizine  10 mg Oral Daily    [Held by provider] gabapentin  300 mg Oral BID    [Held by provider] losartan  100 mg Oral Daily    PARoxetine  20 mg Oral Daily    sodium chloride flush  5-40 mL IntraVENous 2 times per day     I   sodium chloride 1,000 mL (06/23/22 0938)    dextrose      sodium chloride       fentanNYL, midazolam, perflutren lipid microspheres, glucose, dextrose bolus **OR** dextrose bolus, glucagon (rDNA), dextrose, [Held by provider] oxyCODONE **OR** [Held by provider] oxyCODONE, sodium chloride flush, sodium chloride, ondansetron **OR** ondansetron, polyethylene glycol, albuterol sulfate HFA    Lab Data:  Recent Labs     06/21/22  0612 06/21/22  1357 06/22/22  0315 06/22/22  0315 06/22/22  1840 06/23/22  0530 06/23/22  1202   WBC 9.7  --  11.4*  --   --  6.0  --    HGB 6.5*   < > 9.2*   < > 7.8* 7.2* 7.2*   HCT 20.3*   < > 27.9*   < > 23.9* 21.9* 22.2*   MCV 59.5*  --  66.8*  --   --  66.6*  --      --  189  --   --  173  --     < > = values in this interval not displayed.      Recent Labs     06/21/22  0612 06/22/22  0315 06/23/22  0530   * 132* 140   K 4.2 4.5 4.1    101 112*   CO2 22 19* 21   BUN 16 26* 25*   CREATININE 0.8 1.9* 1.5*     Recent Labs     06/22/22  0020 06/22/22  0315   TROPONINI <0.01 <0.01       Coagulation:   Lab Results   Component Value Date    INR 1.80 06/21/2022     Cardiac markers:   Lab Results   Component Value Date    CKTOTAL 38 06/19/2022    TROPONINI <0.01 06/22/2022         Lab Results   Component Value Date    ALT 10 06/19/2022    AST 11 (L) 06/19/2022    ALKPHOS 68 06/19/2022    BILITOT 0.8 06/19/2022       Lab Results   Component Value Date    INR 1.80 (H) 06/21/2022    INR 1.03 01/06/2015    PROTIME 20.7 (H) 06/21/2022    PROTIME 11.1 01/06/2015       Radiology      EKG:  I have reviewed the EKG with the following interpretation:   Sinus tachycardia  left axis  Left anterior fascicular block  Nonspecific ST abnormality  Abnormal ECG  Confirmed by YOHANA LOPES MD ([id number]) on 6/20/2022 7:20:25 AM     RADIOLOGY  XR HIP 2-3 VW W PELVIS RIGHT   Final Result   Moderately displaced fracture of the proximal right femur.           CTA ABDOMINAL AORTA W BILAT RUNOFF W CONTRAST   Final Result   1. Traumatic, acute right hip intertrochanteric fracture with varus   angulation and small surrounding hematoma. 2. No pseudoaneurysm or active extravasation is identified adjacent to the   fracture. 3. Aortoiliac inflow is widely patent. There is a mild amount of fibro   calcified plaque. 4. Femoropopliteal segments are patent bilaterally, noting fibro calcified   plaque. There is 3 vessel runoff bilaterally. However, on the right side   there is only 1 primary runoff vessel, the posterior tibial.   5. No acute abdominopelvic abnormality. Hepatic steatosis.         CT FACIAL BONES WO CONTRAST   Final Result   No acute intracranial abnormality.       No acute facial fractures.           CT Head WO Contrast   Final Result   No acute intracranial abnormality.       No acute facial fractures.           XR FEMUR RIGHT (MIN 2 VIEWS)   Final Result   Traumatic, acute intertrochanteric fracture of the right hip.       Advanced osteoarthritis of the knee.   Moderate-sized knee joint effusion.                 Pertinent previous results reviewed   DEXA 6/18/19  Impression   Osteoporosis of the right femoral neck by WHO criteria.       The prior DEXA scan from 10/06/2014 was performed at a different hospital on   a different machine, therefore, direct comparison cannot be made.  The   current study should be considered the patient's new baseline.       ECHO   LV systolic function is normal with EF estimated 60%. No regional wall motion abnormalities are noted. There is mild concentric left ventricular hypertrophy. Grade I diastolic dysfunction with normal filling pressure. Mild biatrial enlargement. Mild posterior mitral annular calcification is present. The maximal transaortic velocity is 2.22m/s which gives peak pressure   gradient= 21mmHg and mean pressure gradient= 12mmHg which is c/w mild aortic   stenosis. Mild tricuspid and pulmonic regurgitation. Systolic pulmonary artery pressure (SPAP) estimated at 56 mmHg (RAP 15   mmHg), c/w moderate pulm HTN. ASSESSMENT/PLAN:     Shock. Syncope. Septic versus cardiogenic versus hypovolemic. Aggressive hydration with IV fluids. Currently on epinephrine and Levophed drip. Was transfused 3 units of red cells. Cultures sent. Blood cultures negative so far. Urine culture positive for E. coli  Echocardiogram-normal EF  Started on IV Doxy, meropenem and vancomycin pending cultures  Now off all pressors. Acute hypoxic respiratory failure. Critical care physician following. Currently on the vent. Doing well SBT today likely extubation today.     #Intertrochanteric hip fracture; right  #Mechanical fall  -Osteoporosis contributing   - admitted to medical floor for surgical mx and pain control  - ortho surgery consulted , s.p IM nailing of right hip POD 3  - pain control to avoid drowsiness  - dvt prophylaxis  - early ambulation , start PT    #Left foot pain- likely acute gout  -No acute fracture per imaging   - uric acid wnl  - can start nsaids with colchicine       #HTN   -BP initially  elevated in the setting of pain  -Continued on  verapamil, terazosin, cozaar and coreg- adjust as needed  -Hydralazine PRN  All antihypertensive medications currently held given severe hypotension.     #ACute blood loss anemia  - expected with major fractures and surgery   - also has chronicAnemia 2/2 thalassemia minor   -Hgb baseline appears to be 9-10; patient states 9  -8.9--7.9 >6.5  A total of 3 units of red cells have been transfused. Drop in H&H again today 6/23  Obtain hematology consult  Add LDH and haptoglobin levels    Mild MARVIN secondary to hypotension.   Resolving.     #DMII   On sliding scale insulin     #Asthma   -Continue home inhalers     #HLD  -Continue statin     #Osteoporosis  -Receives Fosamax weekly- holding     #GERD   -On PPI     #Gout   -Continue allopurinol      DVT Prophylaxis: SCDs  Diet: Diabetic   Code Status: Full Code         Fazal Meza MD, 6/23/2022 1:12 PM

## 2022-06-24 LAB
ALBUMIN SERPL-MCNC: 2.6 G/DL (ref 3.4–5)
ALP BLD-CCNC: 111 U/L (ref 40–129)
ALT SERPL-CCNC: 502 U/L (ref 10–40)
ANION GAP SERPL CALCULATED.3IONS-SCNC: 9 MMOL/L (ref 3–16)
ANISOCYTOSIS: ABNORMAL
AST SERPL-CCNC: 217 U/L (ref 15–37)
BASOPHILS ABSOLUTE: 0 K/UL (ref 0–0.2)
BASOPHILS RELATIVE PERCENT: 0.2 %
BILIRUB SERPL-MCNC: 0.6 MG/DL (ref 0–1)
BILIRUBIN DIRECT: 0.3 MG/DL (ref 0–0.3)
BILIRUBIN, INDIRECT: 0.3 MG/DL (ref 0–1)
BLOOD BANK DISPENSE STATUS: NORMAL
BLOOD BANK PRODUCT CODE: NORMAL
BPU ID: NORMAL
BUN BLDV-MCNC: 22 MG/DL (ref 7–20)
CALCIUM IONIZED: 1.11 MMOL/L (ref 1.12–1.32)
CALCIUM SERPL-MCNC: 7.5 MG/DL (ref 8.3–10.6)
CHLORIDE BLD-SCNC: 112 MMOL/L (ref 99–110)
CO2: 21 MMOL/L (ref 21–32)
CREAT SERPL-MCNC: 1.3 MG/DL (ref 0.6–1.2)
CULTURE, RESPIRATORY: NORMAL
DESCRIPTION BLOOD BANK: NORMAL
EKG ATRIAL RATE: 144 BPM
EKG DIAGNOSIS: NORMAL
EKG Q-T INTERVAL: 322 MS
EKG QRS DURATION: 102 MS
EKG QTC CALCULATION (BAZETT): 479 MS
EKG R AXIS: -44 DEGREES
EKG T AXIS: 109 DEGREES
EKG VENTRICULAR RATE: 133 BPM
EOSINOPHILS ABSOLUTE: 0.2 K/UL (ref 0–0.6)
EOSINOPHILS RELATIVE PERCENT: 2.6 %
GFR AFRICAN AMERICAN: 48
GFR NON-AFRICAN AMERICAN: 40
GLUCOSE BLD-MCNC: 158 MG/DL (ref 70–99)
GLUCOSE BLD-MCNC: 164 MG/DL (ref 70–99)
GLUCOSE BLD-MCNC: 202 MG/DL (ref 70–99)
GLUCOSE BLD-MCNC: 209 MG/DL (ref 70–99)
GLUCOSE BLD-MCNC: 227 MG/DL (ref 70–99)
GRAM STAIN RESULT: NORMAL
HCT VFR BLD CALC: 22.1 % (ref 36–48)
HEMOGLOBIN: 7.3 G/DL (ref 12–16)
HYPOCHROMIA: ABNORMAL
INR BLD: 1.21 (ref 0.87–1.14)
LYMPHOCYTES ABSOLUTE: 0.6 K/UL (ref 1–5.1)
LYMPHOCYTES RELATIVE PERCENT: 10.4 %
MAGNESIUM: 1.8 MG/DL (ref 1.8–2.4)
MCH RBC QN AUTO: 21.9 PG (ref 26–34)
MCHC RBC AUTO-ENTMCNC: 33.2 G/DL (ref 31–36)
MCV RBC AUTO: 66 FL (ref 80–100)
MICROCYTES: ABNORMAL
MONOCYTES ABSOLUTE: 0.7 K/UL (ref 0–1.3)
MONOCYTES RELATIVE PERCENT: 11.2 %
NEUTROPHILS ABSOLUTE: 4.4 K/UL (ref 1.7–7.7)
NEUTROPHILS RELATIVE PERCENT: 75.6 %
OVALOCYTES: ABNORMAL
PDW BLD-RTO: 25.8 % (ref 12.4–15.4)
PERFORMED ON: ABNORMAL
PH VENOUS: 7.34 (ref 7.35–7.45)
PHOSPHORUS: 3.2 MG/DL (ref 2.5–4.9)
PLATELET # BLD: 186 K/UL (ref 135–450)
PLATELET SLIDE REVIEW: ADEQUATE
PMV BLD AUTO: 9.1 FL (ref 5–10.5)
POTASSIUM REFLEX MAGNESIUM: 3.9 MMOL/L (ref 3.5–5.1)
PROTHROMBIN TIME: 15.2 SEC (ref 11.7–14.5)
RBC # BLD: 3.35 M/UL (ref 4–5.2)
SCHISTOCYTES: ABNORMAL
SLIDE REVIEW: ABNORMAL
SODIUM BLD-SCNC: 142 MMOL/L (ref 136–145)
SOLUBLE TRANSFERRIN RECEPT: 4.9 MG/L (ref 1.9–4.4)
TARGET CELLS: ABNORMAL
TEAR DROP CELLS: ABNORMAL
TOTAL PROTEIN: 3.9 G/DL (ref 6.4–8.2)
TRIGL SERPL-MCNC: 103 MG/DL (ref 0–150)
TROPONIN: <0.01 NG/ML
VANCOMYCIN RANDOM: 11.9 UG/ML
WBC # BLD: 5.9 K/UL (ref 4–11)

## 2022-06-24 PROCEDURE — 97110 THERAPEUTIC EXERCISES: CPT

## 2022-06-24 PROCEDURE — 6370000000 HC RX 637 (ALT 250 FOR IP): Performed by: INTERNAL MEDICINE

## 2022-06-24 PROCEDURE — 99233 SBSQ HOSP IP/OBS HIGH 50: CPT | Performed by: INTERNAL MEDICINE

## 2022-06-24 PROCEDURE — 2580000003 HC RX 258: Performed by: INTERNAL MEDICINE

## 2022-06-24 PROCEDURE — 6360000002 HC RX W HCPCS: Performed by: INTERNAL MEDICINE

## 2022-06-24 PROCEDURE — 80048 BASIC METABOLIC PNL TOTAL CA: CPT

## 2022-06-24 PROCEDURE — 2500000003 HC RX 250 WO HCPCS: Performed by: INTERNAL MEDICINE

## 2022-06-24 PROCEDURE — 84484 ASSAY OF TROPONIN QUANT: CPT

## 2022-06-24 PROCEDURE — C9113 INJ PANTOPRAZOLE SODIUM, VIA: HCPCS | Performed by: INTERNAL MEDICINE

## 2022-06-24 PROCEDURE — 85610 PROTHROMBIN TIME: CPT

## 2022-06-24 PROCEDURE — 93010 ELECTROCARDIOGRAM REPORT: CPT | Performed by: INTERNAL MEDICINE

## 2022-06-24 PROCEDURE — 6370000000 HC RX 637 (ALT 250 FOR IP): Performed by: STUDENT IN AN ORGANIZED HEALTH CARE EDUCATION/TRAINING PROGRAM

## 2022-06-24 PROCEDURE — 2000000000 HC ICU R&B

## 2022-06-24 PROCEDURE — 97530 THERAPEUTIC ACTIVITIES: CPT

## 2022-06-24 PROCEDURE — 85025 COMPLETE CBC W/AUTO DIFF WBC: CPT

## 2022-06-24 PROCEDURE — 84100 ASSAY OF PHOSPHORUS: CPT

## 2022-06-24 PROCEDURE — 80076 HEPATIC FUNCTION PANEL: CPT

## 2022-06-24 PROCEDURE — 80202 ASSAY OF VANCOMYCIN: CPT

## 2022-06-24 PROCEDURE — 2580000003 HC RX 258: Performed by: STUDENT IN AN ORGANIZED HEALTH CARE EDUCATION/TRAINING PROGRAM

## 2022-06-24 PROCEDURE — 84478 ASSAY OF TRIGLYCERIDES: CPT

## 2022-06-24 PROCEDURE — 83735 ASSAY OF MAGNESIUM: CPT

## 2022-06-24 PROCEDURE — 82330 ASSAY OF CALCIUM: CPT

## 2022-06-24 RX ORDER — CARVEDILOL 6.25 MG/1
6.25 TABLET ORAL 2 TIMES DAILY WITH MEALS
Status: DISCONTINUED | OUTPATIENT
Start: 2022-06-24 | End: 2022-06-25

## 2022-06-24 RX ORDER — CALCIUM GLUCONATE 20 MG/ML
1000 INJECTION, SOLUTION INTRAVENOUS ONCE
Status: COMPLETED | OUTPATIENT
Start: 2022-06-24 | End: 2022-06-24

## 2022-06-24 RX ORDER — MAGNESIUM SULFATE IN WATER 40 MG/ML
2000 INJECTION, SOLUTION INTRAVENOUS ONCE
Status: COMPLETED | OUTPATIENT
Start: 2022-06-24 | End: 2022-06-24

## 2022-06-24 RX ORDER — METOPROLOL TARTRATE 5 MG/5ML
2.5 INJECTION INTRAVENOUS EVERY 6 HOURS
Status: DISCONTINUED | OUTPATIENT
Start: 2022-06-24 | End: 2022-06-24

## 2022-06-24 RX ADMIN — INSULIN LISPRO 2 UNITS: 100 INJECTION, SOLUTION INTRAVENOUS; SUBCUTANEOUS at 07:57

## 2022-06-24 RX ADMIN — PAROXETINE HYDROCHLORIDE 20 MG: 20 TABLET, FILM COATED ORAL at 08:44

## 2022-06-24 RX ADMIN — SODIUM CHLORIDE 5 ML/HR: 9 INJECTION, SOLUTION INTRAVENOUS at 07:43

## 2022-06-24 RX ADMIN — CARVEDILOL 6.25 MG: 6.25 TABLET, FILM COATED ORAL at 10:07

## 2022-06-24 RX ADMIN — CETIRIZINE HYDROCHLORIDE 10 MG: 10 TABLET ORAL at 08:45

## 2022-06-24 RX ADMIN — MUPIROCIN: 20 OINTMENT TOPICAL at 20:18

## 2022-06-24 RX ADMIN — INSULIN LISPRO 4 UNITS: 100 INJECTION, SOLUTION INTRAVENOUS; SUBCUTANEOUS at 11:49

## 2022-06-24 RX ADMIN — POLYETHYLENE GLYCOL (3350) 17 G: 17 POWDER, FOR SOLUTION ORAL at 20:17

## 2022-06-24 RX ADMIN — VANCOMYCIN HYDROCHLORIDE 1250 MG: 1 INJECTION, POWDER, LYOPHILIZED, FOR SOLUTION INTRAVENOUS at 06:55

## 2022-06-24 RX ADMIN — MAGNESIUM SULFATE HEPTAHYDRATE 1000 MG: 1 INJECTION, SOLUTION INTRAVENOUS at 00:21

## 2022-06-24 RX ADMIN — SODIUM CHLORIDE, PRESERVATIVE FREE 10 ML: 5 INJECTION INTRAVENOUS at 20:23

## 2022-06-24 RX ADMIN — CALCIUM GLUCONATE 1000 MG: 20 INJECTION, SOLUTION INTRAVENOUS at 07:48

## 2022-06-24 RX ADMIN — ATORVASTATIN CALCIUM 10 MG: 10 TABLET, FILM COATED ORAL at 20:18

## 2022-06-24 RX ADMIN — MEROPENEM 1000 MG: 1 INJECTION, POWDER, FOR SOLUTION INTRAVENOUS at 08:52

## 2022-06-24 RX ADMIN — MUPIROCIN: 20 OINTMENT TOPICAL at 08:45

## 2022-06-24 RX ADMIN — CARVEDILOL 6.25 MG: 6.25 TABLET, FILM COATED ORAL at 16:37

## 2022-06-24 RX ADMIN — SODIUM CHLORIDE, PRESERVATIVE FREE 10 ML: 5 INJECTION INTRAVENOUS at 08:46

## 2022-06-24 RX ADMIN — POLYETHYLENE GLYCOL (3350) 17 G: 17 POWDER, FOR SOLUTION ORAL at 08:45

## 2022-06-24 RX ADMIN — MAGNESIUM SULFATE HEPTAHYDRATE 2000 MG: 40 INJECTION, SOLUTION INTRAVENOUS at 07:45

## 2022-06-24 RX ADMIN — PANTOPRAZOLE SODIUM 40 MG: 40 INJECTION, POWDER, LYOPHILIZED, FOR SOLUTION INTRAVENOUS at 08:45

## 2022-06-24 RX ADMIN — IRON SUCROSE: 20 INJECTION, SOLUTION INTRAVENOUS at 10:01

## 2022-06-24 RX ADMIN — DOXYCYCLINE 100 MG: 100 INJECTION, POWDER, LYOPHILIZED, FOR SOLUTION INTRAVENOUS at 07:53

## 2022-06-24 RX ADMIN — METOPROLOL TARTRATE 2.5 MG: 5 INJECTION INTRAVENOUS at 07:54

## 2022-06-24 RX ADMIN — INSULIN LISPRO 2 UNITS: 100 INJECTION, SOLUTION INTRAVENOUS; SUBCUTANEOUS at 20:30

## 2022-06-24 RX ADMIN — MORPHINE SULFATE 2 MG: 2 INJECTION, SOLUTION INTRAMUSCULAR; INTRAVENOUS at 17:51

## 2022-06-24 RX ADMIN — DOXYCYCLINE 100 MG: 100 INJECTION, POWDER, LYOPHILIZED, FOR SOLUTION INTRAVENOUS at 18:26

## 2022-06-24 RX ADMIN — MEROPENEM 1000 MG: 1 INJECTION, POWDER, FOR SOLUTION INTRAVENOUS at 20:26

## 2022-06-24 RX ADMIN — INSULIN LISPRO 4 UNITS: 100 INJECTION, SOLUTION INTRAVENOUS; SUBCUTANEOUS at 16:37

## 2022-06-24 ASSESSMENT — PAIN DESCRIPTION - LOCATION: LOCATION: HIP

## 2022-06-24 ASSESSMENT — PAIN DESCRIPTION - ORIENTATION: ORIENTATION: RIGHT

## 2022-06-24 ASSESSMENT — PAIN SCALES - GENERAL: PAINLEVEL_OUTOF10: 5

## 2022-06-24 ASSESSMENT — PAIN DESCRIPTION - DESCRIPTORS: DESCRIPTORS: ACHING

## 2022-06-24 NOTE — PROGRESS NOTES
Assessment completed, patient awake and alert, VSS, sinus on monitor rate in the 80s, no complaints per patient, awaiting care rounds.

## 2022-06-24 NOTE — PROGRESS NOTES
states she thought maybe her flat feet contributed to this pain. She is not on anticoagulation. She has asthma but is not in exacerbation. 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 She states she believes her baseline hgb is 9. She was 8.9 on arrival and has dropped to 7.9 with IVF. She has no signs of acute bleeding. She denies history of blood clots in her leg or lung, MI, CVA, known CAD or aneurysm. She has never smoked but she states she has been around people who smoke all her life. She has had procedures in the past that have required general anesthesia and sustained no complications or sequelae.      Her BP and HR have been elevated in the setting of pain. She appears more comfortable now. Hgb again is low in the setting of chronic anemia and dilution from IVF. Type and screen was ordered in case patient requires transfusion. Patient is agreeable for transfusion. Incidentally, patient's UA appears consistent with UTI but she denies sxs. She is admitted for further care. \"     Per Dr. Martines Sides note 22:  \"Ms. Thakur was poorly responsive yesterday and was receiving IV fluid boluses for hypotension. She is planned to have additional 1 unit of red cells. She had another event of poor responsiveness and possible syncope. Was associated with hypotension. She was given 1 mg epinephrine for hypotension and transferred to the ICU  Continue to be hypotensive in the ICU. Given another dose of epinephrine and started epinephrine drip. Also placed on Levophed drip. She had to be intubated and started on mechanical ventilation as her saturation worsened. \"  Extubated . Home Health S4 Level Recommendation:  NA  AM-PAC Mobility Score       AM-PAC Inpatient Mobility without Stair Climbing Raw Score : 7     Pain   Yes   Location: R hip  Ratin/10 at rest before activity; 3/10 at EOB. Pain Medicine Status: Received pain med prior to tx    Cognition    A&O orientation not directly assessed.  WFL Able to follow 2 step commands   Eager to move legs. Subjective  Patient lying supine in bed with family at bedside. Pt agreeable to this PT tx. Balance   Sitting:  Fair +; CGA  Comments: At EOB ~8 minutes    Standing: Not tested; Not Tested  Comments: Unable to stand from EOB with walker. Will trial with STEDY NV. Bed Mobility   Supine to Sit:    Max A  and 2 persons  Sit to Supine:   Max A  and 2 persons  Rolling: Max A  and 2 persons  Scooting in sitting: Not Tested  Scooting in supine:  Not Tested    Transfer Training    Sit to stand:   Unsuccessful attempts x 2 from EOB with rolling walker despite Max A x 2. Pt unable to elevate bottom, limited by hip pain and poor leverage coming off of tall ICU bed. Trial with STEQAMAR NV. Stand to sit:   Not Tested  Bed to Chair:   Not Tested with use of N/A    Gait gait deferred due to difficulty with transfers; pt ambulated 0 ft. Stair Training deferred, pt unsafe/ not appropriate to complete stairs at this time    Activity Tolerance   Pt completed therapy session with No adverse symptoms noted w/activity. Sitting EOB:   BP: 119/58  HR: 78  Spo2: 98%    Positioning Needs   Pt in bed, in ICU monitoring, positioned in proper neutral alignment and pressure relief provided. Call light provided and all needs within reach    Exercises Initiated  all completed bilaterally unless indicated and completed in supine position  Ankle Pumps x 10 reps  Glut sets x 10 reps  Quad sets x 10 reps   Encouraged regular UE/LE ROM as tolerated; pt acknowledged understanding. Other  Pt had loose BM at start of session. Much time spent on cleanup and sheet change. Patient/Family Education   Pt educated on role of inpatient PT, POC, importance of continued activity, DC recommendations, procedure completed and the restrictions associated with that. Assessment  Pt seen 1 day after extubation, POD#4 following R hip IM nail.  Able to participate in a number of rolls to

## 2022-06-24 NOTE — PROGRESS NOTES
Pt having episodes of non-sustained a fib w/ RVR. Pt admitted to ICU for resp fail. These last a few minutes and then returns to NSR w/ mild sinus tach in high 80's to low 90's. She is ASX. VSS other than HR. Resp status stable. She does not appear to have a Hx of a fib. Echo yd showed Gr 1 dCHF w/ preserved EF of 60%. She is tolerating diet so IVF was DC. Chk Mg, phos, CBC, CMP, ionized Ca2+, BNP, trops x2. Results pasted below. Cards signed off earlier today. Asked RN to contact them in am and have them come back by to review tele/case. Addendum [id number] on 6/23. New findings:   - New onset transient a fib w/ RVR. Started [facility] metop IV 2.5 mg IV q6h. PRN metop 2.5 mg IV q6h PRN for HR > 130 sustained for > 15 min. Initial trop neg. RN to print strips for Cards review. - LFT's newly elevated - see below. Repeat in am.  Added daily LFT's for now. May need GI c/s.   - Hypo Mg, 1.4. May be contributing to her transient arrhythmia. Replacement protocol added, should get 2 g MgSO4 by protocol. Daily Mg levels for now. - Hypo Phos, mild at 2.4. Ordered 15 mmol Na3PO4. Repeat in am.  - Hypo Ca2+, serum measured 7.3, corrected for albumin 2.4 = 8.4. However, ionized Ca2+ low at 1.04.  1g CaGluc ordered. Repeat ionized in am.  - Hold tomorrow am labs until at least 30 min after all replacement completed. Discussed above findings w/ RN. Will update Dr. Pillo Juares in am.     Addendum: 0630 on 6/24. - Ionized Ca2+ still slightly low at 1.11. Ordered 1 additional graqm of CGluc.    - Mg 1.8. Would like at >= to 2.0. Ordered 2 additional g MgSO4.  - LFT's slightly improved.  - Dr. Pillo Juares updated of ON events via phone.       CBC with Auto Differential [[id number]] (Abnormal)    Collected: 06/23/22 2105    Updated: 06/23/22 2154     WBC 5.8 K/uL    RBC 3.28 Low  M/uL    Hemoglobin 7.3 Low stable g/dL    Comment: consistent with previous results       Hematocrit 22.0 Low  %    MCV 67.0 Low  fL MCH 22.4 Low  pg    MCHC 33.5 g/dL    RDW 25.4 High  %   Troponin [[id number]]    Collected: 06/23/22 2105    Updated: 06/23/22 2146     Troponin <0.01 ng/mL    Comment: Methodology by Troponin T      Brain Natriuretic Peptide [[id number]] (Abnormal)    Collected: 06/23/22 2105    Updated: 06/23/22 2146     Pro-BNP 1,173 High  pg/mL    Comment: Methodology by NT-proBNP     An age-independent cutoff point of 300 pg/ml has a 98%   negative predictive value excluding acute heart failure. Values exceeding the age-related cutoff values (450 pg/mL if   age<50, 900 if 50-75 and 1800 if >75) has 90% sensitivity and   84% specificity for diagnosing acute HF. In patients with   renal compromise (eGFR<60) values greater than 1200pg/ml have   a diagnostic sensitivity and specificity of 89% and 72% for   acute HF. Comprehensive Metabolic Panel [[id number]] (Abnormal)    Collected: 06/23/22 2105    Updated: 06/23/22 2146     Sodium 141 mmol/L    Potassium 4.0 mmol/L    Chloride 111 High  mmol/L    CO2 21 mmol/L    Anion Gap 9    Glucose 221 High  mg/dL    BUN 24 High  mg/dL    CREATININE 1.4 High  mg/dL    GFR Non- 36 Abnormal     Comment: >60 mL/min/1.73m2 EGFR, calc. for ages 25 and older using the   MDRD formula (not corrected for weight), is valid for stable   renal function. GFR  44 Abnormal     Comment: Chronic Kidney Disease: less than 60 ml/min/1.73 sq. m.         Kidney Failure: less than 15 ml/min/1.73 sq.m. Results valid for patients 18 years and older.         Calcium 7.3 Low  mg/dL    Total Protein 4.0 Low  g/dL    Albumin 2.6 Low  g/dL    Albumin/Globulin Ratio 1.9    Total Bilirubin 0.7 mg/dL    Alkaline Phosphatase 119 U/L     High  U/L     High  U/L   Magnesium [[id number]] (Abnormal)    Collected: 06/23/22 2105    Updated: 06/23/22 2146     Magnesium 1.40 Low  mg/dL   Phosphorus [[id number]] (Abnormal)    Collected: 06/23/22 2105    Updated: 06/23/22 2146     Phosphorus 2.4 Low  mg/dL   Lactic Acid [[id number]]    Collected: 06/23/22 2105    Updated: 06/23/22 2124     Lactic Acid 0.9 mmol/L   TSH with Reflex [[id number]]    Collected: 06/23/22 2105    Updated: 06/23/22 2121    Calcium, Ionized [[id number]] (Abnormal)    Collected: 06/23/22 2105    Updated: 06/23/22 2116     Calcium, Ion 1.04 Low  mmol/L    Comment: Decreased pH due to localized lactic acid production may cause   increased ionized calcium.  Increased pH due to loss of CO2   from the sample may cause decreased ionized calcium. pH, Frederick 7.360   POCT Glucose [[id number]] (Abnormal)      Hepatic Function Panel [[id number]] (Abnormal)    Collected: 06/24/22 0445    Updated: 06/24/22 0545    Specimen Source: Blood     Total Protein 3.9 Low  g/dL    Albumin 2.6 Low  g/dL    Alkaline Phosphatase 111 U/L     High  U/L     High  U/L    Total Bilirubin 0.6 mg/dL    Bilirubin, Direct 0.3 mg/dL    Bilirubin, Indirect 0.3 mg/dL   Magnesium Lab [[id number]]    Collected: 06/24/22 0445    Updated: 06/24/22 0545    Specimen Source: Blood     Magnesium 1.80 mg/dL   Phosphorus [[id number]]    Collected: 06/24/22 0445    Updated: 06/24/22 0545    Specimen Source: Blood     Phosphorus 3.2 mg/dL   Basic Metabolic Panel w/ Reflex to MG [[id number]] (Abnormal)    Collected: 06/24/22 0445    Updated: 06/24/22 0545    Specimen Source: Blood     Sodium 142 mmol/L    Potassium reflex Magnesium 3.9 mmol/L    Chloride 112 High  mmol/L    CO2 21 mmol/L    Anion Gap 9    Glucose 164 High  mg/dL    BUN 22 High  mg/dL    CREATININE 1.3 High  mg/dL    GFR Non- 40 Abnormal     Comment: >60 mL/min/1.73m2 EGFR, calc. for ages 25 and older using the   MDRD formula (not corrected for weight), is valid for stable   renal function. GFR  48 Abnormal     Comment: Chronic Kidney Disease: less than 60 ml/min/1.73 sq. m.         Kidney Failure: less than 15 ml/min/1.73 sq.m. Results valid for patients 18 years and older. Calcium 7.5 Low  mg/dL   CBC with Auto Differential [[id number]] (Abnormal)    Collected: 06/24/22 0445    Updated: 06/24/22 0542    Specimen Source: Blood     WBC 5.9 K/uL    RBC 3.35 Low  M/uL    Hemoglobin 7.3 Low  g/dL    Hematocrit 22.1 Low  %    MCV 66.0 Low  fL    MCH 21.9 Low  pg    MCHC 33.2 g/dL    RDW 25.8 High  %    Platelets 099 K/uL    MPV 9.1 fL    PLATELET SLIDE REVIEW Adequate    SLIDE REVIEW see below    Comment: Slide review agrees with reported results       Neutrophils % 75.6 %    Lymphocytes % 10.4 %    Monocytes % 11.2 %    Eosinophils % 2.6 %    Basophils % 0.2 %    Neutrophils Absolute 4.4 K/uL    Lymphocytes Absolute 0.6 Low  K/uL    Monocytes Absolute 0.7 K/uL    Eosinophils Absolute 0.2 K/uL    Basophils Absolute 0.0 K/uL    Anisocytosis 2+ Abnormal     Microcytes 2+ Abnormal     Hypochromia 2+ Abnormal     Schistocytes 3+ Abnormal     Ovalocytes 3+ Abnormal     Target Cells Occasional Abnormal     Tear Drop Cells Occasional Abnormal      Calcium, Ionized [[id number]] (Abnormal)    Collected: 06/24/22 0445    Updated: 06/24/22 0509    Specimen Source: Blood     Calcium, Ion 1.11 Low  mmol/L    Comment: Decreased pH due to localized lactic acid production may cause   increased ionized calcium.  Increased pH due to loss of CO2   from the sample may cause decreased ionized calcium. pH, Frederick 7.336 Low            EKG 12 Lead [[id number]]    Collected: 06/23/22 1842    Updated: 06/23/22 1848     Ventricular Rate 133 BPM    Atrial Rate 144 BPM    QRS Duration 102 ms    Q-T Interval 322 ms    QTc Calculation (Bazett) 479 ms    R Axis -44 degrees    T Axis 109 degrees    Diagnosis Atrial fibrillation with rapid ventricular responseLeft axis deviationST & T wave abnormality, consider lateral ischemia or digitalis effectAbnormal ECGWhen compared with ECG of 21-JUN-2022 18:33,Atrial fibrillation has replaced Sinus rhythmVent.  rate has increased BY  85 BPMST now depressed in Lateral leadsT wave inversion now evident in Lateral leads     Transthoracic Echocardiography Report (TTE)      Demographics      Patient Name       Skye Miller      Date of Study      06/22/2022         Gender              Female      Patient Number     [id number]         Date of Birth       1945      Visit Number       [id number]          Age                 67 year(s)      Accession Number   [id number]         Room Number         1027      Corporate ID       [id number]            [address] [facility]      Ordering Physician Denise Rojo MD                 Physician           MD, [facility]     Procedure     Type of Study      TTE procedure:ECHOCARDIOGRAM COMPLETE 2D W DOPPLER W COLOR. Procedure Date  Date: 06/22/2022 Start: 08:24 AM     Study Location: [address] [place] [address] - Echo Lab  Technical Quality: Adequate visualization     Additional Indications:Complete heart block bradycardia, hypotension.     Patient Status: Routine     Height: 63 inches Weight: 229.01 pounds BSA: 2.05 m2 BMI: 40.57 kg/m2     BP: 130/52 mmHg      Conclusions      Summary   LV systolic function is normal with EF estimated 60%. No regional wall motion abnormalities are noted. There is mild concentric left ventricular hypertrophy. Grade I diastolic dysfunction with normal filling pressure. Mild biatrial enlargement. Mild posterior mitral annular calcification is present. The maximal transaortic velocity is 2.22m/s which gives peak pressure   gradient= 21mmHg and mean pressure gradient= 12mmHg which is c/w mild aortic   stenosis. Mild tricuspid and pulmonic regurgitation. Systolic pulmonary artery pressure (SPAP) estimated at 56 mmHg (RAP 15   mmHg), c/w moderate pulm HTN.       Signature      ------------------------------------------------------------------   Electronically signed by Nay Robles Selin Bailey MD, [facility]   (Interpreting physician) on 06/22/2022 at 12:05 PM   ------------------------------------------------------------------     Total critical care time caring for this patient this shift with life threatening, unstable organ failure, including direct patient contact, management of life support systems, review of data including imaging and labs, discussions with other team members and physicians is 32 minutes so far today, excluding procedures.

## 2022-06-24 NOTE — PROGRESS NOTES
Department of Orthopedic Surgery  Physician Assistant   Progress Note    Subjective:       Systemic or Specific Complaints: Feeling well today, increasingly better since yesterday. Is ready to work with therapy, motivated to mobilize out of bed. No family at bedside    Objective:     Patient Vitals for the past 24 hrs:   BP Temp Temp src Pulse Resp SpO2   06/24/22 0800 (!) 137/59 -- -- 86 23 94 %   06/24/22 0700 (!) 145/58 99.7 °F (37.6 °C) Bladder 85 23 99 %   06/24/22 0400 (!) 150/62 100 °F (37.8 °C) Bladder 92 24 98 %   06/24/22 0300 (!) 146/56 -- -- 93 26 97 %   06/24/22 0200 (!) 147/60 -- -- 92 27 94 %   06/24/22 0100 (!) 142/58 -- -- 92 28 97 %   06/24/22 0000 (!) 147/57 100.2 °F (37.9 °C) Bladder 92 24 97 %   06/23/22 2300 (!) 158/63 -- -- 95 24 98 %   06/23/22 2200 126/72 -- -- 96 24 99 %   06/23/22 2100 (!) 146/61 -- -- (!) 104 26 95 %   06/23/22 2000 (!) 137/52 (!) 100.6 °F (38.1 °C) Bladder (!) 112 20 98 %   06/23/22 1900 (!) 141/59 -- -- (!) 105 29 96 %   06/23/22 1600 (!) 128/91 99.4 °F (37.4 °C) Bladder 90 22 99 %   06/23/22 1500 (!) 157/60 -- -- 95 15 99 %   06/23/22 1400 (!) 155/60 -- -- 92 22 99 %   06/23/22 1300 (!) 144/61 -- -- 92 24 99 %   06/23/22 1200 (!) 147/62 -- -- 95 19 100 %   06/23/22 1130 -- -- -- 98 18 98 %   06/23/22 1100 (!) 133/50 99.3 °F (37.4 °C) Bladder 99 21 90 %   06/23/22 1000 (!) 147/61 -- -- 90 20 96 %   06/23/22 0900 (!) 145/64 -- -- 91 19 97 %       General: alert, appears stated age, cooperative, no distress and pale   Wound: No Erythema, Positive for Edema and minimal wound drainage   Motion: Painful range of Motion in affected extremity   DVT Exam: No evidence of DVT seen on physical exam.  No cords or calf tenderness. No significant calf/ankle edema. Additional exam: Patient seen sitting up in bed at time of interview.   High flow nasal cannula in place following recent extubation  Thigh moderately swollen, compartments soft and compressible, no tension noted.  No obvious palpable hematoma  Some dried bloody drainage to the Mepilex dressings  EHL, FHL, gastroc, anterior tib motor intact  Sensation intact entirety of right lower extremity  Dorsalis pedis pulse not appreciated, even with Doppler. Anterior tibialis pulse 1+. Foot is warm and well-perfused with brisk capillary refill to the toe nailbeds      Data Review  CBC:   Lab Results   Component Value Date    WBC 5.9 06/24/2022    RBC 3.35 06/24/2022    HGB 7.3 06/24/2022    HCT 22.1 06/24/2022     06/24/2022       Renal:   Lab Results   Component Value Date     06/24/2022    K 3.9 06/24/2022     06/24/2022    CO2 21 06/24/2022    BUN 22 06/24/2022    CREATININE 1.3 06/24/2022    GLUCOSE 164 06/24/2022    CALCIUM 7.5 06/24/2022            Assessment:     S/p right hip intramedullary nailing, postop day 4. Date of surgery 6/20/2022 with Dr. Eliz Yepez    Anemia, status post 3 units PRBCs transfusion. Hemoglobin 7.3 this a.m., stable. IM/ICU following    Plan:      1: Continue with IM/ICU recs. Okay to resume PT/OT. Weightbearing as tolerated to the right lower extremity with assistive device  2:  Continue Deep venous thrombosis prophylaxis-SCD sleeves currently while hemoglobin stabilizes, followed by IM/ICU  3:  Continue Pain Control- oxycodone held due to resp depression. 4: PT/OT eval recommending SNF, case management following  5: Discharge pending medical stability.      Sondra Neighbours, [id number]

## 2022-06-24 NOTE — PROGRESS NOTES
60 DAY ITP READY; COPD GOLD 4; ODILIA; Melita was last seen for LA .  She has only been to one session.  She called in the following week with no explanation.  Then requested an additional two weeks off for a .  This week she was suppose to return and no showed.  She is in danger of being discharged for non-compliance.  Write note accordingly. Vancomycin Day # 3  Current dose = Pulse dosing per daily vancomycin levels. BUN/SRCR 22/1.3     Est CrCl = 42 ml/min  WBC   5.9            Tmax 100.6  Vancomycin random level this am = 11.9 mcg/ml  Will give vancomycin 1250 mg x1 dose today and recheck random level tomorrow am.  Pharmacy will continue to obtain daily random vancomycin levels and redose as appropriate.

## 2022-06-24 NOTE — PROGRESS NOTES
Pulmonary & Critical Care Medicine ICU Progress Note    CC: ORIF R hip, syncope post op D#2 with severe hypoxemia and shock     Events of Last 24 hours:   Short burst of atrial fibrillation  Low-grade fevers    Invasive Lines:   Left femoral CVC 2022    MV: 2022  Vent Mode: AC/VC Resp Rate (Set): 22 bmp/Vt (Set, mL): 470 mL/ /FiO2 : 50 %  Recent Labs     22  0350 22  0615   PHART 7.297* 7.377   [id number] 42.9 30.3*   PO2ART 77.3 80.6       IV:   dextrose      sodium chloride         Vitals:  Blood pressure (!) 145/58, pulse 85, temperature 99.7 °F (37.6 °C), temperature source Bladder, resp. rate 23, height 5' 3\" (1.6 m), weight 229 lb 4.5 oz (104 kg), SpO2 99 %, not currently breastfeeding. on vent  Temp  Av.9 °F (37.7 °C)  Min: 99.3 °F (37.4 °C)  Max: 100.6 °F (38.1 °C)    Intake/Output Summary (Last 24 hours) at 2022 7770  Last data filed at 2022 1818  Gross per 24 hour   Intake 1754.91 ml   Output 1475 ml   Net 279.91 ml     EXAM:  General: NAD  Eyes: PERRL. No sclera icterus. No conjunctival injection. ENT: No discharge. Pharynx clear. Neck: Trachea midline. Normal thyroid. Resp: No accessory muscle use. No crackles. No wheezing. No rhonchi. No dullness on percussion. CV: Regular rate. Regular rhythm. No mumur or rub. + edema. GI: Non-tender. Non-distended. No masses. No organomegaly. Normal bowel sounds. No hernia. Skin: Warm and dry. No nodule on exposed extremities. No rash on exposed extremities. Lymph: No cervical LAD. No supraclavicular LAD. M/S: No cyanosis. No joint deformity. No clubbing. Neuro: A&OX3. Patellar reflexes are symmetric. Psych: No agitation, no anxiety, affect is full.     Scheduled Meds:   vancomycin  1,250 mg IntraVENous Once    metoprolol  2.5 mg IntraVENous Q6H    magnesium sulfate  2,000 mg IntraVENous Once    calcium gluconate  1,000 mg IntraVENous Once    doxycycline (VIBRAMYCIN) IV  100 mg IntraVENous Q12H    insulin lispro 0-12 Units SubCUTAneous TID WC    insulin lispro  0-6 Units SubCUTAneous Nightly    IVPB builder   IntraVENous Q24H    polyethylene glycol  17 g Oral BID    mupirocin   Nasal BID    vancomycin (VANCOCIN) intermittent dosing (placeholder)   Other RX Placeholder    meropenem  1,000 mg IntraVENous Q12H    atropine  1 mg IntraVENous Once    pantoprazole  40 mg IntraVENous Daily    [Held by provider] allopurinol  200 mg Oral Daily    atorvastatin  10 mg Oral Nightly    cetirizine  10 mg Oral Daily    [Held by provider] gabapentin  300 mg Oral BID    [Held by provider] losartan  100 mg Oral Daily    PARoxetine  20 mg Oral Daily    sodium chloride flush  5-40 mL IntraVENous 2 times per day     PRN Meds:  morphine, metoprolol, magnesium sulfate, perflutren lipid microspheres, glucose, dextrose bolus **OR** dextrose bolus, glucagon (rDNA), dextrose, [Held by provider] oxyCODONE **OR** [Held by provider] oxyCODONE, sodium chloride flush, sodium chloride, ondansetron **OR** ondansetron, albuterol sulfate HFA    Results:  CBC:   Recent Labs     06/23/22  0530 06/23/22  1100 06/23/22  1202 06/23/22 2105 06/24/22  0445   WBC 6.0  --   --  5.8 5.9   HGB 7.2*  --  7.2* 7.3* 7.3*   HCT 21.9*   < > 22.2* 22.0* 22.1*   MCV 66.6*  --   --  67.0* 66.0*     --   --  195 186    < > = values in this interval not displayed.      BMP:   Recent Labs     06/23/22  0530 06/23/22  2105 06/24/22  0445    141 142   K 4.1 4.0 3.9   * 111* 112*   CO2 21 21 21   PHOS  --  2.4* 3.2   BUN 25* 24* 22*   CREATININE 1.5* 1.4* 1.3*     LIVER PROFILE:   Recent Labs     06/23/22 2105 06/24/22  0445   * 217*   * 502*   BILIDIR  --  0.3   BILITOT 0.7 0.6   ALKPHOS 119 111     Cultures:      6/19/22 SARS-CoV-2 NAAT negative  6/20/22 Urine E coli 100K pansensitive  6/22/2022 tracheal aspirate pending    Chest imaging was reviewed by me and showed:   6/22/22 CXR ETT okay     Echo 6/22/2022 EF 60%, mild LVH, grade 1 diastolic dysfunction, SPAP 56, mild AS    ASSESSMENT:  · Shock: overal unclear etiology of her rapid severe progressive respiratory and cardiovascular failure  · Acute hypoxemic respiratory failure   · Transient AFIB RVR  · Acute kidney failure/ATN - improving  · Syncope  · Acute blood loss anemia   · Acute coagulopathy   · Acute transaminitis 2/2 shock, improving  · Electrolyte disorder, relatively severe hypomagnesemia  · Post op RIGHT IM hip nailing on 6/20/22  · Thalassemia minor  · DM with hyperglycemia  · Asthma  · Gout    PLAN:  Supplemental oxygen to maintain SaO2 >92%; wean as tolerated    Follow serial hemoglobin    Merrem/Vanc D#4, doxycycline day #2  Monitoring of vancomycin levels to prevent toxicity.    SSI - medium   DC IV Lopressor, resume home Coreg   If atrial fibrillation recurs will need to reconsult cardiology  Prophylaxis: DVT - SCD; MRSA - mupirocin; GI - on protonix IV

## 2022-06-24 NOTE — CARE COORDINATION
INTERDISCIPLINARY PLAN OF CARE CONFERENCE    Date/Time: 6/24/2022 9:40 AM  Completed by: SUMIT Degroot  Case Management    Patient Name:  Esthela Low  YOB: 1945  Admitting Diagnosis: Hip fracture requiring operative repair, left, closed, initial encounter [facility]) Sue Proctor     Admit Date/Time:  6/19/2022 12:49 PM    Chart reviewed. Interdisciplinary team contacted or reviewed plan related to patient progress and discharge plans. Disciplines included Case Management, Nursing, and Dietitian. Current Status:ongoing   PT/OT recommendation for discharge plan of care: SNF    Expected D/C Disposition:  [facility] ([place]) skilled nursing facility   Confirmed plan with patient and/or family Yes confirmed with: (name) pt  Met with:pt    Discharge Plan Comments: Chart review completed. Completed Interdisciplinary rounds with ICU staff. Dr. Layne Ford stated unable to state if pt will need IV ABX on discharge but likely pt won't need it. Met with pt at bedside. Pt confirmed she wants to go to [facility] on discharge and aware can accept pt. She stated first opening on transportation. She denied needs for CM. CM will continue to follow and assist. Please notify CM if needs or concerns arise.     Home O2 in place on admit: No

## 2022-06-24 NOTE — PROGRESS NOTES
Comprehensive Nutrition Assessment    Type and Reason for Visit:  Reassess    Nutrition Recommendations/Plan:   1. Continue ADULT DIET; Regular diet order. 2. Added Ensure high-protein with breakfast meal.   3. Monitor appetite, meal intake, and acceptance/intake of ONS. 4. Please obtain an updated weight for this patient - last weight was obtained on 6/22/22. 5. Monitor nutrition-related labs, bowel function (no documented BM for this admission), and weight trends. Malnutrition Assessment:  Malnutrition Status: At risk for malnutrition (06/24/22 1243)    Context:  Acute Illness     Findings of the 6 clinical characteristics of malnutrition:  Energy Intake:  Mild decrease in energy intake  Weight Loss:  Unable to assess     Body Fat Loss:  No significant body fat loss     Muscle Mass Loss:  No significant muscle mass loss    Fluid Accumulation:  No significant fluid accumulation     Strength:  Not Performed    Nutrition Assessment:    patient is improving from a nutritional standpoint AEB patient was extubated on 6/23/22 and diet was advanced + patient is consuming her meals, however, she remains at risk for further compromise d/t altered nutrition-related labs, hip fracture + surgery, and no documented BM for this admission thus far; will continue ADULT DIET; Regular diet order and add Ensure high-protein with breakfast    Nutrition Related Findings:    patient was extubated on 6/23/22; she is A & O x 4; she consumed % of her dinner on 6/23/22; no documented BM for this admission thus far; liver enzymes are elevated; patient is on med-dose SSI for blood glucose control Wound Type: Surgical Incision (surgical incision on R hip from surgery on 6/20/22)       Current Nutrition Intake & Therapies:    Average Meal Intake: %  Average Supplements Intake: None Ordered  ADULT DIET;  Regular  ADULT ORAL NUTRITION SUPPLEMENT; Breakfast; Low Calorie/High Protein Oral Supplement    Anthropometric Measures:  Height: 5' 3\" (160 cm)  Ideal Body Weight (IBW): 115 lbs (52 kg)    Admission Body Weight: 223 lb 8 oz (101.4 kg) (obtained on 6/19/22; actual weight)  Current Body Weight: 229 lb 4.5 oz (104 kg) (obtained on 6/22/22; actual weight), 199.4 % IBW.  Weight Source: Bed Scale  Current BMI (kg/m2): 40.6  Usual Body Weight: 223 lb 8 oz (101.4 kg) (obtained on 6/19/22; actual weight)  % Weight Change (Calculated): 2.6  Weight Adjustment For: No Adjustment                 BMI Categories: Obese Class 3 (BMI 40.0 or greater) (40.61)    Estimated Daily Nutrient Needs:  Energy Requirements Based On: Kcal/kg  Weight Used for Energy Requirements: Current  Energy (kcal/day): 832 - 1144 kcals based on 8-11 kcals/kg/CBW  Weight Used for Protein Requirements: Ideal  Protein (g/day): 130 - 140 g protein based on 2.5-2.7 g/kg/IBW  Method Used for Fluid Requirements: 1 ml/kcal  Fluid (ml/day): 832-1144 ml    Nutrition Diagnosis:   · Altered nutrition-related lab values related to endocrine dysfuntion,impaired respiratory function,acute injury/trauma as evidenced by wounds,lab values,other (comment),constipation (hip fracture + surgery; elevated liver enzymes)      Nutrition Interventions:   Food and/or Nutrient Delivery: Continue Current Diet,Start Oral Nutrition Supplement  Nutrition Education/Counseling: No recommendation at this time  Coordination of Nutrition Care: Continue to monitor while inpatient,Interdisciplinary Rounds  Plan of Care discussed with: ICU Team    Goals:  Previous Goal Met: Goal(s) Achieved  Goals: PO intake 75% or greater       Nutrition Monitoring and Evaluation:   Behavioral-Environmental Outcomes: None Identified  Food/Nutrient Intake Outcomes: Food and Nutrient Intake,Supplement Intake  Physical Signs/Symptoms Outcomes: Biochemical Data,Constipation,GI Status,Meal Time Behavior,Nutrition Focused Physical Findings,Skin,Weight    Discharge Planning:    Continue current diet     Ignacio Adan SUNG HANSON  Contact: [phone]

## 2022-06-25 LAB
ABO/RH: NORMAL
ALBUMIN SERPL-MCNC: 2.5 G/DL (ref 3.4–5)
ALP BLD-CCNC: 98 U/L (ref 40–129)
ALT SERPL-CCNC: 337 U/L (ref 10–40)
ANION GAP SERPL CALCULATED.3IONS-SCNC: 7 MMOL/L (ref 3–16)
ANTIBODY SCREEN: NORMAL
AST SERPL-CCNC: 88 U/L (ref 15–37)
BASOPHILS ABSOLUTE: 0 K/UL (ref 0–0.2)
BASOPHILS RELATIVE PERCENT: 0.3 %
BILIRUB SERPL-MCNC: 0.6 MG/DL (ref 0–1)
BILIRUBIN DIRECT: 0.3 MG/DL (ref 0–0.3)
BILIRUBIN, INDIRECT: 0.3 MG/DL (ref 0–1)
BLOOD BANK DISPENSE STATUS: NORMAL
BLOOD BANK PRODUCT CODE: NORMAL
BPU ID: NORMAL
BUN BLDV-MCNC: 23 MG/DL (ref 7–20)
CALCIUM SERPL-MCNC: 7.8 MG/DL (ref 8.3–10.6)
CHLORIDE BLD-SCNC: 109 MMOL/L (ref 99–110)
CO2: 24 MMOL/L (ref 21–32)
CREAT SERPL-MCNC: 1.1 MG/DL (ref 0.6–1.2)
DESCRIPTION BLOOD BANK: NORMAL
EOSINOPHILS ABSOLUTE: 0.2 K/UL (ref 0–0.6)
EOSINOPHILS RELATIVE PERCENT: 3.9 %
GFR AFRICAN AMERICAN: 58
GFR NON-AFRICAN AMERICAN: 48
GLUCOSE BLD-MCNC: 145 MG/DL (ref 70–99)
GLUCOSE BLD-MCNC: 172 MG/DL (ref 70–99)
GLUCOSE BLD-MCNC: 217 MG/DL (ref 70–99)
GLUCOSE BLD-MCNC: 267 MG/DL (ref 70–99)
HCT VFR BLD CALC: 18.9 % (ref 36–48)
HCT VFR BLD CALC: 23.2 % (ref 36–48)
HEMOGLOBIN: 6.4 G/DL (ref 12–16)
HEMOGLOBIN: 7.5 G/DL (ref 12–16)
LYMPHOCYTES ABSOLUTE: 0.8 K/UL (ref 1–5.1)
LYMPHOCYTES RELATIVE PERCENT: 15.7 %
MAGNESIUM: 1.7 MG/DL (ref 1.8–2.4)
MCH RBC QN AUTO: 22.4 PG (ref 26–34)
MCHC RBC AUTO-ENTMCNC: 33.6 G/DL (ref 31–36)
MCV RBC AUTO: 66.5 FL (ref 80–100)
MONOCYTES ABSOLUTE: 0.7 K/UL (ref 0–1.3)
MONOCYTES RELATIVE PERCENT: 13.7 %
NEUTROPHILS ABSOLUTE: 3.4 K/UL (ref 1.7–7.7)
NEUTROPHILS RELATIVE PERCENT: 66.4 %
PDW BLD-RTO: 25.7 % (ref 12.4–15.4)
PERFORMED ON: ABNORMAL
PLATELET # BLD: 182 K/UL (ref 135–450)
PMV BLD AUTO: 8.5 FL (ref 5–10.5)
POTASSIUM REFLEX MAGNESIUM: 3.8 MMOL/L (ref 3.5–5.1)
RBC # BLD: 2.85 M/UL (ref 4–5.2)
SODIUM BLD-SCNC: 140 MMOL/L (ref 136–145)
TOTAL PROTEIN: 3.9 G/DL (ref 6.4–8.2)
VANCOMYCIN RANDOM: 14.2 UG/ML
WBC # BLD: 5.1 K/UL (ref 4–11)

## 2022-06-25 PROCEDURE — 2580000003 HC RX 258: Performed by: INTERNAL MEDICINE

## 2022-06-25 PROCEDURE — 83735 ASSAY OF MAGNESIUM: CPT

## 2022-06-25 PROCEDURE — 2500000003 HC RX 250 WO HCPCS: Performed by: INTERNAL MEDICINE

## 2022-06-25 PROCEDURE — 99153 MOD SED SAME PHYS/QHP EA: CPT | Performed by: INTERNAL MEDICINE

## 2022-06-25 PROCEDURE — C9113 INJ PANTOPRAZOLE SODIUM, VIA: HCPCS | Performed by: INTERNAL MEDICINE

## 2022-06-25 PROCEDURE — 6360000002 HC RX W HCPCS: Performed by: INTERNAL MEDICINE

## 2022-06-25 PROCEDURE — 2580000003 HC RX 258: Performed by: STUDENT IN AN ORGANIZED HEALTH CARE EDUCATION/TRAINING PROGRAM

## 2022-06-25 PROCEDURE — 94640 AIRWAY INHALATION TREATMENT: CPT

## 2022-06-25 PROCEDURE — 6370000000 HC RX 637 (ALT 250 FOR IP): Performed by: INTERNAL MEDICINE

## 2022-06-25 PROCEDURE — 94761 N-INVAS EAR/PLS OXIMETRY MLT: CPT

## 2022-06-25 PROCEDURE — 2709999900 HC NON-CHARGEABLE SUPPLY: Performed by: INTERNAL MEDICINE

## 2022-06-25 PROCEDURE — 88305 TISSUE EXAM BY PATHOLOGIST: CPT

## 2022-06-25 PROCEDURE — 2700000000 HC OXYGEN THERAPY PER DAY

## 2022-06-25 PROCEDURE — 80048 BASIC METABOLIC PNL TOTAL CA: CPT

## 2022-06-25 PROCEDURE — 80202 ASSAY OF VANCOMYCIN: CPT

## 2022-06-25 PROCEDURE — 80076 HEPATIC FUNCTION PANEL: CPT

## 2022-06-25 PROCEDURE — 99233 SBSQ HOSP IP/OBS HIGH 50: CPT | Performed by: INTERNAL MEDICINE

## 2022-06-25 PROCEDURE — 2000000000 HC ICU R&B

## 2022-06-25 PROCEDURE — 86901 BLOOD TYPING SEROLOGIC RH(D): CPT

## 2022-06-25 PROCEDURE — 86900 BLOOD TYPING SEROLOGIC ABO: CPT

## 2022-06-25 PROCEDURE — 85025 COMPLETE CBC W/AUTO DIFF WBC: CPT

## 2022-06-25 PROCEDURE — 85014 HEMATOCRIT: CPT

## 2022-06-25 PROCEDURE — 3609012400 HC EGD TRANSORAL BIOPSY SINGLE/MULTIPLE: Performed by: INTERNAL MEDICINE

## 2022-06-25 PROCEDURE — 86923 COMPATIBILITY TEST ELECTRIC: CPT

## 2022-06-25 PROCEDURE — 3609013400 HC EGD REMOVAL LESION(S) BY HOT BIOPSY FORCEPS: Performed by: INTERNAL MEDICINE

## 2022-06-25 PROCEDURE — 6370000000 HC RX 637 (ALT 250 FOR IP): Performed by: STUDENT IN AN ORGANIZED HEALTH CARE EDUCATION/TRAINING PROGRAM

## 2022-06-25 PROCEDURE — 36430 TRANSFUSION BLD/BLD COMPNT: CPT

## 2022-06-25 PROCEDURE — 85018 HEMOGLOBIN: CPT

## 2022-06-25 PROCEDURE — 99152 MOD SED SAME PHYS/QHP 5/>YRS: CPT | Performed by: INTERNAL MEDICINE

## 2022-06-25 PROCEDURE — P9016 RBC LEUKOCYTES REDUCED: HCPCS

## 2022-06-25 PROCEDURE — 36415 COLL VENOUS BLD VENIPUNCTURE: CPT

## 2022-06-25 PROCEDURE — 86850 RBC ANTIBODY SCREEN: CPT

## 2022-06-25 PROCEDURE — 0DJ08ZZ INSPECTION OF UPPER INTESTINAL TRACT, VIA NATURAL OR ARTIFICIAL OPENING ENDOSCOPIC: ICD-10-PCS | Performed by: INTERNAL MEDICINE

## 2022-06-25 RX ORDER — POTASSIUM CHLORIDE 750 MG/1
20 TABLET, EXTENDED RELEASE ORAL ONCE
Status: COMPLETED | OUTPATIENT
Start: 2022-06-25 | End: 2022-06-25

## 2022-06-25 RX ORDER — BUDESONIDE AND FORMOTEROL FUMARATE DIHYDRATE 160; 4.5 UG/1; UG/1
2 AEROSOL RESPIRATORY (INHALATION) 2 TIMES DAILY
Status: DISCONTINUED | OUTPATIENT
Start: 2022-06-25 | End: 2022-06-25

## 2022-06-25 RX ORDER — CARVEDILOL 6.25 MG/1
12.5 TABLET ORAL 2 TIMES DAILY WITH MEALS
Status: DISCONTINUED | OUTPATIENT
Start: 2022-06-25 | End: 2022-06-30 | Stop reason: HOSPADM

## 2022-06-25 RX ORDER — ACETAMINOPHEN 325 MG/1
650 TABLET ORAL EVERY 4 HOURS PRN
Status: DISCONTINUED | OUTPATIENT
Start: 2022-06-25 | End: 2022-06-30 | Stop reason: HOSPADM

## 2022-06-25 RX ORDER — BUDESONIDE AND FORMOTEROL FUMARATE DIHYDRATE 160; 4.5 UG/1; UG/1
2 AEROSOL RESPIRATORY (INHALATION) 2 TIMES DAILY
Status: DISCONTINUED | OUTPATIENT
Start: 2022-06-25 | End: 2022-06-30 | Stop reason: HOSPADM

## 2022-06-25 RX ORDER — MIDAZOLAM HYDROCHLORIDE 5 MG/ML
INJECTION INTRAMUSCULAR; INTRAVENOUS PRN
Status: DISCONTINUED | OUTPATIENT
Start: 2022-06-25 | End: 2022-06-25 | Stop reason: ALTCHOICE

## 2022-06-25 RX ORDER — MAGNESIUM SULFATE IN WATER 40 MG/ML
2000 INJECTION, SOLUTION INTRAVENOUS ONCE
Status: COMPLETED | OUTPATIENT
Start: 2022-06-25 | End: 2022-06-25

## 2022-06-25 RX ORDER — FENTANYL CITRATE 50 UG/ML
INJECTION, SOLUTION INTRAMUSCULAR; INTRAVENOUS PRN
Status: DISCONTINUED | OUTPATIENT
Start: 2022-06-25 | End: 2022-06-25 | Stop reason: ALTCHOICE

## 2022-06-25 RX ORDER — SODIUM CHLORIDE 9 MG/ML
INJECTION, SOLUTION INTRAVENOUS PRN
Status: DISCONTINUED | OUTPATIENT
Start: 2022-06-25 | End: 2022-06-30 | Stop reason: HOSPADM

## 2022-06-25 RX ADMIN — INSULIN LISPRO 2 UNITS: 100 INJECTION, SOLUTION INTRAVENOUS; SUBCUTANEOUS at 20:48

## 2022-06-25 RX ADMIN — INSULIN LISPRO 6 UNITS: 100 INJECTION, SOLUTION INTRAVENOUS; SUBCUTANEOUS at 17:39

## 2022-06-25 RX ADMIN — MORPHINE SULFATE 2 MG: 2 INJECTION, SOLUTION INTRAMUSCULAR; INTRAVENOUS at 20:57

## 2022-06-25 RX ADMIN — SODIUM CHLORIDE: 9 INJECTION, SOLUTION INTRAVENOUS at 10:09

## 2022-06-25 RX ADMIN — SODIUM CHLORIDE, PRESERVATIVE FREE 10 ML: 5 INJECTION INTRAVENOUS at 20:47

## 2022-06-25 RX ADMIN — ATORVASTATIN CALCIUM 10 MG: 10 TABLET, FILM COATED ORAL at 22:01

## 2022-06-25 RX ADMIN — MAGNESIUM SULFATE HEPTAHYDRATE 2000 MG: 40 INJECTION, SOLUTION INTRAVENOUS at 10:11

## 2022-06-25 RX ADMIN — CARVEDILOL 6.25 MG: 6.25 TABLET, FILM COATED ORAL at 07:53

## 2022-06-25 RX ADMIN — Medication 1500 MG: at 07:42

## 2022-06-25 RX ADMIN — DOXYCYCLINE 100 MG: 100 INJECTION, POWDER, LYOPHILIZED, FOR SOLUTION INTRAVENOUS at 07:52

## 2022-06-25 RX ADMIN — PAROXETINE HYDROCHLORIDE 20 MG: 20 TABLET, FILM COATED ORAL at 09:12

## 2022-06-25 RX ADMIN — SODIUM CHLORIDE, PRESERVATIVE FREE 10 ML: 5 INJECTION INTRAVENOUS at 07:54

## 2022-06-25 RX ADMIN — PANTOPRAZOLE SODIUM 40 MG: 40 INJECTION, POWDER, LYOPHILIZED, FOR SOLUTION INTRAVENOUS at 10:42

## 2022-06-25 RX ADMIN — Medication 2 PUFF: at 19:41

## 2022-06-25 RX ADMIN — ACETAMINOPHEN 650 MG: 325 TABLET ORAL at 22:00

## 2022-06-25 RX ADMIN — CETIRIZINE HYDROCHLORIDE 10 MG: 10 TABLET ORAL at 09:12

## 2022-06-25 RX ADMIN — MORPHINE SULFATE 2 MG: 2 INJECTION, SOLUTION INTRAMUSCULAR; INTRAVENOUS at 15:08

## 2022-06-25 RX ADMIN — CARVEDILOL 12.5 MG: 6.25 TABLET, FILM COATED ORAL at 17:39

## 2022-06-25 RX ADMIN — POTASSIUM CHLORIDE 20 MEQ: 750 TABLET, EXTENDED RELEASE ORAL at 11:35

## 2022-06-25 RX ADMIN — MEROPENEM 1000 MG: 1 INJECTION, POWDER, FOR SOLUTION INTRAVENOUS at 20:48

## 2022-06-25 RX ADMIN — IRON SUCROSE: 20 INJECTION, SOLUTION INTRAVENOUS at 10:19

## 2022-06-25 RX ADMIN — DOXYCYCLINE 100 MG: 100 INJECTION, POWDER, LYOPHILIZED, FOR SOLUTION INTRAVENOUS at 18:24

## 2022-06-25 RX ADMIN — INSULIN LISPRO 2 UNITS: 100 INJECTION, SOLUTION INTRAVENOUS; SUBCUTANEOUS at 11:54

## 2022-06-25 RX ADMIN — MUPIROCIN: 20 OINTMENT TOPICAL at 10:21

## 2022-06-25 RX ADMIN — MUPIROCIN: 20 OINTMENT TOPICAL at 20:49

## 2022-06-25 RX ADMIN — MEROPENEM 1000 MG: 1 INJECTION, POWDER, FOR SOLUTION INTRAVENOUS at 10:15

## 2022-06-25 ASSESSMENT — PAIN SCALES - GENERAL
PAINLEVEL_OUTOF10: 0
PAINLEVEL_OUTOF10: 3
PAINLEVEL_OUTOF10: 5
PAINLEVEL_OUTOF10: 0
PAINLEVEL_OUTOF10: 5
PAINLEVEL_OUTOF10: 5

## 2022-06-25 ASSESSMENT — PAIN - FUNCTIONAL ASSESSMENT: PAIN_FUNCTIONAL_ASSESSMENT: NONE - DENIES PAIN

## 2022-06-25 ASSESSMENT — PAIN DESCRIPTION - ONSET: ONSET: ON-GOING

## 2022-06-25 ASSESSMENT — PAIN DESCRIPTION - DESCRIPTORS
DESCRIPTORS: TIGHTNESS
DESCRIPTORS: TIGHTNESS
DESCRIPTORS: ACHING

## 2022-06-25 ASSESSMENT — PAIN DESCRIPTION - ORIENTATION
ORIENTATION: RIGHT

## 2022-06-25 ASSESSMENT — PAIN DESCRIPTION - LOCATION
LOCATION: KNEE

## 2022-06-25 NOTE — PROGRESS NOTES
Vancomycin Day # 4  Current dose = Pulse dosing per daily vancomycin levels. BUN/SRCR 23/1.1     Est CrCl = 49 ml/min  WBC   5.1            Tmax 100  Vancomycin random level this am = 14.2 mcg/ml  Will give vancomycin 1500 mg x1 dose today and recheck random level tomorrow am.  Pharmacy will continue to obtain daily random vancomycin levels and redose as appropriate.

## 2022-06-25 NOTE — OP NOTE
Esophagogastroduodenoscopy Note    Patient:   Afsaneh Gilliam    YOB: 1945    Facility:     [address]  [address]  [address] [address]   [Inpatient]   Referring/PCP: Joe Morejon MD    Procedure:   Esophagogastroduodenoscopy with polypectomy  Date:     6/25/2022   Endoscopist:  Micha Hess DO     Preoperative Diagnosis:   Iron deficiency anemia    Postoperative Diagnosis:  same    Anesthesia:  Versed 5 mg, Fentanyl 50 mcg    Estimated blood loss: Minimal    Complications: None    Description of Procedure:  Informed consent was obtained from the patient after explanation of the procedure including indications, description of the procedure,  benefits and possible risks and complications of the procedure, and alternatives. Questions were answered. The patient's history was reviewed and a directed physical examination was performed prior to the procedure. Patient was monitored throughout the procedure with pulse oximetry and periodic assessment of vital signs. Patient was sedated as noted above. The Nursing staff and I performed a time out. With the patient in the left lateral decubitus position, the Olympus videoendoscope was placed in the patient's mouth and under direct visualization passed into the esophagus. The scope was ultimately passed to the second portion of the duodenum. Visualization was performed during both introduction and withdrawal of the endoscope and retroflexed view of the proximal stomach was obtained. Findings[de-identified]   Esophagus: normal. The findings do not support a diagnosis of Devine's Esophagus. Stomach: Gastric polyps were seen. No active bleeding. One of the fundic gland polyps was large and had a visible vessel supplying the polyp. This was removed with hot snare. The resection site appeared good after resection.   A small red spot along the lesser curve was seen and cauterized with soft coagulation. Biopsies obtained of the angularis. Duodenum: normal.  Biopsies obtained. Recommendations:   No obvious source of upper gi bleeding  Large polyp with visible vessel resected  Red spot cauterized  Continue ICU care  Colonoscopy as outpatient when patient more mobile.     Author Bowen DO    [facility] Office   [address]     Phone: [phone]     Fax: [phone]          Electronically signed by Author Bowen DO on 6/25/2022 at 9:31 AM

## 2022-06-25 NOTE — PROGRESS NOTES
Patient sedated for procedure with 5mg Versed and 50 mcg of Fentanyl per Dr. Philly Cain order. Patient tolerated procedure well, vital signs remained stable during procedure.      Nursing report given to [facility]

## 2022-06-25 NOTE — PROGRESS NOTES
Patient had large amt stool, incontinence present, rosa care done with foam clenser, chg wipes to catheter, also changed dressings to surgical sites on right hip area and sacrum, patient tolerated above well with no problem.

## 2022-06-25 NOTE — PROGRESS NOTES
Pre-Operative:  1. Patient/Caregiver identifies - states name and date of birth. 2.  The patient is free from signs and symptoms of injury. 3.  The patient receives appropriate medication(s), safely administered during the Perioperative period. 4.  The patients's fluid, electrolyte, and acid-base balances are established preoperatively. 5.  The patient's pulmonary function is established preoperatively. 6.  The patient's cardiovascular status is established preoperatively. 7.  The patient / caregiver demonstrates knowledge of nutritional management related to the operative or other invasive procedure. 8.  The patient/caregiver demonstrates knowledge of medication management. 9.  The patient/caregiver demonstrates knowledge of pain management. 10.  The patient/caregiver participates in decisions affection his or her Perioperative plan of care. 11. The patient's care is consistent with the individualized Perioperative plan of care. 12.  The patient's right to privacy is maintained. 13. The patient is the recipient of competent and ethical care within legal standards of practice. 14.  The patient's value system, lifestyle, ethnicity, and culture are considered, respected, and incorporated in the Perioperative plan of care and understands special services available. 15.  The patient demonstrates and/or reports adequate pain control throughout the the Perioperative period. 16.  The patient's neurological status is established preoperatively. 17.  The patient/caregiver demonstrates knowledge of the expected responses to the endoscopy procedure. 18.  Patient/Caregiver has reduced anxiety. Interventions- Familiarize with environment and equipment. 19. Patient/Caregiver verbalizes understanding of Phase II and/or Phase I process. 20.  Patient pain level is established preoperatively using age appropriate pain scale. 21.   The patient will move to fall risk upon sedation- during and through the recovery phase.  Interventions- orient the patient to the environment, especially the location of the bathroom; provide treaded socks/non-skid footwear; demonstrate and teach back use of the nurse's call system; instruct the patient to call for help before getting out of bed; lock all movable equipment before transferring patient; keep bed in lowest position possible. Pre-Operative:  1. Patient/Caregiver identifies - states name and date of birth. 2.  The patient is free from signs and symptoms of injury. 3.  The patient receives appropriate medication(s), safely administered during the Perioperative period. 4.  The patients's fluid, electrolyte, and acid-base balances are established preoperatively. 5.  The patient's pulmonary function is established preoperatively. 6.  The patient's cardiovascular status is established preoperatively. 7.  The patient / caregiver demonstrates knowledge of nutritional management related to the operative or other invasive procedure. 8.  The patient/caregiver demonstrates knowledge of medication management. 9.  The patient/caregiver demonstrates knowledge of pain management. 10.  The patient/caregiver participates in decisions affection his or her Perioperative plan of care. 11. The patient's care is consistent with the individualized Perioperative plan of care. 12.  The patient's right to privacy is maintained. 13. The patient is the recipient of competent and ethical care within legal standards of practice. 14.  The patient's value system, lifestyle, ethnicity, and culture are considered, respected, and incorporated in the Perioperative plan of care and understands special services available. 15.  The patient demonstrates and/or reports adequate pain control throughout the the Perioperative period. 16.  The patient's neurological status is established preoperatively.   17.  The patient/caregiver demonstrates knowledge of the expected responses to the endoscopy procedure. 18.  Patient/Caregiver has reduced anxiety. Interventions- Familiarize with environment and equipment. 19. Patient/Caregiver verbalizes understanding of Phase II and/or Phase I process. 20.  Patient pain level is established preoperatively using age appropriate pain scale. 21. The patient will move to fall risk upon sedation- during and through the recovery phase. Interventions- orient the patient to the environment, especially the location of the bathroom; provide treaded socks/non-skid footwear; demonstrate and teach back use of the nurse's call system; instruct the patient to call for help before getting out of bed; lock all movable equipment before transferring patient; keep bed in lowest position possible.

## 2022-06-25 NOTE — PROGRESS NOTES
Department of Orthopedic Surgery  Physician Assistant   Progress Note    Subjective:       Systemic or Specific Complaints: Had EGD this morning and feeling sluggish. Has some pain in her right hip with movement. Feels well at rest. Complaining of right calf pain.      Objective:     Patient Vitals for the past 24 hrs:   BP Temp Temp src Pulse Resp SpO2   06/25/22 1110 133/62 -- -- 70 18 100 %   06/25/22 1105 (!) 144/61 -- -- 75 15 100 %   06/25/22 1100 (!) 146/61 99 °F (37.2 °C) Bladder 70 24 100 %   06/25/22 1019 -- 99 °F (37.2 °C) Bladder -- -- --   06/25/22 1000 (!) 121/51 -- -- 76 21 98 %   06/25/22 0945 139/70 -- -- 71 18 99 %   06/25/22 0940 139/61 -- -- 79 17 99 %   06/25/22 0925 -- 99.1 °F (37.3 °C) -- -- -- --   06/25/22 0915 (!) 143/86 -- -- 99 21 96 %   06/25/22 0910 132/82 -- -- 82 24 97 %   06/25/22 0905 (!) 147/64 -- -- 80 22 97 %   06/25/22 0900 129/62 -- -- 84 21 93 %   06/25/22 0845 (!) 145/63 -- -- 86 23 95 %   06/25/22 0843 (!) 154/77 -- -- 87 21 97 %   06/25/22 0800 (!) 139/54 -- -- 75 21 100 %   06/25/22 0738 (!) 143/60 99.2 °F (37.3 °C) Bladder 74 22 100 %   06/25/22 0732 135/64 99.2 °F (37.3 °C) Bladder 76 20 100 %   06/25/22 0731 136/64 99.2 °F (37.3 °C) Bladder 73 21 100 %   06/25/22 0723 137/63 99.2 °F (37.3 °C) Bladder 78 17 100 %   06/25/22 0720 -- 99.2 °F (37.3 °C) Bladder 82 23 100 %   06/25/22 0710 (!) 145/59 99.2 °F (37.3 °C) -- 75 21 100 %   06/25/22 0700 (!) 145/59 99.2 °F (37.3 °C) Bladder 77 21 99 %   06/25/22 0600 (!) 122/52 -- -- 70 16 99 %   06/25/22 0500 (!) 125/52 -- -- 71 20 99 %   06/25/22 0400 (!) 116/50 99.7 °F (37.6 °C) Bladder 72 22 99 %   06/25/22 0300 (!) 120/53 -- -- 69 22 99 %   06/25/22 0200 (!) 125/56 -- -- 73 22 100 %   06/25/22 0100 (!) 132/57 -- -- 82 24 99 %   06/25/22 0008 -- -- -- 74 23 99 %   06/25/22 0000 (!) 122/50 99.5 °F (37.5 °C) Bladder 75 23 99 %   06/24/22 2300 (!) 131/53 -- -- 82 23 99 %   06/24/22 2200 (!) 130/51 -- -- 73 23 99 %   06/24/22 2100 (!) 130/54 -- -- 75 23 98 %   06/24/22 2000 (!) 112/50 100 °F (37.8 °C) Bladder 76 24 98 %   06/24/22 1800 139/60 -- -- 83 19 96 %   06/24/22 1700 139/60 -- -- 73 16 98 %   06/24/22 1600 133/64 99.5 °F (37.5 °C) Bladder 75 25 98 %   06/24/22 1500 (!) 145/57 -- -- 84 (!) 33 99 %   06/24/22 1400 (!) 127/53 -- -- 79 25 98 %   06/24/22 1300 (!) 142/57 -- -- 80 18 99 %       General: alert, appears stated age, cooperative, no distress and pale   Wound: No Erythema, Positive for Edema and  wound drainage   Motion: Painful range of Motion in affected extremity   DVT Exam: Right calf pain and swelling present. Additional exam: Patient seen sitting up in bed at time of interview. High flow nasal cannula in place following recent extubation  Thigh moderately swollen, compartments soft and compressible, no tension noted. No obvious palpable hematoma  EHL, FHL, gastroc, anterior tib motor intact  Sensation intact entirety of right lower extremity  Dorsalis pedis pulse not appreciated, even with Doppler. Anterior tibialis pulse 1+. Foot is warm and well-perfused with brisk capillary refill to the toe nailbeds      Data Review  CBC:   Lab Results   Component Value Date    WBC 5.1 06/25/2022    RBC 2.85 06/25/2022    HGB 7.5 06/25/2022    HCT 23.2 06/25/2022     06/25/2022       Renal:   Lab Results   Component Value Date     06/25/2022    K 3.8 06/25/2022     06/25/2022    CO2 24 06/25/2022    BUN 23 06/25/2022    CREATININE 1.1 06/25/2022    GLUCOSE 145 06/25/2022    CALCIUM 7.8 06/25/2022            Assessment:     S/p right hip intramedullary nailing, postop day 5. Date of surgery 6/20/2022 with Dr. Nina Becerril    Anemia, status post 3 units PRBCs transfusion. Hemoglobin 7.5 this a.m., stable. IM/ICU following    Plan:      1: Continue with IM/ICU recs. Okay to resume PT/OT. Weightbearing as tolerated to the right lower extremity with assistive device.  Venous doppler ordered for evaluation of possible DVT right lower extremity. 2:  Continue Deep venous thrombosis prophylaxis-SCD sleeves currently while hemoglobin stabilizes, followed by IM/ICU. Begin lovenox 40 daily for 4 weeks when okayed by heme/onc/ICU recs. 3:  Continue Pain Control- oxycodone held due to resp depression. Resume when okayed by ICU physicians. 4: PT/OT eval recommending SNF, case management following. Encouraged patient to work with PT/OT daily. 5: Discharge pending medical stability. Ortho will follow peripherally and update DCP when stable to leave. Please feel free to contact us with any needs in the meantime.      D[employer], PAR[employer]

## 2022-06-25 NOTE — PROGRESS NOTES
Pulmonary & Critical Care Medicine ICU Progress Note    CC: ORIF R hip, syncope post op D#2 with severe hypoxemia and shock     Events of Last 24 hours:   Transfused again today  Low-grade fevers  No stools, stool yesterday w/o blood   EGD this am with polyp in stomach, no active bleeding     Invasive Lines:   Left femoral CVC 2022    MV: 2022 - 22  Vent Mode: AC/VC Resp Rate (Set): 22 bmp/Vt (Set, mL): 470 mL/ /FiO2 : 50 %  Recent Labs     22  0615   PHART 7.377   [id number] 30.3*   PO2ART 80.6       IV:   sodium chloride      dextrose      sodium chloride Stopped (22 0745)       Vitals:  Blood pressure (!) 125/52, pulse 71, temperature 99.7 °F (37.6 °C), temperature source Bladder, resp. rate 20, height 5' 3\" (1.6 m), weight 229 lb 4.5 oz (104 kg), SpO2 99 %, not currently breastfeeding. on 3 L  Temp  Av.6 °F (37.6 °C)  Min: 99.4 °F (37.4 °C)  Max: 100 °F (37.8 °C)    Intake/Output Summary (Last 24 hours) at 2022 0621  Last data filed at 2022 1800  Gross per 24 hour   Intake 1683.23 ml   Output 2050 ml   Net -366.77 ml     EXAM:  General: NAD  Eyes: PERRL. No sclera icterus. No conjunctival injection. ENT: No discharge. Pharynx clear. Neck: Trachea midline. Normal thyroid. Resp: No accessory muscle use. No crackles. No wheezing. No rhonchi. No dullness on percussion. CV: Regular rate. Regular rhythm. No mumur or rub. + edema. GI: Non-tender. Non-distended. No masses. No organomegaly. Normal bowel sounds. No hernia. Skin: Warm and dry. No nodule on exposed extremities. No rash on exposed extremities. Lymph: No cervical LAD. No supraclavicular LAD. M/S: No cyanosis. No joint deformity. No clubbing. Neuro: A&OX3. Patellar reflexes are symmetric. Psych: No agitation, no anxiety, affect is full.     Scheduled Meds:   carvedilol  6.25 mg Oral BID WC    doxycycline (VIBRAMYCIN) IV  100 mg IntraVENous Q12H    insulin lispro  0-12 Units SubCUTAneous TID WC    insulin lispro  0-6 Units SubCUTAneous Nightly    IVPB builder   IntraVENous Q24H    polyethylene glycol  17 g Oral BID    mupirocin   Nasal BID    vancomycin (VANCOCIN) intermittent dosing (placeholder)   Other RX Placeholder    meropenem  1,000 mg IntraVENous Q12H    atropine  1 mg IntraVENous Once    pantoprazole  40 mg IntraVENous Daily    [Held by provider] allopurinol  200 mg Oral Daily    atorvastatin  10 mg Oral Nightly    cetirizine  10 mg Oral Daily    [Held by provider] gabapentin  300 mg Oral BID    [Held by provider] losartan  100 mg Oral Daily    PARoxetine  20 mg Oral Daily    sodium chloride flush  5-40 mL IntraVENous 2 times per day     PRN Meds:  sodium chloride, morphine, metoprolol, magnesium sulfate, perflutren lipid microspheres, glucose, dextrose bolus **OR** dextrose bolus, glucagon (rDNA), dextrose, [Held by provider] oxyCODONE **OR** [Held by provider] oxyCODONE, sodium chloride flush, sodium chloride, ondansetron **OR** ondansetron, albuterol sulfate HFA    Results:  CBC:   Recent Labs     06/23/22 2105 06/24/22 0445 06/25/22  0503   WBC 5.8 5.9 5.1   HGB 7.3* 7.3* 6.4*   HCT 22.0* 22.1* 18.9*   MCV 67.0* 66.0* 66.5*    186 182     BMP:   Recent Labs     06/23/22 2105 06/24/22 0445 06/25/22  0503    142 140   K 4.0 3.9 3.8   * 112* 109   CO2 21 21 24   PHOS 2.4* 3.2  --    BUN 24* 22* 23*   CREATININE 1.4* 1.3* 1.1     LIVER PROFILE:   Recent Labs     06/23/22 2105 06/24/22 0445 06/25/22  0503   * 217* 88*   * 502* 337*   BILIDIR  --  0.3 0.3   BILITOT 0.7 0.6 0.6   ALKPHOS 119 111 98     Cultures:      6/19/22 SARS-CoV-2 NAAT negative  6/20/22 Urine E coli 100K pansensitive  6/22/2022 tracheal aspirate NRF    Chest imaging was reviewed by me and showed:   6/22/22 CXR ETT okay     Echo 6/22/2022 EF 60%, mild LVH, grade 1 diastolic dysfunction, SPAP 56, mild AS    ASSESSMENT:  · Shock: overal unclear etiology of her rapid severe progressive respiratory and cardiovascular failure  · Acute hypoxemic respiratory failure   · Transient AFIB RVR  · Acute kidney failure/ATN - improving  · Syncope  · Acute blood loss anemia - s/p 4 U PRBC on 6/20/22, 1 U PRBC on 6/25/22; s/p 2 U FFP on 6/20/22 - etiology is unclear  · Acute transaminitis 2/2 shock, improving  · Electrolyte disorder, relatively severe hypomagnesemia  · Post op RIGHT IM hip nailing on 6/20/22  · Thalassemia minor  · DM with hyperglycemia  · Asthma  · Gout    PLAN:  Supplemental oxygen to maintain SaO2 >92%; wean as tolerated    Follow serial hemoglobin    Merrem/Vanc D#5/7, doxycycline day #3/7  Monitoring of vancomycin levels to prevent toxicity. SSI - medium   DC IV Lopressor, resume home [address]   Cardiology for A.  Fib  1 U PRBC today; Gastroenterology for ongoing blood loss, no clear etiology -- planning EGD   Hematology is following   F/U magnesium & replace  D/W Dr. Gavino Forman from my perspective for EGD today   Prophylaxis: DVT - SCD; MRSA - mupirocin; GI - on protonix IV

## 2022-06-25 NOTE — PROGRESS NOTES
Hospitalist Progress Note      PCP: Chantel Aguiar MD    Date of Admission: 6/19/2022    Subjective: had a burst of a fib overnight, pain controlled    Medications:  Reviewed    Infusion Medications    dextrose      sodium chloride Stopped (06/24/22 0745)     Scheduled Medications    carvedilol  6.25 mg Oral BID WC    doxycycline (VIBRAMYCIN) IV  100 mg IntraVENous Q12H    insulin lispro  0-12 Units SubCUTAneous TID WC    insulin lispro  0-6 Units SubCUTAneous Nightly    IVPB builder   IntraVENous Q24H    polyethylene glycol  17 g Oral BID    mupirocin   Nasal BID    vancomycin (VANCOCIN) intermittent dosing (placeholder)   Other RX Placeholder    meropenem  1,000 mg IntraVENous Q12H    atropine  1 mg IntraVENous Once    pantoprazole  40 mg IntraVENous Daily    [Held by provider] allopurinol  200 mg Oral Daily    atorvastatin  10 mg Oral Nightly    cetirizine  10 mg Oral Daily    [Held by provider] gabapentin  300 mg Oral BID    [Held by provider] losartan  100 mg Oral Daily    PARoxetine  20 mg Oral Daily    sodium chloride flush  5-40 mL IntraVENous 2 times per day     PRN Meds: morphine, metoprolol, magnesium sulfate, perflutren lipid microspheres, glucose, dextrose bolus **OR** dextrose bolus, glucagon (rDNA), dextrose, [Held by provider] oxyCODONE **OR** [Held by provider] oxyCODONE, sodium chloride flush, sodium chloride, ondansetron **OR** ondansetron, albuterol sulfate HFA      Intake/Output Summary (Last 24 hours) at 6/25/2022 0331  Last data filed at 6/24/2022 1800  Gross per 24 hour   Intake 1683.23 ml   Output 2050 ml   Net -366.77 ml       Physical Exam Performed:    /60   Pulse 74   Temp 99.5 °F (37.5 °C) (Bladder)   Resp 23   Ht 5' 3\" (1.6 m)   Wt 229 lb 4.5 oz (104 kg)   SpO2 99%   Breastfeeding No   BMI 40.61 kg/m²       General:  Awake  Mucous Membranes:  Pale , anicteric  Neck: No JVD, no carotid bruit, no thyromegaly  Chest:  Clear to auscultation bilaterally, no added sounds  Cardiovascular:  RRR S1S2 heard, no murmurs or gallops  Abdomen:  Soft, undistended, non tender, no organomegaly, BS present  Extremities: left ankle diffuse swelling and tenderness noted  Right hip dressing dry , bilateral knee arthritis with limited movements to both LE with weakness noted   No edema or cyanosis. Distal pulses well felt  Neurological : Awake, following commands       Labs:   Recent Labs     06/23/22  0530 06/23/22  1100 06/23/22  1202 06/23/22  2105 06/24/22  0445   WBC 6.0  --   --  5.8 5.9   HGB 7.2*  --  7.2* 7.3* 7.3*   HCT 21.9*   < > 22.2* 22.0* 22.1*     --   --  195 186    < > = values in this interval not displayed. Recent Labs     06/23/22  0530 06/23/22  2105 06/24/22  0445    141 142   K 4.1 4.0 3.9   * 111* 112*   CO2 21 21 21   BUN 25* 24* 22*   CREATININE 1.5* 1.4* 1.3*   CALCIUM 7.3* 7.3* 7.5*   PHOS  --  2.4* 3.2     Recent Labs     06/23/22  2105 06/24/22  0445   * 217*   * 502*   BILIDIR  --  0.3   BILITOT 0.7 0.6   ALKPHOS 119 111     Recent Labs     06/24/22  0445   INR 1.21*     Recent Labs     06/23/22  2105 06/24/22  0445   TROPONINI <0.01 <0.01       Urinalysis:      Lab Results   Component Value Date    NITRU POSITIVE 06/20/2022    WBCUA >100 06/20/2022    BACTERIA 2+ 08/04/2016    RBCUA see below 06/20/2022    BLOODU MODERATE 06/20/2022    SPECGRAV >=1.030 06/20/2022    GLUCOSEU Negative 06/20/2022       Radiology:  XR CHEST PORTABLE   Final Result   No significant interval improvement. Bibasilar atelectasis versus   infiltrates. Trace pleural effusions. XR CHEST PORTABLE   Final Result   ET tube in satisfactory position 4.6 cm above the terrance. Bibasilar atelectasis. Cardiomegaly.          CTA PULMONARY W CONTRAST   Final Result   No evidence for pulmonary embolism      Considerable subsegmental atelectatic changes the lung bases without separate   consolidation of airspace disease clearly evident or pleural effusion      Right upper lobe 12 mm noncalcified pulmonary nodule with attention on   follow-up CT considered within 3-6 months especially if there is high risk   smoking history or high risk stratification      No acute findings of the abdomen or pelvis identified         CT ABDOMEN PELVIS WO CONTRAST Additional Contrast? None   Final Result   No evidence for pulmonary embolism      Considerable subsegmental atelectatic changes the lung bases without separate   consolidation of airspace disease clearly evident or pleural effusion      Right upper lobe 12 mm noncalcified pulmonary nodule with attention on   follow-up CT considered within 3-6 months especially if there is high risk   smoking history or high risk stratification      No acute findings of the abdomen or pelvis identified         XR CHEST PORTABLE   Final Result   1. Endotracheal tube and enteric tubes properly placed. 2.  Mild left basilar airspace disease and probable trace left pleural   effusion. XR HIP 2-3 VW W PELVIS RIGHT   Final Result   1. Status post ORIF of the right hip intertrochanteric fracture. No   immediate complication. FLUORO FOR SURGICAL PROCEDURES   Final Result   Intraprocedural fluoroscopic spot images as above. See separate procedure   report for more information. XR FOOT LEFT (MIN 3 VIEWS)   Final Result   Degenerative changes in the left foot with no acute abnormality. XR CHEST PORTABLE   Final Result   No acute cardiopulmonary disease. XR HIP 2-3 VW W PELVIS RIGHT   Final Result   Moderately displaced fracture of the proximal right femur. CTA ABDOMINAL AORTA W BILAT RUNOFF W CONTRAST   Final Result   1. Traumatic, acute right hip intertrochanteric fracture with varus   angulation and small surrounding hematoma. 2. No pseudoaneurysm or active extravasation is identified adjacent to the   fracture.    3. Aortoiliac inflow is

## 2022-06-25 NOTE — PROGRESS NOTES
Hospitalist Progress Note      PCP: Sedrick Casey MD    Date of Admission: 6/19/2022    Subjective:       Feels well today. No signs of blood in stool - 2BM. Has concerns about C scope prep.          Medications:  Reviewed    Infusion Medications    sodium chloride Stopped (06/25/22 1008)    dextrose      sodium chloride 5 mL/hr at 06/25/22 1009     Scheduled Medications    carvedilol  12.5 mg Oral BID WC    budesonide-formoterol  2 puff Inhalation BID    [START ON 6/26/2022] tiotropium  2 puff Inhalation Daily    doxycycline (VIBRAMYCIN) IV  100 mg IntraVENous Q12H    insulin lispro  0-12 Units SubCUTAneous TID WC    insulin lispro  0-6 Units SubCUTAneous Nightly    polyethylene glycol  17 g Oral BID    mupirocin   Nasal BID    vancomycin (VANCOCIN) intermittent dosing (placeholder)   Other RX Placeholder    meropenem  1,000 mg IntraVENous Q12H    atropine  1 mg IntraVENous Once    pantoprazole  40 mg IntraVENous Daily    [Held by provider] allopurinol  200 mg Oral Daily    atorvastatin  10 mg Oral Nightly    cetirizine  10 mg Oral Daily    [Held by provider] gabapentin  300 mg Oral BID    [Held by provider] losartan  100 mg Oral Daily    PARoxetine  20 mg Oral Daily    sodium chloride flush  5-40 mL IntraVENous 2 times per day     PRN Meds: sodium chloride, morphine, metoprolol, magnesium sulfate, glucose, dextrose bolus **OR** dextrose bolus, glucagon (rDNA), dextrose, [Held by provider] oxyCODONE **OR** [Held by provider] oxyCODONE, sodium chloride flush, sodium chloride, ondansetron **OR** ondansetron, albuterol sulfate HFA      Intake/Output Summary (Last 24 hours) at 6/25/2022 1712  Last data filed at 6/25/2022 1500  Gross per 24 hour   Intake 1149.78 ml   Output 925 ml   Net 224.78 ml       Physical Exam Performed:    /64   Pulse 81   Temp 99 °F (37.2 °C) (Bladder)   Resp 20   Ht 5' 3\" (1.6 m)   Wt 229 lb 4.5 oz (104 kg)   SpO2 96%   Breastfeeding No   BMI 40.61 kg/m²       General:  Awake  Mucous Membranes:  Pale , anicteric  Neck: No JVD, no carotid bruit, no thyromegaly  Chest:  Clear to auscultation bilaterally, no added sounds  Cardiovascular:  RRR S1S2 heard, no murmurs or gallops  Abdomen:  Soft, undistended, non tender, no organomegaly, BS present  Extremities: left ankle diffuse swelling and tenderness noted  Right hip dressing dry , bilateral knee arthritis with limited movements to both LE with weakness noted   No edema or cyanosis. Distal pulses well felt  Neurological : Awake, following commands       Labs:   Recent Labs     06/23/22 2105 06/23/22 2105 06/24/22 0445 06/25/22  0503 06/25/22  1055   WBC 5.8  --  5.9 5.1  --    HGB 7.3*   < > 7.3* 6.4* 7.5*   HCT 22.0*   < > 22.1* 18.9* 23.2*     --  186 182  --     < > = values in this interval not displayed. Recent Labs     06/23/22 2105 06/24/22 0445 06/25/22  0503    142 140   K 4.0 3.9 3.8   * 112* 109   CO2 21 21 24   BUN 24* 22* 23*   CREATININE 1.4* 1.3* 1.1   CALCIUM 7.3* 7.5* 7.8*   PHOS 2.4* 3.2  --      Recent Labs     06/23/22 2105 06/24/22 0445 06/25/22  0503   * 217* 88*   * 502* 337*   BILIDIR  --  0.3 0.3   BILITOT 0.7 0.6 0.6   ALKPHOS 119 111 98     Recent Labs     06/24/22  0445   INR 1.21*     Recent Labs     06/23/22 2105 06/24/22  0445   TROPONINI <0.01 <0.01       Urinalysis:      Lab Results   Component Value Date    NITRU POSITIVE 06/20/2022    WBCUA >100 06/20/2022    BACTERIA 2+ 08/04/2016    RBCUA see below 06/20/2022    BLOODU MODERATE 06/20/2022    SPECGRAV >=1.030 06/20/2022    GLUCOSEU Negative 06/20/2022       Radiology:  XR CHEST PORTABLE   Final Result   No significant interval improvement. Bibasilar atelectasis versus   infiltrates. Trace pleural effusions. XR CHEST PORTABLE   Final Result   ET tube in satisfactory position 4.6 cm above the terrance. Bibasilar atelectasis. Cardiomegaly.          CTA PULMONARY W CONTRAST   Final Result   No evidence for pulmonary embolism      Considerable subsegmental atelectatic changes the lung bases without separate   consolidation of airspace disease clearly evident or pleural effusion      Right upper lobe 12 mm noncalcified pulmonary nodule with attention on   follow-up CT considered within 3-6 months especially if there is high risk   smoking history or high risk stratification      No acute findings of the abdomen or pelvis identified         CT ABDOMEN PELVIS WO CONTRAST Additional Contrast? None   Final Result   No evidence for pulmonary embolism      Considerable subsegmental atelectatic changes the lung bases without separate   consolidation of airspace disease clearly evident or pleural effusion      Right upper lobe 12 mm noncalcified pulmonary nodule with attention on   follow-up CT considered within 3-6 months especially if there is high risk   smoking history or high risk stratification      No acute findings of the abdomen or pelvis identified         XR CHEST PORTABLE   Final Result   1. Endotracheal tube and enteric tubes properly placed. 2.  Mild left basilar airspace disease and probable trace left pleural   effusion. XR HIP 2-3 VW W PELVIS RIGHT   Final Result   1. Status post ORIF of the right hip intertrochanteric fracture. No   immediate complication. FLUORO FOR SURGICAL PROCEDURES   Final Result   Intraprocedural fluoroscopic spot images as above. See separate procedure   report for more information. XR FOOT LEFT (MIN 3 VIEWS)   Final Result   Degenerative changes in the left foot with no acute abnormality. XR CHEST PORTABLE   Final Result   No acute cardiopulmonary disease. XR HIP 2-3 VW W PELVIS RIGHT   Final Result   Moderately displaced fracture of the proximal right femur. CTA ABDOMINAL AORTA W BILAT RUNOFF W CONTRAST   Final Result   1.  Traumatic, acute right hip intertrochanteric fracture with varus   angulation and small surrounding hematoma. 2. No pseudoaneurysm or active extravasation is identified adjacent to the   fracture. 3. Aortoiliac inflow is widely patent. There is a mild amount of fibro   calcified plaque. 4. Femoropopliteal segments are patent bilaterally, noting fibro calcified   plaque. There is 3 vessel runoff bilaterally. However, on the right side   there is only 1 primary runoff vessel, the posterior tibial.   5. No acute abdominopelvic abnormality. Hepatic steatosis. CT FACIAL BONES WO CONTRAST   Final Result   No acute intracranial abnormality. No acute facial fractures. CT Head WO Contrast   Final Result   No acute intracranial abnormality. No acute facial fractures. XR FEMUR RIGHT (MIN 2 VIEWS)   Final Result   Traumatic, acute intertrochanteric fracture of the right hip. Advanced osteoarthritis of the knee. Moderate-sized knee joint effusion. VL Extremity Venous Right    (Results Pending)           Assessment/Plan:    Active Hospital Problems    Diagnosis     Bradycardia [R00.1]      Priority: Medium    Hypotension [I95.9]      Priority: Medium    Acute blood loss anemia [D62]      Priority: Medium    Closed 2-part intertrochanteric fracture of proximal end of right femur ([address] [S72.141A]      Priority: Medium    Shock ([address] [R57.9]      Priority: Medium    Acute hypoxemic respiratory failure (HCC) [J96.01]      Priority: Medium    Hip fracture requiring operative repair, left, closed, initial encounter ([address] [facility]      Priority: Medium    Benign essential HTN [I10]     Anemia [D64.9]          Shock. Syncope. Septic versus cardiogenic versus hypovolemic. Aggressive hydration with IV fluids. Required epinephrine and Levophed drip. Was transfused 3 units of red cells. Cultures sent. Blood cultures negative so far.   Urine culture positive for E. coli  Echocardiogram-normal EF  Started on IV Doxy, meropenem and vancomycin pending cultures  Now off all pressors. - deescalate Abx by tomorrow.        Acute hypoxic respiratory failure. Critical care physician following. Doing well SBT, extubated 6/23/22      Par a fib -   Cardiology involved.        #Intertrochanteric hip fracture; right  #Mechanical fall  -Osteoporosis contributing   - admitted to medical floor for surgical mx and pain control  - ortho surgery consulted , s.p IM nailing of right hip   - pain control to avoid drowsiness  - dvt prophylaxis  - early ambulation,  - cont PT       #Left foot pain- likely OA  -No acute fracture per imaging   - uric acid wnl          #HTN   -BP initially  elevated in the setting of pain  -Continued on  verapamil, terazosin, cozaar and coreg- adjust as needed  -Hydralazine PRN  All antihypertensive medications were held given severe hypotension.       #Acute blood loss anemia  - expected with major fractures and surgery   - also has chronic Anemia 2/2 thalassemia minor   -Hgb baseline appears to be 9-10; patient states 9  -8.9--7.9 >6.5  A total of 3 units of red cells have been transfused. Drop in H&H again 6/23  Obtain hematology consult  Add LDH and haptoglobin levels       Mild MARVIN secondary to hypotension. Resolving.       #DMII   On sliding scale insulin       #Asthma   -Continue home inhalers       #HLD  -Continue statin       #Osteoporosis  -Receives Fosamax weekly - holding       #GERD   -On PPI       #Gout   -Continue allopurinol        DVT Prophylaxis: SCDs  Diet: ADULT DIET;  Regular  Code Status: Full Code    PT/OT Eval Status: ordered    Dispo - ok for PCU    Jorge Ku MD

## 2022-06-25 NOTE — PROGRESS NOTES
Cardiology consult called to answering service, 6/25/22 @ [id number] Goldberg    GI consult called to [facility], Dr. Candelaria Hussein requesting provided Dr. Juan Daniel Maria, 6/25/22 @ [phone] Goldberg

## 2022-06-25 NOTE — PROGRESS NOTES
Assessment completed, patient awake and alert, patient now NPO for EGD procedure per Dr Benjaman Skiff, Endo team to get consent, VSS.

## 2022-06-25 NOTE — PROGRESS NOTES
Physical /Occupational Therapy  Hold per discussion with RN. Patient has a pending R doppler and is not currently anticoagulated. Will continue to follow and tx when appropriate.     Tushar Ku, PT #[id number]  [facility], [address] [place] [address], OTR/L   [id number]

## 2022-06-25 NOTE — H&P
Gastroenterology Preop Assessment    Patient:   Heber Carrasquillo   :       Facility:   [facility]  Referring/PCP: Melanie Abbasi MD  Date:     2022    Subjective:   Procedure: egd    HPI/Reason for procedure:  66 yo female with ORIF R hip, syncope POD#2 with microcytic anemia. No reported gross bleeding. States had colonoscopy in  which was normal.  BUN/Cr 23/1.1. Discussed risks and benefits of EGD and patient agreeable to evaluation.     Past Medical History:   Diagnosis Date    Anemia     thallasemia minor    Arthritis     osteo-arthritis    Asthma     Back pain     Back pain     Bronchitis chronic     Diabetes mellitus (HCC)     GERD (gastroesophageal reflux disease)     Gout     Hypertension     Melanoma of scalp or neck (HCC)     Osteoarthritis     Osteoporosis     Squamous cell carcinoma, arm     Thal trait      Past Surgical History:   Procedure Laterality Date    APPENDECTOMY      CARPAL TUNNEL RELEASE Left 2018    CATARACT REMOVAL WITH IMPLANT Right 2018    CATARACT REMOVAL WITH IMPLANT Left 08/15/2018    COLONOSCOPY  2014    FEMUR FRACTURE SURGERY Right 2022    RIGHT INTRAMEDULLARY NAILING performed by Vilma Velez DO at [facility] (CERVIX STATUS UNKNOWN)      KNEE ARTHROSCOPY Bilateral     PARATHYROIDECTOMY      PARTIAL HYSTERECTOMY (CERVIX NOT REMOVED)      MD XCAPSL CTRC RMVL INSJ IO LENS PROSTH W/O ECP Right 2018    PHACOEMULSIFICATION OF CATARACT WITH INTRAOCULAR LENS IMPLANT RIGHT EYE performed by Aimee Mcmanus MD at [address] W/O ECP Left 8/15/2018    PHACOEMULSIFICATION OF CATARACT WITH INTRAOCULAR LENS IMPLANT LEFT EYE performed by Aimee Mcmanus MD at [place] [address] ENDOSCOPY         Social:   Social History     Tobacco Use    Smoking status: Passive Smoke Exposure - Never Smoker    Smokeless tobacco: Never Used    Tobacco comment: exposed to passive smoke for 18 yrs   Substance Use Topics    Alcohol use: No     Family:   Family History   Problem Relation Age of Onset    Heart Failure Mother     Emphysema Mother     Stroke Mother     Heart Failure Father     Hypertension Father     Heart Disease Father     High Blood Pressure Father     Cancer Maternal Aunt     Cancer Paternal Aunt     Cancer Maternal Grandmother     Asthma Neg Hx     Diabetes Neg Hx        Scheduled Medications:    vancomycin  1,500 mg IntraVENous Once    potassium chloride  20 mEq Oral Once    budesonide-formoterol  2 puff Inhalation BID    tiotropium  2 puff Inhalation Daily    carvedilol  6.25 mg Oral BID WC    doxycycline (VIBRAMYCIN) IV  100 mg IntraVENous Q12H    insulin lispro  0-12 Units SubCUTAneous TID WC    insulin lispro  0-6 Units SubCUTAneous Nightly    IVPB builder   IntraVENous Q24H    polyethylene glycol  17 g Oral BID    mupirocin   Nasal BID    vancomycin (VANCOCIN) intermittent dosing (placeholder)   Other RX Placeholder    meropenem  1,000 mg IntraVENous Q12H    atropine  1 mg IntraVENous Once    pantoprazole  40 mg IntraVENous Daily    [Held by provider] allopurinol  200 mg Oral Daily    atorvastatin  10 mg Oral Nightly    cetirizine  10 mg Oral Daily    [Held by provider] gabapentin  300 mg Oral BID    [Held by provider] losartan  100 mg Oral Daily    PARoxetine  20 mg Oral Daily    sodium chloride flush  5-40 mL IntraVENous 2 times per day       Current Medications:    Prior to Admission medications    Medication Sig Start Date End Date Taking?  Authorizing Provider   terazosin (HYTRIN) 2 MG capsule TAKE ONE CAPSULE BY MOUTH ONCE NIGHTLY 6/16/22   Narda Perea MD   omeprazole (PRILOSEC) 20 MG delayed release capsule TAKE ONE CAPSULE BY MOUTH TWICE A DAY 6/16/22   Narda Perea MD   alendronate (FOSAMAX) 70 MG tablet TAKE 1 TABLET BY MOUTH  WEEKLY WITH [facility], DRINK OR MEDS. STAY UPRIGHT FOR 30 MINS 6/6/22   Jolene Viveros, MD   verapamil (CALAN SR) 240 MG extended release tablet TAKE 1 TABLET BY MOUTH  DAILY 5/20/22   Davidance Jackeline, MD   Fluticasone furoate-vilanterol (BREO ELLIPTA) 200-25 MCG/INH AEPB inhaler INHALE ONE DOSE BY MOUTH DAILY 3/16/22   Davidance Jackeline, MD   Umeclidinium Bromide (INCRUSE ELLIPTA) 62.5 MCG/INH AEPB INHALE ONE PUFF BY MOUTH DAILY 3/16/22   Davidance Jackeline, MD   gabapentin (NEURONTIN) 300 MG capsule Take 1 capsule by mouth 2 times daily for 180 days. Intended supply: 90 days 3/16/22 9/12/22  Jolene Viveros, MD   blood glucose test strips (ASCENSIA AUTODISC VI;ONE TOUCH ULTRA TEST VI) strip Used to test once daily. DX: E11.9 3/16/22   Jolene Viveros, MD   albuterol sulfate HFA (PROAIR HFA) 108 (90 Base) MCG/ACT inhaler INHALE TWO PUFFS BY MOUTH EVERY 6 HOURS AS NEEDED FOR WHEEZING 11/12/21   Laurance Delay, MD   metFORMIN (GLUCOPHAGE) 1000 MG tablet TAKE 1 TABLET BY MOUTH  TWICE DAILY WITH MEALS 9/7/21 Laurance Delay, MD   PARoxetine (PAXIL) 10 MG tablet TAKE 1 TABLET BY MOUTH  DAILY 9/7/21 Laurance Delay, MD   atorvastatin (LIPITOR) 10 MG tablet TAKE 1 TABLET BY MOUTH AT  NIGHT 9/7/21 Laurance Delay, MD   allopurinol (ZYLOPRIM) 300 MG tablet TAKE 1 TABLET BY MOUTH  DAILY 9/7/21 Laurance Delay, MD   losartan (COZAAR) 100 MG tablet TAKE 1 TABLET BY MOUTH  DAILY 9/7/21 Laurance Delay, MD   glipiZIDE (GLUCOTROL) 10 MG tablet TAKE 1 TABLET BY MOUTH  TWICE DAILY BEFORE MEALS 9/7/21 Laurance Delay, MD   carvedilol (COREG) 6.25 MG tablet TAKE 1 TABLET BY MOUTH  TWICE DAILY 9/7/21 Laurance Delay, MD   clotrimazole-betamethasone (LOTRISONE) 1-0.05 % cream Apply topically 2 times daily.  6/10/21   Jolene Viveros, MD KENNEDY PO Take by mouth    Historical Provider, MD   acetaminophen (TYLENOL) 500 MG tablet Take 500 mg by mouth every 6 hours as needed for Pain    Historical Provider, MD   Coenzyme Q10 (CO Q 10 PO) Take by mouth    Historical Provider, MD   cetirizine (ZYRTEC) 10 MG tablet Take 10 mg by mouth daily    Historical Provider, MD   Cyanocobalamin (VITAMIN B 12 PO) Take 1,000 mcg by mouth daily. Historical Provider, MD   Ascorbic Acid (VITAMIN C) 500 MG CAPS Take  by mouth daily. Historical Provider, MD   vitamin E 400 UNIT capsule Take 400 Units by mouth daily. Historical Provider, MD   Calcium Carbonate-Vitamin D (CALCIUM + D PO) Take  by mouth daily.     Historical Provider, MD         Current Facility-Administered Medications:     0.9 % sodium chloride infusion, , IntraVENous, PRN, Cheryl Whittaker MD    vancomycin 1500 mg in dextrose 5% 300 mL IVPB, 1,500 mg, IntraVENous, Once, Odilon Pink MD, Last Rate: 150 mL/hr at 06/25/22 0742, 1,500 mg at 06/25/22 0742    potassium chloride (KLOR-CON M) extended release tablet 20 mEq, 20 mEq, Oral, Once, Odilon Pink MD    budesonide-formoterol [facility]) 160-4.5 MCG/ACT inhaler 2 puff, 2 puff, Inhalation, BID, Odilon Pink MD    tiotropium (SPIRIVA RESPIMAT) 2.5 MCG/ACT inhaler 2 puff, 2 puff, Inhalation, Daily, Odilon Pink MD    carvedilol (COREG) tablet 6.25 mg, 6.25 mg, Oral, BID Odilon SCHMID MD, 6.25 mg at 06/25/22 0753    doxycycline (VIBRAMYCIN) 100 mg in dextrose 5 % 100 mL IVPB, 100 mg, IntraVENous, Q12H, Odilon Pink MD, Last Rate: 100 mL/hr at 06/25/22 0752, 100 mg at 06/25/22 0752    insulin lispro (HUMALOG) injection vial 0-12 Units, 0-12 Units, SubCUTAneous, TID Odilon SCHMID MD, 4 Units at 06/24/22 1637    insulin lispro (HUMALOG) injection vial 0-6 Units, 0-6 Units, SubCUTAneous, Nightly, Odilon Pink MD, 2 Units at 06/24/22 2030    iron sucrose (VENOFER) 200 mg in sodium chloride 0.9 % 100 mL IVPB, , IntraVENous, Q24H, Dimas Cone MD [place], Stopped at 06/24/22 1148    morphine (PF) injection 2 mg, 2 mg, IntraVENous, Q2H PRN, Azam Crawford MD, 2 mg at 06/24/22 1751    polyethylene glycol (GLYCOLAX) packet 17 g, 17 g, Oral, BID, Azam Crawford MD, 17 g at 06/24/22 2017    metoprolol (LOPRESSOR) injection 2.5 mg, 2.5 mg, IntraVENous, Q6H PRN, Jessica Aguilera MD    magnesium sulfate 1000 mg in dextrose 5% 100 mL IVPB, 1,000 mg, IntraVENous, PRN, Jessica Aguilera MD, Paused at 06/24/22 0120    mupirocin (BACTROBAN) 2 % ointment, , Nasal, BID, Azam Crawford MD, Given at 06/24/22 2018    vancomycin (VANCOCIN) intermittent dosing (placeholder), , Other, RX Placeholder, Azam Crawford MD    meropenem (MERREM) 1,000 mg in sodium chloride 0.9 % 100 mL IVPB (mini-bag), 1,000 mg, IntraVENous, Q12H, Azam Crawford MD, Stopped at 06/24/22 2326    atropine injection 1 mg, 1 mg, IntraVENous, Once, Azam Crawford MD    pantoprazole (PROTONIX) injection 40 mg, 40 mg, IntraVENous, Daily, Azam Crawford MD, 40 mg at 06/24/22 0845    glucose chewable tablet 16 g, 4 tablet, Oral, PRN, Maggie Pavones, DO    dextrose bolus 10% 125 mL, 125 mL, IntraVENous, PRN **OR** dextrose bolus 10% 250 mL, 250 mL, IntraVENous, PRN, Maggie Bakes, DO    glucagon (rDNA) injection 1 mg, 1 mg, IntraMUSCular, PRN, Maggie Bakes, DO    dextrose 5 % solution, 100 mL/hr, IntraVENous, PRN, Maggie Sin, DO    [Held by provider] oxyCODONE (ROXICODONE) immediate release tablet 5 mg, 5 mg, Oral, Q4H PRN **OR** [Held by provider] oxyCODONE (ROXICODONE) immediate release tablet 10 mg, 10 mg, Oral, Q4H PRN, Maggie Sin, DO    [Held by provider] allopurinol (ZYLOPRIM) tablet 200 mg, 200 mg, Oral, Daily, Maggie Bakes, DO, 200 mg at 06/21/22 0825    atorvastatin (LIPITOR) tablet 10 mg, 10 mg, Oral, Nightly, Maggie Bakes, DO, 10 mg at 06/24/22 2018    cetirizine (ZYRTEC) tablet 10 mg, 10 mg, Oral, Daily, Maggie Bakes, DO, 10 mg at 06/24/22 0845    [Held by provider] gabapentin (NEURONTIN) capsule 300 mg, 300 mg, Oral, BID, Aleene Europe, DO, 300 mg at 06/21/22 8709    [Held by provider] losartan (COZAAR) tablet 100 mg, 100 mg, Oral, Daily, Aleene Europe, DO    PARoxetine (PAXIL) tablet 20 mg, 20 mg, Oral, Daily, Aleene Europe, DO, 20 mg at 06/24/22 0844    sodium chloride flush 0.9 % injection 5-40 mL, 5-40 mL, IntraVENous, 2 times per day, Aleene Europe, DO, 10 mL at 06/25/22 0754    sodium chloride flush 0.9 % injection 5-40 mL, 5-40 mL, IntraVENous, PRN, Aleene Europe, DO    0.9 % sodium chloride infusion, , IntraVENous, PRN, Aleene Europe, DO, Stopped at 06/24/22 0745    ondansetron (ZOFRAN-ODT) disintegrating tablet 4 mg, 4 mg, Oral, Q8H PRN **OR** ondansetron (ZOFRAN) injection 4 mg, 4 mg, IntraVENous, Q6H PRN, Aleene Europe, DO, 4 mg at 06/21/22 1443    albuterol sulfate HFA (PROVENTIL;VENTOLIN;PROAIR) 108 (90 Base) MCG/ACT inhaler 2 puff, 2 puff, Inhalation, Q4H PRN, Aleene Europe, DO      Infusions:    sodium chloride      dextrose      sodium chloride Stopped (06/24/22 0745)     PRN Medications: sodium chloride, morphine, metoprolol, magnesium sulfate, glucose, dextrose bolus **OR** dextrose bolus, glucagon (rDNA), dextrose, [Held by provider] oxyCODONE **OR** [Held by provider] oxyCODONE, sodium chloride flush, sodium chloride, ondansetron **OR** ondansetron, albuterol sulfate HFA  Allergies:    Allergies   Allergen Reactions    Hctz [Hydrochlorothiazide]      Increases calcium to unsafe level resulting in parathyroid surgery    Lisinopril Other (See Comments)     cough         Objective:     Physical Exam:   BP (!) 154/77   Pulse 86   Temp 99.2 °F (37.3 °C) (Bladder)   Resp 21   Ht 5' 3\" (1.6 m)   Wt 229 lb 4.5 oz (104 kg)   SpO2 97%   Breastfeeding No   BMI 40.61 kg/m²     HEENT: NCAT  Lungs: CTAB  CV: RRR  Abd: soft, ntd  Ext: dpi    Lab and Imaging Review   Labs:  CBC:   Recent Labs     06/23/22  2105 06/24/22  9082 06/25/22  0503   WBC 5.8 5.9 5.1   HGB 7.3* 7.3* 6.4*   HCT 22.0* 22.1* 18.9*   MCV 67.0* 66.0* 66.5*    186 182     BMP:   Recent Labs     06/23/22 2105 06/24/22  0445 06/25/22  0503    142 140   K 4.0 3.9 3.8   * 112* 109   CO2 21 21 24   PHOS 2.4* 3.2  --    BUN 24* 22* 23*   CREATININE 1.4* 1.3* 1.1     LIVER PROFILE:   Recent Labs     06/23/22 2105 06/24/22  0445 06/25/22  0503   * 217* 88*   * 502* 337*   PROT 4.0* 3.9* 3.9*   BILIDIR  --  0.3 0.3   BILITOT 0.7 0.6 0.6   ALKPHOS 119 111 98     PT/INR:   Recent Labs     06/24/22 0445   INR 1.21*       Pre-Procedure Assessment / Plan:  ASA: Class 3  Airway: Mallampati: II (soft palate, uvula, fauces visible)  Level of Sedation Plan: Moderate sedation  Post Procedure plan: Return to same level of care      Plan:   1. egd    I assessed the patient and find that the patient is in satisfactory condition to proceed with the planned procedure and sedation plan. I have explained the risk, benefits, and alternatives to the procedure; the patient understands and agrees to proceed.        Augusto Rivera, DO  6/25/2022

## 2022-06-25 NOTE — CONSULTS
[address]   Progress Note  Cardiology    Chief Complaint   Patient presents with    Hip Injury     Patient fell this am going to the bathroom. Patient states that her R leg from her hip to her knee. Patient's R leg is noticed to be shortned and rotated. HPI:Asked to see her for some short runs of PAF that occurred when her lytes were abnormal.  She is now in SR. She was previously on coreg and calan at home but had severe bradycardia earlier in this hospitalization. Echo shows normal LV function with LVH, JAMES and RVSP of 56. She is receiving blood and cannot be anticoagulated.  NO complaints this am.     Medications/Labs all Reviewed    Recent Labs     06/25/22  0503   WBC 5.1   HGB 6.4*   HCT 18.9*   MCV 66.5*        Recent Labs     06/25/22  0503   CREATININE 1.1   BUN 23*      K 3.8      CO2 24     Recent Labs     06/24/22  0445   INR 1.21*   PROTIME 15.2*        MEDICATIONS:    vancomycin  1,500 mg IntraVENous Once    potassium chloride  20 mEq Oral Once    budesonide-formoterol  2 puff Inhalation BID    tiotropium  2 puff Inhalation Daily    carvedilol  6.25 mg Oral BID WC    doxycycline (VIBRAMYCIN) IV  100 mg IntraVENous Q12H    insulin lispro  0-12 Units SubCUTAneous TID WC    insulin lispro  0-6 Units SubCUTAneous Nightly    IVPB builder   IntraVENous Q24H    polyethylene glycol  17 g Oral BID    mupirocin   Nasal BID    vancomycin (VANCOCIN) intermittent dosing (placeholder)   Other RX Placeholder    meropenem  1,000 mg IntraVENous Q12H    atropine  1 mg IntraVENous Once    pantoprazole  40 mg IntraVENous Daily    [Held by provider] allopurinol  200 mg Oral Daily    atorvastatin  10 mg Oral Nightly    cetirizine  10 mg Oral Daily    [Held by provider] gabapentin  300 mg Oral BID    [Held by provider] losartan  100 mg Oral Daily    PARoxetine  20 mg Oral Daily    sodium chloride flush  5-40 mL IntraVENous 2 times per day      sodium chloride      dextrose      sodium chloride Stopped (06/24/22 0745)       Physical Examination:    BP (!) 145/63   Pulse 86   Temp 99.2 °F (37.3 °C) (Bladder)   Resp 23   Ht 5' 3\" (1.6 m)   Wt 229 lb 4.5 oz (104 kg)   SpO2 95%   Breastfeeding No   BMI 40.61 kg/m²     Respiratory:  · Resp Assessment: Normal respiratory effort  · Resp Auscultation: Clear to auscultation bilaterally   Cardiovascular:  · Auscultation: regular rate and rhythm, normal S1S2, no murmur, rub or gallop  · Palpation:  Nl PMI  · JVP:  normal  · Extremities: No Edema  Abdomen:  · Soft, non-tender  · Normal bowel sounds  Extremities:  ·  No Cyanosis or Clubbing  Neurological/Psychiatric:  · Oriented to time, place, and person  · Non-anxious  Skin Warm and dry    Assessment:    Principal Problem:    Hip fracture requiring operative repair, left, closed, initial encounter ([facility] [address]    PAF - she has many possible etiologies for her AF, obesity, htn, pulmonary htn, and this was noted during metabolic abnormalities. For now will just increase the coreg dose and continue to monitor her. If this becomes more of a problem could consider restarting the CCB or amio since she cannot be anticoagulated due to bleeding and anemia.          Lali Sanders MD, 6/25/2022 9:05 AM

## 2022-06-26 LAB
ALBUMIN SERPL-MCNC: 2.4 G/DL (ref 3.4–5)
ALP BLD-CCNC: 93 U/L (ref 40–129)
ALT SERPL-CCNC: 215 U/L (ref 10–40)
ANION GAP SERPL CALCULATED.3IONS-SCNC: 7 MMOL/L (ref 3–16)
ANISOCYTOSIS: ABNORMAL
AST SERPL-CCNC: 39 U/L (ref 15–37)
BANDED NEUTROPHILS RELATIVE PERCENT: 1 % (ref 0–7)
BASOPHILS ABSOLUTE: 0 K/UL (ref 0–0.2)
BASOPHILS RELATIVE PERCENT: 0 %
BILIRUB SERPL-MCNC: 0.6 MG/DL (ref 0–1)
BILIRUBIN DIRECT: <0.2 MG/DL (ref 0–0.3)
BILIRUBIN, INDIRECT: ABNORMAL MG/DL (ref 0–1)
BUN BLDV-MCNC: 26 MG/DL (ref 7–20)
CALCIUM SERPL-MCNC: 8.3 MG/DL (ref 8.3–10.6)
CHLORIDE BLD-SCNC: 109 MMOL/L (ref 99–110)
CO2: 23 MMOL/L (ref 21–32)
CREAT SERPL-MCNC: 1 MG/DL (ref 0.6–1.2)
EOSINOPHILS ABSOLUTE: 0 K/UL (ref 0–0.6)
EOSINOPHILS RELATIVE PERCENT: 0 %
GFR AFRICAN AMERICAN: >60
GFR NON-AFRICAN AMERICAN: 54
GLUCOSE BLD-MCNC: 154 MG/DL (ref 70–99)
GLUCOSE BLD-MCNC: 179 MG/DL (ref 70–99)
GLUCOSE BLD-MCNC: 186 MG/DL (ref 70–99)
GLUCOSE BLD-MCNC: 254 MG/DL (ref 70–99)
GLUCOSE BLD-MCNC: 270 MG/DL (ref 70–99)
HCT VFR BLD CALC: 26.1 % (ref 36–48)
HEMOGLOBIN: 8.3 G/DL (ref 12–16)
HYPOCHROMIA: ABNORMAL
LYMPHOCYTES ABSOLUTE: 0.7 K/UL (ref 1–5.1)
LYMPHOCYTES RELATIVE PERCENT: 13 %
MAGNESIUM: 1.7 MG/DL (ref 1.8–2.4)
MCH RBC QN AUTO: 21.6 PG (ref 26–34)
MCHC RBC AUTO-ENTMCNC: 31.6 G/DL (ref 31–36)
MCV RBC AUTO: 68.1 FL (ref 80–100)
MICROCYTES: ABNORMAL
MONOCYTES ABSOLUTE: 0.7 K/UL (ref 0–1.3)
MONOCYTES RELATIVE PERCENT: 14 %
NEUTROPHILS ABSOLUTE: 3.9 K/UL (ref 1.7–7.7)
NEUTROPHILS RELATIVE PERCENT: 72 %
PDW BLD-RTO: 26.2 % (ref 12.4–15.4)
PERFORMED ON: ABNORMAL
PLATELET # BLD: 226 K/UL (ref 135–450)
PLATELET SLIDE REVIEW: ADEQUATE
PMV BLD AUTO: 9.1 FL (ref 5–10.5)
POLYCHROMASIA: ABNORMAL
POTASSIUM REFLEX MAGNESIUM: 4.1 MMOL/L (ref 3.5–5.1)
RBC # BLD: 3.83 M/UL (ref 4–5.2)
SLIDE REVIEW: ABNORMAL
SODIUM BLD-SCNC: 139 MMOL/L (ref 136–145)
TOTAL PROTEIN: 4.8 G/DL (ref 6.4–8.2)
VANCOMYCIN RANDOM: 18.1 UG/ML
WBC # BLD: 5.3 K/UL (ref 4–11)

## 2022-06-26 PROCEDURE — 94640 AIRWAY INHALATION TREATMENT: CPT

## 2022-06-26 PROCEDURE — 36592 COLLECT BLOOD FROM PICC: CPT

## 2022-06-26 PROCEDURE — 6370000000 HC RX 637 (ALT 250 FOR IP): Performed by: INTERNAL MEDICINE

## 2022-06-26 PROCEDURE — 99233 SBSQ HOSP IP/OBS HIGH 50: CPT | Performed by: INTERNAL MEDICINE

## 2022-06-26 PROCEDURE — 6370000000 HC RX 637 (ALT 250 FOR IP): Performed by: STUDENT IN AN ORGANIZED HEALTH CARE EDUCATION/TRAINING PROGRAM

## 2022-06-26 PROCEDURE — 80048 BASIC METABOLIC PNL TOTAL CA: CPT

## 2022-06-26 PROCEDURE — 94761 N-INVAS EAR/PLS OXIMETRY MLT: CPT

## 2022-06-26 PROCEDURE — 2500000003 HC RX 250 WO HCPCS: Performed by: INTERNAL MEDICINE

## 2022-06-26 PROCEDURE — 99232 SBSQ HOSP IP/OBS MODERATE 35: CPT | Performed by: INTERNAL MEDICINE

## 2022-06-26 PROCEDURE — 80076 HEPATIC FUNCTION PANEL: CPT

## 2022-06-26 PROCEDURE — 2580000003 HC RX 258: Performed by: STUDENT IN AN ORGANIZED HEALTH CARE EDUCATION/TRAINING PROGRAM

## 2022-06-26 PROCEDURE — 2700000000 HC OXYGEN THERAPY PER DAY

## 2022-06-26 PROCEDURE — 80202 ASSAY OF VANCOMYCIN: CPT

## 2022-06-26 PROCEDURE — 6360000002 HC RX W HCPCS: Performed by: INTERNAL MEDICINE

## 2022-06-26 PROCEDURE — 97110 THERAPEUTIC EXERCISES: CPT

## 2022-06-26 PROCEDURE — 2000000000 HC ICU R&B

## 2022-06-26 PROCEDURE — 2580000003 HC RX 258: Performed by: INTERNAL MEDICINE

## 2022-06-26 PROCEDURE — 85025 COMPLETE CBC W/AUTO DIFF WBC: CPT

## 2022-06-26 PROCEDURE — 83735 ASSAY OF MAGNESIUM: CPT

## 2022-06-26 PROCEDURE — C9113 INJ PANTOPRAZOLE SODIUM, VIA: HCPCS | Performed by: INTERNAL MEDICINE

## 2022-06-26 RX ORDER — MAGNESIUM SULFATE IN WATER 40 MG/ML
2000 INJECTION, SOLUTION INTRAVENOUS ONCE
Status: COMPLETED | OUTPATIENT
Start: 2022-06-26 | End: 2022-06-26

## 2022-06-26 RX ORDER — LEVOFLOXACIN 5 MG/ML
500 INJECTION, SOLUTION INTRAVENOUS EVERY 24 HOURS
Status: DISCONTINUED | OUTPATIENT
Start: 2022-06-26 | End: 2022-06-29

## 2022-06-26 RX ADMIN — MUPIROCIN: 20 OINTMENT TOPICAL at 20:49

## 2022-06-26 RX ADMIN — CARVEDILOL 12.5 MG: 6.25 TABLET, FILM COATED ORAL at 07:49

## 2022-06-26 RX ADMIN — VANCOMYCIN HYDROCHLORIDE 1250 MG: 10 INJECTION, POWDER, LYOPHILIZED, FOR SOLUTION INTRAVENOUS at 07:56

## 2022-06-26 RX ADMIN — SODIUM CHLORIDE, PRESERVATIVE FREE 10 ML: 5 INJECTION INTRAVENOUS at 20:48

## 2022-06-26 RX ADMIN — LEVOFLOXACIN 500 MG: 500 INJECTION, SOLUTION INTRAVENOUS at 14:31

## 2022-06-26 RX ADMIN — SODIUM CHLORIDE, PRESERVATIVE FREE 10 ML: 5 INJECTION INTRAVENOUS at 07:56

## 2022-06-26 RX ADMIN — MORPHINE SULFATE 2 MG: 2 INJECTION, SOLUTION INTRAMUSCULAR; INTRAVENOUS at 17:42

## 2022-06-26 RX ADMIN — MEROPENEM 1000 MG: 1 INJECTION, POWDER, FOR SOLUTION INTRAVENOUS at 10:30

## 2022-06-26 RX ADMIN — INSULIN LISPRO 2 UNITS: 100 INJECTION, SOLUTION INTRAVENOUS; SUBCUTANEOUS at 08:21

## 2022-06-26 RX ADMIN — INSULIN LISPRO 2 UNITS: 100 INJECTION, SOLUTION INTRAVENOUS; SUBCUTANEOUS at 20:50

## 2022-06-26 RX ADMIN — TIOTROPIUM BROMIDE INHALATION SPRAY 2 PUFF: 3.12 SPRAY, METERED RESPIRATORY (INHALATION) at 08:16

## 2022-06-26 RX ADMIN — ACETAMINOPHEN 650 MG: 325 TABLET ORAL at 20:58

## 2022-06-26 RX ADMIN — MUPIROCIN: 20 OINTMENT TOPICAL at 07:50

## 2022-06-26 RX ADMIN — PANTOPRAZOLE SODIUM 40 MG: 40 INJECTION, POWDER, LYOPHILIZED, FOR SOLUTION INTRAVENOUS at 07:49

## 2022-06-26 RX ADMIN — Medication 2 PUFF: at 19:59

## 2022-06-26 RX ADMIN — INSULIN LISPRO 2 UNITS: 100 INJECTION, SOLUTION INTRAVENOUS; SUBCUTANEOUS at 11:37

## 2022-06-26 RX ADMIN — MAGNESIUM SULFATE HEPTAHYDRATE 2000 MG: 40 INJECTION, SOLUTION INTRAVENOUS at 09:33

## 2022-06-26 RX ADMIN — Medication 2 PUFF: at 08:16

## 2022-06-26 RX ADMIN — POLYETHYLENE GLYCOL (3350) 17 G: 17 POWDER, FOR SOLUTION ORAL at 08:00

## 2022-06-26 RX ADMIN — DOXYCYCLINE 100 MG: 100 INJECTION, POWDER, LYOPHILIZED, FOR SOLUTION INTRAVENOUS at 07:49

## 2022-06-26 RX ADMIN — PAROXETINE HYDROCHLORIDE 20 MG: 20 TABLET, FILM COATED ORAL at 07:49

## 2022-06-26 RX ADMIN — CARVEDILOL 12.5 MG: 6.25 TABLET, FILM COATED ORAL at 16:42

## 2022-06-26 RX ADMIN — CETIRIZINE HYDROCHLORIDE 10 MG: 10 TABLET ORAL at 07:49

## 2022-06-26 RX ADMIN — ATORVASTATIN CALCIUM 10 MG: 10 TABLET, FILM COATED ORAL at 20:58

## 2022-06-26 RX ADMIN — INSULIN LISPRO 6 UNITS: 100 INJECTION, SOLUTION INTRAVENOUS; SUBCUTANEOUS at 16:46

## 2022-06-26 ASSESSMENT — PAIN SCALES - GENERAL: PAINLEVEL_OUTOF10: 6

## 2022-06-26 ASSESSMENT — PAIN DESCRIPTION - ORIENTATION: ORIENTATION: RIGHT;LEFT

## 2022-06-26 ASSESSMENT — PAIN DESCRIPTION - LOCATION: LOCATION: KNEE

## 2022-06-26 ASSESSMENT — PAIN DESCRIPTION - DESCRIPTORS: DESCRIPTORS: ACHING

## 2022-06-26 NOTE — PROGRESS NOTES
Occupational Therapy Daily Treatment Note    Unit: ICU  Date:  6/26/2022  Patient Name:    Rozina Gonzalez  Admitting diagnosis:  Hip fracture requiring operative repair, left, closed, initial encounter [facility] [place] Date:  6/19/2022  Precautions/Restrictions:  fall risk, IV, bed/chair alarm, bacon catheter, supplemental O2 (2L), WB restrictions (WBAT), telemetry, continuous pulse ox, ICU monitoring and code status (Full)     6/26 Pending RLE doppler. Pt is NOT anticoagulated. Cleared for bed level BUE only this date. Treatment Time:  1778-2016  Treatment number:  2    Total Treatment Time:   26 minutes    Patient Goals for Session:  \" to get moving \"   Educated on bed level only pending dopplers. Pt reports understanding. Discharge Recommendations: SNF  DME needs for discharge: defer to facility       Therapy recommendations for staff:  TBD - pending dopplers     History of Present Illness: per H&P    76 y. o. 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 Patient states she woke up early yesterday and went the restroom when her legs got tangled turning away from the sink and she fell to her right side. She developed immediate pain in her right hip and could not stand. Her pain is located just above her right leg and radiates in to her right groin. She lives alone and states she remained on the floor for approximately 4 hours. Luckily, she was meant to go to her family member's home that morning and when she didn't show up they called EMS. She denies dizziness, presyncope, LOC. She hit her chin on the sink but sustain acute intracranial or facial injuries. She states that since Friday evening she has had pain in the arch of her left foot which she believes contributed to her fall. She has not been walking more than usual and denies acute trauma to her left foot.  She states she thought maybe her flat feet contributed to this pain. She is not on anticoagulation. She has asthma but is not in exacerbation. 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 She states she believes her baseline hgb is 9. She was 8.9 on arrival and has dropped to 7.9 with IVF. She has no signs of acute bleeding. She denies history of blood clots in her leg or lung, MI, CVA, known CAD or aneurysm. She has never smoked but she states she has been around people who smoke all her life. She has had procedures in the past that have required general anesthesia and sustained no complications or sequelae.      Per Dr. Natalie Hinton note 6/22/22:  \"Ms. Thakur was poorly responsive yesterday and was receiving IV fluid boluses for hypotension.  She is planned to have additional 1 unit of red cells. She had another event of poor responsiveness and possible syncope.  Was associated with hypotension.  She was given 1 mg epinephrine for hypotension and transferred to the ICU  Continue to be hypotensive in the ICU.  Given another dose of epinephrine and started epinephrine drip.  Also placed on Levophed drip. She had to be intubated and started on mechanical ventilation as her saturation worsened. \"       Home Health S4 Level Recommendation:  NA    AM-PAC Score: AM-PAC Inpatient Daily Activity Raw Score: 14  Pt scored a 14/24 on the AM-PAC ADL Inpatient form. Current research shows that an AM-PAC score of 17 or less is typically not associated with a discharge to the patient's home setting. Subjective:  Pt supine in bed upon therapist arrival. Pt agreeable to work with therapy this date. Pain   No  Rating: NA  Location:   Pain Medicine Status: No request made      Cognition:    A&O Person, Place, Time and Situation   Able to follow 2 step commands, consistently.     Balance:  Functional Sitting Balance:  WFL   Comments: in bed, to maintain trunk at midline of bed for BUE exercises   Functional Standing Balance:NT   Comments:      Bed mobility:    Supine to sit:   Not Tested  Sit to supine:   Not Tested  Rolling:    Not Tested  Scooting in sitting:  Not Tested  Scooting to head of bed:   Not Tested   Bridging:   Not Tested    Transfers:   Cleared for bed level UE work only   Sit to stand:  Not Tested  Stand to sit:  Not Tested  Bed to chair:   Not Tested  Standard toilet: Not Tested  Bed to [facility]:  Not Tested    Activities of Daily Living:   UB Dressing:   minimal assistance (25%)  LB Dressing:    Not Tested  UB Bathing:  Not Tested  LB Bathing:  Not Tested  Feeding:  Not Tested  Grooming:   Not Tested  Toileting:  Not Tested    Activity Tolerance:   Pt completed therapy session with No adverse symptoms noted w/activity    Therapeutic Exercise:   Green theraband provided. Educated on HEP, pulling band apart in various planes. Pt demonstrated understanding. The following exercises completed this date:   1. Red theraband bicep curl: x15  2. Green theraband tricep extension: x15  3. Bilateral Green theraband external rotation: x15  4. Green theraband horizontal abduction: x15  5. Scapular retractions: x15   6. Shoulder shrugs: x15  7. Shoulder flexion: x15  8. Forward punches, alternating: x15  9. Overhead punches, alternating: x15     Patient Education:   Role of OT  Recommendations for DC  HEP    Positioning Needs: In bed, call light and needs in reach. Alarm Set    Family Present:  Yes - dtr     Assessment: Pt with good progress for BUE exercises. OOB session deferred related to pending RLE dopplers. Pt may benefit from continued skilled occupational therapy while in the hospital and upon d/c in order to progress to a safe and more indpt level of functioning. GOALS  To be met in 3 Visits:  1). Bed to toilet/BSC: Mod A  and 2 persons with RW      To be met in 5 Visits:  1). Supine to/from Sit:             Mod A   2). Upper Body Bathing:         Min A   3). Lower Body Bathing: Mod A   4).  Upper Body Dressing:       Min A (goal met 6/26/2022)   5). Lower Body Dressing: Mod A   6).  Pt to demonstrate UE exs x 15 reps with minimal cues (goal met 6/26/2022)       Plan: cont with POC      DILCIA Dyson, OTR/L   [id number]       If patient discharges from this facility prior to next visit, this note will serve as the Discharge Summary

## 2022-06-26 NOTE — PROGRESS NOTES
Vanco day 5:  Random Vanco this AM is 18.1. Will change to scheduled dose of 1250mg IV Q24 as SCr is back to 1.   Trough 6/29 at 0700 Pred , Tr 13.5, Tox 9%  Spenser Degree, [place] [facility] PharmD 6/26/2022 6:48 AM

## 2022-06-26 NOTE — PROGRESS NOTES
Patient care assumed, assessment completed as charted. Patient resting in bed, in no apparent distress. C/O 5/10 right knee pain, Morphine 2mg administered per PRN order. Bladder temp reading 100.9. [employer] message sent to Dr. Susan Crowder regarding. Order received for Tylenol 650mg, and administered. Mosqueda catheter patent. Patient states no further needs at this time. Will monitor.

## 2022-06-26 NOTE — PROGRESS NOTES
Hospitalist Progress Note      PCP: Hank Ruiz MD    Date of Admission: 6/19/2022    Subjective:       Feels well today. No signs of blood in stool s/p 3 BM in the past 2 days.             Medications:  Reviewed    Infusion Medications    sodium chloride Stopped (06/25/22 1008)    dextrose      sodium chloride 5 mL/hr at 06/26/22 0756     Scheduled Medications    vancomycin  1,250 mg IntraVENous Q24H    carvedilol  12.5 mg Oral BID WC    budesonide-formoterol  2 puff Inhalation BID    tiotropium  2 puff Inhalation Daily    doxycycline (VIBRAMYCIN) IV  100 mg IntraVENous Q12H    insulin lispro  0-12 Units SubCUTAneous TID WC    insulin lispro  0-6 Units SubCUTAneous Nightly    polyethylene glycol  17 g Oral BID    mupirocin   Nasal BID    meropenem  1,000 mg IntraVENous Q12H    atropine  1 mg IntraVENous Once    pantoprazole  40 mg IntraVENous Daily    [Held by provider] allopurinol  200 mg Oral Daily    atorvastatin  10 mg Oral Nightly    cetirizine  10 mg Oral Daily    [Held by provider] gabapentin  300 mg Oral BID    [Held by provider] losartan  100 mg Oral Daily    PARoxetine  20 mg Oral Daily    sodium chloride flush  5-40 mL IntraVENous 2 times per day     PRN Meds: sodium chloride, acetaminophen, morphine, metoprolol, magnesium sulfate, glucose, dextrose bolus **OR** dextrose bolus, glucagon (rDNA), dextrose, [Held by provider] oxyCODONE **OR** [Held by provider] oxyCODONE, sodium chloride flush, sodium chloride, ondansetron **OR** ondansetron, albuterol sulfate HFA      Intake/Output Summary (Last 24 hours) at 6/26/2022 1359  Last data filed at 6/26/2022 1135  Gross per 24 hour   Intake 1073.35 ml   Output 1675 ml   Net -601.65 ml       Physical Exam Performed:    BP (!) 127/49   Pulse 78   Temp 99.3 °F (37.4 °C) (Bladder)   Resp 23   Ht 5' 3\" (1.6 m)   Wt 250 lb 14.1 oz (113.8 kg)   SpO2 98%   Breastfeeding No   BMI 44.44 kg/m²       General:  Awake  Mucous Membranes:  Pale , anicteric  Neck: No JVD, no carotid bruit, no thyromegaly  Chest:  Clear to auscultation bilaterally, no added sounds  Cardiovascular:  RRR S1S2 heard, no murmurs or gallops  Abdomen:  Soft, undistended, non tender, no organomegaly, BS present  Extremities: left ankle diffuse swelling and tenderness noted  Right hip dressing dry , bilateral knee arthritis with limited movements to both LE with weakness noted   No edema or cyanosis. Distal pulses well felt  Neurological : Awake, following commands       Labs:   Recent Labs     06/24/22 0445 06/24/22  0445 06/25/22  0503 06/25/22  1055 06/26/22  0600   WBC 5.9  --  5.1  --  5.3   HGB 7.3*   < > 6.4* 7.5* 8.3*   HCT 22.1*   < > 18.9* 23.2* 26.1*     --  182  --  226    < > = values in this interval not displayed. Recent Labs     06/23/22 2105 06/23/22 2105 06/24/22 0445 06/25/22  0503 06/26/22  0600      < > 142 140 139   K 4.0  --  3.9 3.8 4.1   *   < > 112* 109 109   CO2 21   < > 21 24 23   BUN 24*   < > 22* 23* 26*   CREATININE 1.4*   < > 1.3* 1.1 1.0   CALCIUM 7.3*   < > 7.5* 7.8* 8.3   PHOS 2.4*  --  3.2  --   --     < > = values in this interval not displayed. Recent Labs     06/24/22 0445 06/25/22  0503 06/26/22  0600   * 88* 39*   * 337* 215*   BILIDIR 0.3 0.3 <0.2   BILITOT 0.6 0.6 0.6   ALKPHOS 111 98 93     Recent Labs     06/24/22 0445   INR 1.21*     Recent Labs     06/23/22 2105 06/24/22 0445   TROPONINI <0.01 <0.01       Urinalysis:      Lab Results   Component Value Date    NITRU POSITIVE 06/20/2022    WBCUA >100 06/20/2022    BACTERIA 2+ 08/04/2016    RBCUA see below 06/20/2022    BLOODU MODERATE 06/20/2022    SPECGRAV >=1.030 06/20/2022    GLUCOSEU Negative 06/20/2022       Radiology:  XR CHEST PORTABLE   Final Result   No significant interval improvement. Bibasilar atelectasis versus   infiltrates. Trace pleural effusions.          XR CHEST PORTABLE   Final Result   ET tube in satisfactory position 4.6 cm above the terrance. Bibasilar atelectasis. Cardiomegaly. CTA PULMONARY W CONTRAST   Final Result   No evidence for pulmonary embolism      Considerable subsegmental atelectatic changes the lung bases without separate   consolidation of airspace disease clearly evident or pleural effusion      Right upper lobe 12 mm noncalcified pulmonary nodule with attention on   follow-up CT considered within 3-6 months especially if there is high risk   smoking history or high risk stratification      No acute findings of the abdomen or pelvis identified         CT ABDOMEN PELVIS WO CONTRAST Additional Contrast? None   Final Result   No evidence for pulmonary embolism      Considerable subsegmental atelectatic changes the lung bases without separate   consolidation of airspace disease clearly evident or pleural effusion      Right upper lobe 12 mm noncalcified pulmonary nodule with attention on   follow-up CT considered within 3-6 months especially if there is high risk   smoking history or high risk stratification      No acute findings of the abdomen or pelvis identified         XR CHEST PORTABLE   Final Result   1. Endotracheal tube and enteric tubes properly placed. 2.  Mild left basilar airspace disease and probable trace left pleural   effusion. XR HIP 2-3 VW W PELVIS RIGHT   Final Result   1. Status post ORIF of the right hip intertrochanteric fracture. No   immediate complication. FLUORO FOR SURGICAL PROCEDURES   Final Result   Intraprocedural fluoroscopic spot images as above. See separate procedure   report for more information. XR FOOT LEFT (MIN 3 VIEWS)   Final Result   Degenerative changes in the left foot with no acute abnormality. XR CHEST PORTABLE   Final Result   No acute cardiopulmonary disease. XR HIP 2-3 VW W PELVIS RIGHT   Final Result   Moderately displaced fracture of the proximal right femur.          CTA ABDOMINAL AORTA W BILAT RUNOFF W CONTRAST   Final Result   1. Traumatic, acute right hip intertrochanteric fracture with varus   angulation and small surrounding hematoma. 2. No pseudoaneurysm or active extravasation is identified adjacent to the   fracture. 3. Aortoiliac inflow is widely patent. There is a mild amount of fibro   calcified plaque. 4. Femoropopliteal segments are patent bilaterally, noting fibro calcified   plaque. There is 3 vessel runoff bilaterally. However, on the right side   there is only 1 primary runoff vessel, the posterior tibial.   5. No acute abdominopelvic abnormality. Hepatic steatosis. CT FACIAL BONES WO CONTRAST   Final Result   No acute intracranial abnormality. No acute facial fractures. CT Head WO Contrast   Final Result   No acute intracranial abnormality. No acute facial fractures. XR FEMUR RIGHT (MIN 2 VIEWS)   Final Result   Traumatic, acute intertrochanteric fracture of the right hip. Advanced osteoarthritis of the knee. Moderate-sized knee joint effusion. VL Extremity Venous Right    (Results Pending)           Assessment/Plan:    Active Hospital Problems    Diagnosis     Bradycardia [R00.1]      Priority: Medium    Hypotension [I95.9]      Priority: Medium    Acute blood loss anemia [D62]      Priority: Medium    Closed 2-part intertrochanteric fracture of proximal end of right femur ([address] [S72.141A]      Priority: Medium    Shock ([address] [R57.9]      Priority: Medium    Acute hypoxemic respiratory failure (HCC) [J96.01]      Priority: Medium    Hip fracture requiring operative repair, left, closed, initial encounter ([address] Miguel Gray      Priority: Medium    Benign essential HTN [I10]     Anemia [D64.9]          Shock. Syncope. Septic versus cardiogenic versus hypovolemic vs hemorrhagic. Aggressive hydration with IV fluids. Required epinephrine and Levophed drip - has been off through the weekend.    Was transfused 3 units of red cells. Blood cultures negative so far. Urine culture positive for E. coli  Echocardiogram-normal EF  Started on IV Doxy, meropenem and vancomycin pending cultures  off all pressors. - deescalate Abx to levaquin 6/26/2022       Acute hypoxic respiratory failure. Critical care physician following. Did well with SBT, extubated 6/23/22      Par a fib -   Cardiology involved.        #Intertrochanteric hip fracture; right  #Mechanical fall  -Osteoporosis contributing   - admitted to medical floor for surgical mx and pain control  - ortho surgery consulted , s.p IM nailing of right hip   - pain control to avoid drowsiness  - dvt prophylaxis  - early ambulation,  - cont PT as tolerates.        #Left foot pain- likely OA  -No acute fracture per imaging   - uric acid wnl          #HTN   -BP initially  elevated in the setting of pain  -Continued on  verapamil, terazosin, cozaar and coreg- adjust as needed  -Hydralazine PRN  All antihypertensive medications were held given severe hypotension.       #Acute blood loss anemia  - expected with major fractures and surgery   - also has chronic Anemia 2/2 thalassemia minor   -Hgb baseline appears to be 9-10; patient states 9  -8.9--7.9 >6.5  A total of 3 units of red cells have been transfused. Drop in H&H again 6/23  Obtain hematology consult  Add LDH and haptoglobin levels       Mild MARVIN secondary to hypotension. Resolving.       #DMII   On sliding scale insulin       #Asthma   -Continue home inhalers       #HLD  -Continue statin       #Osteoporosis  -Receives Fosamax weekly - holding       #GERD   -On PPI       #Gout   -Continue allopurinol        DVT Prophylaxis: SCDs  Diet: ADULT DIET;  Regular  Code Status: Full Code    PT/OT Eval Status: ordered    Dispo - ok for PCU    Sadia Morelos MD

## 2022-06-26 NOTE — PROGRESS NOTES
Patient awake alert, oriented, VSS, on o2 at 2 lpm nasal cannula, denies pain at present, setup with breakfast tray, feeds self, Assessment completed, awaiting care rounds.

## 2022-06-26 NOTE — PROGRESS NOTES
Pulmonary & Critical Care Medicine ICU Progress Note    CC: ORIF R hip, syncope post op D#2 with severe hypoxemia and shock     Events of Last 24 hours:   EGD with gastric polyps without active bleeding  Brown stools, no blood    Invasive Lines:   Left femoral CVC 2022    MV: 2022 - 22  Vent Mode: AC/VC Resp Rate (Set): 22 bmp/Vt (Set, mL): 470 mL/ /FiO2 : 50 %  No results for input(s): PHART, BLW3TIE, PO2ART in the last 72 hours. IV:   sodium chloride Stopped (22 1008)    dextrose      sodium chloride 5 mL/hr at 22 0756       Vitals:  Blood pressure (!) 120/55, pulse 69, temperature 98.9 °F (37.2 °C), temperature source Bladder, resp. rate 19, height 5' 3\" (1.6 m), weight 250 lb 14.1 oz (113.8 kg), SpO2 98 %, not currently breastfeeding. on 2 L  Temp  Av.4 °F (37.4 °C)  Min: 98.9 °F (37.2 °C)  Max: 100.9 °F (38.3 °C)    Intake/Output Summary (Last 24 hours) at 2022 0832  Last data filed at 2022 0756  Gross per 24 hour   Intake 1393.12 ml   Output 1875 ml   Net -481.88 ml     EXAM:  General: NAD  Eyes: PERRL. No sclera icterus. No conjunctival injection. ENT: No discharge. Pharynx clear. Neck: Trachea midline. Normal thyroid. Resp: No accessory muscle use. No crackles. No wheezing. No rhonchi. No dullness on percussion. CV: Regular rate. Regular rhythm. No mumur or rub. + edema. GI: Non-tender. Non-distended. No masses. No organomegaly. Normal bowel sounds. No hernia. Skin: Warm and dry. No nodule on exposed extremities. No rash on exposed extremities. Lymph: No cervical LAD. No supraclavicular LAD. M/S: No cyanosis. No joint deformity. No clubbing. Neuro: A&OX3. Patellar reflexes are symmetric. Psych: No agitation, no anxiety, affect is full.     Scheduled Meds:   vancomycin  1,250 mg IntraVENous Q24H    carvedilol  12.5 mg Oral BID WC    budesonide-formoterol  2 puff Inhalation BID    tiotropium  2 puff Inhalation Daily    doxycycline (VIBRAMYCIN) IV  100 mg IntraVENous Q12H    insulin lispro  0-12 Units SubCUTAneous TID WC    insulin lispro  0-6 Units SubCUTAneous Nightly    polyethylene glycol  17 g Oral BID    mupirocin   Nasal BID    meropenem  1,000 mg IntraVENous Q12H    atropine  1 mg IntraVENous Once    pantoprazole  40 mg IntraVENous Daily    [Held by provider] allopurinol  200 mg Oral Daily    atorvastatin  10 mg Oral Nightly    cetirizine  10 mg Oral Daily    [Held by provider] gabapentin  300 mg Oral BID    [Held by provider] losartan  100 mg Oral Daily    PARoxetine  20 mg Oral Daily    sodium chloride flush  5-40 mL IntraVENous 2 times per day     PRN Meds:  sodium chloride, acetaminophen, morphine, metoprolol, magnesium sulfate, glucose, dextrose bolus **OR** dextrose bolus, glucagon (rDNA), dextrose, [Held by provider] oxyCODONE **OR** [Held by provider] oxyCODONE, sodium chloride flush, sodium chloride, ondansetron **OR** ondansetron, albuterol sulfate HFA    Results:  CBC:   Recent Labs     06/24/22 0445 06/24/22 0445 06/25/22  0503 06/25/22  1055 06/26/22  0600   WBC 5.9  --  5.1  --  5.3   HGB 7.3*   < > 6.4* 7.5* 8.3*   HCT 22.1*   < > 18.9* 23.2* 26.1*   MCV 66.0*  --  66.5*  --  68.1*     --  182  --  226    < > = values in this interval not displayed. BMP:   Recent Labs     06/23/22 2105 06/23/22 2105 06/24/22 0445 06/25/22  0503 06/26/22  0600      < > 142 140 139   K 4.0  --  3.9 3.8 4.1   *   < > 112* 109 109   CO2 21   < > 21 24 23   PHOS 2.4*  --  3.2  --   --    BUN 24*   < > 22* 23* 26*   CREATININE 1.4*   < > 1.3* 1.1 1.0    < > = values in this interval not displayed.      LIVER PROFILE:   Recent Labs     06/24/22 0445 06/25/22  0503 06/26/22  0600   * 88* 39*   * 337* 215*   BILIDIR 0.3 0.3 <0.2   BILITOT 0.6 0.6 0.6   ALKPHOS 111 98 93     Cultures:      6/19/22 SARS-CoV-2 NAAT negative  6/20/22 Urine E coli 100K pansensitive  6/22/2022 tracheal aspirate NRF    Chest imaging was reviewed by me and showed:   6/22/22 CXR ETT okay     Echo 6/22/2022 EF 60%, mild LVH, grade 1 diastolic dysfunction, SPAP 56, mild AS    ASSESSMENT:  · Shock: overal unclear etiology of her rapid severe progressive respiratory and cardiovascular failure  · Acute hypoxemic respiratory failure   · Transient AFIB RVR  · Acute kidney failure/ATN - improving  · Syncope  · Acute blood loss anemia - s/p 4 U PRBC on 6/20/22, 1 U PRBC on 6/25/22; s/p 2 U FFP on 6/20/22 - etiology is unclear  · Acute transaminitis 2/2 shock, improving  · Electrolyte disorder, relatively severe hypomagnesemia  · Post op RIGHT IM hip nailing on 6/20/22  · Thalassemia minor  · DM with hyperglycemia  · Asthma  · Gout    PLAN:  Supplemental oxygen to maintain SaO2 >92%; wean as tolerated    Follow serial hemoglobin    Merrem/Vanc D#6/7, doxycycline day #4/7  Monitoring of vancomycin levels to prevent toxicity.    SSI - medium   Cardiology for A. Fib, on coreg  Gastroenterology has seen, EGD with gastric polyps w/o active bleeding   Hematology has seen   F/U RLE u/s per orthopaedic surgery   Prophylaxis: DVT - SCD; MRSA - mupirocin; GI - on protonix IV

## 2022-06-26 NOTE — CONSULTS
Cardiovascular:  · Auscultation: regular rate and rhythm, normal S1S2, no murmur, rub or gallop  · Palpation:  Nl PMI  · JVP:  normal  · Extremities: No Edema  Abdomen:  · Soft, non-tender  · Normal bowel sounds  Extremities:  ·  No Cyanosis or Clubbing  Neurological/Psychiatric:  · Oriented to time, place, and person  · Non-anxious  Skin Warm and dry    Assessment:    Principal Problem:    Hip fracture requiring operative repair, left, closed, initial encounter ([facility] [address]    PAF - she has many possible etiologies for her AF, obesity, htn, pulmonary htn, and this was noted during metabolic abnormalities. For now continue to monitor her on coreg alone. If this becomes more of a problem could consider restarting the CCB or amio since she cannot be anticoagulated due to bleeding and anemia.          Andres Bradshaw MD, 6/26/2022 1:02 PM

## 2022-06-26 NOTE — PROGRESS NOTES
PROGRESS NOTE  S:76 yrs Patient  admitted on 6/19/2022 with Hip fracture requiring operative repair, left, closed, initial encounter ([facility] [address] Addison Salamanca . No gross bleeding    Current Hospital Schedued Meds   levofloxacin  500 mg IntraVENous Q24H    carvedilol  12.5 mg Oral BID WC    budesonide-formoterol  2 puff Inhalation BID    tiotropium  2 puff Inhalation Daily    insulin lispro  0-12 Units SubCUTAneous TID WC    insulin lispro  0-6 Units SubCUTAneous Nightly    polyethylene glycol  17 g Oral BID    mupirocin   Nasal BID    atropine  1 mg IntraVENous Once    pantoprazole  40 mg IntraVENous Daily    [Held by provider] allopurinol  200 mg Oral Daily    atorvastatin  10 mg Oral Nightly    cetirizine  10 mg Oral Daily    [Held by provider] gabapentin  300 mg Oral BID    [Held by provider] losartan  100 mg Oral Daily    PARoxetine  20 mg Oral Daily    sodium chloride flush  5-40 mL IntraVENous 2 times per day     Current Hospital IV Meds   sodium chloride Stopped (06/25/22 1008)    dextrose      sodium chloride 5 mL/hr at 06/26/22 0756     Current Hospital PRN Meds  sodium chloride, acetaminophen, morphine, metoprolol, magnesium sulfate, glucose, dextrose bolus **OR** dextrose bolus, glucagon (rDNA), dextrose, [Held by provider] oxyCODONE **OR** [Held by provider] oxyCODONE, sodium chloride flush, sodium chloride, ondansetron **OR** ondansetron, albuterol sulfate HFA    Exam:   Vitals:    06/26/22 1600   BP: (!) 147/66   Pulse: 81   Resp: 15   Temp: 100 °F (37.8 °C)   SpO2: 93%     I/O last 3 completed shifts: In: 1344.8 [P.O.:60; I.V.:68.8;  Blood:300; IV [id number]]  Out: 1625 [Urine:1625]   General appearance: alert, appears stated age and cooperative  HEENT: PERRLA  Neck: no adenopathy, no carotid bruit, no JVD, supple, symmetrical, trachea midline and thyroid not enlarged, symmetric, no tenderness/mass/nodules  Lungs: clear to auscultation bilaterally  Heart: regular rate and rhythm, S1, S2 normal, no murmur, click, rub or gallop  Abdomen: soft, non-tender; bowel sounds normal; no masses,  no organomegaly  Extremities: extremities normal, atraumatic, no cyanosis or edema     Labs:  CBC:   Recent Labs     06/24/22  0445 06/24/22  0445 06/25/22  0503 06/25/22  1055 06/26/22  0600   WBC 5.9  --  5.1  --  5.3   HGB 7.3*   < > 6.4* 7.5* 8.3*   HCT 22.1*   < > 18.9* 23.2* 26.1*   MCV 66.0*  --  66.5*  --  68.1*     --  182  --  226    < > = values in this interval not displayed. BMP:   Recent Labs     06/23/22  2105 06/23/22  2105 06/24/22  0445 06/25/22  0503 06/26/22  0600      < > 142 140 139   K 4.0  --  3.9 3.8 4.1   *   < > 112* 109 109   CO2 21   < > 21 24 23   PHOS 2.4*  --  3.2  --   --    BUN 24*   < > 22* 23* 26*   CREATININE 1.4*   < > 1.3* 1.1 1.0    < > = values in this interval not displayed. LIVER PROFILE:   Recent Labs     06/24/22 0445 06/25/22  0503 06/26/22  0600   * 88* 39*   * 337* 215*   PROT 3.9* 3.9* 4.8*   BILIDIR 0.3 0.3 <0.2   BILITOT 0.6 0.6 0.6   ALKPHOS 111 98 93     PT/INR:   Recent Labs     06/24/22 0445   INR 1.21*       IMAGING:  No results found. Hospital Problems           Last Modified POA    * (Principal) Hip fracture requiring operative repair, left, closed, initial encounter ([facility] [address] 6/21/2022 Yes    Acute blood loss anemia 6/21/2022 Yes    Closed 2-part intertrochanteric fracture of proximal end of right femur ([address] 6/21/2022 Yes    Shock ([address] 6/21/2022 Yes    Acute hypoxemic respiratory failure ([facility] [address] 6/21/2022 Yes    Bradycardia 6/22/2022 Yes    Hypotension 6/23/2022 Yes    Benign essential HTN 6/20/2022 Yes    Anemia 6/22/2022 Yes         Impression:  68 yo female with ORIF R hip, syncope POD#3 with microcytic anemia. No reported gross bleeding. States had colonoscopy in 2014 which was normal.    Recommendation:  1. No gross GI bleeding.   Anemia likely multifactorial. History of hip fracture, thalassemia. Hematology consulted. Will be difficult to perform colonoscopy given recent surgery due to lack of mobility for prep.       Abhishek Earl DO  4:21 PM 6/26/2022            [address] [place] [address]    [address]      [address] [facility]     Phone: [phone]     Fax: [phone]

## 2022-06-26 NOTE — PLAN OF CARE
Problem: Discharge Planning  Goal: Discharge to home or other facility with appropriate resources  Outcome: Progressing     Problem: Pain  Goal: Verbalizes/displays adequate comfort level or baseline comfort level  Outcome: Progressing     Problem: Skin/Tissue Integrity  Goal: Absence of new skin breakdown  Description: 1. Monitor for areas of redness and/or skin breakdown  2. Assess vascular access sites hourly  3. Every 4-6 hours minimum:  Change oxygen saturation probe site  4. Every 4-6 hours:  If on nasal continuous positive airway pressure, respiratory therapy assess nares and determine need for appliance change or resting period. Outcome: Progressing  Note: Patient turned/repositioned every 2 hours and as needed to maintain and improve skin integrity. Problem: Safety - Adult  Goal: Free from fall injury  Outcome: Progressing  Note: Fall precautions in place.       Problem: ABCDS Injury Assessment  Goal: Absence of physical injury  Outcome: Progressing     Problem: Chronic Conditions and Co-morbidities  Goal: Patient's chronic conditions and co-morbidity symptoms are monitored and maintained or improved  Outcome: Progressing     Problem: Nutrition Deficit:  Goal: Optimize nutritional status  Outcome: Progressing

## 2022-06-27 ENCOUNTER — APPOINTMENT (OUTPATIENT)
Dept: VASCULAR LAB | Age: 77
DRG: 480 | End: 2022-06-27
Payer: MEDICARE

## 2022-06-27 PROBLEM — I48.0 PAROXYSMAL ATRIAL FIBRILLATION (HCC): Status: ACTIVE | Noted: 2022-06-27

## 2022-06-27 LAB
ALBUMIN SERPL-MCNC: 2.4 G/DL (ref 3.4–5)
ALP BLD-CCNC: 89 U/L (ref 40–129)
ALT SERPL-CCNC: 141 U/L (ref 10–40)
ANION GAP SERPL CALCULATED.3IONS-SCNC: 10 MMOL/L (ref 3–16)
AST SERPL-CCNC: 28 U/L (ref 15–37)
BASOPHILS ABSOLUTE: 0 K/UL (ref 0–0.2)
BASOPHILS RELATIVE PERCENT: 0.3 %
BILIRUB SERPL-MCNC: 0.7 MG/DL (ref 0–1)
BILIRUBIN DIRECT: <0.2 MG/DL (ref 0–0.3)
BILIRUBIN, INDIRECT: ABNORMAL MG/DL (ref 0–1)
BUN BLDV-MCNC: 28 MG/DL (ref 7–20)
CALCIUM SERPL-MCNC: 8.5 MG/DL (ref 8.3–10.6)
CHLORIDE BLD-SCNC: 106 MMOL/L (ref 99–110)
CO2: 23 MMOL/L (ref 21–32)
CREAT SERPL-MCNC: 1 MG/DL (ref 0.6–1.2)
EOSINOPHILS ABSOLUTE: 0.2 K/UL (ref 0–0.6)
EOSINOPHILS RELATIVE PERCENT: 4 %
GFR AFRICAN AMERICAN: >60
GFR NON-AFRICAN AMERICAN: 54
GLUCOSE BLD-MCNC: 161 MG/DL (ref 70–99)
GLUCOSE BLD-MCNC: 179 MG/DL (ref 70–99)
GLUCOSE BLD-MCNC: 187 MG/DL (ref 70–99)
GLUCOSE BLD-MCNC: 197 MG/DL (ref 70–99)
GLUCOSE BLD-MCNC: 215 MG/DL (ref 70–99)
HCT VFR BLD CALC: 22.9 % (ref 36–48)
HEMOGLOBIN: 7.5 G/DL (ref 12–16)
LYMPHOCYTES ABSOLUTE: 0.9 K/UL (ref 1–5.1)
LYMPHOCYTES RELATIVE PERCENT: 14.7 %
MCH RBC QN AUTO: 22.2 PG (ref 26–34)
MCHC RBC AUTO-ENTMCNC: 32.8 G/DL (ref 31–36)
MCV RBC AUTO: 67.8 FL (ref 80–100)
MONOCYTES ABSOLUTE: 0.9 K/UL (ref 0–1.3)
MONOCYTES RELATIVE PERCENT: 15.5 %
NEUTROPHILS ABSOLUTE: 3.9 K/UL (ref 1.7–7.7)
NEUTROPHILS RELATIVE PERCENT: 65.5 %
PDW BLD-RTO: 26.1 % (ref 12.4–15.4)
PERFORMED ON: ABNORMAL
PLATELET # BLD: 263 K/UL (ref 135–450)
PMV BLD AUTO: 9 FL (ref 5–10.5)
POTASSIUM REFLEX MAGNESIUM: 4.1 MMOL/L (ref 3.5–5.1)
RBC # BLD: 3.38 M/UL (ref 4–5.2)
SODIUM BLD-SCNC: 139 MMOL/L (ref 136–145)
TOTAL PROTEIN: 4.7 G/DL (ref 6.4–8.2)
TSH REFLEX FT4: 2.18 UIU/ML (ref 0.27–4.2)
WBC # BLD: 6 K/UL (ref 4–11)

## 2022-06-27 PROCEDURE — 99233 SBSQ HOSP IP/OBS HIGH 50: CPT | Performed by: INTERNAL MEDICINE

## 2022-06-27 PROCEDURE — 84132 ASSAY OF SERUM POTASSIUM: CPT

## 2022-06-27 PROCEDURE — 6370000000 HC RX 637 (ALT 250 FOR IP): Performed by: INTERNAL MEDICINE

## 2022-06-27 PROCEDURE — 94761 N-INVAS EAR/PLS OXIMETRY MLT: CPT

## 2022-06-27 PROCEDURE — 84450 TRANSFERASE (AST) (SGOT): CPT

## 2022-06-27 PROCEDURE — 36415 COLL VENOUS BLD VENIPUNCTURE: CPT

## 2022-06-27 PROCEDURE — 84443 ASSAY THYROID STIM HORMONE: CPT

## 2022-06-27 PROCEDURE — C9113 INJ PANTOPRAZOLE SODIUM, VIA: HCPCS | Performed by: INTERNAL MEDICINE

## 2022-06-27 PROCEDURE — 84460 ALANINE AMINO (ALT) (SGPT): CPT

## 2022-06-27 PROCEDURE — 36592 COLLECT BLOOD FROM PICC: CPT

## 2022-06-27 PROCEDURE — 6370000000 HC RX 637 (ALT 250 FOR IP): Performed by: STUDENT IN AN ORGANIZED HEALTH CARE EDUCATION/TRAINING PROGRAM

## 2022-06-27 PROCEDURE — 1200000000 HC SEMI PRIVATE

## 2022-06-27 PROCEDURE — 80076 HEPATIC FUNCTION PANEL: CPT

## 2022-06-27 PROCEDURE — 6360000002 HC RX W HCPCS: Performed by: INTERNAL MEDICINE

## 2022-06-27 PROCEDURE — 6360000002 HC RX W HCPCS: Performed by: STUDENT IN AN ORGANIZED HEALTH CARE EDUCATION/TRAINING PROGRAM

## 2022-06-27 PROCEDURE — 94640 AIRWAY INHALATION TREATMENT: CPT

## 2022-06-27 PROCEDURE — 80048 BASIC METABOLIC PNL TOTAL CA: CPT

## 2022-06-27 PROCEDURE — 85025 COMPLETE CBC W/AUTO DIFF WBC: CPT

## 2022-06-27 PROCEDURE — 2580000003 HC RX 258: Performed by: INTERNAL MEDICINE

## 2022-06-27 PROCEDURE — 93971 EXTREMITY STUDY: CPT

## 2022-06-27 PROCEDURE — 6370000000 HC RX 637 (ALT 250 FOR IP): Performed by: PHYSICIAN ASSISTANT

## 2022-06-27 RX ORDER — ALLOPURINOL 100 MG/1
200 TABLET ORAL DAILY
Status: DISCONTINUED | OUTPATIENT
Start: 2022-06-27 | End: 2022-06-30 | Stop reason: HOSPADM

## 2022-06-27 RX ORDER — LOSARTAN POTASSIUM 100 MG/1
100 TABLET ORAL DAILY
Status: DISCONTINUED | OUTPATIENT
Start: 2022-06-27 | End: 2022-06-30 | Stop reason: HOSPADM

## 2022-06-27 RX ORDER — GABAPENTIN 300 MG/1
300 CAPSULE ORAL 2 TIMES DAILY
Status: DISCONTINUED | OUTPATIENT
Start: 2022-06-27 | End: 2022-06-30 | Stop reason: HOSPADM

## 2022-06-27 RX ORDER — FERROUS SULFATE 325(65) MG
325 TABLET ORAL 2 TIMES DAILY WITH MEALS
Status: DISCONTINUED | OUTPATIENT
Start: 2022-06-27 | End: 2022-06-30 | Stop reason: HOSPADM

## 2022-06-27 RX ORDER — SACCHAROMYCES BOULARDII 250 MG
250 CAPSULE ORAL 2 TIMES DAILY
Status: DISCONTINUED | OUTPATIENT
Start: 2022-06-27 | End: 2022-06-30 | Stop reason: HOSPADM

## 2022-06-27 RX ADMIN — RDII 250 MG CAPSULE 250 MG: at 22:03

## 2022-06-27 RX ADMIN — ALLOPURINOL 200 MG: 100 TABLET ORAL at 22:03

## 2022-06-27 RX ADMIN — INSULIN LISPRO 1 UNITS: 100 INJECTION, SOLUTION INTRAVENOUS; SUBCUTANEOUS at 22:05

## 2022-06-27 RX ADMIN — ONDANSETRON HYDROCHLORIDE 4 MG: 2 INJECTION, SOLUTION INTRAMUSCULAR; INTRAVENOUS at 15:27

## 2022-06-27 RX ADMIN — PANTOPRAZOLE SODIUM 40 MG: 40 INJECTION, POWDER, LYOPHILIZED, FOR SOLUTION INTRAVENOUS at 07:51

## 2022-06-27 RX ADMIN — TIOTROPIUM BROMIDE INHALATION SPRAY 2 PUFF: 3.12 SPRAY, METERED RESPIRATORY (INHALATION) at 08:12

## 2022-06-27 RX ADMIN — ATORVASTATIN CALCIUM 10 MG: 10 TABLET, FILM COATED ORAL at 22:03

## 2022-06-27 RX ADMIN — POLYETHYLENE GLYCOL (3350) 17 G: 17 POWDER, FOR SOLUTION ORAL at 07:46

## 2022-06-27 RX ADMIN — FERROUS SULFATE TAB 325 MG (65 MG ELEMENTAL FE) 325 MG: 325 (65 FE) TAB at 14:10

## 2022-06-27 RX ADMIN — CETIRIZINE HYDROCHLORIDE 10 MG: 10 TABLET ORAL at 07:46

## 2022-06-27 RX ADMIN — RDII 250 MG CAPSULE 250 MG: at 14:10

## 2022-06-27 RX ADMIN — Medication 2 PUFF: at 20:32

## 2022-06-27 RX ADMIN — INSULIN LISPRO 2 UNITS: 100 INJECTION, SOLUTION INTRAVENOUS; SUBCUTANEOUS at 18:25

## 2022-06-27 RX ADMIN — MORPHINE SULFATE 2 MG: 2 INJECTION, SOLUTION INTRAMUSCULAR; INTRAVENOUS at 22:04

## 2022-06-27 RX ADMIN — CARVEDILOL 12.5 MG: 6.25 TABLET, FILM COATED ORAL at 07:46

## 2022-06-27 RX ADMIN — CARVEDILOL 12.5 MG: 6.25 TABLET, FILM COATED ORAL at 18:22

## 2022-06-27 RX ADMIN — LOSARTAN POTASSIUM 100 MG: 100 TABLET, FILM COATED ORAL at 22:03

## 2022-06-27 RX ADMIN — FERROUS SULFATE TAB 325 MG (65 MG ELEMENTAL FE) 325 MG: 325 (65 FE) TAB at 18:22

## 2022-06-27 RX ADMIN — GABAPENTIN 300 MG: 300 CAPSULE ORAL at 22:03

## 2022-06-27 RX ADMIN — Medication 2 PUFF: at 08:12

## 2022-06-27 RX ADMIN — INSULIN LISPRO 4 UNITS: 100 INJECTION, SOLUTION INTRAVENOUS; SUBCUTANEOUS at 12:01

## 2022-06-27 RX ADMIN — INSULIN LISPRO 2 UNITS: 100 INJECTION, SOLUTION INTRAVENOUS; SUBCUTANEOUS at 07:48

## 2022-06-27 RX ADMIN — LEVOFLOXACIN 500 MG: 500 INJECTION, SOLUTION INTRAVENOUS at 14:11

## 2022-06-27 RX ADMIN — PAROXETINE HYDROCHLORIDE 20 MG: 20 TABLET, FILM COATED ORAL at 07:47

## 2022-06-27 RX ADMIN — SODIUM CHLORIDE, PRESERVATIVE FREE 10 ML: 5 INJECTION INTRAVENOUS at 22:04

## 2022-06-27 ASSESSMENT — PAIN DESCRIPTION - ORIENTATION: ORIENTATION: RIGHT

## 2022-06-27 ASSESSMENT — PAIN DESCRIPTION - DESCRIPTORS: DESCRIPTORS: ACHING

## 2022-06-27 ASSESSMENT — PAIN DESCRIPTION - LOCATION: LOCATION: LEG

## 2022-06-27 ASSESSMENT — PAIN SCALES - GENERAL: PAINLEVEL_OUTOF10: 6

## 2022-06-27 NOTE — PROGRESS NOTES
Patient care assumed, assessment completed as charted. Patient resting in bed, in no apparent distress. Bladder temp reading 100.5, Tylenol 650mg administered. Mosqueda catheter patent. Patient states no further needs at this time. Will monitor.

## 2022-06-27 NOTE — CARE COORDINATION
INTERDISCIPLINARY PLAN OF CARE CONFERENCE    Date/Time: 6/27/2022 10:36 AM  Completed by: SUMIT Dela Cruz   Case Management    Patient Name:  Devon Chavez  YOB: 1945  Admitting Diagnosis: Hip fracture requiring operative repair, left, closed, initial encounter [facility]) Diana Moya     Admit Date/Time:  6/19/2022 12:49 PM    Chart reviewed. Interdisciplinary team contacted or reviewed plan related to patient progress and discharge plans. Disciplines included Case Management, Nursing, and Dietitian. Current Status:ongoing  PT/OT recommendation for discharge plan of care: SNF    Expected D/C Disposition:  Skilled nursing facility  Confirmed plan with patient and/or family Yes confirmed with: (name) pt  Met with:pt    Discharge Plan Comments: Chart review completed. Completed Interdisciplinary rounds with ICU staff. Met with pt at bedside. Pt confirmed she plans on going to [facility] when discharged. She denied needs for CM at this time. Spoke with Andrea Peña at [facility] via phone call who confirmed they are still able to accept pt on discharge and has a bed for pt. CM will continue to follow and assist. Please notify CM if needs or concerns arise.     Home O2 in place on admit: No

## 2022-06-27 NOTE — PROGRESS NOTES
Pulmonary & Critical Care Medicine ICU Progress Note    CC: ORIF R hip, syncope post op D#2 with severe hypoxemia and shock     Events of Last 24 hours:   Received 1u PRBC, no s/o active bleeding    Invasive Lines:   Left femoral CVC 2022    MV: 2022 - 22  Vent Mode: AC/VC Resp Rate (Set): 22 bmp/Vt (Set, mL): 470 mL/ /FiO2 : 50 %  No results for input(s): PHART, XUZ3PFM, PO2ART in the last 72 hours. IV:   sodium chloride Stopped (22 1008)    dextrose      sodium chloride 5 mL/hr at 22 0756       Vitals:  Blood pressure (!) 155/67, pulse 80, temperature 99.4 °F (37.4 °C), temperature source Bladder, resp. rate 18, height 5' 3\" (1.6 m), weight 250 lb 14.1 oz (113.8 kg), SpO2 93 %, not currently breastfeeding. on 2 L  Temp  Av.8 °F (37.7 °C)  Min: 99.1 °F (37.3 °C)  Max: 100.5 °F (38.1 °C)    Intake/Output Summary (Last 24 hours) at 2022 1211  Last data filed at 2022 0600  Gross per 24 hour   Intake 530 ml   Output 1875 ml   Net -1345 ml     EXAM:  General: NAD  Eyes: PERRL. No sclera icterus. No conjunctival injection. ENT: No discharge. Pharynx clear. Neck: Trachea midline. Normal thyroid. Resp: No accessory muscle use. No crackles. No wheezing. No rhonchi. No dullness on percussion. CV: Regular rate. Regular rhythm. No mumur or rub. + edema. GI: Non-tender. Non-distended. No masses. No organomegaly. Normal bowel sounds. No hernia. Neuro: A&OX3. Patellar reflexes are symmetric. Psych: No agitation, no anxiety, affect is full.     Scheduled Meds:   ferrous sulfate  325 mg Oral BID WC    levofloxacin  500 mg IntraVENous Q24H    carvedilol  12.5 mg Oral BID     budesonide-formoterol  2 puff Inhalation BID    tiotropium  2 puff Inhalation Daily    insulin lispro  0-12 Units SubCUTAneous TID WC    insulin lispro  0-6 Units SubCUTAneous Nightly    polyethylene glycol  17 g Oral BID    atropine  1 mg IntraVENous Once    pantoprazole  40 mg IntraVENous Daily    [Held by provider] allopurinol  200 mg Oral Daily    atorvastatin  10 mg Oral Nightly    cetirizine  10 mg Oral Daily    [Held by provider] gabapentin  300 mg Oral BID    [Held by provider] losartan  100 mg Oral Daily    PARoxetine  20 mg Oral Daily    sodium chloride flush  5-40 mL IntraVENous 2 times per day     PRN Meds:  sodium chloride, acetaminophen, morphine, metoprolol, magnesium sulfate, glucose, dextrose bolus **OR** dextrose bolus, glucagon (rDNA), dextrose, [Held by provider] oxyCODONE **OR** [Held by provider] oxyCODONE, sodium chloride flush, sodium chloride, ondansetron **OR** ondansetron, albuterol sulfate HFA    Results:  CBC:   Recent Labs     06/25/22  0503 06/25/22  0503 06/25/22  1055 06/26/22  0600 06/27/22  0415   WBC 5.1  --   --  5.3 6.0   HGB 6.4*   < > 7.5* 8.3* 7.5*   HCT 18.9*   < > 23.2* 26.1* 22.9*   MCV 66.5*  --   --  68.1* 67.8*     --   --  226 263    < > = values in this interval not displayed.      BMP:   Recent Labs     06/25/22  0503 06/26/22  0600 06/27/22  0415 06/27/22  0600    139 139  --    K 3.8 4.1  --  4.1    109 106  --    CO2 24 23 23  --    BUN 23* 26* 28*  --    CREATININE 1.1 1.0 1.0  --      LIVER PROFILE:   Recent Labs     06/25/22  0503 06/26/22  0600 06/27/22  0415 06/27/22  0600   AST 88* 39*  --  28   * 215*  --  141*   BILIDIR 0.3 <0.2 <0.2  --    BILITOT 0.6 0.6 0.7  --    ALKPHOS 98 93 89  --      Cultures:      6/19/22 SARS-CoV-2 NAAT negative  6/20/22 Urine E coli 100K pansensitive  6/22/2022 tracheal aspirate NRF    Chest imaging was reviewed by me and showed:   6/22/22 CXR ETT [place]     Echo 6/22/2022 EF 60%, mild LVH, grade 1 diastolic dysfunction, SPAP 56, mild AS    ASSESSMENT:  · Shock: overal unclear etiology of her rapid severe progressive respiratory and cardiovascular failure  · Acute hypoxemic respiratory failure   · Transient AFIB RVR  · Acute kidney failure/ATN - improving  · Syncope  · Acute blood loss anemia - s/p 4 U PRBC on 6/20/22, 1 U PRBC on 6/25/22; s/p 2 U FFP on 6/20/22 - etiology is unclear  · Acute transaminitis 2/2 shock, improving  · Electrolyte disorder, relatively severe hypomagnesemia  · Post op RIGHT IM hip nailing on 6/20/22  · Thalassemia minor  · DM with hyperglycemia  · Asthma  · Gout    PLAN:  Supplemental oxygen to maintain SaO2 >92%; wean as tolerated    Follow serial hemoglobin    Merrem/Vanc D#/7, doxycycline day #/7  Monitoring of vancomycin levels to prevent toxicity.    SSI - medium   Cardiology for A. Fib, on coreg  Gastroenterology has seen, EGD with gastric polyps w/o active bleeding   Hematology has seen   F/U RLE u/s per orthopaedic surgery   Prophylaxis: DVT - SCD; MRSA - mupirocin; GI - on protonix IV

## 2022-06-27 NOTE — PROGRESS NOTES
ONCOLOGY FOLLOW-UP:         PROBLEM LIST:       Patient Active Problem List   Diagnosis Code    Knee pain M25.569    Chronic urinary tract infection N39.0    Osteoarthritis of both knees M17.0    Benign essential HTN I10    Type 2 diabetes mellitus without complication, without long-term current use of insulin ([facility]) E11.9    Gastroesophageal reflux disease without esophagitis K21.9    Chronic gout of multiple sites M1A. 200X0    Spinal stenosis of lumbar region M48.061    Anemia D64.9    Carpal tunnel syndrome of left wrist G56.02    Morbid obesity ([facility]) E66.01    Moderate persistent asthma without complication R30.20    Hyperlipidemia associated with type 2 diabetes mellitus ([facility]) E11.69, E78.5    Major depressive disorder with single episode, in full remission ([facility] F32.5    Asthma J45.909    Diabetes ([facility] E11.9    DJD (degenerative joint disease) M19.90    Edema R60.9    Gout M10.9    Lumbar disc disease M51.9    Melanoma ([facility] C43.9    Vitamin B 12 deficiency E53.8    Osteoporosis M81.0    S/P hysterectomy Z90.710    S/P parathyroidectomy ([facility]) E89.2    Sciatic nerve pain M54.30    Thalassemia trait D56.3    Hip fracture requiring operative repair, left, closed, initial encounter ([facility] S72.002A    Acute blood loss anemia D62    Closed 2-part intertrochanteric fracture of proximal end of right femur ([facility]) S72.141A    Shock ([facility] R57.9    Acute hypoxemic respiratory failure ([facility]) J96.01    Bradycardia R00.1    Hypotension I95.9       INTERVAL HISTORY:       Remains admitted in ICU. Hgb 7.5 today. Received venofer over the weekend. REVIEW OF SYSTEMS:       · Constitutional: Denies fever, sweats, weight loss     · Eyes: No visual changes or diplopia. No scleral icterus. · ENT: No Headaches, hearing loss or vertigo. No mouth sores or sore throat. · Cardiovascular: No chest pain, dyspnea on exertion, palpitations or loss of consciousness.    · Respiratory: No cough or wheezing, no sputum production. No hemoptysis. .    · Gastrointestinal: No abdominal pain, appetite loss, blood in stools. No change in bowel habits. · Genitourinary: No dysuria, trouble voiding, or hematuria. · Musculoskeletal:  Generalized weakness. No joint complaints. · Integumentary: No rash or pruritis. · Neurological: No headache, diplopia. No change in gait, balance, or coordination. No paresthesias. · Endocrine: No temperature intolerance. No excessive thirst, fluid intake, or urination. · Hematologic/Lymphatic: No abnormal bruising or ecchymoses, blood clots or swollen lymph nodes.   · Allergic/Immunologic: No nasal congestion or hives.      PHYSICAL EXAM:            Vitals:     06/23/22 0900   BP: (!) 145/64   Pulse: 91   Resp: 19   Temp:     SpO2: 97%         General appearance: alert and cooperative  Head: Normocephalic, without obvious abnormality, atraumatic  Neck: No palpable lymphadenopathy in supraclavicular or cervical chains  Lungs: Clear to auscultation bilaterally, no audible rales, wheezes or crackles  Heart: Regular rate and rhythm, S1, S2 normal  Abdomen: Soft, non-tender; bowel sounds normal; no masses,  no organomegaly  Extremities: without cyanosis, clubbing, edema or asymmetry  Skin: No jaundice, purpura or petechiae        LABS:            Lab Results   Component Value Date     WBC 6.0 06/23/2022     HGB 7.2 (L) 06/23/2022     HCT 21.9 (L) 06/23/2022     MCV 66.6 (L) 06/23/2022      06/23/2022               Lab Results   Component Value Date     GLUCOSE 155 (H) 06/23/2022     BUN 25 (H) 06/23/2022     CREATININE 1.5 (H) 06/23/2022     K 4.1 06/23/2022               Lab Results   Component Value Date     ALKPHOS 68 06/19/2022     ALT 10 06/19/2022     AST 11 (L) 06/19/2022     BILITOT 0.8 06/19/2022     BILIDIR <0.2 07/27/2017     PROT 6.2 (L) 06/19/2022               Lab Results   Component Value Date     IRON 20 (L) 06/20/2022     TIBC 215 (L) 06/20/2022     FERRITIN 100.0 06/20/2022       IMAGING:      Echo Complete  Result Date: 6/22/2022  Conclusions   Summary  LV systolic function is normal with EF estimated 60%. No regional wall motion abnormalities are noted. There is mild concentric left ventricular hypertrophy. Grade I diastolic dysfunction with normal filling pressure. Mild biatrial enlargement. Mild posterior mitral annular calcification is present. The maximal transaortic velocity is 2.22m/s which gives peak pressure  gradient= 21mmHg and mean pressure gradient= 12mmHg which is c/w mild aortic  stenosis. Mild tricuspid and pulmonic regurgitation. Systolic pulmonary artery pressure (SPAP) estimated at 56 mmHg (RAP 15  mmHg), c/w moderate pulm HTN. Signature   ------------------------------------------------------------------       CT ABDOMEN PELVIS WO CONTRAST Additional Contrast? None  Result Date: 6/21/2022  No evidence for pulmonary embolism Considerable subsegmental atelectatic changes the lung bases without separate consolidation of airspace disease clearly evident or pleural effusion Right upper lobe 12 mm noncalcified pulmonary nodule with attention on follow-up CT considered within 3-6 months especially if there is high risk smoking history or high risk stratification No acute findings of the abdomen or pelvis identified      XR FEMUR RIGHT (MIN 2 VIEWS)  Result Date: 6/19/2022  Traumatic, acute intertrochanteric fracture of the right hip. Advanced osteoarthritis of the knee. Moderate-sized knee joint effusion.      XR FOOT LEFT (MIN 3 VIEWS)  Result Date: 6/20/2022  Degenerative changes in the left foot with no acute abnormality.      CT Head WO Contrast  Result Date: 6/19/2022  No acute intracranial abnormality. No acute facial fractures.      CT FACIAL BONES WO CONTRAST  Result Date: 6/19/2022  No acute intracranial abnormality. No acute facial fractures.      XR CHEST PORTABLE  Result Date: 6/23/2022  No significant interval improvement.   Bibasilar atelectasis versus infiltrates. Trace pleural effusions.      XR CHEST PORTABLE  Result Date: 6/22/2022  ET tube in satisfactory position 4.6 cm above the terrance. Bibasilar atelectasis. Cardiomegaly.      XR CHEST PORTABLE  Result Date: 6/21/2022  1. Endotracheal tube and enteric tubes properly placed. 2.  Mild left basilar airspace disease and probable trace left pleural effusion.      XR CHEST PORTABLE  Result Date: 6/20/2022  No acute cardiopulmonary disease.      CTA ABDOMINAL AORTA W BILAT RUNOFF W CONTRAST  Result Date: 6/19/2022  1. Traumatic, acute right hip intertrochanteric fracture with varus angulation and small surrounding hematoma. 2. No pseudoaneurysm or active extravasation is identified adjacent to the fracture. 3. Aortoiliac inflow is widely patent. There is a mild amount of fibro calcified plaque. 4. Femoropopliteal segments are patent bilaterally, noting fibro calcified plaque. There is 3 vessel runoff bilaterally. However, on the right side there is only 1 primary runoff vessel, the posterior tibial. 5. No acute abdominopelvic abnormality. Hepatic steatosis.      CTA PULMONARY W CONTRAST  Result Date: 6/21/2022  No evidence for pulmonary embolism Considerable subsegmental atelectatic changes the lung bases without separate consolidation of airspace disease clearly evident or pleural effusion Right upper lobe 12 mm noncalcified pulmonary nodule with attention on follow-up CT considered within 3-6 months especially if there is high risk smoking history or high risk stratification No acute findings of the abdomen or pelvis identified      FLUORO FOR SURGICAL PROCEDURES  Result Date: 6/20/2022  Intraprocedural fluoroscopic spot images as above. See separate procedure report for more information.      XR HIP 2-3 VW W PELVIS RIGHT  Result Date: 6/20/2022  1. Status post ORIF of the right hip intertrochanteric fracture.   No immediate complication.      XR HIP 2-3 VW W PELVIS RIGHT  Result Date:

## 2022-06-27 NOTE — PROGRESS NOTES
Patient report given to Preston Sotomayor [facility]. Patient has rested comfortably overnight without acute changes in assessment noted. Care transferred.

## 2022-06-27 NOTE — PROGRESS NOTES
Patient transferred to room 224  from ICU 13. Side rails up x2. Patient has an order for telemetry. Bed is locked an in lowest position. Call light placed in patient reach. Patient explained there routine of the hospital, including but not limited to lab work, vital signs, hourly rounding, etc. Care plans and education updated, CHG wipes performed at time of admission. BP (!) 184/78   Pulse 77   Temp 99.8 °F (37.7 °C) (Oral)   Resp 18   Ht 5' 3\" (1.6 m)   Wt 250 lb 14.1 oz (113.8 kg)   SpO2 93%   Breastfeeding No   BMI 44.44 kg/m²     Most recent set of vitals as shown. Patient has an LDA that does not  require CHG wipes, including possible a surgery incision, bacon catheter, or a central line. 4 Eyes Skin Assessment     The patient is being assess for   Admission    I agree that 2 RN's have performed a thorough Head to Toe Skin Assessment on the patient. ALL assessment sites listed below have been assessed. Areas assessed for pressure by both nurses:   [x]   Head, Face, and Ears   [x]   Shoulders, Back, and Chest, Abdomen  [x]   Arms, Elbows, and Hands   [x]   Coccyx, Sacrum, and Ischium  [x]   Legs, Feet, and Heels        Skin Assessed Under all Medical Devices by both nurses:  SCD's              All Mepilex Borders were peeled back and area peeked at by both nurses:  Yes  Please list where Mepilex Borders are located:  Sacrum, bilateral heels    Blanchable redness noted on sacral area. meppilex  peeled and applied. **SHARE this note so that the co-signing nurse is able to place an eSignature**    Co-signer eSignature: Electronically signed by Stefanie Overton RN on 6/27/22 at 7:21 PM EDT    Does the Patient have Skin Breakdown related to pressure?   No              Mauricio Prevention initiated:  No   Wound Care Orders initiated:  No      WOC nurse consulted for Pressure Injury (Stage 3,4, Unstageable, DTI, NWPT, Complex wounds)and New or Established Ostomies:  No      Primary Nurse eSignature: Electronically signed by Claudeen Hook, RN on 6/27/22 at 7:18 PM EDT      Bedside Mobility Assessment Tool (BMAT):     Assessment Level 1- Sit and Shake    1. From a semi-reclined position, ask patient to sit up and rotate to a seated position at the side of the bed. Can use the bedrail. 2. Ask patient to reach out and grab your hand and shake making sure patient reaches across his/her midline. Pass- Patient is able to come to a seated position, maintain core strength. Maintains seated balance while reaching across midline. Move on to Assessment Level 2. Assessment Level 2- Stretch and Point   1. With patient in seated position at the side of the bed, have patient place both feet on the floor (or stool) with knees no higher than hips. 2. Ask patient to stretch one leg and straighten the knee, then bend the ankle/flex and point the toes. If appropriate, repeat with the other leg. Pass- Patient is able to demonstrate appropriate quad strength on intended weight bearing limb(s). Move onto Assessment Level 3. Assessment Level 3- Stand   1. Ask patient to elevate off the bed or chair (seated to standing) using an assistive device (cane, bedrail). 2. Patient should be able to raise buttocks off be and hold for a count of five. May repeat once. Fail- Patient unable to demonstrate standing stability. Patient is MOBILITY LEVEL 3. Assessment Level 4- Walk   1. Ask patient to march in place at bedside. 2. Then ask patient to advance step and return each foot. Some medical conditions may render a patient from stepping backwards, use your best clinical judgement. Fail- Patient not able to complete tasks OR requires use of assistive device. Patient is MOBILITY LEVEL 3. Mobility Level- 2    Patient is not able to demonstrate the ability to move from a reclining position to an upright position within the recliner due to immobility.

## 2022-06-27 NOTE — PLAN OF CARE
Problem: Discharge Planning  Goal: Discharge to home or other facility with appropriate resources  Outcome: Progressing  Flowsheets (Taken 6/27/2022 1919 by Breonna Pickard, RN)  Discharge to home or other facility with appropriate resources:   Identify discharge learning needs (meds, wound care, etc)   Identify barriers to discharge with patient and caregiver   Arrange for needed discharge resources and transportation as appropriate     Problem: Pain  Goal: Verbalizes/displays adequate comfort level or baseline comfort level  Outcome: Progressing     Problem: Skin/Tissue Integrity  Goal: Absence of new skin breakdown  Description: 1. Monitor for areas of redness and/or skin breakdown  2. Assess vascular access sites hourly  3. Every 4-6 hours minimum:  Change oxygen saturation probe site  4. Every 4-6 hours:  If on nasal continuous positive airway pressure, respiratory therapy assess nares and determine need for appliance change or resting period.   Outcome: Progressing     Problem: Safety - Adult  Goal: Free from fall injury  Outcome: Progressing     Problem: ABCDS Injury Assessment  Goal: Absence of physical injury  Outcome: Progressing     Problem: Chronic Conditions and Co-morbidities  Goal: Patient's chronic conditions and co-morbidity symptoms are monitored and maintained or improved  Outcome: Progressing  Flowsheets (Taken 6/27/2022 1919 by Breonna Pickard, RN)  Care Plan - Patient's Chronic Conditions and Co-Morbidity Symptoms are Monitored and Maintained or Improved:   Collaborate with multidisciplinary team to address chronic and comorbid conditions and prevent exacerbation or deterioration   Monitor and assess patient's chronic conditions and comorbid symptoms for stability, deterioration, or improvement     Problem: Nutrition Deficit:  Goal: Optimize nutritional status  Outcome: Progressing

## 2022-06-27 NOTE — PROGRESS NOTES
PROGRESS NOTE  S:76 yrs Patient  admitted on 6/19/2022 with Hip fracture requiring operative repair, left, closed, initial encounter ([facility] [address] Sue Proctor . Today she complains of fatigue. She passed a brown BM overnight, and 1x BM yesterday. She is tolerating diet. Exam:   Vitals:    06/27/22 1200   BP: (!) 168/79   Pulse: 78   Resp: 25   Temp: 99.5 °F (37.5 °C)   SpO2: 96%      General appearance: alert, appears stated age, cooperative, fatigued, no distress and pale  HEENT: Neck supple with midline trachea  Neck: no adenopathy and supple, symmetrical, trachea midline  Lungs: clear to auscultation bilaterally  Heart: regular rate and rhythm, S1, S2 normal, no murmur, click, rub or gallop  Abdomen: soft, non-tender; bowel sounds normal; no masses,  no organomegaly  Extremities: edema 1+ BLE     Medications: Reviewed    Labs:  CBC:   Recent Labs     06/25/22  0503 06/25/22  0503 06/25/22  1055 06/26/22  0600 06/27/22  0415   WBC 5.1  --   --  5.3 6.0   HGB 6.4*   < > 7.5* 8.3* 7.5*   HCT 18.9*   < > 23.2* 26.1* 22.9*   MCV 66.5*  --   --  68.1* 67.8*     --   --  226 263    < > = values in this interval not displayed. BMP:   Recent Labs     06/25/22  0503 06/26/22  0600 06/27/22  0415 06/27/22  0600    139 139  --    K 3.8 4.1  --  4.1    109 106  --    CO2 24 23 23  --    BUN 23* 26* 28*  --    CREATININE 1.1 1.0 1.0  --      LIVER PROFILE:   Recent Labs     06/25/22  0503 06/26/22  0600 06/27/22  0415 06/27/22  0600   AST 88* 39*  --  28   * 215*  --  141*   PROT 3.9* 4.8* 4.7*  --    BILIDIR 0.3 <0.2 <0.2  --    BILITOT 0.6 0.6 0.7  --    ALKPHOS 98 93 89  --      Attending Supervising [de-identified] Attestation Statement  The patient is a 67 y.o. female. I have performed a history and physical examination of the patient.  I discussed the case with my physician assistant Kyara Metcalf PA-C    I reviewed the patient's Past Medical History, Past Surgical History, Medications, and Allergies. Physical Exam:  Vitals:    06/27/22 1300 06/27/22 1400 06/27/22 1500 06/27/22 1643   BP: (!) 152/62 (!) 167/70 (!) 173/67 (!) 187/74   Pulse: 79 75 71 77   Resp: 24 15 17 18   Temp:    99.8 °F (37.7 °C)   TempSrc:    Oral   SpO2: 95% 96% 96% 93%   Weight:       Height:           Physical Examination: General appearance - well hydrated  Mental status - alert, oriented to person, place, and time  Eyes - sclera anicteric  Neck - supple, no significant adenopathy  Chest - no tachypnea, retractions or cyanosis  Heart - normal rate and regular rhythm  Abdomen - soft, nontender, nondistended, no masses or organomegaly  Extremities - no pedal edema noted        Impression: 67year old female with a history of thalassemia minor, DM, HLD, HTN, GERD, asthma, osteoporosis, and gout admitted with acute blood loss anemia s/p 7U pRBCs s/p R hip intramedullary nailing on 6/20 and EGD with polypectomy on 6/25. Recommendation:  1. Continue supportive care  2. Monitor Hgb  3. Observe for signs of bleeding  4. Monitor and document output  5. Transfuse as needed if Hgb < 7.0  6. Continue Pantoprazole 40 mg qAM  7. Continue PO iron supplementation  8. Bowel regimen with Miralax and probiotics daily  9. Continue diet as tolerated  10. Hematology and cardiology following  11. Will follow  12. Will consider colonoscopy as outpatient         Miri Herrera PA-C  1:03 PM 6/27/2022                      79-year-old female with history of DM, HTN, HLD, GERD, chronic back pain, asthma, arthritis, thalasemia, gout, osteoporosis, admitted with mechanical fall and R hip fracture. Hospitalization complicated by sepsis, acute respiratory failure, MARVIN, paroxysmal A fib and anemia requiring 7uPRBC. EGD negative for active bleeding    Continue supportive care. Monitor Hgb. Observe for signs of bleeding. Outpatient colonoscopy once acute issues resolve.     Charlene Johnson MD          (O) [phone]  O) [phone]

## 2022-06-27 NOTE — PROGRESS NOTES
Report given to Cindi Paz, transfer of care to [place] [id number]. Patient met Andrea Weldon at bedside. Mosqueda catheter and femoral central line were earlier discontinued. VSS.

## 2022-06-27 NOTE — PROGRESS NOTES
Pt admitted to room 224 at 1640. Pt alert and oriented x 4, able to make needs known. Telemetry applied. 4 eyes skin assessment completed, see note. Oriented pt to room, call light and staff. Call light within reach.

## 2022-06-27 NOTE — PROGRESS NOTES
Shift assessment is complete. Pt is alert/oriented. She remains on room air with no issues. Her right hip continues sore/painful, but pain medications have not been needed. PT plans to get her up today. Left femoral line is intact and saline locked. Mosqueda catheter intact and draining yellow/clear. VSS. Pt denies any present needs, call light is within reach.

## 2022-06-27 NOTE — PROGRESS NOTES
RT Nebulizer Bronchodilator Protocol Note    There is a bronchodilator order in the chart from a provider indicating to follow the RT Bronchodilator Protocol and there is an Initiate RT Bronchodilator Protocol order as well (see protocol at bottom of note). CXR Findings:  No results found. The findings from the last RT Protocol Assessment were as follows:  Smoking: (P) Chronic pulmonary disease  Respiratory Pattern: (P) Regular pattern and RR 12-20 bpm  Breath Sounds: (P) Clear breath sounds  Cough: (P) Strong, spontaneous, non-productive  Indication for Bronchodilator Therapy: (P) On home bronchodilators  Bronchodilator Assessment Score: (P) 2    Aerosolized bronchodilator medication orders have been revised according to the RT Nebulizer Bronchodilator Protocol below. Respiratory Therapist to perform RT Therapy Protocol Assessment initially then follow the protocol. Repeat RT Therapy Protocol Assessment PRN for score 0-3 or on second treatment, BID, and PRN for scores above 3. No Indications - adjust the frequency to every 6 hours PRN wheezing or bronchospasm, if no treatments needed after 48 hours then discontinue using Per Protocol order mode. If indication present, adjust the RT bronchodilator orders based on the Bronchodilator Assessment Score as indicated below. If a patient is on this medication at home then do not decrease Frequency below that used at home. 0-3 - enter or revise RT bronchodilator order(s) to equivalent RT Bronchodilator order with Frequency of every 4 hours PRN for wheezing or increased work of breathing using Per Protocol order mode. 4-6 - enter or revise RT Bronchodilator order(s) to two equivalent RT bronchodilator orders with one order with BID Frequency and one order with Frequency of every 4 hours PRN wheezing or increased work of breathing using Per Protocol order mode.          7-10 - enter or revise RT Bronchodilator order(s) to two equivalent RT bronchodilator orders with one order with TID Frequency and one order with Frequency of every 4 hours PRN wheezing or increased work of breathing using Per Protocol order mode. 11-13 - enter or revise RT Bronchodilator order(s) to one equivalent RT bronchodilator order with QID Frequency and an Albuterol order with Frequency of every 4 hours PRN wheezing or increased work of breathing using Per Protocol order mode. Greater than 13 - enter or revise RT Bronchodilator order(s) to one equivalent RT bronchodilator order with every 4 hours Frequency and an Albuterol order with Frequency of every 2 hours PRN wheezing or increased work of breathing using Per Protocol order mode. RT to enter RT Home Evaluation for COPD & MDI Assessment order using Per Protocol order mode.     Electronically signed by Arin Marquez RCP on 6/27/2022 at 2:20 PM

## 2022-06-27 NOTE — PROGRESS NOTES
Hospitalist Progress Note      PCP: Sae Sylvester MD    Date of Admission: 6/19/2022    Subjective: This is my first encounter with the patient. Chart reviewed briefly. Patient awake and alert. She is on room air. Venous ultrasound of right lower extremity ordered. Test done results pending.     Medications:  Reviewed    Infusion Medications    sodium chloride Stopped (06/25/22 1008)    dextrose      sodium chloride 5 mL/hr at 06/26/22 0756     Scheduled Medications    ferrous sulfate  325 mg Oral BID WC    saccharomyces boulardii  250 mg Oral BID    levofloxacin  500 mg IntraVENous Q24H    carvedilol  12.5 mg Oral BID WC    budesonide-formoterol  2 puff Inhalation BID    tiotropium  2 puff Inhalation Daily    insulin lispro  0-12 Units SubCUTAneous TID WC    insulin lispro  0-6 Units SubCUTAneous Nightly    polyethylene glycol  17 g Oral BID    atropine  1 mg IntraVENous Once    pantoprazole  40 mg IntraVENous Daily    [Held by provider] allopurinol  200 mg Oral Daily    atorvastatin  10 mg Oral Nightly    cetirizine  10 mg Oral Daily    [Held by provider] gabapentin  300 mg Oral BID    [Held by provider] losartan  100 mg Oral Daily    PARoxetine  20 mg Oral Daily    sodium chloride flush  5-40 mL IntraVENous 2 times per day     PRN Meds: sodium chloride, acetaminophen, morphine, metoprolol, magnesium sulfate, glucose, dextrose bolus **OR** dextrose bolus, glucagon (rDNA), dextrose, [Held by provider] oxyCODONE **OR** [Held by provider] oxyCODONE, sodium chloride flush, sodium chloride, ondansetron **OR** ondansetron, albuterol sulfate HFA      Intake/Output Summary (Last 24 hours) at 6/27/2022 1436  Last data filed at 6/27/2022 1200  Gross per 24 hour   Intake 790 ml   Output 1875 ml   Net -1085 ml       Physical Exam Performed:    BP (!) 167/70   Pulse 75   Temp 99.5 °F (37.5 °C) (Bladder)   Resp 15   Ht 5' 3\" (1.6 m)   Wt 250 lb 14.1 oz (113.8 kg)   SpO2 96%   Breastfeeding No   BMI 44.44 kg/m²       General: Awake and alert. Mucous Membranes:  Pale , anicteric  Neck: No JVD, no carotid bruit, no thyromegaly  Chest:  Clear to auscultation bilaterally, no added sounds  Cardiovascular:  RRR S1S2 heard, no murmurs or gallops  Abdomen: Soft, undistended, non tender, no organomegaly, BS present  Extremities: left ankle diffuse swelling and tenderness noted  Right hip dressing dry , bilateral knee arthritis with limited movements to both LE with weakness noted   No edema or cyanosis. Distal pulses well felt  Neurological : Awake, following commands       Labs:   Recent Labs     06/25/22  0503 06/25/22  0503 06/25/22  1055 06/26/22  0600 06/27/22  0415   WBC 5.1  --   --  5.3 6.0   HGB 6.4*   < > 7.5* 8.3* 7.5*   HCT 18.9*   < > 23.2* 26.1* 22.9*     --   --  226 263    < > = values in this interval not displayed. Recent Labs     06/25/22  0503 06/26/22  0600 06/27/22  0415 06/27/22  0600    139 139  --    K 3.8 4.1  --  4.1    109 106  --    CO2 24 23 23  --    BUN 23* 26* 28*  --    CREATININE 1.1 1.0 1.0  --    CALCIUM 7.8* 8.3 8.5  --      Recent Labs     06/25/22  0503 06/26/22  0600 06/27/22  0415 06/27/22  0600   AST 88* 39*  --  28   * 215*  --  141*   BILIDIR 0.3 <0.2 <0.2  --    BILITOT 0.6 0.6 0.7  --    ALKPHOS 98 93 89  --      No results for input(s): INR in the last 72 hours. No results for input(s): Abner Shackle in the last 72 hours. Urinalysis:      Lab Results   Component Value Date    NITRU POSITIVE 06/20/2022    WBCUA >100 06/20/2022    BACTERIA 2+ 08/04/2016    RBCUA see below 06/20/2022    BLOODU MODERATE 06/20/2022    SPECGRAV >=1.030 06/20/2022    GLUCOSEU Negative 06/20/2022       Radiology:  XR CHEST PORTABLE   Final Result   No significant interval improvement. Bibasilar atelectasis versus   infiltrates. Trace pleural effusions.          XR CHEST PORTABLE   Final Result   ET tube in satisfactory RUNOFF W CONTRAST   Final Result   1. Traumatic, acute right hip intertrochanteric fracture with varus   angulation and small surrounding hematoma. 2. No pseudoaneurysm or active extravasation is identified adjacent to the   fracture. 3. Aortoiliac inflow is widely patent. There is a mild amount of fibro   calcified plaque. 4. Femoropopliteal segments are patent bilaterally, noting fibro calcified   plaque. There is 3 vessel runoff bilaterally. However, on the right side   there is only 1 primary runoff vessel, the posterior tibial.   5. No acute abdominopelvic abnormality. Hepatic steatosis. CT FACIAL BONES WO CONTRAST   Final Result   No acute intracranial abnormality. No acute facial fractures. CT Head WO Contrast   Final Result   No acute intracranial abnormality. No acute facial fractures. XR FEMUR RIGHT (MIN 2 VIEWS)   Final Result   Traumatic, acute intertrochanteric fracture of the right hip. Advanced osteoarthritis of the knee. Moderate-sized knee joint effusion. VL Extremity Venous Right    (Results Pending)           Assessment/Plan:    Principal Problem:    Hip fracture requiring operative repair, left, closed, initial encounter [facility])  Active Problems:    Acute blood loss anemia    Closed 2-part intertrochanteric fracture of proximal end of right femur (HCC)    Shock ([address]    Acute hypoxemic respiratory failure (HCC)    Bradycardia    Hypotension    Paroxysmal atrial fibrillation (HCC)    Benign essential HTN    Anemia  Resolved Problems:    * No resolved hospital problems. *         Shock. Syncope. Septic versus cardiogenic versus hypovolemic vs hemorrhagic. Aggressive hydration with IV fluids. Required epinephrine and Levophed drip - has been off through the weekend. Was transfused 3 units of red cells. Blood cultures negative so far.   Urine culture positive for E. coli  Echocardiogram-normal EF  Started on IV Doxy, meropenem and vancomycin pending cultures. Started on Levaquin on 6/26/2022. Okay to stop  off all pressors.         Acute hypoxic respiratory failure. Resolved. Critical care physician following. Did well with SBT, extubated 6/23/22      Par a fib -   Cardiology involved. No plans for anticoagulation at present given anemia. Plans for event monitor for 2 weeks as outpatient.       #Intertrochanteric hip fracture; right  #Mechanical fall  -Osteoporosis contributing   - admitted to medical floor for surgical mx and pain control  - ortho surgery consulted , s.p IM nailing of right hip   - pain control to avoid drowsiness  - dvt prophylaxis  - early ambulation,  - cont PT as tolerates.        #Left foot pain- likely OA  -No acute fracture per imaging   - uric acid wnl          #HTN   -BP initially  elevated in the setting of pain. Patient then drop blood pressure. Blood pressure medications are held. -Blood pressure is elevated now. On Coreg and losartan.       #Acute blood loss anemia  - expected with major fractures and surgery   - also has chronic Anemia 2/2 thalassemia minor   -Hgb baseline appears to be 9-10; patient states 9  -8.9--7.9 >6.5  A total of 3 units of red cells have been transfused. Drop in H&H again 6/23  Obtain hematology consult  Added LDH and haptoglobin levels       Mild MARVIN secondary to hypotension. Resolving.       #DMII   On sliding scale insulin       #Asthma   -Continue home inhalers       #HLD  -Continue statin       #Osteoporosis  -Receives Fosamax weekly - holding       #GERD   -On PPI       #Gout   -Continue allopurinol        DVT Prophylaxis: SCDs  Diet: ADULT DIET; Regular  Code Status: Full Code    PT/OT Eval Status: ordered    Dispo -we will transfer to [facility] with telemetry.       Jacquelin Cooks, MD 6/27/2022 2:44 PM

## 2022-06-27 NOTE — PROGRESS NOTES
4 Eyes Skin Assessment     The patient is being assess for   Transfer to New Unit [facility]    I agree that 2 RN's have performed a thorough Head to Toe Skin Assessment on the patient. ALL assessment sites listed below have been assessed. Areas assessed for pressure by both nurses:   [x]   Head, Face, and Ears   [x]   Shoulders, Back, and Chest, Abdomen  [x]   Arms, Elbows, and Hands   [x]   Coccyx, Sacrum, and Ischium  [x]   Legs, Feet, and Heels      Scattered bruises from recent fall, Right hip repair with transparent dressing. Skin Assessed Under all Medical Devices by both nurses:  NA              All Mepilex Borders were peeled back and area peeked at by both nurses:  No: NA  Please list where Mepilex Borders are located:  NA               **SHARE this note so that the co-signing nurse is able to place an eSignature**    Co-signer eSignature: Electronically signed by Ashley Camp RN on 6/27/22 at 4:02 PM EDT    Does the Patient have Skin Breakdown related to pressure?   No              Mauricio Prevention initiated:  No   Wound Care Orders initiated:  No      [facility] nurse consulted for Pressure Injury (Stage 3,4, Unstageable, DTI, NWPT, Complex wounds)and New or Established Ostomies:  No      Primary Nurse eSignature: {Esignature:[id number]}

## 2022-06-28 LAB
A/G RATIO: 0.9 (ref 1.1–2.2)
ALBUMIN SERPL-MCNC: 2.4 G/DL (ref 3.4–5)
ALP BLD-CCNC: 95 U/L (ref 40–129)
ALT SERPL-CCNC: 106 U/L (ref 10–40)
ANION GAP SERPL CALCULATED.3IONS-SCNC: 9 MMOL/L (ref 3–16)
ANISOCYTOSIS: ABNORMAL
AST SERPL-CCNC: 21 U/L (ref 15–37)
BASOPHILS ABSOLUTE: 0 K/UL (ref 0–0.2)
BASOPHILS RELATIVE PERCENT: 0.2 %
BILIRUB SERPL-MCNC: 0.8 MG/DL (ref 0–1)
BUN BLDV-MCNC: 27 MG/DL (ref 7–20)
CALCIUM SERPL-MCNC: 8.8 MG/DL (ref 8.3–10.6)
CHLORIDE BLD-SCNC: 106 MMOL/L (ref 99–110)
CO2: 25 MMOL/L (ref 21–32)
CREAT SERPL-MCNC: 1.1 MG/DL (ref 0.6–1.2)
EOSINOPHILS ABSOLUTE: 0.2 K/UL (ref 0–0.6)
EOSINOPHILS RELATIVE PERCENT: 3.8 %
GFR AFRICAN AMERICAN: 58
GFR NON-AFRICAN AMERICAN: 48
GLUCOSE BLD-MCNC: 163 MG/DL (ref 70–99)
GLUCOSE BLD-MCNC: 164 MG/DL (ref 70–99)
GLUCOSE BLD-MCNC: 190 MG/DL (ref 70–99)
GLUCOSE BLD-MCNC: 215 MG/DL (ref 70–99)
GLUCOSE BLD-MCNC: 232 MG/DL (ref 70–99)
HCT VFR BLD CALC: 25.7 % (ref 36–48)
HEMOGLOBIN ELECTROPHORESIS: NORMAL
HEMOGLOBIN: 8.2 G/DL (ref 12–16)
HGB ELECTROPHORESIS INTERP: NORMAL
LYMPHOCYTES ABSOLUTE: 0.9 K/UL (ref 1–5.1)
LYMPHOCYTES RELATIVE PERCENT: 17 %
MCH RBC QN AUTO: 21.4 PG (ref 26–34)
MCHC RBC AUTO-ENTMCNC: 31.8 G/DL (ref 31–36)
MCV RBC AUTO: 67.3 FL (ref 80–100)
MICROCYTES: ABNORMAL
MONOCYTES ABSOLUTE: 0.8 K/UL (ref 0–1.3)
MONOCYTES RELATIVE PERCENT: 15.1 %
NEUTROPHILS ABSOLUTE: 3.5 K/UL (ref 1.7–7.7)
NEUTROPHILS RELATIVE PERCENT: 63.9 %
OVALOCYTES: ABNORMAL
PDW BLD-RTO: 26.1 % (ref 12.4–15.4)
PERFORMED ON: ABNORMAL
PLATELET # BLD: 326 K/UL (ref 135–450)
PLATELET SLIDE REVIEW: ADEQUATE
PMV BLD AUTO: 8.9 FL (ref 5–10.5)
POTASSIUM REFLEX MAGNESIUM: 4.2 MMOL/L (ref 3.5–5.1)
RBC # BLD: 3.81 M/UL (ref 4–5.2)
SCHISTOCYTES: ABNORMAL
SLIDE REVIEW: ABNORMAL
SODIUM BLD-SCNC: 140 MMOL/L (ref 136–145)
TOTAL PROTEIN: 5 G/DL (ref 6.4–8.2)
WBC # BLD: 5.4 K/UL (ref 4–11)

## 2022-06-28 PROCEDURE — 94640 AIRWAY INHALATION TREATMENT: CPT

## 2022-06-28 PROCEDURE — C9113 INJ PANTOPRAZOLE SODIUM, VIA: HCPCS | Performed by: INTERNAL MEDICINE

## 2022-06-28 PROCEDURE — 36415 COLL VENOUS BLD VENIPUNCTURE: CPT

## 2022-06-28 PROCEDURE — 6360000002 HC RX W HCPCS

## 2022-06-28 PROCEDURE — 6360000002 HC RX W HCPCS: Performed by: INTERNAL MEDICINE

## 2022-06-28 PROCEDURE — 6370000000 HC RX 637 (ALT 250 FOR IP): Performed by: INTERNAL MEDICINE

## 2022-06-28 PROCEDURE — 94761 N-INVAS EAR/PLS OXIMETRY MLT: CPT

## 2022-06-28 PROCEDURE — 80053 COMPREHEN METABOLIC PANEL: CPT

## 2022-06-28 PROCEDURE — 99233 SBSQ HOSP IP/OBS HIGH 50: CPT | Performed by: INTERNAL MEDICINE

## 2022-06-28 PROCEDURE — 1200000000 HC SEMI PRIVATE

## 2022-06-28 PROCEDURE — 99233 SBSQ HOSP IP/OBS HIGH 50: CPT | Performed by: NURSE PRACTITIONER

## 2022-06-28 PROCEDURE — 85025 COMPLETE CBC W/AUTO DIFF WBC: CPT

## 2022-06-28 PROCEDURE — 2580000003 HC RX 258: Performed by: INTERNAL MEDICINE

## 2022-06-28 PROCEDURE — APPNB60 APP NON BILLABLE TIME 46-60 MINS: Performed by: PHYSICIAN ASSISTANT

## 2022-06-28 RX ORDER — PANTOPRAZOLE SODIUM 40 MG/1
40 TABLET, DELAYED RELEASE ORAL
Status: DISCONTINUED | OUTPATIENT
Start: 2022-06-29 | End: 2022-06-30 | Stop reason: HOSPADM

## 2022-06-28 RX ORDER — OXYCODONE HYDROCHLORIDE 5 MG/1
5-10 TABLET ORAL EVERY 6 HOURS PRN
Qty: 5 TABLET | Refills: 0 | Status: SHIPPED | OUTPATIENT
Start: 2022-06-28 | End: 2022-07-05

## 2022-06-28 RX ORDER — ENOXAPARIN SODIUM 100 MG/ML
40 INJECTION SUBCUTANEOUS DAILY
Status: DISCONTINUED | OUTPATIENT
Start: 2022-06-28 | End: 2022-06-30 | Stop reason: HOSPADM

## 2022-06-28 RX ADMIN — CARVEDILOL 12.5 MG: 6.25 TABLET, FILM COATED ORAL at 16:42

## 2022-06-28 RX ADMIN — INSULIN LISPRO 2 UNITS: 100 INJECTION, SOLUTION INTRAVENOUS; SUBCUTANEOUS at 09:05

## 2022-06-28 RX ADMIN — FERROUS SULFATE TAB 325 MG (65 MG ELEMENTAL FE) 325 MG: 325 (65 FE) TAB at 09:03

## 2022-06-28 RX ADMIN — CETIRIZINE HYDROCHLORIDE 10 MG: 10 TABLET ORAL at 09:03

## 2022-06-28 RX ADMIN — PAROXETINE HYDROCHLORIDE 20 MG: 20 TABLET, FILM COATED ORAL at 09:03

## 2022-06-28 RX ADMIN — TIOTROPIUM BROMIDE INHALATION SPRAY 2 PUFF: 3.12 SPRAY, METERED RESPIRATORY (INHALATION) at 07:43

## 2022-06-28 RX ADMIN — SODIUM CHLORIDE, PRESERVATIVE FREE 10 ML: 5 INJECTION INTRAVENOUS at 09:04

## 2022-06-28 RX ADMIN — GABAPENTIN 300 MG: 300 CAPSULE ORAL at 09:04

## 2022-06-28 RX ADMIN — PANTOPRAZOLE SODIUM 40 MG: 40 INJECTION, POWDER, LYOPHILIZED, FOR SOLUTION INTRAVENOUS at 09:04

## 2022-06-28 RX ADMIN — Medication 2 PUFF: at 07:43

## 2022-06-28 RX ADMIN — POLYETHYLENE GLYCOL (3350) 17 G: 17 POWDER, FOR SOLUTION ORAL at 09:04

## 2022-06-28 RX ADMIN — ENOXAPARIN SODIUM 40 MG: 100 INJECTION SUBCUTANEOUS at 14:18

## 2022-06-28 RX ADMIN — ALLOPURINOL 200 MG: 100 TABLET ORAL at 09:04

## 2022-06-28 RX ADMIN — SODIUM CHLORIDE, PRESERVATIVE FREE 10 ML: 5 INJECTION INTRAVENOUS at 21:30

## 2022-06-28 RX ADMIN — FERROUS SULFATE TAB 325 MG (65 MG ELEMENTAL FE) 325 MG: 325 (65 FE) TAB at 16:42

## 2022-06-28 RX ADMIN — Medication 2 PUFF: at 20:19

## 2022-06-28 RX ADMIN — LEVOFLOXACIN 500 MG: 500 INJECTION, SOLUTION INTRAVENOUS at 14:15

## 2022-06-28 RX ADMIN — LOSARTAN POTASSIUM 100 MG: 100 TABLET, FILM COATED ORAL at 09:03

## 2022-06-28 RX ADMIN — ATORVASTATIN CALCIUM 10 MG: 10 TABLET, FILM COATED ORAL at 21:30

## 2022-06-28 RX ADMIN — RDII 250 MG CAPSULE 250 MG: at 09:03

## 2022-06-28 RX ADMIN — RDII 250 MG CAPSULE 250 MG: at 21:30

## 2022-06-28 RX ADMIN — GABAPENTIN 300 MG: 300 CAPSULE ORAL at 21:30

## 2022-06-28 RX ADMIN — CARVEDILOL 12.5 MG: 6.25 TABLET, FILM COATED ORAL at 09:03

## 2022-06-28 RX ADMIN — INSULIN LISPRO 2 UNITS: 100 INJECTION, SOLUTION INTRAVENOUS; SUBCUTANEOUS at 12:26

## 2022-06-28 RX ADMIN — INSULIN LISPRO 2 UNITS: 100 INJECTION, SOLUTION INTRAVENOUS; SUBCUTANEOUS at 21:31

## 2022-06-28 RX ADMIN — INSULIN LISPRO 4 UNITS: 100 INJECTION, SOLUTION INTRAVENOUS; SUBCUTANEOUS at 18:58

## 2022-06-28 ASSESSMENT — PAIN DESCRIPTION - LOCATION: LOCATION: LEG

## 2022-06-28 ASSESSMENT — ENCOUNTER SYMPTOMS
GASTROINTESTINAL NEGATIVE: 1
RESPIRATORY NEGATIVE: 1

## 2022-06-28 ASSESSMENT — PAIN DESCRIPTION - PAIN TYPE: TYPE: ACUTE PAIN

## 2022-06-28 ASSESSMENT — PAIN DESCRIPTION - DESCRIPTORS: DESCRIPTORS: ACHING

## 2022-06-28 ASSESSMENT — PAIN SCALES - GENERAL: PAINLEVEL_OUTOF10: 7

## 2022-06-28 ASSESSMENT — PAIN DESCRIPTION - ORIENTATION: ORIENTATION: RIGHT

## 2022-06-28 ASSESSMENT — PAIN - FUNCTIONAL ASSESSMENT: PAIN_FUNCTIONAL_ASSESSMENT: PREVENTS OR INTERFERES SOME ACTIVE ACTIVITIES AND ADLS

## 2022-06-28 NOTE — PROGRESS NOTES
Department of Orthopedic Surgery  Physician Assistant   Progress Note    Subjective:       Systemic or Specific Complaints: feels okay today. Nabila Valentina to work with PT.    Objective:     Patient Vitals for the past 24 hrs:   BP Temp Temp src Pulse Resp SpO2   06/28/22 0745 -- -- -- 72 -- 92 %   06/28/22 0720 (!) 142/76 97.6 °F (36.4 °C) Oral 70 16 93 %   06/28/22 0303 (!) 155/79 98.6 °F (37 °C) Oral 83 18 93 %   06/27/22 2234 -- -- -- -- 18 --   06/27/22 2204 -- -- -- -- 16 --   06/27/22 2034 -- -- -- 76 18 93 %   06/27/22 2028 (!) 171/75 98.3 °F (36.8 °C) Oral 75 18 90 %   06/27/22 1822 (!) 184/78 -- -- 77 -- --   06/27/22 1643 (!) 187/74 99.8 °F (37.7 °C) Oral 77 18 93 %   06/27/22 1500 (!) 173/67 -- -- 71 17 96 %   06/27/22 1400 (!) 167/70 -- -- 75 15 96 %   06/27/22 1300 (!) 152/62 -- -- 79 24 95 %   06/27/22 1200 (!) 168/79 99.5 °F (37.5 °C) Bladder 78 25 96 %       General: alert, appears stated age, cooperative, no distress and pale   Wound: No Erythema, Positive for Edema and  wound drainage   Motion: Painful range of Motion in affected extremity   DVT Exam: Calf soft, NT     Additional exam: Patient seen sitting up in bed at time of interview. High flow nasal cannula in place following recent extubation  Thigh moderately swollen, compartments soft and compressible, no tension noted. No obvious palpable hematoma  EHL, FHL, gastroc, anterior tib motor intact  Sensation intact entirety of right lower extremity  Dorsalis pedis pulse not appreciated, even with Doppler. Anterior tibialis pulse 1+.   Foot is warm and well-perfused with brisk capillary refill to the toe nailbeds      Data Review  CBC:   Lab Results   Component Value Date    WBC 5.4 06/28/2022    RBC 3.81 06/28/2022    HGB 8.2 06/28/2022    HCT 25.7 06/28/2022     06/28/2022       Renal:   Lab Results   Component Value Date     06/28/2022    K 4.2 06/28/2022     06/28/2022    CO2 25 06/28/2022    BUN 27 06/28/2022    CREATININE 1.1 06/28/2022    GLUCOSE 163 06/28/2022    CALCIUM 8.8 06/28/2022            Assessment:     S/p right hip intramedullary nailing. Date of surgery 6/20/2022 with Dr. Clements January    Anemia, status post 3 units PRBCs transfusion. Hemoglobin 8.2 this a.m., stable. Plan:      1: Continue with IM recs. Okay to resume PT/OT. Weightbearing as tolerated to the right lower extremity with assistive device. 2:  Continue Deep venous thrombosis prophylaxis-SCD sleeves currently while hemoglobin stabilizes, followed by IM/ICU. Begin lovenox 40 daily for 4 weeks when okayed by heme/onc/ICU recs. 3:  Continue Pain Control- oxycodone held due to resp depression but can restart when okay with IM team  4: PT/OT eval recommending SNF, case management following. Encouraged patient to work with PT/OT daily. 5: Discharge pending medical stability. Ortho will follow peripherally and update DCP when stable to leave. Please feel free to contact us with any needs in the meantime.      Becky Del Valle PA-C

## 2022-06-28 NOTE — PROGRESS NOTES
Shift assessment complete. See doc flow. Nightly medications given see MAR. Patient A/Ox4. Patient on telemetry. Patient skin intact w/scattered bruising. Patient has preventative dressing to sacrum and bilat heels. Heels elevated off the bed. Patient has dressing to lef femoral, line removed. Valery Iman in place. Patient has SCD's on. Patient with no complaints at this time. Bed in lowest position, alarm on and locked. Call light and bedside table within easy reach.

## 2022-06-28 NOTE — PROGRESS NOTES
Report given @ bedside to [facility], for transferral of care. Pt resting in bed. Call light in reach.

## 2022-06-28 NOTE — PROGRESS NOTES
ONCOLOGY FOLLOW-UP:         PROBLEM LIST:       Patient Active Problem List   Diagnosis Code    Knee pain M25.569    Chronic urinary tract infection N39.0    Osteoarthritis of both knees M17.0    Benign essential HTN I10    Type 2 diabetes mellitus without complication, without long-term current use of insulin ([facility]) E11.9    Gastroesophageal reflux disease without esophagitis K21.9    Chronic gout of multiple sites M1A. 200X0    Spinal stenosis of lumbar region M48.061    Anemia D64.9    Carpal tunnel syndrome of left wrist G56.02    Morbid obesity ([facility]) E66.01    Moderate persistent asthma without complication J89.42    Hyperlipidemia associated with type 2 diabetes mellitus ([facility]) E11.69, E78.5    Major depressive disorder with single episode, in full remission ([facility] [address] F32.5    Asthma J45.909    Diabetes ([facility] [address] E11.9    DJD (degenerative joint disease) M19.90    Edema R60.9    Gout M10.9    Lumbar disc disease M51.9    Melanoma ([facility]) C43.9    Vitamin B 12 deficiency E53.8    Osteoporosis M81.0    S/P hysterectomy Z90.710    S/P parathyroidectomy ([facility]) E89.2    Sciatic nerve pain M54.30    Thalassemia trait D56.3    Hip fracture requiring operative repair, left, closed, initial encounter ([facility] [address] S72.002A    Acute blood loss anemia D62    Closed 2-part intertrochanteric fracture of proximal end of right femur ([facility]) S72.141A    Shock ([facility] [address] R57.9    Acute hypoxemic respiratory failure ([facility]) J96.01    Bradycardia R00.1    Hypotension I95.9    Paroxysmal atrial fibrillation ([facility]) I48.0    Hip fx, right, open type I or II, initial encounter ([facility] S72.001B    Gastrointestinal hemorrhage K92.2       INTERVAL HISTORY:       Pt transferred out of ICU. Hgb 7.2 today. MCV has come up some. Received venofer over the weekend. REVIEW OF SYSTEMS:       · Constitutional: Denies fever, sweats, weight loss     · Eyes: No visual changes or diplopia. No scleral icterus.   · ENT: No Headaches, hearing loss or vertigo. No mouth sores or sore throat. · Cardiovascular: No chest pain, dyspnea on exertion, palpitations or loss of consciousness. · Respiratory: No cough or wheezing, no sputum production. No hemoptysis. .    · Gastrointestinal: No abdominal pain, appetite loss, blood in stools. No change in bowel habits. · Genitourinary: No dysuria, trouble voiding, or hematuria. · Musculoskeletal:  Generalized weakness. No joint complaints. · Integumentary: No rash or pruritis. · Neurological: No headache, diplopia. No change in gait, balance, or coordination. No paresthesias. · Endocrine: No temperature intolerance. No excessive thirst, fluid intake, or urination. · Hematologic/Lymphatic: No abnormal bruising or ecchymoses, blood clots or swollen lymph nodes.   · Allergic/Immunologic: No nasal congestion or hives.      PHYSICAL EXAM:            Vitals:     06/23/22 0900   BP: (!) 145/64   Pulse: 91   Resp: 19   Temp:     SpO2: 97%         General appearance: alert and cooperative  Head: Normocephalic, without obvious abnormality, atraumatic  Neck: No palpable lymphadenopathy in supraclavicular or cervical chains  Lungs: Clear to auscultation bilaterally, no audible rales, wheezes or crackles  Heart: Regular rate and rhythm, S1, S2 normal  Abdomen: Soft, non-tender; bowel sounds normal; no masses,  no organomegaly  Extremities: without cyanosis, clubbing, edema or asymmetry  Skin: No jaundice, purpura or petechiae        LABS:            Lab Results   Component Value Date     WBC 6.0 06/23/2022     HGB 7.2 (L) 06/23/2022     HCT 21.9 (L) 06/23/2022     MCV 66.6 (L) 06/23/2022      06/23/2022               Lab Results   Component Value Date     GLUCOSE 155 (H) 06/23/2022     BUN 25 (H) 06/23/2022     CREATININE 1.5 (H) 06/23/2022     K 4.1 06/23/2022               Lab Results   Component Value Date     ALKPHOS 68 06/19/2022     ALT 10 06/19/2022     AST 11 (L) 06/19/2022     BILITOT 0.8 06/19/2022     BILIDIR <0.2 07/27/2017     PROT 6.2 (L) 06/19/2022               Lab Results   Component Value Date     IRON 20 (L) 06/20/2022     TIBC 215 (L) 06/20/2022     FERRITIN 100.0 06/20/2022         IMAGING:      Echo Complete  Result Date: 6/22/2022  Conclusions   Summary  LV systolic function is normal with EF estimated 60%. No regional wall motion abnormalities are noted. There is mild concentric left ventricular hypertrophy. Grade I diastolic dysfunction with normal filling pressure. Mild biatrial enlargement. Mild posterior mitral annular calcification is present. The maximal transaortic velocity is 2.22m/s which gives peak pressure  gradient= 21mmHg and mean pressure gradient= 12mmHg which is c/w mild aortic  stenosis. Mild tricuspid and pulmonic regurgitation. Systolic pulmonary artery pressure (SPAP) estimated at 56 mmHg (RAP 15  mmHg), c/w moderate pulm HTN. Signature   ------------------------------------------------------------------       CT ABDOMEN PELVIS WO CONTRAST Additional Contrast? None  Result Date: 6/21/2022  No evidence for pulmonary embolism Considerable subsegmental atelectatic changes the lung bases without separate consolidation of airspace disease clearly evident or pleural effusion Right upper lobe 12 mm noncalcified pulmonary nodule with attention on follow-up CT considered within 3-6 months especially if there is high risk smoking history or high risk stratification No acute findings of the abdomen or pelvis identified      XR FEMUR RIGHT (MIN 2 VIEWS)  Result Date: 6/19/2022  Traumatic, acute intertrochanteric fracture of the right hip. Advanced osteoarthritis of the knee. Moderate-sized knee joint effusion.      XR FOOT LEFT (MIN 3 VIEWS)  Result Date: 6/20/2022  Degenerative changes in the left foot with no acute abnormality.      CT Head WO Contrast  Result Date: 6/19/2022  No acute intracranial abnormality.  No acute facial fractures.      CT FACIAL BONES WO CONTRAST  Result Date: 6/19/2022  No acute intracranial abnormality. No acute facial fractures.      XR CHEST PORTABLE  Result Date: 6/23/2022  No significant interval improvement. Bibasilar atelectasis versus infiltrates. Trace pleural effusions.      XR CHEST PORTABLE  Result Date: 6/22/2022  ET tube in satisfactory position 4.6 cm above the terrance. Bibasilar atelectasis. Cardiomegaly.      XR CHEST PORTABLE  Result Date: 6/21/2022  1. Endotracheal tube and enteric tubes properly placed. 2.  Mild left basilar airspace disease and probable trace left pleural effusion.      XR CHEST PORTABLE  Result Date: 6/20/2022  No acute cardiopulmonary disease.      CTA ABDOMINAL AORTA W BILAT RUNOFF W CONTRAST  Result Date: 6/19/2022  1. Traumatic, acute right hip intertrochanteric fracture with varus angulation and small surrounding hematoma. 2. No pseudoaneurysm or active extravasation is identified adjacent to the fracture. 3. Aortoiliac inflow is widely patent. There is a mild amount of fibro calcified plaque. 4. Femoropopliteal segments are patent bilaterally, noting fibro calcified plaque. There is 3 vessel runoff bilaterally. However, on the right side there is only 1 primary runoff vessel, the posterior tibial. 5. No acute abdominopelvic abnormality. Hepatic steatosis.      CTA PULMONARY W CONTRAST  Result Date: 6/21/2022  No evidence for pulmonary embolism Considerable subsegmental atelectatic changes the lung bases without separate consolidation of airspace disease clearly evident or pleural effusion Right upper lobe 12 mm noncalcified pulmonary nodule with attention on follow-up CT considered within 3-6 months especially if there is high risk smoking history or high risk stratification No acute findings of the abdomen or pelvis identified      FLUORO FOR SURGICAL PROCEDURES  Result Date: 6/20/2022  Intraprocedural fluoroscopic spot images as above.   See separate procedure report for more information.      XR HIP 2-3 VW W PELVIS RIGHT  Result Date: 6/20/2022  1. Status post ORIF of the right hip intertrochanteric fracture. No immediate complication.      XR HIP 2-3 VW W PELVIS RIGHT  Result Date: 6/20/2022  Moderately displaced fracture of the proximal right femur.         ASSESSMENT:           Problem List Items Addressed This Visit           Closed 2-part intertrochanteric fracture of proximal end of right femur (HCC) - Primary      Relevant Medications      oxyCODONE (ROXICODONE) immediate release tablet 5 mg      oxyCODONE (ROXICODONE) immediate release tablet 10 mg      fentaNYL (SUBLIMAZE) injection 50 mcg      Anemia                Other Visit Diagnoses      Hip fx, right, open type I or II, initial encounter ([facility])         Relevant Medications     morphine sulfate (PF) injection 4 mg (Completed)     acetaminophen (TYLENOL) suppository 650 mg (Completed)     oxyCODONE (ROXICODONE) immediate release tablet 5 mg     oxyCODONE (ROXICODONE) immediate release tablet 10 mg     ketorolac (TORADOL) injection 15 mg (Completed)     fentaNYL (SUBLIMAZE) injection 50 mcg     Other Relevant Orders     FLUORO FOR SURGICAL PROCEDURES (Completed)                    PLAN:      1.  Microcytic Anemia     - suspect multifactorial  - low MCV could indicate underlying hemoglobinopathy - has known thalassemia trait   - also suspect blood loss from hip fracture and surgery   - also has iron deficiency based on recent iron studies 6/20/22  - agree with transfusion support to keep Hgb > 7  - empirically given venofer based on iron studies     Bhargavi Bedolla MD Myself

## 2022-06-28 NOTE — PROGRESS NOTES
[facility]  Cardiology  Progress Note    Admission date:  2022    Reason for follow up visit: AF    HPI/CC: Afsaneh Gilliam is a 67 y.o. female who presented 2022 following fall and found to have R hip fracture s/p repair 2022. On 2022 she was was hypotensive and bradycardic, likely due to severe anemia. Echo showed EF 60%, moderate pulmonary HTN. She was also noted to have PAF when mag 1.4. She has been treated for respiratory failure, anemia, MARVIN. Rhythm has been sinus. Subjective: Denies chest pain, shortness of breath, palpitations and dizziness. Vitals:  Blood pressure (!) 142/76, pulse 72, temperature 97.6 °F (36.4 °C), temperature source Oral, resp. rate 16, height 5' 3\" (1.6 m), weight 250 lb 14.1 oz (113.8 kg), SpO2 92 %, not currently breastfeeding.   Temp  Av.6 °F (37 °C)  Min: 97.6 °F (36.4 °C)  Max: 99.8 °F (37.7 °C)  Pulse  Av.5  Min: 70  Max: 83  BP  Min: 142/76  Max: 187/74  SpO2  Av.4 %  Min: 90 %  Max: 96 %    24 hour I/O    Intake/Output Summary (Last 24 hours) at 2022 1227  Last data filed at 2022 0546  Gross per 24 hour   Intake 200 ml   Output 1400 ml   Net -1200 ml     Current Facility-Administered Medications   Medication Dose Route Frequency Provider Last Rate Last Admin    enoxaparin (LOVENOX) injection 40 mg  40 mg SubCUTAneous Daily MARIUSZ Veliz        ferrous sulfate (IRON 325) tablet 325 mg  325 mg Oral BID  Kalpesh Polanco MD   325 mg at 22    saccharomyces boulardii (FLORASTOR) capsule 250 mg  250 mg Oral BID Kalpesh Polanco MD   250 mg at 22    allopurinol (ZYLOPRIM) tablet 200 mg  200 mg Oral Daily Kalpesh Polanco MD   200 mg at 22 09    gabapentin (NEURONTIN) capsule 300 mg  300 mg Oral BID Kalpesh Polanco MD   300 mg at 22 0904    losartan (COZAAR) tablet 100 mg  100 mg Oral Daily Kalpesh Polanco MD   100 mg at 22 1618  levoFLOXacin (LEVAQUIN) 500 MG/100ML infusion 500 mg  500 mg IntraVENous Q24H Kym Woodward MD   Stopped at 06/27/22 1602    0.9 % sodium chloride infusion   IntraVENous PRN Kym Woodward MD   Stopped at 06/25/22 1008    carvedilol (COREG) tablet 12.5 mg  12.5 mg Oral BID  Kym Woodward MD   12.5 mg at 06/28/22 0903    budesonide-formoterol (SYMBICORT) 160-4.5 MCG/ACT inhaler 2 puff  2 puff Inhalation BID Kym Woodward MD   2 puff at 06/28/22 0743    tiotropium (SPIRIVA RESPIMAT) 2.5 MCG/ACT inhaler 2 puff  2 puff Inhalation Daily Kym Woodward MD   2 puff at 06/28/22 0743    acetaminophen (TYLENOL) tablet 650 mg  650 mg Oral Q4H PRN Kym Woodward MD   650 mg at 06/26/22 2058    insulin lispro (HUMALOG) injection vial 0-12 Units  0-12 Units SubCUTAneous TID  Kym Woodward MD   2 Units at 06/28/22 0905    insulin lispro (HUMALOG) injection vial 0-6 Units  0-6 Units SubCUTAneous Nightly Kym Woodward MD   1 Units at 06/27/22 2205    morphine (PF) injection 2 mg  2 mg IntraVENous Q2H PRN Kym Woodward MD   2 mg at 06/27/22 2204    polyethylene glycol (GLYCOLAX) packet 17 g  17 g Oral BID Kym Woodward MD   17 g at 06/28/22 0904    metoprolol (LOPRESSOR) injection 2.5 mg  2.5 mg IntraVENous Q6H PRN Kym Woodward MD        magnesium sulfate 1000 mg in dextrose 5% 100 mL IVPB  1,000 mg IntraVENous PRN Kym Woodward MD   Paused at 06/24/22 0120    atropine injection 1 mg  1 mg IntraVENous Once Kym Woodward MD        pantoprazole (PROTONIX) injection 40 mg  40 mg IntraVENous Daily Kym Woodward MD   40 mg at 06/28/22 0904    glucose chewable tablet 16 g  4 tablet Oral PRN Kym Woodward MD        dextrose bolus 10% 125 mL  125 mL IntraVENous PRN Kym Woodward MD        Or    dextrose bolus 10% 250 mL  250 mL IntraVENous PRN Edyta Omid Park MD        glucagon (rDNA) injection 1 mg  1 mg IntraMUSCular PRN Radha Bob MD        dextrose 5 % solution  100 mL/hr IntraVENous PRN Radha Bob MD        atorvastatin (LIPITOR) tablet 10 mg  10 mg Oral Nightly Radha Bob MD   10 mg at 06/27/22 2203    cetirizine (ZYRTEC) tablet 10 mg  10 mg Oral Daily Radha Bob MD   10 mg at 06/28/22 0903    PARoxetine (PAXIL) tablet 20 mg  20 mg Oral Daily Radha Bob MD   20 mg at 06/28/22 0903    sodium chloride flush 0.9 % injection 5-40 mL  5-40 mL IntraVENous 2 times per day Radha Bob MD   10 mL at 06/28/22 0904    sodium chloride flush 0.9 % injection 5-40 mL  5-40 mL IntraVENous PRN Radha Bob MD        0.9 % sodium chloride infusion   IntraVENous PRN Radha Bob MD 5 mL/hr at 06/26/22 0756 Rate Verify at 06/26/22 0756    ondansetron (ZOFRAN-ODT) disintegrating tablet 4 mg  4 mg Oral Q8H PRN Radha Bob MD        Or    ondansetron (ZOFRAN) injection 4 mg  4 mg IntraVENous Q6H PRN Radha Bob MD   4 mg at 06/27/22 1527    albuterol sulfate HFA (PROVENTIL;VENTOLIN;PROAIR) 108 (90 Base) MCG/ACT inhaler 2 puff  2 puff Inhalation Q4H PRN Radha Bob MD         Review of Systems   Constitutional: Negative. Respiratory: Negative. Cardiovascular: Positive for leg swelling. Negative for chest pain and palpitations. Gastrointestinal: Negative. Neurological: Negative.       Objective:     Telemetry monitor: SR    Physical Exam:  Constitutional:  Comfortable and alert, NAD, appears stated age, pale  Eyes: PERRL, sclera nonicteric  Neck:  Supple, no masses, no thyroidmegaly, no JVD  Skin:  Warm and dry; no rash or lesions  Heart: Regular, normal apex, S1 and S2 normal, no M/G/R  Lungs:  Normal respiratory effort; clear; no wheezing/rhonchi/rales  Abdomen: soft, non tender, + bowel sounds  Extremities:  2+ BLE edema, R>L  Neuro: alert and oriented, moves legs and arms equally, normal mood and affect    Data Reviewed:    Echo 4/5603:  LV systolic function is normal with EF estimated 60%. No regional wall motion abnormalities are noted. There is mild concentric left ventricular hypertrophy. Grade I diastolic dysfunction with normal filling pressure. Mild biatrial enlargement. Mild posterior mitral annular calcification is present. The maximal transaortic velocity is 2.22m/s which gives peak pressure   gradient= 21mmHg and mean pressure gradient= 12mmHg which is c/w mild aortic   stenosis. Mild tricuspid and pulmonic regurgitation. Systolic pulmonary artery pressure (SPAP) estimated at 56 mmHg (RAP 15   mmHg), c/w moderate pulm HTN.     Lab Reviewed:     Renal Profile:  Lab Results   Component Value Date    CREATININE 1.1 06/28/2022    BUN 27 06/28/2022     06/28/2022    K 4.2 06/28/2022     06/28/2022    CO2 25 06/28/2022     CBC:    Lab Results   Component Value Date    WBC 5.4 06/28/2022    RBC 3.81 06/28/2022    HGB 8.2 06/28/2022    HCT 25.7 06/28/2022    MCV 67.3 06/28/2022    RDW 26.1 06/28/2022     06/28/2022     BNP:    Lab Results   Component Value Date    PROBNP 1,173 06/23/2022    PROBNP 450 01/06/2015     Fasting Lipid Panel:    Lab Results   Component Value Date    CHOL 123 10/21/2020    HDL 50 10/21/2020    TRIG 103 06/24/2022     Cardiac Enzymes:  CK/MbTroponin  Lab Results   Component Value Date    CKTOTAL 38 06/19/2022    TROPONINI <0.01 06/24/2022     PT/ INR   Lab Results   Component Value Date    INR 1.21 06/24/2022    INR 1.80 06/21/2022    INR 1.03 01/06/2015    PROTIME 15.2 06/24/2022    PROTIME 20.7 06/21/2022    PROTIME 11.1 01/06/2015     PTT No results found for: PTT   Lab Results   Component Value Date    MG 1.70 06/26/2022    No results found for: TSH    All labs and imaging reviewed today    Assessment:  Paroxysmal atrial fibrillation: new diagnosis, now stable in sinus, noted when Mg 1.4   - [id number] score 3  Sinus bradycardia with hypotension: noted 6/21/2022 during severe anemia, possibly vagal event  Moderate pulmonary HTN  HTN  HLD  DM  Anemia  Acute respiratory failure, now extubated  Fall with R hip fracture s/p repair 6/20/2022    Plan:   1. Continue carvedilol for HTN and PAF  2. Holding anticoagulation due to severe anemia requiring PRBCs this admission  3. PAF noted during electrolyte abnormalities. Cardiac monitor at discharge and if recurrence, consider AC if anemia stable and/or Watchman  4.  Monitor telemetry    MADDIE Mosqueda-CNP  [facility]  [phone]

## 2022-06-28 NOTE — PROGRESS NOTES
PROGRESS NOTE  S:76 yrs Patient  admitted on 6/19/2022 with Hip fracture requiring operative repair, left, closed, initial encounter ([facility] [address] Lucia Lee . Today she feels well. She is tolerating diet. She passed 1x BM yesterday. Exam:   Vitals:    06/28/22 0745   BP:    Pulse: 72   Resp:    Temp:    SpO2: 92%      General appearance: alert, appears stated age, cooperative, no distress and pale  HEENT: Neck supple with midline trachea  Neck: no adenopathy and supple, symmetrical, trachea midline  Lungs: clear to auscultation bilaterally  Heart: regular rate and rhythm, S1, S2 normal, no murmur, click, rub or gallop  Abdomen: soft, non-tender; bowel sounds normal; no masses,  no organomegaly  Extremities: edema 1+ BLE     Medications: Reviewed    Labs:  CBC:   Recent Labs     06/26/22  0600 06/27/22  0415 06/28/22  0748   WBC 5.3 6.0 5.4   HGB 8.3* 7.5* 8.2*   HCT 26.1* 22.9* 25.7*   MCV 68.1* 67.8* 67.3*    263 326     BMP:   Recent Labs     06/26/22  0600 06/27/22  0415 06/27/22  0600 06/28/22  0748    139  --  140   K 4.1  --  4.1 4.2    106  --  106   CO2 23 23  --  25   BUN 26* 28*  --  27*   CREATININE 1.0 1.0  --  1.1     LIVER PROFILE:   Recent Labs     06/26/22  0600 06/27/22  0415 06/27/22  0600 06/28/22  0748   AST 39*  --  28 21   *  --  141* 106*   PROT 4.8* 4.7*  --  5.0*   BILIDIR <0.2 <0.2  --   --    BILITOT 0.6 0.7  --  0.8   ALKPHOS 93 89  --  95     Attending Supervising [de-identified] Attestation Statement  The patient is a 67 y.o. female. I have performed a history and physical examination of the patient. I discussed the case with my physician assistant Bailey Chavez PA-C    I reviewed the patient's Past Medical History, Past Surgical History, Medications, and Allergies.      Physical Exam:  Vitals:    06/28/22 0303 06/28/22 0720 06/28/22 0745 06/28/22 1401   BP: (!) 155/79 (!) 142/76  (!) 136/57   Pulse: 83 70 72 79   Resp: 18 16 16   Temp: 98.6 °F (37 °C) 97.6 °F (36.4 °C)  98.4 °F (36.9 °C)   TempSrc: Oral Oral  Oral   SpO2: 93% 93% 92% 94%   Weight:       Height:           Physical Examination: General appearance - improved  Mental status - alert, oriented to person, place, and time  Eyes - sclera anicteric  Neck - supple, no significant adenopathy  Chest - no tachypnea, retractions or cyanosis  Heart - normal rate and regular rhythm  Abdomen - soft, nontender, nondistended, no masses or organomegaly  Extremities - 2+ pedal edema noted       Impression: 67year old female with a history of thalassemia minor, DM, HLD, HTN, GERD, asthma, osteoporosis, and gout admitted with acute blood loss anemia s/p 7U pRBCs s/p R hip intramedullary nailing on 6/20 and EGD with polypectomy on 6/25. Recommendation:  1. Continue supportive care  2. Monitor Hgb  3. Observe for signs of bleeding  4. Monitor and document output  5. Transfuse as needed if Hgb < 7.0  6. Continue Pantoprazole 40 mg qAM  7. Continue PO iron supplementation  8. Bowel regimen with Miralax and probiotics daily  9. Continue diet as tolerated  10. Hematology following  11. Ok to d/c from GI standpoint  12. Recommend colonoscopy as outpatient       Belle Herring PA-C  11:23 AM 6/28/2022                 78-year-old female with history of DM, HTN, HLD, GERD, chronic back pain, asthma, arthritis, thalasemia, gout, osteoporosis, admitted with mechanical fall and R hip fracture. Hospitalization complicated by sepsis, acute respiratory failure, MARVIN, paroxysmal A fib and anemia requiring 7uPRBC. EGD negative for active bleeding     Continue supportive care. Monitor Hgb. Observe for signs of bleeding. Encourage nutrition. PT/OT. Outpatient colonoscopy once acute issues resolve.     Nanette Vega MD          ([id number]  [facility]) [phone]

## 2022-06-28 NOTE — PROGRESS NOTES
Progress Note    Admit Date:  6/19/2022     66 y/o female with pmhx of DM, HTN, Gout, thalasemmia minor, OA, asthma  -presented after mechanical fall with right hip fracture, underwent IM nailing on 6/20/2022, had postop anemia, received 1 Unit pRBCs, became hypotensive and unresponsive -code blue was called, pt was transferred to ICU   -was on epinephrine and levophed gtt then on isoproterenol gtt   -pt was then intubated and on vent   -had episodes of emesis , melena and continued drop in Hgb   -EGD with polyp, no bleeding  -had 4 untis pRBCs on 6/20, 1 on 6/25, 2 units FFP on 6/20  -extubated 6/23  -had episode of a fib 6/24  -pt on RA     Subjective:  Ms. Pickering Gone seen up in bed, on RA . Feels good today     Right knee and hip with pain and unable to move   Worsening pedal edema per pt    Objective:   Patient Vitals for the past 4 hrs:   BP Temp Temp src Pulse Resp SpO2   06/28/22 0745 -- -- -- 72 -- 92 %   06/28/22 0720 (!) 142/76 97.6 °F (36.4 °C) Oral 70 16 93 %            Intake/Output Summary (Last 24 hours) at 6/28/2022 1115  Last data filed at 6/28/2022 0546  Gross per 24 hour   Intake 440 ml   Output 1400 ml   Net -960 ml       Physical Exam:    Gen: elderly female, up in bed,  No distress. Alert.     Appears to be not in any distress  Mucous Membranes:  Pale , anicteric  Neck: No JVD, no carotid bruit, no thyromegaly  Chest:  Clear to auscultation bilaterally, no added sounds  Cardiovascular:  RRR S1S2 heard, no murmurs or gallops  Abdomen:  Soft, undistended, non tender, no organomegaly, BS present  Extremities: right hip dressing dry, expected edema, significant weakness at right hip, knee noted    Distal pulses well felt  Neurological : grossly normal          Medications:  ferrous sulfate, 325 mg, BID WC  saccharomyces boulardii, 250 mg, BID  allopurinol, 200 mg, Daily  gabapentin, 300 mg, BID  losartan, 100 mg, Daily  levofloxacin, 500 mg, Q24H  carvedilol, 12.5 mg, BID WC  budesonide-formoterol, 2 puff, BID  tiotropium, 2 puff, Daily  insulin lispro, 0-12 Units, TID WC  insulin lispro, 0-6 Units, Nightly  polyethylene glycol, 17 g, BID  atropine, 1 mg, Once  pantoprazole, 40 mg, Daily  atorvastatin, 10 mg, Nightly  cetirizine, 10 mg, Daily  PARoxetine, 20 mg, Daily  sodium chloride flush, 5-40 mL, 2 times per day      PRN Medications:  sodium chloride, , PRN  acetaminophen, 650 mg, Q4H PRN  morphine, 2 mg, Q2H PRN  metoprolol, 2.5 mg, Q6H PRN  magnesium sulfate, 1,000 mg, PRN  glucose, 4 tablet, PRN  dextrose bolus, 125 mL, PRN   Or  dextrose bolus, 250 mL, PRN  glucagon (rDNA), 1 mg, PRN  dextrose, 100 mL/hr, PRN  sodium chloride flush, 5-40 mL, PRN  sodium chloride, , PRN  ondansetron, 4 mg, Q8H PRN   Or  ondansetron, 4 mg, Q6H PRN  albuterol sulfate HFA, 2 puff, Q4H PRN          Data:  CBC:   Recent Labs     06/26/22  0600 06/27/22 0415 06/28/22  0748   WBC 5.3 6.0 5.4   HGB 8.3* 7.5* 8.2*   HCT 26.1* 22.9* 25.7*   MCV 68.1* 67.8* 67.3*    263 326     BMP:   Recent Labs     06/26/22 0600 06/27/22 0415 06/27/22 0600 06/28/22  0748    139  --  140   K 4.1  --  4.1 4.2    106  --  106   CO2 23 23  --  25   BUN 26* 28*  --  27*   CREATININE 1.0 1.0  --  1.1     LIVER PROFILE:   Recent Labs     06/26/22 0600 06/27/22 0415 06/27/22 0600 06/28/22  0748   AST 39*  --  28 21   *  --  141* 106*   BILIDIR <0.2 <0.2  --   --    BILITOT 0.6 0.7  --  0.8   ALKPHOS 93 89  --  95     PT/INR: No results for input(s): PROTIME, INR in the last 72 hours. CULTURES  Urine culture + e coli   Sputum culture negative   COVID not detected        RADIOLOGY  VL Extremity Venous Right         XR CHEST PORTABLE   Final Result   No significant interval improvement. Bibasilar atelectasis versus   infiltrates. Trace pleural effusions. XR CHEST PORTABLE   Final Result   ET tube in satisfactory position 4.6 cm above the terrance. Bibasilar atelectasis. Cardiomegaly.          CTA PULMONARY W CONTRAST   Final Result   No evidence for pulmonary embolism      Considerable subsegmental atelectatic changes the lung bases without separate   consolidation of airspace disease clearly evident or pleural effusion      Right upper lobe 12 mm noncalcified pulmonary nodule with attention on   follow-up CT considered within 3-6 months especially if there is high risk   smoking history or high risk stratification      No acute findings of the abdomen or pelvis identified         CT ABDOMEN PELVIS WO CONTRAST Additional Contrast? None   Final Result   No evidence for pulmonary embolism      Considerable subsegmental atelectatic changes the lung bases without separate   consolidation of airspace disease clearly evident or pleural effusion      Right upper lobe 12 mm noncalcified pulmonary nodule with attention on   follow-up CT considered within 3-6 months especially if there is high risk   smoking history or high risk stratification      No acute findings of the abdomen or pelvis identified         XR CHEST PORTABLE   Final Result   1. Endotracheal tube and enteric tubes properly placed. 2.  Mild left basilar airspace disease and probable trace left pleural   effusion. XR HIP 2-3 VW W PELVIS RIGHT   Final Result   1. Status post ORIF of the right hip intertrochanteric fracture. No   immediate complication. FLUORO FOR SURGICAL PROCEDURES   Final Result   Intraprocedural fluoroscopic spot images as above. See separate procedure   report for more information. XR FOOT LEFT (MIN 3 VIEWS)   Final Result   Degenerative changes in the left foot with no acute abnormality. XR CHEST PORTABLE   Final Result   No acute cardiopulmonary disease. XR HIP 2-3 VW W PELVIS RIGHT   Final Result   Moderately displaced fracture of the proximal right femur. CTA ABDOMINAL AORTA W BILAT RUNOFF W CONTRAST   Final Result   1.  Traumatic, acute right hip intertrochanteric fracture with varus   angulation and small surrounding hematoma. 2. No pseudoaneurysm or active extravasation is identified adjacent to the   fracture. 3. Aortoiliac inflow is widely patent. There is a mild amount of fibro   calcified plaque. 4. Femoropopliteal segments are patent bilaterally, noting fibro calcified   plaque. There is 3 vessel runoff bilaterally. However, on the right side   there is only 1 primary runoff vessel, the posterior tibial.   5. No acute abdominopelvic abnormality. Hepatic steatosis. CT FACIAL BONES WO CONTRAST   Final Result   No acute intracranial abnormality. No acute facial fractures. CT Head WO Contrast   Final Result   No acute intracranial abnormality. No acute facial fractures. XR FEMUR RIGHT (MIN 2 VIEWS)   Final Result   Traumatic, acute intertrochanteric fracture of the right hip. Advanced osteoarthritis of the knee. Moderate-sized knee joint effusion. Assessment/Plan:    Shock.  Syncope.  Septic versus cardiogenic versus hypovolemic vs hemorrhagic. - pt passed out with hypotension and bradycardia 1 day post hip surgery   Required epinephrine and Levophed drip to maintain vitals. Kept off BB and verapamil  Severe anemia needing  transfused 3 units of pRBCs, 2 units of FFP, no clear blood loss   Blood cultures negative so far.  Urine culture positive for E. coli  Echocardiogram-normal EF  Started on IV Doxy, meropenem and vancomycin , downgraded to  Levaquin on 6/26/2022. off all pressors.  -no more episodes of hypotension , Hgb stable      Acute hypoxic respiratory failure. Etiology unclear, likely due to hypovolemic shock and associated hypoventilation  Was intubated and on  vent, extubated 6/23  -CTA neg for PE or infection   - weaned off vent, off o2 and now on RA        Paroxysmal  a fib -   Cardiology involved. No plans for anticoagulation at present given anemia.   Plans for event monitor for 2 weeks as outpatient. 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 Resolved  -Cr 1.1        #DMII   On sliding scale insulin        #Asthma   -Continue home inhalers        #HLD  -Continue statin        #Osteoporosis  -Receives Fosamax weekly - holding        #GERD   -On PPI        #Gout   -Continue allopurinol        DVT Prophylaxis: lovenox   Diet: ADULT DIET;  Regular; 4 carb choices (60 gm/meal)  Code Status: Full Code    Resume PT  Dc planning     Sridevi Stevenson MD, 6/28/2022 6:56 PM

## 2022-06-29 DIAGNOSIS — I48.0 PAROXYSMAL ATRIAL FIBRILLATION (HCC): Primary | ICD-10-CM

## 2022-06-29 LAB
ANION GAP SERPL CALCULATED.3IONS-SCNC: 10 MMOL/L (ref 3–16)
ANISOCYTOSIS: ABNORMAL
BASOPHILS ABSOLUTE: 0 K/UL (ref 0–0.2)
BASOPHILS RELATIVE PERCENT: 0.3 %
BUN BLDV-MCNC: 31 MG/DL (ref 7–20)
CALCIUM SERPL-MCNC: 8.7 MG/DL (ref 8.3–10.6)
CHLORIDE BLD-SCNC: 106 MMOL/L (ref 99–110)
CO2: 24 MMOL/L (ref 21–32)
CREAT SERPL-MCNC: 1.1 MG/DL (ref 0.6–1.2)
EOSINOPHILS ABSOLUTE: 0.2 K/UL (ref 0–0.6)
EOSINOPHILS RELATIVE PERCENT: 3.6 %
GFR AFRICAN AMERICAN: 58
GFR NON-AFRICAN AMERICAN: 48
GLUCOSE BLD-MCNC: 147 MG/DL (ref 70–99)
GLUCOSE BLD-MCNC: 165 MG/DL (ref 70–99)
GLUCOSE BLD-MCNC: 173 MG/DL (ref 70–99)
GLUCOSE BLD-MCNC: 229 MG/DL (ref 70–99)
GLUCOSE BLD-MCNC: 280 MG/DL (ref 70–99)
HCT VFR BLD CALC: 23.1 % (ref 36–48)
HEMOGLOBIN: 7.7 G/DL (ref 12–16)
HYPOCHROMIA: ABNORMAL
LYMPHOCYTES ABSOLUTE: 1.1 K/UL (ref 1–5.1)
LYMPHOCYTES RELATIVE PERCENT: 21.3 %
MCH RBC QN AUTO: 22.2 PG (ref 26–34)
MCHC RBC AUTO-ENTMCNC: 33.2 G/DL (ref 31–36)
MCV RBC AUTO: 66.9 FL (ref 80–100)
MICROCYTES: ABNORMAL
MONOCYTES ABSOLUTE: 0.8 K/UL (ref 0–1.3)
MONOCYTES RELATIVE PERCENT: 14.4 %
NEUTROPHILS ABSOLUTE: 3.2 K/UL (ref 1.7–7.7)
NEUTROPHILS RELATIVE PERCENT: 60.4 %
OVALOCYTES: ABNORMAL
PDW BLD-RTO: 26.3 % (ref 12.4–15.4)
PERFORMED ON: ABNORMAL
PLATELET # BLD: 329 K/UL (ref 135–450)
PLATELET SLIDE REVIEW: ADEQUATE
PMV BLD AUTO: 8.7 FL (ref 5–10.5)
POLYCHROMASIA: ABNORMAL
POTASSIUM REFLEX MAGNESIUM: 4.3 MMOL/L (ref 3.5–5.1)
RBC # BLD: 3.45 M/UL (ref 4–5.2)
SCHISTOCYTES: ABNORMAL
SODIUM BLD-SCNC: 140 MMOL/L (ref 136–145)
TARGET CELLS: ABNORMAL
WBC # BLD: 5.2 K/UL (ref 4–11)

## 2022-06-29 PROCEDURE — 6360000002 HC RX W HCPCS: Performed by: INTERNAL MEDICINE

## 2022-06-29 PROCEDURE — 94640 AIRWAY INHALATION TREATMENT: CPT

## 2022-06-29 PROCEDURE — 6370000000 HC RX 637 (ALT 250 FOR IP): Performed by: INTERNAL MEDICINE

## 2022-06-29 PROCEDURE — 80048 BASIC METABOLIC PNL TOTAL CA: CPT

## 2022-06-29 PROCEDURE — 94761 N-INVAS EAR/PLS OXIMETRY MLT: CPT

## 2022-06-29 PROCEDURE — 99232 SBSQ HOSP IP/OBS MODERATE 35: CPT | Performed by: NURSE PRACTITIONER

## 2022-06-29 PROCEDURE — 36415 COLL VENOUS BLD VENIPUNCTURE: CPT

## 2022-06-29 PROCEDURE — 85025 COMPLETE CBC W/AUTO DIFF WBC: CPT

## 2022-06-29 PROCEDURE — 99232 SBSQ HOSP IP/OBS MODERATE 35: CPT | Performed by: INTERNAL MEDICINE

## 2022-06-29 PROCEDURE — 97530 THERAPEUTIC ACTIVITIES: CPT

## 2022-06-29 PROCEDURE — 97535 SELF CARE MNGMENT TRAINING: CPT

## 2022-06-29 PROCEDURE — 6360000002 HC RX W HCPCS

## 2022-06-29 PROCEDURE — 1200000000 HC SEMI PRIVATE

## 2022-06-29 PROCEDURE — 2580000003 HC RX 258: Performed by: INTERNAL MEDICINE

## 2022-06-29 PROCEDURE — 97110 THERAPEUTIC EXERCISES: CPT

## 2022-06-29 RX ORDER — GLIPIZIDE 5 MG/1
5 TABLET ORAL
Status: DISCONTINUED | OUTPATIENT
Start: 2022-06-29 | End: 2022-06-30 | Stop reason: HOSPADM

## 2022-06-29 RX ORDER — LEVOFLOXACIN 500 MG/1
500 TABLET, FILM COATED ORAL ONCE
Status: COMPLETED | OUTPATIENT
Start: 2022-06-30 | End: 2022-06-30

## 2022-06-29 RX ORDER — FUROSEMIDE 10 MG/ML
20 INJECTION INTRAMUSCULAR; INTRAVENOUS ONCE
Status: COMPLETED | OUTPATIENT
Start: 2022-06-29 | End: 2022-06-29

## 2022-06-29 RX ADMIN — INSULIN LISPRO 2 UNITS: 100 INJECTION, SOLUTION INTRAVENOUS; SUBCUTANEOUS at 08:14

## 2022-06-29 RX ADMIN — INSULIN LISPRO 1 UNITS: 100 INJECTION, SOLUTION INTRAVENOUS; SUBCUTANEOUS at 21:45

## 2022-06-29 RX ADMIN — FERROUS SULFATE TAB 325 MG (65 MG ELEMENTAL FE) 325 MG: 325 (65 FE) TAB at 16:51

## 2022-06-29 RX ADMIN — LOSARTAN POTASSIUM 100 MG: 100 TABLET, FILM COATED ORAL at 09:26

## 2022-06-29 RX ADMIN — INSULIN LISPRO 4 UNITS: 100 INJECTION, SOLUTION INTRAVENOUS; SUBCUTANEOUS at 12:47

## 2022-06-29 RX ADMIN — RDII 250 MG CAPSULE 250 MG: at 21:35

## 2022-06-29 RX ADMIN — ALLOPURINOL 200 MG: 100 TABLET ORAL at 09:26

## 2022-06-29 RX ADMIN — CARVEDILOL 12.5 MG: 6.25 TABLET, FILM COATED ORAL at 09:26

## 2022-06-29 RX ADMIN — FERROUS SULFATE TAB 325 MG (65 MG ELEMENTAL FE) 325 MG: 325 (65 FE) TAB at 09:26

## 2022-06-29 RX ADMIN — CETIRIZINE HYDROCHLORIDE 10 MG: 10 TABLET ORAL at 09:26

## 2022-06-29 RX ADMIN — LEVOFLOXACIN 500 MG: 500 INJECTION, SOLUTION INTRAVENOUS at 13:34

## 2022-06-29 RX ADMIN — GLIPIZIDE 5 MG: 5 TABLET ORAL at 17:07

## 2022-06-29 RX ADMIN — MORPHINE SULFATE 2 MG: 2 INJECTION, SOLUTION INTRAMUSCULAR; INTRAVENOUS at 21:36

## 2022-06-29 RX ADMIN — PANTOPRAZOLE SODIUM 40 MG: 40 TABLET, DELAYED RELEASE ORAL at 05:59

## 2022-06-29 RX ADMIN — GABAPENTIN 300 MG: 300 CAPSULE ORAL at 09:26

## 2022-06-29 RX ADMIN — CARVEDILOL 12.5 MG: 6.25 TABLET, FILM COATED ORAL at 16:51

## 2022-06-29 RX ADMIN — ENOXAPARIN SODIUM 40 MG: 100 INJECTION SUBCUTANEOUS at 09:26

## 2022-06-29 RX ADMIN — RDII 250 MG CAPSULE 250 MG: at 09:26

## 2022-06-29 RX ADMIN — SODIUM CHLORIDE, PRESERVATIVE FREE 10 ML: 5 INJECTION INTRAVENOUS at 21:43

## 2022-06-29 RX ADMIN — PAROXETINE HYDROCHLORIDE 20 MG: 20 TABLET, FILM COATED ORAL at 09:26

## 2022-06-29 RX ADMIN — Medication 2 PUFF: at 20:05

## 2022-06-29 RX ADMIN — GABAPENTIN 300 MG: 300 CAPSULE ORAL at 21:35

## 2022-06-29 RX ADMIN — FUROSEMIDE 20 MG: 10 INJECTION, SOLUTION INTRAMUSCULAR; INTRAVENOUS at 13:30

## 2022-06-29 RX ADMIN — Medication 2 PUFF: at 08:17

## 2022-06-29 RX ADMIN — TIOTROPIUM BROMIDE INHALATION SPRAY 2 PUFF: 3.12 SPRAY, METERED RESPIRATORY (INHALATION) at 08:17

## 2022-06-29 RX ADMIN — INSULIN LISPRO 6 UNITS: 100 INJECTION, SOLUTION INTRAVENOUS; SUBCUTANEOUS at 16:52

## 2022-06-29 RX ADMIN — ATORVASTATIN CALCIUM 10 MG: 10 TABLET, FILM COATED ORAL at 21:36

## 2022-06-29 ASSESSMENT — PAIN - FUNCTIONAL ASSESSMENT: PAIN_FUNCTIONAL_ASSESSMENT: PREVENTS OR INTERFERES SOME ACTIVE ACTIVITIES AND ADLS

## 2022-06-29 ASSESSMENT — ENCOUNTER SYMPTOMS
GASTROINTESTINAL NEGATIVE: 1
RESPIRATORY NEGATIVE: 1

## 2022-06-29 ASSESSMENT — PAIN SCALES - GENERAL: PAINLEVEL_OUTOF10: 9

## 2022-06-29 ASSESSMENT — PAIN DESCRIPTION - LOCATION: LOCATION: BUTTOCKS

## 2022-06-29 ASSESSMENT — PAIN DESCRIPTION - DESCRIPTORS: DESCRIPTORS: DISCOMFORT

## 2022-06-29 NOTE — PROGRESS NOTES
Occupational Therapy Daily Treatment Note    Unit: 2 [place]   Date:  6/29/2022  Patient Name:    Marisel Franco  Admitting diagnosis:  Hip fracture requiring operative repair, left, closed, initial encounter [facility]) Ketan Marcelo Date:  6/19/2022  Precautions/Restrictions:  fall risk, IV, bed/chair alarm, bacon catheter, supplemental O2 (2L), WB restrictions (WBAT), telemetry, continuous pulse ox, ICU monitoring and code status (Full) 6-29-22 - pt on room air now and has a purewick    6/26 Pending RLE doppler. Pt is NOT anticoagulated. Cleared for bed level BUE only this date. Treatment Time:  105 380  Treatment number:  3  Total Treatment Time:  60  minutes    Patient Goals for Session:  \" to get out of bed \"       Discharge Recommendations: SNF  DME needs for discharge: defer to facility       Therapy recommendations for staff:  Assist of two with the stedy or maxi lift - use gait belt in the stedy     History of Present Illness: per H&P    76 y. o. female with DMII, GERD, Asthma, HTN, HLD, osteoporosis, anemia 2/2 thalassemia minor and gout who presented to [facility] with complaint of mechanical fall. Patient states she woke up early yesterday and went the restroom when her legs got tangled turning away from the sink and she fell to her right side. She developed immediate pain in her right hip and could not stand. Her pain is located just above her right leg and radiates in to her right groin. She lives alone and states she remained on the floor for approximately 4 hours. Luckily, she was meant to go to her family member's home that morning and when she didn't show up they called EMS. She denies dizziness, presyncope, LOC. She hit her chin on the sink but sustain acute intracranial or facial injuries. She states that since Friday evening she has had pain in the arch of her left foot which she believes contributed to her fall.  She has not been walking more than usual and denies acute trauma to her left foot. She states she thought maybe her flat feet contributed to this pain. She is not on anticoagulation. She has asthma but is not in exacerbation. 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 She states she believes her baseline hgb is 9. She was 8.9 on arrival and has dropped to 7.9 with IVF. She has no signs of acute bleeding. She denies history of blood clots in her leg or lung, MI, CVA, known CAD or aneurysm. She has never smoked but she states she has been around people who smoke all her life. She has had procedures in the past that have required general anesthesia and sustained no complications or sequelae.      Per Dr. Jena Leone note 6/22/22:  \"Ms. Thakur was poorly responsive yesterday and was receiving IV fluid boluses for hypotension.  She is planned to have additional 1 unit of red cells. She had another event of poor responsiveness and possible syncope.  Was associated with hypotension.  She was given 1 mg epinephrine for hypotension and transferred to the ICU  Continue to be hypotensive in the ICU.  Given another dose of epinephrine and started epinephrine drip.  Also placed on Levophed drip. She had to be intubated and started on mechanical ventilation as her saturation worsened. \"       Home Health S4 Level Recommendation:  NA    AM-PAC Score: AM-PAC Inpatient Daily Activity Raw Score: 14  Pt scored a 14/24 on the AM-PAC ADL Inpatient form. Current research shows that an AM-PAC score of 17 or less is typically not associated with a discharge to the patient's home setting. Subjective:  Pt supine in bed upon therapist arrival. Pt agreeable to work with therapy this date with come encouragement     Pain   Yes   Rating: moderate   Location: knees - with stand to sit in the chair with the stedy   Pain Medicine Status: No request made      Cognition:    A&O Person, Place, Time and Situation   Able to follow 2 step commands, consistently.     Balance:  Functional Sitting Balance:  [facility] Standing Balance:Impaired       Bed mobility:    Supine to sit:   Max assist of two   Sit to supine:   Not Tested  Rolling:    Not Tested  Scooting in sitting:  Max assist   Scooting to head of bed:   Not Tested   Bridging:   Not Tested    Transfers:     Sit to stand:  Max assist of two to three into the stedy with the bed raised   Stand to sit:  Max assist   Bed to chair:   Total - stedy used   Standard toilet: Not Tested  Bed to [facility]:  Not Tested    Activities of Daily Living:   UB Dressing:   Not Tested   LB Dressing:    Not Tested  UB Bathing:  Not Tested  LB Bathing:  Not Tested  Feeding:  Not Tested  Grooming:   Not Tested  Toileting:  Max assist - pt had bowel movement in depends. Pt stood for approx 5 mins to be cleaned of bowel movement in the stedy     Activity Tolerance:   Pt completed therapy session with No adverse symptoms noted w/activity  BP sitting edge of bed 178/87 HR 77 Sp02 94%   BP in chair 153/81 after standing in stedy for prolonged time to clean up bowel movement   Therapeutic Exercise:    NA      Patient Education:   Instructed in the steps for transfers with the use of stedy     Positioning Needs: In bed, call light and needs in reach. Alarm Set    Family Present:  Yes - dtr     Assessment: Pt with good progress today. The pt stood in the stedy with max assist of two to three with the bed elevated. The pt was max assist to clean pt up when standing in the stedy from having a bowel movement Pt needs verbal cues for performing transfer. Pt complained of knee pain with stand to sit from stedy to chair. Linda Raza Pt may benefit from continued skilled occupational therapy while in the hospital and upon d/c in order to progress to a safe and more indpt level of functioning. GOALS  To be met in 3 Visits:  1). Bed to toilet/BSC: Mod A  and 2 persons with RW      To be met in 5 Visits:  1). Supine to/from Sit:             Mod A   2). Upper Body Bathing:         Min A   3).  Lower Body Bathing: Mod A   4). Upper Body Dressing:       Min A (goal met 6/26/2022)   5). Lower Body Dressing: Mod A   6).  Pt to demonstrate UE exs x 15 reps with minimal cues (goal met 6/26/2022)       Plan: cont with TANYA Beltre OTR/L 14823      If patient discharges from this facility prior to next visit, this note will serve as the Discharge Summary

## 2022-06-29 NOTE — PROGRESS NOTES
PROGRESS NOTE  S:76 yrs Patient  admitted on 6/19/2022 with Hip fracture requiring operative repair, left, closed, initial encounter ([facility] [address] Robi Dean . Today she feels well. She passed a large BM yesterday, and is tolerating diet. Exam:   Vitals:    06/29/22 1343   BP: 130/75   Pulse: 92   Resp: 16   Temp: 98.2 °F (36.8 °C)   SpO2: 93%      General appearance: alert, appears stated age, cooperative and no distress  HEENT: Neck supple with midline trachea  Neck: no adenopathy and supple, symmetrical, trachea midline  Lungs: clear to auscultation bilaterally  Heart: regular rate and rhythm, S1, S2 normal, no murmur, click, rub or gallop  Abdomen: soft, non-tender; bowel sounds normal; no masses,  no organomegaly  Extremities: extremities normal, atraumatic, no cyanosis or edema     Medications: Reviewed    Labs:  CBC:   Recent Labs     06/27/22 0415 06/28/22  0748 06/29/22  0556   WBC 6.0 5.4 5.2   HGB 7.5* 8.2* 7.7*   HCT 22.9* 25.7* 23.1*   MCV 67.8* 67.3* 66.9*    326 329     BMP:   Recent Labs     06/27/22 0415 06/27/22  0600 06/28/22  0748 06/29/22  0556     --  140 140   K  --  4.1 4.2 4.3     --  106 106   CO2 23  --  25 24   BUN 28*  --  27* 31*   CREATININE 1.0  --  1.1 1.1     LIVER PROFILE:   Recent Labs     06/27/22 0415 06/27/22  0600 06/28/22  0748   AST  --  28 21   ALT  --  141* 106*   PROT 4.7*  --  5.0*   BILIDIR <0.2  --   --    BILITOT 0.7  --  0.8   ALKPHOS 89  --  95     Attending Supervising [de-identified] Attestation Statement  The patient is a 67 y.o. female. I have performed a history and physical examination of the patient. I discussed the case with my physician assistant Panfilo Cobos PA-C    I reviewed the patient's Past Medical History, Past Surgical History, Medications, and Allergies.      Physical Exam:  Vitals:    06/29/22 0718 06/29/22 0818 06/29/22 1343 06/29/22 1647   BP: (!) 168/79  130/75 (!) 163/74   Pulse: 77  92 84   Resp: 16  16    Temp: 97.9 °F (36.6 °C)  98.2 °F (36.8 °C)    TempSrc: Oral  Oral    SpO2: 95% 93% 93%    Weight:       Height:           Physical Examination: General appearance - well hydrated  Mental status - alert, oriented to person, place, and time  Eyes - sclera anicteric  Neck - supple, no significant adenopathy  Chest - no tachypnea, retractions or cyanosis  Heart - normal rate and regular rhythm  Abdomen - soft, nontender, nondistended, no masses or organomegaly  Extremities - pedal edema 2 +          Impression: 67year old female with a history of thalassemia minor, DM, HLD, HTN, GERD, asthma, osteoporosis, and gout admitted with acute blood loss anemia s/p 7U pRBCs s/p R hip intramedullary nailing on 6/20. EGD on 6/25 showed mild chronic gastritis and fundic gland polyp. Recommendation:  1. Continue supportive care  2. Monitor Hgb  3. Monitor and document output  4. Transfuse as needed if Hgb < 7.0  5. Continue Pantoprazole 40 mg qAM  6. Continue PO iron supplementation  7. Bowel regimen with Miralax and probiotics daily  8. Continue diet as tolerated  9. Hematology following  10. Encourage PT/OT  11. Ok to d/c from GI standpoint  12. Will sign off  13. Call with questions   14. Recommend colonoscopy as outpatient       Noemy Benavides PA-C  2:40 PM 6/29/2022                      65-year-old female with history of DM, HTN, HLD, GERD, chronic back pain, asthma, arthritis, thalasemia, gout, osteoporosis, admitted with mechanical fall and R hip fracture. Hospitalization complicated by sepsis, acute respiratory failure, MARVIN, paroxysmal A fib and anemia requiring 7uPRBC. EGD negative for active bleeding     Continue supportive care. Monitor Hgb. Observe for signs of bleeding. Encourage nutrition. PT/OT. Outpatient colonoscopy once acute issues resolve. Will sign off. Call with questions.     Jena Constantino MD          [phone]  [phone]

## 2022-06-29 NOTE — PROGRESS NOTES
Patient resting, eyes closed, respirations witnessed as e/e. No signs of distress. Bed in lowest position, alarm on and locked. Call light and bedside table is easy reach.

## 2022-06-29 NOTE — CARE COORDINATION
INTERDISCIPLINARY PLAN OF CARE CONFERENCE    Date/Time: 6/29/2022 2:27 PM  Completed by: Morena Luu RN, Case Management      Patient Name:  Wing Smith  YOB: 1945  Admitting Diagnosis: Hip fracture requiring operative repair, left, closed, initial encounter [facility]) Raissa Villegas     Admit Date/Time:  6/19/2022 12:49 PM    Chart reviewed. Interdisciplinary team contacted or reviewed plan related to patient progress and discharge plans. Disciplines included Case Management, Nursing, and Dietitian. Current Status: IP 06/19/2022  PT/OT recommendation for discharge plan of care:   Discharge Recommendations: SNF  DME needs for discharge: defer to facility  Expected D/C Disposition:  Home  Confirmed plan with patient  Discharge Plan Comments:   Plans to DC to [facility]. Anticipate DC tomorrow with a 4 week cardiac event monitor. Per Iqra Pugh [facility] admissions) is ready to accept patient when she is ready for DC. +CM following.     Home O2 in place on admit: No  Pt informed of need to bring portable home O2 tank on day of discharge for nursing to connect prior to leaving:  Not Indicated  Verbalized agreement/Understanding:  Not Indicated

## 2022-06-29 NOTE — PROGRESS NOTES
[facility]  Cardiology  Progress Note    Admission date:  2022    Reason for follow up visit: AF    HPI/CC: Mireya Petersen is a 67 y.o. female who presented 2022 following fall and found to have R hip fracture s/p repair 2022. On 2022 she was was hypotensive and bradycardic, likely due to severe anemia. Echo showed EF 60%, moderate pulmonary HTN. She was also noted to have PAF when mag 1.4. She has been treated for respiratory failure, anemia, MARVIN. Rhythm has been sinus. Subjective: Denies chest pain, shortness of breath, palpitations and dizziness. Up to chair today. Vitals:  Blood pressure (!) 168/79, pulse 77, temperature 97.9 °F (36.6 °C), temperature source Oral, resp. rate 16, height 5' 3\" (1.6 m), weight 250 lb 14.1 oz (113.8 kg), SpO2 93 %, not currently breastfeeding.   Temp  Av.2 °F (36.8 °C)  Min: 97.9 °F (36.6 °C)  Max: 98.7 °F (37.1 °C)  Pulse  Av  Min: 77  Max: 80  BP  Min: 136/57  Max: 168/79  SpO2  Av.8 %  Min: 88 %  Max: 95 %    24 hour I/O    Intake/Output Summary (Last 24 hours) at 2022 1335  Last data filed at 2022 1028  Gross per 24 hour   Intake 420 ml   Output 500 ml   Net -80 ml     Current Facility-Administered Medications   Medication Dose Route Frequency Provider Last Rate Last Admin    enoxaparin (LOVENOX) injection 40 mg  40 mg SubCUTAneous Daily MARIUSZ [place]   40 mg at 22 0926    pantoprazole (PROTONIX) tablet 40 mg  40 mg Oral QAM AC Meg Barroso MD   40 mg at 22 0559    ferrous sulfate (IRON 325) tablet 325 mg  325 mg Oral BID  Ino Walls MD   325 mg at 22 0926    saccharomyces boulardii (FLORASTOR) capsule 250 mg  250 mg Oral BID Ino Walls MD   250 mg at 22 09    allopurinol (ZYLOPRIM) tablet 200 mg  200 mg Oral Daily Ino Walls MD   200 mg at 22 0926    gabapentin (NEURONTIN) capsule 300 mg  300 mg Oral BID Edyta injection 1 mg  1 mg IntraMUSCular PRN Kolton Tomlinson MD        dextrose 5 % solution  100 mL/hr IntraVENous PRN Kolton Tomlinson MD        atorvastatin (LIPITOR) tablet 10 mg  10 mg Oral Nightly Kolton Tomlinson MD   10 mg at 06/28/22 2130    cetirizine (ZYRTEC) tablet 10 mg  10 mg Oral Daily Kolton Tomlinson MD   10 mg at 06/29/22 0926    PARoxetine (PAXIL) tablet 20 mg  20 mg Oral Daily Kolton Tomlinson MD   20 mg at 06/29/22 0926    sodium chloride flush 0.9 % injection 5-40 mL  5-40 mL IntraVENous 2 times per day Kolton Tomlinson MD   10 mL at 06/28/22 2130    sodium chloride flush 0.9 % injection 5-40 mL  5-40 mL IntraVENous PRN Kolton Tomlinson MD        0.9 % sodium chloride infusion   IntraVENous PRN Kolton Tomlinson MD 5 mL/hr at 06/26/22 0756 Rate Verify at 06/26/22 0756    ondansetron (ZOFRAN-ODT) disintegrating tablet 4 mg  4 mg Oral Q8H PRN Kolton Tomlinson MD        Or    ondansetron (ZOFRAN) injection 4 mg  4 mg IntraVENous Q6H PRN Kolton Tomlinson MD   4 mg at 06/27/22 1527    albuterol sulfate HFA (PROVENTIL;VENTOLIN;PROAIR) 108 (90 Base) MCG/ACT inhaler 2 puff  2 puff Inhalation Q4H PRN Kolton Tomlinson MD         Review of Systems   Constitutional: Negative. Respiratory: Negative. Cardiovascular: Positive for leg swelling. Negative for chest pain and palpitations. Gastrointestinal: Negative. Neurological: Negative.       Objective:     Telemetry monitor: SR    Physical Exam:  Constitutional:  Comfortable and alert, NAD, appears stated age, pale  Eyes: PERRL, sclera nonicteric  Neck:  Supple, no masses, no thyroidmegaly, no JVD  Skin:  Warm and dry; no rash or lesions  Heart: Regular, normal apex, S1 and S2 normal, no M/G/R  Lungs:  Normal respiratory effort; clear; no wheezing/rhonchi/rales  Abdomen: soft, non tender, + bowel sounds  Extremities:  2+ BLE edema, R>L  Neuro: alert and oriented, moves legs and arms equally, normal mood and affect    Data Reviewed:    Echo 6/6260:  LV systolic function is normal with EF estimated 60%. No regional wall motion abnormalities are noted. There is mild concentric left ventricular hypertrophy. Grade I diastolic dysfunction with normal filling pressure. Mild biatrial enlargement. Mild posterior mitral annular calcification is present. The maximal transaortic velocity is 2.22m/s which gives peak pressure   gradient= 21mmHg and mean pressure gradient= 12mmHg which is c/w mild aortic   stenosis. Mild tricuspid and pulmonic regurgitation. Systolic pulmonary artery pressure (SPAP) estimated at 56 mmHg (RAP 15   mmHg), c/w moderate pulm HTN.     Lab Reviewed:     Renal Profile:  Lab Results   Component Value Date    CREATININE 1.1 06/29/2022    BUN 31 06/29/2022     06/29/2022    K 4.3 06/29/2022     06/29/2022    CO2 24 06/29/2022     CBC:    Lab Results   Component Value Date    WBC 5.2 06/29/2022    RBC 3.45 06/29/2022    HGB 7.7 06/29/2022    HCT 23.1 06/29/2022    MCV 66.9 06/29/2022    RDW 26.3 06/29/2022     06/29/2022     BNP:    Lab Results   Component Value Date    PROBNP 1,173 06/23/2022    PROBNP 450 01/06/2015     Fasting Lipid Panel:    Lab Results   Component Value Date    CHOL 123 10/21/2020    HDL 50 10/21/2020    TRIG 103 06/24/2022     Cardiac Enzymes:  CK/MbTroponin  Lab Results   Component Value Date    CKTOTAL 38 06/19/2022    TROPONINI <0.01 06/24/2022     PT/ INR   Lab Results   Component Value Date    INR 1.21 06/24/2022    INR 1.80 06/21/2022    INR 1.03 01/06/2015    PROTIME 15.2 06/24/2022    PROTIME 20.7 06/21/2022    PROTIME 11.1 01/06/2015     PTT No results found for: PTT   Lab Results   Component Value Date    MG 1.70 06/26/2022    No results found for: TSH    All labs and imaging reviewed today    Assessment:  Paroxysmal atrial fibrillation: new diagnosis, now stable in sinus, noted when Mg 1.4   - [id number] score 3  Sinus bradycardia with hypotension: noted 6/21/2022 during severe anemia, possibly vagal event  Moderate pulmonary HTN  HTN  HLD  DM  Anemia  Acute respiratory failure, now extubated  Fall with R hip fracture s/p repair 6/20/2022    Plan:   1. Continue carvedilol for HTN and PAF  2. Holding anticoagulation due to severe anemia requiring PRBCs this admission  3. PAF noted during electrolyte abnormalities. Cardiac monitor at discharge and if recurrence, consider AC if anemia stable and/or Watchman  4. Monitor telemetry  5. Discharge planning for tomorrow to SNF. Will order 4 week monitor.      MADDIE Govea-CNP  [employer]  [phone]

## 2022-06-29 NOTE — PROGRESS NOTES
Progress Note    Admit Date:  6/19/2022     66 y/o female with pmhx of DM, HTN, Gout, thalasemmia minor, OA, asthma  -presented after mechanical fall with right hip fracture, underwent IM nailing on 6/20/2022, had postop anemia, received 1 Unit pRBCs, became hypotensive and unresponsive -code blue was called, pt was transferred to ICU   -was on epinephrine and levophed gtt then on isoproterenol gtt   -pt was then intubated and on vent   -had episodes of emesis , melena and continued drop in Hgb   -EGD with polyp, no bleeding  -had 4 untis pRBCs on 6/20, 1 on 6/25, 2 units FFP on 6/20  -extubated 6/23  -had episode of a fib 6/24, now in NSR  -pt on RA     Subjective:  Ms. Emile Jordan seen up in chair today , worked with PT, needed steady for getting out  Significant weakness in right leg noted , on RA . Feels ok today     Needed o2 overnight   Worsening pedal edema per pt    Objective:   Patient Vitals for the past 4 hrs:   BP Temp Temp src Pulse Resp SpO2   06/29/22 0718 (!) 168/79 97.9 °F (36.6 °C) Oral 77 16 95 %            Intake/Output Summary (Last 24 hours) at 6/29/2022 0746  Last data filed at 6/28/2022 2125  Gross per 24 hour   Intake 420 ml   Output 500 ml   Net -80 ml       Physical Exam:    Gen: elderly female, up in chair,  No distress. Alert.     Appears to be not in any distress  Mucous Membranes:  Pale , anicteric  Neck: No JVD, no carotid bruit, no thyromegaly  Chest:  Clear to auscultation bilaterally, no added sounds  Cardiovascular:  RRR S1S2 heard, no murmurs or gallops  Abdomen:  Soft, undistended, non tender, no organomegaly, BS present  Extremities: right hip dressing dry, expected edema, significant weakness at right hip, knee noted    Distal pulses well felt  Neurological : grossly normal          Medications:  enoxaparin, 40 mg, Daily  pantoprazole, 40 mg, QAM AC  ferrous sulfate, 325 mg, BID WC  saccharomyces boulardii, 250 mg, BID  allopurinol, 200 mg, Daily  gabapentin, 300 mg, BID  losartan, 100 mg, Daily  levofloxacin, 500 mg, Q24H  carvedilol, 12.5 mg, BID WC  budesonide-formoterol, 2 puff, BID  tiotropium, 2 puff, Daily  insulin lispro, 0-12 Units, TID WC  insulin lispro, 0-6 Units, Nightly  polyethylene glycol, 17 g, BID  atorvastatin, 10 mg, Nightly  cetirizine, 10 mg, Daily  PARoxetine, 20 mg, Daily  sodium chloride flush, 5-40 mL, 2 times per day      PRN Medications:  sodium chloride, , PRN  acetaminophen, 650 mg, Q4H PRN  morphine, 2 mg, Q2H PRN  metoprolol, 2.5 mg, Q6H PRN  magnesium sulfate, 1,000 mg, PRN  glucose, 4 tablet, PRN  dextrose bolus, 125 mL, PRN   Or  dextrose bolus, 250 mL, PRN  glucagon (rDNA), 1 mg, PRN  dextrose, 100 mL/hr, PRN  sodium chloride flush, 5-40 mL, PRN  sodium chloride, , PRN  ondansetron, 4 mg, Q8H PRN   Or  ondansetron, 4 mg, Q6H PRN  albuterol sulfate HFA, 2 puff, Q4H PRN          Data:  CBC:   Recent Labs     06/27/22 0415 06/28/22  0748 06/29/22  0556   WBC 6.0 5.4 5.2   HGB 7.5* 8.2* 7.7*   HCT 22.9* 25.7* 23.1*   MCV 67.8* 67.3* 66.9*    326 329     BMP:   Recent Labs     06/27/22 0415 06/27/22  0600 06/28/22  0748 06/29/22  0556     --  140 140   K  --  4.1 4.2 4.3     --  106 106   CO2 23  --  25 24   BUN 28*  --  27* 31*   CREATININE 1.0  --  1.1 1.1     LIVER PROFILE:   Recent Labs     06/27/22 0415 06/27/22 0600 06/28/22  0748   AST  --  28 21   ALT  --  141* 106*   BILIDIR <0.2  --   --    BILITOT 0.7  --  0.8   ALKPHOS 89  --  95     PT/INR: No results for input(s): PROTIME, INR in the last 72 hours. CULTURES  Urine culture + e coli   Sputum culture negative   COVID not detected        RADIOLOGY  VL Extremity Venous Right         XR CHEST PORTABLE   Final Result   No significant interval improvement. Bibasilar atelectasis versus   infiltrates. Trace pleural effusions. XR CHEST PORTABLE   Final Result   ET tube in satisfactory position 4.6 cm above the terrance. Bibasilar atelectasis. Cardiomegaly. CTA PULMONARY W CONTRAST   Final Result   No evidence for pulmonary embolism      Considerable subsegmental atelectatic changes the lung bases without separate   consolidation of airspace disease clearly evident or pleural effusion      Right upper lobe 12 mm noncalcified pulmonary nodule with attention on   follow-up CT considered within 3-6 months especially if there is high risk   smoking history or high risk stratification      No acute findings of the abdomen or pelvis identified         CT ABDOMEN PELVIS WO CONTRAST Additional Contrast? None   Final Result   No evidence for pulmonary embolism      Considerable subsegmental atelectatic changes the lung bases without separate   consolidation of airspace disease clearly evident or pleural effusion      Right upper lobe 12 mm noncalcified pulmonary nodule with attention on   follow-up CT considered within 3-6 months especially if there is high risk   smoking history or high risk stratification      No acute findings of the abdomen or pelvis identified         XR CHEST PORTABLE   Final Result   1. Endotracheal tube and enteric tubes properly placed. 2.  Mild left basilar airspace disease and probable trace left pleural   effusion. XR HIP 2-3 VW W PELVIS RIGHT   Final Result   1. Status post ORIF of the right hip intertrochanteric fracture. No   immediate complication. FLUORO FOR SURGICAL PROCEDURES   Final Result   Intraprocedural fluoroscopic spot images as above. See separate procedure   report for more information. XR FOOT LEFT (MIN 3 VIEWS)   Final Result   Degenerative changes in the left foot with no acute abnormality. XR CHEST PORTABLE   Final Result   No acute cardiopulmonary disease. XR HIP 2-3 VW W PELVIS RIGHT   Final Result   Moderately displaced fracture of the proximal right femur. CTA ABDOMINAL AORTA W BILAT RUNOFF W CONTRAST   Final Result   1.  Traumatic, acute right hip intertrochanteric fracture with varus   angulation and small surrounding hematoma. 2. No pseudoaneurysm or active extravasation is identified adjacent to the   fracture. 3. Aortoiliac inflow is widely patent. There is a mild amount of fibro   calcified plaque. 4. Femoropopliteal segments are patent bilaterally, noting fibro calcified   plaque. There is 3 vessel runoff bilaterally. However, on the right side   there is only 1 primary runoff vessel, the posterior tibial.   5. No acute abdominopelvic abnormality. Hepatic steatosis. CT FACIAL BONES WO CONTRAST   Final Result   No acute intracranial abnormality. No acute facial fractures. CT Head WO Contrast   Final Result   No acute intracranial abnormality. No acute facial fractures. XR FEMUR RIGHT (MIN 2 VIEWS)   Final Result   Traumatic, acute intertrochanteric fracture of the right hip. Advanced osteoarthritis of the knee. Moderate-sized knee joint effusion. Assessment/Plan:    Shock.  Syncope.  Septic versus cardiogenic versus hypovolemic vs hemorrhagic. - pt passed out with hypotension and bradycardia 1 day post hip surgery   Required epinephrine and Levophed drip to maintain vitals. Kept off BB and verapamil  Severe anemia needing  transfused 3 units of pRBCs, 2 units of FFP, no clear blood loss   Blood cultures negative so far.  Urine culture positive for E. coli  Echocardiogram-normal EF  Started on IV Doxy, meropenem and vancomycin , downgraded to  Levaquin on 6/26/2022. off all pressors.  -no more episodes of hypotension , Hgb stable      Acute hypoxic respiratory failure. Etiology unclear, likely due to hypovolemic shock and associated hypoventilation  Was intubated and on  vent, extubated 6/23  -CTA neg for PE or infection   - weaned off vent, off o2 and now on RA        Paroxysmal  a fib -   Cardiology involved. No plans for anticoagulation at present given anemia.   Plans for event

## 2022-06-29 NOTE — PROGRESS NOTES
left foot. She states she thought maybe her flat feet contributed to this pain. She is not on anticoagulation. She has asthma but is not in exacerbation. 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 She states she believes her baseline hgb is 9. She was 8.9 on arrival and has dropped to 7.9 with IVF. She has no signs of acute bleeding. She denies history of blood clots in her leg or lung, MI, CVA, known CAD or aneurysm. She has never smoked but she states she has been around people who smoke all her life. She has had procedures in the past that have required general anesthesia and sustained no complications or sequelae.      Her BP and HR have been elevated in the setting of pain. She appears more comfortable now. Hgb again is low in the setting of chronic anemia and dilution from IVF. Type and screen was ordered in case patient requires transfusion. Patient is agreeable for transfusion. Incidentally, patient's UA appears consistent with UTI but she denies sxs. She is admitted for further care. \"     Per  [employer] note 6/22/22:  \"Ms. Thakur was poorly responsive yesterday and was receiving IV fluid boluses for hypotension. She is planned to have additional 1 unit of red cells. She had another event of poor responsiveness and possible syncope. Was associated with hypotension. She was given 1 mg epinephrine for hypotension and transferred to the ICU  Continue to be hypotensive in the ICU. Given another dose of epinephrine and started epinephrine drip. Also placed on Levophed drip. She had to be intubated and started on mechanical ventilation as her saturation worsened. \"  Extubated 6/23.     Home Health S4 Level Recommendation:  NA  AM-PAC Mobility Score       AM-PAC Inpatient Mobility without Stair Climbing Raw Score : 7     Pain   Yes   Location: R hip, R knee, progressing with sustained standing (when stand>sit)  Rating: mild  Pain Medicine Status: Received pain med prior to tx    Cognition    A&O orientation not directly assessed. WFL   Able to follow 2 step commands     Subjective  Patient lying supine in bed with no family present. Pt agreeable to this PT/OT co-tx. Balance   Sitting:  Fair +; SBA  Comments: At EOB ~5 minutes    Standing: Fair -; Min A  in STEDY ~10 min, while therapist provided total A for posterior pericare. Comments: increased difficulty activating quads and glutes to stand upright, VC/TC given to improve. Pt responded well, but was unable to sustain upright posture for more than ~1-2 min before requiring continued cueing. Heavy reliance of BUE and trunk fwd flexed on STEDY for stability. Increased c/o HECTOR knee pain during standing. Bed Mobility   Supine to Sit:    Mod A  and 2 persons  Sit to Supine:   Not Tested   (up to chair to end session)  Rolling:   Not Tested  Scooting in sitting: Min A   Scooting in supine:  Not Tested    Transfer Training    Sit to stand: Max A  and 2 persons /c STEDY and elevated bed (failed first attempt /c bed in lowest position)  Stand to sit:   Mod A to STEDY; Max A to recliner  Bed to Chair:   Total A with use of gait belt and VICTORINA STEDY    Gait gait deferred due to difficulty with transfers; pt ambulated 0 ft. Stair Training deferred, pt unsafe/ not appropriate to complete stairs at this time    Activity Tolerance   Pt completed therapy session with No adverse symptoms noted w/activity. Sitting EOB:   BP: 178/87  HR: 79  SpO2: 94% on RA    Sitting in recliner after Bed>chair transfer:  BP: 153/81    Positioning Needs   Pt reclined in chair, alarm set, positioned in proper neutral alignment and pressure relief provided. Call light provided and all needs within reach    Exercises Initiated  Completed on RLE in supine and educated to continue to perform in recliner  Ankle Pumps x 5 reps  Quad sets x 10 reps  Heel slides x 10 reps  Hip IR in supine x 8 reps     Other  Pt had loose-soft BM at during session.  Much time spent on cleanup in standing. Patient/Family Education   Pt educated on role of inpatient PT, POC, importance of continued activity, DC recommendations, HEP, proper transferring techniques and to perform therapeutic exercises frequently throughout the day to improve BLE strength and endurance. Assessment  Pt seen POD#9 following R hip IM nail. Due to post-op complications she has not been out of bed yet. Pt progressed /c AAROM therapeutic exercises, Mod A x 2 for bed mobility, Max x 2 STS /c elevated bed, and use of STEDY for bed>chair transfer. Pt demonstrates increased difficulty coordinating STS /c STEDY and decreased quad and glute activation that required repeated VC/TC to improve for proper upright position standing in STEDY. Pt tolerated standing ~10 min in STEDY during pericare demonstrating improving activity tolerance. Pt continues to display trace quad activation and was educated to perform exercises frequently throughout the day in order to improve RLE muscle activation ans strength. Recommending SNF upon discharge as patient functioning well below baseline, demonstrates good rehab potential and unable to return home due to inability to negotiate stairs to enter home/bedroom/bathroom, burden of care beyond caregiver ability and home environment not conducive to patient recovery. Goals : To be met in 3 visits:  1). Independent with LE Ex x 10 reps    To be met in 6 visits:  1). Supine to/from sit: CGA  2). Sit to/from stand: Min A   3). Bed to chair: Min A   4). Gait: Ambulate 50 ft.  with  CGA and use of rolling walker (RW)  5).   Tolerate B LE exercises 3 sets of 10-15 reps    Rehabilitation Potential: Fair  Strengths for achieving goals include:   PLOF, Family Support and Pt cooperative   Barriers to achieving goals include:    Complexity of condition, Pain and Weakness    Plan    To be seen 3-5 x / week  while in acute care setting for therapeutic exercises, bed mobility, transfers, progressive gait training, balance training, and family/patient education. Signature: HUMA Wagner  Co-signature: Gerson Cloud PT, DPT     If patient discharges from this facility prior to next visit, this note will serve as the Discharge Summary.

## 2022-06-29 NOTE — PROGRESS NOTES
Monitor tech called stating patient was sating in the mid 80's. Patient put on 1L of O2 via NC, patient sating at 92%.

## 2022-06-29 NOTE — PLAN OF CARE
Problem: Discharge Planning  Goal: Discharge to home or other facility with appropriate resources  6/29/2022 0958 by Jovita Regan RN  Outcome: Progressing  Flowsheets (Taken 6/28/2022 2125 by Amrik Teague, RN)  Discharge to home or other facility with appropriate resources: Identify barriers to discharge with patient and caregiver     Problem: Pain  Goal: Verbalizes/displays adequate comfort level or baseline comfort level  6/29/2022 0958 by Jovita Regan RN  Outcome: Progressing     Problem: Skin/Tissue Integrity  Goal: Absence of new skin breakdown  Description: 1. Monitor for areas of redness and/or skin breakdown  2. Assess vascular access sites hourly  3. Every 4-6 hours minimum:  Change oxygen saturation probe site  4. Every 4-6 hours:  If on nasal continuous positive airway pressure, respiratory therapy assess nares and determine need for appliance change or resting period.   6/29/2022 0958 by Jovita Regan RN  Outcome: Progressing     Problem: Safety - Adult  Goal: Free from fall injury  6/29/2022 0958 by Jovita Regan RN  Outcome: Progressing     Problem: ABCDS Injury Assessment  Goal: Absence of physical injury  6/29/2022 0958 by Jovita Regan RN  Outcome: Progressing     Problem: Chronic Conditions and Co-morbidities  Goal: Patient's chronic conditions and co-morbidity symptoms are monitored and maintained or improved  6/29/2022 0958 by Jovita Regan RN  Outcome: Progressing  Flowsheets (Taken 6/28/2022 2125 by Amrik Teague, RN)  Care Plan - Patient's Chronic Conditions and Co-Morbidity Symptoms are Monitored and Maintained or Improved: Monitor and assess patient's chronic conditions and comorbid symptoms for stability, deterioration, or improvement     Problem: Nutrition Deficit:  Goal: Optimize nutritional status  6/29/2022 0958 by Jovita Regan RN  Outcome: Progressing

## 2022-06-29 NOTE — PLAN OF CARE
Problem: Discharge Planning  Goal: Discharge to home or other facility with appropriate resources  6/28/2022 2023 by Saji Ellis RN  Outcome: Progressing  6/28/2022 1254 by Aldair Morgan RN  Outcome: Progressing     Problem: Pain  Goal: Verbalizes/displays adequate comfort level or baseline comfort level  6/28/2022 2023 by Saji Ellis RN  Outcome: Progressing  6/28/2022 1254 by Aldair Morgan RN  Outcome: Progressing     Problem: Skin/Tissue Integrity  Goal: Absence of new skin breakdown  Description: 1. Monitor for areas of redness and/or skin breakdown  2. Assess vascular access sites hourly  3. Every 4-6 hours minimum:  Change oxygen saturation probe site  4. Every 4-6 hours:  If on nasal continuous positive airway pressure, respiratory therapy assess nares and determine need for appliance change or resting period.   6/28/2022 2023 by Saji Ellis RN  Outcome: Progressing  6/28/2022 1254 by Aldair Morgan RN  Outcome: Progressing     Problem: Safety - Adult  Goal: Free from fall injury  6/28/2022 2023 by Saji Ellis RN  Outcome: Progressing  6/28/2022 1254 by Aldair Morgan RN  Outcome: Progressing     Problem: ABCDS Injury Assessment  Goal: Absence of physical injury  6/28/2022 2023 by Saji Ellis RN  Outcome: Progressing  6/28/2022 1254 by Aldair Morgan RN  Outcome: Progressing     Problem: Chronic Conditions and Co-morbidities  Goal: Patient's chronic conditions and co-morbidity symptoms are monitored and maintained or improved  6/28/2022 2023 by Saji Ellis RN  Outcome: Progressing  6/28/2022 1254 by Aldair Morgan RN  Outcome: Progressing     Problem: Nutrition Deficit:  Goal: Optimize nutritional status  6/28/2022 2023 by Saji Ellis RN  Outcome: Progressing  6/28/2022 1254 by Aldair Morgan RN  Outcome: Progressing

## 2022-06-29 NOTE — PROGRESS NOTES
Shift assessment complete. See doc flow. Nightly medications given see MAR. Patient A/Ox4. Patient on telemetry. Patient's skin intact w/scattered bruising. Patient has dressing to left femoral, line removed. Patient c/o pain to right leg, pain denied pain medication at this time. Bed in lowest position, alarm on and locked. Call light and bedside table within easy reach.

## 2022-06-29 NOTE — PROGRESS NOTES
hearing loss or vertigo. No mouth sores or sore throat. · Cardiovascular: No chest pain, dyspnea on exertion, palpitations or loss of consciousness. · Respiratory: No cough or wheezing, no sputum production. No hemoptysis. .    · Gastrointestinal: No abdominal pain, appetite loss, blood in stools. No change in bowel habits. · Genitourinary: No dysuria, trouble voiding, or hematuria. · Musculoskeletal:  Generalized weakness. No joint complaints. · Integumentary: No rash or pruritis. · Neurological: No headache, diplopia. No change in gait, balance, or coordination. No paresthesias. · Endocrine: No temperature intolerance. No excessive thirst, fluid intake, or urination. · Hematologic/Lymphatic: No abnormal bruising or ecchymoses, blood clots or swollen lymph nodes. · Allergic/Immunologic: No nasal congestion or hives.      PHYSICAL EXAM:            Vitals:     06/23/22 0900   BP: (!) 145/64   Pulse: 91   Resp: 19   Temp:     SpO2: 97%         General appearance: alert and cooperative  Head: Normocephalic, without obvious abnormality, atraumatic  Neck: No palpable lymphadenopathy in supraclavicular or cervical chains  Lungs: Clear to auscultation bilaterally, no audible rales, wheezes or crackles  Heart: Regular rate and rhythm, S1, S2 normal  Abdomen: Soft, non-tender; bowel sounds normal; no masses,  no organomegaly  Extremities: without cyanosis, clubbing, edema or asymmetry  Skin: No jaundice, purpura or petechiae        LABS:      Lab Results   Component Value Date    WBC 5.2 06/29/2022    HGB 7.7 (L) 06/29/2022    HCT 23.1 (L) 06/29/2022    MCV 66.9 (L) 06/29/2022     06/29/2022       IMAGING:      Echo Complete  Result Date: 6/22/2022  Conclusions   Summary  LV systolic function is normal with EF estimated 60%. No regional wall motion abnormalities are noted. There is mild concentric left ventricular hypertrophy. Grade I diastolic dysfunction with normal filling pressure.   Mild biatrial enlargement. Mild posterior mitral annular calcification is present. The maximal transaortic velocity is 2.22m/s which gives peak pressure  gradient= 21mmHg and mean pressure gradient= 12mmHg which is c/w mild aortic  stenosis. Mild tricuspid and pulmonic regurgitation. Systolic pulmonary artery pressure (SPAP) estimated at 56 mmHg (RAP 15  mmHg), c/w moderate pulm HTN. Signature   ------------------------------------------------------------------       CT ABDOMEN PELVIS WO CONTRAST Additional Contrast? None  Result Date: 6/21/2022  No evidence for pulmonary embolism Considerable subsegmental atelectatic changes the lung bases without separate consolidation of airspace disease clearly evident or pleural effusion Right upper lobe 12 mm noncalcified pulmonary nodule with attention on follow-up CT considered within 3-6 months especially if there is high risk smoking history or high risk stratification No acute findings of the abdomen or pelvis identified      XR FEMUR RIGHT (MIN 2 VIEWS)  Result Date: 6/19/2022  Traumatic, acute intertrochanteric fracture of the right hip. Advanced osteoarthritis of the knee. Moderate-sized knee joint effusion.      XR FOOT LEFT (MIN 3 VIEWS)  Result Date: 6/20/2022  Degenerative changes in the left foot with no acute abnormality.      CT Head WO Contrast  Result Date: 6/19/2022  No acute intracranial abnormality. No acute facial fractures.      CT FACIAL BONES WO CONTRAST  Result Date: 6/19/2022  No acute intracranial abnormality. No acute facial fractures.      XR CHEST PORTABLE  Result Date: 6/23/2022  No significant interval improvement. Bibasilar atelectasis versus infiltrates. Trace pleural effusions.      XR CHEST PORTABLE  Result Date: 6/22/2022  ET tube in satisfactory position 4.6 cm above the terrance. Bibasilar atelectasis. Cardiomegaly.      XR CHEST PORTABLE  Result Date: 6/21/2022  1. Endotracheal tube and enteric tubes properly placed.  2.  Mild left basilar airspace disease and probable trace left pleural effusion.      XR CHEST PORTABLE  Result Date: 6/20/2022  No acute cardiopulmonary disease.      CTA ABDOMINAL AORTA W BILAT RUNOFF W CONTRAST  Result Date: 6/19/2022  1. Traumatic, acute right hip intertrochanteric fracture with varus angulation and small surrounding hematoma. 2. No pseudoaneurysm or active extravasation is identified adjacent to the fracture. 3. Aortoiliac inflow is widely patent. There is a mild amount of fibro calcified plaque. 4. Femoropopliteal segments are patent bilaterally, noting fibro calcified plaque. There is 3 vessel runoff bilaterally. However, on the right side there is only 1 primary runoff vessel, the posterior tibial. 5. No acute abdominopelvic abnormality. Hepatic steatosis.      CTA PULMONARY W CONTRAST  Result Date: 6/21/2022  No evidence for pulmonary embolism Considerable subsegmental atelectatic changes the lung bases without separate consolidation of airspace disease clearly evident or pleural effusion Right upper lobe 12 mm noncalcified pulmonary nodule with attention on follow-up CT considered within 3-6 months especially if there is high risk smoking history or high risk stratification No acute findings of the abdomen or pelvis identified      FLUORO FOR SURGICAL PROCEDURES  Result Date: 6/20/2022  Intraprocedural fluoroscopic spot images as above. See separate procedure report for more information.      XR HIP 2-3 VW W PELVIS RIGHT  Result Date: 6/20/2022  1. Status post ORIF of the right hip intertrochanteric fracture.   No immediate complication.      XR HIP 2-3 VW W PELVIS RIGHT  Result Date: 6/20/2022  Moderately displaced fracture of the proximal right femur.         ASSESSMENT:           Problem List Items Addressed This Visit           Closed 2-part intertrochanteric fracture of proximal end of right femur ([address] - Primary      Relevant Medications      oxyCODONE (ROXICODONE) immediate release tablet 5 mg      oxyCODONE (ROXICODONE) immediate release tablet 10 mg      fentaNYL (SUBLIMAZE) injection 50 mcg      Anemia                Other Visit Diagnoses      Hip fx, right, open type I or II, initial encounter ([facility])         Relevant Medications     morphine sulfate (PF) injection 4 mg (Completed)     acetaminophen (TYLENOL) suppository 650 mg (Completed)     oxyCODONE (ROXICODONE) immediate release tablet 5 mg     oxyCODONE (ROXICODONE) immediate release tablet 10 mg     ketorolac (TORADOL) injection 15 mg (Completed)     fentaNYL (SUBLIMAZE) injection 50 mcg     Other Relevant Orders     FLUORO FOR SURGICAL PROCEDURES (Completed)                    PLAN:      1.  Microcytic Anemia     - suspect multifactorial  - low MCV could indicate underlying hemoglobinopathy - has known thalassemia trait   -baseline Hgb 9-10  - also suspect blood loss from hip fracture and surgery   - also has iron deficiency based on recent iron studies 6/20/22  - agree with transfusion support to keep Hgb > 7  - empirically given venofer based on iron studies  - Hgb 7.7 today     Pari Robles MD

## 2022-06-30 ENCOUNTER — TELEPHONE (OUTPATIENT)
Dept: CARDIOLOGY CLINIC | Age: 77
End: 2022-06-30

## 2022-06-30 VITALS
HEIGHT: 63 IN | OXYGEN SATURATION: 95 % | SYSTOLIC BLOOD PRESSURE: 158 MMHG | HEART RATE: 82 BPM | DIASTOLIC BLOOD PRESSURE: 79 MMHG | TEMPERATURE: 98.2 F | BODY MASS INDEX: 44.45 KG/M2 | RESPIRATION RATE: 18 BRPM | WEIGHT: 250.88 LBS

## 2022-06-30 LAB
ANION GAP SERPL CALCULATED.3IONS-SCNC: 11 MMOL/L (ref 3–16)
BASOPHILS ABSOLUTE: 0 K/UL (ref 0–0.2)
BASOPHILS RELATIVE PERCENT: 0.2 %
BUN BLDV-MCNC: 32 MG/DL (ref 7–20)
CALCIUM SERPL-MCNC: 8.8 MG/DL (ref 8.3–10.6)
CHLORIDE BLD-SCNC: 105 MMOL/L (ref 99–110)
CO2: 24 MMOL/L (ref 21–32)
CREAT SERPL-MCNC: 1.1 MG/DL (ref 0.6–1.2)
EOSINOPHILS ABSOLUTE: 0.1 K/UL (ref 0–0.6)
EOSINOPHILS RELATIVE PERCENT: 2 %
GFR AFRICAN AMERICAN: 58
GFR NON-AFRICAN AMERICAN: 48
GLUCOSE BLD-MCNC: 171 MG/DL (ref 70–99)
GLUCOSE BLD-MCNC: 175 MG/DL (ref 70–99)
GLUCOSE BLD-MCNC: 180 MG/DL (ref 70–99)
GLUCOSE BLD-MCNC: 204 MG/DL (ref 70–99)
HCT VFR BLD CALC: 23.9 % (ref 36–48)
HEMOGLOBIN: 7.7 G/DL (ref 12–16)
LYMPHOCYTES ABSOLUTE: 0.9 K/UL (ref 1–5.1)
LYMPHOCYTES RELATIVE PERCENT: 14.3 %
MCH RBC QN AUTO: 22 PG (ref 26–34)
MCHC RBC AUTO-ENTMCNC: 32.3 G/DL (ref 31–36)
MCV RBC AUTO: 68.1 FL (ref 80–100)
MONOCYTES ABSOLUTE: 0.9 K/UL (ref 0–1.3)
MONOCYTES RELATIVE PERCENT: 13.8 %
NEUTROPHILS ABSOLUTE: 4.5 K/UL (ref 1.7–7.7)
NEUTROPHILS RELATIVE PERCENT: 69.7 %
PDW BLD-RTO: 26.2 % (ref 12.4–15.4)
PERFORMED ON: ABNORMAL
PLATELET # BLD: 339 K/UL (ref 135–450)
PMV BLD AUTO: 8.8 FL (ref 5–10.5)
POTASSIUM REFLEX MAGNESIUM: 4 MMOL/L (ref 3.5–5.1)
RBC # BLD: 3.52 M/UL (ref 4–5.2)
SARS-COV-2, NAAT: NOT DETECTED
SODIUM BLD-SCNC: 140 MMOL/L (ref 136–145)
WBC # BLD: 6.4 K/UL (ref 4–11)

## 2022-06-30 PROCEDURE — 36415 COLL VENOUS BLD VENIPUNCTURE: CPT

## 2022-06-30 PROCEDURE — 6370000000 HC RX 637 (ALT 250 FOR IP): Performed by: INTERNAL MEDICINE

## 2022-06-30 PROCEDURE — 99239 HOSP IP/OBS DSCHRG MGMT >30: CPT | Performed by: INTERNAL MEDICINE

## 2022-06-30 PROCEDURE — 94640 AIRWAY INHALATION TREATMENT: CPT

## 2022-06-30 PROCEDURE — APPNB60 APP NON BILLABLE TIME 46-60 MINS: Performed by: PHYSICIAN ASSISTANT

## 2022-06-30 PROCEDURE — 85025 COMPLETE CBC W/AUTO DIFF WBC: CPT

## 2022-06-30 PROCEDURE — 80048 BASIC METABOLIC PNL TOTAL CA: CPT

## 2022-06-30 PROCEDURE — 6360000002 HC RX W HCPCS

## 2022-06-30 PROCEDURE — 99232 SBSQ HOSP IP/OBS MODERATE 35: CPT | Performed by: NURSE PRACTITIONER

## 2022-06-30 PROCEDURE — 94761 N-INVAS EAR/PLS OXIMETRY MLT: CPT

## 2022-06-30 PROCEDURE — 87635 SARS-COV-2 COVID-19 AMP PRB: CPT

## 2022-06-30 PROCEDURE — 6360000002 HC RX W HCPCS: Performed by: INTERNAL MEDICINE

## 2022-06-30 PROCEDURE — 2580000003 HC RX 258: Performed by: INTERNAL MEDICINE

## 2022-06-30 RX ORDER — FERROUS SULFATE 325(65) MG
325 TABLET ORAL
Qty: 30 TABLET | Refills: 3 | Status: ON HOLD
Start: 2022-06-30 | End: 2022-08-06 | Stop reason: SDUPTHER

## 2022-06-30 RX ORDER — SACCHAROMYCES BOULARDII 250 MG
250 CAPSULE ORAL 2 TIMES DAILY
Qty: 14 CAPSULE | Refills: 0
Start: 2022-06-30 | End: 2022-07-07

## 2022-06-30 RX ORDER — FUROSEMIDE 10 MG/ML
20 INJECTION INTRAMUSCULAR; INTRAVENOUS ONCE
Status: COMPLETED | OUTPATIENT
Start: 2022-06-30 | End: 2022-06-30

## 2022-06-30 RX ORDER — CARVEDILOL 12.5 MG/1
12.5 TABLET ORAL 2 TIMES DAILY WITH MEALS
Qty: 60 TABLET | Refills: 0
Start: 2022-06-30

## 2022-06-30 RX ADMIN — GLIPIZIDE 5 MG: 5 TABLET ORAL at 08:58

## 2022-06-30 RX ADMIN — INSULIN LISPRO 2 UNITS: 100 INJECTION, SOLUTION INTRAVENOUS; SUBCUTANEOUS at 08:27

## 2022-06-30 RX ADMIN — ALLOPURINOL 200 MG: 100 TABLET ORAL at 08:58

## 2022-06-30 RX ADMIN — INSULIN LISPRO 2 UNITS: 100 INJECTION, SOLUTION INTRAVENOUS; SUBCUTANEOUS at 17:46

## 2022-06-30 RX ADMIN — INSULIN LISPRO 4 UNITS: 100 INJECTION, SOLUTION INTRAVENOUS; SUBCUTANEOUS at 11:59

## 2022-06-30 RX ADMIN — Medication 2 PUFF: at 07:58

## 2022-06-30 RX ADMIN — ACETAMINOPHEN 650 MG: 325 TABLET ORAL at 17:42

## 2022-06-30 RX ADMIN — SODIUM CHLORIDE, PRESERVATIVE FREE 10 ML: 5 INJECTION INTRAVENOUS at 08:59

## 2022-06-30 RX ADMIN — LEVOFLOXACIN 500 MG: 500 TABLET, FILM COATED ORAL at 08:58

## 2022-06-30 RX ADMIN — FERROUS SULFATE TAB 325 MG (65 MG ELEMENTAL FE) 325 MG: 325 (65 FE) TAB at 17:42

## 2022-06-30 RX ADMIN — CARVEDILOL 12.5 MG: 6.25 TABLET, FILM COATED ORAL at 17:42

## 2022-06-30 RX ADMIN — PANTOPRAZOLE SODIUM 40 MG: 40 TABLET, DELAYED RELEASE ORAL at 06:55

## 2022-06-30 RX ADMIN — GLIPIZIDE 5 MG: 5 TABLET ORAL at 17:42

## 2022-06-30 RX ADMIN — TIOTROPIUM BROMIDE INHALATION SPRAY 2 PUFF: 3.12 SPRAY, METERED RESPIRATORY (INHALATION) at 07:58

## 2022-06-30 RX ADMIN — GABAPENTIN 300 MG: 300 CAPSULE ORAL at 08:58

## 2022-06-30 RX ADMIN — CARVEDILOL 12.5 MG: 6.25 TABLET, FILM COATED ORAL at 08:58

## 2022-06-30 RX ADMIN — CETIRIZINE HYDROCHLORIDE 10 MG: 10 TABLET ORAL at 08:58

## 2022-06-30 RX ADMIN — FERROUS SULFATE TAB 325 MG (65 MG ELEMENTAL FE) 325 MG: 325 (65 FE) TAB at 08:58

## 2022-06-30 RX ADMIN — RDII 250 MG CAPSULE 250 MG: at 08:58

## 2022-06-30 RX ADMIN — ENOXAPARIN SODIUM 40 MG: 100 INJECTION SUBCUTANEOUS at 08:59

## 2022-06-30 RX ADMIN — PAROXETINE HYDROCHLORIDE 20 MG: 20 TABLET, FILM COATED ORAL at 08:58

## 2022-06-30 RX ADMIN — FUROSEMIDE 20 MG: 10 INJECTION, SOLUTION INTRAMUSCULAR; INTRAVENOUS at 12:01

## 2022-06-30 RX ADMIN — LOSARTAN POTASSIUM 100 MG: 100 TABLET, FILM COATED ORAL at 08:58

## 2022-06-30 ASSESSMENT — ENCOUNTER SYMPTOMS
GASTROINTESTINAL NEGATIVE: 1
RESPIRATORY NEGATIVE: 1

## 2022-06-30 NOTE — PROGRESS NOTES
Report given to Margaux at [facility] via phone. Report given to EMS staff. Pt leaving on stretcher with EMS staff in stable condition.

## 2022-06-30 NOTE — PROGRESS NOTES
[facility]  Cardiology  Progress Note    Admission date:  2022    Reason for follow up visit: AF    HPI/CC: Kalyn Corbett is a 67 y.o. female who presented 2022 following fall and found to have R hip fracture s/p repair 2022. On 2022 she was was hypotensive and bradycardic, likely due to severe anemia. Echo showed EF 60%, moderate pulmonary HTN. She was also noted to have PAF when mag 1.4. She has been treated for respiratory failure, anemia, MARVIN. Rhythm has been sinus. Subjective: Denies chest pain, shortness of breath, palpitations and dizziness. Frustrated with weakness. Vitals:  Blood pressure (!) 142/69, pulse 85, temperature 98.2 °F (36.8 °C), temperature source Oral, resp. rate 18, height 5' 3\" (1.6 m), weight 250 lb 14.1 oz (113.8 kg), SpO2 95 %, not currently breastfeeding.   Temp  Av.3 °F (36.8 °C)  Min: 97.9 °F (36.6 °C)  Max: 98.6 °F (37 °C)  Pulse  Av.7  Min: 75  Max: 85  BP  Min: 142/69  Max: 165/99  SpO2  Av.3 %  Min: 92 %  Max: 97 %    24 hour I/O    Intake/Output Summary (Last 24 hours) at 2022 1407  Last data filed at 2022 1326  Gross per 24 hour   Intake 480 ml   Output 3000 ml   Net -2520 ml     Current Facility-Administered Medications   Medication Dose Route Frequency Provider Last Rate Last Admin    glipiZIDE (GLUCOTROL) tablet 5 mg  5 mg Oral BID SEMAJ Feliz MD   5 mg at 22 0858    enoxaparin (LOVENOX) injection 40 mg  40 mg SubCUTAneous Daily MARIUSZ [place]   40 mg at 22 0859    pantoprazole (PROTONIX) tablet 40 mg  40 mg Oral QAM AC Wenceslao Feliz MD   40 mg at 22 1247    ferrous sulfate (IRON 325) tablet 325 mg  325 mg Oral BID  Carl Sanches MD   325 mg at 22 0858    saccharomyces boulardii (FLORASTOR) capsule 250 mg  250 mg Oral BID Carl Sanches MD   250 mg at 22 0858    allopurinol (ZYLOPRIM) tablet 200 mg  200 mg Oral Daily Edyta Anne Reynoso MD   200 mg at 06/30/22 0858    gabapentin (NEURONTIN) capsule 300 mg  300 mg Oral BID Ino Walls MD   300 mg at 06/30/22 0858    losartan (COZAAR) tablet 100 mg  100 mg Oral Daily Ino Walls MD   100 mg at 06/30/22 0858    0.9 % sodium chloride infusion   IntraVENous PRN Ino Walls MD   Stopped at 06/25/22 1008    carvedilol (COREG) tablet 12.5 mg  12.5 mg Oral BID  Ino Walls MD   12.5 mg at 06/30/22 0858    budesonide-formoterol (SYMBICORT) 160-4.5 MCG/ACT inhaler 2 puff  2 puff Inhalation BID Ino Walls MD   2 puff at 06/30/22 0758    tiotropium (SPIRIVA RESPIMAT) 2.5 MCG/ACT inhaler 2 puff  2 puff Inhalation Daily Ino Walls MD   2 puff at 06/30/22 0758    acetaminophen (TYLENOL) tablet 650 mg  650 mg Oral Q4H PRN Ino Walls MD   650 mg at 06/26/22 2058    insulin lispro (HUMALOG) injection vial 0-12 Units  0-12 Units SubCUTAneous TID  Ino Walls MD   4 Units at 06/30/22 1159    insulin lispro (HUMALOG) injection vial 0-6 Units  0-6 Units SubCUTAneous Nightly Ino Walls MD   1 Units at 06/29/22 2145    morphine (PF) injection 2 mg  2 mg IntraVENous Q2H PRN Ino Walls MD   2 mg at 06/29/22 2136    polyethylene glycol (GLYCOLAX) packet 17 g  17 g Oral BID Ino Walls MD   17 g at 06/28/22 0904    metoprolol (LOPRESSOR) injection 2.5 mg  2.5 mg IntraVENous Q6H PRN Ino Walls MD        magnesium sulfate 1000 mg in dextrose 5% 100 mL IVPB  1,000 mg IntraVENous PRN Ino Walls MD   Paused at 06/24/22 0120    glucose chewable tablet 16 g  4 tablet Oral PRN Ino Walls MD        dextrose bolus 10% 125 mL  125 mL IntraVENous PRN Ino Walls MD        Or    dextrose bolus 10% 250 mL  250 mL IntraVENous PRN Ino Walls MD        glucagon (rDNA) injection 1 mg  1 mg IntraMUSCular PRN Meredith Almaguer MD        dextrose 5 % solution  100 mL/hr IntraVENous PRN Meredith Almaguer MD        atorvastatin (LIPITOR) tablet 10 mg  10 mg Oral Nightly Meredith Almaguer MD   10 mg at 06/29/22 2136    cetirizine (ZYRTEC) tablet 10 mg  10 mg Oral Daily Meredith Almaguer MD   10 mg at 06/30/22 0858    PARoxetine (PAXIL) tablet 20 mg  20 mg Oral Daily Meredith Almaguer MD   20 mg at 06/30/22 0858    sodium chloride flush 0.9 % injection 5-40 mL  5-40 mL IntraVENous 2 times per day Meredith Almaguer MD   10 mL at 06/30/22 0859    sodium chloride flush 0.9 % injection 5-40 mL  5-40 mL IntraVENous PRN Meredith Almaguer MD        0.9 % sodium chloride infusion   IntraVENous PRN Meredith Almaguer MD 5 mL/hr at 06/26/22 0756 Rate Verify at 06/26/22 0756    ondansetron (ZOFRAN-ODT) disintegrating tablet 4 mg  4 mg Oral Q8H PRN Meredith Almaguer MD        Or    ondansetron (ZOFRAN) injection 4 mg  4 mg IntraVENous Q6H PRN Meredith Almaguer MD   4 mg at 06/27/22 1527    albuterol sulfate HFA (PROVENTIL;VENTOLIN;PROAIR) 108 (90 Base) MCG/ACT inhaler 2 puff  2 puff Inhalation Q4H PRN Meredith Almaguer MD         Review of Systems   Constitutional: Negative. Respiratory: Negative. Cardiovascular: Positive for leg swelling. Negative for chest pain and palpitations. Gastrointestinal: Negative. Neurological: Negative.       Objective:     Telemetry monitor: SR    Physical Exam:  Constitutional:  Comfortable and alert, NAD, appears stated age, pale  Eyes: PERRL, sclera nonicteric  Neck:  Supple, no masses, no thyroidmegaly, no JVD  Skin:  Warm and dry; no rash or lesions  Heart: Regular, normal apex, S1 and S2 normal, no M/G/R  Lungs:  Normal respiratory effort; clear; no wheezing/rhonchi/rales  Abdomen: soft, non tender, + bowel sounds  Extremities:  2+ BLE edema, R>L  Neuro: alert and oriented, moves legs and arms equally, normal mood and affect    Data Reviewed:    Echo 0/5162:  LV systolic function is normal with EF estimated 60%. No regional wall motion abnormalities are noted. There is mild concentric left ventricular hypertrophy. Grade I diastolic dysfunction with normal filling pressure. Mild biatrial enlargement. Mild posterior mitral annular calcification is present. The maximal transaortic velocity is 2.22m/s which gives peak pressure   gradient= 21mmHg and mean pressure gradient= 12mmHg which is c/w mild aortic   stenosis. Mild tricuspid and pulmonic regurgitation. Systolic pulmonary artery pressure (SPAP) estimated at 56 mmHg (RAP 15   mmHg), c/w moderate pulm HTN.     Lab Reviewed:     Renal Profile:  Lab Results   Component Value Date/Time    CREATININE 1.1 06/30/2022 05:57 AM    BUN 32 06/30/2022 05:57 AM     06/30/2022 05:57 AM    K 4.0 06/30/2022 05:57 AM     06/30/2022 05:57 AM    CO2 24 06/30/2022 05:57 AM     CBC:    Lab Results   Component Value Date/Time    WBC 6.4 06/30/2022 05:57 AM    RBC 3.52 06/30/2022 05:57 AM    HGB 7.7 06/30/2022 05:57 AM    HCT 23.9 06/30/2022 05:57 AM    MCV 68.1 06/30/2022 05:57 AM    RDW 26.2 06/30/2022 05:57 AM     06/30/2022 05:57 AM     BNP:    Lab Results   Component Value Date/Time    PROBNP 1,173 06/23/2022 09:05 PM    PROBNP 450 01/06/2015 02:10 PM     Fasting Lipid Panel:    Lab Results   Component Value Date/Time    CHOL 123 10/21/2020 01:25 PM    HDL 50 10/21/2020 01:25 PM    TRIG 103 06/24/2022 04:45 AM     Cardiac Enzymes:  CK/MbTroponin  Lab Results   Component Value Date/Time    CKTOTAL 38 06/19/2022 01:20 PM    TROPONINI <0.01 06/24/2022 04:45 AM     PT/ INR   Lab Results   Component Value Date/Time    INR 1.21 06/24/2022 04:45 AM    INR 1.80 06/21/2022 03:34 PM    INR 1.03 01/06/2015 02:10 PM    PROTIME 15.2 06/24/2022 04:45 AM    PROTIME 20.7 06/21/2022 03:34 PM    PROTIME 11.1 01/06/2015 02:10 PM     PTT No results found for: PTT   Lab Results Component Value Date/Time    MG 1.70 06/26/2022 06:00 AM    No results found for: TSH    All labs and imaging reviewed today    Assessment:  Paroxysmal atrial fibrillation: new diagnosis, now stable in sinus, noted when Mg 1.4   - [id number] score 3  Sinus bradycardia with hypotension: noted 6/21/2022 during severe anemia, possibly vagal event  Moderate pulmonary HTN  HTN  HLD  DM  Anemia  Acute respiratory failure, now extubated  Fall with R hip fracture s/p repair 6/20/2022    Plan:   1. Continue carvedilol for HTN and PAF  2. Holding anticoagulation due to severe anemia requiring PRBCs this admission  3. PAF noted during electrolyte abnormalities. Cardiac monitor at discharge and if recurrence, consider AC if anemia stable and/or Watchman  4. Monitor telemetry  5. Cardiac monitor placed. Discharge to SNF today. Follow up in office.      MADDIE Cleveland-CNP  [facility], [place]  [phone]

## 2022-06-30 NOTE — PROGRESS NOTES
4 Eyes Skin Assessment     The patient is being assess for   Transfer to New Unit    I agree that 2 RN's have performed a thorough Head to Toe Skin Assessment on the patient. ALL assessment sites listed below have been assessed. Areas assessed for pressure by both nurses:   [x]   Head, Face, and Ears   [x]   Shoulders, Back, and Chest, Abdomen  [x]   Arms, Elbows, and Hands   [x]   Coccyx, Sacrum, and Ischium  [x]   Legs, Feet, and Heels    Pt has scattered bruises and abrasion. 3 surgical incision to R hip with staples  Stage 2 to coccyx. Skin Assessed Under all Medical Devices by both nurses:  na              All Mepilex Borders were peeled back and area peeked at by both nurses:  Yes  Please list where Mepilex Borders are located:  Bilat heels, R hip             **SHARE this note so that the co-signing nurse is able to place an eSignature**    Co-signer eSignature: Electronically signed by Karie Clemens RN on 6/30/22 at 5:56 PM EDT    Does the Patient have Skin Breakdown related to pressure?   No     (Insert Photo herena)         Mauricio Prevention initiated:  No   Wound Care Orders initiated:  No      WOC nurse consulted for Pressure Injury (Stage 3,4, Unstageable, DTI, NWPT, Complex wounds)and New or Established Ostomies:  No   Primary Nurse eSignature: Electronically signed by Sonya Contreras RN on 6/30/22 at 5:53 PM EDT

## 2022-06-30 NOTE — PROGRESS NOTES
Phone call to cardiology left message for [facility] being discharged today at 5:30 they will apply heart monitor.

## 2022-06-30 NOTE — PLAN OF CARE
Problem: Discharge Planning  Goal: Discharge to home or other facility with appropriate resources  6/30/2022 1207 by Emili Hester RN  Outcome: Completed     Problem: Pain  Goal: Verbalizes/displays adequate comfort level or baseline comfort level  6/30/2022 1207 by Emili Hester RN  Outcome: Completed     Problem: Skin/Tissue Integrity  Goal: Absence of new skin breakdown  Description: 1. Monitor for areas of redness and/or skin breakdown  2. Assess vascular access sites hourly  3. Every 4-6 hours minimum:  Change oxygen saturation probe site  4. Every 4-6 hours:  If on nasal continuous positive airway pressure, respiratory therapy assess nares and determine need for appliance change or resting period.   6/30/2022 1207 by Emili Hester RN  Outcome: Completed     Problem: Safety - Adult  Goal: Free from fall injury  6/30/2022 1207 by Emili Hester RN  Outcome: Completed     Problem: ABCDS Injury Assessment  Goal: Absence of physical injury  6/30/2022 1207 by Emili Hester RN  Outcome: Completed     Problem: Chronic Conditions and Co-morbidities  Goal: Patient's chronic conditions and co-morbidity symptoms are monitored and maintained or improved  6/30/2022 1207 by Emili Hester RN  Outcome: Completed     Problem: Nutrition Deficit:  Goal: Optimize nutritional status  6/30/2022 1207 by Emili Hester RN  Outcome: Completed     Problem: Nutrition Deficit:  Goal: Optimize nutritional status  6/30/2022 1207 by Emili Hester RN  Outcome: Completed

## 2022-06-30 NOTE — TELEPHONE ENCOUNTER
Monitor placed by [employer]  Monitor company [employer]  Length of monitor 2 wks  Monitor ordered by [facility]  Serial number [id number]  Patch  ID  [id number]  Activation successful prior to pt leaving office?  NO

## 2022-06-30 NOTE — PROGRESS NOTES
Dr. Jesus Hall:      1: Continue with IM recs. Okay to resume PT/OT. Weightbearing as tolerated to the right lower extremity with assistive device. 2:  Continue Deep venous thrombosis prophylaxis   3:  Continue Pain Control-   4: PT/OT eval recommending SNF, case management following. Encouraged patient to work with PT/OT daily. 5: Discharge pending medical stability. Ortho will follow peripherally and update DCP when stable to leave. Please feel free to contact us with any needs in the meantime.      Becki Sandoval PA-C

## 2022-06-30 NOTE — CARE COORDINATION
DISCHARGE ORDER  Date/Time 2022 12:20 PM  Completed by: Morena Luu RN, Case Management    Patient Name: Wing Smith    : 1945      Admit order Date and Status: IP 2022  Noted discharge order. (verify MD's last order for status of admission/Traditional Medicare 3 [place] Inpatient qualifying stay required for SNF)    Confirmed discharge plan with:               Patient:  YES  Discharge to Facility:   · Name: [facility]  · Address:  [address] [place] [address]., [place], [address]  · Phone:  [phone] RN Report # [phone]  · Fax:  [phone]    Facility phone number for staff giving report:   Pre-certification completed: [facility] Exemption Notification (HENS) completed: [facility]  Discharge orders and Continuity of Care faxed to facility:   YES     Transportation:               Medical Transport explained with choice list offered to pt/family. Choice: (no preference)  Agency used: [address] [place] [address] Rd up time:   17:30PM      Pt/family/Nursing/Facility aware of  time:    Patient (she will call family)  Vero Hurst/Lety Hilario [place])  Ambulance form completed: YES      Date Last IMM Given: 2022    Comments:     Pt is being d/c'd to  today. Pt's O2 sats are 93% on 1 liter. Discharge timeout done with [facility]. All discharge needs and concerns addressed. Discharging nurse to complete VIOLETTE, reconcile AVS, and place final copy with patient's discharge packet. Discharging RN to ensure that written prescriptions for  Level II medications are sent with patient to the facility as per protocol.

## 2022-06-30 NOTE — PROGRESS NOTES
ONCOLOGY FOLLOW-UP:         PROBLEM LIST:       Patient Active Problem List   Diagnosis Code    Knee pain M25.569    Chronic urinary tract infection N39.0    Osteoarthritis of both knees M17.0    Benign essential HTN I10    Type 2 diabetes mellitus without complication, without long-term current use of insulin ([facility]) E11.9    Gastroesophageal reflux disease without esophagitis K21.9    Chronic gout of multiple sites M1A. 90X8    Spinal stenosis of lumbar region M48.061    Anemia, chronic disease D63.8    Carpal tunnel syndrome of left wrist G56.02    Morbid obesity ([facility]) E66.01    Moderate persistent asthma without complication M00.11    Hyperlipidemia associated with type 2 diabetes mellitus ([facility]) E11.69, E78.5    Major depressive disorder with single episode, in full remission ([address] F32.5    Asthma J45.909    Diabetes ([address] E11.9    DJD (degenerative joint disease) M19.90    Edema R60.9    Gout M10.9    Lumbar disc disease M51.9    Melanoma ([facility] C43.9    Vitamin B 12 deficiency E53.8    Osteoporosis M81.0    S/P hysterectomy Z90.710    S/P parathyroidectomy ([facility]) E89.2    Sciatic nerve pain M54.30    Thalassemia trait D56.3    Hip fracture requiring operative repair, left, closed, initial encounter ([facility] S72.002A    Acute blood loss anemia D62    Closed 2-part intertrochanteric fracture of proximal end of right femur ([facility]) S72.141A    Shock ([facility] R57.9    Acute hypoxemic respiratory failure ([facility]) J96.01    Bradycardia R00.1    Hypotension I95.9    Paroxysmal atrial fibrillation ([facility]) I48.0    Hip fx, right, open type I or II, initial encounter ([facility] S72.001B    Gastrointestinal hemorrhage K92.2       INTERVAL HISTORY:       Pt remains admitted. She is afebrile. Hgb 7.7 today. Received venofer over the weekend. REVIEW OF SYSTEMS:       · Constitutional: Denies fever, sweats, weight loss     · Eyes: No visual changes or diplopia. No scleral icterus.   · ENT: No Headaches, airspace disease and probable trace left pleural effusion.      XR CHEST PORTABLE  Result Date: 6/20/2022  No acute cardiopulmonary disease.      CTA ABDOMINAL AORTA W BILAT RUNOFF W CONTRAST  Result Date: 6/19/2022  1. Traumatic, acute right hip intertrochanteric fracture with varus angulation and small surrounding hematoma. 2. No pseudoaneurysm or active extravasation is identified adjacent to the fracture. 3. Aortoiliac inflow is widely patent. There is a mild amount of fibro calcified plaque. 4. Femoropopliteal segments are patent bilaterally, noting fibro calcified plaque. There is 3 vessel runoff bilaterally. However, on the right side there is only 1 primary runoff vessel, the posterior tibial. 5. No acute abdominopelvic abnormality. Hepatic steatosis.      CTA PULMONARY W CONTRAST  Result Date: 6/21/2022  No evidence for pulmonary embolism Considerable subsegmental atelectatic changes the lung bases without separate consolidation of airspace disease clearly evident or pleural effusion Right upper lobe 12 mm noncalcified pulmonary nodule with attention on follow-up CT considered within 3-6 months especially if there is high risk smoking history or high risk stratification No acute findings of the abdomen or pelvis identified      FLUORO FOR SURGICAL PROCEDURES  Result Date: 6/20/2022  Intraprocedural fluoroscopic spot images as above. See separate procedure report for more information.      XR HIP 2-3 VW W PELVIS RIGHT  Result Date: 6/20/2022  1. Status post ORIF of the right hip intertrochanteric fracture.   No immediate complication.      XR HIP 2-3 VW W PELVIS RIGHT  Result Date: 6/20/2022  Moderately displaced fracture of the proximal right femur.         ASSESSMENT:           Problem List Items Addressed This Visit           Closed 2-part intertrochanteric fracture of proximal end of right femur ([address] - Primary      Relevant Medications      oxyCODONE (ROXICODONE) immediate release tablet 5 mg      oxyCODONE (ROXICODONE) immediate release tablet 10 mg      fentaNYL (SUBLIMAZE) injection 50 mcg      Anemia                Other Visit Diagnoses      Hip fx, right, open type I or II, initial encounter ([facility])         Relevant Medications     morphine sulfate (PF) injection 4 mg (Completed)     acetaminophen (TYLENOL) suppository 650 mg (Completed)     oxyCODONE (ROXICODONE) immediate release tablet 5 mg     oxyCODONE (ROXICODONE) immediate release tablet 10 mg     ketorolac (TORADOL) injection 15 mg (Completed)     fentaNYL (SUBLIMAZE) injection 50 mcg     Other Relevant Orders     FLUORO FOR SURGICAL PROCEDURES (Completed)                    PLAN:      1.  Microcytic Anemia     - suspect multifactorial  - low MCV could indicate underlying hemoglobinopathy - has known thalassemia trait   -baseline Hgb 9-10  - also suspect blood loss from hip fracture and surgery   - also has iron deficiency based on recent iron studies 6/20/22  - agree with transfusion support to keep Hgb > 7  - empirically given venofer based on iron studies  - Hgb 7.7 today     Dante Smith MD

## 2022-06-30 NOTE — PROGRESS NOTES
Shift assessment complete; see flow sheet. Scheduled medications administered; See MAR. IV infusing without difficulty. Pt c/o leg pain 7/10 PRN morphine given at this time. Pt denies any needs at this time. purewick changed and pt changed.  Pt educated on use of call light and to call out with needs, verbalized understanding, bed in low locked position for pt safety

## 2022-06-30 NOTE — DISCHARGE SUMMARY
Name:  Edilson Records  Room:  4657/1194-85  MRN:    [id number]    Discharge Summary      This discharge summary is in conjunction with a complete physical exam done on the day of discharge. Attending Physician: Dr. Portia Martinez  Discharging Physician: Dr. Wyatt Siemens: 6/19/2022  Discharge:   6/30/2022    HPI:    The patient is a 67 y.o. 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 Patient states she woke up early yesterday and went the restroom when her legs got tangled turning away from the sink and she fell to her right side. She developed immediate pain in her right hip and could not stand. Her pain is located just above her right leg and radiates in to her right groin. She lives alone and states she remained on the floor for approximately 4 hours. Luckily, she was meant to go to her family member's home that morning and when she didn't show up they called EMS. She denies dizziness, presyncope, LOC. She hit her chin on the sink but sustain acute intracranial or facial injuries. She states that since Friday evening she has had pain in the arch of her left foot which she believes contributed to her fall. She has not been walking more than usual and denies acute trauma to her left foot. She states she thought maybe her flat feet contributed to this pain. She is not on anticoagulation. She has asthma but is not in exacerbation. 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 She states she believes her baseline hgb is 9. She was 8.9 on arrival and has dropped to 7.9 with IVF. She has no signs of acute bleeding. She denies history of blood clots in her leg or lung, MI, CVA, known CAD or aneurysm. She has never smoked but she states she has been around people who smoke all her life. She has had procedures in the past that have required general anesthesia and sustained no complications or sequelae.    Her BP and HR have been elevated in the setting of pain. She appears more comfortable now. Hgb again is low in the setting of chronic anemia and dilution from IVF. Type and screen was ordered in case patient requires transfusion. Patient is agreeable for transfusion. Incidentally, patient's UA appears consistent with UTI but she denies sxs. She is admitted for further care. Diagnoses this Admission and Hospital Course     Shock.  Syncope.  Septic versus cardiogenic versus hypovolemic vs hemorrhagic. - pt passed out with hypotension and bradycardia 1 day post hip surgery   Required epinephrine and Levophed drip to maintain vitals. Kept off BB and verapamil  Severe anemia needing  transfused 3 units of pRBCs, 2 units of FFP, no clear blood loss   Blood cultures negative so far.  Urine culture positive for E. coli  Echocardiogram-normal EF  Started on IV Doxy, meropenem and vancomycin , downgraded to  Levaquin on 6/26/2022.    off all pressors.  -no more episodes of hypotension , Hgb stable      Acute hypoxic respiratory failure.   Etiology unclear, likely due to hypovolemic shock and associated hypoventilation  Was intubated and on  vent, extubated 6/23  -CTA neg for PE or infection   - weaned off vent, off o2 and now on RA        Paroxysmal  a fib -   Cardiology involved.  No plans for anticoagulation at present given anemia.  Plans for event monitor for 2 weeks as outpatient.   Rate controlled   -resumed coreg and remains in NSR            #Intertrochanteric hip fracture; right  #Mechanical fall  -Osteoporosis contributing   - admitted to medical floor for surgical mx and pain control  - ortho surgery consulted , s.p IM nailing of right hip   - pain control to avoid drowsiness  - started PT/OT  D/c to SNF.        #Acute blood loss anemia, multifactorial   - expected with major fractures and surgery   - also has chronic Anemia 2/2 thalassemia minor   -Hgb baseline appears to be 9-10;  -8.9--7.9 >6.5--> 8.2 stable  A total of 4 units of red cells have been transfused with no major bleed   - for lasix today     Mild MARVIN secondary to hypotension. Resolved        #DMII   On sliding scale insulin  Resumed home meds in am     #Asthma   -Continued home inhalers        #HLD  -Continued statin        #Osteoporosis  -Receives Fosamax weekly - holding        #GERD   -On PPI        #Gout   -Continued allopurinol         DVT Prophylaxis: lovenox     Procedures (Please Review Full Report for Details)  Central line placement  Intubation    Esophagogastroduodenoscopy with polypectomy  [facility]     Pulmonology   Cardiology   Hem/Onc  Orthopedic surgery    Physical Exam at Discharge:    BP (!) 142/69   Pulse 85   Temp 98.2 °F (36.8 °C) (Oral)   Resp 18   Ht 5' 3\" (1.6 m)   Wt 250 lb 14.1 oz (113.8 kg)   SpO2 95%   Breastfeeding No   BMI 44.44 kg/m²     Gen: elderly female, up in chair,  No distress. Alert.     Appears to be not in any distress  Mucous Membranes:  Pale , anicteric  Neck: No JVD, no carotid bruit, no thyromegaly  Chest:  Clear to auscultation bilaterally, no added sounds  Cardiovascular:  RRR S1S2 heard, no murmurs or gallops  Abdomen:  Soft, undistended, non tender, no organomegaly, BS present  Extremities: right hip dressing dry, expected edema, significant weakness at right hip, knee noted    Distal pulses well felt  Neurological : grossly normal    CBC: Recent Labs     06/28/22  0748 06/29/22  0556 06/30/22  0557   WBC 5.4 5.2 6.4   HGB 8.2* 7.7* 7.7*   HCT 25.7* 23.1* 23.9*   MCV 67.3* 66.9* 68.1*    329 339     BMP:   Recent Labs     06/28/22  0748 06/29/22  0556 06/30/22  0557    140 140   K 4.2 4.3 4.0    106 105   CO2 25 24 24   BUN 27* 31* 32*   CREATININE 1.1 1.1 1.1     LIVER PROFILE:   Recent Labs     06/28/22  0748   AST 21   *   BILITOT 0.8   ALKPHOS 95       U/A:    Lab Results   Component Value Date/Time    NITRITE neg 09/23/2021 01:54 PM    COLORU Yellow 06/20/2022 10:30 AM    WBCUA >100 06/20/2022 10:30 AM    RBCUA see below 06/20/2022 10:30 AM    BACTERIA 2+ 08/04/2016 06:00 AM    CLARITYU CLOUDY 06/20/2022 10:30 AM    SPECGRAV >=1.030 06/20/2022 10:30 AM    LEUKOCYTESUR MODERATE 06/20/2022 10:30 AM    BLOODU MODERATE 06/20/2022 10:30 AM    GLUCOSEU Negative 06/20/2022 10:30 AM    AMORPHOUS Rare 01/06/2015 03:24 PM       ABG    Lab Results   Component Value Date/Time    PXR1VDM 17.4 06/23/2022 06:15 AM    BEART -7.0 06/23/2022 06:15 AM    X5HGYWSX 95.9 06/23/2022 06:15 AM    PHART 7.377 06/23/2022 06:15 AM    KNL3ALW 30.3 06/23/2022 06:15 AM    PO2ART 80.6 06/23/2022 06:15 AM    LGI9ZIJ 18.3 06/23/2022 06:15 AM         CULTURES  Urine culture + e coli   Sputum culture negative   COVID not detected        RADIOLOGY  VL Extremity Venous Right   Final Result      XR CHEST PORTABLE   Final Result   No significant interval improvement. Bibasilar atelectasis versus   infiltrates. Trace pleural effusions. XR CHEST PORTABLE   Final Result   ET tube in satisfactory position 4.6 cm above the terrance. Bibasilar atelectasis. Cardiomegaly.          CTA PULMONARY W CONTRAST   Final Result   No evidence for pulmonary embolism      Considerable subsegmental atelectatic changes the lung bases without separate   consolidation of airspace disease clearly evident or pleural effusion      Right upper lobe 12 mm noncalcified pulmonary nodule with attention on   follow-up CT considered within 3-6 months especially if there is high risk   smoking history or high risk stratification      No acute findings of the abdomen or pelvis identified         CT ABDOMEN PELVIS WO CONTRAST Additional Contrast? None   Final Result   No evidence for pulmonary embolism      Considerable subsegmental atelectatic changes the lung bases without separate   consolidation of airspace disease clearly evident or pleural effusion      Right upper lobe 12 mm noncalcified pulmonary nodule with attention on   follow-up CT considered within 3-6 months especially if there is high risk   smoking history or high risk stratification      No acute findings of the abdomen or pelvis identified         XR CHEST PORTABLE   Final Result   1. Endotracheal tube and enteric tubes properly placed. 2.  Mild left basilar airspace disease and probable trace left pleural   effusion. XR HIP 2-3 VW W PELVIS RIGHT   Final Result   1. Status post ORIF of the right hip intertrochanteric fracture. No   immediate complication. FLUORO FOR SURGICAL PROCEDURES   Final Result   Intraprocedural fluoroscopic spot images as above. See separate procedure   report for more information. XR FOOT LEFT (MIN 3 VIEWS)   Final Result   Degenerative changes in the left foot with no acute abnormality. XR CHEST PORTABLE   Final Result   No acute cardiopulmonary disease. XR HIP 2-3 VW W PELVIS RIGHT   Final Result   Moderately displaced fracture of the proximal right femur. CTA ABDOMINAL AORTA W BILAT RUNOFF W CONTRAST   Final Result   1. Traumatic, acute right hip intertrochanteric fracture with varus   angulation and small surrounding hematoma. 2. No pseudoaneurysm or active extravasation is identified adjacent to the   fracture. 3. Aortoiliac inflow is widely patent. There is a mild amount of fibro   calcified plaque. 4. Femoropopliteal segments are patent bilaterally, noting fibro calcified   plaque. There is 3 vessel runoff bilaterally. However, on the right side   there is only 1 primary runoff vessel, the posterior tibial.   5. No acute abdominopelvic abnormality. Hepatic steatosis. CT FACIAL BONES WO CONTRAST   Final Result   No acute intracranial abnormality. No acute facial fractures. CT Head WO Contrast   Final Result   No acute intracranial abnormality. No acute facial fractures.          XR FEMUR RIGHT (MIN 2 VIEWS)   Final Result   Traumatic, acute intertrochanteric fracture of the right hip. Advanced osteoarthritis of the knee. Moderate-sized knee joint effusion. RLE doppler  Summary    Within the limits of this exam, there is no evidence of deep or superficial    venous thrombosis in the right lower extremity or left common femoral vein.    Two Baker's cyst at the right medial joint space measuring 4.9 X 0.9 cm and    3.7 X 0.8 cm. Discharge Medications     Medication List      START taking these medications    ferrous sulfate 325 (65 Fe) MG tablet  Commonly known as: IRON 325  Take 1 tablet by mouth daily (with breakfast)     oxyCODONE 5 MG immediate release tablet  Commonly known as: Roxicodone  Take 1-2 tablets by mouth every 6 hours as needed for Pain for up to 7 days. saccharomyces boulardii 250 MG capsule  Commonly known as: FLORASTOR  Take 1 capsule by mouth 2 times daily for 7 days        CHANGE how you take these medications    carvedilol 12.5 MG tablet  Commonly known as: COREG  Take 1 tablet by mouth 2 times daily (with meals)  What changed:   · medication strength  · See the new instructions. CONTINUE taking these medications    acetaminophen 500 MG tablet  Commonly known as: TYLENOL     albuterol sulfate  (90 Base) MCG/ACT inhaler  Commonly known as: ProAir HFA  INHALE TWO PUFFS BY MOUTH EVERY 6 HOURS AS NEEDED FOR WHEEZING     alendronate 70 MG tablet  Commonly known as: FOSAMAX  TAKE 1 TABLET BY MOUTH  WEEKLY WITH 8 OZ OF PLAIN  WATER 30 MINUTES BEFORE  FIRST FOOD, DRINK OR MEDS. STAY UPRIGHT FOR 30 MINS     allopurinol 300 MG tablet  Commonly known as: ZYLOPRIM  TAKE 1 TABLET BY MOUTH  DAILY     atorvastatin 10 MG tablet  Commonly known as: LIPITOR  TAKE 1 TABLET BY MOUTH AT  NIGHT     blood glucose test strips strip  Commonly known as: ASCENSIA AUTODISC VI;ONE TOUCH ULTRA TEST VI  Used to test once daily.  DX: E11.9     Breo Ellipta 200-25 MCG/INH Aepb inhaler  Generic drug:

## 2022-07-08 ENCOUNTER — TELEPHONE (OUTPATIENT)
Dept: CASE MANAGEMENT | Age: 77
End: 2022-07-08

## 2022-07-08 NOTE — TELEPHONE ENCOUNTER
Per imaging report CTA PULM 6/21/2022:    Right upper lobe 12 mm noncalcified pulmonary nodule with attention on   follow-up CT considered within 3-6 months especially if there is high risk   smoking history or high risk stratification     May also consider Pulmonology Referral for lung nodule greater than 6 mm.     Thank you,  Edward Gutierrez RN  [facility] Lung Navigator  [phone]

## 2022-07-11 ENCOUNTER — TELEPHONE (OUTPATIENT)
Dept: ORTHOPEDIC SURGERY | Age: 77
End: 2022-07-11

## 2022-07-11 NOTE — TELEPHONE ENCOUNTER
SPOKE WITH NURSE AT [facility] AND GAVE APPROVAL TO GO AHEAD AND REMOVE STAPLES AND STERI-STRIP THE INCISION SITE.

## 2022-07-21 ENCOUNTER — OFFICE VISIT (OUTPATIENT)
Dept: ORTHOPEDIC SURGERY | Age: 77
End: 2022-07-21

## 2022-07-21 VITALS — BODY MASS INDEX: 44.3 KG/M2 | HEIGHT: 63 IN | WEIGHT: 250 LBS

## 2022-07-21 DIAGNOSIS — Z87.81 S/P ORIF (OPEN REDUCTION INTERNAL FIXATION) FRACTURE: Primary | ICD-10-CM

## 2022-07-21 DIAGNOSIS — Z98.890 S/P ORIF (OPEN REDUCTION INTERNAL FIXATION) FRACTURE: Primary | ICD-10-CM

## 2022-07-21 PROCEDURE — 99024 POSTOP FOLLOW-UP VISIT: CPT | Performed by: STUDENT IN AN ORGANIZED HEALTH CARE EDUCATION/TRAINING PROGRAM

## 2022-07-21 NOTE — PROGRESS NOTES
6/20/2022    Impression:  Encounter Diagnosis   Name Primary? S/P ORIF (open reduction internal fixation) fracture Yes       Office Procedures:  Orders Placed This Encounter   Procedures    XR HIP 2-3 VW W PELVIS RIGHT     Order Specific Question:   Reason for exam:     Answer:   POST-OP CHECK         Plan:     Staple removal today. The patient has no restrictions from my standpoint. She can weight-bear as tolerated. Follow-up in 1 month with repeat x-rays to evaluate final healing of the right hip. . Sherryle Breeze is in agreement with this plan. All questions were answered to patient's satisfaction and was encouraged to call with any further questions. Lauren Juares, DO  Orthopedic Surgery and Sports Medicine  7/21/2022      This dictation was performed with a verbal recognition program [facility]) and it was checked for errors. It is possible that there are still dictated errors within this office note. If so, please bring any errors to my attention for an addendum. All efforts were made to ensure that this office note is accurate.

## 2022-07-29 ENCOUNTER — TELEPHONE (OUTPATIENT)
Dept: CARDIOLOGY CLINIC | Age: 77
End: 2022-07-29

## 2022-07-29 DIAGNOSIS — I48.0 PAROXYSMAL ATRIAL FIBRILLATION (HCC): ICD-10-CM

## 2022-07-29 PROCEDURE — 93228 REMOTE 30 DAY ECG REV/REPORT: CPT | Performed by: INTERNAL MEDICINE

## 2022-07-29 NOTE — TELEPHONE ENCOUNTER
Attempted to call the patient with monitor results, no answer, voicemail was full. Cardiac event monitor looked good. Baseline heart rate and rhythm was sinus and 82 BPM. Lowest HR 62, highest . Less than 1% PAC burden with very brief atrial runs (Longest 12 beats). Rare PVC's. No sustained arrhthymias.

## 2022-08-02 ENCOUNTER — HOSPITAL ENCOUNTER (INPATIENT)
Age: 77
LOS: 2 days | Discharge: NURSING, REHAB OR LONG-TERM CARE | DRG: 812 | End: 2022-08-06
Attending: STUDENT IN AN ORGANIZED HEALTH CARE EDUCATION/TRAINING PROGRAM | Admitting: INTERNAL MEDICINE
Payer: MEDICARE

## 2022-08-02 ENCOUNTER — APPOINTMENT (OUTPATIENT)
Dept: GENERAL RADIOLOGY | Age: 77
DRG: 812 | End: 2022-08-02
Payer: MEDICARE

## 2022-08-02 ENCOUNTER — TELEPHONE (OUTPATIENT)
Dept: CARDIOLOGY CLINIC | Age: 77
End: 2022-08-02

## 2022-08-02 DIAGNOSIS — D64.9 SYMPTOMATIC ANEMIA: Primary | ICD-10-CM

## 2022-08-02 LAB
A/G RATIO: 1 (ref 1.1–2.2)
ABO/RH: NORMAL
ALBUMIN SERPL-MCNC: 3.2 G/DL (ref 3.4–5)
ALP BLD-CCNC: 98 U/L (ref 40–129)
ALT SERPL-CCNC: 6 U/L (ref 10–40)
ANION GAP SERPL CALCULATED.3IONS-SCNC: 12 MMOL/L (ref 3–16)
ANISOCYTOSIS: ABNORMAL
ANTIBODY SCREEN: NORMAL
APTT: 38.9 SEC (ref 23–34.3)
AST SERPL-CCNC: 8 U/L (ref 15–37)
BASOPHILS ABSOLUTE: 0 K/UL (ref 0–0.2)
BASOPHILS RELATIVE PERCENT: 0.4 %
BILIRUB SERPL-MCNC: 0.6 MG/DL (ref 0–1)
BLOOD BANK DISPENSE STATUS: NORMAL
BLOOD BANK PRODUCT CODE: NORMAL
BPU ID: NORMAL
BUN BLDV-MCNC: 12 MG/DL (ref 7–20)
CALCIUM SERPL-MCNC: 8.6 MG/DL (ref 8.3–10.6)
CHLORIDE BLD-SCNC: 103 MMOL/L (ref 99–110)
CO2: 25 MMOL/L (ref 21–32)
CREAT SERPL-MCNC: 0.7 MG/DL (ref 0.6–1.2)
DESCRIPTION BLOOD BANK: NORMAL
EKG ATRIAL RATE: 81 BPM
EKG DIAGNOSIS: NORMAL
EKG P AXIS: 19 DEGREES
EKG P-R INTERVAL: 154 MS
EKG Q-T INTERVAL: 360 MS
EKG QRS DURATION: 98 MS
EKG QTC CALCULATION (BAZETT): 418 MS
EKG R AXIS: -40 DEGREES
EKG T AXIS: 53 DEGREES
EKG VENTRICULAR RATE: 81 BPM
EOSINOPHILS ABSOLUTE: 0 K/UL (ref 0–0.6)
EOSINOPHILS RELATIVE PERCENT: 0.9 %
GFR AFRICAN AMERICAN: >60
GFR NON-AFRICAN AMERICAN: >60
GLUCOSE BLD-MCNC: 139 MG/DL (ref 70–99)
GLUCOSE BLD-MCNC: 189 MG/DL (ref 70–99)
HCT VFR BLD CALC: 22.1 % (ref 36–48)
HCT VFR BLD CALC: 23.9 % (ref 36–48)
HEMATOLOGY PATH CONSULT: YES
HEMOGLOBIN: 7.2 G/DL (ref 12–16)
HEMOGLOBIN: 7.7 G/DL (ref 12–16)
HYPOCHROMIA: ABNORMAL
INR BLD: 1.39 (ref 0.87–1.14)
LIPASE: 13 U/L (ref 13–60)
LYMPHOCYTES ABSOLUTE: 1.4 K/UL (ref 1–5.1)
LYMPHOCYTES RELATIVE PERCENT: 26 %
MCH RBC QN AUTO: 20.7 PG (ref 26–34)
MCHC RBC AUTO-ENTMCNC: 32.4 G/DL (ref 31–36)
MCV RBC AUTO: 63.8 FL (ref 80–100)
MICROCYTES: ABNORMAL
MONOCYTES ABSOLUTE: 0.6 K/UL (ref 0–1.3)
MONOCYTES RELATIVE PERCENT: 11 %
NEUTROPHILS ABSOLUTE: 3.3 K/UL (ref 1.7–7.7)
NEUTROPHILS RELATIVE PERCENT: 61.7 %
OCCULT BLOOD DIAGNOSTIC: NORMAL
PDW BLD-RTO: 23.9 % (ref 12.4–15.4)
PERFORMED ON: ABNORMAL
PLATELET # BLD: 297 K/UL (ref 135–450)
PLATELET SLIDE REVIEW: ADEQUATE
PMV BLD AUTO: 8.9 FL (ref 5–10.5)
POIKILOCYTES: ABNORMAL
POTASSIUM REFLEX MAGNESIUM: 4 MMOL/L (ref 3.5–5.1)
PROTHROMBIN TIME: 16.8 SEC (ref 11.7–14.5)
RBC # BLD: 3.47 M/UL (ref 4–5.2)
SARS-COV-2, NAAT: NOT DETECTED
SLIDE REVIEW: ABNORMAL
SODIUM BLD-SCNC: 140 MMOL/L (ref 136–145)
TOTAL PROTEIN: 6.4 G/DL (ref 6.4–8.2)
TROPONIN: <0.01 NG/ML
WBC # BLD: 5.3 K/UL (ref 4–11)

## 2022-08-02 PROCEDURE — 85025 COMPLETE CBC W/AUTO DIFF WBC: CPT

## 2022-08-02 PROCEDURE — G0378 HOSPITAL OBSERVATION PER HR: HCPCS

## 2022-08-02 PROCEDURE — 85014 HEMATOCRIT: CPT

## 2022-08-02 PROCEDURE — 93005 ELECTROCARDIOGRAM TRACING: CPT | Performed by: STUDENT IN AN ORGANIZED HEALTH CARE EDUCATION/TRAINING PROGRAM

## 2022-08-02 PROCEDURE — 2580000003 HC RX 258

## 2022-08-02 PROCEDURE — 85610 PROTHROMBIN TIME: CPT

## 2022-08-02 PROCEDURE — 71045 X-RAY EXAM CHEST 1 VIEW: CPT

## 2022-08-02 PROCEDURE — 94640 AIRWAY INHALATION TREATMENT: CPT

## 2022-08-02 PROCEDURE — 86850 RBC ANTIBODY SCREEN: CPT

## 2022-08-02 PROCEDURE — 99285 EMERGENCY DEPT VISIT HI MDM: CPT

## 2022-08-02 PROCEDURE — 6370000000 HC RX 637 (ALT 250 FOR IP)

## 2022-08-02 PROCEDURE — 85730 THROMBOPLASTIN TIME PARTIAL: CPT

## 2022-08-02 PROCEDURE — 86923 COMPATIBILITY TEST ELECTRIC: CPT

## 2022-08-02 PROCEDURE — 82270 OCCULT BLOOD FECES: CPT

## 2022-08-02 PROCEDURE — 86900 BLOOD TYPING SEROLOGIC ABO: CPT

## 2022-08-02 PROCEDURE — 36415 COLL VENOUS BLD VENIPUNCTURE: CPT

## 2022-08-02 PROCEDURE — 80053 COMPREHEN METABOLIC PANEL: CPT

## 2022-08-02 PROCEDURE — 87635 SARS-COV-2 COVID-19 AMP PRB: CPT

## 2022-08-02 PROCEDURE — 83690 ASSAY OF LIPASE: CPT

## 2022-08-02 PROCEDURE — 85018 HEMOGLOBIN: CPT

## 2022-08-02 PROCEDURE — 94761 N-INVAS EAR/PLS OXIMETRY MLT: CPT

## 2022-08-02 PROCEDURE — 86901 BLOOD TYPING SEROLOGIC RH(D): CPT

## 2022-08-02 PROCEDURE — P9016 RBC LEUKOCYTES REDUCED: HCPCS

## 2022-08-02 PROCEDURE — 84484 ASSAY OF TROPONIN QUANT: CPT

## 2022-08-02 PROCEDURE — 36430 TRANSFUSION BLD/BLD COMPNT: CPT

## 2022-08-02 RX ORDER — BUDESONIDE AND FORMOTEROL FUMARATE DIHYDRATE 160; 4.5 UG/1; UG/1
2 AEROSOL RESPIRATORY (INHALATION) 2 TIMES DAILY
Status: DISCONTINUED | OUTPATIENT
Start: 2022-08-02 | End: 2022-08-06 | Stop reason: HOSPADM

## 2022-08-02 RX ORDER — SODIUM CHLORIDE 0.9 % (FLUSH) 0.9 %
10 SYRINGE (ML) INJECTION PRN
Status: DISCONTINUED | OUTPATIENT
Start: 2022-08-02 | End: 2022-08-06 | Stop reason: HOSPADM

## 2022-08-02 RX ORDER — INSULIN LISPRO 100 [IU]/ML
0-4 INJECTION, SOLUTION INTRAVENOUS; SUBCUTANEOUS NIGHTLY
Status: DISCONTINUED | OUTPATIENT
Start: 2022-08-02 | End: 2022-08-06 | Stop reason: HOSPADM

## 2022-08-02 RX ORDER — SODIUM CHLORIDE 0.9 % (FLUSH) 0.9 %
5-40 SYRINGE (ML) INJECTION EVERY 12 HOURS SCHEDULED
Status: DISCONTINUED | OUTPATIENT
Start: 2022-08-02 | End: 2022-08-06 | Stop reason: HOSPADM

## 2022-08-02 RX ORDER — FERROUS SULFATE 325(65) MG
325 TABLET ORAL
Status: DISCONTINUED | OUTPATIENT
Start: 2022-08-03 | End: 2022-08-06 | Stop reason: HOSPADM

## 2022-08-02 RX ORDER — PANTOPRAZOLE SODIUM 40 MG/1
40 TABLET, DELAYED RELEASE ORAL
Status: DISCONTINUED | OUTPATIENT
Start: 2022-08-03 | End: 2022-08-06 | Stop reason: HOSPADM

## 2022-08-02 RX ORDER — POTASSIUM CHLORIDE 20 MEQ/1
40 TABLET, EXTENDED RELEASE ORAL PRN
Status: DISCONTINUED | OUTPATIENT
Start: 2022-08-02 | End: 2022-08-06 | Stop reason: HOSPADM

## 2022-08-02 RX ORDER — ACETAMINOPHEN 325 MG/1
650 TABLET ORAL EVERY 6 HOURS PRN
Status: DISCONTINUED | OUTPATIENT
Start: 2022-08-02 | End: 2022-08-06 | Stop reason: HOSPADM

## 2022-08-02 RX ORDER — DEXTROSE MONOHYDRATE 100 MG/ML
INJECTION, SOLUTION INTRAVENOUS CONTINUOUS PRN
Status: DISCONTINUED | OUTPATIENT
Start: 2022-08-02 | End: 2022-08-06 | Stop reason: HOSPADM

## 2022-08-02 RX ORDER — SODIUM CHLORIDE 9 MG/ML
INJECTION, SOLUTION INTRAVENOUS PRN
Status: DISCONTINUED | OUTPATIENT
Start: 2022-08-02 | End: 2022-08-04

## 2022-08-02 RX ORDER — MAGNESIUM SULFATE 1 G/100ML
1000 INJECTION INTRAVENOUS PRN
Status: DISCONTINUED | OUTPATIENT
Start: 2022-08-02 | End: 2022-08-06 | Stop reason: HOSPADM

## 2022-08-02 RX ORDER — ALBUTEROL SULFATE 90 UG/1
2 AEROSOL, METERED RESPIRATORY (INHALATION) EVERY 6 HOURS PRN
Status: DISCONTINUED | OUTPATIENT
Start: 2022-08-02 | End: 2022-08-06 | Stop reason: HOSPADM

## 2022-08-02 RX ORDER — ONDANSETRON 4 MG/1
4 TABLET, ORALLY DISINTEGRATING ORAL EVERY 8 HOURS PRN
Status: DISCONTINUED | OUTPATIENT
Start: 2022-08-02 | End: 2022-08-06 | Stop reason: HOSPADM

## 2022-08-02 RX ORDER — ATORVASTATIN CALCIUM 10 MG/1
10 TABLET, FILM COATED ORAL NIGHTLY
Status: DISCONTINUED | OUTPATIENT
Start: 2022-08-02 | End: 2022-08-06 | Stop reason: HOSPADM

## 2022-08-02 RX ORDER — INSULIN LISPRO 100 [IU]/ML
0-4 INJECTION, SOLUTION INTRAVENOUS; SUBCUTANEOUS
Status: DISCONTINUED | OUTPATIENT
Start: 2022-08-02 | End: 2022-08-06 | Stop reason: HOSPADM

## 2022-08-02 RX ORDER — ONDANSETRON 2 MG/ML
4 INJECTION INTRAMUSCULAR; INTRAVENOUS EVERY 6 HOURS PRN
Status: DISCONTINUED | OUTPATIENT
Start: 2022-08-02 | End: 2022-08-06 | Stop reason: HOSPADM

## 2022-08-02 RX ORDER — ACETAMINOPHEN 650 MG/1
650 SUPPOSITORY RECTAL EVERY 6 HOURS PRN
Status: DISCONTINUED | OUTPATIENT
Start: 2022-08-02 | End: 2022-08-06 | Stop reason: HOSPADM

## 2022-08-02 RX ORDER — DOXAZOSIN 2 MG/1
2 TABLET ORAL DAILY
Status: DISCONTINUED | OUTPATIENT
Start: 2022-08-02 | End: 2022-08-06 | Stop reason: HOSPADM

## 2022-08-02 RX ORDER — PAROXETINE HYDROCHLORIDE 20 MG/1
10 TABLET, FILM COATED ORAL DAILY
Status: DISCONTINUED | OUTPATIENT
Start: 2022-08-02 | End: 2022-08-04

## 2022-08-02 RX ORDER — POLYETHYLENE GLYCOL 3350 17 G/17G
17 POWDER, FOR SOLUTION ORAL DAILY PRN
Status: DISCONTINUED | OUTPATIENT
Start: 2022-08-02 | End: 2022-08-06 | Stop reason: HOSPADM

## 2022-08-02 RX ORDER — CARVEDILOL 6.25 MG/1
12.5 TABLET ORAL 2 TIMES DAILY WITH MEALS
Status: DISCONTINUED | OUTPATIENT
Start: 2022-08-02 | End: 2022-08-06 | Stop reason: HOSPADM

## 2022-08-02 RX ORDER — POTASSIUM CHLORIDE 7.45 MG/ML
10 INJECTION INTRAVENOUS PRN
Status: DISCONTINUED | OUTPATIENT
Start: 2022-08-02 | End: 2022-08-06 | Stop reason: HOSPADM

## 2022-08-02 RX ORDER — SODIUM CHLORIDE 9 MG/ML
INJECTION, SOLUTION INTRAVENOUS PRN
Status: DISCONTINUED | OUTPATIENT
Start: 2022-08-02 | End: 2022-08-06 | Stop reason: HOSPADM

## 2022-08-02 RX ORDER — GABAPENTIN 300 MG/1
300 CAPSULE ORAL 2 TIMES DAILY
Status: DISCONTINUED | OUTPATIENT
Start: 2022-08-02 | End: 2022-08-06 | Stop reason: HOSPADM

## 2022-08-02 RX ORDER — CETIRIZINE HYDROCHLORIDE 10 MG/1
10 TABLET ORAL DAILY
Status: DISCONTINUED | OUTPATIENT
Start: 2022-08-02 | End: 2022-08-06 | Stop reason: HOSPADM

## 2022-08-02 RX ORDER — LOSARTAN POTASSIUM 100 MG/1
100 TABLET ORAL DAILY
Status: DISCONTINUED | OUTPATIENT
Start: 2022-08-02 | End: 2022-08-06 | Stop reason: HOSPADM

## 2022-08-02 RX ADMIN — Medication 2 PUFF: at 23:27

## 2022-08-02 RX ADMIN — ACETAMINOPHEN 650 MG: 325 TABLET ORAL at 23:21

## 2022-08-02 RX ADMIN — Medication 10 ML: at 23:22

## 2022-08-02 ASSESSMENT — PAIN DESCRIPTION - ORIENTATION: ORIENTATION: RIGHT;LEFT

## 2022-08-02 ASSESSMENT — PAIN DESCRIPTION - LOCATION: LOCATION: KNEE

## 2022-08-02 ASSESSMENT — PAIN - FUNCTIONAL ASSESSMENT: PAIN_FUNCTIONAL_ASSESSMENT: NONE - DENIES PAIN

## 2022-08-02 ASSESSMENT — PAIN SCALES - GENERAL
PAINLEVEL_OUTOF10: 10
PAINLEVEL_OUTOF10: 6

## 2022-08-02 ASSESSMENT — PAIN DESCRIPTION - DESCRIPTORS: DESCRIPTORS: ACHING

## 2022-08-02 NOTE — ED NOTES
Consent for blood signed by patient and witnessed by Chelsea Constantino, placed in medical record bin at nurse's station.       Cherelle Montes RN  08/02/22 6001

## 2022-08-02 NOTE — PLAN OF CARE
Admit to [address] from [facility] [place], operated by [facility] 2/2 staff concern for Hgb 6.2 at facility. Patient does have recent hx of hip fracture with complicated hospital course and has been battling with anemia since then. 1 unit PRBC ordered. Will trend HH. Per ED provider patient looks pale and endorses some increased fatigue.      Celio Zhao PA-C  8/2/2022 5:22 PM

## 2022-08-02 NOTE — TELEPHONE ENCOUNTER
----- Message from Manish Patterson MD sent at 8/1/2022  7:56 AM EDT -----  2 week heart monitor shows no atrial fibrillations please let patient know not sure if she has follow up set up  She was seen in hospital during hip fracture.

## 2022-08-02 NOTE — TELEPHONE ENCOUNTER
Spoke with patient's son, ok per hippa. His mom has been in rehab since her hip surgery and doesn't have her phone with her. He will relay results.   I also gave him her follow up appt info with [facility]

## 2022-08-02 NOTE — ED NOTES
5- Perfect served the Hospitalist for a consult. Larissa Correa  08/02/22 4739    2003- Hospitalist called back and spoke with Dr. Jimena Ambrose.       Larissa Correa  08/02/22 [id number]

## 2022-08-02 NOTE — H&P
Report given to [facility]. Questions answered. Will transport to [facility].

## 2022-08-02 NOTE — TELEPHONE ENCOUNTER
No answer on pt phone yesterday or today. LM on son's voicemail, ok per hao, to have his mom call our office.

## 2022-08-03 LAB
ANION GAP SERPL CALCULATED.3IONS-SCNC: 9 MMOL/L (ref 3–16)
BUN BLDV-MCNC: 13 MG/DL (ref 7–20)
CALCIUM SERPL-MCNC: 8 MG/DL (ref 8.3–10.6)
CHLORIDE BLD-SCNC: 104 MMOL/L (ref 99–110)
CO2: 25 MMOL/L (ref 21–32)
CREAT SERPL-MCNC: 0.7 MG/DL (ref 0.6–1.2)
GFR AFRICAN AMERICAN: >60
GFR NON-AFRICAN AMERICAN: >60
GLUCOSE BLD-MCNC: 168 MG/DL (ref 70–99)
GLUCOSE BLD-MCNC: 172 MG/DL (ref 70–99)
GLUCOSE BLD-MCNC: 195 MG/DL (ref 70–99)
GLUCOSE BLD-MCNC: 216 MG/DL (ref 70–99)
GLUCOSE BLD-MCNC: 241 MG/DL (ref 70–99)
HCT VFR BLD CALC: 21.6 % (ref 36–48)
HCT VFR BLD CALC: 21.9 % (ref 36–48)
HCT VFR BLD CALC: 23.2 % (ref 36–48)
HCT VFR BLD CALC: 24.7 % (ref 36–48)
HEMATOLOGY PATH CONSULT: NORMAL
HEMOGLOBIN: 7.1 G/DL (ref 12–16)
HEMOGLOBIN: 7.2 G/DL (ref 12–16)
HEMOGLOBIN: 7.5 G/DL (ref 12–16)
HEMOGLOBIN: 7.9 G/DL (ref 12–16)
PERFORMED ON: ABNORMAL
POTASSIUM REFLEX MAGNESIUM: 4.1 MMOL/L (ref 3.5–5.1)
SODIUM BLD-SCNC: 138 MMOL/L (ref 136–145)

## 2022-08-03 PROCEDURE — 97530 THERAPEUTIC ACTIVITIES: CPT

## 2022-08-03 PROCEDURE — G0378 HOSPITAL OBSERVATION PER HR: HCPCS

## 2022-08-03 PROCEDURE — 2580000003 HC RX 258

## 2022-08-03 PROCEDURE — 2580000003 HC RX 258: Performed by: INTERNAL MEDICINE

## 2022-08-03 PROCEDURE — 97110 THERAPEUTIC EXERCISES: CPT

## 2022-08-03 PROCEDURE — 36415 COLL VENOUS BLD VENIPUNCTURE: CPT

## 2022-08-03 PROCEDURE — 6370000000 HC RX 637 (ALT 250 FOR IP)

## 2022-08-03 PROCEDURE — 97112 NEUROMUSCULAR REEDUCATION: CPT

## 2022-08-03 PROCEDURE — 99222 1ST HOSP IP/OBS MODERATE 55: CPT

## 2022-08-03 PROCEDURE — 85018 HEMOGLOBIN: CPT

## 2022-08-03 PROCEDURE — 6360000002 HC RX W HCPCS: Performed by: INTERNAL MEDICINE

## 2022-08-03 PROCEDURE — 94761 N-INVAS EAR/PLS OXIMETRY MLT: CPT

## 2022-08-03 PROCEDURE — 80048 BASIC METABOLIC PNL TOTAL CA: CPT

## 2022-08-03 PROCEDURE — 97165 OT EVAL LOW COMPLEX 30 MIN: CPT

## 2022-08-03 PROCEDURE — 83735 ASSAY OF MAGNESIUM: CPT

## 2022-08-03 PROCEDURE — 85014 HEMATOCRIT: CPT

## 2022-08-03 PROCEDURE — 97162 PT EVAL MOD COMPLEX 30 MIN: CPT

## 2022-08-03 PROCEDURE — 94640 AIRWAY INHALATION TREATMENT: CPT

## 2022-08-03 RX ORDER — SODIUM CHLORIDE 9 MG/ML
INJECTION, SOLUTION INTRAVENOUS CONTINUOUS
Status: DISCONTINUED | OUTPATIENT
Start: 2022-08-03 | End: 2022-08-04

## 2022-08-03 RX ADMIN — ATORVASTATIN CALCIUM 10 MG: 10 TABLET, FILM COATED ORAL at 22:34

## 2022-08-03 RX ADMIN — TIOTROPIUM BROMIDE INHALATION SPRAY 2 PUFF: 3.12 SPRAY, METERED RESPIRATORY (INHALATION) at 08:27

## 2022-08-03 RX ADMIN — SODIUM CHLORIDE: 9 INJECTION, SOLUTION INTRAVENOUS at 10:23

## 2022-08-03 RX ADMIN — ACETAMINOPHEN 650 MG: 325 TABLET ORAL at 06:15

## 2022-08-03 RX ADMIN — PANTOPRAZOLE SODIUM 40 MG: 40 TABLET, DELAYED RELEASE ORAL at 06:11

## 2022-08-03 RX ADMIN — FERROUS SULFATE TAB 325 MG (65 MG ELEMENTAL FE) 325 MG: 325 (65 FE) TAB at 08:38

## 2022-08-03 RX ADMIN — CETIRIZINE HYDROCHLORIDE 10 MG: 10 TABLET ORAL at 08:38

## 2022-08-03 RX ADMIN — Medication 2 PUFF: at 08:27

## 2022-08-03 RX ADMIN — ACETAMINOPHEN 650 MG: 325 TABLET ORAL at 15:07

## 2022-08-03 RX ADMIN — INSULIN LISPRO 1 UNITS: 100 INJECTION, SOLUTION INTRAVENOUS; SUBCUTANEOUS at 17:25

## 2022-08-03 RX ADMIN — CARVEDILOL 12.5 MG: 6.25 TABLET, FILM COATED ORAL at 17:25

## 2022-08-03 RX ADMIN — DOXAZOSIN 2 MG: 2 TABLET ORAL at 08:37

## 2022-08-03 RX ADMIN — PAROXETINE HYDROCHLORIDE 10 MG: 20 TABLET, FILM COATED ORAL at 08:38

## 2022-08-03 RX ADMIN — CARVEDILOL 12.5 MG: 6.25 TABLET, FILM COATED ORAL at 08:38

## 2022-08-03 RX ADMIN — IRON SUCROSE 200 MG: 20 INJECTION, SOLUTION INTRAVENOUS at 11:54

## 2022-08-03 RX ADMIN — GABAPENTIN 300 MG: 300 CAPSULE ORAL at 08:39

## 2022-08-03 RX ADMIN — ACETAMINOPHEN 650 MG: 325 TABLET ORAL at 22:34

## 2022-08-03 RX ADMIN — PAROXETINE HYDROCHLORIDE 10 MG: 20 TABLET, FILM COATED ORAL at 08:43

## 2022-08-03 RX ADMIN — GABAPENTIN 300 MG: 300 CAPSULE ORAL at 22:34

## 2022-08-03 RX ADMIN — LOSARTAN POTASSIUM 100 MG: 100 TABLET, FILM COATED ORAL at 08:37

## 2022-08-03 RX ADMIN — Medication 2 PUFF: at 19:34

## 2022-08-03 ASSESSMENT — PAIN SCALES - GENERAL
PAINLEVEL_OUTOF10: 6
PAINLEVEL_OUTOF10: 0
PAINLEVEL_OUTOF10: 7
PAINLEVEL_OUTOF10: 9
PAINLEVEL_OUTOF10: 10

## 2022-08-03 ASSESSMENT — PAIN DESCRIPTION - DESCRIPTORS
DESCRIPTORS: ACHING
DESCRIPTORS: ACHING;DISCOMFORT

## 2022-08-03 ASSESSMENT — PAIN DESCRIPTION - ORIENTATION
ORIENTATION: LEFT;RIGHT
ORIENTATION: RIGHT;LEFT

## 2022-08-03 ASSESSMENT — PAIN DESCRIPTION - LOCATION
LOCATION: KNEE
LOCATION: KNEE

## 2022-08-03 NOTE — PLAN OF CARE
Problem: Discharge Planning  Goal: Discharge to home or other facility with appropriate resources  8/3/2022 0543 by Hardik Tan RN  Outcome: Progressing  8/3/2022 0543 by Hardik Tan RN  Outcome: Progressing  Flowsheets (Taken 8/2/2022 2332)  Discharge to home or other facility with appropriate resources:   Identify barriers to discharge with patient and caregiver   Arrange for needed discharge resources and transportation as appropriate   Identify discharge learning needs (meds, wound care, etc)   Arrange for interpreters to assist at discharge as needed   Refer to discharge planning if patient needs post-hospital services based on physician order or complex needs related to functional status, cognitive ability or social support system     Problem: Safety - Adult  Goal: Free from fall injury  8/3/2022 0543 by Hardik Tan RN  Outcome: Progressing  8/3/2022 0543 by Hardik Tan RN  Outcome: Progressing     Problem: ABCDS Injury Assessment  Goal: Absence of physical injury  8/3/2022 0543 by Hardik Tan RN  Outcome: Progressing  8/3/2022 0543 by Hardik Tan RN  Outcome: Progressing     Problem: Pain  Goal: Verbalizes/displays adequate comfort level or baseline comfort level  8/3/2022 1346 by Nia Jose RN  Outcome: Progressing  8/3/2022 0543 by Hardik Tan RN  Outcome: Progressing     Problem: Chronic Conditions and Co-morbidities  Goal: Patient's chronic conditions and co-morbidity symptoms are monitored and maintained or improved  Outcome: Progressing

## 2022-08-03 NOTE — ED PROVIDER NOTES
Emergency Department Encounter    Patient: Paulino Prieto  MRN: [id number]  : 1945  Date of Evaluation: 8/3/2022  ED Provider:  Riya Denise MD    Triage Chief Complaint:   Anemia (Sent from [facility], they said her H/H was 6.2 and when she went to [facility] today they said it was 7.1. Андрей said they would not accept her back and had transport bring her here. )    [place]unkey:  Paulino Prieto is a 65 y.o. female who with history seen below including recent hip fracture with complicated hospital course. Patient states she had some anemia of unknown source and has required transfusions in the past.  Patient is at rehab facility currently. Hemoglobin was drawn was 6.2. Repeat draw in the ED is 7.2. Nursing facility is concerned that they are unable to get any further blood draws until Thursday. They state patient has been more tired and fatigued over the past several days. Patient states she does have generalized fatigue and is having trouble getting her self to perform therapy secondary to fatigue. Patient denies any headache, blurred vision, focal neurodeficits, motor or sensory changes. Denies any chest pain or shortness of breath, cough or sputum production. Denies abdominal pain, nausea vomiting, hematemesis, hemoptysis, diarrhea constipation, blood or melena stools. Denies hematuria or or any change in urination. Other than most recent fall with hip fracture denies any falls over the past several days.     ROS - see HPI, below listed is current ROS at time of my eval:  At least 14 systems reviewed, negative other HPI  Past Medical History:   Diagnosis Date    Anemia     thallasemia minor    Arthritis     osteo-arthritis    Asthma     Back pain     Back pain     Bronchitis chronic     Diabetes mellitus (HCC)     GERD (gastroesophageal reflux disease)     Gout     Hypertension     Melanoma of scalp or neck (HCC)     Osteoarthritis     Osteoporosis     Squamous cell carcinoma, arm Thal trait      Past Surgical History:   Procedure Laterality Date    APPENDECTOMY      CARPAL TUNNEL RELEASE Left 02/05/2018    CATARACT REMOVAL WITH IMPLANT Right 07/18/2018    CATARACT REMOVAL WITH IMPLANT Left 08/15/2018    COLONOSCOPY  12/2014    FEMUR FRACTURE SURGERY Right 6/20/2022    RIGHT INTRAMEDULLARY NAILING performed by Cadence Velasco DO at [facility] ([address] [place] [address])      KNEE ARTHROSCOPY Bilateral     PARATHYROIDECTOMY      PARTIAL HYSTERECTOMY (CERVIX NOT REMOVED)      OH XCAPSL CTRC RMVL INSJ IO LENS PROSTH W/O ECP Right 7/18/2018    PHACOEMULSIFICATION OF CATARACT WITH INTRAOCULAR LENS IMPLANT RIGHT EYE performed by Christi Warner MD at [place] [address] RMVL INSJ IO LENS PROSTH W/O ECP Left 8/15/2018    PHACOEMULSIFICATION OF CATARACT WITH INTRAOCULAR LENS IMPLANT LEFT EYE performed by Christi Warner MD at [address] [place] [address] GASTROINTESTINAL ENDOSCOPY N/A 6/25/2022    EGD BIOPSY performed by Reena aGspar DO at [facility]  6/25/2022    EGD POLYP HOT FORCEP/CAUTERY performed by Reena Gaspar DO at [facility]     Family History   Problem Relation Age of Onset    Heart Failure Mother     Emphysema Mother     Stroke Mother     Heart Failure Father     Hypertension Father     Heart Disease Father     High Blood Pressure Father     Cancer Maternal Aunt     Cancer Paternal Aunt     Cancer Maternal Grandmother     Asthma Neg Hx     Diabetes Neg Hx      Social History     Socioeconomic History    Marital status:      Spouse name: Not on file    Number of children: Not on file    Years of education: Not on file    Highest education level: Not on file   Occupational History    Not on file   Tobacco Use    Smoking status: Passive Smoke Exposure - Never Smoker    Smokeless tobacco: Never    Tobacco comments:     exposed to passive smoke for 18 yrs   Vaping Use Vaping Use: Never used   Substance and Sexual Activity    Alcohol use: No    Drug use: No    Sexual activity: Never   Other Topics Concern    Not on file   Social History Narrative    Not on file     Social Determinants of Health     Financial Resource Strain: Not on file   Food Insecurity: Not on file   Transportation Needs: Not on file   Physical Activity: Not on file   Stress: Not on file   Social Connections: Not on file   Intimate Partner Violence: Not on file   Housing Stability: Not on file     Current Facility-Administered Medications   Medication Dose Route Frequency Provider Last Rate Last Admin    albuterol sulfate HFA (PROVENTIL;VENTOLIN;PROAIR) 108 (90 Base) MCG/ACT inhaler 2 puff  2 puff Inhalation Q6H PRN MARIUSZ Ivy        atorvastatin (LIPITOR) tablet 10 mg  10 mg Oral Nightly MARIUSZ Ivy        ferrous sulfate (IRON 325) tablet 325 mg  325 mg Oral Daily with breakfast MARIUSZ Ivy        cetirizine (ZYRTEC) tablet 10 mg  10 mg Oral Daily MARIUSZ Ivy        carvedilol (COREG) tablet 12.5 mg  12.5 mg Oral BID WC MARIUSZ Ivy        budesonide-formoterol (SYMBICORT) 160-4.5 MCG/ACT inhaler 2 puff  2 puff Inhalation BID MARIUSZ Ivy   2 puff at 08/02/22 2327    gabapentin (NEURONTIN) capsule 300 mg  300 mg Oral BID Arroyo MARIUSZ Epps        losartan (COZAAR) tablet 100 mg  100 mg Oral Daily Arroyo MARIUSZ Epps        pantoprazole (PROTONIX) tablet 40 mg  40 mg Oral QAM AC MARIUSZ Alfred   40 mg at 08/03/22 0611    tiotropium (SPIRIVA RESPIMAT) 2.5 MCG/ACT inhaler 2 puff  2 puff Inhalation Daily MARIUSZ Ivy        doxazosin (CARDURA) tablet 2 mg  2 mg Oral Daily Arroyo MARIUSZ Epps        PARoxetine (PAXIL) tablet 10 mg  10 mg Oral Daily MARIUSZ Ivy        glucose chewable tablet 16 g  4 tablet Oral PRN MARIUSZ Alfred        dextrose bolus 10% 125 mL  125 mL IntraVENous PRN MARIUSZ Ivy        Or    dextrose bolus 10% 250 mL  250 mL IntraVENous PRN MARIUSZ Glez        glucagon (rDNA) injection 1 mg  1 mg SubCUTAneous PRN MARIUSZ Glez        dextrose 10 % infusion   IntraVENous Continuous PRN MARIUSZ Glez        sodium chloride flush 0.9 % injection 5-40 mL  5-40 mL IntraVENous 2 times per day MARIUSZ Glez   10 mL at 08/02/22 2322    sodium chloride flush 0.9 % injection 10 mL  10 mL IntraVENous PRN MARIUSZ Alfred        0.9 % sodium chloride infusion   IntraVENous PRN MARIUSZ Glez        potassium chloride (KLOR-CON M) extended release tablet 40 mEq  40 mEq Oral PRN MARIUSZ Glez        Or    potassium bicarb-citric acid (EFFER-K) effervescent tablet 40 mEq  40 mEq Oral PRN MARIUSZ Glez        Or    potassium chloride 10 mEq/100 mL IVPB (Peripheral Line)  10 mEq IntraVENous PRN MARIUSZ Glez        magnesium sulfate 1000 mg in dextrose 5% 100 mL IVPB  1,000 mg IntraVENous PRN MARIUSZ Glez        ondansetron (ZOFRAN-ODT) disintegrating tablet 4 mg  4 mg Oral Q8H PRN MARIUSZ Glez        Or    ondansetron (ZOFRAN) injection 4 mg  4 mg IntraVENous Q6H PRN MARIUSZ Glez        polyethylene glycol (GLYCOLAX) packet 17 g  17 g Oral Daily PRN MARIUSZ Glez        acetaminophen (TYLENOL) tablet 650 mg  650 mg Oral Q6H PRN MARIUSZ Glez   650 mg at 08/03/22 7793    Or    acetaminophen (TYLENOL) suppository 650 mg  650 mg Rectal Q6H PRN MARIUSZ Glez        insulin lispro (HUMALOG) injection vial 0-4 Units  0-4 Units SubCUTAneous TID  MARIUSZ Alfred        insulin lispro (HUMALOG) injection vial 0-4 Units  0-4 Units SubCUTAneous Nightly MARIUSZ Alfred        0.9 % sodium chloride infusion   IntraVENous PRN MARIUSZ Glez         Allergies   Allergen Reactions    Hctz [Hydrochlorothiazide]      Increases calcium to unsafe level resulting in parathyroid surgery    Lisinopril Other (See Comments)     cough       Nursing Notes Reviewed    Physical Exam:  Triage VS: % 0.4 %    Neutrophils Absolute 3.3 1.7 - 7.7 K/uL    Lymphocytes Absolute 1.4 1.0 - 5.1 K/uL    Monocytes Absolute 0.6 0.0 - 1.3 K/uL    Eosinophils Absolute 0.0 0.0 - 0.6 K/uL    Basophils Absolute 0.0 0.0 - 0.2 K/uL    Anisocytosis 3+ (A)     Microcytes 1+ (A)     Hypochromia 1+ (A)     Poikilocytes 2+ (A)    Comprehensive Metabolic Panel w/ Reflex to MG   Result Value Ref Range    Sodium 140 136 - 145 mmol/L    Potassium reflex Magnesium 4.0 3.5 - 5.1 mmol/L    Chloride 103 99 - 110 mmol/L    CO2 25 21 - 32 mmol/L    Anion Gap 12 3 - 16    Glucose 139 (H) 70 - 99 mg/dL    BUN 12 7 - 20 mg/dL    Creatinine 0.7 0.6 - 1.2 mg/dL    GFR Non-African American >60 >60    GFR African American >60 >60    Calcium 8.6 8.3 - 10.6 mg/dL    Total Protein 6.4 6.4 - 8.2 g/dL    Albumin 3.2 (L) 3.4 - 5.0 g/dL    Albumin/Globulin Ratio 1.0 (L) 1.1 - 2.2    Total Bilirubin 0.6 0.0 - 1.0 mg/dL    Alkaline Phosphatase 98 40 - 129 U/L    ALT 6 (L) 10 - 40 U/L    AST 8 (L) 15 - 37 U/L   Protime-INR   Result Value Ref Range    Protime 16.8 (H) 11.7 - 14.5 sec    INR 1.39 (H) 0.87 - 1.14   APTT   Result Value Ref Range    aPTT 38.9 (H) 23.0 - 34.3 sec   Troponin   Result Value Ref Range    Troponin <0.01 <0.01 ng/mL   Lipase   Result Value Ref Range    Lipase 13.0 13.0 - 60.0 U/L   Blood occult stool #1   Result Value Ref Range    Occult Blood Diagnostic Result: Negative  Normal range: Negative      Basic Metabolic Panel w/ Reflex to MG   Result Value Ref Range    Sodium 138 136 - 145 mmol/L    Potassium reflex Magnesium 4.1 3.5 - 5.1 mmol/L    Chloride 104 99 - 110 mmol/L    CO2 25 21 - 32 mmol/L    Anion Gap 9 3 - 16    Glucose 168 (H) 70 - 99 mg/dL    BUN 13 7 - 20 mg/dL    Creatinine 0.7 0.6 - 1.2 mg/dL    GFR Non-African American >60 >60    GFR African American >60 >60    Calcium 8.0 (L) 8.3 - 10.6 mg/dL   Hemoglobin and Hematocrit   Result Value Ref Range    Hemoglobin 7.7 (L) 12.0 - 16.0 g/dL    Hematocrit 23.9 (L) 36.0 - 48.0 % Hemoglobin and Hematocrit   Result Value Ref Range    Hemoglobin 7.1 (L) 12.0 - 16.0 g/dL    Hematocrit 21.9 (L) 36.0 - 48.0 %   POCT Glucose   Result Value Ref Range    POC Glucose 189 (H) 70 - 99 mg/dl    Performed on ACCU-CHEK    POCT Glucose   Result Value Ref Range    POC Glucose 172 (H) 70 - 99 mg/dl    Performed on ACCU-CHEK    EKG 12 Lead   Result Value Ref Range    Ventricular Rate 81 BPM    Atrial Rate 81 BPM    P-R Interval 154 ms    QRS Duration 98 ms    Q-T Interval 360 ms    QTc Calculation (Bazett) 418 ms    P Axis 19 degrees    R Axis -40 degrees    T Axis 53 degrees    Diagnosis       Normal sinus rhythmLeft axis deviationVoltage criteria for left ventricular hypertrophyAbnormal ECGWhen compared with ECG of 23-JUN-2022 18:42,Sinus rhythm has replaced Atrial fibrillationVent. rate has decreased BY  52 BPMST no longer depressed in Lateral leadsT wave inversion no longer evident in Lateral leadsConfirmed by Delbert Walls ([id number]) on 8/2/2022 5:38:08 PM     TYPE AND SCREEN   Result Value Ref Range    ABO/Rh O POS     Antibody Screen NEG    PREPARE RBC (CROSSMATCH), 1 Units   Result Value Ref Range    Product Code Blood Bank [id number]     Description Blood Bank Red Blood Cells, Apheresis, Leuko-reduced     Unit Number [id number]     Dispense Status Blood Bank transfused       Radiographs (if obtained):  Radiologist's Report Reviewed:  XR CHEST PORTABLE    Result Date: 8/2/2022  EXAMINATION: ONE XRAY VIEW OF THE CHEST 8/2/2022 4:20 pm COMPARISON: Chest x-ray dated 06/23/2022. HISTORY: ORDERING SYSTEM PROVIDED HISTORY: anemia, mild intermittent hypoxia TECHNOLOGIST PROVIDED HISTORY: Reason for exam:->anemia, mild intermittent hypoxia Reason for Exam: Anemia (Sent from [facility], they said her H/H was 6.2 and when she went to [facility] today they said it was 7.1. [facility] said they would not accept her back and had transport bring her here. ) FINDINGS: No focal lung opacity.   No pleural effusion or pneumothorax. Cardiomediastinal silhouette is unremarkable. Visualized osseous structures are unremarkable. No focal lung opacity. EKG (if obtained): (All EKG's are interpreted by myself in the absence of a cardiologist)  Normal sinus rhythm, ventricular rate 81, ME interval 154, QRS duration 98, QTc 418, no significant ST elevation or depression    MDM:    41-year-old female presenting with anemia from nursing facility. History missing above. Vitals on presentation reassuring patient afebrile satting well on room air. On evaluation in the ED patient does intermittently drop her oxygen saturations to the low 90s and upper 80s. Patient denies any chest pain or shortness of breath. Patient states she has also been more tired and fatigued than baseline. Per nursing facility patient's hemoglobin was 6.2 on their draw. Patient's hemoglobin is 7.2 today. Most recent priors hemoglobin 7.7. Otherwise laboratory evaluation is reassuring. Patient has no leukocytosis. CMP is overall reassuring at baseline for patient. EKG can be seen above. Michell Javon is within normal limits. Lipase not elevated. Rectal exam reveals no gross blood or melena, Hemoccult is pending. Rapid COVID is negative. Chest x-ray is nonacute. Discussion with nursing facility they are concerned as they are unable to get repeat lab draws until Thursday. They are also concerned the patient is symptomatic from her anemia as patient has been more tired and fatigued than baseline. Patient's oxygen saturations also intermittently dipping down to the low 90s and upper 80s. Do believe patient would benefit from transfusion as appears to be symptomatic from anemia. Type and screen was obtained. Hospitalist service called and patient will be admitted to their service for further evaluation and treatment. Clinical Impression:  1.  Symptomatic anemia          Comment: Please note this report has been produced using speech recognition software and may contain errors related to that system including errors in grammar, punctuation, and spelling, as well as words and phrases that may be inappropriate. Efforts were made to edit the dictations.         Nina Lentz MD  08/03/22 2648

## 2022-08-03 NOTE — PROGRESS NOTES
RT Inhaler-Nebulizer Bronchodilator Protocol Note    There is a bronchodilator order in the chart from a provider indicating to follow the RT Bronchodilator Protocol and there is an Initiate RT Inhaler-Nebulizer Bronchodilator Protocol order as well (see protocol at bottom of note). CXR Findings:  XR CHEST PORTABLE    Result Date: 8/2/2022  No focal lung opacity. The findings from the last RT Protocol Assessment were as follows:   History Pulmonary Disease: Chronic pulmonary disease  Respiratory Pattern: Regular pattern and RR 12-20 bpm  Breath Sounds: Clear breath sounds  Cough: Strong, spontaneous, non-productive  Indication for Bronchodilator Therapy: None  Bronchodilator Assessment Score: 2    Aerosolized bronchodilator medication orders have been revised according to the RT Inhaler-Nebulizer Bronchodilator Protocol below. Respiratory Therapist to perform RT Therapy Protocol Assessment initially then follow the protocol. Repeat RT Therapy Protocol Assessment PRN for score 0-3 or on second treatment, BID, and PRN for scores above 3. No Indications - adjust the frequency to every 6 hours PRN wheezing or bronchospasm, if no treatments needed after 48 hours then discontinue using Per Protocol order mode. If indication present, adjust the RT bronchodilator orders based on the Bronchodilator Assessment Score as indicated below. Use Inhaler orders unless patient has one or more of the following: on home nebulizer, not able to hold breath for 10 seconds, is not alert and oriented, cannot activate and use MDI correctly, or respiratory rate 25 breaths per minute or more, then use the equivalent nebulizer order(s) with same Frequency and PRN reasons based on the score. If a patient is on this medication at home then do not decrease Frequency below that used at home.     0-3 - enter or revise RT bronchodilator order(s) to equivalent RT Bronchodilator order with Frequency of every 4 hours PRN for wheezing or increased work of breathing using Per Protocol order mode. 4-6 - enter or revise RT Bronchodilator order(s) to two equivalent RT bronchodilator orders with one order with BID Frequency and one order with Frequency of every 4 hours PRN wheezing or increased work of breathing using Per Protocol order mode. 7-10 - enter or revise RT Bronchodilator order(s) to two equivalent RT bronchodilator orders with one order with TID Frequency and one order with Frequency of every 4 hours PRN wheezing or increased work of breathing using Per Protocol order mode. 11-13 - enter or revise RT Bronchodilator order(s) to one equivalent RT bronchodilator order with QID Frequency and an Albuterol order with Frequency of every 4 hours PRN wheezing or increased work of breathing using Per Protocol order mode. Greater than 13 - enter or revise RT Bronchodilator order(s) to one equivalent RT bronchodilator order with every 4 hours Frequency and an Albuterol order with Frequency of every 2 hours PRN wheezing or increased work of breathing using Per Protocol order mode.        Electronically signed by Oliver Mendenhall RCP on 8/2/2022 at 11:31 PM

## 2022-08-03 NOTE — PROGRESS NOTES
Consult has been called to Dr. Brent Roblero on 8/3*/22. Spoke with dr puga via perfect serve.  9:40 AM    Monique Rodriguez  8/3/2022

## 2022-08-03 NOTE — PROGRESS NOTES
LAST [facility] OFFICE NOTE 22    Patient Name: Patrick Light  Patient : 1945  Patient MRN: [id number]    Primary Oncologist: Aaliyah Moulton  Referring Physician:        Date of Service: 2022      Chief Complaint  Iron deficiency anemia (disorder)      Problem List  Iron deficiency anemia (disorder)  Asthma  Depression  Diabetes  GERD  Hyperlipidemia  Hypertension  Malabsorption  Neuropathy    HPI    Patient is a 69-year-old female originally referred while inpatient at [facility] due to anemia. Patient repeat ported a past medical history of thalassemia trait. Hemoglobin electrophoresis was done which was normal.  She does have chronic microcytosis. Iron studies were done on 2022 which revealed total iron 28, TIBC 215, ferritin 100. Hemoglobin was 7.2. She did receive Venofer while inpatient. Previous Therapies    Current Therapy         Interval History    Kelli presents today on a stretcher from a SNF. She is recovering from right hip surgery. She reports physical therapy is slow due to pain in her knees. She requires significant assist to ambulate. She remains fatigued. No shortness of breath or cough. Bowels are moving well. Review of Systems  Constitutional:  No weight loss, No fever, No chills, No night sweats. Energy level poor.   Eyes:  No impairment or change in vision  ENT / Mouth:  No pain, abnormal ulceration, bleeding, nasal drip or change in voice or hearing  Cardiovascular:  No chest pain, palpitations, new edema, or calf discomfort  Respiratory:  No pain, hemoptysis, change to breathing  Breast:  No pain, discharge, change in appearance or texture  Gastrointestinal:  No pain, cramping, jaundice, change to eating and bowel habits  Urinary:  No pain, bleeding or change in continence  Genitalia: No pain, bleeding or discharge  Musculoskeletal:  No redness, pain, edema or weakness  Skin:  No pruritus, rash, change to nodules or lesions  Neurologic:  No discomfort, change in mental status, speech, sensory or motor activity  Psychiatric:  No change in concentration or change to affect or mood  Endocrine:  No hot flashes, increased thirst, or change to urine production  Hematologic: No petechiae, ecchymosis or bleeding  Lymphatic:  No lymphadenopathy or lymphedema  Allergy / Immunologic:  No eczema, hives, frequent or recurrent infections      Vital Signs  Blood pressure: 148/76, Pulse: 88, Temperature: 98.6 F, Respirations: 13, O2 sat: , Pain Scale: 0, Height: , Weight: , BSA: , BMI:    Physical Exam    CONSTITUTIONAL: awake, alert, cooperative, no apparent distress, on stretcher   EYES: pupils equal, round, sclera clear and conjunctiva normal  ENT: Normocephalic, without obvious abnormality, atraumatic  NECK: supple, symmetrical   HEMATOLOGIC/LYMPHATIC: no cervical, supraclavicular or axillary lymphadenopathy   LUNGS: no increased work of breathing and clear to auscultation   CARDIOVASCULAR: regular rate and rhythm, normal S1 and S2, no murmur noted  ABDOMEN: normal bowel sounds x 4, soft, non-distended, non-tender, no masses palpated, no hepatosplenomgaly   MUSCULOSKELETAL: full range of motion noted  NEUROLOGIC: awake, alert, oriented to name, place and time. Motor skills grossly intact. SKIN: Normal skin color, texture, turgor and no jaundice.  appears intact   EXTREMITIES: no LE edema         Labs  CBC  Lab Results 08/02/2022 06/30/2022 06/28/2022 06/24/2022 06/23/2022 06/07/2019    CBC                 WBC x 10^3/uL 5.0               RBC x 10^6/uL 3.47 (L)               HGB g/dL 7.1 (L)               HCT % 22.8 (L)               MCV fL 65.7 (L)               MCH pg 20.5 (L)               MCHC g/dL 31.1 (L)               RDW-CV, % 22.7 (H)               PLT x 10^3/uL 353.0               Lena % 59.1               LY % 28.0               MO % 11.5               EO % 1.2               Lena # (ANC) x 10^3/uL 2.93               BA % 0.2               MO # x 10^3/uL 0.57               EO # x 10^3/uL 0.06               BA # x 10^3/uL 0.01               LY # x 10^3/uL 1.39           CMP  Lab Results 08/02/2022 06/30/2022 06/28/2022 06/24/2022 06/23/2022 06/07/2019    Chemistries                 Glucose mg/dL 140 (H)               BUN mg/dL 13               Creatinine mg/dL 0.71               BUN/Creatinine ratio 18.3               Sodium mmol/L 143               Potassium mmol/L 4.0               Chloride mmol/L 108 (H)               CO2 mmol/L 26.2               Anion gap, mmol/L 8.8               Calcium mg/dL 8.3 (L)               Albumin g/dL 2.5 (L)               Total protein g/dL 5.6 (L)               A/G ratio 0.8 (L)               Bilirubin, total mg/dL 0.5               Alkaline phosphatase U/L 88               AST/SGOT U/L 9               ALT/SGPT U/L 7 (L)               GFR non-African American, estimated mL/min/1.73m2 >60.0               GFR African American, estimated mL/min/1.73m2 >60.0                          Lab Results 08/02/2022 06/30/2022 06/28/2022 06/24/2022 06/23/2022 06/07/2019    Anemia Labs                 Iron / FE ug/dL 6 (L)               TIBC ug/dL 151 (L)               Ferritin ng/mL 426.3 (H)               Iron, % saturation % 4 (L)             Imaging      OCM - Patient Care Management    Pain, if applicable:        Performance Status: ECOG 4 100% bedridden. Completely disabled. Cannot carry on any self-care. Totally confined to bed or chair. (Date: 08/02/2022)     Depression Status:        Psycho-social PHQ-9 Follow-up Plan (if applicable):     Research    Would you like this patient screened for clinical trial eligibility? If yes, please enter the order for \"Research: Screen for Eligibility\"    Assessment & Plan  Microcytic anemia  -Thalassemia trait listed on past medical history, but hemoglobin electrophoresis normal.  -Patient did receive Venofer while hospitalized.   -She remains fatigued and is recovering from hip fracture and surgery.  -We will repeat CBC, CMP, iron studies today.  -Anticipate that she will require further IV iron.  -We will call with the results of her iron studies. -EGD was done while in the hospital, but no source for bleeding was found.  -Last colonoscopy was in 2014. This may need to be repeated once patient recovers from hip replacement. After visit while patient was in the lab, received a call from Ady Camacho at [facility]. She requested that we send the patient to the emergency room as her labs from their facility this morning reveal a hemoglobin 6.2. Discussed with transportation, and they agreed to take her to the hospital.  Unfortunately, due to being in a skilled nursing facility, it is difficult to arrange an outpatient blood transfusion since she would need a type and cross.

## 2022-08-03 NOTE — ACP (ADVANCE CARE PLANNING)
Advance Care Planning     General Advance Care Planning (ACP) Conversation    Date of Conversation: 8/2/2022  Conducted with: Patient with Decision Making Capacity    Healthcare Decision Maker:    Primary Decision Maker: Meka Sobeida - Child - [phone]    Primary Decision Maker: Vince Hdz - Child - [phone]  Click here to complete Healthcare Decision Makers including selection of the Healthcare Decision Maker Relationship (ie \"Primary\"). Today we documented Decision Maker(s) consistent with Legal Next of Kin hierarchy. Content/Action Overview:   Has ACP document(s) NOT on file - requested patient to provide  Reviewed DNR/DNI and patient elects Full Code (Attempt Resuscitation)    Length of Voluntary ACP Conversation in minutes:  <16 minutes (Non-Billable)    SUMIT Gallegos

## 2022-08-03 NOTE — PROGRESS NOTES
Inpatient Physical Therapy Evaluation and Treatment    Unit: 2 [facility]  Date:  8/3/2022  Patient Name:    Marisel Franco  Admitting diagnosis:  Symptomatic anemia [D64.9]  Admit Date:  8/2/2022  Precautions/Restrictions/WB Status/ Lines/ Wounds/ Oxygen: Fall risk, Bed/chair alarm, Lines -IV and Purewick catheter, Telemetry, and moderate bilateral knee pain. Treatment Time:  6618 - 8517  Treatment Number:  1   Timed Code Treatment Minutes: 16.5 minutes (observation status, Co treated with OT)  Total Treatment Minutes:  49  minutes    Patient Goals for Therapy: \" walk again \"          Discharge Recommendations: SNF  DME needs for discharge: defer to facility       Therapy recommendation for EMS Transport: requires transport by cot due to need of lift equipment for functional transfers    Therapy recommendations for staff:   Assist of 2 with use of MAXI-Move for all transfers to/from chair    History of Present Illness: H & P as per Estephanie Kasper MD's note dated 8/2/2022  Marisel Franco is a 65 y.o. female who with history seen below including recent hip fracture with complicated hospital course. Patient states she had some anemia of unknown source and has required transfusions in the past.  Patient is at rehab facility currently. Hemoglobin was drawn was 6.2. Repeat draw in the ED is 7.2. Nursing facility is concerned that they are unable to get any further blood draws until Thursday. They state patient has been more tired and fatigued over the past several days. Patient states she does have generalized fatigue and is having trouble getting her self to perform therapy secondary to fatigue. Patient denies any headache, blurred vision, focal neurodeficits, motor or sensory changes. Denies any chest pain or shortness of breath, cough or sputum production. Denies abdominal pain, nausea vomiting, hematemesis, hemoptysis, diarrhea constipation, blood or melena stools.   Denies hematuria or or any change in urination. Other than most recent fall with hip fracture denies any falls over the past several days. Home Health S4 Level Recommendation:  NA  AM-PAC Mobility Score    AM-PAC Inpatient Mobility Raw Score : 9       Preadmission Environment   Pt. Lives Alone  Home environment:    mobile home/trailer  Steps to enter first floor: 3-4 steps to enter and hand rail unilateral. Family reports she can ascend/descend indep but \"it's getting harder for her\". Steps to second floor: N/A  Bathroom: walk in shower and standard height commode  Equipment owned:  [facility]     Preadmission Status:  Pt. Able to drive: Yes  Pt Fully independent with ADLs: Yes  Pt. Required assistance from family for: Independent PTA  Pt. independent for transfers and gait and walked with Cane; walker PRN. History of falls Yes - reason for admission, but no other recent previous h/o falls     Pain   Yes  Location: bilateral knees  Rating: moderate /10  Pain Medicine Status: Pain med requested    Cognition    A&O x4   Able to follow 2 step commands    Subjective  Patient lying supine in bed with no family present. Pt agreeable to this PT eval & tx. Upper Extremity ROM/Strength  Please see OT evaluation. Lower Extremity ROM / Strength   AROM WFL: Yes  ROM limitations: N/A    Strength Assessment (measured on a 0-5 scale):  R LE   Quad   3-   Ant Tib  3+   Hamstring 3-   Iliopsoas 3-  L LE  Quad   3-   Ant Tib  3+   Hamstring 3-   Iliopsoas 3-    Lower Extremity Sensation    WFL    Lower Extremity Proprioception:   WFL    Coordination and Tone  WFL    Balance  Sitting:  Fair ; CGA  Comments: ~10 min at the EOB    Standing: Poor; Max A  and 2 persons  Comments: Patient stood up using STEDY but presented with forward trunk lean and heavy forearm weight bearing. Patient presented difficulty to pull to stand and had moderate level of pain on bilateral knees.     Bed Mobility   Supine to Sit:    Min A  and 2 persons (increased time patient recovery, and limited safety awareness. Goals : To be met in 3 visits:  1). Independent with LE Ex x 10 reps    To be met in 6 visits:  1). Supine to/from sit: Min A   2). Sit to/from stand: Mod A   3). Bed to chair: Max A   4). Gait: Ambulate  5 ft.   with  Max A  and use of LRAD (least restrictive assistive device)  5). Tolerate B LE exercises 3 sets of 10-15 reps    Rehabilitation Potential: Good  Strengths for achieving goals include:   Pt motivated, PLOF, and Pt cooperative   Barriers to achieving goals include:    Pain    Plan    To be seen 3-5 x / week  while in acute care setting for therapeutic exercises, bed mobility, transfers, progressive gait training, balance training, and family/patient education. Signature: Yannick Garcia MS PT, # [id number]    If patient discharges from this facility prior to next visit, this note will serve as the Discharge Summary.

## 2022-08-03 NOTE — PROGRESS NOTES
Comprehensive Nutrition Assessment    Type and Reason for Visit:  Initial, Positive Nutrition Screen (wt loss poor po)    Nutrition Recommendations/Plan:   Continue 4 CCC diet  Added Magic Cups BID      Malnutrition Assessment:  Malnutrition Status: At risk for malnutrition (Comment) (08/03/22 7217)    Context:  Acute Illness     Findings of the 6 clinical characteristics of malnutrition:  Energy Intake:  Mild decrease in energy intake (Comment)  Weight Loss:  No significant weight loss     Body Fat Loss:  No significant body fat loss     Muscle Mass Loss:  Unable to assess    Fluid Accumulation:  Unable to assess     Strength:  Not Performed    Nutrition Assessment:    Pt. nutritionally compromised AEB pt admitted d/t anemia . At risk for further nutrition compromise r/t pt has noted 14# (6.5%) weight loss since admission in June with reported decreased appetite r/t anemia . Will add magic cups BID . Nutrition Related Findings:    pt was A & O x 4; reciveing IV iron and PT was beginning therapy session  expressed tht she had been feeling fatigued at the NH and her appetite had reduced; dislikes ensure but willing to try the magic cups Wound Type: Surgical Incision       Current Nutrition Intake & Therapies:    Average Meal Intake: Unable to assess  Average Supplements Intake: None Ordered  ADULT DIET; Regular; 4 carb choices (60 gm/meal)    Anthropometric Measures:  Height: 5' 3\" (160 cm)  Ideal Body Weight (IBW): 115 lbs (52 kg)    Admission Body Weight: 209 lb (94.8 kg)  Current Body Weight: 209 lb 8 oz (95 kg), 182.2 % IBW. Weight Source:  Other (Comment)  Current BMI (kg/m2): 37.1  Usual Body Weight: 223 lb (101.2 kg) (6/24)  % Weight Change (Calculated): -6.1                    BMI Categories: Obese Class 2 (BMI 35.0 -39.9)    Estimated Daily Nutrient Needs:  Energy Requirements Based On: Kcal/kg  Weight Used for Energy Requirements: Current  Energy (kcal/day): 9018-8767 based ~ 15-18 kcal/kg cbw  Weight Used for Protein Requirements: Ideal  Protein (g/day): 66 based ~ 1.2 gr/kg ibw  Method Used for Fluid Requirements: 1 ml/kcal  Fluid (ml/day): 7349-0706    Nutrition Diagnosis:   Inadequate oral intake related to altered GI function, inadequate protein-energy intake as evidenced by poor intake prior to admission, weight loss, lab values    Nutrition Interventions:   Food and/or Nutrient Delivery: Continue Current Diet, Start Oral Nutrition Supplement  Nutrition Education/Counseling: No recommendation at this time  Coordination of Nutrition Care: Continue to monitor while inpatient       Goals:     Goals: PO intake 50% or greater, by next RD assessment       Nutrition Monitoring and Evaluation:   Behavioral-Environmental Outcomes: None Identified  Food/Nutrient Intake Outcomes: Food and Nutrient Intake, Supplement Intake  Physical Signs/Symptoms Outcomes: Biochemical Data, Weight, Nutrition Focused Physical Findings    Discharge Planning:     Too soon to determine     Chicho Massey, [address] [place] [address],   Contact: [phone]

## 2022-08-03 NOTE — PROGRESS NOTES
4 Eyes Skin Assessment     The patient is being assess for   Admission    I agree that 2 RN's have performed a thorough Head to Toe Skin Assessment on the patient. ALL assessment sites listed below have been assessed. Areas assessed for pressure by both nurses:   [x]   Head, Face, and Ears   [x]   Shoulders, Back, and Chest, Abdomen  [x]   Arms, Elbows, and Hands   [x]   Coccyx, Sacrum, and Ischium  [x]   Legs, Feet, and Heels    Patient has scattered bruising, blanchable redness on coccyx. Skin Assessed Under all Medical Devices by both nurses:  N/A               All Mepilex Borders were peeled back and area peeked at by both nurses:  No: N/A  Please list where Mepilex Borders are located:  N/A             **SHARE this note so that the co-signing nurse is able to place an eSignature**    Co-signer eSignature: Electronically signed by Ru Dominique RN on 8/3/22 at 4:58 AM EDT    Does the Patient have Skin Breakdown related to pressure? No              Mauricio Prevention initiated:  No   Wound Care Orders initiated:  No      [facility] nurse consulted for Pressure Injury (Stage 3,4, Unstageable, DTI, NWPT, Complex wounds)and New or Established Ostomies:  No      Primary Nurse eSignature: Electronically signed by Wilbert Dexter RN on 8/3/22 at 2:25 AM EDT  Patient is not able to demonstrated the ability to move from a reclining position to an upright position within the recliner. however patient is alert, oriented and able to provide informed consent    Bedside Mobility Assessment Tool (BMAT):     Assessment Level 1- Sit and Shake    1. From a semi-reclined position, ask patient to sit up and rotate to a seated position at the side of the bed. Can use the bedrail. 2. Ask patient to reach out and grab your hand and shake making sure patient reaches across his/her midline. Pass- Patient is able to come to a seated position, maintain core strength. Maintains seated balance while reaching across midline.  Move on to Assessment Level 2. Assessment Level 2- Stretch and Point   1. With patient in seated position at the side of the bed, have patient place both feet on the floor (or stool) with knees no higher than hips. 2. Ask patient to stretch one leg and straighten the knee, then bend the ankle/flex and point the toes. If appropriate, repeat with the other leg. Fail- Patient is unable to complete task. Patient is MOBILITY LEVEL 2. Assessment Level 3- Stand   1. Ask patient to elevate off the bed or chair (seated to standing) using an assistive device (cane, bedrail). 2. Patient should be able to raise buttocks off be and hold for a count of five. May repeat once. Fail- Patient unable to demonstrate standing stability. Patient is MOBILITY LEVEL 3. Assessment Level 4- Walk   1. Ask patient to march in place at bedside. 2. Then ask patient to advance step and return each foot. Some medical conditions may render a patient from stepping backwards, use your best clinical judgement. Fail- Patient not able to complete tasks OR requires use of assistive device. Patient is MOBILITY LEVEL 3.        Mobility Level- 2

## 2022-08-03 NOTE — PLAN OF CARE
Problem: Discharge Planning  Goal: Discharge to home or other facility with appropriate resources  8/3/2022 0543 by Kris Nj RN  Outcome: Progressing  8/3/2022 0543 by Kris Nj RN  Outcome: Progressing  Flowsheets (Taken 8/2/2022 2332)  Discharge to home or other facility with appropriate resources:   Identify barriers to discharge with patient and caregiver   Arrange for needed discharge resources and transportation as appropriate   Identify discharge learning needs (meds, wound care, etc)   Arrange for interpreters to assist at discharge as needed   Refer to discharge planning if patient needs post-hospital services based on physician order or complex needs related to functional status, cognitive ability or social support system     Problem: Safety - Adult  Goal: Free from fall injury  8/3/2022 0543 by Kris Nj RN  Outcome: Progressing  8/3/2022 0543 by Kris Nj RN  Outcome: Progressing     Problem: ABCDS Injury Assessment  Goal: Absence of physical injury  8/3/2022 0543 by Kris Nj RN  Outcome: Progressing  8/3/2022 0543 by Kris Nj RN  Outcome: Progressing     Problem: Pain  Goal: Verbalizes/displays adequate comfort level or baseline comfort level  Outcome: Progressing

## 2022-08-03 NOTE — FLOWSHEET NOTE
08/02/22 2030   Vital Signs   Temp 100.1 °F (37.8 °C)   Temp Source Oral   Heart Rate 90   Heart Rate Source Monitor   Resp 18   BP (!) 168/77   BP Location Right upper arm   BP Method Automatic   MAP (Calculated) 107.33   Patient Position Semi fowlers   Level of Consciousness Alert (0)   MEWS Score 1   RASS   [place] Agitation Sedation Scale (RASS) 0   Oxygen Therapy   SpO2 96 %   Pulse Oximetry Type Intermittent   Pulse Oximeter Device Mode Intermittent   O2 Device None (Room air)   Height and Weight   Height 5' 3\" (1.6 m)   Weight 212 lb 4.8 oz (96.3 kg)   Weight Method Actual;Bed scale   BSA (Calculated - sq m) 2.07 sq meters   BMI (Calculated) 37.7     Patient arrived to floor from ED, patient A+Ox4, vitals and assessments done at this time. Bed in lowest position, call light within reach.

## 2022-08-03 NOTE — PROGRESS NOTES
Patient had 5 beats of V-tach asymptomatic at 2350 and 6 beats of V-tach at 0250 asymptomatic while sleeping. MD notified.

## 2022-08-03 NOTE — CONSULTS
HEMATOLOGY/ONCOLOGY CONSULTATION:     8/3/2022 4:47 PM    REASON FOR CONSULT: Anemia    PROVIDERS:  Sae Sylvester MD    CHIEF COMPLAINT:     Chief Complaint   Patient presents with    Anemia     Sent from [facility], they said her H/H was 6.2 and when she went to [facility] today they said it was 7.1. [facility] said they would not accept her back and had transport bring her here. HISTORY OF PRESENT ILLNESS:     HPI:  65 WF with pmh DM2, melanoma and HTN. She also has a reported pmh of thalassemia trait with chronic microcytosis. Pt was admitted 6/20/22 after a fall. She broke her hip and is now s/p surgery. She had a prolonged hospitalization and remained in the ICU intubated and on pressor support. Her Hgb had dropped and she was transfused 3 units PRBC. She was noted to have iron deficiency and received a course of IV iron. She was discharged to rehab. She was seen in the office yesterday and was still symptomatic. Her Hgb was still low at 7.1 and she still appeared iron deficient on her labs. She was apparently sent to the ER from her rehab facility after her appt.      PAST MEDICAL HISTORY:     Past Medical History:   Diagnosis Date    Anemia     thallasemia minor    Arthritis     osteo-arthritis    Asthma     Back pain     Back pain     Bronchitis chronic     Diabetes mellitus (HCC)     GERD (gastroesophageal reflux disease)     Gout     Hypertension     Melanoma of scalp or neck (HCC)     Osteoarthritis     Osteoporosis     Squamous cell carcinoma, arm     Thal trait        PAST SURGICAL HISTORY:        Past Surgical History:   Procedure Laterality Date    APPENDECTOMY      CARPAL TUNNEL RELEASE Left 02/05/2018    CATARACT REMOVAL WITH IMPLANT Right 07/18/2018    CATARACT REMOVAL WITH IMPLANT Left 08/15/2018    COLONOSCOPY  12/2014    FEMUR FRACTURE SURGERY Right 6/20/2022    RIGHT INTRAMEDULLARY NAILING performed by Angelika Daigle DO at [address] ([address] [facility])      KNEE ARTHROSCOPY Bilateral     PARATHYROIDECTOMY      PARTIAL HYSTERECTOMY (CERVIX NOT REMOVED)      MI XCAPSL CTRC RMVL INSJ IO LENS PROSTH W/O ECP Right 7/18/2018    PHACOEMULSIFICATION OF CATARACT WITH INTRAOCULAR LENS IMPLANT RIGHT EYE performed by Varun Bowser MD at [address] IO LENS PROSTH W/O ECP Left 8/15/2018    PHACOEMULSIFICATION OF CATARACT WITH INTRAOCULAR LENS IMPLANT LEFT EYE performed by Varun Bowser MD at [address] [facility] GASTROINTESTINAL ENDOSCOPY N/A 6/25/2022    EGD BIOPSY performed by Sisi Acosta DO at [facility]  6/25/2022    EGD POLYP HOT FORCEP/CAUTERY performed by Sisi Acosat DO at [address]:     Social History     Socioeconomic History    Marital status:      Spouse name: Not on file    Number of children: Not on file    Years of education: Not on file    Highest education level: Not on file   Occupational History    Not on file   Tobacco Use    Smoking status: Passive Smoke Exposure - Never Smoker    Smokeless tobacco: Never    Tobacco comments:     exposed to passive smoke for 18 yrs   Vaping Use    Vaping Use: Never used   Substance and Sexual Activity    Alcohol use: No    Drug use: No    Sexual activity: Never   Other Topics Concern    Not on file   Social History Narrative    Not on file     Social Determinants of Health     Financial Resource Strain: Not on file   Food Insecurity: Not on file   Transportation Needs: Not on file   Physical Activity: Not on file   Stress: Not on file   Social Connections: Not on file   Intimate Partner Violence: Not on file   Housing Stability: Not on file       FAMILY HISTORY:     Family History   Problem Relation Age of Onset    Heart Failure Mother     Emphysema Mother     Stroke Mother     Heart Failure Father     Hypertension Father     Heart Disease Father     High Blood Pressure Father     Cancer Maternal Aunt     Cancer Paternal Aunt     Cancer Maternal Grandmother     Asthma Neg Hx     Diabetes Neg Hx        ALLERGIES:     Allergies as of 08/02/2022 - Fully Reviewed 08/02/2022   Allergen Reaction Noted    Hctz [hydrochlorothiazide]  04/20/2017    Lisinopril Other (See Comments) 10/19/2016       MEDICATIONS:     No current facility-administered medications on file prior to encounter. Current Outpatient Medications on File Prior to Encounter   Medication Sig Dispense Refill    carvedilol (COREG) 12.5 MG tablet Take 1 tablet by mouth 2 times daily (with meals) 60 tablet 0    ferrous sulfate (IRON 325) 325 (65 Fe) MG tablet Take 1 tablet by mouth daily (with breakfast) 30 tablet 3    terazosin (HYTRIN) 2 MG capsule TAKE ONE CAPSULE BY MOUTH ONCE NIGHTLY 30 capsule 2    omeprazole (PRILOSEC) 20 MG delayed release capsule TAKE ONE CAPSULE BY MOUTH TWICE A  capsule 0    alendronate (FOSAMAX) 70 MG tablet TAKE 1 TABLET BY MOUTH  WEEKLY WITH 8 OZ OF PLAIN  WATER 30 MINUTES BEFORE  FIRST FOOD, DRINK OR MEDS. STAY UPRIGHT FOR 30 MINS 12 tablet 0    Fluticasone furoate-vilanterol (BREO ELLIPTA) 200-25 MCG/INH AEPB inhaler INHALE ONE DOSE BY MOUTH DAILY 1 each 5    Umeclidinium Bromide (INCRUSE ELLIPTA) 62.5 MCG/INH AEPB INHALE ONE PUFF BY MOUTH DAILY 1 each 5    gabapentin (NEURONTIN) 300 MG capsule Take 1 capsule by mouth 2 times daily for 180 days. Intended supply: 90 days 60 capsule 2    blood glucose test strips (ASCENSIA AUTODISC VI;ONE TOUCH ULTRA TEST VI) strip Used to test once daily.  DX: E11.9 100 each 3    albuterol sulfate HFA (PROAIR HFA) 108 (90 Base) MCG/ACT inhaler INHALE TWO PUFFS BY MOUTH EVERY 6 HOURS AS NEEDED FOR WHEEZING 1 each 2    metFORMIN (GLUCOPHAGE) 1000 MG tablet TAKE 1 TABLET BY MOUTH  TWICE DAILY WITH MEALS 180 tablet 3    PARoxetine (PAXIL) 10 MG tablet TAKE 1 TABLET BY MOUTH  DAILY 90 tablet 3    atorvastatin (LIPITOR) 10 MG tablet TAKE 1 TABLET BY MOUTH AT  NIGHT 90 tablet 3    allopurinol (ZYLOPRIM) 300 MG tablet TAKE 1 TABLET BY MOUTH  DAILY 90 tablet 3    losartan (COZAAR) 100 MG tablet TAKE 1 TABLET BY MOUTH  DAILY 90 tablet 3    glipiZIDE (GLUCOTROL) 10 MG tablet TAKE 1 TABLET BY MOUTH  TWICE DAILY BEFORE MEALS 180 tablet 3    clotrimazole-betamethasone (LOTRISONE) 1-0.05 % cream Apply topically 2 times daily. 1 Tube 1    acetaminophen (TYLENOL) 500 MG tablet Take 500 mg by mouth every 6 hours as needed for Pain      Coenzyme Q10 (CO Q 10 PO) Take by mouth      cetirizine (ZYRTEC) 10 MG tablet Take 10 mg by mouth daily      Cyanocobalamin (VITAMIN B 12 PO) Take 1,000 mcg by mouth daily. Ascorbic Acid (VITAMIN C) 500 MG CAPS Take  by mouth daily. vitamin E 400 UNIT capsule Take 400 Units by mouth daily. Calcium Carbonate-Vitamin D (CALCIUM + D PO) Take  by mouth daily. REVIEW OF SYSTEMS:       Constitutional: Denies fever, sweats, weight loss     Eyes: No visual changes or diplopia. No scleral icterus. ENT: No Headaches, hearing loss or vertigo. No mouth sores or sore throat. Cardiovascular: No chest pain, dyspnea on exertion, palpitations or loss of consciousness. Respiratory: No cough or wheezing, no sputum production. No hemoptysis. .    Gastrointestinal: No abdominal pain, appetite loss, blood in stools. No change in bowel habits. Genitourinary: No dysuria, trouble voiding, or hematuria. Musculoskeletal:  Generalized weakness. No joint complaints. Integumentary: No rash or pruritis. Neurological: No headache, diplopia. No change in gait, balance, or coordination. No paresthesias. Endocrine: No temperature intolerance. No excessive thirst, fluid intake, or urination. Hematologic/Lymphatic: No abnormal bruising or ecchymoses, blood clots or swollen lymph nodes. Allergic/Immunologic: No nasal congestion or hives.      PHYSICAL EXAM:       Vitals:    08/03/22 1317   BP: (!) 125/94   Pulse: 88   Resp: 16   Temp: 98 °F (36.7 °C)   SpO2: 98%       General appearance: alert and cooperative  Head: Normocephalic, without obvious abnormality, atraumatic  Neck: No palpable lymphadenopathy in supraclavicular or cervical chains  Lungs: Clear to auscultation bilaterally, no audible rales, wheezes or crackles  Heart: Regular rate and rhythm, S1, S2 normal  Abdomen: Soft, non-tender; bowel sounds normal; no masses,  no organomegaly  Extremities: without cyanosis, clubbing, edema or asymmetry  Skin: No jaundice, purpura or petechiae      LABS:     Lab Results   Component Value Date    WBC 5.3 08/02/2022    HGB 7.9 (L) 08/03/2022    HCT 24.7 (L) 08/03/2022    MCV 63.8 (L) 08/02/2022     08/02/2022       Lab Results   Component Value Date    GLUCOSE 168 (H) 08/03/2022    BUN 13 08/03/2022    CREATININE 0.7 08/03/2022    K 4.1 08/03/2022    PHOS 3.2 06/24/2022       Lab Results   Component Value Date    ALKPHOS 98 08/02/2022    ALT 6 (L) 08/02/2022    AST 8 (L) 08/02/2022    BILITOT 0.6 08/02/2022    BILIDIR <0.2 06/27/2022    PROT 6.4 08/02/2022       Lab Results   Component Value Date    MG 1.70 (L) 06/26/2022       Lab Results   Component Value Date    LABURIC 4.1 06/20/2022       Lab Results   Component Value Date     (H) 06/23/2022       Lab Results   Component Value Date    PROTIME 16.8 (H) 08/02/2022    INR 1.39 (H) 08/02/2022       Lab Results   Component Value Date    IRON 20 (L) 06/20/2022    TIBC 215 (L) 06/20/2022    FERRITIN 100.0 06/20/2022     From Office 8/2/22:    Iron / FE (ug/dL) [50 - 170]  6  TIBC (ug/dL) [250 - 425]  151  Iron, % saturation (%) [15 - 50]  4    IMAGING:     XR CHEST PORTABLE  Result Date: 8/2/2022  No focal lung opacity. XR HIP 2-3 VW W PELVIS RIGHT  Result Date: 7/21/2022  Radiology exam is complete. No Radiologist dictation. Please follow up with ordering provider.      ASSESSMENT:     Problem List Items Addressed This Visit       * (Principal) Symptomatic anemia - Primary             PLAN: Microcytic anemia      -has known Thalassemia trait which can cause chronic microcytosis and baseline anemia - Hgb electrophoresis was normal - ? Alpha trait  - dropped last admission after orthopedic surgery  -Patient did receive Venofer while hospitalized from hip fracture surgery  -She remains fatigued and is recovering from hip fracture and surgery. - repeat iron studies in office yesterday still showed low serum iron and % sat - ferritin was elevated but has been transfused and could be reactive  -will start additional IV iron. -EGD was done while in the hospital, but no source for bleeding was found - suspect surgery may have contributed  -Last colonoscopy was in 2014.   This may need to be repeated once patient recovers from hip replacement.  -transfuse if Hgb < 7    Dread Pradhan MD

## 2022-08-03 NOTE — PROGRESS NOTES
Inpatient Occupational Therapy  Evaluation and Treatment    Unit: 2 [place]  Date:  8/3/2022  Patient Name:    Yuri Tinajero  Admitting diagnosis:  Symptomatic anemia [D64.9]  Admit Date:  8/2/2022  Precautions/Restrictions/WB Status/ Lines/ Wounds/ Oxygen: fall risk, IV, and bed/chair alarm    Treatment Time:  13:30-13:50, cotx with PT 13:50-14:39  Treatment Number: 1     Billable Treatment Time: 20 + 24 minutes   Total Treatment Time:   69 minutes    Patient Goals for Therapy:  \" to go back to rehab \"      Discharge Recommendations: SNF  DME needs for discharge: defer to facility       Therapy recommendations for staff:   Assist of 2 with use of MAXI-Move for all transfers to/from BSC/chair    History of Present Illness: 65 WF with pmh DM2, melanoma and HTN. She also has a reported pmh of thalassemia trait with chronic microcytosis. Pt was admitted 6/20/22 after a fall. She broke her hip and is now s/p surgery. She had a prolonged hospitalization and remained in the ICU intubated and on pressor support. Her Hgb had dropped and she was transfused 3 units PRBC. She was noted to have iron deficiency and received a course of IV iron. She was discharged to rehab. She was seen in the office yesterday and was still symptomatic. Her Hgb was still low at 7.1 and she still appeared iron deficient on her labs. She was apparently sent to the ER from her rehab facility after her appt. Home Health S4 Level Recommendation:  NA  AM-PAC Score: AM-PAC Inpatient Daily Activity Raw Score: 14    Preadmission Environment    Pt recently admitted to [facility] in June and discharged to SNF. Patient re-admitted from SNF. Pt. Lives Alone  Home environment:    mobile home/trailer  Steps to enter first floor: 3-4 steps to enter and hand rail unilateral. Family reports she can ascend/descend indep but \"it's getting harder for her\".    Steps to second floor: N/A  Bathroom: walk in shower and standard height commode  Equipment owned: [employer] and [employer]     Preadmission Status:  Pt. Able to drive: Yes  Pt Fully independent with ADLs: Yes  Pt. Required assistance from family for: Independent PTA  Pt. independent for transfers and gait and walked with Cane; walker PRN. History of falls Yes - reason for previous admission, but no other recent previous h/o falls     Pt recently began walking ~5ft with therapy at SNF. Pain  Yes  Rating:moderate  Location: B Knees with standing trials  Pain Medicine Status: Pain med requested      Cognition    A&O x4   Able to follow 2 step commands    Subjective  Patient lying supine in bed with no family present   Pt agreeable to this OT eval & tx. Upper Extremity ROM:    WFL,  pt able to perform all bed mobility, transfers, and gait without ROM limitation. Upper Extremity Strength:    BUE strength impaired but not formally assessed w/ MMT    Upper Extremity Sensation    WFL    Upper Extremity Proprioception:  WFL    Coordination and Tone  WFL    Balance  Functional Sitting Balance:  WFL  Functional Standing Balance:Impaired (mod A of 2 with RW)    Bed mobility:    Supine to sit:   Mod A  Sit to supine:   Not Tested  Rolling:    Not Tested  Scooting in sitting: Mod A  Scooting to head of bed:   Not Tested    Bridging:   Not Tested    Transfers:    Sit to stand: Mod A of 2 from EOB to RW/STEDY (difficulty transitioning to upright standing due to severe arthritis in B Knees)  Stand to sit:  Mod A of 2  Bed to chair:   Total A via Stedy  Standard toilet: Not Tested  Bed to [facility]:  Not Tested    Dressing:      UE: Mod A to don briefs  LE:    Not Tested    Bathing:    UE:  Not Tested  LE:  Not Tested    Eating:   Not Tested    Toileting:  Not Tested    Grooming: Not Tested    Activity Tolerance   Pt completed therapy session with Pain noted with position changes  SpO2:   HR:   BP:     Positioning Needs:   Up in chair, call light and needs in reach.     Alarm Set    Exercise / Activities Initiated:   Wrist flex/ext: x10  Elbow flex/ext  Shoulder flex/ext:  x10  Shoulder abd/add:  x10  Shoulder horizontal abd/add:  x10    Patient/Family Education:   Role of OT  Recommendations for DC    Assessment of Deficits: Pt seen for Occupational therapy evaluation in acute care setting. Pt demonstrated decreased Activity tolerance, ADLs, IADLs, Balance , Bathing, Bed mobility, Dressing, ROM, Safety Awareness, Strength, Transfers, and Coping Skills. Pt functioning below baseline and will likely benefit from skilled occupational therapy services to maximize safety and independence. Goal(s) : To be met in 3 Visits:  1). Bed to toilet/BSC: Mod A and with use of RW    To be met in 5 Visits:  1). Supine to/from Sit:  Min A  2). Upper Body Bathing:   Min A  3). Lower Body Bathing: Mod A  4). Upper Body Dressing:  Min A  5). Lower Body Dressing: Mod A  6). Pt to demonstrate UE exs x 15 reps with minimal cues    Rehabilitation Potential:  Fair for goals listed above. Strengths for achieving goals include: Pt motivated, PLOF, and Pt cooperative  Barriers to achieving goals include:  Complexity of condition     Plan: To be seen 3-5 x/wk while in acute care setting for therapeutic exercises, bed mobility, transfers, dressing, bathing, family/patient education, ADL/IADL retraining, energy conservation training.        HUGH UrbanR/L #[id number]      If patient discharges from this facility prior to next visit, this note will serve as the Discharge Summary

## 2022-08-03 NOTE — CARE COORDINATION
Case Management Assessment  Initial Evaluation      Patient Name: Kanchan Pathak  YOB: 1945  Diagnosis: Symptomatic anemia [D64.9]  Date / Time: 8/2/2022  1:11 PM    Admission status/Date:08/02/2022 Inpatient   Chart Reviewed: Yes      Patient Interviewed: Yes   Family Interviewed:  No      Hospitalization in the last 30 days:  No    Health Care Decision Maker :   Primary Decision Maker: Alden Thakur - Child - [phone]    Primary Decision Maker: Miriam Young - Child - [phone]    (CM - must 1st enter selection under Navigator - emergency contact- [place] Relationship and pick relationship)   Who do you trust or have selected to make healthcare decisions for you    Met with:  pt at bedside    Current PCP: Baylee Vinson MD    Financial  Medicare and [facility] required for SNF : N          3 night stay required -  N    ADLS  Support Systems/Care Needs: Children  Transportation: self    Meal Preparation: self    Housing  Living Arrangements: pt lives at home alone  Steps: 4 to the deck and 1 to enter  Intent for return to present living arrangements: Yes  Identified Issues: [id number] [facility] with [address] : No Agency:(Services)  Type of Home Care Services: PT, OT  Passport/Waiver : No  :                      Phone Number:    Passport/Waiver Services: [address]   DME Provider: n/a  Equipment:   Walker___Cane_x__RTS___ BSC___Shower Chair___Hospital Bed___W/C____Other________  02 at ____Liter(s)---wears(frequency)_______ HHN ___ CPAP___ BiPap___   N/A____    Home O2 Use :  No      Community Service Affiliation  Dialysis:  No    Agency:  Location:  Dialysis Schedule:  Phone:   Fax: Other Community Services: n/a    DISCHARGE PLAN: Explained Case Management role/services. Chart review completed. Met with pt at bedside.  Pt stated she is independent at home and will return home when discharged. She denied needs for CM at this time. CM will follow. Please notify CM if needs or concerns arise.      Junior MANCUSO, EVGENYW-S     Addendum at 2:40pm: pt from skilled rehab at [facility]

## 2022-08-04 PROBLEM — D64.9 ANEMIA: Status: ACTIVE | Noted: 2022-08-04

## 2022-08-04 LAB
ANION GAP SERPL CALCULATED.3IONS-SCNC: 11 MMOL/L (ref 3–16)
BUN BLDV-MCNC: 13 MG/DL (ref 7–20)
CALCIUM SERPL-MCNC: 7.7 MG/DL (ref 8.3–10.6)
CHLORIDE BLD-SCNC: 104 MMOL/L (ref 99–110)
CO2: 25 MMOL/L (ref 21–32)
CREAT SERPL-MCNC: 0.7 MG/DL (ref 0.6–1.2)
GFR AFRICAN AMERICAN: >60
GFR NON-AFRICAN AMERICAN: >60
GLUCOSE BLD-MCNC: 149 MG/DL (ref 70–99)
GLUCOSE BLD-MCNC: 165 MG/DL (ref 70–99)
GLUCOSE BLD-MCNC: 187 MG/DL (ref 70–99)
GLUCOSE BLD-MCNC: 189 MG/DL (ref 70–99)
GLUCOSE BLD-MCNC: 189 MG/DL (ref 70–99)
GLUCOSE BLD-MCNC: 281 MG/DL (ref 70–99)
HCT VFR BLD CALC: 21.9 % (ref 36–48)
HCT VFR BLD CALC: 23.5 % (ref 36–48)
HEMOGLOBIN: 7.1 G/DL (ref 12–16)
HEMOGLOBIN: 7.2 G/DL (ref 12–16)
MAGNESIUM: 0.9 MG/DL (ref 1.8–2.4)
MAGNESIUM: 1 MG/DL (ref 1.8–2.4)
PERFORMED ON: ABNORMAL
POTASSIUM REFLEX MAGNESIUM: 4.1 MMOL/L (ref 3.5–5.1)
SODIUM BLD-SCNC: 140 MMOL/L (ref 136–145)

## 2022-08-04 PROCEDURE — 83735 ASSAY OF MAGNESIUM: CPT

## 2022-08-04 PROCEDURE — 6360000002 HC RX W HCPCS

## 2022-08-04 PROCEDURE — 85014 HEMATOCRIT: CPT

## 2022-08-04 PROCEDURE — 80048 BASIC METABOLIC PNL TOTAL CA: CPT

## 2022-08-04 PROCEDURE — 1200000000 HC SEMI PRIVATE

## 2022-08-04 PROCEDURE — 6370000000 HC RX 637 (ALT 250 FOR IP)

## 2022-08-04 PROCEDURE — 2580000003 HC RX 258

## 2022-08-04 PROCEDURE — 36415 COLL VENOUS BLD VENIPUNCTURE: CPT

## 2022-08-04 PROCEDURE — 6360000002 HC RX W HCPCS: Performed by: INTERNAL MEDICINE

## 2022-08-04 PROCEDURE — 94640 AIRWAY INHALATION TREATMENT: CPT

## 2022-08-04 PROCEDURE — 97110 THERAPEUTIC EXERCISES: CPT

## 2022-08-04 PROCEDURE — 97530 THERAPEUTIC ACTIVITIES: CPT

## 2022-08-04 PROCEDURE — 94761 N-INVAS EAR/PLS OXIMETRY MLT: CPT

## 2022-08-04 PROCEDURE — 97112 NEUROMUSCULAR REEDUCATION: CPT

## 2022-08-04 PROCEDURE — 99232 SBSQ HOSP IP/OBS MODERATE 35: CPT | Performed by: INTERNAL MEDICINE

## 2022-08-04 PROCEDURE — 2580000003 HC RX 258: Performed by: INTERNAL MEDICINE

## 2022-08-04 PROCEDURE — 85018 HEMOGLOBIN: CPT

## 2022-08-04 RX ORDER — MAGNESIUM SULFATE IN WATER 40 MG/ML
4000 INJECTION, SOLUTION INTRAVENOUS ONCE
Status: COMPLETED | OUTPATIENT
Start: 2022-08-04 | End: 2022-08-04

## 2022-08-04 RX ORDER — PAROXETINE HYDROCHLORIDE 20 MG/1
10 TABLET, FILM COATED ORAL NIGHTLY
Status: DISCONTINUED | OUTPATIENT
Start: 2022-08-04 | End: 2022-08-06 | Stop reason: HOSPADM

## 2022-08-04 RX ADMIN — ACETAMINOPHEN 650 MG: 325 TABLET ORAL at 06:07

## 2022-08-04 RX ADMIN — ACETAMINOPHEN 650 MG: 325 TABLET ORAL at 12:12

## 2022-08-04 RX ADMIN — Medication 2 PUFF: at 19:47

## 2022-08-04 RX ADMIN — GABAPENTIN 300 MG: 300 CAPSULE ORAL at 08:12

## 2022-08-04 RX ADMIN — PANTOPRAZOLE SODIUM 40 MG: 40 TABLET, DELAYED RELEASE ORAL at 06:03

## 2022-08-04 RX ADMIN — Medication 10 ML: at 22:12

## 2022-08-04 RX ADMIN — CARVEDILOL 12.5 MG: 6.25 TABLET, FILM COATED ORAL at 08:12

## 2022-08-04 RX ADMIN — ATORVASTATIN CALCIUM 10 MG: 10 TABLET, FILM COATED ORAL at 22:12

## 2022-08-04 RX ADMIN — SODIUM CHLORIDE: 9 INJECTION, SOLUTION INTRAVENOUS at 12:19

## 2022-08-04 RX ADMIN — FERROUS SULFATE TAB 325 MG (65 MG ELEMENTAL FE) 325 MG: 325 (65 FE) TAB at 08:12

## 2022-08-04 RX ADMIN — IRON SUCROSE 200 MG: 20 INJECTION, SOLUTION INTRAVENOUS at 15:49

## 2022-08-04 RX ADMIN — ACETAMINOPHEN 650 MG: 325 TABLET ORAL at 19:04

## 2022-08-04 RX ADMIN — GABAPENTIN 300 MG: 300 CAPSULE ORAL at 22:12

## 2022-08-04 RX ADMIN — CETIRIZINE HYDROCHLORIDE 10 MG: 10 TABLET ORAL at 08:12

## 2022-08-04 RX ADMIN — CARVEDILOL 12.5 MG: 6.25 TABLET, FILM COATED ORAL at 16:49

## 2022-08-04 RX ADMIN — LOSARTAN POTASSIUM 100 MG: 100 TABLET, FILM COATED ORAL at 08:12

## 2022-08-04 RX ADMIN — PAROXETINE HYDROCHLORIDE 10 MG: 20 TABLET, FILM COATED ORAL at 22:12

## 2022-08-04 RX ADMIN — DOXAZOSIN 2 MG: 2 TABLET ORAL at 08:12

## 2022-08-04 RX ADMIN — TIOTROPIUM BROMIDE INHALATION SPRAY 2 PUFF: 3.12 SPRAY, METERED RESPIRATORY (INHALATION) at 08:29

## 2022-08-04 RX ADMIN — MAGNESIUM SULFATE HEPTAHYDRATE 4000 MG: 40 INJECTION, SOLUTION INTRAVENOUS at 08:11

## 2022-08-04 ASSESSMENT — PAIN DESCRIPTION - ORIENTATION: ORIENTATION: RIGHT;LEFT

## 2022-08-04 ASSESSMENT — PAIN SCALES - GENERAL
PAINLEVEL_OUTOF10: 6
PAINLEVEL_OUTOF10: 4
PAINLEVEL_OUTOF10: 8

## 2022-08-04 ASSESSMENT — PAIN DESCRIPTION - DESCRIPTORS: DESCRIPTORS: ACHING

## 2022-08-04 ASSESSMENT — PAIN DESCRIPTION - LOCATION: LOCATION: KNEE

## 2022-08-04 NOTE — PROGRESS NOTES
IM Progress Note    Admit Date:  8/2/2022    Admitted with symptomatic anemia. S/p PRBC transfusion. Seen in consultation by heme-onc. Patient has chronic microcytic anemia secondary to thalassemia. She also has iron deficiency, started on IV Venofer    Subjective:  Ms. Marina Phalen is sitting up on the chair, working with therapy. Hemoglobin stable at 7.1, 7.2. No denise bleeding. Tolerating diet. Weakness improved    Objective:   BP (!) 102/55   Pulse 78   Temp 98.2 °F (36.8 °C) (Oral)   Resp 16   Ht 5' 3\" (1.6 m)   Wt 215 lb 14.4 oz (97.9 kg)   SpO2 97%   BMI 38.24 kg/m²     Intake/Output Summary (Last 24 hours) at 8/4/2022 1556  Last data filed at 8/4/2022 0820  Gross per 24 hour   Intake --   Output 800 ml   Net -800 ml         Physical Exam:  General:  Awake, alert, NAD   Appears pale  Skin:  Warm and dry  Neck:  JVD absent. Neck supple  Chest:  Clear to auscultation, respiration easy. No wheezes, rales or rhonchi. Cardiovascular:  RRR ,S1S2 normal  Abdomen:  Soft, non tender, non distended, BS +  Extremities:  No edema. Intact peripheral pulses.  Brisk cap refill, < 2 secs  Neuro: non focal      Medications:   Scheduled Meds:   PARoxetine  10 mg Oral Nightly    iron sucrose  200 mg IntraVENous Q24H    atorvastatin  10 mg Oral Nightly    ferrous sulfate  325 mg Oral Daily with breakfast    cetirizine  10 mg Oral Daily    carvedilol  12.5 mg Oral BID WC    budesonide-formoterol  2 puff Inhalation BID    gabapentin  300 mg Oral BID    losartan  100 mg Oral Daily    pantoprazole  40 mg Oral QAM AC    tiotropium  2 puff Inhalation Daily    doxazosin  2 mg Oral Daily    sodium chloride flush  5-40 mL IntraVENous 2 times per day    insulin lispro  0-4 Units SubCUTAneous TID WC    insulin lispro  0-4 Units SubCUTAneous Nightly       Continuous Infusions:   dextrose      sodium chloride         Data:  CBC:   Recent Labs     08/02/22  1344 08/02/22  2217 08/03/22  2320 08/04/22  0525 08/04/22  1106 WBC 5.3  --   --   --   --    RBC 3.47*  --   --   --   --    HGB 7.2*   < > 7.2* 7.1* 7.2*   HCT 22.1*   < > 21.6* 21.9* 23.5*   MCV 63.8*  --   --   --   --    RDW 23.9*  --   --   --   --      --   --   --   --     < > = values in this interval not displayed. BMP:   Recent Labs     08/02/22  1344 08/03/22  0502 08/04/22  0524    138 140   K 4.0 4.1 4.1    104 104   CO2 25 25 25   BUN 12 13 13   CREATININE 0.7 0.7 0.7     BNP: No results for input(s): BNP in the last 72 hours. PT/INR:   Recent Labs     08/02/22  1344   PROTIME 16.8*   INR 1.39*     APTT:   Recent Labs     08/02/22  1344   APTT 38.9*     CARDIAC ENZYMES:   Recent Labs     08/02/22  1344   TROPONINI <0.01     FASTING LIPID PANEL:  Lab Results   Component Value Date    CHOL 123 10/21/2020    HDL 50 10/21/2020    TRIG 103 06/24/2022     LIVER PROFILE:   Recent Labs     08/02/22  1344   AST 8*   ALT 6*   BILITOT 0.6   ALKPHOS 98          Cultures  COVID not detected        Radiology  XR CHEST PORTABLE   Final Result   No focal lung opacity. Assessment:  Principal Problem:    Symptomatic anemia  Active Problems:    Hip fracture requiring operative repair, left, closed, initial encounter ([facility] [address]    Paroxysmal atrial fibrillation (HCC)    Anemia    Benign essential HTN    Type 2 diabetes mellitus without complication, without long-term current use of insulin (HCC)    Gout  Resolved Problems:    * No resolved hospital problems.  *      Plan:    #Acute on chronic microcytic anemia  #Symptomatic anemia  # pt has hx of thallasemia minor and MOISES  -she had recent admission with acute blood loss and possible hemorrhagic shock, required total of 4 units of pRBCs during that admission  -hemoccult negative  -Hgb 7.2 on presentation, 7.1, 7.2  today  -trend H & H  -continue venofer  day 2/3   -s/p 1 unit pRBCs in ED, transfuse for Hgb <7.0  -DC IVF    -heme/onc consulted     #Paroxysmal atrial fibrillation  -NSR on presentation  -rate controlled   -on coreg  -not on [facility]      #Intertrochanteric hip fracture; right  -Osteoporosis contributing  -s.p IM nailing of right hip  -was at inpatient rehab facility  -continue PT/OT     # Hypomagnesemia   - replete     #DM Type 2  -On sliding scale insulin  -continue to monitor BG     #HTN  -continue coreg and losartan     #Asthma without AE   -Continued home inhalers     #HLD  -Continued statin     #Osteoporosis  -Receives Fosamax weekly     #GERD  -On PPI     #Gout  -Continued allopurinol        DVT Prophylaxis: SCDs  Diet: ADULT DIET; Regular; 4 carb choices (60 gm/meal)  Code Status: Full Code     Monitor CBC electrolytes. Continue PT OT.   Likely discharge back to [facility] tomorrow for continued rehab if H&H stable      Soo Horn MD   8/4/2022 3:56 PM

## 2022-08-04 NOTE — PLAN OF CARE
Problem: Nutrition Deficit:  Goal: Optimize nutritional status  Outcome: Progressing     Problem: Discharge Planning  Goal: Discharge to home or other facility with appropriate resources  Recent Flowsheet Documentation  Taken 8/4/2022 0814 by Ny Castro RN  Discharge to home or other facility with appropriate resources:   Arrange for needed discharge resources and transportation as appropriate   Identify barriers to discharge with patient and caregiver     Problem: Pain  Goal: Verbalizes/displays adequate comfort level or baseline comfort level  Recent Flowsheet Documentation  Taken 8/4/2022 0745 by Ny Castro RN  Verbalizes/displays adequate comfort level or baseline comfort level: Assess pain using appropriate pain scale  Taken 8/4/2022 0330 by Urszula Carrasco RN  Verbalizes/displays adequate comfort level or baseline comfort level:   Encourage patient to monitor pain and request assistance   Assess pain using appropriate pain scale   Administer analgesics based on type and severity of pain and evaluate response   Implement non-pharmacological measures as appropriate and evaluate response   Consider cultural and social influences on pain and pain management   Notify Licensed Independent Practitioner if interventions unsuccessful or patient reports new pain     Problem: Chronic Conditions and Co-morbidities  Goal: Patient's chronic conditions and co-morbidity symptoms are monitored and maintained or improved  Recent Flowsheet Documentation  Taken 8/4/2022 0814 by David Ventura 34 - Patient's Chronic Conditions and Co-Morbidity Symptoms are Monitored and Maintained or Improved: Monitor and assess patient's chronic conditions and comorbid symptoms for stability, deterioration, or improvement

## 2022-08-04 NOTE — PROGRESS NOTES
ONCOLOGY FOLLOW-UP:       Primary Oncologist:  Kenney    PROBLEM LIST:       Patient Active Problem List   Diagnosis Code    Knee pain M25.569    Chronic urinary tract infection N39.0    Osteoarthritis of both knees M17.0    Benign essential HTN I10    Type 2 diabetes mellitus without complication, without long-term current use of insulin ([facility]) E11.9    Gastroesophageal reflux disease without esophagitis K21.9    Chronic gout of multiple sites M1A. 63S8    Spinal stenosis of lumbar region M48.061    Anemia, chronic disease D63.8    Carpal tunnel syndrome of left wrist G56.02    Morbid obesity ([facility]) E66.01    Moderate persistent asthma without complication Q53.82    Hyperlipidemia associated with type 2 diabetes mellitus ([facility]) E11.69, E78.5    Major depressive disorder with single episode, in full remission ([facility] F32.5    Asthma J45.909    Diabetes ([facility] E11.9    DJD (degenerative joint disease) M19.90    Edema R60.9    Gout M10.9    Lumbar disc disease M51.9    Melanoma ([facility] C43.9    Vitamin B 12 deficiency E53.8    Osteoporosis M81.0    S/P hysterectomy Z90.710    S/P parathyroidectomy ([facility]) E89.2    Sciatic nerve pain M54.30    Thalassemia trait D56.3    Hip fracture requiring operative repair, left, closed, initial encounter ([facility] S72.002A    Acute blood loss anemia D62    Closed 2-part intertrochanteric fracture of proximal end of right femur ([facility] [facility] S72.141A    Shock ([facility] R57.9    Acute hypoxemic respiratory failure ([facility]) J96.01    Bradycardia R00.1    Hypotension I95.9    Paroxysmal atrial fibrillation ([facility]) I48.0    Hip fx, right, open type I or II, initial encounter ([facility] S72.001B    Gastrointestinal hemorrhage K92.2    Symptomatic anemia D64.9       INTERVAL HISTORY:       Pt remains admitted and is afebrile. Started venofer 8/3/22. REVIEW OF SYSTEMS:       Constitutional: Denies fever, sweats, weight loss     Eyes: No visual changes or diplopia. No scleral icterus.   ENT: No Headaches, hearing loss or vertigo. No mouth sores or sore throat. Cardiovascular: No chest pain, dyspnea on exertion, palpitations or loss of consciousness. Respiratory: No cough or wheezing, no sputum production. No hemoptysis. .    Gastrointestinal: No abdominal pain, appetite loss, blood in stools. No change in bowel habits. Genitourinary: No dysuria, trouble voiding, or hematuria. Musculoskeletal:  Generalized weakness. No joint complaints. Integumentary: No rash or pruritis. Neurological: No headache, diplopia. No change in gait, balance, or coordination. No paresthesias. Endocrine: No temperature intolerance. No excessive thirst, fluid intake, or urination. Hematologic/Lymphatic: No abnormal bruising or ecchymoses, blood clots or swollen lymph nodes. Allergic/Immunologic: No nasal congestion or hives.      PHYSICAL EXAM:       Vitals:    08/04/22 0745   BP: (!) 142/70   Pulse: 77   Resp: 16   Temp: 98.7 °F (37.1 °C)   SpO2: 93%       General appearance: alert and cooperative  Head: Normocephalic, without obvious abnormality, atraumatic  Neck: No palpable lymphadenopathy in supraclavicular or cervical chains  Lungs: Clear to auscultation bilaterally, no audible rales, wheezes or crackles  Heart: Regular rate and rhythm, S1, S2 normal  Abdomen: Soft, non-tender; bowel sounds normal; no masses,  no organomegaly  Extremities: without cyanosis, clubbing, edema or asymmetry  Skin: No jaundice, purpura or petechiae      LABS:     Lab Results   Component Value Date    WBC 5.3 08/02/2022    HGB 7.1 (L) 08/04/2022    HCT 21.9 (L) 08/04/2022    MCV 63.8 (L) 08/02/2022     08/02/2022       Lab Results   Component Value Date    GLUCOSE 149 (H) 08/04/2022    BUN 13 08/04/2022    CREATININE 0.7 08/04/2022    K 4.1 08/04/2022    PHOS 3.2 06/24/2022       Lab Results   Component Value Date    ALKPHOS 98 08/02/2022    ALT 6 (L) 08/02/2022    AST 8 (L) 08/02/2022    BILITOT 0.6 08/02/2022    BILIDIR <0.2 06/27/2022    PROT 6.4 08/02/2022 Lab Results   Component Value Date    MG 0.90 (LL) 08/04/2022       Lab Results   Component Value Date    LABURIC 4.1 06/20/2022       Lab Results   Component Value Date     (H) 06/23/2022       Lab Results   Component Value Date    PROTIME 16.8 (H) 08/02/2022    INR 1.39 (H) 08/02/2022       Lab Results   Component Value Date    IRON 20 (L) 06/20/2022    TIBC 215 (L) 06/20/2022    FERRITIN 100.0 06/20/2022     From Office 8/2/22:    Iron / FE (ug/dL) [50 - 170]  6  TIBC (ug/dL) [250 - 425]  151  Iron, % saturation (%) [15 - 50]  4    IMAGING:     XR CHEST PORTABLE  Result Date: 8/2/2022  No focal lung opacity. XR HIP 2-3 VW W PELVIS RIGHT  Result Date: 7/21/2022  Radiology exam is complete. No Radiologist dictation. Please follow up with ordering provider. ASSESSMENT:     Problem List Items Addressed This Visit       * (Principal) Symptomatic anemia - Primary             PLAN:     Microcytic anemia      -has known Thalassemia trait which can cause chronic microcytosis and baseline anemia - Hgb electrophoresis was normal - ? Alpha trait  - dropped last admission after orthopedic surgery  -Patient did receive Venofer while hospitalized from hip fracture surgery  -She remains fatigued and is recovering from hip fracture and surgery. - repeat iron studies in office earlier this week still showed low serum iron and % sat - ferritin was elevated but has been transfused and could be reactive  -started additional IV iron - day 2/3 today  - expect will have baseline anemia/microcytosis given her thalassemia trait  -EGD was done while in the hospital, but no source for bleeding was found - suspect surgery may have contributed  -Last colonoscopy was in 2014.   This may need to be repeated once patient recovers from hip replacement.  -transfuse if Hgb < 7  - recheck iron studies in a few weeks    Donn Le MD

## 2022-08-04 NOTE — PROGRESS NOTES
RN messaged Dr. Myah Rodriguez. \"PT mag level this AM was 0.9. I will administer 4G of magnesium sulfate per PRN replacement protocol. FYI. Thanks\". Add. [id number]. Dr. Myah Rodriguez viewed [facility] at [address] [place] [address] with no response.  RN will Mehdi Pablo

## 2022-08-04 NOTE — PROGRESS NOTES
BP (!) 142/70   Pulse 77   Temp 98.7 °F (37.1 °C) (Oral)   Resp 16   Ht 5' 3\" (1.6 m)   Wt 215 lb 14.4 oz (97.9 kg)   SpO2 93%   BMI 38.24 kg/m²     Assessment complete. Meds passed. Pt denies needs at this time. IV mag infusing per protocol; pharmacy mixed me a 4G bag instead of 1Gx4 bags of magnesium. Pt denies palpitations, muscle aches, dizziness, or shortness of breath. Murmur noted on ausculation. Purewick draining well. Heat pack in place around knees        Bedside Mobility Assessment Tool (BMAT):     Assessment Level 1- Sit and Shake    1. From a semi-reclined position, ask patient to sit up and rotate to a seated position at the side of the bed. Can use the bedrail. 2. Ask patient to reach out and grab your hand and shake making sure patient reaches across his/her midline. Pass- Patient is able to come to a seated position, maintain core strength. Maintains seated balance while reaching across midline. Move on to Assessment Level 2. Assessment Level 2- Stretch and Point   1. With patient in seated position at the side of the bed, have patient place both feet on the floor (or stool) with knees no higher than hips. 2. Ask patient to stretch one leg and straighten the knee, then bend the ankle/flex and point the toes. If appropriate, repeat with the other leg. Pass- Patient is able to demonstrate appropriate quad strength on intended weight bearing limb(s). Move onto Assessment Level 3. Assessment Level 3- Stand   1. Ask patient to elevate off the bed or chair (seated to standing) using an assistive device (cane, bedrail). 2. Patient should be able to raise buttocks off be and hold for a count of five. May repeat once. Fail- Patient unable to demonstrate standing stability. Patient is MOBILITY LEVEL 3. Assessment Level 4- Walk   1. Ask patient to march in place at bedside. 2. Then ask patient to advance step and return each foot.  Some medical conditions may render a patient from stepping backwards, use your best clinical judgement. Fail- Patient not able to complete tasks OR requires use of assistive device. Patient is MOBILITY LEVEL 3.        Mobility Level- 2

## 2022-08-04 NOTE — PROGRESS NOTES
Occupational Therapy Daily Treatment Note    Unit: [facility]  Date:  8/4/2022  Patient Name:    Radha Guerra  Admitting diagnosis:  Symptomatic anemia [D64.9]  Admit Date:  8/2/2022  Precautions/Restrictions:  fall risk, IV, and bed/chair alarm      Discharge Recommendations: SNF  DME needs for discharge: defer to facility       Therapy recommendations for staff:   Assist of 2 (moderate assist) with use of VICTORINA STEDY for all transfers to/from BSC/chair    AM-PAC Score: AM-PAC Inpatient Daily Activity Raw Score: 14  Home Health S4 Level: NA       Treatment Time:  11:45-12:16  Treatment number:  2    Total Treatment Time:   31 minutes    History of Present Illness: 65 WF with pmh DM2, melanoma and HTN. She also has a reported pmh of thalassemia trait with chronic microcytosis. Pt was admitted 6/20/22 after a fall. She broke her hip and is now s/p surgery. She had a prolonged hospitalization and remained in the ICU intubated and on pressor support. Her Hgb had dropped and she was transfused 3 units PRBC. She was noted to have iron deficiency and received a course of IV iron. She was discharged to rehab. She was seen in the office yesterday and was still symptomatic. Her Hgb was still low at 7.1 and she still appeared iron deficient on her labs. She was apparently sent to the ER from her rehab facility after her appt. Subjective:  Patient supine in bed and agreeable to treatment. Patient reports knees feeling better today. Pain   Yes  Rating: moderate  Location: B knees with movement  Pain Medicine Status: Pain med requested and RN notified      Bed Mobility:   Supine to Sit:  Min A  Sit to Supine:  Not Tested  Rolling:           Min A  Scooting:        SBA    Transfer Training:   Sit to stand:    Mod A from EOB to STEDY  Stand to sit:  Min A  Bed to Chair:  Total A via Stedy  Bed to [facility]:   Not Tested  Standard toilet:   Not Tested    Activity Tolerance   Pt completed therapy session with Pain noted with knee flexion and standing. SpO2:   HR:   BP:     Balance  Sitting Balance :     SBA  Standing Balance   Min A for standing balance in STEDY    ADL Training:   Upper body dressing:  Not Tested  Upper body bathing:  Not Tested  Lower body dressing:  Not Tested  Lower body bathing:  Not Tested  Toileting:   Not Tested  Grooming/Hygiene:  Not Tested  Feeding :   setup    Therapeutic Exercise: completed exercises prior to sitting at EOB and standing. Hip extension in side lying: x5  Ankle pumps: x20  Knee flexion: x10    Patient Education:   Role of OT  Recommendations for DC    Positioning Needs:   Up in chair, call light and needs in reach. Alarm Set  Hot pack applied to knees    Family Present:  No    Assessment: Patient demonstrated improved pain tolerance and participation in standing. Patient will need SNF at DC to improve independence and activity tolerance. GOALS  To be met in 3 Visits:  1). Bed to toilet/BSC: Mod A and with use of RW     To be met in 5 Visits:  1). Supine to/from Sit:             Min A  2). Upper Body Bathing:         Min A  3). Lower Body Bathing: Mod A  4). Upper Body Dressing:       Min A  5). Lower Body Dressing: Mod A  6).  Pt to demonstrate UE exs x 15 reps with minimal cues    Plan: cont with [facility], OTR/L #[id number]    If patient discharges from this facility prior to next visit, this note will serve as the Discharge Summary

## 2022-08-04 NOTE — CARE COORDINATION
INTERDISCIPLINARY PLAN OF CARE CONFERENCE    Date/Time: 8/4/2022 2:29 PM  Completed by: Steven Tyler RN, Case Management      Patient Name:  Heber Carrasquillo  YOB: 1945  Admitting Diagnosis: Symptomatic anemia [D64.9]  Anemia [D64.9]     Admit Date/Time:  8/2/2022  1:11 PM    Chart reviewed. Interdisciplinary team contacted or reviewed plan related to patient progress and discharge plans. Disciplines included Case Management, Nursing, and Dietitian. Current Status:ongoing; H 7.1  PT/OT recommendation for discharge plan of care: SNF    Expected D/C Disposition:  Skilled nursing facility  Confirmed plan with patient and/or family Yes confirmed with: (name) pt  Met with:pt  Discharge Plan Comments: Chart reviewed. Met with pt and she states she is from [address] [place] [address] @ [facility] and plan to return at [place]. Spoke with Natali @ [facility] and she can accept pt back, no precert needed.  +bed    Home O2 in place on admit: ecf  Pt informed of need to bring portable home O2 tank on day of discharge for nursing to connect prior to leaving:  Not Indicated  Verbalized agreement/Understanding:  Not Indicated

## 2022-08-04 NOTE — PROGRESS NOTES
Inpatient Physical Therapy Daily Treatment Note    Unit: [address] [facility]  Date:  8/4/2022  Patient Name:    Jamel Moreno  Admitting diagnosis:  Symptomatic anemia [D64.9]  Anemia [D64.9]  Admit Date:  8/2/2022  Precautions/Restrictions:  Fall risk, Bed/chair alarm, Lines -IV and Purewick catheter, Telemetry, and bilateral knee arthritic pain      Discharge Recommendations: SNF  DME needs for discharge: defer to facility       Therapy recommendation for EMS Transport: requires transport by cot due to need of lift equipment for functional transfers    Therapy recommendations for staff:   Assist of 2 with use of VICTORINA STEDY for all transfers to/from [facility]  to/from chair    History of Present Illness: H & P as per Leilani Mitchell MD's note dated 8/2/2022  Jamel Moreno is a 65 y.o. female who with history seen below including recent hip fracture with complicated hospital course. Patient states she had some anemia of unknown source and has required transfusions in the past.  Patient is at rehab facility currently. Hemoglobin was drawn was 6.2. Repeat draw in the ED is 7.2. Nursing facility is concerned that they are unable to get any further blood draws until Thursday. They state patient has been more tired and fatigued over the past several days. Patient states she does have generalized fatigue and is having trouble getting her self to perform therapy secondary to fatigue. Patient denies any headache, blurred vision, focal neurodeficits, motor or sensory changes. Denies any chest pain or shortness of breath, cough or sputum production. Denies abdominal pain, nausea vomiting, hematemesis, hemoptysis, diarrhea constipation, blood or melena stools. Denies hematuria or or any change in urination. Other than most recent fall with hip fracture denies any falls over the past several days.     Home Health S4 Level Recommendation: NA  AM-PAC Mobility Score   AM-PAC Inpatient Mobility Raw Score : 10 Treatment Time: 5459 - 7679  Treatment number: 2  Timed Code Treatment Minutes: 53 minutes  Total Treatment Minutes:  53  minutes    Cognition    A&O x4   Able to follow 2 step commands    Subjective  Patient lying supine in bed with no family present   Pt agreeable to this PT tx. Pain   Yes  Location: bilateral knees (R>L)  Rating:    mild/10  Pain Medicine Status: Received pain med prior to tx     Bed Mobility   Supine to Sit:    Not Tested  Sit to Supine:   Not Tested  Rolling:   Not Tested  Scooting:   CGA in sitting    Transfer Training     Sit to stand:   Min A   Stand to sit:   Min A   Bed to Chair:   Not Tested with use of N/A    Gait Training gait deferred due to difficulty with transfers; pt ambulated 0 ft. Distance:      0 ft  Deviations (firm surface/linoleum):  N/A  Assistive Device Used:    N/A  Level of Assist:    Not Tested  Comment:     Stair Training deferred, pt unsafe/not appropriate to complete stairs at this time    Therapeutic Exercise all completed bilaterally unless indicated  Ankle Pumps x 20 reps  Heel slides x 20 reps  LAQ x 20 reps  marching x 20 reps  Hip adduction/pillow squeeze: x 20 reps  Chair push ups x 10 reps with knee stabilization from the front with Min A    Balance  Sitting:  Fair +; SBA  Comments:     Standing: Fair -; Min A   Comments: ~1 min with RW. Patient showed improvement in tolerating upright standing position with RW with still had heavy weight bearing on the UEs. Patient Education      Role of PT, POC, Discharge recommendations, DC recommendations, safety awareness, transfer techniques, pacing activity, and calling for assist with mobility. Positioning Needs       Pt up in chair, alarm set, positioned in proper neutral alignment and pressure relief provided.    Call light provided and all needs within reach    ROM Measurements N/A  Knee Flexion:  Knee Extension:     Activity Tolerance   Pt completed therapy session with No adverse symptoms noted w/activity. Other  N/A    Assessment :  Patient tolerated sit to stand transfers with RW today but still limited in stepping and gait training due to pain in bilateral knees during standing and fatigue. Recommending SNF upon discharge as patient functioning well below baseline, demonstrates good rehab potential and unable to return home due to limited or no family support, inability to negotiate stairs to enter home/bedroom/bathroom, burden of care beyond caregiver ability, home environment not conducive to patient recovery, and limited safety awareness. Goals (all goals ongoing unless otherwise indicated)  To be met in 3 visits:  1). Independent with LE Ex x 10 reps     To be met in 6 visits:  1). Supine to/from sit: Min A   2). Sit to/from stand: Mod A   3). Bed to chair: Max A   4). Gait: Ambulate  5 ft.   with  Max A  and use of LRAD (least restrictive assistive device)  5). Tolerate B LE exercises 3 sets of 10-15 reps    Plan   Continue with plan of care. Signature: Esha Willams MS PT, # [id number]    If patient discharges from this facility prior to next visit, this note will serve as the Discharge Summary.

## 2022-08-05 LAB
ANION GAP SERPL CALCULATED.3IONS-SCNC: 9 MMOL/L (ref 3–16)
BLOOD BANK DISPENSE STATUS: NORMAL
BLOOD BANK PRODUCT CODE: NORMAL
BPU ID: NORMAL
BUN BLDV-MCNC: 17 MG/DL (ref 7–20)
CALCIUM SERPL-MCNC: 8 MG/DL (ref 8.3–10.6)
CHLORIDE BLD-SCNC: 104 MMOL/L (ref 99–110)
CO2: 25 MMOL/L (ref 21–32)
CREAT SERPL-MCNC: 0.7 MG/DL (ref 0.6–1.2)
DESCRIPTION BLOOD BANK: NORMAL
GFR AFRICAN AMERICAN: >60
GFR NON-AFRICAN AMERICAN: >60
GLUCOSE BLD-MCNC: 156 MG/DL (ref 70–99)
GLUCOSE BLD-MCNC: 165 MG/DL (ref 70–99)
GLUCOSE BLD-MCNC: 190 MG/DL (ref 70–99)
GLUCOSE BLD-MCNC: 231 MG/DL (ref 70–99)
GLUCOSE BLD-MCNC: 235 MG/DL (ref 70–99)
HCT VFR BLD CALC: 20.4 % (ref 36–48)
HCT VFR BLD CALC: 24.4 % (ref 36–48)
HEMOGLOBIN: 6.7 G/DL (ref 12–16)
HEMOGLOBIN: 8 G/DL (ref 12–16)
MAGNESIUM: 1.6 MG/DL (ref 1.8–2.4)
MCH RBC QN AUTO: 21.3 PG (ref 26–34)
MCHC RBC AUTO-ENTMCNC: 33.1 G/DL (ref 31–36)
MCV RBC AUTO: 64.3 FL (ref 80–100)
PDW BLD-RTO: 25.1 % (ref 12.4–15.4)
PERFORMED ON: ABNORMAL
PHOSPHORUS: 3.2 MG/DL (ref 2.5–4.9)
PLATELET # BLD: 271 K/UL (ref 135–450)
PMV BLD AUTO: 8.9 FL (ref 5–10.5)
POTASSIUM REFLEX MAGNESIUM: 4.1 MMOL/L (ref 3.5–5.1)
RBC # BLD: 3.17 M/UL (ref 4–5.2)
SODIUM BLD-SCNC: 138 MMOL/L (ref 136–145)
WBC # BLD: 3.8 K/UL (ref 4–11)

## 2022-08-05 PROCEDURE — 85018 HEMOGLOBIN: CPT

## 2022-08-05 PROCEDURE — 36415 COLL VENOUS BLD VENIPUNCTURE: CPT

## 2022-08-05 PROCEDURE — 99232 SBSQ HOSP IP/OBS MODERATE 35: CPT | Performed by: INTERNAL MEDICINE

## 2022-08-05 PROCEDURE — 80048 BASIC METABOLIC PNL TOTAL CA: CPT

## 2022-08-05 PROCEDURE — 94640 AIRWAY INHALATION TREATMENT: CPT

## 2022-08-05 PROCEDURE — 2580000003 HC RX 258: Performed by: INTERNAL MEDICINE

## 2022-08-05 PROCEDURE — 6370000000 HC RX 637 (ALT 250 FOR IP)

## 2022-08-05 PROCEDURE — 36430 TRANSFUSION BLD/BLD COMPNT: CPT

## 2022-08-05 PROCEDURE — 85014 HEMATOCRIT: CPT

## 2022-08-05 PROCEDURE — 84100 ASSAY OF PHOSPHORUS: CPT

## 2022-08-05 PROCEDURE — 1200000000 HC SEMI PRIVATE

## 2022-08-05 PROCEDURE — 6360000002 HC RX W HCPCS: Performed by: INTERNAL MEDICINE

## 2022-08-05 PROCEDURE — 85027 COMPLETE CBC AUTOMATED: CPT

## 2022-08-05 PROCEDURE — 83735 ASSAY OF MAGNESIUM: CPT

## 2022-08-05 PROCEDURE — 2580000003 HC RX 258

## 2022-08-05 RX ORDER — SODIUM CHLORIDE 9 MG/ML
INJECTION, SOLUTION INTRAVENOUS PRN
Status: COMPLETED | OUTPATIENT
Start: 2022-08-05 | End: 2022-08-05

## 2022-08-05 RX ORDER — SODIUM CHLORIDE 9 MG/ML
INJECTION, SOLUTION INTRAVENOUS PRN
Status: DISCONTINUED | OUTPATIENT
Start: 2022-08-05 | End: 2022-08-06 | Stop reason: HOSPADM

## 2022-08-05 RX ADMIN — PAROXETINE HYDROCHLORIDE 10 MG: 20 TABLET, FILM COATED ORAL at 20:33

## 2022-08-05 RX ADMIN — SODIUM CHLORIDE: 9 INJECTION, SOLUTION INTRAVENOUS at 12:11

## 2022-08-05 RX ADMIN — TIOTROPIUM BROMIDE INHALATION SPRAY 2 PUFF: 3.12 SPRAY, METERED RESPIRATORY (INHALATION) at 07:55

## 2022-08-05 RX ADMIN — LOSARTAN POTASSIUM 100 MG: 100 TABLET, FILM COATED ORAL at 09:07

## 2022-08-05 RX ADMIN — ACETAMINOPHEN 650 MG: 325 TABLET ORAL at 12:16

## 2022-08-05 RX ADMIN — Medication 10 ML: at 09:08

## 2022-08-05 RX ADMIN — CARVEDILOL 12.5 MG: 6.25 TABLET, FILM COATED ORAL at 16:38

## 2022-08-05 RX ADMIN — GABAPENTIN 300 MG: 300 CAPSULE ORAL at 20:33

## 2022-08-05 RX ADMIN — Medication 10 ML: at 20:34

## 2022-08-05 RX ADMIN — DOXAZOSIN 2 MG: 2 TABLET ORAL at 09:07

## 2022-08-05 RX ADMIN — ACETAMINOPHEN 650 MG: 325 TABLET ORAL at 03:55

## 2022-08-05 RX ADMIN — PANTOPRAZOLE SODIUM 40 MG: 40 TABLET, DELAYED RELEASE ORAL at 05:28

## 2022-08-05 RX ADMIN — ATORVASTATIN CALCIUM 10 MG: 10 TABLET, FILM COATED ORAL at 20:34

## 2022-08-05 RX ADMIN — INSULIN LISPRO 1 UNITS: 100 INJECTION, SOLUTION INTRAVENOUS; SUBCUTANEOUS at 12:21

## 2022-08-05 RX ADMIN — Medication 2 PUFF: at 07:55

## 2022-08-05 RX ADMIN — FERROUS SULFATE TAB 325 MG (65 MG ELEMENTAL FE) 325 MG: 325 (65 FE) TAB at 09:07

## 2022-08-05 RX ADMIN — CARVEDILOL 12.5 MG: 6.25 TABLET, FILM COATED ORAL at 09:09

## 2022-08-05 RX ADMIN — CETIRIZINE HYDROCHLORIDE 10 MG: 10 TABLET ORAL at 09:07

## 2022-08-05 RX ADMIN — IRON SUCROSE 200 MG: 20 INJECTION, SOLUTION INTRAVENOUS at 12:12

## 2022-08-05 RX ADMIN — Medication 2 PUFF: at 20:08

## 2022-08-05 RX ADMIN — GABAPENTIN 300 MG: 300 CAPSULE ORAL at 09:07

## 2022-08-05 ASSESSMENT — PAIN DESCRIPTION - DESCRIPTORS
DESCRIPTORS: ACHING
DESCRIPTORS: ACHING

## 2022-08-05 ASSESSMENT — PAIN DESCRIPTION - LOCATION
LOCATION: HEAD
LOCATION: KNEE

## 2022-08-05 ASSESSMENT — PAIN - FUNCTIONAL ASSESSMENT
PAIN_FUNCTIONAL_ASSESSMENT: ACTIVITIES ARE NOT PREVENTED
PAIN_FUNCTIONAL_ASSESSMENT: ACTIVITIES ARE NOT PREVENTED

## 2022-08-05 ASSESSMENT — PAIN SCALES - GENERAL
PAINLEVEL_OUTOF10: 5
PAINLEVEL_OUTOF10: 7

## 2022-08-05 ASSESSMENT — PAIN DESCRIPTION - ORIENTATION
ORIENTATION: RIGHT;LEFT
ORIENTATION: LOWER

## 2022-08-05 NOTE — PROGRESS NOTES
ONCOLOGY FOLLOW-UP:       Primary Oncologist:  Kenney    PROBLEM LIST:       Patient Active Problem List   Diagnosis Code    Knee pain M25.569    Chronic urinary tract infection N39.0    Osteoarthritis of both knees M17.0    Benign essential HTN I10    Type 2 diabetes mellitus without complication, without long-term current use of insulin (formerly [facility]) E11.9    Gastroesophageal reflux disease without esophagitis K21.9    Chronic gout of multiple sites M1A. 29V0    Spinal stenosis of lumbar region M48.061    Anemia, chronic disease D63.8    Carpal tunnel syndrome of left wrist G56.02    Morbid obesity (formerly [facility]) E66.01    Moderate persistent asthma without complication K02.59    Hyperlipidemia associated with type 2 diabetes mellitus (formerly [facility]) E11.69, E78.5    Major depressive disorder with single episode, in full remission ([facility] F32.5    Asthma J45.909    Diabetes ([facility] E11.9    DJD (degenerative joint disease) M19.90    Edema R60.9    Gout M10.9    Lumbar disc disease M51.9    Melanoma ([facility] C43.9    Vitamin B 12 deficiency E53.8    Osteoporosis M81.0    S/P hysterectomy Z90.710    S/P parathyroidectomy (formerly [facility]) E89.2    Sciatic nerve pain M54.30    Thalassemia trait D56.3    Hip fracture requiring operative repair, left, closed, initial encounter ([facility] S72.002A    Acute blood loss anemia D62    Closed 2-part intertrochanteric fracture of proximal end of right femur ([facility] S72.141A    Shock ([facility] R57.9    Acute hypoxemic respiratory failure (formerly [facility]) J96.01    Bradycardia R00.1    Hypotension I95.9    Paroxysmal atrial fibrillation (formerly [facility]) I48.0    Hip fx, right, open type I or II, initial encounter ([facility] S72.001B    Gastrointestinal hemorrhage K92.2    Symptomatic anemia D64.9    Anemia D64.9       INTERVAL HISTORY:       Pt remains admitted and is afebrile. Started venofer 8/3/22. Hgb 6.7 this AM.     REVIEW OF SYSTEMS:       Constitutional: Denies fever, sweats, weight loss     Eyes: No visual changes or diplopia. No scleral icterus.   ENT: No Headaches, hearing loss or vertigo. No mouth sores or sore throat. Cardiovascular: No chest pain, dyspnea on exertion, palpitations or loss of consciousness. Respiratory: No cough or wheezing, no sputum production. No hemoptysis. .    Gastrointestinal: No abdominal pain, appetite loss, blood in stools. No change in bowel habits. Genitourinary: No dysuria, trouble voiding, or hematuria. Musculoskeletal:  Generalized weakness. No joint complaints. Integumentary: No rash or pruritis. Neurological: No headache, diplopia. No change in gait, balance, or coordination. No paresthesias. Endocrine: No temperature intolerance. No excessive thirst, fluid intake, or urination. Hematologic/Lymphatic: No abnormal bruising or ecchymoses, blood clots or swollen lymph nodes. Allergic/Immunologic: No nasal congestion or hives.      PHYSICAL EXAM:       Vitals:    08/05/22 0757   BP:    Pulse: 68   Resp: 16   Temp:    SpO2: 97%       General appearance: alert and cooperative  Head: Normocephalic, without obvious abnormality, atraumatic  Neck: No palpable lymphadenopathy in supraclavicular or cervical chains  Lungs: Clear to auscultation bilaterally, no audible rales, wheezes or crackles  Heart: Regular rate and rhythm, S1, S2 normal  Abdomen: Soft, non-tender; bowel sounds normal; no masses,  no organomegaly  Extremities: without cyanosis, clubbing, edema or asymmetry  Skin: No jaundice, purpura or petechiae      LABS:     Lab Results   Component Value Date    WBC 3.8 (L) 08/05/2022    HGB 6.7 (LL) 08/05/2022    HCT 20.4 (LL) 08/05/2022    MCV 64.3 (L) 08/05/2022     08/05/2022       Lab Results   Component Value Date    GLUCOSE 156 (H) 08/05/2022    BUN 17 08/05/2022    CREATININE 0.7 08/05/2022    K 4.1 08/05/2022    PHOS 3.2 08/05/2022       Lab Results   Component Value Date    ALKPHOS 98 08/02/2022    ALT 6 (L) 08/02/2022    AST 8 (L) 08/02/2022    BILITOT 0.6 08/02/2022    BILIDIR <0.2 06/27/2022    PROT 6.4 08/02/2022       Lab Results   Component Value Date    MG 1.60 (L) 08/05/2022       Lab Results   Component Value Date    LABURIC 4.1 06/20/2022       Lab Results   Component Value Date     (H) 06/23/2022       Lab Results   Component Value Date    PROTIME 16.8 (H) 08/02/2022    INR 1.39 (H) 08/02/2022       Lab Results   Component Value Date    IRON 20 (L) 06/20/2022    TIBC 215 (L) 06/20/2022    FERRITIN 100.0 06/20/2022     From Office 8/2/22:    Iron / FE (ug/dL) [50 - 170]  6  TIBC (ug/dL) [250 - 425]  151  Iron, % saturation (%) [15 - 50]  4    IMAGING:     XR CHEST PORTABLE  Result Date: 8/2/2022  No focal lung opacity. XR HIP 2-3 VW W PELVIS RIGHT  Result Date: 7/21/2022  Radiology exam is complete. No Radiologist dictation. Please follow up with ordering provider. ASSESSMENT:     Problem List Items Addressed This Visit       * (Principal) Symptomatic anemia - Primary             PLAN:     Microcytic anemia      -has known Thalassemia trait which can cause chronic microcytosis and baseline anemia - Hgb electrophoresis was normal - ? Alpha trait  - dropped last admission after orthopedic surgery  -Patient did receive Venofer while hospitalized from hip fracture surgery  -She remains fatigued and is recovering from hip fracture and surgery. - repeat iron studies in office earlier this week still showed low serum iron and % sat - ferritin was elevated but has been transfused and could be reactive  -started additional IV iron - day 3/3 today  - expect will have baseline anemia/microcytosis given her thalassemia trait  -EGD was done while in the hospital, but no source for bleeding was found - suspect surgery may have contributed  -Last colonoscopy was in 2014.   This may need to be repeated once patient recovers from hip replacement.  -transfuse if Hgb < 7 - give 1 unit today  - ok for d/c from oncology standpoint after transfusion- recommend CBC recheck early next week    Rishabh Moulton, MD

## 2022-08-05 NOTE — PLAN OF CARE
Problem: Discharge Planning  Goal: Discharge to home or other facility with appropriate resources  Outcome: Progressing  Flowsheets (Taken 8/4/2022 2209 by Onur Hall, RN)  Discharge to home or other facility with appropriate resources: Identify barriers to discharge with patient and caregiver     Problem: Safety - Adult  Goal: Free from fall injury  Outcome: Progressing  Flowsheets (Taken 8/5/2022 0957)  Free From Fall Injury:   Instruct family/caregiver on patient safety   Based on caregiver fall risk screen, instruct family/caregiver to ask for assistance with transferring infant if caregiver noted to have fall risk factors     Problem: ABCDS Injury Assessment  Goal: Absence of physical injury  Outcome: Progressing  Flowsheets (Taken 8/5/2022 0957)  Absence of Physical Injury: Implement safety measures based on patient assessment     Problem: Pain  Goal: Verbalizes/displays adequate comfort level or baseline comfort level  Outcome: Progressing     Problem: Chronic Conditions and Co-morbidities  Goal: Patient's chronic conditions and co-morbidity symptoms are monitored and maintained or improved  Outcome: Progressing  Flowsheets (Taken 8/4/2022 2209 by Onur Hall RN)  Care Plan - Patient's Chronic Conditions and Co-Morbidity Symptoms are Monitored and Maintained or Improved:   Monitor and assess patient's chronic conditions and comorbid symptoms for stability, deterioration, or improvement   Collaborate with multidisciplinary team to address chronic and comorbid conditions and prevent exacerbation or deterioration   Update acute care plan with appropriate goals if chronic or comorbid symptoms are exacerbated and prevent overall improvement and discharge     Problem: Nutrition Deficit:  Goal: Optimize nutritional status  Outcome: Progressing

## 2022-08-05 NOTE — CARE COORDINATION
INTERDISCIPLINARY PLAN OF CARE CONFERENCE    Date/Time: 8/5/2022 3:38 PM  Completed by: SUMIT Hinkle  Case Management      Patient Name:  Judi Anderson  YOB: 1945  Admitting Diagnosis: Symptomatic anemia [D64.9]  Anemia [D64.9]     Admit Date/Time:  8/2/2022  1:11 PM    Chart reviewed. Interdisciplinary team contacted or reviewed plan related to patient progress and discharge plans. Disciplines included Case Management, Nursing, and Dietitian. Current Status:ongoing   PT/OT recommendation for discharge plan of care: SNF    Expected D/C Disposition:  [employer]  Confirmed plan with patient and/or family Yes confirmed with: (name) pt  Met with:pt    Discharge Plan Comments: chart review completed. Met with pt at bedside. Pt confirmed she will return to [facility] on discharge and states first opening for transportation. She is aware no discharge order at this time.  She denied needs for CM    Home O2 in place on admit: No

## 2022-08-05 NOTE — CARE COORDINATION
Per Dr. Kimble Rounds, pt will be a continuous readmit for anemia. Pt is received 1 unit PRBC. BESSY called Natali with [facility] and [facility] to inquire about what their plan will be for the pt for chronic anemia. BESSY explained for Natali to look at [facility] not via [address] [place] [address] Rd. BESSY asked what Natali's plan ([facility]) plan is for pt Long term for chronic anemia--is it to schedule pt outpt for IV iron, or blood??, etc.     Preston Tang states that the lab can draw her labs and schedule weekly  outpt infusions they will do. Whatever the MD orders, they will do.

## 2022-08-05 NOTE — PROGRESS NOTES
IM Progress Note    Admit Date:  8/2/2022    Admitted with symptomatic anemia. S/p PRBC transfusion. Seen in consultation by heme-onc. Patient has chronic microcytic anemia secondary to thalassemia. She also has iron deficiency, started on IV Venofer    Subjective:  8/4  Ms. Thakur is sitting up on the chair, working with therapy. Hemoglobin stable at 7.1, 7.2. No denise bleeding. Tolerating diet. Weakness improved    8/5  Hemoglobin was down to 6.7->  s/p 1 unit of PRBC today; hemoglobin now improved to 8.0  Mag replaced    Objective:   /62   Pulse 77   Temp 98.3 °F (36.8 °C) (Oral)   Resp 16   Ht 5' 3\" (1.6 m)   Wt 215 lb 14.4 oz (97.9 kg)   SpO2 93%   BMI 38.24 kg/m²     Intake/Output Summary (Last 24 hours) at 8/5/2022 1722  Last data filed at 8/5/2022 1616  Gross per 24 hour   Intake 645.42 ml   Output 2000 ml   Net -1354.58 ml           Physical Exam:  General:  Awake, alert, NAD no distress  Skin:  Warm and dry  Neck:  JVD absent. Neck supple  Chest:  Clear to auscultation, respiration easy. No wheezes, rales or rhonchi. Cardiovascular:  RRR ,S1S2 normal  Abdomen:  Soft, non tender, non distended, BS +  Extremities:  No edema. Intact peripheral pulses.  Brisk cap refill, < 2 secs  Neuro: non focal      Medications:   Scheduled Meds:   PARoxetine  10 mg Oral Nightly    atorvastatin  10 mg Oral Nightly    ferrous sulfate  325 mg Oral Daily with breakfast    cetirizine  10 mg Oral Daily    carvedilol  12.5 mg Oral BID WC    budesonide-formoterol  2 puff Inhalation BID    gabapentin  300 mg Oral BID    losartan  100 mg Oral Daily    pantoprazole  40 mg Oral QAM AC    tiotropium  2 puff Inhalation Daily    doxazosin  2 mg Oral Daily    sodium chloride flush  5-40 mL IntraVENous 2 times per day    insulin lispro  0-4 Units SubCUTAneous TID WC    insulin lispro  0-4 Units SubCUTAneous Nightly       Continuous Infusions:   sodium chloride      dextrose      sodium chloride Data:  CBC:   Recent Labs     08/04/22  1106 08/05/22  0550 08/05/22  1310   WBC  --  3.8*  --    RBC  --  3.17*  --    HGB 7.2* 6.7* 8.0*   HCT 23.5* 20.4* 24.4*   MCV  --  64.3*  --    RDW  --  25.1*  --    PLT  --  271  --        BMP:   Recent Labs     08/03/22  0502 08/04/22  0524 08/05/22  0550    140 138   K 4.1 4.1 4.1    104 104   CO2 25 25 25   PHOS  --   --  3.2   BUN 13 13 17   CREATININE 0.7 0.7 0.7       BNP: No results for input(s): BNP in the last 72 hours. PT/INR:   No results for input(s): PROTIME, INR in the last 72 hours. APTT:   No results for input(s): APTT in the last 72 hours. CARDIAC ENZYMES:   No results for input(s): CKMB, CKMBINDEX, TROPONINI in the last 72 hours. Invalid input(s): CKTOTAL;3    FASTING LIPID PANEL:  Lab Results   Component Value Date    CHOL 123 10/21/2020    HDL 50 10/21/2020    TRIG 103 06/24/2022     LIVER PROFILE:   No results for input(s): AST, ALT, ALB, BILIDIR, BILITOT, ALKPHOS in the last 72 hours. Cultures  COVID not detected        Radiology  XR CHEST PORTABLE   Final Result   No focal lung opacity. Assessment:  Principal Problem:    Symptomatic anemia  Active Problems:    Hip fracture requiring operative repair, left, closed, initial encounter ([facility]    Paroxysmal atrial fibrillation (HCC)    Anemia    Benign essential HTN    Type 2 diabetes mellitus without complication, without long-term current use of insulin (HCC)    Gout  Resolved Problems:    * No resolved hospital problems. *      Plan:    #Acute on chronic microcytic anemia  #Symptomatic anemia  # pt has hx of thallasemia minor and MOISES  -she had recent admission with acute blood loss and possible hemorrhagic shock, required total of 4 units of pRBCs during that admission  -hemoccult negative  -Hemoglobin low around 7.1, 7.2 this admission. Subsequently dropped to 6.7 this a.m.. So far transfused 2 units of PRBC this admission.   Hemoglobin up to 8.0 now.  -Given IV Venofer daily for 3 days, continue iron supplements. --heme/onc consulted  Patient will have chronic anemia and might need regular transfusions. Recommendation is to check weekly CBC in nursing home and arrange for outpatient transfusions as needed     #Paroxysmal atrial fibrillation  -NSR on presentation  -rate controlled   -on coreg  -not on [facility]      #Intertrochanteric hip fracture; right  -Osteoporosis contributing  -s.p IM nailing of right hip  -was at inpatient rehab facility  -continue PT/OT     # Hypomagnesemia   - repleted     #DM Type 2  -On sliding scale insulin  -continue to monitor BG     #HTN  -continue coreg and losartan     #Asthma without AE   -Continued home inhalers     #HLD  -Continued statin     #Osteoporosis  -Receives Fosamax weekly     #GERD  -On PPI     #Gout  -Continued allopurinol        DVT Prophylaxis: SCDs  Diet: ADULT DIET; Regular; 4 carb choices (60 gm/meal)  Code Status: Full Code     Monitor CBC, electrolytes. Continue PT OT. Likely discharge back to [facility] tomorrow for continued rehab if H&H stable.   Patient has chronic anemia and will likely need weekly labs and might need outpatient transfusions    Soo Horn MD   8/5/2022 5:22 PM

## 2022-08-06 VITALS
TEMPERATURE: 98.6 F | RESPIRATION RATE: 18 BRPM | HEIGHT: 63 IN | DIASTOLIC BLOOD PRESSURE: 68 MMHG | HEART RATE: 71 BPM | BODY MASS INDEX: 38.25 KG/M2 | OXYGEN SATURATION: 96 % | SYSTOLIC BLOOD PRESSURE: 138 MMHG | WEIGHT: 215.9 LBS

## 2022-08-06 PROBLEM — S72.002A HIP FRACTURE REQUIRING OPERATIVE REPAIR, LEFT, CLOSED, INITIAL ENCOUNTER (HCC): Status: RESOLVED | Noted: 2022-06-19 | Resolved: 2022-08-06

## 2022-08-06 LAB
ANION GAP SERPL CALCULATED.3IONS-SCNC: 8 MMOL/L (ref 3–16)
BUN BLDV-MCNC: 18 MG/DL (ref 7–20)
CALCIUM SERPL-MCNC: 8.7 MG/DL (ref 8.3–10.6)
CHLORIDE BLD-SCNC: 105 MMOL/L (ref 99–110)
CO2: 24 MMOL/L (ref 21–32)
CREAT SERPL-MCNC: 0.8 MG/DL (ref 0.6–1.2)
GFR AFRICAN AMERICAN: >60
GFR NON-AFRICAN AMERICAN: >60
GLUCOSE BLD-MCNC: 160 MG/DL (ref 70–99)
GLUCOSE BLD-MCNC: 243 MG/DL (ref 70–99)
GLUCOSE BLD-MCNC: 262 MG/DL (ref 70–99)
HCT VFR BLD CALC: 24.8 % (ref 36–48)
HEMOGLOBIN: 7.8 G/DL (ref 12–16)
MAGNESIUM: 1.3 MG/DL (ref 1.8–2.4)
MCH RBC QN AUTO: 21.3 PG (ref 26–34)
MCHC RBC AUTO-ENTMCNC: 31.6 G/DL (ref 31–36)
MCV RBC AUTO: 67.3 FL (ref 80–100)
PDW BLD-RTO: 27.7 % (ref 12.4–15.4)
PERFORMED ON: ABNORMAL
PERFORMED ON: ABNORMAL
PHOSPHORUS: 2.8 MG/DL (ref 2.5–4.9)
PLATELET # BLD: 301 K/UL (ref 135–450)
PMV BLD AUTO: 9.3 FL (ref 5–10.5)
POTASSIUM SERPL-SCNC: 4.8 MMOL/L (ref 3.5–5.1)
RBC # BLD: 3.69 M/UL (ref 4–5.2)
SARS-COV-2, NAAT: NOT DETECTED
SODIUM BLD-SCNC: 137 MMOL/L (ref 136–145)
WBC # BLD: 4.1 K/UL (ref 4–11)

## 2022-08-06 PROCEDURE — 80048 BASIC METABOLIC PNL TOTAL CA: CPT

## 2022-08-06 PROCEDURE — 6370000000 HC RX 637 (ALT 250 FOR IP)

## 2022-08-06 PROCEDURE — 36415 COLL VENOUS BLD VENIPUNCTURE: CPT

## 2022-08-06 PROCEDURE — 2580000003 HC RX 258

## 2022-08-06 PROCEDURE — 87635 SARS-COV-2 COVID-19 AMP PRB: CPT

## 2022-08-06 PROCEDURE — 85027 COMPLETE CBC AUTOMATED: CPT

## 2022-08-06 PROCEDURE — 83735 ASSAY OF MAGNESIUM: CPT

## 2022-08-06 PROCEDURE — 84100 ASSAY OF PHOSPHORUS: CPT

## 2022-08-06 PROCEDURE — 94640 AIRWAY INHALATION TREATMENT: CPT

## 2022-08-06 RX ORDER — FERROUS SULFATE 325(65) MG
325 TABLET ORAL 2 TIMES DAILY
DISCHARGE
Start: 2022-08-06 | End: 2022-09-19 | Stop reason: SDUPTHER

## 2022-08-06 RX ADMIN — CETIRIZINE HYDROCHLORIDE 10 MG: 10 TABLET ORAL at 09:31

## 2022-08-06 RX ADMIN — DOXAZOSIN 2 MG: 2 TABLET ORAL at 09:30

## 2022-08-06 RX ADMIN — ACETAMINOPHEN 650 MG: 325 TABLET ORAL at 11:50

## 2022-08-06 RX ADMIN — FERROUS SULFATE TAB 325 MG (65 MG ELEMENTAL FE) 325 MG: 325 (65 FE) TAB at 09:31

## 2022-08-06 RX ADMIN — INSULIN LISPRO 1 UNITS: 100 INJECTION, SOLUTION INTRAVENOUS; SUBCUTANEOUS at 11:52

## 2022-08-06 RX ADMIN — Medication 10 ML: at 09:33

## 2022-08-06 RX ADMIN — CARVEDILOL 12.5 MG: 6.25 TABLET, FILM COATED ORAL at 09:31

## 2022-08-06 RX ADMIN — LOSARTAN POTASSIUM 100 MG: 100 TABLET, FILM COATED ORAL at 09:30

## 2022-08-06 RX ADMIN — TIOTROPIUM BROMIDE INHALATION SPRAY 2 PUFF: 3.12 SPRAY, METERED RESPIRATORY (INHALATION) at 07:57

## 2022-08-06 RX ADMIN — GABAPENTIN 300 MG: 300 CAPSULE ORAL at 09:31

## 2022-08-06 RX ADMIN — Medication 2 PUFF: at 07:58

## 2022-08-06 RX ADMIN — PANTOPRAZOLE SODIUM 40 MG: 40 TABLET, DELAYED RELEASE ORAL at 05:57

## 2022-08-06 ASSESSMENT — PAIN SCALES - GENERAL: PAINLEVEL_OUTOF10: 5

## 2022-08-06 ASSESSMENT — PAIN - FUNCTIONAL ASSESSMENT: PAIN_FUNCTIONAL_ASSESSMENT: ACTIVITIES ARE NOT PREVENTED

## 2022-08-06 ASSESSMENT — PAIN DESCRIPTION - LOCATION: LOCATION: HEAD

## 2022-08-06 ASSESSMENT — PAIN DESCRIPTION - DESCRIPTORS: DESCRIPTORS: ACHING

## 2022-08-06 ASSESSMENT — PAIN DESCRIPTION - ORIENTATION: ORIENTATION: RIGHT;LEFT

## 2022-08-06 NOTE — PLAN OF CARE
Problem: Discharge Planning  Goal: Discharge to home or other facility with appropriate resources  Outcome: Progressing  Flowsheets (Taken 8/6/2022 0900)  Discharge to home or other facility with appropriate resources: Identify barriers to discharge with patient and caregiver     Problem: Safety - Adult  Goal: Free from fall injury  Outcome: Progressing     Problem: ABCDS Injury Assessment  Goal: Absence of physical injury  Outcome: Progressing     Problem: Pain  Goal: Verbalizes/displays adequate comfort level or baseline comfort level  Outcome: Progressing     Problem: Chronic Conditions and Co-morbidities  Goal: Patient's chronic conditions and co-morbidity symptoms are monitored and maintained or improved  Outcome: Progressing  Flowsheets (Taken 8/6/2022 0900)  Care Plan - Patient's Chronic Conditions and Co-Morbidity Symptoms are Monitored and Maintained or Improved: Collaborate with multidisciplinary team to address chronic and comorbid conditions and prevent exacerbation or deterioration     Problem: Nutrition Deficit:  Goal: Optimize nutritional status  Outcome: Progressing

## 2022-08-06 NOTE — CARE COORDINATION
DISCHARGE ORDER  Date/Time 2022 3:04 PM  Completed by: Owen Sorensen RN, Case Management    Patient Name: Yuri Tinajero    : 1945      Admit order Date and Status:2022 inpt  Noted discharge order. (verify MD's last order for status of admission/Traditional Medicare 3 [place] Inpatient qualifying stay required for SNF)    Confirmed discharge plan with:              Patient:  Yes              When pt confirms DC plan does any support person need to be contacted by CM No if yes who______                      Discharge to Facility: [address] phone number for staff giving report: [phone] completed: [facility] Exemption Notification (HENS) completed: not needed returning to [facility]   Discharge orders and Continuity of Care faxed to facility:  yes      Transportation:               Medical Transport explained with choice list offered to pt/family. Choice:Yes(no preference)  Agency used: [facility] up time:   [id number]      Pt/family/Nursing/Facility aware of  time:   Yes Names: pt/bedside RN Krystal/Felicitas @ [address] [place] [address] Main form completed:  yes:      Date Last IMM Given: 2022    Comments:Chart reviewed. Pt is returning to [facility] and is aware of  time. Pt states she will let family know. LVM x2 with Edna Dixon @ [facility], then called facility @ [phone] and spoke with Robi Barker who states pt can return and she is aware of  time, negative covid test obtained at this time. Pt is being d/c'd to [facility] today. Pt's O2 sats are 96% on RA. Discharge timeout done with Krystal. All discharge needs and concerns addressed. Discharging nurse to complete VIOLETTE, reconcile AVS, and place final copy with patient's discharge packet.  Discharging RN to ensure that written prescriptions for  Level II medications are sent with patient to the facility as per protocol.

## 2022-08-06 NOTE — PROGRESS NOTES
Patient IV removed; patient tolerated well, pressure held. Tele monitor removed at this time. Discharge instructions printed off and placed with squad pack. Attempted to call report to jimmy at this time; no answer.

## 2022-08-06 NOTE — DISCHARGE INSTR - COC
Continuity of Care Form    Patient Name: Marisel Franco   :    MRN:  [id number]    Admit date:  2022  Discharge date:  2022    Code Status Order: Full Code   Advance Directives:     Admitting Physician:  Radha De Jesus MD  PCP: Magali Pryor MD    Discharging Nurse: Abhi Reyna Unit/Room#: 4392/9389-62  Discharging Unit Phone Number: [phone]    Emergency Contact:   Extended Emergency Contact Information  Primary Emergency Contact: Alden Thakur   [address] Phone: [phone]  Relation: Child  Secondary Emergency Contact: Donaldo Nascimento [facility]. Phone: [phone]  Relation: Child   needed?  No    Past Surgical History:  Past Surgical History:   Procedure Laterality Date    APPENDECTOMY      CARPAL TUNNEL RELEASE Left 2018    CATARACT REMOVAL WITH IMPLANT Right 2018    CATARACT REMOVAL WITH IMPLANT Left 08/15/2018    COLONOSCOPY  2014    FEMUR FRACTURE SURGERY Right 2022    RIGHT INTRAMEDULLARY NAILING performed by Perla Crowder DO at [address] ([facility])      KNEE ARTHROSCOPY Bilateral     PARATHYROIDECTOMY      PARTIAL HYSTERECTOMY (CERVIX NOT REMOVED)      KS XCAPSL CTRC RMVL INSJ IO LENS PROSTH W/O ECP Right 2018    PHACOEMULSIFICATION OF CATARACT WITH INTRAOCULAR LENS IMPLANT RIGHT EYE performed by Marimar Jara MD at [facility] W/O ECP Left 8/15/2018    PHACOEMULSIFICATION OF CATARACT WITH INTRAOCULAR LENS IMPLANT LEFT EYE performed by Marimar Jara MD at [address] [facility] N/A 2022    EGD BIOPSY performed by Ru Augustin DO at [facility]  2022    EGD POLYP HOT FORCEP/CAUTERY performed by Ru Augustin DO at [address]       Immunization History:   Immunization History   Administered Date(s) Administered    COVID-19, MODERNA BLUE border, Primary or Immunocompromised, (age 12y+), IM, 100 mcg/0.5mL 02/12/2021, 03/12/2021    COVID-19, [place] Vaccine, Vaccine Unspecified 12/02/2021    Influenza A (H0V5-48) Vaccine IM 10/29/2010    Influenza Vaccine, unspecified formulation 10/15/2016    Influenza Virus Vaccine 10/20/2011, 10/04/2013, 10/02/2014, 10/15/2016    Influenza, High Dose (Fluzone 65 yrs and older) 10/15/2015, 09/30/2016, 10/19/2017, 11/01/2018, 09/23/2019    Influenza, High-dose, Quadv, 65 yrs +, IM (Fluzone) 10/21/2020, 09/23/2021    Pneumococcal Conjugate 13-valent (Jvwpqss27) 10/15/2015, 10/15/2015    Pneumococcal Polysaccharide (Hasormejp64) 09/28/2012, 01/01/2013    Td (Adult), 5 Lf Tetanus Toxoid, Pf (Tenivac, Decavac) 01/01/1997    Tdap (Boostrix, Adacel) 11/11/2015, 07/14/2016    Tetanus 07/14/2016    Tetanus Toxoid, absorbed 07/14/2016       Active Problems:  Patient Active Problem List   Diagnosis Code    Knee pain M25.569    Chronic urinary tract infection N39.0    Osteoarthritis of both knees M17.0    Benign essential HTN I10    Type 2 diabetes mellitus without complication, without long-term current use of insulin ([facility]) E11.9    Gastroesophageal reflux disease without esophagitis K21.9    Chronic gout of multiple sites M1A. 15O8    Spinal stenosis of lumbar region M48.061    Anemia, chronic disease D63.8    Carpal tunnel syndrome of left wrist G56.02    Morbid obesity ([facility]) E66.01    Moderate persistent asthma without complication X10.65    Hyperlipidemia associated with type 2 diabetes mellitus (HCC) E11.69, E78.5    Major depressive disorder with single episode, in full remission ([facility] F32.5    Asthma J45.909    Diabetes ([facility] E11.9    DJD (degenerative joint disease) M19.90    Edema R60.9    Gout M10.9    Lumbar disc disease M51.9    Melanoma ([facility] C43.9    Vitamin B 12 deficiency E53.8    Osteoporosis M81.0    S/P hysterectomy Z90.710    S/P parathyroidectomy ([facility] E89.2    Sciatic nerve pain M54.30    Thalassemia trait D56. 3    Acute blood loss anemia D62    Closed 2-part intertrochanteric fracture of proximal end of right femur ([facility]) S72.141A    Shock ([facility]) R57.9    Acute hypoxemic respiratory failure ([facility]) J96.01    Bradycardia R00.1    Hypotension I95.9    Paroxysmal atrial fibrillation ([facility]) I48.0    Hip fx, right, open type I or II, initial encounter ([facility] S72.001B    Gastrointestinal hemorrhage K92.2    Symptomatic anemia D64.9    Anemia D64.9       Isolation/Infection:   Isolation            No Isolation          Patient Infection Status       Infection Onset Added Last Indicated Last Indicated By Review Planned Expiration Resolved Resolved By    None active    Resolved    COVID-19 (Rule Out) 06/30/22 06/30/22 06/30/22 COVID-19, Rapid (Ordered)   06/30/22 Rule-Out Test Resulted            Nurse Assessment:  Last Vital Signs: BP (!) 143/62   Pulse 82   Temp 97.7 °F (36.5 °C) (Oral)   Resp 18   Ht 5' 3\" (1.6 m)   Wt 215 lb 14.4 oz (97.9 kg)   SpO2 95%   BMI 38.24 kg/m²     Last documented pain score (0-10 scale): Pain Level: 5  Last Weight:   Wt Readings from Last 1 Encounters:   08/04/22 215 lb 14.4 oz (97.9 kg)     Mental Status:  oriented and alert    IV Access:  - None    Nursing Mobility/ADLs:  Walking   Dependent  Transfer  Assisted  Bathing  Assisted  Dressing  Independent  Toileting  Assisted  Feeding  Independent  Med Admin  Independent  Med Delivery   whole    Wound Care Documentation and Therapy:  Incision 06/20/22 Femoral Right;Lateral (Active)   Number of days: 46        Elimination:  Continence: Bowel: Yes  Bladder: Yes  Urinary Catheter: None   Colostomy/Ileostomy/Ileal Conduit: No       Date of Last BM: 8/6/22    Intake/Output Summary (Last 24 hours) at 8/6/2022 1252  Last data filed at 8/6/2022 0856  Gross per 24 hour   Intake 480 ml   Output 900 ml   Net -420 ml     I/O last 3 completed shifts:   In: 885.4 [P.O.:600; Blood:285.4]  Out: 2000 [Urine:2000]    Safety Concerns:     History of Falls (last 30 days) and At Risk for Falls    Impairments/Disabilities:      None    Nutrition Therapy:  Current Nutrition Therapy:   - Oral Diet:  General    Routes of Feeding: Oral  Liquids: No Restrictions  Daily Fluid Restriction: no  Last Modified Barium Swallow with Video (Video Swallowing Test): not done    Treatments at the Time of Hospital Discharge:   Respiratory Treatments:   Oxygen Therapy:  is not on home oxygen therapy. Ventilator:    - No ventilator support    Rehab Therapies: Physical Therapy and Occupational Therapy  Weight Bearing Status/Restrictions: No weight bearing restrictions  Other Medical Equipment (for information only, NOT a DME order):  wheelchair and walker  Other Treatments:     Patient's personal belongings (please select all that are sent with patient): With patient     RN SIGNATURE:  Electronically signed by Matias Solano RN on 8/6/22 at 3:04 PM EDT    CASE MANAGEMENT/SOCIAL WORK SECTION    Inpatient Status Date: 8/4/2022    Readmission Risk Assessment Score:  Readmission Risk              Risk of Unplanned Readmission:  [id number]           Discharging to Facility/ Agency   Name: [facility]        Address: [address] [place] [address]  Phone: [phone]  Fax: [phone]     Dialysis Facility (if applicable)   Name:  Address:  Dialysis Schedule:  Phone:  Fax:    / signature: Electronically signed by Elian Singer RN on 8/6/22 at 1:35 PM EDT    PHYSICIAN SECTION    Prognosis: Fair    Condition at Discharge: Stable    Rehab Potential (if transferring to Rehab): Good    Recommended Labs or Other Treatments After Discharge:    Recommend weekly labs- CBC, BMP, mag . If H/H below 7.0 -->  outpt blood transfusion can be ordered by PCP/ NH MD      Follow up with  Hematologist Dr. Letty Fields in 1-2 weeks .      Outpt blood transfusion orders could also be coordinated through hematologist's office         Physician Certification: I certify the above information and transfer of Yuri Clause  is necessary for the continuing treatment of the diagnosis listed and that she requires Manoj Alan for greater 30 days.      Update Admission H&P: No change in H&P    PHYSICIAN SIGNATURE:  Electronically signed by Jed Spatz, MD on 8/6/22 at 12:54 PM EDT

## 2022-08-06 NOTE — PROGRESS NOTES
AM Shift assessment completed. Pt is alert and oriented. Vital signs are WNL. Respirations are even & easy. No complaints voiced. Pt denies needs at this time. SR up x 2 and bed in low position. Call light is within reach. Will monitor. .Bedside Mobility Assessment Tool (BMAT):     Assessment Level 1- Sit and Shake    1. From a semi-reclined position, ask patient to sit up and rotate to a seated position at the side of the bed. Can use the bedrail. 2. Ask patient to reach out and grab your hand and shake making sure patient reaches across his/her midline. Pass- Patient is able to come to a seated position, maintain core strength. Maintains seated balance while reaching across midline. Move on to Assessment Level 2. Assessment Level 2- Stretch and Point   1. With patient in seated position at the side of the bed, have patient place both feet on the floor (or stool) with knees no higher than hips. 2. Ask patient to stretch one leg and straighten the knee, then bend the ankle/flex and point the toes. If appropriate, repeat with the other leg. Pass- Patient is able to demonstrate appropriate quad strength on intended weight bearing limb(s). Move onto Assessment Level 3. Assessment Level 3- Stand   1. Ask patient to elevate off the bed or chair (seated to standing) using an assistive device (cane, bedrail). 2. Patient should be able to raise buttocks off be and hold for a count of five. May repeat once. Fail- Patient unable to demonstrate standing stability. Patient is MOBILITY LEVEL 3. Assessment Level 4- Walk   1. Ask patient to march in place at bedside. 2. Then ask patient to advance step and return each foot. Some medical conditions may render a patient from stepping backwards, use your best clinical judgement. Fail- Patient not able to complete tasks OR requires use of assistive device. Patient is MOBILITY LEVEL 3.        Mobility Level- 2

## 2022-08-13 NOTE — DISCHARGE SUMMARY
Physician Discharge Summary     Patient ID:  Judi Anderson  [id number]  65 y.o.  1945    Admit date: 8/2/2022    Discharge date and time: 8/6/2022  4:40 PM     Admitting Physician: Mirna Cm MD     Discharge Physician: Mirna Cm MD    Admission Diagnoses: Symptomatic anemia [D64.9]  Anemia [D64.9]    Discharge Diagnoses: Principal Problem:    Symptomatic anemia  Active Problems:    Paroxysmal atrial fibrillation (HCC)    Anemia    Benign essential HTN    Type 2 diabetes mellitus without complication, without long-term current use of insulin (HCC)    Gout  Resolved Problems:    Hip fracture requiring operative repair, left, closed, initial encounter [facility])      Admission Condition: fair    Discharged Condition: stable    Indication for Admission:   Per HPI     The patient is a 65 y.o. female with pmhx of recent hip fracture, thallasemia minor, OA, DM, GERD, HTN who [facility], [facility], Hgb was found to be 6.2 there so they brought her to the ED. Patient herself was feeling ok, she just reports that she was increasingly fatigued and sleepy. She denies any dizziness, light-headness, falls. No dark tarry stool or rectal bleeding. No hemoptysis or hematemesis. No fever, chills, chest pain, dyspnea, nausea, vomiting, diarrhea,hematuria, dysuria. Hospital Course:     #Acute on chronic microcytic anemia  #Symptomatic anemia  # pt has hx of thallasemia minor and Iron Deficiency Anemia   -she had recent admission with acute blood loss and possible hemorrhagic shock, required total of 4 units of pRBCs during that admission  -hemoccult negative  -Hemoglobin low around 7.1 this admission. Subsequently dropped to 6.7. So far transfused 2 units of PRBC this admission. Hemoglobin up to 8.0, 7.8 now. -Given IV Venofer daily for 3 days, continue iron supplements. Admitted with symptomatic anemia. S/p PRBC transfusion.   Seen in consultation by heme-onc. Patient has chronic microcytic anemia secondary to thalassemia. She also has iron deficiency, S/P IV Venofer     Ms. Thakur is sitting up on the chair, working with therapy today . Hemoglobin stable at 7.8. No denise bleeding. Tolerating diet. Weakness improved  --heme/onc recommendations . Patient will have chronic anemia and might need regular transfusions. Recommendation is to check weekly CBC in nursing home and arrange for outpatient transfusions as needed     #Paroxysmal atrial fibrillation  -NSR on presentation  -rate controlled   -on coreg  -not on [facility]      #recent intertrochanteric hip fracture; right  -Osteoporosis contributing  -s.p IM nailing of right hip  -was at inpatient rehab facility  -continue PT/OT      # Hypomagnesemia   - repleted     #DM Type 2  -On sliding scale insulin  -continue to monitor BG     #HTN  -continue coreg and losartan     #Asthma without AE   -Continued home inhalers     #HLD  -Continued statin     #Osteoporosis  -Receives Fosamax weekly     #GERD  -On PPI     #Gout  -Continued allopurinol        DVT Prophylaxis: SCDs  Diet: ADULT DIET; Regular; 4 carb choices (60 gm/meal)  Code Status: Full Code     Monitor CBC, electrolytes. Continue PT OT.  -discharge back to [facility]  for continued rehab.   Patient has chronic anemia and will likely need weekly labs and might need outpatient transfusions          Consults: hematology/oncology    Significant Diagnostic Studies:   Data:   Latest Reference Range & Units 8/6/22 14:30   Sodium 136 - 145 mmol/L 137   Potassium 3.5 - 5.1 mmol/L 4.8   Chloride 99 - 110 mmol/L 105   CO2 21 - 32 mmol/L 24   BUN,BUNPL 7 - 20 mg/dL 18   Creatinine 0.6 - 1.2 mg/dL 0.8   Anion Gap 3 - 16  8   GFR Non-African American >60  >60   GFR African American >60  >60   Magnesium 1.80 - 2.40 mg/dL 1.30 (L)   GLUCOSE - FASTING 70 - 99 mg/dL 262 (H)   CALCIUM, SERUM, 378395 8.3 - 10.6 mg/dL 8.7   Phosphorus 2.5 - 4.9 mg/dL 2.8   WBC 4.0 - 11.0 K/uL 4.1   RBC 4.00 - 5.20 M/uL 3.69 (L)   Hemoglobin Quant 12.0 - 16.0 g/dL 7.8 (L)   Hematocrit 36.0 - 48.0 % 24.8 (L)   MCV 80.0 - 100.0 fL 67.3 (L)   MCH 26.0 - 34.0 pg 21.3 (L)   MCHC 31.0 - 36.0 g/dL 31.6   MPV 5.0 - 10.5 fL 9.3   RDW 12.4 - 15.4 % 27.7 (H)   Platelet Count 064 - 450 K/uL 301   (L): Data is abnormally low  (H): Data is abnormally high    CBC:         Recent Labs     08/04/22  1106 08/05/22  0550 08/05/22  1310   WBC  -- 3.8*  --   RBC  -- 3.17*  --   HGB 7.2* 6.7* 8.0*   HCT 23.5* 20.4* 24.4*   MCV  -- 64.3*  --   RDW  -- 25.1*  --   PLT  -- 271  --             Cultures  COVID not detected         Radiology  XR CHEST PORTABLE   Final Result   No focal lung opacity. Treatments: as above        Discharge Exam:    Objective:   BP (!) 143/62   Pulse 82   Temp 97.7 °F (36.5 °C) (Oral)   Resp 18   Ht 5' 3\" (1.6 m)   Wt 215 lb 14.4 oz (97.9 kg)   SpO2 95%   BMI 38.24 kg/m²     Intake/Output Summary (Last 24 hours) at 8/6/2022 1249  Last data filed at 8/6/2022 0856      Gross per 24 hour   Intake 480 ml   Output 900 ml   Net -420 ml            Physical Exam:  General:  Awake, alert, NAD. Skin:  Warm and dry  Neck:  JVD absent. Neck supple  Chest:  Clear to auscultation, respiration easy. No wheezes, rales or rhonchi. Cardiovascular:  RRR ,S1S2 normal  Abdomen:  Soft, non tender, non distended, BS +  Extremities:  No edema. Intact peripheral pulses.  Brisk cap refill, < 2 secs  Neuro: non focal        Disposition: SNF    Patient Instructions:      Medication List        CHANGE how you take these medications      ferrous sulfate 325 (65 Fe) MG tablet  Commonly known as: IRON 325  Take 1 tablet by mouth in the morning and at bedtime  What changed: when to take this            CONTINUE taking these medications      acetaminophen 500 MG tablet  Commonly known as: TYLENOL     albuterol sulfate  (90 Base) MCG/ACT inhaler  Commonly known as: ProAir HFA  INHALE TWO PUFFS BY MOUTH EVERY 6 HOURS AS NEEDED FOR WHEEZING     alendronate 70 MG tablet  Commonly known as: FOSAMAX  TAKE 1 TABLET BY MOUTH  WEEKLY WITH 8 OZ OF PLAIN  WATER 30 MINUTES BEFORE  FIRST FOOD, DRINK OR MEDS. STAY UPRIGHT FOR 30 MINS     allopurinol 300 MG tablet  Commonly known as: ZYLOPRIM  TAKE 1 TABLET BY MOUTH  DAILY     atorvastatin 10 MG tablet  Commonly known as: LIPITOR  TAKE 1 TABLET BY MOUTH AT  NIGHT     blood glucose test strips strip  Commonly known as: ASCENSIA AUTODISC VI;ONE TOUCH ULTRA TEST VI  Used to test once daily. DX: E11.9     Breo Ellipta 200-25 MCG/INH Aepb inhaler  Generic drug: Fluticasone furoate-vilanterol  INHALE ONE DOSE BY MOUTH DAILY     CALCIUM + D PO     carvedilol 12.5 MG tablet  Commonly known as: COREG  Take 1 tablet by mouth 2 times daily (with meals)     cetirizine 10 MG tablet  Commonly known as: ZYRTEC     clotrimazole-betamethasone 1-0.05 % cream  Commonly known as: Lotrisone  Apply topically 2 times daily. CO Q 10 PO     gabapentin 300 MG capsule  Commonly known as: NEURONTIN  Take 1 capsule by mouth 2 times daily for 180 days.  Intended supply: 90 days     glipiZIDE 10 MG tablet  Commonly known as: GLUCOTROL  TAKE 1 TABLET BY MOUTH  TWICE DAILY BEFORE MEALS     Incruse Ellipta 62.5 MCG/INH Aepb  Generic drug: Umeclidinium Bromide  INHALE ONE PUFF BY MOUTH DAILY     losartan 100 MG tablet  Commonly known as: COZAAR  TAKE 1 TABLET BY MOUTH  DAILY     metFORMIN 1000 MG tablet  Commonly known as: GLUCOPHAGE  TAKE 1 TABLET BY MOUTH  TWICE DAILY WITH MEALS     omeprazole 20 MG delayed release capsule  Commonly known as: PRILOSEC  TAKE ONE CAPSULE BY MOUTH TWICE A DAY     PARoxetine 10 MG tablet  Commonly known as: PAXIL  TAKE 1 TABLET BY MOUTH  DAILY     terazosin 2 MG capsule  Commonly known as: HYTRIN  TAKE ONE CAPSULE BY MOUTH ONCE NIGHTLY     VITAMIN B 12 PO     Vitamin C 500 MG Caps     vitamin E 400 UNIT capsule               Where to Get Your Medications Information about where to get these medications is not yet available    Ask your nurse or doctor about these medications  ferrous sulfate 325 (65 Fe) MG tablet             Follow-up with MD at [facility] . Total time today 36 minutes.  > 50 % time dominated by coordination of care         Signed:  Ana M Austin MD

## 2022-08-16 ENCOUNTER — OFFICE VISIT (OUTPATIENT)
Dept: CARDIOLOGY CLINIC | Age: 77
End: 2022-08-16
Payer: MEDICARE

## 2022-08-16 VITALS
DIASTOLIC BLOOD PRESSURE: 70 MMHG | HEIGHT: 63 IN | WEIGHT: 213 LBS | BODY MASS INDEX: 37.74 KG/M2 | HEART RATE: 77 BPM | SYSTOLIC BLOOD PRESSURE: 120 MMHG | TEMPERATURE: 98.2 F

## 2022-08-16 DIAGNOSIS — I10 PRIMARY HYPERTENSION: ICD-10-CM

## 2022-08-16 DIAGNOSIS — I48.0 PAROXYSMAL ATRIAL FIBRILLATION (HCC): Primary | ICD-10-CM

## 2022-08-16 PROCEDURE — 1036F TOBACCO NON-USER: CPT | Performed by: NURSE PRACTITIONER

## 2022-08-16 PROCEDURE — 1123F ACP DISCUSS/DSCN MKR DOCD: CPT | Performed by: NURSE PRACTITIONER

## 2022-08-16 PROCEDURE — G8399 PT W/DXA RESULTS DOCUMENT: HCPCS | Performed by: NURSE PRACTITIONER

## 2022-08-16 PROCEDURE — 1090F PRES/ABSN URINE INCON ASSESS: CPT | Performed by: NURSE PRACTITIONER

## 2022-08-16 PROCEDURE — 1111F DSCHRG MED/CURRENT MED MERGE: CPT | Performed by: NURSE PRACTITIONER

## 2022-08-16 PROCEDURE — 99214 OFFICE O/P EST MOD 30 MIN: CPT | Performed by: NURSE PRACTITIONER

## 2022-08-16 PROCEDURE — G8417 CALC BMI ABV UP PARAM F/U: HCPCS | Performed by: NURSE PRACTITIONER

## 2022-08-16 PROCEDURE — G8427 DOCREV CUR MEDS BY ELIG CLIN: HCPCS | Performed by: NURSE PRACTITIONER

## 2022-08-16 RX ORDER — PROMETHAZINE HYDROCHLORIDE 25 MG/1
25 TABLET ORAL EVERY 6 HOURS PRN
COMMUNITY
End: 2022-09-21

## 2022-08-16 RX ORDER — NYSTATIN 100000 [USP'U]/G
POWDER TOPICAL 4 TIMES DAILY
COMMUNITY
End: 2022-09-21

## 2022-08-16 RX ORDER — CYCLOBENZAPRINE HCL 5 MG
5 TABLET ORAL 3 TIMES DAILY PRN
COMMUNITY
End: 2022-10-06

## 2022-08-16 RX ORDER — FLUTICASONE PROPIONATE AND SALMETEROL 500; 50 UG/1; UG/1
1 POWDER RESPIRATORY (INHALATION) DAILY
COMMUNITY
End: 2022-10-06

## 2022-08-16 RX ORDER — TROLAMINE SALICYLATE 10 G/100G
CREAM TOPICAL PRN
COMMUNITY
End: 2022-09-21

## 2022-08-16 RX ORDER — ONDANSETRON 4 MG/1
4 TABLET, FILM COATED ORAL EVERY 8 HOURS PRN
COMMUNITY
End: 2022-09-21

## 2022-08-16 ASSESSMENT — ENCOUNTER SYMPTOMS
RESPIRATORY NEGATIVE: 1
GASTROINTESTINAL NEGATIVE: 1

## 2022-08-16 NOTE — PROGRESS NOTES
Medication Sig Start Date End Date Taking? Authorizing Provider   ferrous sulfate (IRON 325) 325 (65 Fe) MG tablet Take 1 tablet by mouth in the morning and at bedtime 8/6/22   Radha De Jesus MD   carvedilol (COREG) 12.5 MG tablet Take 1 tablet by mouth 2 times daily (with meals) 6/30/22   Flaquito Esquivel MD   terazosin (HYTRIN) 2 MG capsule TAKE ONE CAPSULE BY MOUTH ONCE NIGHTLY 6/16/22   Araceli Burnette MD   omeprazole (PRILOSEC) 20 MG delayed release capsule TAKE ONE CAPSULE BY MOUTH TWICE A DAY 6/16/22   Araceli Burnette MD   alendronate (FOSAMAX) 70 MG tablet TAKE 1 TABLET BY MOUTH  WEEKLY WITH 8 OZ OF PLAIN  WATER 30 MINUTES BEFORE  FIRST FOOD, DRINK OR MEDS. STAY UPRIGHT FOR 30 MINS 6/6/22   Araceli Burnette MD   Fluticasone furoate-vilanterol (BREO ELLIPTA) 200-25 MCG/INH AEPB inhaler INHALE ONE DOSE BY MOUTH DAILY 3/16/22   Araceli Burnette MD   Umeclidinium Bromide (INCRUSE ELLIPTA) 62.5 MCG/INH AEPB INHALE ONE PUFF BY MOUTH DAILY 3/16/22   Araceli Burnette MD   gabapentin (NEURONTIN) 300 MG capsule Take 1 capsule by mouth 2 times daily for 180 days. Intended supply: 90 days 3/16/22 9/12/22  Araceli Burnette MD   blood glucose test strips (ASCENSIA AUTODISC VI;ONE TOUCH ULTRA TEST VI) strip Used to test once daily.  DX: E11.9 3/16/22   Araceli Burnette MD   albuterol sulfate HFA (PROAIR HFA) 108 (90 Base) MCG/ACT inhaler INHALE TWO PUFFS BY MOUTH EVERY 6 HOURS AS NEEDED FOR WHEEZING 11/12/21   Araceli Burnette MD   metFORMIN (GLUCOPHAGE) 1000 MG tablet TAKE 1 TABLET BY MOUTH  TWICE DAILY WITH MEALS 9/7/21   Araceli Burnette MD   PARoxetine (PAXIL) 10 MG tablet TAKE 1 TABLET BY MOUTH  DAILY 9/7/21   Araceli Burnette MD   atorvastatin (LIPITOR) 10 MG tablet TAKE 1 TABLET BY MOUTH AT  NIGHT 9/7/21   Araceli Burnette MD   allopurinol (ZYLOPRIM) 300 MG tablet TAKE 1 TABLET BY MOUTH  DAILY 9/7/21   Araceli Burnette MD   losartan (COZAAR) 100 MG tablet TAKE 1 TABLET BY MOUTH  DAILY 9/7/21   Araceli Burnette MD   glipiZIDE (GLUCOTROL) 10 MG tablet TAKE 1 TABLET BY MOUTH  TWICE DAILY BEFORE MEALS 9/7/21   Araceli Burnette MD   clotrimazole-betamethasone (LOTRISONE) 1-0.05 % cream Apply topically 2 times daily. 6/10/21   Araceli Burnette MD   acetaminophen (TYLENOL) 500 MG tablet Take 500 mg by mouth every 6 hours as needed for Pain    Historical Provider, MD   Coenzyme Q10 (CO Q 10 PO) Take by mouth    Historical Provider, MD   cetirizine (ZYRTEC) 10 MG tablet Take 10 mg by mouth daily    Historical Provider, MD   Cyanocobalamin (VITAMIN B 12 PO) Take 1,000 mcg by mouth daily. Historical Provider, MD   Ascorbic Acid (VITAMIN C) 500 MG CAPS Take  by mouth daily. Historical Provider, MD   vitamin E 400 UNIT capsule Take 400 Units by mouth daily. Historical Provider, MD   Calcium Carbonate-Vitamin D (CALCIUM + D PO) Take  by mouth daily. Historical Provider, MD     Allergies:  Hctz [hydrochlorothiazide] and Lisinopril    Social History:   reports that she is a non-smoker but has been exposed to tobacco smoke. She has never used smokeless tobacco. She reports that she does not drink alcohol and does not use drugs. Family History: family history includes Cancer in her maternal aunt, maternal grandmother, and paternal aunt; Emphysema in her mother; Heart Disease in her father; Heart Failure in her father and mother; High Blood Pressure in her father; Hypertension in her father; Stroke in her mother. Review of Systems   Review of Systems   Constitutional: Negative. Respiratory: Negative. Cardiovascular: Negative. Gastrointestinal: Negative. Neurological:  Positive for weakness.      OBJECTIVE:      Vital signs:    /70   Pulse 77   Temp 98.2 °F (36.8 °C)   Ht 5' 3\" (1.6 m)   Wt 213 lb (96.6 kg)   BMI 37.73 kg/m²      Physical Exam:  Constitutional:  Comfortable and alert, NAD, appears older than stated age  Eyes: PERRL, sclera nonicteric  Neck:  Supple, no masses, no thyroidmegaly, no JVD  Skin:  Warm and dry; no rash or lesions  Heart:  Regular, normal apex, S1 and S2 normal, no M/G/R  Lungs:  Normal respiratory effort; clear; no wheezing/rhonchi/rales  Abdomen: soft, non tender, + bowel sounds  Extremities:  No edema or cyanosis; no clubbing  Neuro: alert and oriented, moves legs and arms equally, normal mood and affect    Data Reviewed:      Echo 6/4861:  LV systolic function is normal with EF estimated 60%. No regional wall motion abnormalities are noted. There is mild concentric left ventricular hypertrophy. Grade I diastolic dysfunction with normal filling pressure. Mild biatrial enlargement. Mild posterior mitral annular calcification is present. The maximal transaortic velocity is 2.22m/s which gives peak pressure   gradient= 21mmHg and mean pressure gradient= 12mmHg which is c/w mild aortic   stenosis. Mild tricuspid and pulmonic regurgitation. Systolic pulmonary artery pressure (SPAP) estimated at 56 mmHg (RAP 15   mmHg), c/w moderate pulm HTN. Cardiology Labs Reviewed:   CBC: No results for input(s): WBC, HGB, HCT, PLT in the last 72 hours. BMP:No results for input(s): NA, K, CO2, BUN, CREATININE, LABGLOM, GLUCOSE in the last 72 hours. PT/INR: No results for input(s): PROTIME, INR in the last 72 hours. APTT:No results for input(s): APTT in the last 72 hours. FASTING LIPID PANEL:  Lab Results   Component Value Date/Time    HDL 50 10/21/2020 01:25 PM    LDLCALC 50 10/21/2020 01:25 PM    TRIG 103 06/24/2022 04:45 AM     LIVER PROFILE:No results for input(s): AST, ALT, ALB in the last 72 hours.   BNP:   Lab Results   Component Value Date/Time    PROBNP 1,173 06/23/2022 09:05 PM    PROBNP 450 01/06/2015 02:10 PM     Reviewed all labs and imaging today    Assessment:   Paroxysmal atrial fibrillation: stable, regular today              - [id number] score 3   - noted 6/2022 when magnesium 1.4   - cardiac monitor 7/2022 did not show afib  Sinus bradycardia with hypotension: resolved; noted 6/21/2022 during severe anemia, possibly vagal event  Moderate pulmonary HTN  HTN: controlled  HLD  DM  Anemia with history of thallasemia minor: required PRBCs; IV iron per hem onc  Fall with R hip fracture s/p repair 6/20/2022    Plan:   1. Continue carvedilol and losartan  2. Not on anticoagulation for PAF due to chronic anemia and has required multiple transfusions. PAF originally noted during electrolyte abnormalities (Mg 1.4) post hip repair and no known recurrence. Cardiac monitor 7/2022 showed no afib. Thus, risk of anticoagulation outweighs benefit. If afib recurrent in future, could consider Watchman if she were to be able to tolerate short term anticoagulation. 3. Check BP at home and call the office if consistently out of goal range  4.  Follow up in 6 months    MADDIE Mg-CNP  [facility]  [phone]

## 2022-08-18 ENCOUNTER — OFFICE VISIT (OUTPATIENT)
Dept: ORTHOPEDIC SURGERY | Age: 77
End: 2022-08-18

## 2022-08-18 VITALS — HEIGHT: 63 IN | BODY MASS INDEX: 37.74 KG/M2 | WEIGHT: 213 LBS

## 2022-08-18 DIAGNOSIS — M25.551 RIGHT HIP PAIN: Primary | ICD-10-CM

## 2022-08-18 DIAGNOSIS — Z98.890 S/P ORIF (OPEN REDUCTION INTERNAL FIXATION) FRACTURE: ICD-10-CM

## 2022-08-18 DIAGNOSIS — Z87.81 S/P ORIF (OPEN REDUCTION INTERNAL FIXATION) FRACTURE: ICD-10-CM

## 2022-08-18 PROCEDURE — 1111F DSCHRG MED/CURRENT MED MERGE: CPT | Performed by: STUDENT IN AN ORGANIZED HEALTH CARE EDUCATION/TRAINING PROGRAM

## 2022-08-18 PROCEDURE — 99024 POSTOP FOLLOW-UP VISIT: CPT | Performed by: STUDENT IN AN ORGANIZED HEALTH CARE EDUCATION/TRAINING PROGRAM

## 2022-08-18 PROCEDURE — G8399 PT W/DXA RESULTS DOCUMENT: HCPCS | Performed by: STUDENT IN AN ORGANIZED HEALTH CARE EDUCATION/TRAINING PROGRAM

## 2022-08-18 PROCEDURE — 1123F ACP DISCUSS/DSCN MKR DOCD: CPT | Performed by: STUDENT IN AN ORGANIZED HEALTH CARE EDUCATION/TRAINING PROGRAM

## 2022-08-18 PROCEDURE — 1090F PRES/ABSN URINE INCON ASSESS: CPT | Performed by: STUDENT IN AN ORGANIZED HEALTH CARE EDUCATION/TRAINING PROGRAM

## 2022-08-18 PROCEDURE — G8417 CALC BMI ABV UP PARAM F/U: HCPCS | Performed by: STUDENT IN AN ORGANIZED HEALTH CARE EDUCATION/TRAINING PROGRAM

## 2022-08-18 PROCEDURE — G8427 DOCREV CUR MEDS BY ELIG CLIN: HCPCS | Performed by: STUDENT IN AN ORGANIZED HEALTH CARE EDUCATION/TRAINING PROGRAM

## 2022-08-18 PROCEDURE — 1036F TOBACCO NON-USER: CPT | Performed by: STUDENT IN AN ORGANIZED HEALTH CARE EDUCATION/TRAINING PROGRAM

## 2022-08-18 NOTE — PROGRESS NOTES
Chief Complaint  Post-Op Check (RIGHT HIP, NEW XR TODAY)      History of Present Illness: The patient is here today for repeat evaluation for her right hip. The patient is 2 months out from right hip intramedullary nailing for an intertrochanteric femur fracture. Date of procedure 6/20/2022. She is in a stretcher again today as transportation from the facility. She was recently discharged from the hospital on 8/6/2022 secondary to symptomatic anemia, which was similar to her issues that she had postoperatively. She is also reporting 2 days of left leg weakness with no known cause or injury. She woke up and felt some stretching to her thigh and has since had weakness and difficulty lifting that leg. She still needs help getting up to walk which is why she is still at [facility]. Prior HPI 7/21/2022:  Kanchan Pathak is a pleasant 65 y.o. female here today for her first postop evaluation regarding her right hip. The patient is 1 month out from a right hip intramedullary nailing with a short nail for intertrochanteric femur fracture. The patient did have a couple day stay in the ICU due to respiratory failure from anemia. The patient is still chronically anemic but she is stable. She is currently at [facility]. Medical History:  Patient's medications, allergies, past medical, surgical, social and family histories were reviewed and updated as appropriate. Pertinent items are noted in HPI  Review of systems reviewed from Patient History Form dated on 8/18/22 and available in the patient's chart under the Media tab. Vital Signs: There were no vitals filed for this visit. Constitutional: In no apparent distress. Normal affect. Alert and oriented X3 and is cooperative. Right hip Examination:    Well-healed incisions to the right hip. Nontender to palpation. Tolerates circumduction and flexion and abduction of the hip without much pain.   L4-S1 distributions intact without paresthesias. The patient's left hip examination today demonstrates an ability to perform a straight leg raise. Unable to maintain leg raise after lifting passively. Some tenderness to the proximal thigh and anterior rectus musculature. L4-S1 distributions intact without paresthesias. Radiology:       Findings:   Views: 3 views right hip  Weight bearing: No  Findings: 3 views of the right hip taken in the office today demonstrate a stable intramedullary nail to the right hip. Bony healing noted with maintained alignment compared to prior films. No acute osseous abnormalities noted to the proximal left thigh. Previous comparison films: 7/21/2022    Impression:  1. Stable right intertrochanteric femur fracture and orthopedic hardware with interval healing           Assessment : 26-year-old female 2 months status post right hip intramedullary nailing for intertrochanteric femur fracture, date of procedure 6/20/2022    New onset left leg weakness 2 days ago    Impression:  Encounter Diagnoses   Name Primary? Right hip pain Yes    S/P ORIF (open reduction internal fixation) fracture        Office Procedures:  Orders Placed This Encounter   Procedures    XR HIP RIGHT (2-3 VIEWS)     Standing Status:   Future     Number of Occurrences:   1     Standing Expiration Date:   8/16/2023     Order Specific Question:   Reason for exam:     Answer:   right hip pain         Plan:     In regards to the patient's right hip she is doing very well. I believe the fracture is healed and she has good strength to the right hip. Unfortunately her left hip and thigh are demonstrating new onset weakness. The patient still requires a substantial amount of assistance to get up and move around. I think she needs a considerable amount of therapy. I do not think she is ready to go home yet. Follow-up with me in 6 weeks for recheck of her right hip and to reevaluate her left hip.   No x-rays of her right hip unless she has a setback. I will get a full series of left hip x-rays and a lumbar spine x-ray at next visit if she is still having weakness and problems to the left leg. . Jennifer Easonr is in agreement with this plan. All questions were answered to patient's satisfaction and was encouraged to call with any further questions. Tamiko Zapata,   Orthopedic Surgery and Sports Medicine  8/18/2022      This dictation was performed with a verbal recognition program [facility]) and it was checked for errors. It is possible that there are still dictated errors within this office note. If so, please bring any errors to my attention for an addendum. All efforts were made to ensure that this office note is accurate.

## 2022-09-08 RX ORDER — LOSARTAN POTASSIUM 100 MG/1
TABLET ORAL
Qty: 90 TABLET | Refills: 3 | OUTPATIENT
Start: 2022-09-08

## 2022-09-08 RX ORDER — GLIPIZIDE 10 MG/1
TABLET ORAL
Qty: 180 TABLET | Refills: 3 | OUTPATIENT
Start: 2022-09-08

## 2022-09-08 RX ORDER — ALLOPURINOL 300 MG/1
TABLET ORAL
Qty: 90 TABLET | Refills: 3 | OUTPATIENT
Start: 2022-09-08

## 2022-09-08 RX ORDER — CARVEDILOL 6.25 MG/1
TABLET ORAL
Qty: 180 TABLET | Refills: 3 | OUTPATIENT
Start: 2022-09-08

## 2022-09-08 RX ORDER — ATORVASTATIN CALCIUM 10 MG/1
TABLET, FILM COATED ORAL
Qty: 90 TABLET | Refills: 3 | OUTPATIENT
Start: 2022-09-08

## 2022-09-08 RX ORDER — PAROXETINE 10 MG/1
TABLET, FILM COATED ORAL
Qty: 90 TABLET | Refills: 3 | OUTPATIENT
Start: 2022-09-08

## 2022-09-08 RX ORDER — ALENDRONATE SODIUM 70 MG/1
TABLET ORAL
Qty: 12 TABLET | Refills: 3 | OUTPATIENT
Start: 2022-09-08

## 2022-09-16 ENCOUNTER — TELEPHONE (OUTPATIENT)
Dept: INTERNAL MEDICINE CLINIC | Age: 77
End: 2022-09-16

## 2022-09-16 NOTE — TELEPHONE ENCOUNTER
----- Message from Anna Schultz MD sent at 9/16/2022  9:51 AM EDT -----  Contact: Rachael Weldon RN [facility] [phone]  Ok    Go to wound care clinic for ulcer in heel  ----- Message -----  From: Norman Andrade  Sent: 9/16/2022   9:46 AM EDT  To: Anna Schultz MD    Caller is requesting that you follow home health orders and states the patient has ulcers on her left heal.

## 2022-09-19 ENCOUNTER — CARE COORDINATION (OUTPATIENT)
Dept: CASE MANAGEMENT | Age: 77
End: 2022-09-19

## 2022-09-19 DIAGNOSIS — D63.8 ANEMIA, CHRONIC DISEASE: Primary | ICD-10-CM

## 2022-09-19 PROCEDURE — 1111F DSCHRG MED/CURRENT MED MERGE: CPT | Performed by: INTERNAL MEDICINE

## 2022-09-19 RX ORDER — FERROUS SULFATE 325(65) MG
325 TABLET ORAL 2 TIMES DAILY
Qty: 30 TABLET | Refills: 2 | Status: SHIPPED | OUTPATIENT
Start: 2022-09-19 | End: 2022-10-05 | Stop reason: SDUPTHER

## 2022-09-19 NOTE — TELEPHONE ENCOUNTER
----- Message from Mary Lyon sent at 9/19/2022  1:55 PM EDT -----  Contact: Janusz Cobian [phone]  Patient is requesting refill for ferrous sulfate (IRON 325) 325 (65 Fe) MG tablet.   [address]

## 2022-09-19 NOTE — ADDENDUM NOTE
Addended by: Letty Espinoza on: 9/19/2022 02:10 PM     Modules accepted: Orders Consent: The rationale for Repairs was explained to the patient and consent was obtained. The risks, benefits and alternatives to therapy were discussed in detail. Specifically, the risks of infection, scarring, bleeding, prolonged wound healing, incomplete removal, allergy to anesthesia, nerve injury and recurrence were addressed. Prior to the procedure, the treatment site was clearly identified and confirmed by the patient. All components of Universal Protocol/PAUSE Rule completed.

## 2022-09-19 NOTE — CARE COORDINATION
[facility] Discharge Call    2022    Patient: Jamel Moreno Patient : 1945   MRN: [id number]  Reason for Admission: anemia  Discharge Date: 22 RARS: Readmission Risk Score: 24.5         Discharge Facility: [facility]    Acute Care Course:    [place] with a mechanical fall and a R hip fx then to [facility]   to  [place] with anemia from [facility] ([place])   to  [facility] with d/c on  to home after 41 days    HFU made:  10/6 with ortho    Sig Hx:   HTN, DMII, afib, gout, OA, asthma    DME:     Conversation:   Left HIPPA compliant message regarding the nature of the call and a request for a return call with my contact information      JOSÉ MIGUEL OlmsteadN, [phone]  [facility] / [place] [address] Transition Nurse     Pt called me back and after 2 IDs she told me that she is miserable because she caught a cold as soon as she got home. She is walking with hter walker and sometimes can use a cane. Her pain is back to normal. Er hip is good but she has significant knee pain. If she would have known she would have gotten her knees done years ago. She is doing a sponge bath currently. She is having her furnace worked on. Her bathroom needs an upgrade and she is working on it. The raised toilet seat is ordered. Charley Chavarria left a message and she has been trying to call them bed. Coughing while speaking to her on phone and recommended that she call PCP for cough meds. She states she tried to get an appt with Dr GEORGE for next week but he was full. She is now waiting for her daughter as she needs input from her daughter as to when she can take her. She did confirm they have a w/c in the lobby she can use. She states she has a hereditary blood disease and becomes anemic frequently.  has appt with Dr Janelle Cristobal from [facility] Reviewed meds with pt.  Recommended getting PCP appt as soon as she can and to take med list with her to all appts. She is very aware of the meds she takes. Agreeable to calls. Follow up plan: Will hand off to [facility]            Care Transitions Interventions         Future Appointments   Date Time Provider Geraldine Arnold   10/6/2022 10:45 AM DO KARIE Christina     Transitions of Care Initial Call    Was this an external facility discharge? Yes,   Discharge Facility: [address] [place] [address] to be reviewed by the provider   Additional needs identified to be addressed with provider: No  none             Method of communication with provider : none    Advance Care Planning:   Does patient have an Advance Directive: reviewed and current. Care Transition Nurse contacted the patient by telephone to perform post hospital discharge assessment. Verified name and  with patient as identifiers. Provided introduction to self, and explanation of the CTN role. CTN reviewed discharge instructions, medical action plan and red flags with patient who verbalized understanding. Patient given an opportunity to ask questions and does not have any further questions or concerns at this time. Were discharge instructions available to patient? Yes. Reviewed appropriate site of care based on symptoms and resources available to patient including: PCP. The patient agrees to contact the PCP office for questions related to their healthcare. Medication reconciliation was performed with patient, who verbalizes understanding of administration of home medications. Advised obtaining a 90-day supply of all daily and as-needed medications. Was patient discharged with a pulse oximeter? no    CTN provided contact information. Plan for follow-up call in 5-7 days based on severity of symptoms and risk factors.   Plan for next call: referral to ambulatory care manager-MILLI Anderson, RN   [address] [facility]  [phone]

## 2022-09-20 ENCOUNTER — TELEPHONE (OUTPATIENT)
Dept: INTERNAL MEDICINE CLINIC | Age: 77
End: 2022-09-20

## 2022-09-20 NOTE — TELEPHONE ENCOUNTER
----- Message from Benita Couch MD sent at 9/20/2022 10:53 AM EDT -----  Contact: Sarah Guido [phone]    ----- Message -----  From: Gerson Doyle  Sent: 9/20/2022  10:37 AM EDT  To: Benita Couch MD    Called and stated that her daughter called and made hospital follow up.for 11\2\22. Patient feels she needs to be seen sooner because of her blood  anemia history.        [facility] [id number] Donna Elkins, [address] [place] [address] [phone] - F [phone] (Ph: [phone])

## 2022-09-21 ENCOUNTER — OFFICE VISIT (OUTPATIENT)
Dept: INTERNAL MEDICINE CLINIC | Age: 77
End: 2022-09-21

## 2022-09-21 VITALS
WEIGHT: 211 LBS | DIASTOLIC BLOOD PRESSURE: 62 MMHG | HEIGHT: 63 IN | SYSTOLIC BLOOD PRESSURE: 154 MMHG | BODY MASS INDEX: 37.39 KG/M2

## 2022-09-21 DIAGNOSIS — I48.0 PAROXYSMAL ATRIAL FIBRILLATION (HCC): ICD-10-CM

## 2022-09-21 DIAGNOSIS — I10 BENIGN ESSENTIAL HTN: ICD-10-CM

## 2022-09-21 DIAGNOSIS — Z09 HOSPITAL DISCHARGE FOLLOW-UP: ICD-10-CM

## 2022-09-21 DIAGNOSIS — E11.69 HYPERLIPIDEMIA ASSOCIATED WITH TYPE 2 DIABETES MELLITUS (HCC): ICD-10-CM

## 2022-09-21 DIAGNOSIS — E11.9 TYPE 2 DIABETES MELLITUS WITHOUT COMPLICATION, WITHOUT LONG-TERM CURRENT USE OF INSULIN (HCC): ICD-10-CM

## 2022-09-21 DIAGNOSIS — S72.001D CLOSED FRACTURE OF RIGHT HIP WITH ROUTINE HEALING, SUBSEQUENT ENCOUNTER: Primary | ICD-10-CM

## 2022-09-21 DIAGNOSIS — R91.1 LUNG NODULE: ICD-10-CM

## 2022-09-21 DIAGNOSIS — J45.40 MODERATE PERSISTENT ASTHMA WITHOUT COMPLICATION: ICD-10-CM

## 2022-09-21 DIAGNOSIS — E78.5 HYPERLIPIDEMIA ASSOCIATED WITH TYPE 2 DIABETES MELLITUS (HCC): ICD-10-CM

## 2022-09-21 DIAGNOSIS — F32.5 MAJOR DEPRESSIVE DISORDER WITH SINGLE EPISODE, IN FULL REMISSION (HCC): ICD-10-CM

## 2022-09-21 PROBLEM — Z23 NEED FOR INFLUENZA VACCINATION: Status: ACTIVE | Noted: 2022-09-21

## 2022-09-21 PROCEDURE — 1036F TOBACCO NON-USER: CPT | Performed by: INTERNAL MEDICINE

## 2022-09-21 PROCEDURE — 1111F DSCHRG MED/CURRENT MED MERGE: CPT | Performed by: INTERNAL MEDICINE

## 2022-09-21 PROCEDURE — 3044F HG A1C LEVEL LT 7.0%: CPT | Performed by: INTERNAL MEDICINE

## 2022-09-21 PROCEDURE — G8427 DOCREV CUR MEDS BY ELIG CLIN: HCPCS | Performed by: INTERNAL MEDICINE

## 2022-09-21 PROCEDURE — 99214 OFFICE O/P EST MOD 30 MIN: CPT | Performed by: INTERNAL MEDICINE

## 2022-09-21 PROCEDURE — G8417 CALC BMI ABV UP PARAM F/U: HCPCS | Performed by: INTERNAL MEDICINE

## 2022-09-21 PROCEDURE — G8399 PT W/DXA RESULTS DOCUMENT: HCPCS | Performed by: INTERNAL MEDICINE

## 2022-09-21 PROCEDURE — 1123F ACP DISCUSS/DSCN MKR DOCD: CPT | Performed by: INTERNAL MEDICINE

## 2022-09-21 PROCEDURE — 1090F PRES/ABSN URINE INCON ASSESS: CPT | Performed by: INTERNAL MEDICINE

## 2022-09-21 NOTE — PROGRESS NOTES
Post-Discharge Transitional Care  Follow Up      Kalyn Corbett   YOB: 1945    Date of Office Visit:  9/21/2022  Date of Hospital Admission: 8/2/22  Date of Hospital Discharge: 8/6/22  Risk of hospital readmission (high >=14%. Medium >=10%) :Readmission Risk Score: 24.5      Care management risk score Rising risk (score 2-5) and Complex Care (Scores >=6): No Risk Score On File     Non face to face  following discharge, date last encounter closed (first attempt may have been earlier): *No documented post hospital discharge outreach found in the last 14 days    Call initiated 2 business days of discharge: *No response recorded in the last 14 days    ASSESSMENT/PLAN:   Closed fracture of right hip with routine healing, subsequent encounter  Benign essential HTN  Paroxysmal atrial fibrillation ([facility]  Hyperlipidemia associated with type 2 diabetes mellitus ([facility]  Type 2 diabetes mellitus without complication, without long-term current use of insulin (HCC)  Moderate persistent asthma without complication  Major depressive disorder with single episode, in full remission [facility])      Medical Decision Making: moderate complexity  No follow-ups on file. Subjective:   HPI:  Follow up of Hospital problems/diagnosis(es):    Diagnosis Orders   1. Closed fracture of right hip with routine healing, subsequent encounter        2. Benign essential HTN        3. Paroxysmal atrial fibrillation (HCC)        4. Hyperlipidemia associated with type 2 diabetes mellitus ([address]        5. Type 2 diabetes mellitus without complication, without long-term current use of insulin ([address]        6. Moderate persistent asthma without complication        7. Major depressive disorder with single episode, in full remission ([facility]          Right hip fracture s/p ORIF  Doing well. HTN. Bp is high  Restart verapamil  Continue coreg,terazosin and losartan    DM. Continue metformin     HLD  Continue lipitor.     Chronic anemia  Sees hematology  Continue iron        Inpatient course: Discharge summary reviewed- see chart. Interval history/Current status:   Patient Active Problem List   Diagnosis    Knee pain    Chronic urinary tract infection    Osteoarthritis of both knees    Benign essential HTN    Type 2 diabetes mellitus without complication, without long-term current use of insulin ([facility])    Gastroesophageal reflux disease without esophagitis    Chronic gout of multiple sites    Spinal stenosis of lumbar region    Anemia, chronic disease    Carpal tunnel syndrome of left wrist    Morbid obesity ([address]    Moderate persistent asthma without complication    Hyperlipidemia associated with type 2 diabetes mellitus ([address]    Major depressive disorder with single episode, in full remission ([address]    Asthma    Diabetes ([address]    DJD (degenerative joint disease)    Edema    Gout    Lumbar disc disease    Melanoma ([address]    Vitamin B 12 deficiency    Osteoporosis    S/P hysterectomy    S/P parathyroidectomy ([facility])    Sciatic nerve pain    Thalassemia trait    Acute blood loss anemia    Closed 2-part intertrochanteric fracture of proximal end of right femur ([facility])    Shock ([address]    Bradycardia    Hypotension    Paroxysmal atrial fibrillation ([address]    Hip fx, right, open type I or II, initial encounter [facility])    Gastrointestinal hemorrhage    Symptomatic anemia    Anemia    Need for influenza vaccination       Medications listed as ordered at the time of discharge from hospital     Medication List            Accurate as of September 21, 2022  9:24 AM. If you have any questions, ask your nurse or doctor.                 CONTINUE taking these medications      acetaminophen 500 MG tablet  Commonly known as: TYLENOL     albuterol sulfate  (90 Base) MCG/ACT inhaler  Commonly known as: ProAir HFA  INHALE TWO PUFFS BY MOUTH EVERY 6 HOURS AS NEEDED FOR WHEEZING     alendronate 70 MG tablet  Commonly known as: FOSAMAX  TAKE 1 TABLET BY MOUTH WEEKLY WITH 8 OZ OF PLAIN  WATER 30 MINUTES BEFORE  FIRST FOOD, DRINK OR MEDS. STAY UPRIGHT FOR 30 MINS     allopurinol 300 MG tablet  Commonly known as: ZYLOPRIM  TAKE 1 TABLET BY MOUTH  DAILY     atorvastatin 10 MG tablet  Commonly known as: LIPITOR  TAKE 1 TABLET BY MOUTH AT  NIGHT     blood glucose test strips strip  Commonly known as: ASCENSIA AUTODISC VI;ONE TOUCH ULTRA TEST VI  Used to test once daily. DX: E11.9     Breo Ellipta 200-25 MCG/INH Aepb inhaler  Generic drug: Fluticasone furoate-vilanterol  INHALE ONE DOSE BY MOUTH DAILY     CALCIUM + D PO     carvedilol 12.5 MG tablet  Commonly known as: COREG  Take 1 tablet by mouth 2 times daily (with meals)     cetirizine 10 MG tablet  Commonly known as: ZYRTEC     clotrimazole-betamethasone 1-0.05 % cream  Commonly known as: Lotrisone  Apply topically 2 times daily. CO Q 10 PO     cyclobenzaprine 5 MG tablet  Commonly known as: FLEXERIL     ferrous sulfate 325 (65 Fe) MG tablet  Commonly known as: IRON 325  Take 1 tablet by mouth in the morning and at bedtime     fluticasone-salmeterol 500-50 MCG/ACT Aepb diskus inhaler  Commonly known as: ADVAIR     gabapentin 300 MG capsule  Commonly known as: NEURONTIN  Take 1 capsule by mouth 2 times daily for 180 days.  Intended supply: 90 days     glipiZIDE 10 MG tablet  Commonly known as: GLUCOTROL  TAKE 1 TABLET BY MOUTH  TWICE DAILY BEFORE MEALS     Incruse Ellipta 62.5 MCG/INH Aepb  Generic drug: Umeclidinium Bromide  INHALE ONE PUFF BY MOUTH DAILY     losartan 100 MG tablet  Commonly known as: COZAAR  TAKE 1 TABLET BY MOUTH  DAILY     Menthol (Topical Analgesic) 2 % Gel     metFORMIN 1000 MG tablet  Commonly known as: GLUCOPHAGE  TAKE 1 TABLET BY MOUTH  TWICE DAILY WITH MEALS     nystatin 496246 UNIT/GM powder  Commonly known as: MYCOSTATIN     omeprazole 20 MG delayed release capsule  Commonly known as: PRILOSEC  TAKE ONE CAPSULE BY MOUTH TWICE A DAY     ondansetron 4 MG tablet  Commonly known as: Reita Lies PARoxetine 10 MG tablet  Commonly known as: PAXIL  TAKE 1 TABLET BY MOUTH  DAILY     promethazine 25 MG tablet  Commonly known as: PHENERGAN     psyllium 28.3 % Pack  Commonly known as: KONSYL     silver sulfADIAZINE 1 % cream  Commonly known as: SILVADENE     terazosin 2 MG capsule  Commonly known as: HYTRIN  TAKE ONE CAPSULE BY MOUTH ONCE NIGHTLY     trolamine salicylate 10 % cream  Commonly known as: ASPERCREME     VITAMIN B 12 PO     Vitamin C 500 MG Caps     vitamin E 400 UNIT capsule                Medications marked \"taking\" at this time  No outpatient medications have been marked as taking for the 9/21/22 encounter (Office Visit) with Lou Wilder MD.        Medications patient taking as of now reconciled against medications ordered at time of hospital discharge: Yes    A comprehensive review of systems was negative. Objective:    BP (!) 154/62   Ht 5' 3\" (1.6 m)   Wt 211 lb (95.7 kg)   BMI 37.38 kg/m²   General Appearance: alert and oriented to person, place and time, well developed and well- nourished, in no acute distress  Skin: warm and dry, no rash or erythema  Head: normocephalic and atraumatic  Pulmonary/Chest: clear to auscultation bilaterally- no wheezes, rales or rhonchi, normal air movement, no respiratory distress  Cardiovascular: normal rate, regular rhythm, normal S1 and S2, no murmurs, rubs, clicks, or gallops, distal pulses intact, no carotid bruits  Abdomen: soft, non-tender, non-distended, normal bowel sounds, no masses or organomegaly  Extremities: no cyanosis, clubbing or edema      An electronic signature was used to authenticate this note.   --Vannessa Washington MD

## 2022-09-23 ENCOUNTER — CARE COORDINATION (OUTPATIENT)
Dept: CARE COORDINATION | Age: 77
End: 2022-09-23

## 2022-09-23 NOTE — CARE COORDINATION
Ambulatory Care Coordination Note  9/27/2022    ACC: Vasiliy Bella RN    Summary Note: ACM spoke with patient for outreach for care coordination. She is home now from [facility]. She is active with [facility] for SN, PT/OT R hip repair after a fall at home. She had severe anemia and was in ICU during hospital stay. She is moving around better but still has some word struggles. We discussed that a referral for SLP evaluation may be helpful as she sometimes is mixing up words. This is not her baseline. She did get ill with a cold as soon as she came home and delayed the start of home care for a few days till she was feeling better. She is working on [employer] to be done. Using walker. She has been having some increased knee pain but stated her knees were bad prior to fall. No live alert but has her cellphone on her walker. She is agreeable to additional calls   Lab Results   Component Value Date    LABA1C 6.0 06/22/2022    LABA1C 7.1 03/16/2022    LABA1C 6.6 09/23/2021     Lab Results   Component Value Date    .5 06/22/2022    .1 03/16/2022    .7 09/23/2021     Diabetes has remained controlled.      Past Medical History:   Diagnosis Date    Anemia     thallasemia minor    Arthritis     osteo-arthritis    Asthma     Back pain     Back pain     Bronchitis chronic     Diabetes mellitus (HCC)     GERD (gastroesophageal reflux disease)     Gout     Hypertension     Melanoma of scalp or neck (HCC)     Osteoarthritis     Osteoporosis     Squamous cell carcinoma, arm     Thal trait      Plan    Diabetes and asthma support   Fall safety   Continue to assess for additional home support   Follow with patient if she would like referral for SLP    Ambulatory Care Coordination Assessment    Care Coordination Protocol  Referral from Primary Care Provider: No  Week 1 - Initial Assessment     Do you have all of your prescriptions and are they filled?: Yes  Barriers to medication adherence: None  Are you able to afford your medications?: Yes  How often do you have trouble taking your medications the way you have been told to take them?: I always take them as prescribed. Do you have Home O2 Therapy?: No      Ability to seek help/take action for Emergent Urgent situations i.e. fire, crime, inclement weather or health crisis. : Independent  Ability to ambulate to restroom: Independent  Ability handle personal hygeine needs (bathing/dressing/grooming): Independent  Ability to manage Medications: Independent  Ability to prepare Food Preparation: Needs Assistance  Ability to maintain home (clean home, laundry): Needs Assistance  Ability to drive and/or has transportation: Dependent  Ability to do shopping: Needs Assistance  Ability to manage finances: Needs Assistance  Is patient able to live independently?: Yes     Current Housing: Private Residence                 Suggested Interventions and Community Resources                    Prior to Admission medications    Medication Sig Start Date End Date Taking?  Authorizing Provider   ferrous sulfate (IRON 325) 325 (65 Fe) MG tablet Take 1 tablet by mouth in the morning and at bedtime 9/19/22   Sharon Mckeon MD   Menthol, Topical Analgesic, 2 % GEL Apply topically    Historical Provider, MD   fluticasone-salmeterol (ADVAIR) 500-50 MCG/ACT AEPB diskus inhaler Inhale 1 puff into the lungs daily  Patient not taking: Reported on 9/19/2022    Historical Provider, MD   cyclobenzaprine (FLEXERIL) 5 MG tablet Take 5 mg by mouth 3 times daily as needed for Muscle spasms  Patient not taking: Reported on 9/19/2022    Historical Provider, MD   carvedilol (COREG) 12.5 MG tablet Take 1 tablet by mouth 2 times daily (with meals) 6/30/22   Soheila Ortiz MD   terazosin (HYTRIN) 2 MG capsule TAKE ONE CAPSULE BY MOUTH ONCE NIGHTLY 6/16/22   Sharon Mckeon MD   omeprazole (PRILOSEC) 20 MG delayed release capsule TAKE ONE CAPSULE BY MOUTH TWICE A DAY 6/16/22 Narda Perea MD   alendronate (FOSAMAX) 70 MG tablet TAKE 1 TABLET BY MOUTH  WEEKLY WITH 8 OZ OF PLAIN  WATER 30 MINUTES BEFORE  FIRST FOOD, DRINK OR MEDS. STAY UPRIGHT FOR 30 MINS 6/6/22   Narda Perea MD   Fluticasone furoate-vilanterol (BREO ELLIPTA) 200-25 MCG/INH AEPB inhaler INHALE ONE DOSE BY MOUTH DAILY  Patient not taking: No sig reported 3/16/22   Narda Perea MD   Umeclidinium Bromide (INCRUSE ELLIPTA) 62.5 MCG/INH AEPB INHALE ONE PUFF BY MOUTH DAILY 3/16/22   Narda Perea MD   gabapentin (NEURONTIN) 300 MG capsule Take 1 capsule by mouth 2 times daily for 180 days. Intended supply: 90 days 3/16/22 9/12/22  Narda Perea MD   blood glucose test strips (ASCENSIA AUTODISC VI;ONE TOUCH ULTRA TEST VI) strip Used to test once daily. DX: E11.9 3/16/22   Narda Perea MD   albuterol sulfate HFA (PROAIR HFA) 108 (90 Base) MCG/ACT inhaler INHALE TWO PUFFS BY MOUTH EVERY 6 HOURS AS NEEDED FOR WHEEZING 11/12/21   Narda Perea MD   metFORMIN (GLUCOPHAGE) 1000 MG tablet TAKE 1 TABLET BY MOUTH  TWICE DAILY WITH MEALS 9/7/21   Narda Perea MD   PARoxetine (PAXIL) 10 MG tablet TAKE 1 TABLET BY MOUTH  DAILY 9/7/21   Narda Perea MD   atorvastatin (LIPITOR) 10 MG tablet TAKE 1 TABLET BY MOUTH AT  NIGHT 9/7/21   Narda Perea MD   allopurinol (ZYLOPRIM) 300 MG tablet TAKE 1 TABLET BY MOUTH  DAILY 9/7/21   Narda Peera MD   losartan (COZAAR) 100 MG tablet TAKE 1 TABLET BY MOUTH  DAILY 9/7/21   Narda Perea MD   glipiZIDE (GLUCOTROL) 10 MG tablet TAKE 1 TABLET BY MOUTH  TWICE DAILY BEFORE MEALS 9/7/21   Narda Perea MD   clotrimazole-betamethasone (LOTRISONE) 1-0.05 % cream Apply topically 2 times daily.   Patient not taking: No sig reported 6/10/21   Narda Perea MD   acetaminophen (TYLENOL) 500 MG tablet Take 500 mg by mouth every 6 hours as needed for Pain    Historical Provider, MD   Coenzyme Q10 (CO Q 10 PO) Take by mouth  Patient not taking: No sig reported    Historical Provider, MD   cetirizine (ZYRTEC) 10 MG tablet Take 10 mg by mouth daily    Historical Provider, MD   Cyanocobalamin (VITAMIN B 12 PO) Take 1,000 mcg by mouth daily. Patient not taking: No sig reported    Historical Provider, MD   Ascorbic Acid (VITAMIN C) 500 MG CAPS Take  by mouth daily. Patient not taking: No sig reported    Historical Provider, MD   vitamin E 400 UNIT capsule Take 400 Units by mouth daily. Patient not taking: No sig reported    Historical Provider, MD   Calcium Carbonate-Vitamin D (CALCIUM + D PO) Take  by mouth daily.   Patient not taking: No sig reported    Historical Provider, MD       Future Appointments   Date Time Provider Geraldine Arnold   10/6/2022  9:30 AM DO KARIE Xie [facility]   11/14/2022 10:30 AM Geovanni Ortiz MD [facility] None

## 2022-09-23 NOTE — CARE COORDINATION
ACM attempted outreach. Left message. Contact information for call back provided. CTN referral for care coordination received:     Facility: [facility]. Initial call completed on 9/19/22, please see encounter for details. Discharge services in place: [facility]. The following challenges have been routed to provider: none. Needs addressed: Education of patient/family/caregiver/guardian to support self-management-making HFU. Needs to wait for daughter. Patients top risk factors for readmission: medical condition-anemia. Patient's primary caregiver/contact: self   Recommended follow-up call: Plan for follow-up call in 5-7 days based on severity of symptoms and risk factors.

## 2022-10-05 RX ORDER — FERROUS SULFATE 325(65) MG
325 TABLET ORAL 2 TIMES DAILY
Qty: 60 TABLET | Refills: 2 | Status: SHIPPED | OUTPATIENT
Start: 2022-10-05

## 2022-10-06 ENCOUNTER — OFFICE VISIT (OUTPATIENT)
Dept: ORTHOPEDIC SURGERY | Age: 77
End: 2022-10-06
Payer: MEDICARE

## 2022-10-06 VITALS — WEIGHT: 211 LBS | BODY MASS INDEX: 37.39 KG/M2 | HEIGHT: 63 IN

## 2022-10-06 DIAGNOSIS — M25.559 HIP PAIN: Primary | ICD-10-CM

## 2022-10-06 DIAGNOSIS — Z87.81 S/P ORIF (OPEN REDUCTION INTERNAL FIXATION) FRACTURE: ICD-10-CM

## 2022-10-06 DIAGNOSIS — Z98.890 S/P ORIF (OPEN REDUCTION INTERNAL FIXATION) FRACTURE: ICD-10-CM

## 2022-10-06 PROCEDURE — 1090F PRES/ABSN URINE INCON ASSESS: CPT | Performed by: STUDENT IN AN ORGANIZED HEALTH CARE EDUCATION/TRAINING PROGRAM

## 2022-10-06 PROCEDURE — 1123F ACP DISCUSS/DSCN MKR DOCD: CPT | Performed by: STUDENT IN AN ORGANIZED HEALTH CARE EDUCATION/TRAINING PROGRAM

## 2022-10-06 PROCEDURE — G8417 CALC BMI ABV UP PARAM F/U: HCPCS | Performed by: STUDENT IN AN ORGANIZED HEALTH CARE EDUCATION/TRAINING PROGRAM

## 2022-10-06 PROCEDURE — G8399 PT W/DXA RESULTS DOCUMENT: HCPCS | Performed by: STUDENT IN AN ORGANIZED HEALTH CARE EDUCATION/TRAINING PROGRAM

## 2022-10-06 PROCEDURE — 1036F TOBACCO NON-USER: CPT | Performed by: STUDENT IN AN ORGANIZED HEALTH CARE EDUCATION/TRAINING PROGRAM

## 2022-10-06 PROCEDURE — G8484 FLU IMMUNIZE NO ADMIN: HCPCS | Performed by: STUDENT IN AN ORGANIZED HEALTH CARE EDUCATION/TRAINING PROGRAM

## 2022-10-06 PROCEDURE — G8427 DOCREV CUR MEDS BY ELIG CLIN: HCPCS | Performed by: STUDENT IN AN ORGANIZED HEALTH CARE EDUCATION/TRAINING PROGRAM

## 2022-10-06 PROCEDURE — 99213 OFFICE O/P EST LOW 20 MIN: CPT | Performed by: STUDENT IN AN ORGANIZED HEALTH CARE EDUCATION/TRAINING PROGRAM

## 2022-10-06 NOTE — PROGRESS NOTES
Chief Complaint  Hip Pain (F/U LEFT HIP)      History of Present Illness:  Patient is here today for repeat evaluation of her right hip and repeat check of her left hip. The patient is 3-1/2 months out from right hip intramedullary nailing. The patient reports great improvement in her left hip. She denies any problems with her right hip. She has recently been discharged home and is about to start home PT. Prior HPI 8/18/2022:  The patient is here today for repeat evaluation for her right hip. The patient is 2 months out from right hip intramedullary nailing for an intertrochanteric femur fracture. Date of procedure 6/20/2022. She is in a stretcher again today as transportation from the facility. She was recently discharged from the hospital on 8/6/2022 secondary to symptomatic anemia, which was similar to her issues that she had postoperatively. She is also reporting 2 days of left leg weakness with no known cause or injury. She woke up and felt some stretching to her thigh and has since had weakness and difficulty lifting that leg. She still needs help getting up to walk which is why she is still at [facility]. Prior HPI 7/21/2022:  Disha Vasquez is a pleasant 65 y.o. female here today for her first postop evaluation regarding her right hip. The patient is 1 month out from a right hip intramedullary nailing with a short nail for intertrochanteric femur fracture. The patient did have a couple day stay in the ICU due to respiratory failure from anemia. The patient is still chronically anemic but she is stable. She is currently at [facility]. Medical History:  Patient's medications, allergies, past medical, surgical, social and family histories were reviewed and updated as appropriate. Pertinent items are noted in HPI  Review of systems reviewed from Patient History Form dated on 10/6/22 and available in the patient's chart under the Media tab. Vital Signs:   There were no vitals filed for this visit. Constitutional: In no apparent distress. Normal affect. Alert and oriented X3 and is cooperative. Right hip Examination:    Nontender to both hips on palpation. No tenderness to either hip with resisted hip flexion or extension and circumduction. Patient is ambulating with walker. Radiology:       3 views of the left hip taken in the office today demonstrate no acute osseous abnormalities with minimal arthritic change to the hip joint. There continues to demonstrate a stable right intertrochanteric femur fracture with robust healing. Stable orthopedic implant           Assessment : 40-year-old female 3.5 months status post right hip intramedullary nailing for intertrochanteric femur fracture, date of procedure 6/20/2022      Impression:  Encounter Diagnosis   Name Primary? Hip pain Yes       Office Procedures:  Orders Placed This Encounter   Procedures    XR HIP LEFT (2-3 VIEWS)     Standing Status:   Future     Number of Occurrences:   1     Standing Expiration Date:   10/6/2023         Plan:     The patient is just about to start home PT. She is getting around with a walker and she looks much better than she has previously. Her left hip pain has resolved. She has no right hip pain. X-rays demonstrate good healing to the right hip fracture. At this point I recommend she move forward with a home PT. She may even need some outpatient physical therapy when she finishes that. Let me check her back in 6 weeks without x-rays to check on her physical capabilities and see if she needs outpatient PT. . Teresa Shahid is in agreement with this plan. All questions were answered to patient's satisfaction and was encouraged to call with any further questions. Kar Garsia DO  Orthopedic Surgery and Sports Medicine  10/6/2022      This dictation was performed with a verbal recognition program [facility]) and it was checked for errors.   It is possible that there are still dictated errors within this office note. If so, please bring any errors to my attention for an addendum. All efforts were made to ensure that this office note is accurate.

## 2022-10-12 ENCOUNTER — TELEPHONE (OUTPATIENT)
Dept: INTERNAL MEDICINE CLINIC | Age: 77
End: 2022-10-12

## 2022-10-12 DIAGNOSIS — D50.9 IRON DEFICIENCY ANEMIA, UNSPECIFIED IRON DEFICIENCY ANEMIA TYPE: Primary | ICD-10-CM

## 2022-10-12 NOTE — TELEPHONE ENCOUNTER
Admitted:     09/21/2018      DATE OF ADMISSION: 09/21/2018      CHIEF COMPLAINT:  Left leg and shin pain.      HISTORY OF PRESENT ILLNESS:  Kelsey Wood is an 88-year-old female with past medical history significant for arthritis, asthma, glaucoma, and chronic back pain who presents to the emergency room today with left leg pain.  The patient has ongoing pain for the last 1 week.  Today, she noticed the left leg was even more painful and sore and heard like a snap, and prompted her to come to the emergency room.  The patient has underlying chronic left knee pain, but this is new for her. Denied any trauma, but there was a site of bruising just above where she has a painful area.  She also noticed some redness on the shin area above the left ankle joint.  There is no fever or chills.  No runny nose, no cough, no shortness of breath, no chest pain, no palpitation.        In the emergency room, she was evaluated. I discussed with Dr. Crane. Electrolytes were normal.  CBC was normal.  X-ray of the lower extremity and ultrasound were both within normal limits.  Suspected soft tissue infection, given a dose of Ancef and admitted to the hospital.      PAST MEDICAL HISTORY:   1.  Intermittent asthma.   2.  Glaucoma.   3.  Osteoarthritis.   4.  Chronic back pain.   5.  Hard of hearing.      PAST SURGICAL HISTORY:  Cholecystectomy, appendectomy.      SOCIAL HISTORY:  The patient does not smoke.  She does not drink alcohol.  She lives in an assisted living facility.  She uses a wheeled walker.      FAMILY HISTORY:  Reviewed and noncontributory..     ALLERGIES:  NO KNOWN DRUG ALLERGIES.      MEDICATIONS:  Reviewed.  Need to be reconciled by pharmacy.   Prior to Admission Medications   Prescriptions Last Dose Informant Patient Reported? Taking?   GABAPENTIN PO 9/21/2018 at Unknown time  Yes Yes   Sig: Take 300 mg by mouth 3 times daily   HYDROcodone-acetaminophen (NORCO) 5-325 MG per tablet 9/21/2018 at Unknown time  Yes No   Sig:  Tuesday informed Take 1 tablet by mouth 3 times daily   Misc Natural Products (OSTEO BI-FLEX ADV DOUBLE ST PO)   Yes Yes   Sig: Take 1 tablet by mouth 2 times daily ON HOLD   acetaminophen (TYLENOL) 325 MG tablet   No No   Sig: Take 2 tablets (650 mg) by mouth every 4 hours as needed for mild pain   albuterol (PROAIR HFA/PROVENTIL HFA/VENTOLIN HFA) 108 (90 Base) MCG/ACT Inhaler   Yes Yes   Sig: Inhale 2 puffs into the lungs every 4 hours as needed for shortness of breath / dyspnea or wheezing   bismuth subsalicylate (PEPTO BISMOL) 262 MG chewable tablet   Yes Yes   Sig: Take 2 tablets by mouth every hour as needed for heartburn Max of 16 tabs per 24 hrs   calcium carbonate (OS-MARY GRACE 500 MG Ruby. CA) 500 MG tablet 9/21/2018 at Unknown time  Yes Yes   Sig: Take 1 tablet by mouth daily    estradiol (ESTRACE) 0.1 MG/GM cream 9/19/2018 at days of the week not known  Yes Yes   Sig: Place 2 g vaginally twice a week   hydrocortisone 1 % CREA cream   Yes Yes   Sig: Apply topically daily as needed for itching   latanoprost (XALATAN) 0.005 % ophthalmic solution 9/20/2018 at Unknown time  Yes Yes   Sig: Place 1 drop into both eyes At Bedtime      Facility-Administered Medications: None       REVIEW OF SYSTEMS:  Ten points reviewed, all are negative except those mentioned in history of present illness.      PHYSICAL EXAMINATION:   GENERAL:  The patient is awake, alert, oriented, hard of hearing, not in distress.   VITAL SIGNS:  Blood pressure 160/80, pulse rate 71, temperature 98, oxygen saturation 95%.   HEENT:  Pink, nonicteric.  Extraocular muscle movement intact.   NECK:  Supple, no JVD, no thyromegaly.   CHEST:  Good air entry bilaterally.  No wheezing, crackles or rales.   CARDIOVASCULAR:  S1 and S2 well heard.  No gallop or murmur.   ABDOMEN:  Obese, soft, nontender, nondistended, positive bowel sounds.   EXTREMITIES:  Left lower extremity with area of erythema with different stages of discoloration; purplish area in the mid shin area and  red distally above the ankle joint, overall about 4 x 5 cm area.  Slightly increased warmth than the right side.  No effusion in the joint.   PSYCHIATRIC:  Normal mood and affect, keeps eye contact, responds to questions appropriately.      DIAGNOSTIC TESTS OF INTEREST:  X-ray of the tibia and fibula, 2-view showed no evidence of fracture or malalignment.        Ultrasound of left lower extremity negative.        Sodium 143, potassium 4.3, BUN 15, creatinine 0.5, calcium 8.5.  WBC 8.3, hemoglobin 12, platelets 315.      ASSESSMENT:  Kelsey Wood is an 88-year-old female with history of asthma, glaucoma, osteoarthritis, and chronic back pain who came in today with left leg pain of about a week's duration and more pain today with redness and unable to walk. She came to the emergency room and is being admitted to the hospital under observation.   1.  Left lower extremity pain with suspected trauma to the shin area.   2.  Possible soft tissue infection of the left leg.   3.  Osteoarthritis.   4.  Chronic back pain.      PLAN:  The patient is being admitted under observation for pain control.  She will get some oxycodone, Dilaudid as needed, and also Tylenol. She got a dose of Ancef in the emergency room.  From my examination, it looks like this is more of a trauma than an infection, but the pain is out of proportion to the area of inflammation and tenderness.  Will leave her on Keflex and will treat her for a total of 5-7 days.  There is no systemic inflammatory response, no leukocytosis, no fever, no tachycardia.  PT, OT will see the patient in the morning. At this point, there is no indication for aggressive IV antibiotic. Will encourage oral intake.  If pain is controlled and the patient is able to ambulate, she can be discharged in the next 24 hours on oral antibiotic and pain medication.  I discussed with the patient and her son at bedside.  All their questions and concerns addressed, showed understanding.      CODE  STATUS:  IN THE EVENT OF CARDIORESPIRATORY ARREST, SHE IS DO NOT INTUBATE/DO NOT RESUSCITATE.         SAAD PARTIDA MD             D: 2018   T: 2018   MT:       Name:     SONALI TUCKER   MRN:      [id number]        Account:      [id number]   :      1930        Admitted:     2018                   Document: [id number]

## 2022-10-12 NOTE — TELEPHONE ENCOUNTER
----- Message from Karina Gutierrez MD sent at 10/12/2022 12:52 PM EDT -----  Contact: Chino George [facility] [phone]  Yes   ----- Message -----  From: Medina Chavira  Sent: 10/12/2022  12:18 PM EDT  To: Karina Gutierrez MD    Home care nurse states that she tried to get the blood today, but was unsuccessful. Wants to know if it's ok to wait till Tuesday next week when she goes back?   ----- Message -----  From: Karina Gutierrez MD  Sent: 10/12/2022  11:14 AM EDT  To: Cindy Rios    CBC   anemia  ----- Message -----  From: Rizwana Guerra  Sent: 10/12/2022   8:09 AM EDT  To: Karina Gutierrez MD    Caller is requesting for the patient to have blood work. Care taker states her hemoglobin dropped significantly and patient has been more tired than usual. Please advise.

## 2022-10-21 PROBLEM — Z23 NEED FOR INFLUENZA VACCINATION: Status: RESOLVED | Noted: 2022-09-21 | Resolved: 2022-10-21

## 2022-10-24 ENCOUNTER — CARE COORDINATION (OUTPATIENT)
Dept: CARE COORDINATION | Age: 77
End: 2022-10-24

## 2022-10-24 SDOH — ECONOMIC STABILITY: FOOD INSECURITY: WITHIN THE PAST 12 MONTHS, THE FOOD YOU BOUGHT JUST DIDN'T LAST AND YOU DIDN'T HAVE MONEY TO GET MORE.: NEVER TRUE

## 2022-10-24 SDOH — ECONOMIC STABILITY: HOUSING INSECURITY: IN THE LAST 12 MONTHS, HOW MANY PLACES HAVE YOU LIVED?: 1

## 2022-10-24 SDOH — HEALTH STABILITY: MENTAL HEALTH
STRESS IS WHEN SOMEONE FEELS TENSE, NERVOUS, ANXIOUS, OR CAN'T SLEEP AT NIGHT BECAUSE THEIR MIND IS TROUBLED. HOW STRESSED ARE YOU?: ONLY A LITTLE

## 2022-10-24 SDOH — SOCIAL STABILITY: SOCIAL NETWORK
IN A TYPICAL WEEK, HOW MANY TIMES DO YOU TALK ON THE PHONE WITH FAMILY, FRIENDS, OR NEIGHBORS?: MORE THAN THREE TIMES A WEEK

## 2022-10-24 SDOH — ECONOMIC STABILITY: TRANSPORTATION INSECURITY
IN THE PAST 12 MONTHS, HAS LACK OF TRANSPORTATION KEPT YOU FROM MEETINGS, WORK, OR FROM GETTING THINGS NEEDED FOR DAILY LIVING?: NO

## 2022-10-24 SDOH — ECONOMIC STABILITY: FOOD INSECURITY: WITHIN THE PAST 12 MONTHS, YOU WORRIED THAT YOUR FOOD WOULD RUN OUT BEFORE YOU GOT MONEY TO BUY MORE.: NEVER TRUE

## 2022-10-24 SDOH — SOCIAL STABILITY: SOCIAL NETWORK: ARE YOU MARRIED, WIDOWED, DIVORCED, SEPARATED, NEVER MARRIED, OR LIVING WITH A PARTNER?: DIVORCED

## 2022-10-24 SDOH — ECONOMIC STABILITY: INCOME INSECURITY: IN THE LAST 12 MONTHS, WAS THERE A TIME WHEN YOU WERE NOT ABLE TO PAY THE MORTGAGE OR RENT ON TIME?: NO

## 2022-10-24 SDOH — ECONOMIC STABILITY: HOUSING INSECURITY
IN THE LAST 12 MONTHS, WAS THERE A TIME WHEN YOU DID NOT HAVE A STEADY PLACE TO SLEEP OR SLEPT IN A SHELTER (INCLUDING NOW)?: NO

## 2022-10-24 SDOH — ECONOMIC STABILITY: TRANSPORTATION INSECURITY
IN THE PAST 12 MONTHS, HAS THE LACK OF TRANSPORTATION KEPT YOU FROM MEDICAL APPOINTMENTS OR FROM GETTING MEDICATIONS?: NO

## 2022-10-24 SDOH — ECONOMIC STABILITY: INCOME INSECURITY: HOW HARD IS IT FOR YOU TO PAY FOR THE VERY BASICS LIKE FOOD, HOUSING, MEDICAL CARE, AND HEATING?: NOT HARD AT ALL

## 2022-10-24 SDOH — HEALTH STABILITY: PHYSICAL HEALTH: ON AVERAGE, HOW MANY MINUTES DO YOU ENGAGE IN EXERCISE AT THIS LEVEL?: 0 MIN

## 2022-10-24 SDOH — SOCIAL STABILITY: SOCIAL NETWORK: ARE YOU MARRIED, WIDOWED, DIVORCED, SEPARATED, NEVER MARRIED, OR LIVING WITH A PARTNER?: WIDOWED

## 2022-10-24 SDOH — HEALTH STABILITY: PHYSICAL HEALTH: ON AVERAGE, HOW MANY DAYS PER WEEK DO YOU ENGAGE IN MODERATE TO STRENUOUS EXERCISE (LIKE A BRISK WALK)?: 0 DAYS

## 2022-10-24 SDOH — SOCIAL STABILITY: SOCIAL NETWORK: HOW OFTEN DO YOU GET TOGETHER WITH FRIENDS OR RELATIVES?: MORE THAN THREE TIMES A WEEK

## 2022-10-24 SDOH — HEALTH STABILITY: MENTAL HEALTH: HOW OFTEN DO YOU HAVE A DRINK CONTAINING ALCOHOL?: NEVER

## 2022-10-24 NOTE — CARE COORDINATION
ACM attempted outreach. Left message. Contact information for call back provided.        Plan    Diabetes and asthma support   Fall safety follow up   Continue to assess for additional home support   Follow with patient if she would like referral for SLP  Assess Energy levels -increased fatigue

## 2022-10-29 ENCOUNTER — HOSPITAL ENCOUNTER (OUTPATIENT)
Dept: CT IMAGING | Age: 77
Discharge: HOME | End: 2022-10-29
Payer: MEDICARE

## 2022-10-29 DIAGNOSIS — R91.1 LUNG NODULE: ICD-10-CM

## 2022-10-29 PROCEDURE — 71250 CT THORAX DX C-: CPT

## 2022-11-01 ENCOUNTER — TELEPHONE (OUTPATIENT)
Dept: INTERNAL MEDICINE CLINIC | Age: 77
End: 2022-11-01

## 2022-11-01 NOTE — TELEPHONE ENCOUNTER
----- Message from Sandra Lopez MD sent at 11/1/2022  1:36 PM EDT -----  Contact: Melvin  [phone]  [facility]   ----- Message -----  From: Libertad Bowser  Sent: 11/1/2022  12:58 PM EDT  To: Sandra Lopez MD    PT is asking to continue PT for 1 x per week for 4 week.      Thank you

## 2022-11-09 ENCOUNTER — TELEPHONE (OUTPATIENT)
Dept: INTERNAL MEDICINE CLINIC | Age: 77
End: 2022-11-09

## 2022-11-09 RX ORDER — FERROUS SULFATE 325(65) MG
325 TABLET ORAL 2 TIMES DAILY
Qty: 180 TABLET | Refills: 0 | Status: SHIPPED | OUTPATIENT
Start: 2022-11-09

## 2022-11-09 RX ORDER — TERAZOSIN 2 MG/1
CAPSULE ORAL
Qty: 90 CAPSULE | Refills: 0 | Status: SHIPPED | OUTPATIENT
Start: 2022-11-09

## 2022-11-09 NOTE — TELEPHONE ENCOUNTER
----- Message from Kathryn Pollock sent at 11/9/2022 11:55 AM EST -----  Contact: Jason Edge [phone]  FYI Patient is requesting a 90 supply of the following.        ferrous sulfate (IRON 325) 325 (65 Fe) MG tablet    terazosin (HYTRIN) 2 MG capsule      Alyssa Blend [id number] [place] [phone] [facility] [phone]   [address] [place] [address], [address] [id number]   Phone:  [phone]  Fax:  [phone]

## 2022-12-01 ENCOUNTER — CARE COORDINATION (OUTPATIENT)
Dept: CARE COORDINATION | Age: 77
End: 2022-12-01

## 2022-12-01 NOTE — CARE COORDINATION
ACM attempted outreach. Left message. Contact information for call back provided.      Plan    Diabetes and asthma support ongoing   Fall safety follow up   Continue to assess for additional home support -PT status   Follow with patient if she would like referral for SLP or for symptom improvement   Assess Energy levels -increased fatigue issues

## 2022-12-12 ENCOUNTER — TELEPHONE (OUTPATIENT)
Dept: INTERNAL MEDICINE CLINIC | Age: 77
End: 2022-12-12

## 2022-12-12 NOTE — TELEPHONE ENCOUNTER
----- Message from Arely Martínez MD sent at 12/12/2022 11:23 AM EST -----  Contact: Marlin Delarosa [phone]  [address] [address]   ----- Message -----  From: Jerry Carlisle  Sent: 12/12/2022  10:45 AM EST  To: Arely Martínez MD    Patient needs new prescription for a handicap placard. Would like mailed to her.         [facility] [id number] [facility], [address] [place] [address] [phone] -  [phone] (Ph: [phone])

## 2023-01-09 ENCOUNTER — CARE COORDINATION (OUTPATIENT)
Dept: CARE COORDINATION | Age: 78
End: 2023-01-09

## 2023-01-09 NOTE — CARE COORDINATION
ACM attempted outreach. Left message. Contact information for call back provided.  2nd missed outreach     Plan        Diabetes and asthma support   Fall safety follow up   Continue to assess for additional home support   Follow with patient if she would like referral for SLP  Assess Energy levels -increased fatigue  Assess for d.c  2 missed calls

## 2023-01-23 RX ORDER — PAROXETINE 10 MG/1
TABLET, FILM COATED ORAL
Qty: 90 TABLET | Refills: 0 | Status: SHIPPED | OUTPATIENT
Start: 2023-01-23

## 2023-01-23 RX ORDER — ATORVASTATIN CALCIUM 10 MG/1
TABLET, FILM COATED ORAL
Qty: 90 TABLET | Refills: 0 | Status: SHIPPED | OUTPATIENT
Start: 2023-01-23

## 2023-01-23 RX ORDER — LOSARTAN POTASSIUM 100 MG/1
TABLET ORAL
Qty: 90 TABLET | Refills: 0 | Status: SHIPPED | OUTPATIENT
Start: 2023-01-23

## 2023-01-23 RX ORDER — ALLOPURINOL 300 MG/1
TABLET ORAL
Qty: 90 TABLET | Refills: 0 | Status: SHIPPED | OUTPATIENT
Start: 2023-01-23

## 2023-01-23 RX ORDER — ALENDRONATE SODIUM 70 MG/1
TABLET ORAL
Qty: 12 TABLET | Refills: 0 | Status: SHIPPED | OUTPATIENT
Start: 2023-01-23

## 2023-01-23 RX ORDER — CARVEDILOL 6.25 MG/1
TABLET ORAL
Qty: 180 TABLET | Refills: 0 | Status: SHIPPED | OUTPATIENT
Start: 2023-01-23

## 2023-01-23 RX ORDER — GLIPIZIDE 10 MG/1
TABLET ORAL
Qty: 180 TABLET | Refills: 0 | Status: SHIPPED | OUTPATIENT
Start: 2023-01-23

## 2023-01-24 ENCOUNTER — CARE COORDINATION (OUTPATIENT)
Dept: CARE COORDINATION | Age: 78
End: 2023-01-24

## 2023-01-24 NOTE — CARE COORDINATION
Chart reviewed. [facility] plans to complete care coordination at this time. 3 missed outreach calls.  No further outreach at this time

## 2023-02-02 ENCOUNTER — OFFICE VISIT (OUTPATIENT)
Dept: INTERNAL MEDICINE CLINIC | Age: 78
End: 2023-02-02

## 2023-02-02 VITALS
HEART RATE: 84 BPM | DIASTOLIC BLOOD PRESSURE: 80 MMHG | SYSTOLIC BLOOD PRESSURE: 166 MMHG | WEIGHT: 209 LBS | HEIGHT: 63 IN | BODY MASS INDEX: 37.03 KG/M2

## 2023-02-02 DIAGNOSIS — E11.69 HYPERLIPIDEMIA ASSOCIATED WITH TYPE 2 DIABETES MELLITUS (HCC): ICD-10-CM

## 2023-02-02 DIAGNOSIS — F32.5 MAJOR DEPRESSIVE DISORDER WITH SINGLE EPISODE, IN FULL REMISSION (HCC): ICD-10-CM

## 2023-02-02 DIAGNOSIS — E89.2 S/P PARATHYROIDECTOMY (HCC): ICD-10-CM

## 2023-02-02 DIAGNOSIS — E66.01 SEVERE OBESITY (BMI 35.0-39.9) WITH COMORBIDITY (HCC): ICD-10-CM

## 2023-02-02 DIAGNOSIS — E78.5 HYPERLIPIDEMIA ASSOCIATED WITH TYPE 2 DIABETES MELLITUS (HCC): ICD-10-CM

## 2023-02-02 DIAGNOSIS — C43.59 MALIGNANT MELANOMA OF TORSO EXCLUDING BREAST (HCC): ICD-10-CM

## 2023-02-02 DIAGNOSIS — I48.0 PAROXYSMAL ATRIAL FIBRILLATION (HCC): ICD-10-CM

## 2023-02-02 DIAGNOSIS — E11.9 TYPE 2 DIABETES MELLITUS WITHOUT COMPLICATION, WITHOUT LONG-TERM CURRENT USE OF INSULIN (HCC): Primary | ICD-10-CM

## 2023-02-02 DIAGNOSIS — E11.9 TYPE 2 DIABETES MELLITUS WITHOUT COMPLICATION, WITHOUT LONG-TERM CURRENT USE OF INSULIN (HCC): ICD-10-CM

## 2023-02-02 DIAGNOSIS — D68.9 COAGULATION DEFECT, UNSPECIFIED (HCC): ICD-10-CM

## 2023-02-02 LAB
A/G RATIO: 2.2 (ref 1.1–2.2)
ALBUMIN SERPL-MCNC: 4.2 G/DL (ref 3.4–5)
ALP BLD-CCNC: 82 U/L (ref 40–129)
ALT SERPL-CCNC: 8 U/L (ref 10–40)
ANION GAP SERPL CALCULATED.3IONS-SCNC: 15 MMOL/L (ref 3–16)
AST SERPL-CCNC: 9 U/L (ref 15–37)
BILIRUB SERPL-MCNC: 0.5 MG/DL (ref 0–1)
BUN BLDV-MCNC: 31 MG/DL (ref 7–20)
CALCIUM SERPL-MCNC: 9.7 MG/DL (ref 8.3–10.6)
CHLORIDE BLD-SCNC: 106 MMOL/L (ref 99–110)
CHOLESTEROL, TOTAL: 126 MG/DL (ref 0–199)
CO2: 21 MMOL/L (ref 21–32)
CREAT SERPL-MCNC: 0.9 MG/DL (ref 0.6–1.2)
GFR SERPL CREATININE-BSD FRML MDRD: >60 ML/MIN/{1.73_M2}
GLUCOSE BLD-MCNC: 104 MG/DL (ref 70–99)
HDLC SERPL-MCNC: 58 MG/DL (ref 40–60)
LDL CHOLESTEROL CALCULATED: 54 MG/DL
POTASSIUM SERPL-SCNC: 4.9 MMOL/L (ref 3.5–5.1)
SODIUM BLD-SCNC: 142 MMOL/L (ref 136–145)
TOTAL PROTEIN: 6.1 G/DL (ref 6.4–8.2)
TRIGL SERPL-MCNC: 71 MG/DL (ref 0–150)
TSH REFLEX: 2.28 UIU/ML (ref 0.27–4.2)
VLDLC SERPL CALC-MCNC: 14 MG/DL

## 2023-02-02 PROCEDURE — 3079F DIAST BP 80-89 MM HG: CPT | Performed by: INTERNAL MEDICINE

## 2023-02-02 PROCEDURE — 1090F PRES/ABSN URINE INCON ASSESS: CPT | Performed by: INTERNAL MEDICINE

## 2023-02-02 PROCEDURE — 99214 OFFICE O/P EST MOD 30 MIN: CPT | Performed by: INTERNAL MEDICINE

## 2023-02-02 PROCEDURE — G8417 CALC BMI ABV UP PARAM F/U: HCPCS | Performed by: INTERNAL MEDICINE

## 2023-02-02 PROCEDURE — 3077F SYST BP >= 140 MM HG: CPT | Performed by: INTERNAL MEDICINE

## 2023-02-02 PROCEDURE — G8399 PT W/DXA RESULTS DOCUMENT: HCPCS | Performed by: INTERNAL MEDICINE

## 2023-02-02 PROCEDURE — G8484 FLU IMMUNIZE NO ADMIN: HCPCS | Performed by: INTERNAL MEDICINE

## 2023-02-02 PROCEDURE — G8427 DOCREV CUR MEDS BY ELIG CLIN: HCPCS | Performed by: INTERNAL MEDICINE

## 2023-02-02 PROCEDURE — 1123F ACP DISCUSS/DSCN MKR DOCD: CPT | Performed by: INTERNAL MEDICINE

## 2023-02-02 PROCEDURE — 1036F TOBACCO NON-USER: CPT | Performed by: INTERNAL MEDICINE

## 2023-02-02 RX ORDER — TERAZOSIN 2 MG/1
CAPSULE ORAL
Qty: 90 CAPSULE | Refills: 0 | Status: SHIPPED | OUTPATIENT
Start: 2023-02-02 | End: 2023-02-02 | Stop reason: SDUPTHER

## 2023-02-02 RX ORDER — VERAPAMIL HYDROCHLORIDE 240 MG/1
240 CAPSULE, EXTENDED RELEASE ORAL DAILY
COMMUNITY

## 2023-02-02 RX ORDER — TERAZOSIN 5 MG/1
CAPSULE ORAL
Qty: 90 CAPSULE | Refills: 0 | Status: SHIPPED | OUTPATIENT
Start: 2023-02-02

## 2023-02-02 RX ORDER — FERROUS SULFATE 325(65) MG
325 TABLET ORAL 2 TIMES DAILY
Qty: 180 TABLET | Refills: 0 | Status: SHIPPED | OUTPATIENT
Start: 2023-02-02

## 2023-02-02 ASSESSMENT — ENCOUNTER SYMPTOMS
ABDOMINAL PAIN: 0
WHEEZING: 0
DIARRHEA: 0
COUGH: 0
CHEST TIGHTNESS: 0
SHORTNESS OF BREATH: 0
VOMITING: 0
BLOOD IN STOOL: 0
NAUSEA: 0

## 2023-02-02 NOTE — PROGRESS NOTES
Kelli Thakur (:  1945) is a 65 y.o. female,Established patient, here for evaluation of the following chief complaint(s):  Follow-up         ASSESSMENT/PLAN:  1. Type 2 diabetes mellitus without complication, without long-term current use of insulin (HCC)  -     Hemoglobin A1C; Future  2. Coagulation defect, unspecified ([facility]  3. Severe obesity (BMI 35.0-39. 9) with comorbidity ([facility]  4. Major depressive disorder with single episode, in full remission ([facility]  -     TSH with Reflex; Future  5. Hyperlipidemia associated with type 2 diabetes mellitus ([facility]  -     Lipid Panel; Future  -     Comprehensive Metabolic Panel; Future  6. Paroxysmal atrial fibrillation (HCC)  7. S/P parathyroidectomy ([facility]  8. Malignant melanoma of torso excluding breast (HCC)         DM- Stable. continue metformin and  glipizide   Strict diet. Check A 1 c       HTN. Blood pressure is elevated today   Continue calan,coreg,losartan for now   Cannot take HCTZ 2 to high Ca. Aldactone stopped for high K   Walking. Weight loss. Increase terazosin to 5 mg hs      Asthma  Stable on Breo,incruse and proventil     HLD. continue lipitor. Lipids are good      Diarrhea  Advised probiotics       Gout- continue allopurinol  No recent flareups. Depression. Increase paxil to 20 mg daily       GERD. Stable on PPI. Lumbar Spinal stenosis  nav knee OA  Has seen  Ortho  Also sees pain physician      Anemia. 2 to  thal trait     Osteoporosis  Continue fosamax,calcium and vit D    Subjective   SUBJECTIVE/OBJECTIVE:  HPI  She has Type 2 DM,HTN,GERD,spinal stenosis,asthma,gout and depression. Blood sugars are under good control. Has had DM since . Patient's BP is controlled on current medications. Depression is stable on paxil. Asthma- mod persistent- on Breo,incruse  and proventil.stable. She has osteoarthritis and severe lumbar spinal stenosis. seeing pain physician. Now on gabapentin  Has osteoposis- on fosamax.   Seeing ortho for knee arthritis- gets cortisone shots     S/p right hip fracture and surgery  Admission for anemia - recd blood transfusions. Seeing oncology      Review of Systems   Constitutional:  Negative for fatigue, fever and unexpected weight change. Respiratory:  Negative for cough, chest tightness, shortness of breath and wheezing. Cardiovascular:  Negative for chest pain, palpitations and leg swelling. Gastrointestinal:  Negative for abdominal pain, blood in stool, diarrhea, nausea and vomiting. Genitourinary:  Negative for dysuria and hematuria. Neurological:  Negative for light-headedness. Objective   Physical Exam  Constitutional:       Appearance: She is well-developed. HENT:      Head: Normocephalic and atraumatic. Eyes:      Pupils: Pupils are equal, round, and reactive to light. Neck:      Thyroid: No thyromegaly. Cardiovascular:      Rate and Rhythm: Normal rate and regular rhythm. Heart sounds: Normal heart sounds. No murmur heard. No friction rub. No gallop. Comments: No carotid bruit  Pulmonary:      Effort: Pulmonary effort is normal. No respiratory distress. Breath sounds: Normal breath sounds. No wheezing or rales. Chest:      Chest wall: No tenderness. Abdominal:      General: Bowel sounds are normal. There is no distension. Palpations: Abdomen is soft. There is no mass. Tenderness: There is no abdominal tenderness. There is no guarding or rebound. Musculoskeletal:      Cervical back: Normal range of motion and neck supple. Neurological:      Mental Status: She is alert and oriented to person, place, and time. An electronic signature was used to authenticate this note.     --Grace Rich MD

## 2023-02-03 LAB
ESTIMATED AVERAGE GLUCOSE: 122.6 MG/DL
HBA1C MFR BLD: 5.9 %

## 2023-02-06 RX ORDER — FLUTICASONE FUROATE AND VILANTEROL 200; 25 UG/1; UG/1
POWDER RESPIRATORY (INHALATION)
Qty: 3 EACH | Refills: 0 | Status: SHIPPED | OUTPATIENT
Start: 2023-02-06

## 2023-02-06 RX ORDER — UMECLIDINIUM 62.5 UG/1
AEROSOL, POWDER ORAL
Qty: 3 EACH | Refills: 0 | Status: SHIPPED | OUTPATIENT
Start: 2023-02-06

## 2023-02-06 RX ORDER — OMEPRAZOLE 20 MG/1
CAPSULE, DELAYED RELEASE ORAL
Qty: 180 CAPSULE | Refills: 0 | Status: SHIPPED | OUTPATIENT
Start: 2023-02-06

## 2023-02-06 RX ORDER — VERAPAMIL HYDROCHLORIDE 240 MG/1
240 CAPSULE, EXTENDED RELEASE ORAL DAILY
Qty: 90 CAPSULE | Refills: 0 | Status: SHIPPED | OUTPATIENT
Start: 2023-02-06

## 2023-02-07 ENCOUNTER — APPOINTMENT (OUTPATIENT)
Dept: CT IMAGING | Age: 78
End: 2023-02-07
Payer: MEDICARE

## 2023-02-07 ENCOUNTER — HOSPITAL ENCOUNTER (EMERGENCY)
Age: 78
Discharge: HOME | End: 2023-02-07
Payer: MEDICARE

## 2023-02-07 VITALS
OXYGEN SATURATION: 97 % | SYSTOLIC BLOOD PRESSURE: 158 MMHG | TEMPERATURE: 97.9 F | RESPIRATION RATE: 16 BRPM | HEIGHT: 63 IN | BODY MASS INDEX: 37.03 KG/M2 | DIASTOLIC BLOOD PRESSURE: 79 MMHG | WEIGHT: 209 LBS | HEART RATE: 70 BPM

## 2023-02-07 DIAGNOSIS — S05.12XA TRAUMATIC HEMATOMA OF LEFT ORBIT, INITIAL ENCOUNTER: ICD-10-CM

## 2023-02-07 DIAGNOSIS — S09.90XA INJURY OF HEAD, INITIAL ENCOUNTER: Primary | ICD-10-CM

## 2023-02-07 DIAGNOSIS — S61.412A LACERATION OF LEFT HAND, FOREIGN BODY PRESENCE UNSPECIFIED, INITIAL ENCOUNTER: ICD-10-CM
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
## 2023-02-07 PROCEDURE — 72125 CT NECK SPINE W/O DYE: CPT

## 2023-02-07 PROCEDURE — 12031 INTMD RPR S/A/T/EXT 2.5 CM/<: CPT

## 2023-02-07 PROCEDURE — 99284 EMERGENCY DEPT VISIT MOD MDM: CPT

## 2023-02-07 PROCEDURE — 70450 CT HEAD/BRAIN W/O DYE: CPT

## 2023-02-07 ASSESSMENT — ENCOUNTER SYMPTOMS
ABDOMINAL PAIN: 0
SORE THROAT: 0
RHINORRHEA: 0
FACIAL SWELLING: 1
COLOR CHANGE: 0
SHORTNESS OF BREATH: 0

## 2023-02-07 ASSESSMENT — PAIN - FUNCTIONAL ASSESSMENT: PAIN_FUNCTIONAL_ASSESSMENT: 0-10

## 2023-02-07 ASSESSMENT — PAIN DESCRIPTION - LOCATION: LOCATION: FACE;HAND

## 2023-02-07 ASSESSMENT — PAIN SCALES - GENERAL: PAINLEVEL_OUTOF10: 10

## 2023-02-07 NOTE — ED PROVIDER NOTES
[address] ED  EMERGENCY DEPARTMENT ENCOUNTER      I am the Primary Clinician of Record    Note started: 1:34 PM EST 2/7/23    DOMINIQUE. I have evaluated this patient. My supervising physician was available for consultation. Pt Name: Warden Donaldson  MRN: [id number]  [place] [birth year]  Dateof evaluation: 2/7/2023  Provider: MADDIE Silva - CNP  PCP: Melani Sher MD  ED Attending: No att. providers found      [address] [place] [address]       Chief Complaint   Patient presents with    Fall     Mechanical fall while carrying in groceries. Pt with noticeable discoloration to left eye. Hand Injury     Laceration left hand. HISTORY OF PRESENTILLNESS   (Location/Symptom, Timing/Onset, Context/Setting, Quality, Duration, Modifying Factors, Severity)  Note limiting factors. Warden Donaldson is a 65 y.o. female for closed head injury and left hand laceration. Onset was just prior to arrival.  Context includes patient reports that she was bringing in groceries when she fell. She denies dizziness or lightheadedness. She does report there is a small area between her grass in her concrete that she may have tripped on. Patient fell striking the left side of her head and cutting her left hand. Patient denies loss of consciousness or use of blood thinners. Patient states her tetanus is up-to-date. Alleviating factors include nothing. Aggravating factors include nothing. Pain is 10/10. Nothing has been used for pain today. Nursing Notes were all reviewed and agreed with or any disagreements were addressed  in the HPI. REVIEW OF SYSTEMS       Review of Systems   Constitutional:  Negative for activity change, appetite change and fever. HENT:  Positive for facial swelling. Negative for congestion, rhinorrhea and sore throat. Eyes:  Negative for visual disturbance. Respiratory:  Negative for shortness of breath.     Cardiovascular:  Negative for chest pain. Gastrointestinal:  Negative for abdominal pain. Genitourinary:  Negative for difficulty urinating. Musculoskeletal:  Negative for arthralgias and myalgias. Skin:  Positive for wound. Negative for color change and rash. Neurological:  Negative for dizziness and light-headedness. Psychiatric/Behavioral:  Negative for agitation. All other systems reviewed and are negative. Positives and Pertinent negatives as per HPI. Except as noted above in the ROS, all other systems were reviewed and negative.        PAST MEDICAL HISTORY     Past Medical History:   Diagnosis Date    Anemia     thallasemia minor    Arthritis     osteo-arthritis    Asthma     Back pain     Back pain     Bronchitis chronic     Diabetes mellitus ([address]     GERD (gastroesophageal reflux disease)     Gout     Hypertension     Melanoma of scalp or neck (HCC)     Osteoarthritis     Osteoporosis     Squamous cell carcinoma, arm     Thal trait          SURGICAL HISTORY       Past Surgical History:   Procedure Laterality Date    APPENDECTOMY      CARPAL TUNNEL RELEASE Left 02/05/2018    CATARACT REMOVAL WITH IMPLANT Right 07/18/2018    CATARACT REMOVAL WITH IMPLANT Left 08/15/2018    COLONOSCOPY  12/2014    FEMUR FRACTURE SURGERY Right 6/20/2022    RIGHT INTRAMEDULLARY NAILING performed by Raman Eubanks DO at [address] ([facility])      KNEE ARTHROSCOPY Bilateral     PARATHYROIDECTOMY      PARTIAL HYSTERECTOMY (CERVIX NOT REMOVED)      NE XCAPSL CTRC RMVL INSJ IO LENS PROSTH W/O ECP Right 7/18/2018    PHACOEMULSIFICATION OF CATARACT WITH INTRAOCULAR LENS IMPLANT RIGHT EYE performed by Umm Aranda MD at [address] W/O ECP Left 8/15/2018    PHACOEMULSIFICATION OF CATARACT WITH INTRAOCULAR LENS IMPLANT LEFT EYE performed by Umm Aranda MD at [address] ENDOSCOPY N/A 6/25/2022    EGD BIOPSY performed by Kierra Shea DO at [address] [facility] [address]  6/25/2022    EGD POLYP HOT FORCEP/CAUTERY performed by Kierra Shea DO at [address] [facility]       Discharge Medication List as of 2/7/2023  3:45 PM        CONTINUE these medications which have NOT CHANGED    Details   !! fluticasone furoate-vilanterol (BREO ELLIPTA) 200-25 MCG/ACT AEPB inhaler Inhale one dose by mouth daily. , Disp-3 each, R-0Normal      !! Umeclidinium Bromide (INCRUSE ELLIPTA) 62.5 MCG/ACT AEPB Inhale one puff by mouth daily. , Disp-3 each, R-0Normal      verapamil (VERELAN) 240 MG extended release capsule Take 1 capsule by mouth daily Take 240 mg by mouth daily, Disp-90 capsule, R-0Normal      omeprazole (PRILOSEC) 20 MG delayed release capsule TAKE ONE CAPSULE BY MOUTH TWICE A DAY, Disp-180 capsule, R-0Normal      ferrous sulfate (IRON 325) 325 (65 Fe) MG tablet Take 1 tablet by mouth in the morning and at bedtime, Disp-180 tablet, R-0PLEASE DELETE REFILLS ON SCRIPT FROM 10/05/22Normal      terazosin (HYTRIN) 5 MG capsule TAKE ONE CAPSULE BY MOUTH ONCE NIGHTLY    Dose increased from 2 mg to 5 mg, Disp-90 capsule, R-0Normal      PARoxetine (PAXIL) 10 MG tablet TAKE 1 TABLET BY MOUTH  DAILY, Disp-90 tablet, R-0Requesting 1 year supplyNormal      losartan (COZAAR) 100 MG tablet TAKE 1 TABLET BY MOUTH  DAILY, Disp-90 tablet, R-0Requesting 1 year supplyNormal      glipiZIDE (GLUCOTROL) 10 MG tablet TAKE 1 TABLET BY MOUTH  TWICE DAILY BEFORE MEALS, Disp-180 tablet, R-0Requesting 1 year supplyNormal      metFORMIN (GLUCOPHAGE) 1000 MG tablet TAKE 1 TABLET BY MOUTH  TWICE DAILY WITH MEALS, Disp-180 tablet, R-0Requesting 1 year supplyNormal      carvedilol (COREG) 6.25 MG tablet TAKE 1 TABLET BY MOUTH  TWICE DAILY, Disp-180 tablet, R-0Requesting 1 year supplyNormal      allopurinol (ZYLOPRIM) 300 MG tablet TAKE 1 TABLET BY MOUTH  DAILY, Disp-90 tablet, R-0Requesting 1 year supplyNormal      alendronate (FOSAMAX) 70 MG tablet TAKE 1 TABLET BY MOUTH  WEEKLY WITH 8 OZ OF PLAIN  WATER 30 MINUTES BEFORE  FIRST FOOD, DRINK OR MEDS. STAY UPRIGHT FOR 30 MINS, Disp-12 tablet, R-0Requesting 1 year supplyNormal      atorvastatin (LIPITOR) 10 MG tablet TAKE 1 TABLET BY MOUTH AT  NIGHT, Disp-90 tablet, R-0Requesting 1 year supplyNormal      Menthol, Topical Analgesic, 2 % GEL Apply topicallyHistorical Med      !! Fluticasone furoate-vilanterol (BREO ELLIPTA) 200-25 MCG/INH AEPB inhaler INHALE ONE DOSE BY MOUTH DAILY, Disp-1 each, R-5Normal      !! Umeclidinium Bromide (INCRUSE ELLIPTA) 62.5 MCG/INH AEPB INHALE ONE PUFF BY MOUTH DAILY, Disp-1 each, R-5Normal      gabapentin (NEURONTIN) 300 MG capsule Take 1 capsule by mouth 2 times daily for 180 days. Intended supply: 90 days, Disp-60 capsule, R-2Normal      blood glucose test strips (ASCENSIA AUTODISC VI;ONE TOUCH ULTRA TEST VI) strip Disp-100 each, R-3, NormalUsed to test once daily. DX: E11.9      albuterol sulfate HFA (PROAIR HFA) 108 (90 Base) MCG/ACT inhaler INHALE TWO PUFFS BY MOUTH EVERY 6 HOURS AS NEEDED FOR WHEEZING, Disp-1 each, R-2Normal      acetaminophen (TYLENOL) 500 MG tablet Take 500 mg by mouth every 6 hours as needed for PainHistorical Med      cetirizine (ZYRTEC) 10 MG tablet Take 10 mg by mouth dailyHistorical Med      Calcium Carbonate-Vitamin D (CALCIUM + D PO) Take  by mouth daily. Historical Med       !! - Potential duplicate medications found. Please discuss with provider.             ALLERGIES     Hctz [hydrochlorothiazide], Lisinopril, and Spironolactone      FAMILY HISTORY       Family History   Problem Relation Age of Onset    Heart Failure Mother     Emphysema Mother     Stroke Mother     Heart Failure Father     Hypertension Father     Heart Disease Father     High Blood Pressure Father     Cancer Maternal Aunt     Cancer Paternal Aunt     Cancer Maternal Grandmother     Asthma Neg Hx     Diabetes Neg Hx SOCIAL HISTORY       Social History     Socioeconomic History    Marital status:    Tobacco Use    Smoking status: Never     Passive exposure: Yes    Smokeless tobacco: Never    Tobacco comments:     exposed to passive smoke for 18 yrs   Vaping Use    Vaping Use: Never used   Substance and Sexual Activity    Alcohol use: No    Drug use: No    Sexual activity: Never     Social Determinants of Health     Financial Resource Strain: Low Risk     Difficulty of Paying Living Expenses: Not hard at all   Food Insecurity: No Food Insecurity    Worried About Running Out of Food in the Last Year: Never true    Ran Out of Food in the Last Year: Never true   Transportation Needs: No Transportation Needs    Lack of Transportation (Medical): No    Lack of Transportation (Non-Medical): No   Physical Activity: Inactive    Days of Exercise per Week: 0 days    Minutes of Exercise per Session: 0 min   Stress: No Stress Concern Present    Feeling of Stress : Only a little   Social Connections: Unknown    Frequency of Communication with Friends and Family: More than three times a week    Frequency of Social Gatherings with Friends and Family: More than three times a week    Marital Status:    Housing Stability: Low Risk     Unable to Pay for Housing in the Last Year: No    Number of [place] in the Last Year: 1    Unstable Housing in the Last Year: No         SCREENINGS    [place] Coma Scale  Eye Opening: Spontaneous  Best Verbal Response: Oriented  Best Motor Response: Obeys commands  [place] Coma Scale Score: 15      CIWA Assessment  BP: (!) 158/79  Heart Rate: 70          PHYSICAL EXAM     ED Triage Vitals [02/07/23 1256]   BP Temp Temp Source Heart Rate Resp SpO2 Height Weight   (!) 176/87 97.9 °F (36.6 °C) Oral 83 18 100 % 5' 3\" (1.6 m) 209 lb (94.8 kg)       Physical Exam  Constitutional:       Appearance: Normal appearance. She is well-developed. HENT:      Head: Normocephalic.      Eyes:      Extraocular Movements: Extraocular movements intact. Pupils: Pupils are equal, round, and reactive to light. Cardiovascular:      Rate and Rhythm: Normal rate. Pulmonary:      Effort: Pulmonary effort is normal. No respiratory distress. Abdominal:      General: There is no distension. Palpations: Abdomen is soft. Tenderness: There is no abdominal tenderness. Musculoskeletal:         General: Normal range of motion. Cervical back: Normal range of motion. No spinous process tenderness or muscular tenderness. Skin:     General: Skin is warm and dry. Neurological:      General: No focal deficit present. Mental Status: She is alert and oriented to person, place, and time. DIAGNOSTIC RESULTS   LABS:    Labs Reviewed - No data to display      All other labs were within normal range or not returned as of this dictation. EKG: All EKG's are interpreted by the Emergency Department Physician who either signs or Co-signs this chart in the absence of a cardiologist.  Please see their note for interpretation of EKG. RADIOLOGY:   Non plain film images ans such as CT, ultrasound, and MRI are read by the radiologist. Plain radiographic images are visualized and preliminarily interpreted by the ED Provider with the below findings:      Head CT without contrast interpreted by radiologist for  FINDINGS:   BRAIN/VENTRICLES: There is a moderate cerebral atrophy. There are white   matter hypodensities, in keeping with microvascular disease. There is a   artifact noted along a right posterior frontal gyrus. There is no acute   intracranial hemorrhage, mass effect or midline shift. No abnormal   extra-axial fluid collection. The gray-white differentiation is maintained   without evidence of an acute infarct. There is no evidence of hydrocephalus. ORBITS: The visualized portion of the orbits demonstrate no acute abnormality.      SINUSES: The visualized paranasal sinuses and mastoid air cells demonstrate   no acute abnormality. SOFT TISSUES/SKULL:  No acute abnormality of the visualized skull or soft   tissues    Cervical spine CT without contrast interpreted by radiologist for    FINDINGS:   BONES/ALIGNMENT: There is no acute fracture or traumatic malalignment. DEGENERATIVE CHANGES: There are osteoarthritic changes of the right and left   facet joints. SOFT TISSUES: There is no prevertebral soft tissue swelling. Interpretation per the Radiologist below, if available at the time of this note:    CT CERVICAL SPINE WO CONTRAST   Final Result   No acute abnormality of the cervical spine. CT HEAD WO CONTRAST   Final Result   No acute intracranial abnormality. No results found. No results found. No results found.       PROCEDURES   Unless otherwise noted below, none     Lac Repair    Date/Time: 2/7/2023 4:13 PM  Performed by: MADDIE Silva CNP  Authorized by: MADDIE Silva CNP     Consent:     Consent obtained:  Verbal    Consent given by:  Patient    Risks discussed:  Infection, pain and retained foreign body  Universal protocol:     Procedure explained and questions answered to patient or proxy's satisfaction: yes      Patient identity confirmed:  Verbally with patient  Anesthesia:     Anesthesia method:  Local infiltration    Local anesthetic:  Lidocaine 1% w/o epi  Laceration details:     Location:  Hand    Hand location:  L hand, dorsum    Length (cm):  1  Exploration:     Hemostasis achieved with:  Direct pressure    Wound extent: areolar tissue violated and fascia violated      Wound extent: no foreign bodies/material noted, no muscle damage noted, no nerve damage noted, no tendon damage noted, no underlying fracture noted and no vascular damage noted      Contaminated: yes    Treatment:     Area cleansed with:  Chlorhexidine    Amount of cleaning:  Standard    Irrigation solution:  Sterile water    Irrigation method:  Syringe  Skin repair:     Repair method:  Sutures    Suture size:  5-0    Suture material:  Prolene    Suture technique:  Simple interrupted    Number of sutures:  5  Approximation:     Approximation:  Close  Repair type:     Repair type:  Simple  Post-procedure details:     Dressing:  Non-adherent dressing    Procedure completion:  Tolerated     CRITICAL CARE TIME   Unless otherwise noted below, none        EMERGENCYDEPARTMENT COURSE/MDM:     Vitals:    Vitals:    02/07/23 1256 02/07/23 1557   BP: (!) 176/87 (!) 158/79   Pulse: 83 70   Resp: 18 16   Temp: 97.9 °F (36.6 °C)    TempSrc: Oral    SpO2: 100% 97%   Weight: 209 lb (94.8 kg)    Height: 5' 3\" (1.6 m)          Patient was seen and evaluated by myself. Patient here for concerns for mechanical fall. Patient here after she was bringing in the groceries and she had a fall. Patient denies dizziness or lightheadedness. Patient states that she has an area between her grass in her driveway that is a step-off. She states that she tries to be conscious of that area but maybe she tripped on it. She did fall injuring her left hand and her left orbit. Patient denies loss of consciousness vomiting or change in behavior. She does report that her tetanus is up-to-date. She does not use any blood thinners. On exam she is awake and alert hemodynamically stable nontoxic in appearance. She does have ecchymosis and erythema noted to the left orbit. Extraocular movements are intact. CT of her head and neck of been obtained and reviewed. Patient did receive sutures to her left hand. See procedure note for laceration repair. Patient will be given instructions to follow-up with her primary care doctor in the next 2 days and return to the ED for worsening symptoms. She was given instructions to follow-up with her primary care doctor for suture clinic or the ED in 7 to 10 days for suture removals. Patient was ultimately discharged with questions answered.       Patient was given the following medications:  Medications - No data to display         CONSULTS:  None      Discussion with other professionals - none    Social determinants - none    Records Reviewed - none    History from - patient    Limitations to history- none    Chronic conditions: has a past medical history of Anemia, Arthritis, Asthma, Back pain, Back pain, Bronchitis chronic, Diabetes mellitus ([facility] [address] GERD (gastroesophageal reflux disease), Gout, Hypertension, Melanoma of scalp or neck ([facility] Osteoarthritis, Osteoporosis, Squamous cell carcinoma, arm, and Thal trait. Is this patient to be included in the SEP-1 Core Measure due to severe sepsis or septic shock? No   Exclusion criteria - the patient is NOT to be included for SEP-1 Core Measure due to: Infection is not suspected         The patient tolerated their visit well. I have evaluated this patient. My supervising physician was available for consultation. The patient and / or the family were informed of the results of any tests, a time was given to answer questions, a plan was proposed and they agreed with plan. FINAL IMPRESSION      1. Injury of head, initial encounter    2. Trips over objects    3. Laceration of left hand, foreign body presence unspecified, initial encounter    4.  Traumatic hematoma of left orbit, initial encounter          DISPOSITION/PLAN   DISPOSITION Decision To Discharge 02/07/2023 01:39:18 PM      PATIENT REFERRED TO:  Epi Moreno MD  [facility] [address], [facility]  [phone]    Schedule an appointment as soon as possible for a visit in 2 days  for re-evaluation, For suture removal 7-10 days    [facility] ED  [address] [facility]  [phone]    If symptoms worsen, For suture removal 7-10 days    [facility]- for suture removal  [address] [facility] [address], [address]    Monday- Friday 9am-4pm  There is not charge  Schedule an appointment as soon as possible for a visit   7-10 days    DISCHARGE MEDICATIONS:  Discharge Medication List as of 2/7/2023  3:45 PM          DISCONTINUED MEDICATIONS:  Discharge Medication List as of 2/7/2023  3:45 PM                 (Please note that portions of this note were completed with a voice recognition program.  Efforts were made to edit the dictations but occasionally words are mis-transcribed.)    Patient was seen during the time of the [place] pandemic. N95 and appropriate PPE was worn during the visit.       MADDIE Trent CNP (electronically signed)        MADDIE Trent CNP  02/07/23 1613

## 2023-02-10 ENCOUNTER — CARE COORDINATION (OUTPATIENT)
Dept: CARE COORDINATION | Age: 78
End: 2023-02-10

## 2023-02-10 NOTE — CARE COORDINATION
[facility] ED Follow up Call    Reason for ED visit:  Fall  Status:    Not changed    Did you call your PCP prior to going to the ED? No      Did you receive a discharge instructions from the Emergency Room? Yes  Review of Instructions:     Understands what to report/when to return?:  Yes   Understands discharge instructions?:  Yes   Following discharge instructions?:  Yes   If not why? N/A    Are there any new complaints of pain? Yes knee pain  New Pain Meds? No    Constipation prophylaxis needed? N/A    If you have a wound is the dressing clean, dry, and intact? Yes  Understands wound care regimen? Yes    Are there any other complaints/concerns that you wish to tell your provider? ACM completed ED f/u call with patient who said she is doing okay. Patient feels she is feeling better. Patient said she would be agreeable to follow up calls from [employer]. Patient did say she has issues with transportation. Patient is active with [employer]. Patient will be asking family to provide transportation for stitch removal appt. Patient declined referral to Charley Chavarria PT/OT. ACM will route note to LUISA Parra. FU appts/Provider:    Future Appointments   Date Time Provider Geraldine Arnold   6/1/2023 10:40 AM Rober Brooks MD [facility] None           New Medications?:   No      Medication Reconciliation by phone - No    Any further needs in the home i.e. Equipment?   No    Link to services in community?:  No   Which services:  N/A

## 2023-02-19 RX ORDER — VERAPAMIL HYDROCHLORIDE 240 MG/1
240 TABLET, FILM COATED, EXTENDED RELEASE ORAL DAILY
Qty: 90 TABLET | Refills: 0 | Status: SHIPPED | OUTPATIENT
Start: 2023-02-19

## 2023-02-20 ENCOUNTER — TELEPHONE (OUTPATIENT)
Dept: INTERNAL MEDICINE CLINIC | Age: 78
End: 2023-02-20

## 2023-02-20 NOTE — TELEPHONE ENCOUNTER
----- Message from Elizabeth Bhat MD sent at 2/20/2023 12:49 PM EST -----  Contact: Tatiana Alonso- [phone]  We changed from the capsule to the tablet   ----- Message -----  From: Nimesh Pena  Sent: 2/20/2023  12:15 PM EST  To: Elizabeth Bhat MD    Caller asking if dosages is intended for below medication.  Please advise       verapamil (CALAN SR) 240 MG extended release tablet     [facility] ([address] [facility]) [address] [facility] [phone] - F [phone]   [address] [address]   Phone:  [phone]  Fax:  [phone]

## 2023-03-09 ENCOUNTER — CARE COORDINATION (OUTPATIENT)
Dept: CARE COORDINATION | Age: 78
End: 2023-03-09

## 2023-03-09 ASSESSMENT — SOCIAL DETERMINANTS OF HEALTH (SDOH)
IN A TYPICAL WEEK, HOW MANY TIMES DO YOU TALK ON THE PHONE WITH FAMILY, FRIENDS, OR NEIGHBORS?: TWICE A WEEK
HOW OFTEN DO YOU GET TOGETHER WITH FRIENDS OR RELATIVES?: TWICE A WEEK

## 2023-03-09 NOTE — CARE COORDINATION
Ambulatory Care Coordination Note  3/9/2023    Patient Current Location:  [place]     ACM contacted the patient by telephone. Verified name and  with patient as identifiers. Provided introduction to self, and explanation of the ACM role.       Method of communication with provider: chart routing.    ACM: Lety Saba, RN    I spoke with the patient today for follow up. She has had not more falls. She was just able to get her glasses replaced this week. She has been without for 4 weeks. This has caused her to be less active. She feels tired all the time. She notices she is not able to do as many exercises as past. She still has some left knee soreness and plans to follow up with ortho regarding this. She has a hard area below her eye lid and above the eye socket still from the fall and it is sore. She has just scant bruising left on the face. She has a follow up with plastics for assessment as this area has not improved.  Recently increased her Verapamil at PCP visit. She has not been checking her BP, but will start. Educated on the need for monitoring to increase with med changes.  She is not planning another PCP visit till . I have spoke to her about the need for possible earlier follow up due to the fatigue. Last H & H was done in January with [facility] and was 10 and 31.8.  history of anemia . She is having a hard time with her homemaking. Previously, no aid could be found to assist due to supply/demand issue with [facility]. I have advised her to contact [facility] again and see where she is on the wait list. She feels she can do this.  She was encouraged to increase her activity at home. She is relying on her walker. She is scared to not use it. She feels like she is has been a little down with being stuck at home and no glasses. She had increased Paxil to 40 mg daily and this seems to have helped.    Past Medical History:   Diagnosis Date    Anemia     thallasemia minor    Arthritis     osteo-arthritis    Asthma     Back  pain     Back pain     Bronchitis chronic     Diabetes mellitus (HCC)     GERD (gastroesophageal reflux disease)     Gout     Hypertension     Melanoma of scalp or neck (HCC)     Osteoarthritis     Osteoporosis     Squamous cell carcinoma, arm     Thal trait      Lab Results   Component Value Date    LABA1C 5.9 02/02/2023    LABA1C 6.0 06/22/2022    LABA1C 7.1 03/16/2022     Lab Results   Component Value Date    .6 02/02/2023    .5 06/22/2022    .1 03/16/2022     Plan    Follow up call in 1 week  Did she call CSS  Assess activity/fatigue. (Is earlier f/u needed/labs)  Fall safety ongoing   Diabetes support   HTN-BP readings? Finish up SDOH and ADs next call       Offered patient enrollment in the Remote Patient Monitoring (RPM) program for in-home monitoring: Patient is not eligible for RPM program. (Affiliate)     Lab Results       None            Care Coordination Interventions    Referral from Primary Care Provider: No  Suggested Interventions and Community Resources  Fall Risk Prevention: In Process (Comment: fall safety mailed again 3/9/23 trb)  Meals on Wheels: Declined  Medi Set or Pill Pack: Completed (Comment: pill packs-[facility]   fills her own mediset)  Physical Therapy: Declined  Senior Services: Completed (Comment: has the number again for follow up. plans to call 3/9/23 trb)  Transportation Support: Declined  Zone Management Tools: In Process (Comment: diabetes)  Other Services or Interventions: [employer] and gets delivery service to home. Goals Addressed                   This Visit's Progress       Care Coordination     Community Resource Goal   No change     I will complete referral to [facility] (Senior Services) for assistance.      Barriers: fear of failure, lack of motivation, and lack of education  Plan for overcoming my barriers: education and support   Confidence: 8/10  Anticipated Goal Completion Date: 5/23    Provided information for referral for CSS follow up on homemaker supports. 3/9/23 trb        Reduce Falls    No change     I will reduce my risk of falls by the following: Remove rugs or use non slip rugs  Install grab bars in bathroom  Use walking aids like cane or walker    Barriers: impairment:  physical: weakness , stress, and lack of education  Plan for overcoming my barriers: education an support   Confidence: 5/10  Anticipated Goal Completion Date: 5/23    Fall safety discussed. Weaker at time and fatigued. She has been pretty sedentary the last 4 weeks. Fear of falling again. Declined PT. She is going to start her taught exercises again. 3/9/23 trb               Future Appointments   Date Time Provider Geraldine Lambi   6/8/2023 10:30 AM Eerndira Gonsalves MD [facility] None   ,   Diabetes Assessment    Medic Alert ID: No  Meal Planning: Avoidance of concentrated sweets, Plate Method   How often do you test your blood sugar?: Other (Comment: sometimes a couple times a week)   Do you have barriers with adherence to non-pharmacologic self-management interventions? (Nutrition/Exercise/Self-Monitoring): No   Have you ever had to go to the ED for symptoms of low blood sugar?: No       No patient-reported symptoms   Do you have hyperglycemia symptoms?: No   Do you have hypoglycemia symptoms?: No   Blood Sugar Monitoring Regimen: Not Testing   Blood Sugar Trends:  (Comment: ashlyn)        , and   General Assessment    Do you have any symptoms that are causing concern?: Yes  Progression since Onset: Gradually Worsening  Reported Symptoms: Fatigue (Comment: She is tired a lot. She still has left knee soreness.  She still has a sore/firm area around her left eye)

## 2023-03-13 RX ORDER — UMECLIDINIUM 62.5 UG/1
AEROSOL, POWDER ORAL
Qty: 30 EACH | Refills: 0 | Status: SHIPPED | OUTPATIENT
Start: 2023-03-13 | End: 2023-04-13

## 2023-03-13 RX ORDER — FLUTICASONE FUROATE AND VILANTEROL 200; 25 UG/1; UG/1
POWDER RESPIRATORY (INHALATION)
Qty: 60 EACH | Refills: 0 | Status: SHIPPED | OUTPATIENT
Start: 2023-03-13 | End: 2023-04-13

## 2023-03-20 ENCOUNTER — CARE COORDINATION (OUTPATIENT)
Dept: CARE COORDINATION | Age: 78
End: 2023-03-20

## 2023-03-20 RX ORDER — CARVEDILOL 6.25 MG/1
TABLET ORAL
Qty: 180 TABLET | Refills: 0 | Status: SHIPPED | OUTPATIENT
Start: 2023-03-20

## 2023-03-20 RX ORDER — ALLOPURINOL 300 MG/1
TABLET ORAL
Qty: 90 TABLET | Refills: 0 | Status: SHIPPED | OUTPATIENT
Start: 2023-03-20

## 2023-03-20 RX ORDER — GLIPIZIDE 10 MG/1
TABLET ORAL
Qty: 180 TABLET | Refills: 0 | Status: SHIPPED | OUTPATIENT
Start: 2023-03-20

## 2023-03-20 RX ORDER — ATORVASTATIN CALCIUM 10 MG/1
TABLET, FILM COATED ORAL
Qty: 90 TABLET | Refills: 0 | Status: SHIPPED | OUTPATIENT
Start: 2023-03-20

## 2023-03-20 RX ORDER — LOSARTAN POTASSIUM 100 MG/1
TABLET ORAL
Qty: 90 TABLET | Refills: 0 | Status: SHIPPED | OUTPATIENT
Start: 2023-03-20

## 2023-03-20 NOTE — CARE COORDINATION
Care Provider: No  Suggested Interventions and Community Resources  Fall Risk Prevention: In Process (Comment: fall safety mailed again 3/9/23 trb)  Meals on Wheels: Declined  Medi Set or Pill Pack: Completed (Comment: pill packs-[facility]   fills her own mediset)  Physical Therapy: Declined  Senior Services: Completed (Comment: has the number again for follow up. plans to call 3/9/23 trb)  Transportation Support: Declined  Zone Management Tools: In Process (Comment: diabetes)  Other Services or Interventions: [employer] and gets delivery service to home.           Goals Addressed    None         Future Appointments   Date Time Provider Geraldine Arnold   6/8/2023 10:30 AM Autumn Salas MD [facility] None

## 2023-03-22 RX ORDER — ALENDRONATE SODIUM 70 MG/1
TABLET ORAL
Qty: 12 TABLET | Refills: 0 | Status: SHIPPED | OUTPATIENT
Start: 2023-03-22

## 2023-03-27 RX ORDER — PAROXETINE 10 MG/1
TABLET, FILM COATED ORAL
Qty: 90 TABLET | Refills: 0 | Status: SHIPPED | OUTPATIENT
Start: 2023-03-27

## 2023-03-30 ENCOUNTER — TELEPHONE (OUTPATIENT)
Dept: INTERNAL MEDICINE CLINIC | Age: 78
End: 2023-03-30

## 2023-03-30 RX ORDER — VERAPAMIL HYDROCHLORIDE 240 MG/1
240 TABLET, FILM COATED, EXTENDED RELEASE ORAL DAILY
Qty: 90 TABLET | Refills: 0 | Status: SHIPPED | OUTPATIENT
Start: 2023-03-30

## 2023-03-30 RX ORDER — FERROUS SULFATE 325(65) MG
325 TABLET ORAL 2 TIMES DAILY
Qty: 180 TABLET | Refills: 0 | Status: SHIPPED | OUTPATIENT
Start: 2023-03-30

## 2023-03-30 RX ORDER — TERAZOSIN 5 MG/1
CAPSULE ORAL
Qty: 90 CAPSULE | Refills: 0 | Status: SHIPPED | OUTPATIENT
Start: 2023-03-30

## 2023-03-30 NOTE — TELEPHONE ENCOUNTER
----- Message from Ibis Rockwell sent at 3/30/2023  4:12 PM EDT -----  Contact: [phone]  Patient requesting a refill. ferrous sulfate (IRON 325) 325 (65 Fe) MG tablet       Patient wants 180 day supply. Sudheer marks- [facility].

## 2023-04-24 ENCOUNTER — CARE COORDINATION (OUTPATIENT)
Dept: CARE COORDINATION | Age: 78
End: 2023-04-24

## 2023-04-24 NOTE — CARE COORDINATION
ACM attempted outreach. Left message. Contact information for call back provided. Plan      Plan for d/c   Fall safety education to be completed. Continue to make sure there is not interest in CSS referral   Assess fatigue  BS readings.    AD assessment again

## 2023-05-03 ENCOUNTER — CARE COORDINATION (OUTPATIENT)
Dept: CARE COORDINATION | Age: 78
End: 2023-05-03

## 2023-05-03 NOTE — CARE COORDINATION
ACM attempted outreach. Left message. Contact information for call back provided.  2nd missed message
Admission

## 2023-05-12 ENCOUNTER — CARE COORDINATION (OUTPATIENT)
Dept: CARE COORDINATION | Age: 78
End: 2023-05-12

## 2023-05-12 RX ORDER — OMEPRAZOLE 20 MG/1
CAPSULE, DELAYED RELEASE ORAL
Qty: 180 CAPSULE | Refills: 0 | Status: SHIPPED | OUTPATIENT
Start: 2023-05-12

## 2023-05-12 RX ORDER — UMECLIDINIUM 62.5 UG/1
AEROSOL, POWDER ORAL
Qty: 30 EACH | Refills: 0 | Status: SHIPPED | OUTPATIENT
Start: 2023-05-12

## 2023-05-12 RX ORDER — FLUTICASONE FUROATE AND VILANTEROL 200; 25 UG/1; UG/1
POWDER RESPIRATORY (INHALATION)
Qty: 20 EACH | Refills: 0 | Status: SHIPPED | OUTPATIENT
Start: 2023-05-12

## 2023-05-12 NOTE — CARE COORDINATION
ACM attempted outreach. Left message. Contact information for call back provided. 3rd missed outreach. Plan to complete care coordination at this time.

## 2023-06-05 SDOH — HEALTH STABILITY: PHYSICAL HEALTH: ON AVERAGE, HOW MANY DAYS PER WEEK DO YOU ENGAGE IN MODERATE TO STRENUOUS EXERCISE (LIKE A BRISK WALK)?: 0 DAYS

## 2023-06-05 ASSESSMENT — PATIENT HEALTH QUESTIONNAIRE - PHQ9
2. FEELING DOWN, DEPRESSED OR HOPELESS: 0
SUM OF ALL RESPONSES TO PHQ QUESTIONS 1-9: 0
1. LITTLE INTEREST OR PLEASURE IN DOING THINGS: 0
SUM OF ALL RESPONSES TO PHQ QUESTIONS 1-9: 0
SUM OF ALL RESPONSES TO PHQ9 QUESTIONS 1 & 2: 0

## 2023-06-05 ASSESSMENT — LIFESTYLE VARIABLES
HOW MANY STANDARD DRINKS CONTAINING ALCOHOL DO YOU HAVE ON A TYPICAL DAY: PATIENT DOES NOT DRINK
HOW OFTEN DO YOU HAVE SIX OR MORE DRINKS ON ONE OCCASION: 1
HOW OFTEN DO YOU HAVE A DRINK CONTAINING ALCOHOL: 1
HOW OFTEN DO YOU HAVE A DRINK CONTAINING ALCOHOL: NEVER
HOW MANY STANDARD DRINKS CONTAINING ALCOHOL DO YOU HAVE ON A TYPICAL DAY: 0

## 2023-06-08 ENCOUNTER — TELEPHONE (OUTPATIENT)
Dept: INTERNAL MEDICINE CLINIC | Age: 78
End: 2023-06-08

## 2023-06-08 ENCOUNTER — OFFICE VISIT (OUTPATIENT)
Dept: INTERNAL MEDICINE CLINIC | Age: 78
End: 2023-06-08

## 2023-06-08 VITALS
BODY MASS INDEX: 36.32 KG/M2 | HEART RATE: 72 BPM | DIASTOLIC BLOOD PRESSURE: 74 MMHG | HEIGHT: 63 IN | WEIGHT: 205 LBS | SYSTOLIC BLOOD PRESSURE: 136 MMHG

## 2023-06-08 DIAGNOSIS — I10 BENIGN ESSENTIAL HTN: ICD-10-CM

## 2023-06-08 DIAGNOSIS — E11.69 HYPERLIPIDEMIA ASSOCIATED WITH TYPE 2 DIABETES MELLITUS (HCC): ICD-10-CM

## 2023-06-08 DIAGNOSIS — F32.5 MAJOR DEPRESSIVE DISORDER WITH SINGLE EPISODE, IN FULL REMISSION (HCC): ICD-10-CM

## 2023-06-08 DIAGNOSIS — Z00.00 ROUTINE GENERAL MEDICAL EXAMINATION AT A HEALTH CARE FACILITY: ICD-10-CM

## 2023-06-08 DIAGNOSIS — E78.5 HYPERLIPIDEMIA ASSOCIATED WITH TYPE 2 DIABETES MELLITUS (HCC): ICD-10-CM

## 2023-06-08 DIAGNOSIS — Z00.00 MEDICARE ANNUAL WELLNESS VISIT, SUBSEQUENT: Primary | ICD-10-CM

## 2023-06-08 DIAGNOSIS — E11.9 TYPE 2 DIABETES MELLITUS WITHOUT COMPLICATION, WITHOUT LONG-TERM CURRENT USE OF INSULIN (HCC): ICD-10-CM

## 2023-06-08 DIAGNOSIS — I48.0 PAROXYSMAL ATRIAL FIBRILLATION (HCC): ICD-10-CM

## 2023-06-08 LAB
BASOPHILS # BLD: 0 K/UL (ref 0–0.2)
BASOPHILS NFR BLD: 0.4 %
DEPRECATED RDW RBC AUTO: 16.6 % (ref 12.4–15.4)
EOSINOPHIL # BLD: 0.1 K/UL (ref 0–0.6)
EOSINOPHIL NFR BLD: 1.6 %
HCT VFR BLD AUTO: 29.6 % (ref 36–48)
HGB BLD-MCNC: 9.4 G/DL (ref 12–16)
LYMPHOCYTES # BLD: 1.5 K/UL (ref 1–5.1)
LYMPHOCYTES NFR BLD: 32.3 %
MCH RBC QN AUTO: 19.4 PG (ref 26–34)
MCHC RBC AUTO-ENTMCNC: 31.7 G/DL (ref 31–36)
MCV RBC AUTO: 61.2 FL (ref 80–100)
MONOCYTES # BLD: 0.4 K/UL (ref 0–1.3)
MONOCYTES NFR BLD: 8.2 %
NEUTROPHILS # BLD: 2.7 K/UL (ref 1.7–7.7)
NEUTROPHILS NFR BLD: 57.5 %
PATH INTERP BLD-IMP: NO
PLATELET # BLD AUTO: 229 K/UL (ref 135–450)
PLATELET BLD QL SMEAR: ADEQUATE
PMV BLD AUTO: 11.7 FL (ref 5–10.5)
RBC # BLD AUTO: 4.83 M/UL (ref 4–5.2)
SLIDE REVIEW: ABNORMAL
WBC # BLD AUTO: 4.8 K/UL (ref 4–11)

## 2023-06-08 PROCEDURE — 3075F SYST BP GE 130 - 139MM HG: CPT | Performed by: INTERNAL MEDICINE

## 2023-06-08 PROCEDURE — 3078F DIAST BP <80 MM HG: CPT | Performed by: INTERNAL MEDICINE

## 2023-06-08 PROCEDURE — 1123F ACP DISCUSS/DSCN MKR DOCD: CPT | Performed by: INTERNAL MEDICINE

## 2023-06-08 PROCEDURE — G0439 PPPS, SUBSEQ VISIT: HCPCS | Performed by: INTERNAL MEDICINE

## 2023-06-08 PROCEDURE — 3044F HG A1C LEVEL LT 7.0%: CPT | Performed by: INTERNAL MEDICINE

## 2023-06-08 RX ORDER — UMECLIDINIUM 62.5 UG/1
AEROSOL, POWDER ORAL
Qty: 1 EACH | Refills: 5 | Status: SHIPPED | OUTPATIENT
Start: 2023-06-08

## 2023-06-08 RX ORDER — FLUTICASONE FUROATE AND VILANTEROL 200; 25 UG/1; UG/1
POWDER RESPIRATORY (INHALATION)
Qty: 1 EACH | Refills: 5 | Status: SHIPPED | OUTPATIENT
Start: 2023-06-08 | End: 2023-06-08 | Stop reason: SDUPTHER

## 2023-06-08 RX ORDER — UMECLIDINIUM 62.5 UG/1
AEROSOL, POWDER ORAL
Qty: 1 EACH | Refills: 5 | Status: SHIPPED | OUTPATIENT
Start: 2023-06-08 | End: 2023-06-08 | Stop reason: SDUPTHER

## 2023-06-08 RX ORDER — FLUTICASONE FUROATE AND VILANTEROL 200; 25 UG/1; UG/1
POWDER RESPIRATORY (INHALATION)
Qty: 1 EACH | Refills: 5 | Status: SHIPPED | OUTPATIENT
Start: 2023-06-08

## 2023-06-08 NOTE — PATIENT INSTRUCTIONS
Personalized Preventive Plan for Kelli Mtz - 6/8/2023  Medicare offers a range of preventive health benefits. Some of the tests and screenings are paid in full while other may be subject to a deductible, co-insurance, and/or copay. Some of these benefits include a comprehensive review of your medical history including lifestyle, illnesses that may run in your family, and various assessments and screenings as appropriate. After reviewing your medical record and screening and assessments performed today your provider may have ordered immunizations, labs, imaging, and/or referrals for you. A list of these orders (if applicable) as well as your Preventive Care list are included within your After Visit Summary for your review. Other Preventive Recommendations:    A preventive eye exam performed by an eye specialist is recommended every 1-2 years to screen for glaucoma; cataracts, macular degeneration, and other eye disorders. A preventive dental visit is recommended every 6 months. Try to get at least 150 minutes of exercise per week or 10,000 steps per day on a pedometer . Order or download the FREE \"Exercise & Physical Activity: Your Everyday Guide\" from [employer]. Call 3-646.768.9805 or search [employer] online. You need 9238-4150 mg of calcium and 3692-3107 IU of vitamin D per day. It is possible to meet your calcium requirement with diet alone, but a vitamin D supplement is usually necessary to meet this goal.  When exposed to the sun, use a sunscreen that protects against both UVA and UVB radiation with an SPF of 30 or greater. Reapply every 2 to 3 hours or after sweating, drying off with a towel, or swimming. Always wear a seat belt when traveling in a car. Always wear a helmet when riding a bicycle or motorcycle.

## 2023-06-08 NOTE — TELEPHONE ENCOUNTER
Patient requesting printed scripts be mailed to her so they can be sent to patient assistance once she reaches the requirement of out of pocket expense in July.

## 2023-06-08 NOTE — TELEPHONE ENCOUNTER
----- Message from Sonia Pena sent at 6/8/2023  1:14 PM EDT -----  Contact: [phone]  Patient asking for inhaler information to be mailed to her. She is sending them to someone  to get help paying for them.  Please advise       fluticasone furoate-vilanterol (BREO ELLIPTA) 200-25 MCG/ACT AEPB inhaler       umeclidinium bromide (INCRUSE ELLIPTA) 62.5 MCG/ACT inhaler

## 2023-06-08 NOTE — TELEPHONE ENCOUNTER
----- Message from Iqra Burch sent at 6/8/2023  1:14 PM EDT -----  Contact: [phone]  Patient asking for inhaler information to be mailed to her. She is sending them to someone  to get help paying for them.  Please advise       fluticasone furoate-vilanterol (BREO ELLIPTA) 200-25 MCG/ACT AEPB inhaler       umeclidinium bromide (INCRUSE ELLIPTA) 62.5 MCG/ACT inhaler

## 2023-06-08 NOTE — PROGRESS NOTES
normal bilaterally  Neck: supple and non-tender without mass, no thyromegaly or thyroid nodules, no cervical lymphadenopathy  Pulmonary/Chest: clear to auscultation bilaterally- no wheezes, rales or rhonchi, normal air movement, no respiratory distress  Cardiovascular: normal rate, regular rhythm, normal S1 and S2, no murmurs, rubs, clicks, or gallops, distal pulses intact, no carotid bruits  Abdomen: soft, non-tender, non-distended, normal bowel sounds, no masses or organomegaly  Extremities: no cyanosis, clubbing or edema        Allergies   Allergen Reactions    Hctz [Hydrochlorothiazide]      Increases calcium to unsafe level resulting in parathyroid surgery    Lisinopril Other (See Comments)     cough    Spironolactone      Increasing potassium     Prior to Visit Medications    Medication Sig Taking? Authorizing Provider   fluticasone furoate-vilanterol (BREO ELLIPTA) 200-25 MCG/ACT AEPB inhaler INHALE ONE DOSE BY MOUTH DAILY Yes Chrissie Malhotra MD   umeclidinium bromide (INCRUSE ELLIPTA) 62.5 MCG/ACT inhaler INHALE ONE PUFF BY MOUTH DAILY Yes Chrissie Malhotra MD   omeprazole (PRILOSEC) 20 MG delayed release capsule TAKE ONE CAPSULE BY MOUTH TWICE Kevan Huerta MD   ferrous sulfate (IRON 325) 325 (65 Fe) MG tablet Take 1 tablet by mouth in the morning and at bedtime  Chrissie Malhotra MD   terazosin (HYTRIN) 5 MG capsule TAKE 1 CAPSULE BY MOUTH ONCE  NIGHTLY  Regina Quijano MD   verapamil (CALAN SR) 240 MG extended release tablet TAKE 1 TABLET BY MOUTH DAILY  Chrissie Malhotra MD   PARoxetine (PAXIL) 10 MG tablet TAKE 1 TABLET BY MOUTH ONCE  DAILY  Chrissie Malhotra MD   alendronate (FOSAMAX) 70 MG tablet TAKE 1 TABLET BY MOUTH WEEKLY  WITH 8 OZ OF PLAIN WATER 30  MINUTES BEFORE FIRST FOOD, DRINK OR MEDS.  Nahed Pollock MD   losartan (COZAAR) 100 MG tablet TAKE 1 TABLET BY MOUTH ONCE  DAILY

## 2023-06-09 LAB
EST. AVERAGE GLUCOSE BLD GHB EST-MCNC: 119.8 MG/DL
HBA1C MFR BLD: 5.8 %

## 2023-06-19 RX ORDER — ALENDRONATE SODIUM 70 MG/1
TABLET ORAL
Qty: 12 TABLET | Refills: 0 | Status: SHIPPED | OUTPATIENT
Start: 2023-06-19

## 2023-06-20 ENCOUNTER — ANESTHESIA EVENT (OUTPATIENT)
Dept: ENDOSCOPY | Age: 78
End: 2023-06-20
Payer: MEDICARE

## 2023-06-20 RX ORDER — PAROXETINE 10 MG/1
TABLET, FILM COATED ORAL
Qty: 90 TABLET | Refills: 3 | Status: SHIPPED | OUTPATIENT
Start: 2023-06-20

## 2023-06-20 RX ORDER — CARVEDILOL 6.25 MG/1
TABLET ORAL
Qty: 180 TABLET | Refills: 3 | Status: SHIPPED | OUTPATIENT
Start: 2023-06-20

## 2023-06-20 RX ORDER — LOSARTAN POTASSIUM 100 MG/1
TABLET ORAL
Qty: 90 TABLET | Refills: 3 | Status: SHIPPED | OUTPATIENT
Start: 2023-06-20

## 2023-06-20 RX ORDER — GLIPIZIDE 10 MG/1
TABLET ORAL
Qty: 180 TABLET | Refills: 3 | Status: SHIPPED | OUTPATIENT
Start: 2023-06-20

## 2023-06-20 RX ORDER — ALLOPURINOL 300 MG/1
TABLET ORAL
Qty: 90 TABLET | Refills: 3 | Status: SHIPPED | OUTPATIENT
Start: 2023-06-20

## 2023-06-20 RX ORDER — ATORVASTATIN CALCIUM 10 MG/1
TABLET, FILM COATED ORAL
Qty: 90 TABLET | Refills: 3 | Status: SHIPPED | OUTPATIENT
Start: 2023-06-20

## 2023-06-21 ENCOUNTER — ANESTHESIA (OUTPATIENT)
Dept: ENDOSCOPY | Age: 78
End: 2023-06-21
Payer: MEDICARE

## 2023-06-21 ENCOUNTER — HOSPITAL ENCOUNTER (OUTPATIENT)
Age: 78
Setting detail: OUTPATIENT SURGERY
Discharge: HOME | End: 2023-06-21
Attending: INTERNAL MEDICINE | Admitting: INTERNAL MEDICINE
Payer: MEDICARE

## 2023-06-21 VITALS
TEMPERATURE: 97 F | BODY MASS INDEX: 36.32 KG/M2 | HEIGHT: 63 IN | RESPIRATION RATE: 16 BRPM | OXYGEN SATURATION: 95 % | SYSTOLIC BLOOD PRESSURE: 132 MMHG | DIASTOLIC BLOOD PRESSURE: 72 MMHG | WEIGHT: 205 LBS | HEART RATE: 75 BPM

## 2023-06-21 DIAGNOSIS — D64.9 ANEMIA, UNSPECIFIED TYPE: ICD-10-CM

## 2023-06-21 LAB
GLUCOSE BLD-MCNC: 136 MG/DL (ref 70–99)
GLUCOSE BLD-MCNC: 151 MG/DL (ref 70–99)
PERFORMED ON: ABNORMAL
PERFORMED ON: ABNORMAL

## 2023-06-21 PROCEDURE — 3609010600 HC COLONOSCOPY POLYPECTOMY SNARE/COLD BIOPSY: Performed by: INTERNAL MEDICINE

## 2023-06-21 PROCEDURE — 3700000000 HC ANESTHESIA ATTENDED CARE: Performed by: INTERNAL MEDICINE

## 2023-06-21 PROCEDURE — 3609012400 HC EGD TRANSORAL BIOPSY SINGLE/MULTIPLE: Performed by: INTERNAL MEDICINE

## 2023-06-21 PROCEDURE — 3700000001 HC ADD 15 MINUTES (ANESTHESIA): Performed by: INTERNAL MEDICINE

## 2023-06-21 PROCEDURE — 3609010300 HC COLONOSCOPY W/BIOPSY SINGLE/MULTIPLE: Performed by: INTERNAL MEDICINE

## 2023-06-21 PROCEDURE — 6360000002 HC RX W HCPCS

## 2023-06-21 PROCEDURE — 7100000010 HC PHASE II RECOVERY - FIRST 15 MIN: Performed by: INTERNAL MEDICINE

## 2023-06-21 PROCEDURE — 2709999900 HC NON-CHARGEABLE SUPPLY: Performed by: INTERNAL MEDICINE

## 2023-06-21 PROCEDURE — 2500000003 HC RX 250 WO HCPCS

## 2023-06-21 PROCEDURE — 7100000011 HC PHASE II RECOVERY - ADDTL 15 MIN: Performed by: INTERNAL MEDICINE

## 2023-06-21 PROCEDURE — 88305 TISSUE EXAM BY PATHOLOGIST: CPT

## 2023-06-21 RX ORDER — SODIUM CHLORIDE 0.9 % (FLUSH) 0.9 %
5-40 SYRINGE (ML) INJECTION PRN
Status: DISCONTINUED | OUTPATIENT
Start: 2023-06-21 | End: 2023-06-21 | Stop reason: HOSPADM

## 2023-06-21 RX ORDER — LIDOCAINE HYDROCHLORIDE 20 MG/ML
INJECTION, SOLUTION INFILTRATION; PERINEURAL PRN
Status: DISCONTINUED | OUTPATIENT
Start: 2023-06-21 | End: 2023-06-21 | Stop reason: SDUPTHER

## 2023-06-21 RX ORDER — SODIUM CHLORIDE, SODIUM LACTATE, POTASSIUM CHLORIDE, CALCIUM CHLORIDE 600; 310; 30; 20 MG/100ML; MG/100ML; MG/100ML; MG/100ML
INJECTION, SOLUTION INTRAVENOUS CONTINUOUS
Status: DISCONTINUED | OUTPATIENT
Start: 2023-06-21 | End: 2023-06-21 | Stop reason: HOSPADM

## 2023-06-21 RX ORDER — MIDAZOLAM HYDROCHLORIDE 1 MG/ML
2 INJECTION INTRAMUSCULAR; INTRAVENOUS
Status: DISCONTINUED | OUTPATIENT
Start: 2023-06-21 | End: 2023-06-21 | Stop reason: HOSPADM

## 2023-06-21 RX ORDER — LIDOCAINE HYDROCHLORIDE 10 MG/ML
1 INJECTION, SOLUTION EPIDURAL; INFILTRATION; INTRACAUDAL; PERINEURAL
Status: DISCONTINUED | OUTPATIENT
Start: 2023-06-21 | End: 2023-06-21 | Stop reason: HOSPADM

## 2023-06-21 RX ORDER — SODIUM CHLORIDE 9 MG/ML
INJECTION, SOLUTION INTRAVENOUS PRN
Status: DISCONTINUED | OUTPATIENT
Start: 2023-06-21 | End: 2023-06-21 | Stop reason: HOSPADM

## 2023-06-21 RX ORDER — PROPOFOL 10 MG/ML
INJECTION, EMULSION INTRAVENOUS PRN
Status: DISCONTINUED | OUTPATIENT
Start: 2023-06-21 | End: 2023-06-21 | Stop reason: SDUPTHER

## 2023-06-21 RX ORDER — SODIUM CHLORIDE 0.9 % (FLUSH) 0.9 %
5-40 SYRINGE (ML) INJECTION EVERY 12 HOURS SCHEDULED
Status: DISCONTINUED | OUTPATIENT
Start: 2023-06-21 | End: 2023-06-21 | Stop reason: HOSPADM

## 2023-06-21 RX ADMIN — PROPOFOL 80 MG: 10 INJECTION, EMULSION INTRAVENOUS at 11:35

## 2023-06-21 RX ADMIN — PROPOFOL 120 MCG/KG/MIN: 10 INJECTION, EMULSION INTRAVENOUS at 11:36

## 2023-06-21 RX ADMIN — LIDOCAINE HYDROCHLORIDE 50 MG: 20 INJECTION, SOLUTION INFILTRATION; PERINEURAL at 11:35

## 2023-06-21 ASSESSMENT — PAIN - FUNCTIONAL ASSESSMENT: PAIN_FUNCTIONAL_ASSESSMENT: NONE - DENIES PAIN

## 2023-06-21 NOTE — ANESTHESIA PRE PROCEDURE
Department of Anesthesiology  Preprocedure Note       Name:  Kole Rendon   Age:  65 y.o.  :  1945                                          MRN:  [id number]         Date:  2023      Surgeon: Jocelin Orlando):  Dora Saldivar MD    Procedure: Procedure(s):  EGD W/ANES. (11:30)  COLON W/ANES. Medications prior to admission:   Prior to Admission medications    Medication Sig Start Date End Date Taking? Authorizing Provider   carvedilol (COREG) 6.25 MG tablet TAKE 1 TABLET BY MOUTH TWICE  DAILY 23   Wild Malave MD   atorvastatin (LIPITOR) 10 MG tablet TAKE 1 TABLET BY MOUTH ONCE  DAILY AT [facility] 23   Regina Quijano MD   PARoxetine (PAXIL) 10 MG tablet TAKE 1 TABLET BY MOUTH ONCE  DAILY 23   Wild Malave MD   losartan (COZAAR) 100 MG tablet TAKE 1 TABLET BY MOUTH ONCE  DAILY 23   Wild Malave MD   glipiZIDE (GLUCOTROL) 10 MG tablet TAKE 1 TABLET BY MOUTH TWICE  DAILY BEFORE MEALS 23   Wild Malave MD   metFORMIN (GLUCOPHAGE) 1000 MG tablet TAKE 1 TABLET BY MOUTH TWICE  DAILY WITH MEALS 23   Wild Malave MD   allopurinol (ZYLOPRIM) 300 MG tablet TAKE 1 TABLET BY MOUTH ONCE  DAILY 23   Wild Malave MD   alendronate (FOSAMAX) 70 MG tablet TAKE 1 TABLET BY MOUTH WEEKLY  WITH 8 OZ OF PLAIN WATER 30  MINUTES BEFORE FIRST FOOD, DRINK OR MEDS.  STAY UPRIGHT FOR 30  MINS 23   Wild Malave MD   BREO ELLIPTA 200-25 MCG/ACT AEPB inhaler INHALE ONE DOSE BY MOUTH DAILY 23   Wild Malave MD   umeclidinium bromide (INCRUSE ELLIPTA) 62.5 MCG/ACT inhaler INHALE ONE PUFF BY MOUTH DAILYINHALE ONE PUFF BY MOUTH DAILY 23   Wild Malave MD   omeprazole (PRILOSEC) 20 MG delayed release capsule TAKE ONE CAPSULE BY MOUTH TWICE A DAY 23   Wild Malave MD   terazosin (HYTRIN) 5 MG capsule TAKE 1 CAPSULE BY MOUTH ONCE

## 2023-06-21 NOTE — PROGRESS NOTES
Patient admitted from endo to pacu. Bedside report received. Patient immediately hooked up to vitals machine. Crystal Hill RN

## 2023-06-21 NOTE — H&P
History and Physical / Pre-Sedation Assessment    Patient:  Pinky Hendrix   :   1945     Intended Procedure:  EGD+colon    HPI: 65year old female with anemia and change in BMs    Past Medical History:   Diagnosis Date    Anemia     thallasemia minor    Arthritis     osteo-arthritis    Asthma     Back pain     Back pain     Bronchitis chronic     Diabetes mellitus ([address]     GERD (gastroesophageal reflux disease)     Gout     Hypertension     Melanoma of scalp or neck (HCC)     Osteoarthritis     Osteoporosis     Squamous cell carcinoma, arm     Thal trait      Past Surgical History:   Procedure Laterality Date    APPENDECTOMY      CARPAL TUNNEL RELEASE Left 2018    CATARACT REMOVAL WITH IMPLANT Right 2018    CATARACT REMOVAL WITH IMPLANT Left 08/15/2018    COLONOSCOPY  2014    FEMUR FRACTURE SURGERY Right 2022    RIGHT INTRAMEDULLARY NAILING performed by Zach Moya DO at [facility] ([address] [place] [address])      KNEE ARTHROSCOPY Bilateral     PARATHYROIDECTOMY      PARTIAL HYSTERECTOMY (CERVIX NOT REMOVED)      TN XCAPSL CTRC RMVL INSJ IO LENS PROSTH W/O ECP Right 2018    PHACOEMULSIFICATION OF CATARACT WITH INTRAOCULAR LENS IMPLANT RIGHT EYE performed by Kirby Grant MD at [facility] W/O ECP Left 8/15/2018    PHACOEMULSIFICATION OF CATARACT WITH INTRAOCULAR LENS IMPLANT LEFT EYE performed by Kirby Grant MD at [facility] N/A 2022    EGD BIOPSY performed by Annmarie Cui DO at [facility]  2022    EGD POLYP HOT FORCEP/CAUTERY performed by Annmarie Cui DO at [address] [place] [address]       Medications reviewed  Prior to Admission medications    Medication Sig Start Date End Date Taking?  Authorizing Provider   carvedilol (COREG) 6.25 MG tablet TAKE 1 TABLET BY MOUTH TWICE  DAILY

## 2023-06-21 NOTE — PROGRESS NOTES
Discharge instructions given to patient and patients granddaughter. Both deny any questions at this time. Jayda Ramsey RN

## 2023-06-21 NOTE — ANESTHESIA POSTPROCEDURE EVALUATION
Department of Anesthesiology  Postprocedure Note    Patient: Yanci Mir  MRN: [id number]  YOB: 1945  Date of evaluation: 6/21/2023      Procedure Summary     Date: 06/21/23 Room / Location: [place] [address] / [facility]    Anesthesia Start: 1131 Anesthesia Stop: 1159    Procedures:       EGD BIOPSY      COLONOSCOPY POLYPECTOMY SNARE/COLD BIOPSY      COLONOSCOPY WITH BIOPSY Diagnosis:       Anemia, unspecified type      (Anemia, unspecified type [D64.9])    Surgeons: Nisha Dugan MD Responsible Provider: Gaby Deras MD    Anesthesia Type: MAC ASA Status: 3          Anesthesia Type: No value filed.     Amber Phase I: Amber Score: 10    Amber Phase II: Amber Score: 10      Anesthesia Post Evaluation    Patient location during evaluation: PACU  Level of consciousness: awake  Airway patency: patent  Nausea & Vomiting: no nausea  Complications: no  Cardiovascular status: blood pressure returned to baseline  Respiratory status: acceptable  Hydration status: euvolemic

## 2023-06-21 NOTE — DISCHARGE INSTRUCTIONS
with the biopsy findings. Call Dr. Vijay Parsons if there are any GI concerns. [phone]. Repeat Colonoscopy in 3 years. History of Colonic Polyps. ANESTHESIA DISCHARGE INSTRUCTIONS    You are under the influence of drugs- do not drink alcohol, drive a car, operate machinery(such as power tools, kitchen appliances, etc), sign legal documents, or make any important decisions for 24 hours (or while on pain medications). Children should not ride bikes or [place] or play on gym sets  for 24 hours after surgery. A responsible adult should be with you for 24 hours. Rest at home today- increase activity as tolerated. Progress slowly to a regular diet unless your physician has instructed you otherwise. Drink plenty of water. CALL YOUR DOCTOR IF YOU:  Have moderate to severe nausea or vomiting AND are unable to hold down fluids or prescribed medications. Have bright red bloody drainage from your dressing that won't stop oozing. Do not get relief with your pain medication    NORMAL (POSSIBLE) SIDE EFFECTS FROM ANESTHESIA:     Confusion, temporary memory loss, delayed reaction times in the first 24 hours  Lightheadedness, dizziness, difficulty focusing, blurred vision  Nausea/vomiting can happen  Shivering, feeling cold, sore throat, cough and muscle aches should stop within 24-48 hours  Trouble urinating - call your surgeon if it has been more than 8 hrs  Bruising or soreness at the IV site - call if it remains red, firm or there is drainage             FEMALES OF CHILDBEARING AGE WHO ARE TAKING BIRTH CONTROL PILLS:  You may have received a medication during your procedure that interferes with the   actions of birth control pills (Bridion or Emend). Use some other kind of birth control in addition to your pills, like a condom, for 1 month after your procedure to prevent unwanted pregnancy. The following instructions are to be followed if you have a known history or diagnosis of sleep apnea:   For

## 2023-06-21 NOTE — PROGRESS NOTES
Discharge instructions given to patient and patients granddaughter. Both deny any questions at this time. Josee Resendiz RN

## 2023-06-21 NOTE — BRIEF OP NOTE
Brief Postoperative Note      Patient: Justine Guajardo  YOB: 1945  MRN: [id number]    Date of Procedure: 6/21/2023    Pre-Op Diagnosis Codes:     * Anemia, unspecified type [D64.9]    Post-Op Diagnosis:  gastritis, ascending colon polyp, otherwise normal EGD and colon       Procedure(s):  EGD BIOPSY  COLONOSCOPY POLYPECTOMY SNARE/COLD BIOPSY  COLONOSCOPY WITH BIOPSY    Surgeon(s):  Ian Bird MD    Assistant:  * No surgical staff found *    Anesthesia: Monitor Anesthesia Care    Estimated Blood Loss (mL): Minimal    Complications: None    Specimens:   ID Type Source Tests Collected by Time Destination   A :  Tissue Biopsy SURGICAL PATHOLOGY Ian Bird MD 6/21/2023 1137    B :  Tissue Biopsy SURGICAL PATHOLOGY Ian Bird MD 6/21/2023 1138    C :  Tissue Biopsy SURGICAL PATHOLOGY Ian Bird MD 6/21/2023 1148    D :  Tissue Biopsy SURGICAL PATHOLOGY Ian Bird MD 6/21/2023 1151        Implants:  * No implants in log *      Drains: * No LDAs found *    Findings: gastritis, ascending colon polyp, otherwise normal EGD and colon    Recommendation  Await pathology  Repeat colon in 3y      Electronically signed by Ian Bird MD on 6/21/2023 at 12:08 PM

## 2023-06-22 ENCOUNTER — TELEPHONE (OUTPATIENT)
Dept: INTERNAL MEDICINE CLINIC | Age: 78
End: 2023-06-22

## 2023-06-22 RX ORDER — FLUTICASONE FUROATE AND VILANTEROL TRIFENATATE 200; 25 UG/1; UG/1
POWDER RESPIRATORY (INHALATION)
Qty: 3 EACH | Refills: 1 | Status: SHIPPED | OUTPATIENT
Start: 2023-06-22

## 2023-06-22 NOTE — TELEPHONE ENCOUNTER
----- Message from [address] [place] [address] sent at 6/22/2023  4:24 PM EDT -----  Contact: Pt [phone]  Pt is requesting a refill of BREO ELLIPTA 200-25 MCG/ACT AEPB inhaler. Please advise.              [facility] ([facility] ) - [address] [phone] Rosalind Dear [phone]   [place] [address] [address]   Phone:  [phone]  Fax:  [phone]

## 2023-06-22 NOTE — OP NOTE
[address]                 [address] [facility] [place], [place]                                OPERATIVE REPORT    PATIENT NAME: Tamara Irvin              :        1945  MED REC NO:   [id number]                          ROOM:  ACCOUNT NO:   [de-identified]                           ADMIT DATE: 2023  PROVIDER:     Taurus Cardenas MD    DATE OF PROCEDURE:  2023    PREPROCEDURE DIAGNOSES:  1. Anemia. 2.  Change in bowel habits. 3.  History of GI bleed. PROCEDURE:  1. EGD with biopsy. 2.  Colonoscopy to the cecum with snare polypectomy. POSTPROCEDURE DIAGNOSES:  1.  Mild gastritis. 2.  Gastric polyps. 3.  Large ascending colon polyp. 4.  Sigmoid diverticulosis. 5.  Internal hemorrhoids. SURGEON:  Taurus Cardenas MD    PROCEDURE INDICATIONS:  A 76-year-old female with history of diabetes,  hypertension, hyperlipidemia, GERD, gout, asthma, arthritis and  osteoporosis presents for evaluation of anemia and change in bowel  habits characterized by alternating diarrhea with constipation. Her  last EGD during hospitalization for GI bleed did reveal a bleeding  gastric polyp that was resected in 2022. MEDICATIONS:  MAC per Anesthesia. PROCEDURE DETAILS:  Informed consent obtained after discussing risks,  benefits and alternatives. Full history and physical was performed. The patient was classified as ASA class III. Medications were  sequentially given by Anesthesia. Cardiopulmonary status was  continuously monitored throughout the procedure. The patient was placed  in left lateral decubitus position. Once adequately sedated, a standard  upper gastroscope was inserted in the mouth and advanced under direct  visualization to the second portion of the duodenum.   Entire mucosa of  the esophagus, stomach (retroflexed and forward views), duodenum (bulb,  sweep and second portion) were examined carefully during

## 2023-07-27 RX ORDER — VERAPAMIL HYDROCHLORIDE 240 MG/1
240 TABLET, FILM COATED, EXTENDED RELEASE ORAL DAILY
Qty: 90 TABLET | Refills: 0 | Status: SHIPPED | OUTPATIENT
Start: 2023-07-27

## 2023-07-27 RX ORDER — TERAZOSIN 5 MG/1
CAPSULE ORAL
Qty: 90 CAPSULE | Refills: 0 | Status: SHIPPED | OUTPATIENT
Start: 2023-07-27

## 2023-09-07 ENCOUNTER — OFFICE VISIT (OUTPATIENT)
Dept: INTERNAL MEDICINE CLINIC | Age: 78
End: 2023-09-07

## 2023-09-07 VITALS
BODY MASS INDEX: 35.51 KG/M2 | WEIGHT: 208 LBS | SYSTOLIC BLOOD PRESSURE: 124 MMHG | HEIGHT: 64 IN | DIASTOLIC BLOOD PRESSURE: 78 MMHG | HEART RATE: 80 BPM

## 2023-09-07 DIAGNOSIS — I48.0 PAROXYSMAL ATRIAL FIBRILLATION (HCC): ICD-10-CM

## 2023-09-07 DIAGNOSIS — I10 BENIGN ESSENTIAL HTN: ICD-10-CM

## 2023-09-07 DIAGNOSIS — E11.9 TYPE 2 DIABETES MELLITUS WITHOUT COMPLICATION, WITHOUT LONG-TERM CURRENT USE OF INSULIN (HCC): Primary | ICD-10-CM

## 2023-09-07 DIAGNOSIS — J45.40 MODERATE PERSISTENT ASTHMA WITHOUT COMPLICATION: ICD-10-CM

## 2023-09-07 DIAGNOSIS — D64.9 ANEMIA, UNSPECIFIED TYPE: ICD-10-CM

## 2023-09-07 DIAGNOSIS — N39.41 URGE URINARY INCONTINENCE: ICD-10-CM

## 2023-09-07 DIAGNOSIS — Z23 NEED FOR INFLUENZA VACCINATION: ICD-10-CM

## 2023-09-07 DIAGNOSIS — E66.01 MORBID OBESITY (HCC): ICD-10-CM

## 2023-09-07 DIAGNOSIS — E78.5 HYPERLIPIDEMIA ASSOCIATED WITH TYPE 2 DIABETES MELLITUS (HCC): ICD-10-CM

## 2023-09-07 DIAGNOSIS — D50.9 IRON DEFICIENCY ANEMIA, UNSPECIFIED IRON DEFICIENCY ANEMIA TYPE: ICD-10-CM

## 2023-09-07 DIAGNOSIS — F32.5 MAJOR DEPRESSIVE DISORDER WITH SINGLE EPISODE, IN FULL REMISSION (HCC): ICD-10-CM

## 2023-09-07 DIAGNOSIS — D56.3 THALASSEMIA TRAIT: ICD-10-CM

## 2023-09-07 DIAGNOSIS — E11.69 HYPERLIPIDEMIA ASSOCIATED WITH TYPE 2 DIABETES MELLITUS (HCC): ICD-10-CM

## 2023-09-07 LAB
BASOPHILS # BLD: 0 K/UL (ref 0–0.2)
BASOPHILS NFR BLD: 0.3 %
DEPRECATED RDW RBC AUTO: 17.1 % (ref 12.4–15.4)
EOSINOPHIL # BLD: 0.1 K/UL (ref 0–0.6)
EOSINOPHIL NFR BLD: 1.6 %
HCT VFR BLD AUTO: 30.7 % (ref 36–48)
HGB BLD-MCNC: 9.7 G/DL (ref 12–16)
LYMPHOCYTES # BLD: 1.7 K/UL (ref 1–5.1)
LYMPHOCYTES NFR BLD: 31.4 %
MCH RBC QN AUTO: 19.1 PG (ref 26–34)
MCHC RBC AUTO-ENTMCNC: 31.6 G/DL (ref 31–36)
MCV RBC AUTO: 60.5 FL (ref 80–100)
MONOCYTES # BLD: 0.4 K/UL (ref 0–1.3)
MONOCYTES NFR BLD: 8.1 %
NEUTROPHILS # BLD: 3.2 K/UL (ref 1.7–7.7)
NEUTROPHILS NFR BLD: 58.6 %
PATH INTERP BLD-IMP: NO
PLATELET # BLD AUTO: 232 K/UL (ref 135–450)
PLATELET BLD QL SMEAR: ABNORMAL
PMV BLD AUTO: 11.3 FL (ref 5–10.5)
RBC # BLD AUTO: 5.08 M/UL (ref 4–5.2)
SLIDE REVIEW: ABNORMAL
WBC # BLD AUTO: 5.5 K/UL (ref 4–11)

## 2023-09-07 PROCEDURE — G0008 ADMIN INFLUENZA VIRUS VAC: HCPCS | Performed by: INTERNAL MEDICINE

## 2023-09-07 PROCEDURE — G8427 DOCREV CUR MEDS BY ELIG CLIN: HCPCS | Performed by: INTERNAL MEDICINE

## 2023-09-07 PROCEDURE — 3074F SYST BP LT 130 MM HG: CPT | Performed by: INTERNAL MEDICINE

## 2023-09-07 PROCEDURE — 3078F DIAST BP <80 MM HG: CPT | Performed by: INTERNAL MEDICINE

## 2023-09-07 PROCEDURE — 1090F PRES/ABSN URINE INCON ASSESS: CPT | Performed by: INTERNAL MEDICINE

## 2023-09-07 PROCEDURE — 3044F HG A1C LEVEL LT 7.0%: CPT | Performed by: INTERNAL MEDICINE

## 2023-09-07 PROCEDURE — 99214 OFFICE O/P EST MOD 30 MIN: CPT | Performed by: INTERNAL MEDICINE

## 2023-09-07 PROCEDURE — 90662 IIV NO PRSV INCREASED AG IM: CPT | Performed by: INTERNAL MEDICINE

## 2023-09-07 PROCEDURE — 1036F TOBACCO NON-USER: CPT | Performed by: INTERNAL MEDICINE

## 2023-09-07 PROCEDURE — 1123F ACP DISCUSS/DSCN MKR DOCD: CPT | Performed by: INTERNAL MEDICINE

## 2023-09-07 PROCEDURE — G8417 CALC BMI ABV UP PARAM F/U: HCPCS | Performed by: INTERNAL MEDICINE

## 2023-09-07 PROCEDURE — G8399 PT W/DXA RESULTS DOCUMENT: HCPCS | Performed by: INTERNAL MEDICINE

## 2023-09-07 PROCEDURE — 0509F URINE INCON PLAN DOCD: CPT | Performed by: INTERNAL MEDICINE

## 2023-09-07 RX ORDER — TOLTERODINE 4 MG/1
4 CAPSULE, EXTENDED RELEASE ORAL DAILY
Qty: 30 CAPSULE | Refills: 3 | Status: SHIPPED | OUTPATIENT
Start: 2023-09-07

## 2023-09-07 RX ORDER — BLOOD SUGAR DIAGNOSTIC
STRIP MISCELLANEOUS
Qty: 100 EACH | Refills: 3 | Status: SHIPPED | OUTPATIENT
Start: 2023-09-07

## 2023-09-07 ASSESSMENT — ENCOUNTER SYMPTOMS
CHEST TIGHTNESS: 0
DIARRHEA: 0
COUGH: 0
NAUSEA: 0
BLOOD IN STOOL: 0
SHORTNESS OF BREATH: 0
WHEEZING: 0
ABDOMINAL PAIN: 0
VOMITING: 0

## 2023-09-07 NOTE — PROGRESS NOTES
osteoarthritis and severe lumbar spinal stenosis. seeing pain physician. Now on gabapentin  Has osteoposis- on fosamax. Seeing ortho for knee arthritis- gets cortisone shots    h/o chronic anemia    C/o increased frequency,urgency of urination    Review of Systems   Constitutional:  Positive for fatigue. Negative for fever and unexpected weight change. Respiratory:  Negative for cough, chest tightness, shortness of breath and wheezing. Cardiovascular:  Negative for chest pain, palpitations and leg swelling. Gastrointestinal:  Negative for abdominal pain, blood in stool, diarrhea, nausea and vomiting. Genitourinary:  Negative for dysuria and hematuria. Neurological:  Negative for light-headedness and headaches. Objective   Physical Exam  Constitutional:       Appearance: She is well-developed. HENT:      Head: Normocephalic and atraumatic. Eyes:      Pupils: Pupils are equal, round, and reactive to light. Neck:      Thyroid: No thyromegaly. Cardiovascular:      Rate and Rhythm: Normal rate and regular rhythm. Heart sounds: Normal heart sounds. No murmur heard. No friction rub. No gallop. Comments: No carotid bruit  Pulmonary:      Effort: Pulmonary effort is normal. No respiratory distress. Breath sounds: Normal breath sounds. No wheezing or rales. Chest:      Chest wall: No tenderness. Abdominal:      General: Bowel sounds are normal. There is no distension. Palpations: Abdomen is soft. There is no mass. Tenderness: There is no abdominal tenderness. There is no guarding or rebound. Musculoskeletal:      Cervical back: Normal range of motion and neck supple. Neurological:      Mental Status: She is alert and oriented to person, place, and time. An electronic signature was used to authenticate this note.     --Yinka Weeks MD

## 2023-09-08 LAB
EST. AVERAGE GLUCOSE BLD GHB EST-MCNC: 119.8 MG/DL
HBA1C MFR BLD: 5.8 %

## 2023-09-25 ENCOUNTER — OFFICE VISIT (OUTPATIENT)
Dept: ORTHOPEDIC SURGERY | Age: 78
End: 2023-09-25
Payer: MEDICARE

## 2023-09-25 DIAGNOSIS — T84.84XA PAINFUL ORTHOPAEDIC HARDWARE (HCC): ICD-10-CM

## 2023-09-25 DIAGNOSIS — M25.552 HIP PAIN, LEFT: Primary | ICD-10-CM

## 2023-09-25 PROCEDURE — 1036F TOBACCO NON-USER: CPT | Performed by: STUDENT IN AN ORGANIZED HEALTH CARE EDUCATION/TRAINING PROGRAM

## 2023-09-25 PROCEDURE — G8417 CALC BMI ABV UP PARAM F/U: HCPCS | Performed by: STUDENT IN AN ORGANIZED HEALTH CARE EDUCATION/TRAINING PROGRAM

## 2023-09-25 PROCEDURE — 1090F PRES/ABSN URINE INCON ASSESS: CPT | Performed by: STUDENT IN AN ORGANIZED HEALTH CARE EDUCATION/TRAINING PROGRAM

## 2023-09-25 PROCEDURE — 1123F ACP DISCUSS/DSCN MKR DOCD: CPT | Performed by: STUDENT IN AN ORGANIZED HEALTH CARE EDUCATION/TRAINING PROGRAM

## 2023-09-25 PROCEDURE — 99213 OFFICE O/P EST LOW 20 MIN: CPT | Performed by: STUDENT IN AN ORGANIZED HEALTH CARE EDUCATION/TRAINING PROGRAM

## 2023-09-25 PROCEDURE — G8399 PT W/DXA RESULTS DOCUMENT: HCPCS | Performed by: STUDENT IN AN ORGANIZED HEALTH CARE EDUCATION/TRAINING PROGRAM

## 2023-09-25 PROCEDURE — G8428 CUR MEDS NOT DOCUMENT: HCPCS | Performed by: STUDENT IN AN ORGANIZED HEALTH CARE EDUCATION/TRAINING PROGRAM

## 2023-09-25 NOTE — PROGRESS NOTES
Chief Complaint  Hip Pain (CK RT HIP)        History of Present Illness: The patient is here for repeat evaluation of her right hip. The patient reports she still has some soreness to the lateral aspect of her hip. The patient also reports that her back pain has been getting worse with sciatic symptoms down her leg. She is getting set up with an evaluation with our spine providers in the next month. Prior HPI 10/6/2022:  Patient is here today for repeat evaluation of her right hip and repeat check of her left hip. The patient is 3-1/2 months out from right hip intramedullary nailing. The patient reports great improvement in her left hip. She denies any problems with her right hip. She has recently been discharged home and is about to start home PT. Prior HPI 8/18/2022:  The patient is here today for repeat evaluation for her right hip. The patient is 2 months out from right hip intramedullary nailing for an intertrochanteric femur fracture. Date of procedure 6/20/2022. She is in a stretcher again today as transportation from the facility. She was recently discharged from the hospital on 8/6/2022 secondary to symptomatic anemia, which was similar to her issues that she had postoperatively. She is also reporting 2 days of left leg weakness with no known cause or injury. She woke up and felt some stretching to her thigh and has since had weakness and difficulty lifting that leg. She still needs help getting up to walk which is why she is still at [facility]. Prior HPI 7/21/2022:  Dayna Sanchez is a pleasant 77 y.o. female here today for her first postop evaluation regarding her right hip. The patient is 1 month out from a right hip intramedullary nailing with a short nail for intertrochanteric femur fracture. The patient did have a couple day stay in the ICU due to respiratory failure from anemia. The patient is still chronically anemic but she is stable.   She is currently at

## 2023-10-10 RX ORDER — VERAPAMIL HYDROCHLORIDE 240 MG/1
240 TABLET, FILM COATED, EXTENDED RELEASE ORAL DAILY
Qty: 90 TABLET | Refills: 3 | Status: SHIPPED | OUTPATIENT
Start: 2023-10-10

## 2023-10-10 RX ORDER — TERAZOSIN 5 MG/1
CAPSULE ORAL
Qty: 90 CAPSULE | Refills: 3 | Status: SHIPPED | OUTPATIENT
Start: 2023-10-10

## 2023-10-16 ENCOUNTER — OFFICE VISIT (OUTPATIENT)
Dept: ORTHOPEDIC SURGERY | Age: 78
End: 2023-10-16

## 2023-10-16 VITALS — WEIGHT: 208 LBS | HEIGHT: 64 IN | BODY MASS INDEX: 35.51 KG/M2

## 2023-10-16 DIAGNOSIS — G89.29 CHRONIC BILATERAL LOW BACK PAIN, UNSPECIFIED WHETHER SCIATICA PRESENT: ICD-10-CM

## 2023-10-16 DIAGNOSIS — M51.36 LUMBAR ADJACENT SEGMENT DISEASE WITH SPONDYLOLISTHESIS: ICD-10-CM

## 2023-10-16 DIAGNOSIS — M43.16 LUMBAR ADJACENT SEGMENT DISEASE WITH SPONDYLOLISTHESIS: ICD-10-CM

## 2023-10-16 DIAGNOSIS — M48.061 SPINAL STENOSIS OF LUMBAR REGION, UNSPECIFIED WHETHER NEUROGENIC CLAUDICATION PRESENT: ICD-10-CM

## 2023-10-16 DIAGNOSIS — M51.36 DDD (DEGENERATIVE DISC DISEASE), LUMBAR: Primary | ICD-10-CM

## 2023-10-16 DIAGNOSIS — M54.50 CHRONIC BILATERAL LOW BACK PAIN, UNSPECIFIED WHETHER SCIATICA PRESENT: ICD-10-CM

## 2023-10-16 RX ORDER — TIOTROPIUM BROMIDE 18 UG/1
CAPSULE ORAL; RESPIRATORY (INHALATION)
COMMUNITY
Start: 2018-06-18

## 2023-10-16 NOTE — PROGRESS NOTES
Follow-up: SPINE    CHIEF COMPLAINT:    Chief Complaint   Patient presents with    Follow-up     LUMBAR       HISTORY OF PRESENT ILLNESS:                The patient is a 77 y.o. female history of DM, thalassemia minor, melanoma, osteoporosis, here to follow-up for worsening chronic low back pain. She suffered a fall June 2022 resulting in a right hip fracture and requiring right hip IM nailing (Dr. Pako Barnhart). She spent months rehabbing from this. She suffered another fall February of this year landing on her left side and states this has caused increased back pain. She currently describes fairly constant aching and stabbing midline low back pain. Her symptoms are increased with any walking standing or activity. She reports some relief with sitting and resting. Conservative care includes Tylenol, gabapentin. In the past she has undergone ARTEMIO's with benefit and PT. She currently denies any lower extremity radiating pain. At times she does report some right leg weakness. She denies any progressive numbness tingling or extremity weakness. She denies any recent bowel or bladder dysfunction or saddle anesthesia. Denies any recent fevers or infections. Current/Past Treatment:   Physical Therapy: Past  Chiropractic:     Injection:   With benefit  6/23/15: Arsen L5/S1 TX ARTEMIO  10/6/15: Rt L5 TX ARTEMIO     3/30/2021 right L5-S1 Dr. Karma FIGUEROA Batters to 80% improved  Medications:            NSAIDS: GI upset            Muscle relaxer:              Steriods:              Neuropathic medications: Gabapentin             Opioids:            Other: Tylenol  Surgery/Consult: no      Function-Does the pain medication improve your ability to do:   Personal care: Yes  Housework: Yes   Physical activity: Yes  Social activity: Yes  Improve quality of family life: Yes    Pain Scale: 1-10  With Meds:   3/10  Pain Scale: 1-10 Without Meds: 10+    Potential aberrant drug-related behavior:  Aberrant behavior identified?

## 2023-10-26 ENCOUNTER — HOSPITAL ENCOUNTER (OUTPATIENT)
Dept: MRI IMAGING | Age: 78
Discharge: HOME | End: 2023-10-26
Payer: MEDICARE

## 2023-10-26 DIAGNOSIS — M43.16 LUMBAR ADJACENT SEGMENT DISEASE WITH SPONDYLOLISTHESIS: ICD-10-CM

## 2023-10-26 DIAGNOSIS — M51.36 LUMBAR ADJACENT SEGMENT DISEASE WITH SPONDYLOLISTHESIS: ICD-10-CM

## 2023-10-26 DIAGNOSIS — M48.061 SPINAL STENOSIS OF LUMBAR REGION, UNSPECIFIED WHETHER NEUROGENIC CLAUDICATION PRESENT: ICD-10-CM

## 2023-10-26 DIAGNOSIS — M51.36 DDD (DEGENERATIVE DISC DISEASE), LUMBAR: ICD-10-CM

## 2023-10-26 PROCEDURE — 72148 MRI LUMBAR SPINE W/O DYE: CPT

## 2023-11-06 ENCOUNTER — OFFICE VISIT (OUTPATIENT)
Dept: ORTHOPEDIC SURGERY | Age: 78
End: 2023-11-06
Payer: MEDICARE

## 2023-11-06 VITALS — HEIGHT: 63 IN | WEIGHT: 205 LBS | BODY MASS INDEX: 36.32 KG/M2

## 2023-11-06 DIAGNOSIS — M51.36 DDD (DEGENERATIVE DISC DISEASE), LUMBAR: Primary | ICD-10-CM

## 2023-11-06 DIAGNOSIS — M43.16 LUMBAR ADJACENT SEGMENT DISEASE WITH SPONDYLOLISTHESIS: ICD-10-CM

## 2023-11-06 DIAGNOSIS — M51.36 LUMBAR ADJACENT SEGMENT DISEASE WITH SPONDYLOLISTHESIS: ICD-10-CM

## 2023-11-06 DIAGNOSIS — M48.061 SPINAL STENOSIS OF LUMBAR REGION, UNSPECIFIED WHETHER NEUROGENIC CLAUDICATION PRESENT: ICD-10-CM

## 2023-11-06 PROCEDURE — 1090F PRES/ABSN URINE INCON ASSESS: CPT | Performed by: PHYSICIAN ASSISTANT

## 2023-11-06 PROCEDURE — 1123F ACP DISCUSS/DSCN MKR DOCD: CPT | Performed by: PHYSICIAN ASSISTANT

## 2023-11-06 PROCEDURE — G8417 CALC BMI ABV UP PARAM F/U: HCPCS | Performed by: PHYSICIAN ASSISTANT

## 2023-11-06 PROCEDURE — 99214 OFFICE O/P EST MOD 30 MIN: CPT | Performed by: PHYSICIAN ASSISTANT

## 2023-11-06 PROCEDURE — G8399 PT W/DXA RESULTS DOCUMENT: HCPCS | Performed by: PHYSICIAN ASSISTANT

## 2023-11-06 PROCEDURE — G8427 DOCREV CUR MEDS BY ELIG CLIN: HCPCS | Performed by: PHYSICIAN ASSISTANT

## 2023-11-06 PROCEDURE — 1036F TOBACCO NON-USER: CPT | Performed by: PHYSICIAN ASSISTANT

## 2023-11-06 PROCEDURE — G8484 FLU IMMUNIZE NO ADMIN: HCPCS | Performed by: PHYSICIAN ASSISTANT

## 2023-11-06 NOTE — PROGRESS NOTES
and pelvis showed stable renal cyst)    2 views lumbar spine 2021 severe bone-on-bone DDD L5-S1, L4-5 spondylolisthesis, likely degenerative endplate changes L4, mild superior endplate compression deformity L4, T12 wedging, multilevel facet arthropathy, right SI sclerosis    Lumbar MRI scan and report reviewed from 2015 showing progressed mod-severe central stenosis L3 4, & L4 5, severe stenosis L5-S1, multilevel DDD    Hemoglobin A1c .1      Cervical CT   1. No acute abnormality. 2.  Facet arthropathy throughout the cervical spine. Impression:  1) Chronic worsening LBP, intermittent radiculitis, foot numbness  2) L3-4, L4-5 spondylolisthesis w/severe central stenosis   3) DM, melanoma, osteoporosis  4) H/o ESIs w/benefit  5) ORT=2  6) S/p right hip IM nail, Dr. Pooja Alvarado       Plan:   1) We had a long discussion. We reviewed her recent lumbar MRI scan today and discussed treatment options in detail. She has elected to pursue treatment course includin) Right L5-S1 IL ARTEMIO #1 new series. Procedure risk and benefits discussed.   We will order again with Dr. Lamont Kearns  3) Cont brace and HEP   4) Discussed concerning s/sx  5) F/u 2wks after [address], [facility]

## 2023-11-09 SDOH — ECONOMIC STABILITY: FOOD INSECURITY: WITHIN THE PAST 12 MONTHS, THE FOOD YOU BOUGHT JUST DIDN'T LAST AND YOU DIDN'T HAVE MONEY TO GET MORE.: NEVER TRUE

## 2023-11-09 SDOH — ECONOMIC STABILITY: FOOD INSECURITY: WITHIN THE PAST 12 MONTHS, YOU WORRIED THAT YOUR FOOD WOULD RUN OUT BEFORE YOU GOT MONEY TO BUY MORE.: NEVER TRUE

## 2023-11-09 SDOH — ECONOMIC STABILITY: INCOME INSECURITY: HOW HARD IS IT FOR YOU TO PAY FOR THE VERY BASICS LIKE FOOD, HOUSING, MEDICAL CARE, AND HEATING?: SOMEWHAT HARD

## 2023-11-10 ENCOUNTER — OFFICE VISIT (OUTPATIENT)
Dept: INTERNAL MEDICINE CLINIC | Age: 78
End: 2023-11-10

## 2023-11-10 ENCOUNTER — HOSPITAL ENCOUNTER (OUTPATIENT)
Age: 78
Discharge: HOME | End: 2023-11-10
Payer: MEDICARE

## 2023-11-10 ENCOUNTER — HOSPITAL ENCOUNTER (OUTPATIENT)
Dept: GENERAL RADIOLOGY | Age: 78
Discharge: HOME | End: 2023-11-10
Payer: MEDICARE

## 2023-11-10 VITALS
SYSTOLIC BLOOD PRESSURE: 138 MMHG | HEIGHT: 63 IN | BODY MASS INDEX: 36.32 KG/M2 | HEART RATE: 76 BPM | WEIGHT: 205 LBS | DIASTOLIC BLOOD PRESSURE: 80 MMHG

## 2023-11-10 DIAGNOSIS — R42 LIGHT HEADEDNESS: ICD-10-CM

## 2023-11-10 DIAGNOSIS — M79.602 LEFT ARM PAIN: ICD-10-CM

## 2023-11-10 DIAGNOSIS — H65.01 NON-RECURRENT ACUTE SEROUS OTITIS MEDIA OF RIGHT EAR: Primary | ICD-10-CM

## 2023-11-10 LAB
ANION GAP SERPL CALCULATED.3IONS-SCNC: 12 MMOL/L (ref 3–16)
BUN SERPL-MCNC: 23 MG/DL (ref 7–20)
CALCIUM SERPL-MCNC: 10.1 MG/DL (ref 8.3–10.6)
CHLORIDE SERPL-SCNC: 106 MMOL/L (ref 99–110)
CO2 SERPL-SCNC: 23 MMOL/L (ref 21–32)
CREAT SERPL-MCNC: 0.9 MG/DL (ref 0.6–1.2)
GFR SERPLBLD CREATININE-BSD FMLA CKD-EPI: >60 ML/MIN/{1.73_M2}
GLUCOSE SERPL-MCNC: 186 MG/DL (ref 70–99)
POTASSIUM SERPL-SCNC: 4.6 MMOL/L (ref 3.5–5.1)
SODIUM SERPL-SCNC: 141 MMOL/L (ref 136–145)

## 2023-11-10 PROCEDURE — 73060 X-RAY EXAM OF HUMERUS: CPT

## 2023-11-10 RX ORDER — CEFDINIR 300 MG/1
300 CAPSULE ORAL 2 TIMES DAILY
Qty: 10 CAPSULE | Refills: 0 | Status: SHIPPED | OUTPATIENT
Start: 2023-11-10 | End: 2023-11-15

## 2023-11-10 ASSESSMENT — ENCOUNTER SYMPTOMS
SHORTNESS OF BREATH: 0
ABDOMINAL PAIN: 0
VOMITING: 0
COUGH: 0
BLOOD IN STOOL: 0
NAUSEA: 0
CHEST TIGHTNESS: 0
WHEEZING: 0
DIARRHEA: 0

## 2023-11-10 NOTE — PROGRESS NOTES
Abdomen is soft. There is no mass. Tenderness: There is no abdominal tenderness. There is no guarding or rebound. Musculoskeletal:      Cervical back: Normal range of motion and neck supple. Comments: Left arm- mild tenderness over the biceps. Elbow and shoulder - full ROM   Neurological:      Mental Status: She is alert and oriented to person, place, and time. An electronic signature was used to authenticate this note.     --Ny Potts MD

## 2023-11-11 LAB
ACANTHOCYTES BLD QL SMEAR: ABNORMAL
ANISOCYTOSIS BLD QL SMEAR: ABNORMAL
BASOPHILS # BLD: 0 K/UL (ref 0–0.2)
BASOPHILS NFR BLD: 0.1 %
BURR CELLS BLD QL SMEAR: ABNORMAL
BURR CELLS BLD QL SMEAR: ABNORMAL
DACRYOCYTES BLD QL SMEAR: ABNORMAL
DEPRECATED RDW RBC AUTO: 17 % (ref 12.4–15.4)
EOSINOPHIL # BLD: 0 K/UL (ref 0–0.6)
EOSINOPHIL NFR BLD: 0.8 %
HCT VFR BLD AUTO: 30.9 % (ref 36–48)
HGB BLD-MCNC: 10 G/DL (ref 12–16)
HYPOCHROMIA BLD QL SMEAR: ABNORMAL
LYMPHOCYTES # BLD: 1.4 K/UL (ref 1–5.1)
LYMPHOCYTES NFR BLD: 23.2 %
MCH RBC QN AUTO: 19.6 PG (ref 26–34)
MCHC RBC AUTO-ENTMCNC: 32.4 G/DL (ref 31–36)
MCV RBC AUTO: 60.3 FL (ref 80–100)
MONOCYTES # BLD: 0.4 K/UL (ref 0–1.3)
MONOCYTES NFR BLD: 6.5 %
NEUTROPHILS # BLD: 4.2 K/UL (ref 1.7–7.7)
NEUTROPHILS NFR BLD: 69.4 %
OVALOCYTES BLD QL SMEAR: ABNORMAL
PATH INTERP BLD-IMP: NO
PLATELET # BLD AUTO: 268 K/UL (ref 135–450)
PLATELET BLD QL SMEAR: ADEQUATE
PMV BLD AUTO: 11.8 FL (ref 5–10.5)
POIKILOCYTOSIS BLD QL SMEAR: ABNORMAL
RBC # BLD AUTO: 5.12 M/UL (ref 4–5.2)
SCHISTOCYTES BLD QL SMEAR: ABNORMAL
SLIDE REVIEW: ABNORMAL
WBC # BLD AUTO: 6 K/UL (ref 4–11)

## 2023-11-30 ENCOUNTER — HOSPITAL ENCOUNTER (OUTPATIENT)
Age: 78
Setting detail: OUTPATIENT SURGERY
Discharge: HOME | End: 2023-11-30
Attending: PAIN MEDICINE | Admitting: PAIN MEDICINE
Payer: MEDICARE

## 2023-11-30 VITALS
BODY MASS INDEX: 36.32 KG/M2 | SYSTOLIC BLOOD PRESSURE: 129 MMHG | DIASTOLIC BLOOD PRESSURE: 56 MMHG | RESPIRATION RATE: 16 BRPM | OXYGEN SATURATION: 97 % | TEMPERATURE: 97.1 F | HEIGHT: 63 IN | WEIGHT: 205 LBS | HEART RATE: 64 BPM

## 2023-11-30 PROBLEM — M54.16 LUMBAR RADICULOPATHY: Status: ACTIVE | Noted: 2023-11-30

## 2023-11-30 LAB
GLUCOSE BLD-MCNC: 254 MG/DL (ref 70–99)
PERFORMED ON: ABNORMAL

## 2023-11-30 PROCEDURE — 6360000004 HC RX CONTRAST MEDICATION: Performed by: PAIN MEDICINE

## 2023-11-30 PROCEDURE — 6360000002 HC RX W HCPCS: Performed by: PAIN MEDICINE

## 2023-11-30 PROCEDURE — 2500000003 HC RX 250 WO HCPCS: Performed by: PAIN MEDICINE

## 2023-11-30 PROCEDURE — 3600000002 HC SURGERY LEVEL 2 BASE: Performed by: PAIN MEDICINE

## 2023-11-30 PROCEDURE — 62323 NJX INTERLAMINAR LMBR/SAC: CPT | Performed by: PAIN MEDICINE

## 2023-11-30 PROCEDURE — 7100000010 HC PHASE II RECOVERY - FIRST 15 MIN: Performed by: PAIN MEDICINE

## 2023-11-30 PROCEDURE — 2709999900 HC NON-CHARGEABLE SUPPLY: Performed by: PAIN MEDICINE

## 2023-11-30 RX ORDER — LIDOCAINE HYDROCHLORIDE 10 MG/ML
INJECTION, SOLUTION EPIDURAL; INFILTRATION; INTRACAUDAL; PERINEURAL PRN
Status: DISCONTINUED | OUTPATIENT
Start: 2023-11-30 | End: 2023-11-30 | Stop reason: ALTCHOICE

## 2023-11-30 ASSESSMENT — PAIN - FUNCTIONAL ASSESSMENT: PAIN_FUNCTIONAL_ASSESSMENT: 0-10

## 2023-11-30 NOTE — DISCHARGE INSTRUCTIONS
[address]    [phone]    Post Pain Management Injection    PATIENT INSTRUCTIONS:     -Resume Normal Diet  -Other    ACTIVITY:    -No driving or operating machinery for 8 hours post procedure without sedation and 24 hours with sedation. If you are seen driving during this time the proper authorities will be notified.  -Do not stay alone for 4-6 hours after the procedure.  -If you have had IV sedation, do not sign legal documents, make any major decisions, or be involved in work decisions for the remainder of the day. -May shower or bathe.  -Resume normal activity when full movement/sensation has returned in extremities. 3)  SITE CARE:    -Observe puncture site for signs of infection (redness, warmth swelling, drainage with a foul odor, fever or increased tenderness). 4)  EXPECTED SIDE EFFECTS:    -Numbness/tingling/weakness in extremities, if this lasts more than 6 hours notify Dr. Alla Kehr. -Muscle stiffness, soreness at puncture site (soreness may last 2-4 days). DIABETIC PATIENTS ONLY:    -Increased glucose levels in all diabetic patients who have received a steroid injection. -Monitor blood sugars frequently for the first 5 days following procedure.      -Adjust medication accordingly. 6)  TO REACH DR. Alla Kehr: Call [phone]    ADDITIONAL INSTRUCTIONS:    Follow-up as scheduled or call for appointment if not already done. Patients taking Coumadin may resume taking as before the procedure.

## 2023-11-30 NOTE — PROGRESS NOTES
Discharge instructions given to pt and verbalized understanding, states pain is at a tolerable level, no numbness or weakness noted,vitals stable, pt wheeled out to car without complications, daughter driving home

## 2023-12-14 ENCOUNTER — OFFICE VISIT (OUTPATIENT)
Dept: INTERNAL MEDICINE CLINIC | Age: 78
End: 2023-12-14

## 2023-12-14 VITALS
SYSTOLIC BLOOD PRESSURE: 138 MMHG | DIASTOLIC BLOOD PRESSURE: 60 MMHG | WEIGHT: 208 LBS | HEIGHT: 63 IN | BODY MASS INDEX: 36.86 KG/M2

## 2023-12-14 DIAGNOSIS — E66.01 MORBID OBESITY (HCC): ICD-10-CM

## 2023-12-14 DIAGNOSIS — E11.69 HYPERLIPIDEMIA ASSOCIATED WITH TYPE 2 DIABETES MELLITUS (HCC): ICD-10-CM

## 2023-12-14 DIAGNOSIS — D63.8 ANEMIA, CHRONIC DISEASE: ICD-10-CM

## 2023-12-14 DIAGNOSIS — E78.5 HYPERLIPIDEMIA ASSOCIATED WITH TYPE 2 DIABETES MELLITUS (HCC): ICD-10-CM

## 2023-12-14 DIAGNOSIS — M1A.09X0 CHRONIC GOUT OF MULTIPLE SITES, UNSPECIFIED CAUSE: ICD-10-CM

## 2023-12-14 DIAGNOSIS — E11.9 TYPE 2 DIABETES MELLITUS WITHOUT COMPLICATION, WITHOUT LONG-TERM CURRENT USE OF INSULIN (HCC): Primary | ICD-10-CM

## 2023-12-14 DIAGNOSIS — J45.40 MODERATE PERSISTENT ASTHMA WITHOUT COMPLICATION: ICD-10-CM

## 2023-12-14 DIAGNOSIS — K21.9 GASTROESOPHAGEAL REFLUX DISEASE WITHOUT ESOPHAGITIS: ICD-10-CM

## 2023-12-14 DIAGNOSIS — I10 BENIGN ESSENTIAL HTN: ICD-10-CM

## 2023-12-14 DIAGNOSIS — F32.5 MAJOR DEPRESSIVE DISORDER WITH SINGLE EPISODE, IN FULL REMISSION (HCC): ICD-10-CM

## 2023-12-14 PROCEDURE — 1036F TOBACCO NON-USER: CPT | Performed by: INTERNAL MEDICINE

## 2023-12-14 PROCEDURE — G8484 FLU IMMUNIZE NO ADMIN: HCPCS | Performed by: INTERNAL MEDICINE

## 2023-12-14 PROCEDURE — 3075F SYST BP GE 130 - 139MM HG: CPT | Performed by: INTERNAL MEDICINE

## 2023-12-14 PROCEDURE — 1090F PRES/ABSN URINE INCON ASSESS: CPT | Performed by: INTERNAL MEDICINE

## 2023-12-14 PROCEDURE — 3044F HG A1C LEVEL LT 7.0%: CPT | Performed by: INTERNAL MEDICINE

## 2023-12-14 PROCEDURE — G8427 DOCREV CUR MEDS BY ELIG CLIN: HCPCS | Performed by: INTERNAL MEDICINE

## 2023-12-14 PROCEDURE — 3078F DIAST BP <80 MM HG: CPT | Performed by: INTERNAL MEDICINE

## 2023-12-14 PROCEDURE — G8399 PT W/DXA RESULTS DOCUMENT: HCPCS | Performed by: INTERNAL MEDICINE

## 2023-12-14 PROCEDURE — 1123F ACP DISCUSS/DSCN MKR DOCD: CPT | Performed by: INTERNAL MEDICINE

## 2023-12-14 PROCEDURE — 99214 OFFICE O/P EST MOD 30 MIN: CPT | Performed by: INTERNAL MEDICINE

## 2023-12-14 PROCEDURE — G8417 CALC BMI ABV UP PARAM F/U: HCPCS | Performed by: INTERNAL MEDICINE

## 2023-12-14 NOTE — PROGRESS NOTES
Kelli Thakur (:  1945) is a 77 y.o. female,Established patient, here for evaluation of the following chief complaint(s):  Follow-up         ASSESSMENT/PLAN:  1. Type 2 diabetes mellitus without complication, without long-term current use of insulin ([address])  2. Benign essential HTN  3. Gastroesophageal reflux disease without esophagitis  4. Chronic gout of multiple sites, unspecified cause  5. Anemia, chronic disease  6. Morbid obesity ([address])  7. Moderate persistent asthma without complication  8. Hyperlipidemia associated with type 2 diabetes mellitus ([address])  9. Major depressive disorder with single episode, in full remission ([address])        DM- Stable. continue metformin   Strict diet. HTN. Blood pressure is good  Continue calan,coreg,losartan,terazosin . Cannot take HCTZ 2 to high Ca. Aldactone stopped for high K   Walking. Weight loss. Asthma  Stable on Breo,incruse and proventil     HLD. continue lipitor. Lipids are good      Diarrhea  Advised probiotics      Urinary urge incontinence   Detrol LA 4 mg daily       Gout- continue allopurinol  No recent flareups. Depression. Continue  paxil 20 mg daily       GERD. Stable on PPI. Lumbar Spinal stenosis  nav knee OA  Has seen  Ortho  Also sees pain physician      Anemia. 2 to  thal trait and iron def      Osteoporosis  Continue fosamax,calcium and vit D         Subjective   SUBJECTIVE/OBJECTIVE:  HPI  She has Type 2 DM,HTN,GERD,spinal stenosis,asthma,gout and depression. Blood sugars are under good control. Has had DM since . Patient's BP is controlled on current medications. Depression is stable on paxil. Asthma- mod persistent- on Breo,incruse  and proventil.stable. She has osteoarthritis and severe lumbar spinal stenosis. seeing pain physician. Now on gabapentin  Has osteoposis- on fosamax.   Seeing ortho for knee arthritis- gets cortisone shots    h/o chronic anemia    Review of Systems   Constitutional:  Negative for

## 2024-01-04 ENCOUNTER — HOSPITAL ENCOUNTER (OUTPATIENT)
Dept: MRI IMAGING | Age: 79
Discharge: HOME | End: 2024-01-04
Payer: MEDICARE

## 2024-01-04 DIAGNOSIS — M54.12 CERVICAL RADICULITIS: ICD-10-CM

## 2024-01-04 PROCEDURE — G1010 CDSM STANSON: HCPCS

## 2024-01-08 RX ORDER — TOLTERODINE 4 MG/1
4 CAPSULE, EXTENDED RELEASE ORAL DAILY
Qty: 90 CAPSULE | Refills: 0 | Status: SHIPPED | OUTPATIENT
Start: 2024-01-08

## 2024-01-24 RX ORDER — OMEPRAZOLE 20 MG/1
CAPSULE, DELAYED RELEASE ORAL
Qty: 180 CAPSULE | Refills: 0 | Status: SHIPPED | OUTPATIENT
Start: 2024-01-24

## 2024-01-24 RX ORDER — FLUTICASONE FUROATE AND VILANTEROL 200; 25 UG/1; UG/1
POWDER RESPIRATORY (INHALATION)
Qty: 28 EACH | Refills: 0 | Status: SHIPPED | OUTPATIENT
Start: 2024-01-24

## 2024-01-29 ENCOUNTER — OFFICE VISIT (OUTPATIENT)
Dept: ORTHOPEDIC SURGERY | Age: 79
End: 2024-01-29
Payer: MEDICARE

## 2024-01-29 VITALS — WEIGHT: 208 LBS | HEIGHT: 63 IN | BODY MASS INDEX: 36.86 KG/M2

## 2024-01-29 DIAGNOSIS — M43.16 LUMBAR ADJACENT SEGMENT DISEASE WITH SPONDYLOLISTHESIS: ICD-10-CM

## 2024-01-29 DIAGNOSIS — M51.36 LUMBAR ADJACENT SEGMENT DISEASE WITH SPONDYLOLISTHESIS: ICD-10-CM

## 2024-01-29 DIAGNOSIS — M79.18 MYOFASCIAL PAIN SYNDROME, CERVICAL: ICD-10-CM

## 2024-01-29 DIAGNOSIS — M50.30 DDD (DEGENERATIVE DISC DISEASE), CERVICAL: Primary | ICD-10-CM

## 2024-01-29 PROCEDURE — G8399 PT W/DXA RESULTS DOCUMENT: HCPCS | Performed by: PHYSICIAN ASSISTANT

## 2024-01-29 PROCEDURE — 99214 OFFICE O/P EST MOD 30 MIN: CPT | Performed by: PHYSICIAN ASSISTANT

## 2024-01-29 PROCEDURE — G8484 FLU IMMUNIZE NO ADMIN: HCPCS | Performed by: PHYSICIAN ASSISTANT

## 2024-01-29 PROCEDURE — 1123F ACP DISCUSS/DSCN MKR DOCD: CPT | Performed by: PHYSICIAN ASSISTANT

## 2024-01-29 PROCEDURE — 1090F PRES/ABSN URINE INCON ASSESS: CPT | Performed by: PHYSICIAN ASSISTANT

## 2024-01-29 PROCEDURE — G8417 CALC BMI ABV UP PARAM F/U: HCPCS | Performed by: PHYSICIAN ASSISTANT

## 2024-01-29 PROCEDURE — G8427 DOCREV CUR MEDS BY ELIG CLIN: HCPCS | Performed by: PHYSICIAN ASSISTANT

## 2024-01-29 PROCEDURE — 1036F TOBACCO NON-USER: CPT | Performed by: PHYSICIAN ASSISTANT

## 2024-01-29 NOTE — PROGRESS NOTES
Follow-up: SPINE    CHIEF COMPLAINT: Low back pain, MRI review    HISTORY OF PRESENT ILLNESS:                The patient is a 78 y.o. female history of DM, thalassemia minor, melanoma, osteoporosis, here to review cervical MRI for chronic neck pain.  She reports a history of chronic episodic neck/trap pain with tightness.  Periodically pain would radiate into the right or left upper extremity.  She states her arm symptoms have resolved at this current time.  Today she currently denies any upper extremity radiating pain.  She denies any progressive numbness tingling or progressive extremity weakness.      History of lumbar ARTEMIO November 2023 with significant improvement of lower back pain but some residual right buttock discomfort. Other conservative care includes Tylenol, gabapentin (now d/c), bracing with some benefit.  In the past she has undergone prior ARTEMIO's with benefit and PT.  She denies any progressive numbness tingling or extremity weakness.  She denies any recent bowel or bladder dysfunction or saddle anesthesia.  Denies any recent fevers or infections.      Current/Past Treatment:   Physical Therapy: Past, brace with benefit  Chiropractic:     Injection:   With benefit  6/23/15: Arsen L5/S1 TX ARTEMIO  10/6/15: Rt L5 TX ARTEMIO   3/30/2021 right L5-S1 Dr. Mckenzie FIGUEROA--60 to 80% improved  11/30/2023 LUMBAR INTERLAMINAR EPIDURAL INJECTION AT RIGHT L5-S1 - Neil Rincon MD --100% improved, residual R buttock pain   Medications:            NSAIDS: GI upset            Muscle relaxer:              Steriods:              Neuropathic medications: Gabapentin--with benefit of sciatica but noted drowsiness with 300 mg; 100mg not as helpful, now d/c         Opioids:            Other: Tylenol  Surgery/Consult: no  Past Medical History: Medical history form was reviewed today & scanned into the media tab  Past Medical History:   Diagnosis Date    Anemia     thallasemia minor    Arthritis     osteo-arthritis    Asthma

## 2024-02-23 RX ORDER — FLUTICASONE FUROATE AND VILANTEROL 200; 25 UG/1; UG/1
POWDER RESPIRATORY (INHALATION)
Qty: 60 EACH | Refills: 0 | Status: SHIPPED | OUTPATIENT
Start: 2024-02-23

## 2024-03-14 ENCOUNTER — HOSPITAL ENCOUNTER (OUTPATIENT)
Age: 79
Discharge: HOME | End: 2024-03-14
Payer: MEDICARE

## 2024-03-14 ENCOUNTER — OFFICE VISIT (OUTPATIENT)
Dept: INTERNAL MEDICINE CLINIC | Age: 79
End: 2024-03-14

## 2024-03-14 VITALS
HEART RATE: 76 BPM | HEIGHT: 63 IN | WEIGHT: 210 LBS | SYSTOLIC BLOOD PRESSURE: 136 MMHG | DIASTOLIC BLOOD PRESSURE: 72 MMHG | BODY MASS INDEX: 37.21 KG/M2

## 2024-03-14 DIAGNOSIS — I10 BENIGN ESSENTIAL HTN: ICD-10-CM

## 2024-03-14 DIAGNOSIS — E66.01 SEVERE OBESITY (BMI 35.0-39.9) WITH COMORBIDITY (HCC): ICD-10-CM

## 2024-03-14 DIAGNOSIS — E78.5 HYPERLIPIDEMIA ASSOCIATED WITH TYPE 2 DIABETES MELLITUS (HCC): ICD-10-CM

## 2024-03-14 DIAGNOSIS — R06.02 SOB (SHORTNESS OF BREATH): Primary | ICD-10-CM

## 2024-03-14 DIAGNOSIS — R06.02 SHORTNESS OF BREATH: ICD-10-CM

## 2024-03-14 DIAGNOSIS — J45.40 MODERATE PERSISTENT ASTHMA WITHOUT COMPLICATION: ICD-10-CM

## 2024-03-14 DIAGNOSIS — E11.69 HYPERLIPIDEMIA ASSOCIATED WITH TYPE 2 DIABETES MELLITUS (HCC): ICD-10-CM

## 2024-03-14 DIAGNOSIS — E89.2 S/P PARATHYROIDECTOMY (HCC): ICD-10-CM

## 2024-03-14 DIAGNOSIS — K21.9 GASTROESOPHAGEAL REFLUX DISEASE WITHOUT ESOPHAGITIS: ICD-10-CM

## 2024-03-14 DIAGNOSIS — E11.9 TYPE 2 DIABETES MELLITUS WITHOUT COMPLICATION, WITHOUT LONG-TERM CURRENT USE OF INSULIN (HCC): Primary | ICD-10-CM

## 2024-03-14 DIAGNOSIS — C43.59 MALIGNANT MELANOMA OF TORSO EXCLUDING BREAST (HCC): ICD-10-CM

## 2024-03-14 DIAGNOSIS — F32.5 MAJOR DEPRESSIVE DISORDER WITH SINGLE EPISODE, IN FULL REMISSION (HCC): ICD-10-CM

## 2024-03-14 LAB
ANION GAP SERPL CALCULATED.3IONS-SCNC: 13 MMOL/L (ref 3–16)
BASOPHILS # BLD: 0 K/UL (ref 0–0.2)
BASOPHILS NFR BLD: 0.2 %
BUN SERPL-MCNC: 31 MG/DL (ref 7–20)
CALCIUM SERPL-MCNC: 9.3 MG/DL (ref 8.3–10.6)
CHLORIDE SERPL-SCNC: 107 MMOL/L (ref 99–110)
CO2 SERPL-SCNC: 21 MMOL/L (ref 21–32)
CREAT SERPL-MCNC: 1.1 MG/DL (ref 0.6–1.2)
DEPRECATED RDW RBC AUTO: 16.7 % (ref 12.4–15.4)
EKG ATRIAL RATE: 81 BPM
EKG DIAGNOSIS: NORMAL
EKG P AXIS: 27 DEGREES
EKG P-R INTERVAL: 166 MS
EKG Q-T INTERVAL: 360 MS
EKG QRS DURATION: 102 MS
EKG QTC CALCULATION (BAZETT): 418 MS
EKG R AXIS: -48 DEGREES
EKG T AXIS: 72 DEGREES
EKG VENTRICULAR RATE: 81 BPM
EOSINOPHIL # BLD: 0.1 K/UL (ref 0–0.6)
EOSINOPHIL NFR BLD: 1.7 %
GFR SERPLBLD CREATININE-BSD FMLA CKD-EPI: 51 ML/MIN/{1.73_M2}
GLUCOSE SERPL-MCNC: 179 MG/DL (ref 70–99)
HCT VFR BLD AUTO: 28.3 % (ref 36–48)
HGB BLD-MCNC: 9.1 G/DL (ref 12–16)
LYMPHOCYTES # BLD: 1.5 K/UL (ref 1–5.1)
LYMPHOCYTES NFR BLD: 20.3 %
MCH RBC QN AUTO: 19.5 PG (ref 26–34)
MCHC RBC AUTO-ENTMCNC: 32.2 G/DL (ref 31–36)
MCV RBC AUTO: 60.5 FL (ref 80–100)
MONOCYTES # BLD: 0.5 K/UL (ref 0–1.3)
MONOCYTES NFR BLD: 6.5 %
NEUTROPHILS # BLD: 5.3 K/UL (ref 1.7–7.7)
NEUTROPHILS NFR BLD: 71.3 %
PATH INTERP BLD-IMP: NO
PLATELET # BLD AUTO: 177 K/UL (ref 135–450)
PLATELET BLD QL SMEAR: ADEQUATE
PMV BLD AUTO: 9.7 FL (ref 5–10.5)
POTASSIUM SERPL-SCNC: 4.7 MMOL/L (ref 3.5–5.1)
RBC # BLD AUTO: 4.69 M/UL (ref 4–5.2)
SLIDE REVIEW: ABNORMAL
SODIUM SERPL-SCNC: 141 MMOL/L (ref 136–145)
WBC # BLD AUTO: 7.4 K/UL (ref 4–11)

## 2024-03-14 PROCEDURE — G8427 DOCREV CUR MEDS BY ELIG CLIN: HCPCS | Performed by: INTERNAL MEDICINE

## 2024-03-14 PROCEDURE — 93005 ELECTROCARDIOGRAM TRACING: CPT

## 2024-03-14 PROCEDURE — G8417 CALC BMI ABV UP PARAM F/U: HCPCS | Performed by: INTERNAL MEDICINE

## 2024-03-14 PROCEDURE — 85025 COMPLETE CBC W/AUTO DIFF WBC: CPT

## 2024-03-14 PROCEDURE — 83036 HEMOGLOBIN GLYCOSYLATED A1C: CPT

## 2024-03-14 PROCEDURE — 1036F TOBACCO NON-USER: CPT | Performed by: INTERNAL MEDICINE

## 2024-03-14 PROCEDURE — 3075F SYST BP GE 130 - 139MM HG: CPT | Performed by: INTERNAL MEDICINE

## 2024-03-14 PROCEDURE — G8399 PT W/DXA RESULTS DOCUMENT: HCPCS | Performed by: INTERNAL MEDICINE

## 2024-03-14 PROCEDURE — 3078F DIAST BP <80 MM HG: CPT | Performed by: INTERNAL MEDICINE

## 2024-03-14 PROCEDURE — G8484 FLU IMMUNIZE NO ADMIN: HCPCS | Performed by: INTERNAL MEDICINE

## 2024-03-14 PROCEDURE — 1123F ACP DISCUSS/DSCN MKR DOCD: CPT | Performed by: INTERNAL MEDICINE

## 2024-03-14 PROCEDURE — 1090F PRES/ABSN URINE INCON ASSESS: CPT | Performed by: INTERNAL MEDICINE

## 2024-03-14 PROCEDURE — 99214 OFFICE O/P EST MOD 30 MIN: CPT | Performed by: INTERNAL MEDICINE

## 2024-03-14 PROCEDURE — 80048 BASIC METABOLIC PNL TOTAL CA: CPT

## 2024-03-14 PROCEDURE — 36415 COLL VENOUS BLD VENIPUNCTURE: CPT

## 2024-03-14 ASSESSMENT — PATIENT HEALTH QUESTIONNAIRE - PHQ9
SUM OF ALL RESPONSES TO PHQ QUESTIONS 1-9: 4
3. TROUBLE FALLING OR STAYING ASLEEP: 1
8. MOVING OR SPEAKING SO SLOWLY THAT OTHER PEOPLE COULD HAVE NOTICED. OR THE OPPOSITE, BEING SO FIGETY OR RESTLESS THAT YOU HAVE BEEN MOVING AROUND A LOT MORE THAN USUAL: 0
9. THOUGHTS THAT YOU WOULD BE BETTER OFF DEAD, OR OF HURTING YOURSELF: 0
7. TROUBLE CONCENTRATING ON THINGS, SUCH AS READING THE NEWSPAPER OR WATCHING TELEVISION: 0
6. FEELING BAD ABOUT YOURSELF - OR THAT YOU ARE A FAILURE OR HAVE LET YOURSELF OR YOUR FAMILY DOWN: 0
SUM OF ALL RESPONSES TO PHQ QUESTIONS 1-9: 4
SUM OF ALL RESPONSES TO PHQ9 QUESTIONS 1 & 2: 2
4. FEELING TIRED OR HAVING LITTLE ENERGY: 1
2. FEELING DOWN, DEPRESSED OR HOPELESS: 1
1. LITTLE INTEREST OR PLEASURE IN DOING THINGS: 1
SUM OF ALL RESPONSES TO PHQ QUESTIONS 1-9: 4
5. POOR APPETITE OR OVEREATING: 0
10. IF YOU CHECKED OFF ANY PROBLEMS, HOW DIFFICULT HAVE THESE PROBLEMS MADE IT FOR YOU TO DO YOUR WORK, TAKE CARE OF THINGS AT HOME, OR GET ALONG WITH OTHER PEOPLE: 0
SUM OF ALL RESPONSES TO PHQ QUESTIONS 1-9: 4

## 2024-03-14 ASSESSMENT — ENCOUNTER SYMPTOMS
NAUSEA: 0
CHEST TIGHTNESS: 0
BLOOD IN STOOL: 0
VOMITING: 0
ABDOMINAL PAIN: 0
COUGH: 0
DIARRHEA: 0
SHORTNESS OF BREATH: 1
WHEEZING: 0

## 2024-03-14 NOTE — PROGRESS NOTES
Kelli Thakur (:  1945) is a 78 y.o. female,Established patient, here for evaluation of the following chief complaint(s):  No chief complaint on file.         ASSESSMENT/PLAN:  1. Type 2 diabetes mellitus without complication, without long-term current use of insulin (HCC)  -     Hemoglobin A1C  2. Severe obesity (BMI 35.0-39.9) with comorbidity (HCC)  3. Major depressive disorder with single episode, in full remission (HCC)  4. S/P parathyroidectomy (HCC)  5. Malignant melanoma of torso excluding breast (HCC)  6. Hyperlipidemia associated with type 2 diabetes mellitus (HCC)  7. Moderate persistent asthma without complication  8. Benign essential HTN  -     CBC with Auto Differential; Future  -     Basic Metabolic Panel; Future  9. Gastroesophageal reflux disease without esophagitis  10. Shortness of breath  -     EKG 12 lead; Future      DM- Stable.  continue metformin   Strict diet.       HTN.  Blood pressure is good  Continue calan,coreg,losartan,terazosin .  Cannot take HCTZ 2 to high Ca.  Aldactone stopped for high K   Walking.  Weight loss.     Asthma  Stable on Breo,incruse and proventil    Shortness of breath  No orthopnea  No edema  Check H/H  EKG  Consider Lexiscan myoview      HLD.continue lipitor.  Lipids are good      Diarrhea  Advised probiotics      Urinary urge incontinence   Detrol LA 4 mg daily       Gout- continue allopurinol  No recent flareups.      Depression.  Continue  paxil 20 mg daily       GERD.  Stable on PPI.     Lumbar Spinal stenosis  nav knee OA  Has seen  Ortho  Also sees pain physician      Anemia.  2 to  thal trait and iron def      Osteoporosis  Continue fosamax,calcium and vit D           Subjective   SUBJECTIVE/OBJECTIVE:  HPI  She has Type 2 DM,HTN,GERD,spinal stenosis,asthma,gout and depression.  Blood sugars are under good control.Has had DM since .  Patient's BP is controlled on current medications.  Depression is stable on paxil.  Asthma- mod

## 2024-03-15 ENCOUNTER — TELEPHONE (OUTPATIENT)
Dept: INTERNAL MEDICINE CLINIC | Age: 79
End: 2024-03-15

## 2024-03-15 LAB
EST. AVERAGE GLUCOSE BLD GHB EST-MCNC: 177.2 MG/DL
HBA1C MFR BLD: 7.8 %

## 2024-03-15 RX ORDER — LANOLIN ALCOHOL/MO/W.PET/CERES
CREAM (GRAM) TOPICAL
Qty: 90 TABLET | Refills: 0 | Status: SHIPPED | OUTPATIENT
Start: 2024-03-15

## 2024-03-15 NOTE — TELEPHONE ENCOUNTER
----- Message from Mimi Jose Raul sent at 3/15/2024 11:06 AM EDT -----  Per Dr. Quijano- A1c is elevated but no changes for now. Hemoglobin has gone down, this can cause SOB, stress test has already been ordered. Is she taking any iron? If not, start otc iron 1-2 tablets daily of 65 mg. Has she had a colonoscopy?

## 2024-03-19 RX ORDER — TOLTERODINE 4 MG/1
4 CAPSULE, EXTENDED RELEASE ORAL DAILY
Qty: 90 CAPSULE | Refills: 0 | Status: SHIPPED | OUTPATIENT
Start: 2024-03-19

## 2024-03-27 RX ORDER — FLUTICASONE FUROATE AND VILANTEROL 200; 25 UG/1; UG/1
POWDER RESPIRATORY (INHALATION)
Qty: 3 EACH | Refills: 1 | Status: SHIPPED | OUTPATIENT
Start: 2024-03-27

## 2024-03-28 ENCOUNTER — HOSPITAL ENCOUNTER (OUTPATIENT)
Dept: NON INVASIVE DIAGNOSTICS | Age: 79
Discharge: HOME | End: 2024-03-28
Payer: MEDICARE

## 2024-03-28 ENCOUNTER — HOSPITAL ENCOUNTER (OUTPATIENT)
Dept: NUCLEAR MEDICINE | Age: 79
Discharge: HOME | End: 2024-03-28
Payer: MEDICARE

## 2024-03-28 DIAGNOSIS — R06.02 SOB (SHORTNESS OF BREATH): ICD-10-CM

## 2024-03-28 PROCEDURE — A9502 TC99M TETROFOSMIN: HCPCS | Performed by: INTERNAL MEDICINE

## 2024-03-28 PROCEDURE — 3430000000 HC RX DIAGNOSTIC RADIOPHARMACEUTICAL: Performed by: INTERNAL MEDICINE

## 2024-03-28 PROCEDURE — 78452 HT MUSCLE IMAGE SPECT MULT: CPT

## 2024-03-28 PROCEDURE — 6360000002 HC RX W HCPCS: Performed by: INTERNAL MEDICINE

## 2024-03-28 PROCEDURE — 93017 CV STRESS TEST TRACING ONLY: CPT

## 2024-03-28 RX ORDER — REGADENOSON 0.08 MG/ML
0.4 INJECTION, SOLUTION INTRAVENOUS
Status: COMPLETED | OUTPATIENT
Start: 2024-03-28 | End: 2024-03-28

## 2024-03-28 RX ADMIN — TETROFOSMIN 10.9 MILLICURIE: 1.38 INJECTION, POWDER, LYOPHILIZED, FOR SOLUTION INTRAVENOUS at 09:45

## 2024-03-28 RX ADMIN — TETROFOSMIN 32.5 MILLICURIE: 1.38 INJECTION, POWDER, LYOPHILIZED, FOR SOLUTION INTRAVENOUS at 11:15

## 2024-03-28 RX ADMIN — REGADENOSON 0.4 MG: 0.08 INJECTION, SOLUTION INTRAVENOUS at 11:15

## 2024-04-15 RX ORDER — ALENDRONATE SODIUM 70 MG/1
TABLET ORAL
Qty: 12 TABLET | Refills: 3 | Status: SHIPPED | OUTPATIENT
Start: 2024-04-15

## 2024-05-17 RX ORDER — ATORVASTATIN CALCIUM 10 MG/1
TABLET, FILM COATED ORAL
Qty: 90 TABLET | Refills: 0 | Status: SHIPPED | OUTPATIENT
Start: 2024-05-17

## 2024-05-17 RX ORDER — LOSARTAN POTASSIUM 100 MG/1
TABLET ORAL
Qty: 90 TABLET | Refills: 0 | Status: SHIPPED | OUTPATIENT
Start: 2024-05-17

## 2024-05-17 RX ORDER — PAROXETINE 10 MG/1
TABLET, FILM COATED ORAL
Qty: 90 TABLET | Refills: 0 | Status: SHIPPED | OUTPATIENT
Start: 2024-05-17

## 2024-05-17 RX ORDER — ALLOPURINOL 300 MG/1
TABLET ORAL
Qty: 90 TABLET | Refills: 0 | Status: SHIPPED | OUTPATIENT
Start: 2024-05-17

## 2024-05-17 RX ORDER — CARVEDILOL 6.25 MG/1
TABLET ORAL
Qty: 180 TABLET | Refills: 0 | Status: SHIPPED | OUTPATIENT
Start: 2024-05-17

## 2024-06-18 ENCOUNTER — OFFICE VISIT (OUTPATIENT)
Dept: INTERNAL MEDICINE CLINIC | Age: 79
End: 2024-06-18

## 2024-06-18 VITALS
HEIGHT: 63 IN | BODY MASS INDEX: 35.61 KG/M2 | WEIGHT: 201 LBS | SYSTOLIC BLOOD PRESSURE: 128 MMHG | DIASTOLIC BLOOD PRESSURE: 70 MMHG | HEART RATE: 76 BPM

## 2024-06-18 DIAGNOSIS — D64.9 ANEMIA, UNSPECIFIED TYPE: ICD-10-CM

## 2024-06-18 DIAGNOSIS — Z00.00 MEDICARE ANNUAL WELLNESS VISIT, SUBSEQUENT: Primary | ICD-10-CM

## 2024-06-18 DIAGNOSIS — E11.9 TYPE 2 DIABETES MELLITUS WITHOUT COMPLICATION, WITHOUT LONG-TERM CURRENT USE OF INSULIN (HCC): ICD-10-CM

## 2024-06-18 DIAGNOSIS — E11.69 HYPERLIPIDEMIA ASSOCIATED WITH TYPE 2 DIABETES MELLITUS (HCC): ICD-10-CM

## 2024-06-18 DIAGNOSIS — I10 BENIGN ESSENTIAL HTN: ICD-10-CM

## 2024-06-18 DIAGNOSIS — Z00.00 ROUTINE GENERAL MEDICAL EXAMINATION AT A HEALTH CARE FACILITY: ICD-10-CM

## 2024-06-18 DIAGNOSIS — J45.40 MODERATE PERSISTENT ASTHMA WITHOUT COMPLICATION: ICD-10-CM

## 2024-06-18 DIAGNOSIS — E78.5 HYPERLIPIDEMIA ASSOCIATED WITH TYPE 2 DIABETES MELLITUS (HCC): ICD-10-CM

## 2024-06-18 DIAGNOSIS — E66.01 MORBID OBESITY (HCC): ICD-10-CM

## 2024-06-18 DIAGNOSIS — I48.0 PAROXYSMAL ATRIAL FIBRILLATION (HCC): ICD-10-CM

## 2024-06-18 DIAGNOSIS — K21.9 GASTROESOPHAGEAL REFLUX DISEASE WITHOUT ESOPHAGITIS: ICD-10-CM

## 2024-06-18 DIAGNOSIS — F32.5 MAJOR DEPRESSIVE DISORDER WITH SINGLE EPISODE, IN FULL REMISSION (HCC): ICD-10-CM

## 2024-06-18 PROCEDURE — 3078F DIAST BP <80 MM HG: CPT | Performed by: INTERNAL MEDICINE

## 2024-06-18 PROCEDURE — 1123F ACP DISCUSS/DSCN MKR DOCD: CPT | Performed by: INTERNAL MEDICINE

## 2024-06-18 PROCEDURE — 3051F HG A1C>EQUAL 7.0%<8.0%: CPT | Performed by: INTERNAL MEDICINE

## 2024-06-18 PROCEDURE — 3074F SYST BP LT 130 MM HG: CPT | Performed by: INTERNAL MEDICINE

## 2024-06-18 PROCEDURE — G0439 PPPS, SUBSEQ VISIT: HCPCS | Performed by: INTERNAL MEDICINE

## 2024-06-18 RX ORDER — UREA 10 %
LOTION (ML) TOPICAL
Qty: 90 TABLET | Refills: 1 | Status: SHIPPED | OUTPATIENT
Start: 2024-06-18

## 2024-06-18 NOTE — PROGRESS NOTES
rhythm, normal S1 and S2, no murmurs, rubs, clicks, or gallops, distal pulses intact, no carotid bruits  Abdomen: soft, non-tender, non-distended, normal bowel sounds, no masses or organomegaly  Extremities: no cyanosis, clubbing or edema    Allergies   Allergen Reactions    Hydrochlorothiazide      Increases calcium to unsafe level resulting in parathyroid surgery    Lisinopril Other (See Comments)     cough    Spironolactone      Increasing potassium     Prior to Visit Medications    Medication Sig Taking? Authorizing Provider   ferrous sulfate (FE TABS) 325 (65 Fe) MG EC tablet Take 1 to 2 tablets daily Yes Regina Qujiano MD   losartan (COZAAR) 100 MG tablet TAKE 1 TABLET BY MOUTH ONCE  DAILY  Regina Quijano MD   allopurinol (ZYLOPRIM) 300 MG tablet TAKE 1 TABLET BY MOUTH ONCE  DAILY  Regina Quijano MD   PARoxetine (PAXIL) 10 MG tablet TAKE 1 TABLET BY MOUTH ONCE  DAILY  Regina Quijano MD   atorvastatin (LIPITOR) 10 MG tablet TAKE 1 TABLET BY MOUTH ONCE  DAILY AT NIGHT  Regina Quijano MD   carvedilol (COREG) 6.25 MG tablet TAKE 1 TABLET BY MOUTH TWICE  DAILY  Regina Quijano MD   metFORMIN (GLUCOPHAGE) 1000 MG tablet TAKE 1 TABLET BY MOUTH TWICE  DAILY WITH MEALS  Regina Quijano MD   alendronate (FOSAMAX) 70 MG tablet TAKE 1 TABLET BY MOUTH WEEKLY  WITH 8 OZ OF PLAIN WATER 30  MINUTES BEFORE FIRST FOOD, DRINK OR MEDS. STAY UPRIGHT FOR 30  MINS  Regina Quijano MD   fluticasone furoate-vilanterol (BREO ELLIPTA) 200-25 MCG/ACT AEPB inhaler INHALE 1 DOSE BY MOUTH DAILY  Regina Quijano MD   tolterodine (DETROL LA) 4 MG extended release capsule TAKE 1 CAPSULE BY MOUTH DAILY  Regina Quijano MD   omeprazole (PRILOSEC) 20 MG delayed release capsule TAKE ONE CAPSULE BY MOUTH TWICE A DAY  Regina Quijano MD   terazosin (HYTRIN) 5 MG capsule TAKE 1 CAPSULE BY MOUTH AT NIGHT

## 2024-06-18 NOTE — PATIENT INSTRUCTIONS
condition or this instruction, always ask your healthcare professional. [employer], [employer] disclaims any warranty or liability for your use of this information.           Advance Directives: Care Instructions  Overview  An advance directive is a legal way to state your wishes at the end of your life. It tells your family and your doctor what to do if you can't say what you want.  There are two main types of advance directives. You can change them any time your wishes change.  Living will.  This form tells your family and your doctor your wishes about life support and other treatment. The form is also called a declaration.  Medical power of .  This form lets you name a person to make treatment decisions for you when you can't speak for yourself. This person is called a health care agent (health care proxy, health care surrogate). The form is also called a durable power of  for health care.  If you do not have an advance directive, decisions about your medical care may be made by a family member, or by a doctor or a  who doesn't know you.  It may help to think of an advance directive as a gift to the people who care for you. If you have one, they won't have to make tough decisions by themselves.  For more information, including forms for your state, see the [employer] website (www.caringinfo.org/planning/advance-directives/).  Follow-up care is a key part of your treatment and safety. Be sure to make and go to all appointments, and call your doctor if you are having problems. It's also a good idea to know your test results and keep a list of the medicines you take.  What should you include in an advance directive?  Many states have a unique advance directive form. (It may ask you to address specific issues.) Or you might use a universal form that's approved by many states.  If your form doesn't tell you what to address, it may be hard to know what to include in your advance directive. Use

## 2024-06-19 LAB
ANION GAP SERPL CALCULATED.3IONS-SCNC: 13 MMOL/L (ref 3–16)
BASOPHILS # BLD: 0 K/UL (ref 0–0.2)
BASOPHILS NFR BLD: 0.3 %
BUN SERPL-MCNC: 27 MG/DL (ref 7–20)
BURR CELLS BLD QL SMEAR: ABNORMAL
CALCIUM SERPL-MCNC: 9.9 MG/DL (ref 8.3–10.6)
CHLORIDE SERPL-SCNC: 109 MMOL/L (ref 99–110)
CHOLEST SERPL-MCNC: 122 MG/DL (ref 0–199)
CO2 SERPL-SCNC: 22 MMOL/L (ref 21–32)
CREAT SERPL-MCNC: 1 MG/DL (ref 0.6–1.2)
DACRYOCYTES BLD QL SMEAR: ABNORMAL
DEPRECATED RDW RBC AUTO: 17.6 % (ref 12.4–15.4)
EOSINOPHIL # BLD: 0.1 K/UL (ref 0–0.6)
EOSINOPHIL NFR BLD: 1.3 %
EST. AVERAGE GLUCOSE BLD GHB EST-MCNC: 151.3 MG/DL
GFR SERPLBLD CREATININE-BSD FMLA CKD-EPI: 57 ML/MIN/{1.73_M2}
GLUCOSE SERPL-MCNC: 149 MG/DL (ref 70–99)
HBA1C MFR BLD: 6.9 %
HCT VFR BLD AUTO: 29.5 % (ref 36–48)
HDLC SERPL-MCNC: 54 MG/DL (ref 40–60)
HGB BLD-MCNC: 9.3 G/DL (ref 12–16)
LDLC SERPL CALC-MCNC: 49 MG/DL
LYMPHOCYTES # BLD: 1.6 K/UL (ref 1–5.1)
LYMPHOCYTES NFR BLD: 22.3 %
MCH RBC QN AUTO: 19 PG (ref 26–34)
MCHC RBC AUTO-ENTMCNC: 31.4 G/DL (ref 31–36)
MCV RBC AUTO: 60.6 FL (ref 80–100)
MONOCYTES # BLD: 0.4 K/UL (ref 0–1.3)
MONOCYTES NFR BLD: 5.7 %
NEUTROPHILS # BLD: 4.9 K/UL (ref 1.7–7.7)
NEUTROPHILS NFR BLD: 70.4 %
OVALOCYTES BLD QL SMEAR: ABNORMAL
PATH INTERP BLD-IMP: NO
PLATELET # BLD AUTO: 238 K/UL (ref 135–450)
PLATELET BLD QL SMEAR: ADEQUATE
PMV BLD AUTO: 11.5 FL (ref 5–10.5)
POIKILOCYTOSIS BLD QL SMEAR: ABNORMAL
POTASSIUM SERPL-SCNC: 4.9 MMOL/L (ref 3.5–5.1)
RBC # BLD AUTO: 4.87 M/UL (ref 4–5.2)
SCHISTOCYTES BLD QL SMEAR: ABNORMAL
SLIDE REVIEW: ABNORMAL
SODIUM SERPL-SCNC: 144 MMOL/L (ref 136–145)
TRIGL SERPL-MCNC: 93 MG/DL (ref 0–150)
TSH SERPL DL<=0.005 MIU/L-ACNC: 1 UIU/ML (ref 0.27–4.2)
VLDLC SERPL CALC-MCNC: 19 MG/DL
WBC # BLD AUTO: 7 K/UL (ref 4–11)

## 2024-06-27 RX ORDER — UMECLIDINIUM 62.5 UG/1
AEROSOL, POWDER ORAL
Qty: 1 EACH | Refills: 5 | Status: SHIPPED | OUTPATIENT
Start: 2024-06-27

## 2024-06-27 RX ORDER — TOLTERODINE 4 MG/1
4 CAPSULE, EXTENDED RELEASE ORAL DAILY
Qty: 90 CAPSULE | Refills: 0 | Status: SHIPPED | OUTPATIENT
Start: 2024-06-27

## 2024-07-08 ENCOUNTER — TELEPHONE (OUTPATIENT)
Dept: INTERNAL MEDICINE CLINIC | Age: 79
End: 2024-07-08

## 2024-07-08 RX ORDER — UMECLIDINIUM 62.5 UG/1
AEROSOL, POWDER ORAL
Qty: 30 EACH | Refills: 2 | Status: SHIPPED | OUTPATIENT
Start: 2024-07-08

## 2024-07-08 RX ORDER — FLUTICASONE FUROATE AND VILANTEROL 200; 25 UG/1; UG/1
POWDER RESPIRATORY (INHALATION)
Qty: 3 EACH | Refills: 1 | Status: SHIPPED | OUTPATIENT
Start: 2024-07-08

## 2024-07-08 NOTE — TELEPHONE ENCOUNTER
----- Message from Roxann Castaneda MA sent at 7/8/2024  3:04 PM EDT -----  Contact: [phone]  Patient states that she needs paper scripts for her below medications. She says they need to be printed and mailed to her to send in to [employer] for patient assistance. She states that it needs to be for 3 inhalers of each and 1 refill. Patient is requesting that, if possible, once they are printed the scripts be mailed to her. Her address is:  [address] [place] [id number]. Please advise.         BREO ELLIPTA 200-25 MCG/INH AEPB inhaler  umeclidinium bromide (INCRUSE ELLIPTA) 62.5 MCG/ACT inhale

## 2024-07-19 RX ORDER — ATORVASTATIN CALCIUM 10 MG/1
TABLET, FILM COATED ORAL
Qty: 90 TABLET | Refills: 0 | Status: SHIPPED | OUTPATIENT
Start: 2024-07-19

## 2024-07-19 RX ORDER — ALLOPURINOL 300 MG/1
TABLET ORAL
Qty: 90 TABLET | Refills: 0 | Status: SHIPPED | OUTPATIENT
Start: 2024-07-19

## 2024-07-19 RX ORDER — CARVEDILOL 6.25 MG/1
TABLET ORAL
Qty: 180 TABLET | Refills: 0 | Status: SHIPPED | OUTPATIENT
Start: 2024-07-19

## 2024-07-19 RX ORDER — LOSARTAN POTASSIUM 100 MG/1
TABLET ORAL
Qty: 90 TABLET | Refills: 0 | Status: SHIPPED | OUTPATIENT
Start: 2024-07-19

## 2024-07-19 RX ORDER — PAROXETINE 10 MG/1
TABLET, FILM COATED ORAL
Qty: 90 TABLET | Refills: 0 | Status: SHIPPED | OUTPATIENT
Start: 2024-07-19

## 2024-08-13 ENCOUNTER — TELEPHONE (OUTPATIENT)
Dept: INTERNAL MEDICINE CLINIC | Age: 79
End: 2024-08-13

## 2024-08-13 DIAGNOSIS — J45.909 ASTHMA, UNSPECIFIED ASTHMA SEVERITY, UNSPECIFIED WHETHER COMPLICATED, UNSPECIFIED WHETHER PERSISTENT: Primary | ICD-10-CM

## 2024-08-13 NOTE — TELEPHONE ENCOUNTER
----- Message from Dr. Regina Quijano MD sent at 8/13/2024 12:20 PM EDT -----  Contact: [phone]  Ok    Asthma  ----- Message -----  From: Roxann Castaneda MA  Sent: 8/13/2024  11:42 AM EDT  To: Regina Quijano MD    Patient states that despite the testing, she is still having the shortness of breath and is wanting to go see Dr. Emanuel. She has not been seen in their office since 2016, so she is a new patient, therefore she is requiring a referral. Patient is requesting that a referral be placed. Please advise.       [facility] - [address] -  [phone] - F [phone]  [address] [place] [id number]  Phone: [phone]  Fax: [phone]

## 2024-08-14 ASSESSMENT — SLEEP AND FATIGUE QUESTIONNAIRES
HOW LIKELY ARE YOU TO NOD OFF OR FALL ASLEEP IN A CAR, WHILE STOPPED FOR A FEW MINUTES IN TRAFFIC: WOULD NEVER DOZE
HOW LIKELY ARE YOU TO NOD OFF OR FALL ASLEEP WHILE SITTING INACTIVE IN A PUBLIC PLACE: WOULD NEVER DOZE
HOW LIKELY ARE YOU TO NOD OFF OR FALL ASLEEP WHILE WATCHING TV: SLIGHT CHANCE OF DOZING
HOW LIKELY ARE YOU TO NOD OFF OR FALL ASLEEP WHILE SITTING AND READING: SLIGHT CHANCE OF DOZING
HOW LIKELY ARE YOU TO NOD OFF OR FALL ASLEEP IN A CAR, WHILE STOPPED FOR A FEW MINUTES IN TRAFFIC: WOULD NEVER DOZE
HOW LIKELY ARE YOU TO NOD OFF OR FALL ASLEEP WHILE LYING DOWN TO REST IN THE AFTERNOON WHEN CIRCUMSTANCES PERMIT: MODERATE CHANCE OF DOZING
HOW LIKELY ARE YOU TO NOD OFF OR FALL ASLEEP WHILE SITTING QUIETLY AFTER LUNCH WITHOUT ALCOHOL: HIGH CHANCE OF DOZING
HOW LIKELY ARE YOU TO NOD OFF OR FALL ASLEEP WHILE LYING DOWN TO REST IN THE AFTERNOON WHEN CIRCUMSTANCES PERMIT: MODERATE CHANCE OF DOZING
HOW LIKELY ARE YOU TO NOD OFF OR FALL ASLEEP WHEN YOU ARE A PASSENGER IN A CAR FOR AN HOUR WITHOUT A BREAK: SLIGHT CHANCE OF DOZING
HOW LIKELY ARE YOU TO NOD OFF OR FALL ASLEEP WHILE SITTING AND TALKING TO SOMEONE: WOULD NEVER DOZE
HOW LIKELY ARE YOU TO NOD OFF OR FALL ASLEEP WHILE SITTING AND READING: SLIGHT CHANCE OF DOZING
HOW LIKELY ARE YOU TO NOD OFF OR FALL ASLEEP WHILE SITTING AND TALKING TO SOMEONE: WOULD NEVER DOZE
ESS TOTAL SCORE: 8
HOW LIKELY ARE YOU TO NOD OFF OR FALL ASLEEP WHILE SITTING QUIETLY AFTER LUNCH WITHOUT ALCOHOL: HIGH CHANCE OF DOZING
HOW LIKELY ARE YOU TO NOD OFF OR FALL ASLEEP WHILE WATCHING TV: SLIGHT CHANCE OF DOZING
HOW LIKELY ARE YOU TO NOD OFF OR FALL ASLEEP WHILE SITTING INACTIVE IN A PUBLIC PLACE: WOULD NEVER DOZE
HOW LIKELY ARE YOU TO NOD OFF OR FALL ASLEEP WHEN YOU ARE A PASSENGER IN A CAR FOR AN HOUR WITHOUT A BREAK: SLIGHT CHANCE OF DOZING

## 2024-08-15 ENCOUNTER — OFFICE VISIT (OUTPATIENT)
Dept: PULMONOLOGY | Age: 79
End: 2024-08-15
Payer: MEDICARE

## 2024-08-15 VITALS
WEIGHT: 199 LBS | HEART RATE: 87 BPM | BODY MASS INDEX: 35.26 KG/M2 | DIASTOLIC BLOOD PRESSURE: 84 MMHG | OXYGEN SATURATION: 98 % | SYSTOLIC BLOOD PRESSURE: 167 MMHG | TEMPERATURE: 97.8 F | HEIGHT: 63 IN | RESPIRATION RATE: 16 BRPM

## 2024-08-15 DIAGNOSIS — D50.9 MICROCYTIC HYPOCHROMIC ANEMIA: ICD-10-CM

## 2024-08-15 DIAGNOSIS — E66.01 CLASS 2 SEVERE OBESITY DUE TO EXCESS CALORIES WITH SERIOUS COMORBIDITY AND BODY MASS INDEX (BMI) OF 35.0 TO 35.9 IN ADULT (HCC): ICD-10-CM

## 2024-08-15 DIAGNOSIS — R06.02 SHORTNESS OF BREATH: Primary | ICD-10-CM

## 2024-08-15 DIAGNOSIS — I25.10 CORONARY ARTERY CALCIFICATION SEEN ON CT SCAN: ICD-10-CM

## 2024-08-15 DIAGNOSIS — I27.20 PULMONARY HYPERTENSION (HCC): ICD-10-CM

## 2024-08-15 DIAGNOSIS — K21.9 GASTROESOPHAGEAL REFLUX DISEASE WITHOUT ESOPHAGITIS: ICD-10-CM

## 2024-08-15 DIAGNOSIS — I51.7 BIATRIAL ENLARGEMENT: ICD-10-CM

## 2024-08-15 DIAGNOSIS — G47.30 OBSERVED SLEEP APNEA: ICD-10-CM

## 2024-08-15 DIAGNOSIS — I51.89 GRADE I DIASTOLIC DYSFUNCTION: ICD-10-CM

## 2024-08-15 PROBLEM — E66.812 CLASS 2 SEVERE OBESITY DUE TO EXCESS CALORIES WITH SERIOUS COMORBIDITY AND BODY MASS INDEX (BMI) OF 35.0 TO 35.9 IN ADULT: Status: ACTIVE | Noted: 2018-02-22

## 2024-08-15 PROCEDURE — 3077F SYST BP >= 140 MM HG: CPT | Performed by: INTERNAL MEDICINE

## 2024-08-15 PROCEDURE — G8399 PT W/DXA RESULTS DOCUMENT: HCPCS | Performed by: INTERNAL MEDICINE

## 2024-08-15 PROCEDURE — 99204 OFFICE O/P NEW MOD 45 MIN: CPT | Performed by: INTERNAL MEDICINE

## 2024-08-15 PROCEDURE — 1036F TOBACCO NON-USER: CPT | Performed by: INTERNAL MEDICINE

## 2024-08-15 PROCEDURE — G8417 CALC BMI ABV UP PARAM F/U: HCPCS | Performed by: INTERNAL MEDICINE

## 2024-08-15 PROCEDURE — 3079F DIAST BP 80-89 MM HG: CPT | Performed by: INTERNAL MEDICINE

## 2024-08-15 PROCEDURE — G8427 DOCREV CUR MEDS BY ELIG CLIN: HCPCS | Performed by: INTERNAL MEDICINE

## 2024-08-15 PROCEDURE — 1123F ACP DISCUSS/DSCN MKR DOCD: CPT | Performed by: INTERNAL MEDICINE

## 2024-08-15 PROCEDURE — 1090F PRES/ABSN URINE INCON ASSESS: CPT | Performed by: INTERNAL MEDICINE

## 2024-08-15 NOTE — PATIENT INSTRUCTIONS
Patient's wife called stating that Chase Wu had ordered an MRI for the patient. The patient was seen yesterday by his neurologist and he suggested that they have a test to check the arteries in his neck instead of the head mri. Please call patient's wife back at [phone] to let her know which test  wants the patient to have. Remember to bring a list of pulmonary medications and any CPAP or BiPAP machines to your next appointment with the office.     Please keep all of your future appointments scheduled by [facility]. Out of respect for other patients and providers, you may be asked to reschedule your appointment if you arrive later than your scheduled appointment time. Appointments cancelled less than 24hrs in advance will be considered a no show. Patients with three missed appointments within 1 year or four missed appointments within 2 years can be dismissed from the practice.     Please be aware that our physicians are required to work in the Intensive Care Unit at [facility].  Your appointment may need to be rescheduled if they are designated to work during your appointment time.      You may receive a survey regarding the care you received during your visit.  Your input is valuable to us.  We encourage you to complete and return your survey.  We hope you will choose us in the future for your healthcare needs.     Pt instructed of all future appointment dates & times, including radiology, labs, procedures & referrals. If procedures were scheduled preparation instructions provided. Instructions on future appointments with [facility] were given.      If you should need to cancel or reschedule your appointment, please call the Sleep Center at [phone] as soon as possible.         Please refrain from or reduce the use of caffeine and/or alcohol prior to your sleep study and avoid napping the day of your study as these will affect the accuracy of your test results.       In the next few weeks, you will be receiving a survey from [facility] regarding your visit today.  We would greatly appreciate it if you would take just a few minutes to fill that out.  It is very important to us that our patients receive top notch care and our surveys help keep us accountable.

## 2024-08-15 NOTE — PROGRESS NOTES
MA Communication:  The following orders are received by verbal communication from Geovanni Muniz MD    Orders include:    1 month f/u  HST  PFT at [place]    
patient's PCP  Patient has been having some projectile vomiting after food-patient was told to discuss with PCP and GI about evaluation for gastroparesis and to do the needful if need be  Patient's PFT from 2010 did not show any obstructive or restrictive lung disease at that time  Patient feels better with the Breo and Incruse which was given to the patient by PCP  Albuterol inhaler 2 puffs every 6 hours whenever necessary basis  Patient's CT of the chest from 2022 was shown to the patient and reviewed and patient does not have any structural defect in the lungs of concern  Patient's shortness of breath appears to be multifactorial in nature  Patient was told about the implications of diastolic dysfunction/biatrial enlargement and pulmonary hypertension  Patient will benefit from a repeat PFT which is being ordered for the patient  Clinically patient appears to have SORAIDA  Patient was told about the pathophysiology and sequelae of SORAIDA  After discussion home sleep ready being ordered for the patient  Diet and lifestyle modifications discussed  Patient should avoid spicy food, caffeine sodas nuts and chocolates and not to lie down immediately after eating and patient's supper has to be at least 2 hours prior to sleeping time and patient's head should be about 30 degrees higher than abdomen  Patient also is having some dizziness-patient was told about limb elevation and limb exercises  If patient's dizziness persists then patient can discuss with PCP about optimization of antihypertensives if deemed appropriate  Regular regimented exercise will help  Weight loss will help  Patient does not want to use any nasal sprays for sinus congestion  Zyrtec can cause patient to be tired also  Further management depending on patient clinical status, follow-up on above recommendations along with test results

## 2024-08-21 RX ORDER — OMEPRAZOLE 20 MG/1
CAPSULE, DELAYED RELEASE ORAL
Qty: 180 CAPSULE | Refills: 0 | Status: SHIPPED | OUTPATIENT
Start: 2024-08-21

## 2024-08-25 ENCOUNTER — APPOINTMENT (OUTPATIENT)
Dept: GENERAL RADIOLOGY | Age: 79
End: 2024-08-25
Payer: MEDICARE

## 2024-08-25 ENCOUNTER — HOSPITAL ENCOUNTER (EMERGENCY)
Age: 79
Discharge: HOME | End: 2024-08-25
Payer: MEDICARE

## 2024-08-25 VITALS
OXYGEN SATURATION: 96 % | RESPIRATION RATE: 17 BRPM | SYSTOLIC BLOOD PRESSURE: 171 MMHG | DIASTOLIC BLOOD PRESSURE: 69 MMHG | TEMPERATURE: 97.3 F | HEART RATE: 69 BPM

## 2024-08-25 DIAGNOSIS — L97.521 SKIN ULCER OF SECOND TOE OF LEFT FOOT, LIMITED TO BREAKDOWN OF SKIN (HCC): Primary | ICD-10-CM

## 2024-08-25 PROCEDURE — 73630 X-RAY EXAM OF FOOT: CPT

## 2024-08-25 PROCEDURE — 6370000000 HC RX 637 (ALT 250 FOR IP): Performed by: PHYSICIAN ASSISTANT

## 2024-08-25 PROCEDURE — 99283 EMERGENCY DEPT VISIT LOW MDM: CPT

## 2024-08-25 RX ORDER — CEPHALEXIN 500 MG/1
500 CAPSULE ORAL ONCE
Status: COMPLETED | OUTPATIENT
Start: 2024-08-25 | End: 2024-08-25

## 2024-08-25 RX ORDER — CEPHALEXIN 500 MG/1
500 CAPSULE ORAL 4 TIMES DAILY
Qty: 28 CAPSULE | Refills: 0 | Status: SHIPPED | OUTPATIENT
Start: 2024-08-25 | End: 2024-09-01

## 2024-08-25 RX ADMIN — CEPHALEXIN 500 MG: 500 CAPSULE ORAL at 18:11

## 2024-08-25 ASSESSMENT — LIFESTYLE VARIABLES
HOW MANY STANDARD DRINKS CONTAINING ALCOHOL DO YOU HAVE ON A TYPICAL DAY: PATIENT DOES NOT DRINK
HOW OFTEN DO YOU HAVE A DRINK CONTAINING ALCOHOL: NEVER

## 2024-08-25 ASSESSMENT — PAIN - FUNCTIONAL ASSESSMENT: PAIN_FUNCTIONAL_ASSESSMENT: 0-10

## 2024-08-25 ASSESSMENT — PAIN SCALES - GENERAL: PAINLEVEL_OUTOF10: 1

## 2024-08-25 NOTE — ED NOTES
Discharge instructions gone over, patient denies any further questions. Ambulated to car without difficulty

## 2024-08-30 NOTE — DISCHARGE INSTRUCTIONS
Wound Care Center Physician Orders and Discharge Instructions  [facility]  [address], [address]  [place] [id number]  Telephone: [phone]      Fax: [phone]      Your home care company:  *** .    Your wound-care supplies will be provided by:  *** .    NAME:  Kelli Thakur   YOB: 1945  PRIMARY DIAGNOSIS FOR WOUND CARE CENTER:  *** .    Wound cleansing:   Do not scrub or use excessive force.  Wash hands with soap and water before and after dressing changes.  Prior to applying a clean dressing, cleanse wound with normal saline, wound cleanser, or mild soap and water. Ask your physician or nurse before getting the wound(s) wet in the shower.     Wound care for home:    ***    Please note, all wounds (unless stated otherwise here) were mechanically debrided at the time of cleansing here in the wound-care center today, so a small amount of pain, drainage or bleeding from that process might be expected, and is normal.     All products for home use, including multiple products for a single wound if applicable, are medically necessary in order to achieve the best chance at timely wound healing. See provider documentation for details if needed.    Substituted dressings applied in the [facility] today, if applicable:    ***    New orders for this week (labs, imaging, medications, etc.):    ***    Additional instructions for specific diagnoses:    ***    F/U Appointment is with  *** in {NUMBERS 1-10:16067} {UNITS OF TIME:08105}, on                                   at                       .     Your nurse  is ***.     If we applied slip-resistant hospital socks today, be sure to remove them at least once a day to inspect your toes or feet, even if you're not changing the wraps or dressings underneath. If you see anything concerning (redness, excess moisture, etc), please call and let us know right away.    Should you experience any significant changes in

## 2024-09-05 ENCOUNTER — HOSPITAL ENCOUNTER (OUTPATIENT)
Dept: WOUND CARE | Age: 79
Discharge: HOME | End: 2024-09-05

## 2024-09-05 ENCOUNTER — HOSPITAL ENCOUNTER (OUTPATIENT)
Dept: SLEEP CENTER | Age: 79
Discharge: HOME | End: 2024-09-07
Payer: MEDICARE

## 2024-09-05 VITALS
WEIGHT: 200.4 LBS | TEMPERATURE: 97.9 F | BODY MASS INDEX: 35.51 KG/M2 | HEIGHT: 63 IN | RESPIRATION RATE: 18 BRPM | DIASTOLIC BLOOD PRESSURE: 89 MMHG | SYSTOLIC BLOOD PRESSURE: 192 MMHG | HEART RATE: 76 BPM

## 2024-09-05 DIAGNOSIS — G47.30 OBSERVED SLEEP APNEA: ICD-10-CM

## 2024-09-05 PROCEDURE — 95806 SLEEP STUDY UNATT&RESP EFFT: CPT

## 2024-09-09 PROBLEM — G47.30 OBSERVED SLEEP APNEA: Status: ACTIVE | Noted: 2024-09-09

## 2024-09-10 RX ORDER — VERAPAMIL HYDROCHLORIDE 240 MG/1
240 TABLET, FILM COATED, EXTENDED RELEASE ORAL DAILY
Qty: 90 TABLET | Refills: 1 | Status: SHIPPED | OUTPATIENT
Start: 2024-09-10

## 2024-09-12 ENCOUNTER — HOSPITAL ENCOUNTER (OUTPATIENT)
Dept: PULMONOLOGY | Age: 79
Discharge: HOME | End: 2024-09-12
Payer: MEDICARE

## 2024-09-12 VITALS — OXYGEN SATURATION: 97 %

## 2024-09-12 DIAGNOSIS — R06.02 SHORTNESS OF BREATH: ICD-10-CM

## 2024-09-12 LAB
DLCO %PRED: 83 %
DLCO PRED: NORMAL
DLCO/VA %PRED: NORMAL
DLCO/VA PRED: NORMAL
DLCO/VA: NORMAL
DLCO: NORMAL
EXPIRATORY TIME-POST: NORMAL
EXPIRATORY TIME: NORMAL
FEF 25-75 %CHNG: NORMAL
FEF 25-75 POST %PRED: NORMAL
FEF 25-75% %PRED-PRE: NORMAL
FEF 25-75% PRED: NORMAL
FEF 25-75-POST: NORMAL
FEF 25-75-PRE: NORMAL
FEV1 %PRED-POST: 90 %
FEV1 %PRED-PRE: 79 %
FEV1 PRED: NORMAL
FEV1-POST: NORMAL
FEV1-PRE: NORMAL
FEV1/FVC %PRED-POST: NORMAL
FEV1/FVC %PRED-PRE: NORMAL
FEV1/FVC PRED: NORMAL
FEV1/FVC-POST: 72 %
FEV1/FVC-PRE: 73 %
FVC %PRED-POST: NORMAL
FVC %PRED-PRE: NORMAL
FVC PRED: NORMAL
FVC-POST: NORMAL
FVC-PRE: NORMAL
GAW %PRED: NORMAL
GAW PRED: NORMAL
GAW: NORMAL
IC PRE %PRED: NORMAL
IC PRED: NORMAL
IC: NORMAL
MEP: NORMAL
MIP: NORMAL
MVV %PRED-PRE: NORMAL
MVV PRED: NORMAL
MVV-PRE: NORMAL
PEF %PRED-POST: NORMAL
PEF %PRED-PRE: NORMAL
PEF PRED: NORMAL
PEF%CHNG: NORMAL
PEF-POST: NORMAL
PEF-PRE: NORMAL
RAW %PRED: NORMAL
RAW PRED: NORMAL
RAW: NORMAL
RV PRE %PRED: NORMAL
RV PRED: NORMAL
RV: NORMAL
SVC %PRED: NORMAL
SVC PRED: NORMAL
SVC: NORMAL
TLC PRE %PRED: 108 %
TLC PRED: NORMAL
TLC: NORMAL
VA %PRED: NORMAL
VA PRED: NORMAL
VA: NORMAL
VTG %PRED: NORMAL
VTG PRED: NORMAL
VTG: NORMAL

## 2024-09-12 PROCEDURE — 94729 DIFFUSING CAPACITY: CPT

## 2024-09-12 PROCEDURE — 6370000000 HC RX 637 (ALT 250 FOR IP): Performed by: INTERNAL MEDICINE

## 2024-09-12 PROCEDURE — 94760 N-INVAS EAR/PLS OXIMETRY 1: CPT

## 2024-09-12 PROCEDURE — 94726 PLETHYSMOGRAPHY LUNG VOLUMES: CPT

## 2024-09-12 PROCEDURE — 94060 EVALUATION OF WHEEZING: CPT

## 2024-09-12 PROCEDURE — 94640 AIRWAY INHALATION TREATMENT: CPT

## 2024-09-12 RX ORDER — ALBUTEROL SULFATE 90 UG/1
4 AEROSOL, METERED RESPIRATORY (INHALATION) ONCE
Status: COMPLETED | OUTPATIENT
Start: 2024-09-12 | End: 2024-09-12

## 2024-09-12 RX ADMIN — ALBUTEROL SULFATE 4 PUFF: 90 AEROSOL, METERED RESPIRATORY (INHALATION) at 09:03

## 2024-09-12 ASSESSMENT — PULMONARY FUNCTION TESTS
FEV1_PERCENT_PREDICTED_POST: 90
FEV1/FVC_POST: 72
FEV1_PERCENT_PREDICTED_PRE: 79
FEV1/FVC_PRE: 73

## 2024-09-18 ENCOUNTER — OFFICE VISIT (OUTPATIENT)
Dept: INTERNAL MEDICINE CLINIC | Age: 79
End: 2024-09-18

## 2024-09-18 VITALS
SYSTOLIC BLOOD PRESSURE: 168 MMHG | WEIGHT: 201 LBS | BODY MASS INDEX: 35.61 KG/M2 | DIASTOLIC BLOOD PRESSURE: 80 MMHG | HEIGHT: 63 IN | HEART RATE: 76 BPM

## 2024-09-18 DIAGNOSIS — I10 BENIGN ESSENTIAL HTN: ICD-10-CM

## 2024-09-18 DIAGNOSIS — I10 BENIGN ESSENTIAL HTN: Primary | ICD-10-CM

## 2024-09-18 DIAGNOSIS — C43.9 MALIGNANT MELANOMA, UNSPECIFIED SITE (HCC): ICD-10-CM

## 2024-09-18 DIAGNOSIS — I48.0 PAROXYSMAL ATRIAL FIBRILLATION (HCC): ICD-10-CM

## 2024-09-18 DIAGNOSIS — E11.9 TYPE 2 DIABETES MELLITUS WITHOUT COMPLICATION, WITHOUT LONG-TERM CURRENT USE OF INSULIN (HCC): ICD-10-CM

## 2024-09-18 DIAGNOSIS — Z23 NEED FOR INFLUENZA VACCINATION: ICD-10-CM

## 2024-09-18 DIAGNOSIS — E78.5 HYPERLIPIDEMIA ASSOCIATED WITH TYPE 2 DIABETES MELLITUS (HCC): ICD-10-CM

## 2024-09-18 DIAGNOSIS — D56.3 THALASSEMIA TRAIT: ICD-10-CM

## 2024-09-18 DIAGNOSIS — E11.69 HYPERLIPIDEMIA ASSOCIATED WITH TYPE 2 DIABETES MELLITUS (HCC): ICD-10-CM

## 2024-09-18 DIAGNOSIS — F32.5 MAJOR DEPRESSIVE DISORDER WITH SINGLE EPISODE, IN FULL REMISSION (HCC): ICD-10-CM

## 2024-09-18 DIAGNOSIS — J45.40 MODERATE PERSISTENT ASTHMA WITHOUT COMPLICATION: ICD-10-CM

## 2024-09-18 LAB
EST. AVERAGE GLUCOSE BLD GHB EST-MCNC: 159.9 MG/DL
HBA1C MFR BLD: 7.2 %

## 2024-09-18 PROCEDURE — 3078F DIAST BP <80 MM HG: CPT | Performed by: INTERNAL MEDICINE

## 2024-09-18 PROCEDURE — 1090F PRES/ABSN URINE INCON ASSESS: CPT | Performed by: INTERNAL MEDICINE

## 2024-09-18 PROCEDURE — 1036F TOBACCO NON-USER: CPT | Performed by: INTERNAL MEDICINE

## 2024-09-18 PROCEDURE — G0008 ADMIN INFLUENZA VIRUS VAC: HCPCS | Performed by: INTERNAL MEDICINE

## 2024-09-18 PROCEDURE — 1123F ACP DISCUSS/DSCN MKR DOCD: CPT | Performed by: INTERNAL MEDICINE

## 2024-09-18 PROCEDURE — 3044F HG A1C LEVEL LT 7.0%: CPT | Performed by: INTERNAL MEDICINE

## 2024-09-18 PROCEDURE — 99214 OFFICE O/P EST MOD 30 MIN: CPT | Performed by: INTERNAL MEDICINE

## 2024-09-18 PROCEDURE — G8417 CALC BMI ABV UP PARAM F/U: HCPCS | Performed by: INTERNAL MEDICINE

## 2024-09-18 PROCEDURE — G8399 PT W/DXA RESULTS DOCUMENT: HCPCS | Performed by: INTERNAL MEDICINE

## 2024-09-18 PROCEDURE — G8427 DOCREV CUR MEDS BY ELIG CLIN: HCPCS | Performed by: INTERNAL MEDICINE

## 2024-09-18 PROCEDURE — 3077F SYST BP >= 140 MM HG: CPT | Performed by: INTERNAL MEDICINE

## 2024-09-18 PROCEDURE — 90662 IIV NO PRSV INCREASED AG IM: CPT | Performed by: INTERNAL MEDICINE

## 2024-09-18 RX ORDER — ALBUTEROL SULFATE 90 UG/1
INHALANT RESPIRATORY (INHALATION)
Qty: 1 EACH | Refills: 2 | Status: SHIPPED | OUTPATIENT
Start: 2024-09-18

## 2024-09-18 ASSESSMENT — ENCOUNTER SYMPTOMS
DIARRHEA: 0
VOMITING: 0
WHEEZING: 0
COUGH: 0
ABDOMINAL PAIN: 0
SHORTNESS OF BREATH: 1
CHEST TIGHTNESS: 0
NAUSEA: 0
BLOOD IN STOOL: 0

## 2024-09-19 LAB
BASOPHILS # BLD: 0 K/UL (ref 0–0.2)
BASOPHILS NFR BLD: 0.4 %
DEPRECATED RDW RBC AUTO: 16.3 % (ref 12.4–15.4)
EOSINOPHIL # BLD: 0.1 K/UL (ref 0–0.6)
EOSINOPHIL NFR BLD: 1.2 %
HCT VFR BLD AUTO: 29.7 % (ref 36–48)
HGB BLD-MCNC: 9.5 G/DL (ref 12–16)
LYMPHOCYTES # BLD: 1.5 K/UL (ref 1–5.1)
LYMPHOCYTES NFR BLD: 23.4 %
MCH RBC QN AUTO: 19.4 PG (ref 26–34)
MCHC RBC AUTO-ENTMCNC: 32.1 G/DL (ref 31–36)
MCV RBC AUTO: 60.5 FL (ref 80–100)
MONOCYTES # BLD: 0.5 K/UL (ref 0–1.3)
MONOCYTES NFR BLD: 8 %
NEUTROPHILS # BLD: 4.2 K/UL (ref 1.7–7.7)
NEUTROPHILS NFR BLD: 67 %
PATH INTERP BLD-IMP: NO
PLATELET # BLD AUTO: 180 K/UL (ref 135–450)
PMV BLD AUTO: 10.4 FL (ref 5–10.5)
RBC # BLD AUTO: 4.91 M/UL (ref 4–5.2)
WBC # BLD AUTO: 6.3 K/UL (ref 4–11)

## 2024-09-20 RX ORDER — PAROXETINE 10 MG/1
TABLET, FILM COATED ORAL
Qty: 90 TABLET | Refills: 1 | Status: SHIPPED | OUTPATIENT
Start: 2024-09-20

## 2024-09-20 RX ORDER — ATORVASTATIN CALCIUM 10 MG/1
TABLET, FILM COATED ORAL
Qty: 90 TABLET | Refills: 1 | Status: SHIPPED | OUTPATIENT
Start: 2024-09-20

## 2024-09-20 RX ORDER — LOSARTAN POTASSIUM 100 MG/1
TABLET ORAL
Qty: 90 TABLET | Refills: 0 | Status: SHIPPED | OUTPATIENT
Start: 2024-09-20

## 2024-09-20 RX ORDER — CARVEDILOL 6.25 MG/1
TABLET ORAL
Qty: 180 TABLET | Refills: 0 | Status: SHIPPED | OUTPATIENT
Start: 2024-09-20

## 2024-09-23 RX ORDER — TERAZOSIN 5 MG/1
CAPSULE ORAL
COMMUNITY
Start: 2024-09-09

## 2024-09-24 ENCOUNTER — OFFICE VISIT (OUTPATIENT)
Dept: PULMONOLOGY | Age: 79
End: 2024-09-24
Payer: MEDICARE

## 2024-09-24 VITALS
RESPIRATION RATE: 16 BRPM | SYSTOLIC BLOOD PRESSURE: 188 MMHG | BODY MASS INDEX: 36.14 KG/M2 | TEMPERATURE: 98.6 F | DIASTOLIC BLOOD PRESSURE: 80 MMHG | WEIGHT: 204 LBS | OXYGEN SATURATION: 96 % | HEIGHT: 63 IN | HEART RATE: 83 BPM

## 2024-09-24 DIAGNOSIS — G47.33 OSA (OBSTRUCTIVE SLEEP APNEA): ICD-10-CM

## 2024-09-24 DIAGNOSIS — I48.0 PAROXYSMAL ATRIAL FIBRILLATION (HCC): ICD-10-CM

## 2024-09-24 DIAGNOSIS — J45.40 MODERATE PERSISTENT ASTHMA WITHOUT COMPLICATION: Primary | ICD-10-CM

## 2024-09-24 DIAGNOSIS — E66.01 CLASS 2 SEVERE OBESITY DUE TO EXCESS CALORIES WITH SERIOUS COMORBIDITY AND BODY MASS INDEX (BMI) OF 36.0 TO 36.9 IN ADULT: ICD-10-CM

## 2024-09-24 DIAGNOSIS — I27.20 PULMONARY HYPERTENSION (HCC): ICD-10-CM

## 2024-09-24 PROCEDURE — 3077F SYST BP >= 140 MM HG: CPT | Performed by: NURSE PRACTITIONER

## 2024-09-24 PROCEDURE — 1036F TOBACCO NON-USER: CPT | Performed by: NURSE PRACTITIONER

## 2024-09-24 PROCEDURE — G8399 PT W/DXA RESULTS DOCUMENT: HCPCS | Performed by: NURSE PRACTITIONER

## 2024-09-24 PROCEDURE — G8417 CALC BMI ABV UP PARAM F/U: HCPCS | Performed by: NURSE PRACTITIONER

## 2024-09-24 PROCEDURE — 1090F PRES/ABSN URINE INCON ASSESS: CPT | Performed by: NURSE PRACTITIONER

## 2024-09-24 PROCEDURE — 99214 OFFICE O/P EST MOD 30 MIN: CPT | Performed by: NURSE PRACTITIONER

## 2024-09-24 PROCEDURE — G8427 DOCREV CUR MEDS BY ELIG CLIN: HCPCS | Performed by: NURSE PRACTITIONER

## 2024-09-24 PROCEDURE — 3079F DIAST BP 80-89 MM HG: CPT | Performed by: NURSE PRACTITIONER

## 2024-09-24 PROCEDURE — 1123F ACP DISCUSS/DSCN MKR DOCD: CPT | Performed by: NURSE PRACTITIONER

## 2024-09-24 ASSESSMENT — ENCOUNTER SYMPTOMS
COUGH: 1
RHINORRHEA: 0
CHEST TIGHTNESS: 0
ABDOMINAL PAIN: 0
SORE THROAT: 0
WHEEZING: 0
SHORTNESS OF BREATH: 1
EYE PAIN: 0

## 2024-09-26 ENCOUNTER — TELEPHONE (OUTPATIENT)
Dept: PULMONOLOGY | Age: 79
End: 2024-09-26

## 2024-10-16 RX ORDER — TOLTERODINE 4 MG/1
4 CAPSULE, EXTENDED RELEASE ORAL DAILY
Qty: 90 CAPSULE | Refills: 0 | Status: SHIPPED | OUTPATIENT
Start: 2024-10-16

## 2024-11-04 RX ORDER — TERAZOSIN 5 MG/1
CAPSULE ORAL
Qty: 90 CAPSULE | Refills: 0 | Status: SHIPPED | OUTPATIENT
Start: 2024-11-04

## 2024-11-20 ENCOUNTER — APPOINTMENT (OUTPATIENT)
Dept: GENERAL RADIOLOGY | Age: 79
End: 2024-11-20
Payer: MEDICARE

## 2024-11-20 ENCOUNTER — HOSPITAL ENCOUNTER (EMERGENCY)
Age: 79
Discharge: HOME | End: 2024-11-20
Attending: EMERGENCY MEDICINE
Payer: MEDICARE

## 2024-11-20 VITALS
DIASTOLIC BLOOD PRESSURE: 67 MMHG | OXYGEN SATURATION: 97 % | WEIGHT: 197 LBS | HEIGHT: 63 IN | SYSTOLIC BLOOD PRESSURE: 141 MMHG | RESPIRATION RATE: 16 BRPM | HEART RATE: 68 BPM | BODY MASS INDEX: 34.91 KG/M2

## 2024-11-20 DIAGNOSIS — M25.551 RIGHT HIP PAIN: Primary | ICD-10-CM

## 2024-11-20 PROCEDURE — 99283 EMERGENCY DEPT VISIT LOW MDM: CPT

## 2024-11-20 PROCEDURE — 73502 X-RAY EXAM HIP UNI 2-3 VIEWS: CPT

## 2024-11-20 ASSESSMENT — PAIN SCALES - GENERAL: PAINLEVEL_OUTOF10: 8

## 2024-11-20 ASSESSMENT — PAIN DESCRIPTION - ORIENTATION: ORIENTATION: RIGHT

## 2024-11-20 ASSESSMENT — PAIN - FUNCTIONAL ASSESSMENT: PAIN_FUNCTIONAL_ASSESSMENT: 0-10

## 2024-11-20 ASSESSMENT — PAIN DESCRIPTION - ONSET: ONSET: ON-GOING

## 2024-11-20 ASSESSMENT — LIFESTYLE VARIABLES
HOW OFTEN DO YOU HAVE A DRINK CONTAINING ALCOHOL: NEVER
HOW MANY STANDARD DRINKS CONTAINING ALCOHOL DO YOU HAVE ON A TYPICAL DAY: PATIENT DOES NOT DRINK

## 2024-11-20 ASSESSMENT — PAIN DESCRIPTION - FREQUENCY: FREQUENCY: CONTINUOUS

## 2024-11-20 ASSESSMENT — PAIN DESCRIPTION - LOCATION: LOCATION: HIP

## 2024-11-20 NOTE — DISCHARGE INSTRUCTIONS
Your x-ray is normal.  You may need additional imaging of your hip such as an MRI.  I am recommending you follow-up with orthopedics to discuss the pain you have been having.  You can continue over-the-counter Tylenol and ibuprofen for pain.

## 2024-11-20 NOTE — ED NOTES
Discharge instructions reviewed with Pt. Pt verbalizes understanding at this time. medications reviewed with pt at this time. VS as noted. Pt condition stable at this time. No concerns voiced.

## 2024-11-21 ENCOUNTER — CARE COORDINATION (OUTPATIENT)
Dept: CARE COORDINATION | Age: 79
End: 2024-11-21

## 2024-11-21 NOTE — CARE COORDINATION
Ambulatory Care Coordination Note     11/21/2024 8:51 AM     ACM outreach attempt by this ACM today to offer care management services. ACM was unable to reach the patient by telephone today;   left voice message requesting a return phone call to this ACM.     ACM: Zoya Allen RN     Care Summary Note: UTRx1    PCP/Specialist follow up:   Future Appointments         Provider Specialty Dept Phone    12/12/2024 10:20 AM Bibi Mcdonough MD Pulmonology [phone]    1/7/2025 10:10 AM Regina Quijano MD Internal Medicine [phone]            Follow Up:   Plan for next ACM outreach in approximately 1-2 days  to complete:  - outreach attempt to offer care management services  - RPM.

## 2024-11-22 ENCOUNTER — CARE COORDINATION (OUTPATIENT)
Dept: CARE COORDINATION | Age: 79
End: 2024-11-22

## 2024-11-22 RX ORDER — CARVEDILOL 6.25 MG/1
TABLET ORAL
Qty: 180 TABLET | Refills: 3 | Status: SHIPPED | OUTPATIENT
Start: 2024-11-22

## 2024-11-22 RX ORDER — LOSARTAN POTASSIUM 100 MG/1
TABLET ORAL
Qty: 90 TABLET | Refills: 3 | Status: SHIPPED | OUTPATIENT
Start: 2024-11-22

## 2024-11-22 NOTE — CARE COORDINATION
Ambulatory Care Coordination Note     11/22/2024 9:55 AM     patient outreach attempt by this AC today to offer care management services. ACM was unable to reach the patient by telephone today;   left voice message requesting a return phone call to this ACM.  mychart message sent requesting patient to contact this ACM.     Patient closed (unable to reach patient) from the High Risk Care Management program on 11/22/2024.   No further Ambulatory Care Manager follow up scheduled.

## 2024-11-26 NOTE — ED PROVIDER NOTES
evaluated. Here the patient is afebrile with normal vitals signs.     Old records reviewed: None    Diagnoses affirmatively addressed, consideration of tests, treatments & admission, including shared decision making:    The patient is well-appearing.  She has full range of motion of the right hip.  Right leg is neurovascularly intact.  No swelling of the right calf.  She has a benign abdominal exam.  X-rays negative fracture.  She is able to bear weight so I have low suspicion for occult fracture.  The trauma from the fall was remote.  She may have some resultant osteoarthritis or bursitis.  She does have hardware in that hip from a previous fall and fracture.  She is not having fevers or erythema overlying or warmth.  She has very minimal pain with range of motion.  I have low suspicion for a septic hip.  She does have hardware in that hip from up she does get pain relief with over-the-counter medications so I will recommend she continue this.  I will refer her to Ortho for follow-up.  I do not think she needs additional imaging here.    Consultation summary: None    Social determinants of health: None      Labs and imaging reviewed and results discussed with patient. Patient was reassessed as noted above . Plan of care discussed with patient. Patient in agreement with plan. Strict return precautions have been given.        Patient was given scripts for the following medications. I counseled patient how to take these medications.   Discharge Medication List as of 11/20/2024  6:15 PM          none      CLINICAL IMPRESSION  1. Right hip pain        Blood pressure (!) 141/67, pulse 68, resp. rate 16, height 1.6 m (5' 3\"), weight 89.4 kg (197 lb), SpO2 97%, not currently breastfeeding.    DISPOSITION  Kelli Thakur was discharged to home in stable condition.    IMacy MD am the primary clinician of record.    (Please note this note was completed with a voice recognition program.  Efforts were made

## 2024-11-29 SDOH — HEALTH STABILITY: PHYSICAL HEALTH: ON AVERAGE, HOW MANY MINUTES DO YOU ENGAGE IN EXERCISE AT THIS LEVEL?: 0 MIN

## 2024-11-29 SDOH — HEALTH STABILITY: PHYSICAL HEALTH: ON AVERAGE, HOW MANY DAYS PER WEEK DO YOU ENGAGE IN MODERATE TO STRENUOUS EXERCISE (LIKE A BRISK WALK)?: 0 DAYS

## 2024-12-02 ENCOUNTER — OFFICE VISIT (OUTPATIENT)
Dept: ORTHOPEDIC SURGERY | Age: 79
End: 2024-12-02
Payer: MEDICARE

## 2024-12-02 VITALS — BODY MASS INDEX: 34.91 KG/M2 | HEIGHT: 63 IN | WEIGHT: 197 LBS

## 2024-12-02 DIAGNOSIS — M47.27 LUMBOSACRAL SPONDYLOSIS WITH RADICULOPATHY: Primary | ICD-10-CM

## 2024-12-02 PROCEDURE — G8399 PT W/DXA RESULTS DOCUMENT: HCPCS | Performed by: ORTHOPAEDIC SURGERY

## 2024-12-02 PROCEDURE — G8417 CALC BMI ABV UP PARAM F/U: HCPCS | Performed by: ORTHOPAEDIC SURGERY

## 2024-12-02 PROCEDURE — 1090F PRES/ABSN URINE INCON ASSESS: CPT | Performed by: ORTHOPAEDIC SURGERY

## 2024-12-02 PROCEDURE — G8482 FLU IMMUNIZE ORDER/ADMIN: HCPCS | Performed by: ORTHOPAEDIC SURGERY

## 2024-12-02 PROCEDURE — G8427 DOCREV CUR MEDS BY ELIG CLIN: HCPCS | Performed by: ORTHOPAEDIC SURGERY

## 2024-12-02 PROCEDURE — 1036F TOBACCO NON-USER: CPT | Performed by: ORTHOPAEDIC SURGERY

## 2024-12-02 PROCEDURE — 1123F ACP DISCUSS/DSCN MKR DOCD: CPT | Performed by: ORTHOPAEDIC SURGERY

## 2024-12-02 PROCEDURE — 99213 OFFICE O/P EST LOW 20 MIN: CPT | Performed by: ORTHOPAEDIC SURGERY

## 2024-12-02 PROCEDURE — 1159F MED LIST DOCD IN RCRD: CPT | Performed by: ORTHOPAEDIC SURGERY

## 2024-12-02 NOTE — PROGRESS NOTES
NEW PATIENT VISIT       HISTORY OF PRESENT ILLNESS    Kelli Thakur is a 79 y.o. female who presents for evaluation of right leg pain.  She is fallen 3 times over last few years.  She had a right hip intertrochanteric fracture but fell in October this year and was told the hip fracture was well healed and without acute injury but was plagued with the right sciatic type pain.  She was referred here.  She presents with a walker states she has symptoms which sound consistent with neurogenic claudication although she denies bowel or bladder difficulties.  She is afraid to go out of the house now because of her fear of falling.  She has pain goes from her buttock region down her leg into her lower leg with numbness that sometimes the whole leg is numb and has symptoms that sound like she gets a dropfoot at times.  She has had a history of sciatica in the past and has seen Kaitlin Gibson for that.    ROS    Well-documented patient history form dated 12-24  All other ROS negative except for above.    Past Surgical history    Past Surgical History:   Procedure Laterality Date    APPENDECTOMY      CARPAL TUNNEL RELEASE Left 02/05/2018    CATARACT REMOVAL WITH IMPLANT Right 07/18/2018    CATARACT REMOVAL WITH IMPLANT Left 08/15/2018    COLONOSCOPY  12/2014    COLONOSCOPY N/A 6/21/2023    COLONOSCOPY POLYPECTOMY SNARE/COLD BIOPSY performed by Josué Pickard MD at [facility]    COLONOSCOPY N/A 6/21/2023    COLONOSCOPY WITH BIOPSY performed by Josué Pickard MD at [facility]    FEMUR FRACTURE SURGERY Right 6/20/2022    RIGHT INTRAMEDULLARY NAILING performed by Jesse Ross DO at [facility]    HYSTERECTOMY (CERVIX STATUS UNKNOWN)      KNEE ARTHROSCOPY Bilateral     PAIN MANAGEMENT PROCEDURE Right 11/30/2023    RIGHT LUMBAR FIVE SACRAL ONE EPIDURAL STEROID INJECTION SITE CONFIRMED BY FLUOROSCOPY performed by Neil Rincon MD at [facility]    PARATHYROIDECTOMY      PARTIAL

## 2024-12-10 ASSESSMENT — SLEEP AND FATIGUE QUESTIONNAIRES
HOW LIKELY ARE YOU TO NOD OFF OR FALL ASLEEP WHILE WATCHING TV: SLIGHT CHANCE OF DOZING
HOW LIKELY ARE YOU TO NOD OFF OR FALL ASLEEP WHEN YOU ARE A PASSENGER IN A CAR FOR AN HOUR WITHOUT A BREAK: SLIGHT CHANCE OF DOZING
HOW LIKELY ARE YOU TO NOD OFF OR FALL ASLEEP WHILE SITTING QUIETLY AFTER LUNCH WITHOUT ALCOHOL: MODERATE CHANCE OF DOZING
HOW LIKELY ARE YOU TO NOD OFF OR FALL ASLEEP WHILE LYING DOWN TO REST IN THE AFTERNOON WHEN CIRCUMSTANCES PERMIT: HIGH CHANCE OF DOZING
HOW LIKELY ARE YOU TO NOD OFF OR FALL ASLEEP IN A CAR, WHILE STOPPED FOR A FEW MINUTES IN TRAFFIC: WOULD NEVER DOZE
HOW LIKELY ARE YOU TO NOD OFF OR FALL ASLEEP WHEN YOU ARE A PASSENGER IN A CAR FOR AN HOUR WITHOUT A BREAK: SLIGHT CHANCE OF DOZING
ESS TOTAL SCORE: 7
HOW LIKELY ARE YOU TO NOD OFF OR FALL ASLEEP WHILE SITTING AND READING: WOULD NEVER DOZE
HOW LIKELY ARE YOU TO NOD OFF OR FALL ASLEEP WHILE LYING DOWN TO REST IN THE AFTERNOON WHEN CIRCUMSTANCES PERMIT: HIGH CHANCE OF DOZING
HOW LIKELY ARE YOU TO NOD OFF OR FALL ASLEEP WHILE SITTING INACTIVE IN A PUBLIC PLACE: WOULD NEVER DOZE
HOW LIKELY ARE YOU TO NOD OFF OR FALL ASLEEP WHILE SITTING AND TALKING TO SOMEONE: WOULD NEVER DOZE
HOW LIKELY ARE YOU TO NOD OFF OR FALL ASLEEP WHILE WATCHING TV: SLIGHT CHANCE OF DOZING
HOW LIKELY ARE YOU TO NOD OFF OR FALL ASLEEP WHILE SITTING AND READING: WOULD NEVER DOZE
HOW LIKELY ARE YOU TO NOD OFF OR FALL ASLEEP WHILE SITTING AND TALKING TO SOMEONE: WOULD NEVER DOZE
HOW LIKELY ARE YOU TO NOD OFF OR FALL ASLEEP WHILE SITTING QUIETLY AFTER LUNCH WITHOUT ALCOHOL: MODERATE CHANCE OF DOZING
HOW LIKELY ARE YOU TO NOD OFF OR FALL ASLEEP WHILE SITTING INACTIVE IN A PUBLIC PLACE: WOULD NEVER DOZE
HOW LIKELY ARE YOU TO NOD OFF OR FALL ASLEEP IN A CAR, WHILE STOPPED FOR A FEW MINUTES IN TRAFFIC: WOULD NEVER DOZE

## 2024-12-12 ENCOUNTER — OFFICE VISIT (OUTPATIENT)
Dept: PULMONOLOGY | Age: 79
End: 2024-12-12

## 2024-12-12 VITALS
BODY MASS INDEX: 36 KG/M2 | OXYGEN SATURATION: 96 % | SYSTOLIC BLOOD PRESSURE: 195 MMHG | HEART RATE: 84 BPM | WEIGHT: 203.2 LBS | TEMPERATURE: 97.2 F | RESPIRATION RATE: 20 BRPM | DIASTOLIC BLOOD PRESSURE: 92 MMHG | HEIGHT: 63 IN

## 2024-12-12 DIAGNOSIS — J45.40 MODERATE PERSISTENT ASTHMA WITHOUT COMPLICATION: ICD-10-CM

## 2024-12-12 DIAGNOSIS — G47.33 OSA (OBSTRUCTIVE SLEEP APNEA): Primary | ICD-10-CM

## 2024-12-12 NOTE — PATIENT INSTRUCTIONS
Continue Breo Ellipta 200 mcg, Incruse Ellipta with albuterol as needed  Continue CPAP therapy with the current settings.  CPAP desensitization education provided  Will recheck blood pressure before patient leaves.  Patient has whitecoat hypertension and had some high blood pressure readings at home and follow-up with PCP  Follow-up in 6 months      Health Maintenance/Preventive measures:        >>  Avoid smoking, vaping or secondhand exposure.  Avoid exposure to irritants, allergens as possible as well as contact with patients with infectious respiratory illness.        >>  Stay up-to-date with influenza & pneumonia vaccines, RSV, & COVID-19 vaccine as recommended by the Advisory Committee on Immunization Practices (ACIP)        >>  Healthy diet and activity as able.        >>  Acid reflux precautions: Head of bed elevation, avoiding tight clothes, avoiding big meals or snacking 3 hours before bedtime, targeting healthy weight.        >>  Practice sleep hygiene measures. Avoid driving or operating heavy machines if tired or sleepy.

## 2024-12-12 NOTE — PROGRESS NOTES
MA Communication:  The following orders are received by verbal communication from Bibi Mcdonough MD    Orders include:    6 month    
Rfl: 2    verapamil (CALAN SR) 240 MG extended release tablet, TAKE 1 TABLET BY MOUTH DAILY, Disp: 90 tablet, Rfl: 1    omeprazole (PRILOSEC) 20 MG delayed release capsule, TAKE 1 CAPSULE BY MOUTH TWICE A DAY, Disp: 180 capsule, Rfl: 0    allopurinol (ZYLOPRIM) 300 MG tablet, TAKE 1 TABLET BY MOUTH ONCE  DAILY, Disp: 90 tablet, Rfl: 0    umeclidinium bromide (INCRUSE ELLIPTA) 62.5 MCG/ACT inhaler, INHALE 1 PUFF BY MOUTH DAILY, Disp: 30 each, Rfl: 2    fluticasone furoate-vilanterol (BREO ELLIPTA) 200-25 MCG/ACT AEPB inhaler, INHALE 1 DOSE BY MOUTH DAILY, Disp: 3 each, Rfl: 1    ferrous sulfate (FE TABS) 325 (65 Fe) MG EC tablet, Take 1 to 2 tablets daily, Disp: 90 tablet, Rfl: 1    alendronate (FOSAMAX) 70 MG tablet, TAKE 1 TABLET BY MOUTH WEEKLY  WITH 8 OZ OF PLAIN WATER 30  MINUTES BEFORE FIRST FOOD, DRINK OR MEDS. STAY UPRIGHT FOR 30  MINS, Disp: 12 tablet, Rfl: 3    blood glucose test strips (ONETOUCH ULTRA) strip, Test once daily. DX: E11.9, Disp: 100 each, Rfl: 3    acetaminophen (TYLENOL) 500 MG tablet, Take 1 tablet by mouth every 6 hours as needed for Pain, Disp: , Rfl:     tolterodine (DETROL LA) 4 MG extended release capsule, TAKE 1 CAPSULE BY MOUTH DAILY (Patient not taking: Reported on 12/12/2024), Disp: 90 capsule, Rfl: 0    cetirizine (ZYRTEC) 10 MG tablet, Take 1 tablet by mouth daily (Patient not taking: Reported on 12/12/2024), Disp: , Rfl:        Test Results:  Radiology:  I have reviewed radiology images personally.    CT chest October 2022  IMPRESSION:  Interval resolution of previously visualized noncalcified right upper lobe  nodule.     No acute intrapulmonary findings.         PFTs/Oxygen testing:  September 2024        Sleep:        Cardiology       Labs:   Hemoglobin (g/dL)   Date Value   09/18/2024 9.5 (L)     Eosinophils Absolute (K/uL)   Date Value   09/18/2024 0.1     Platelets (K/uL)   Date Value   09/18/2024 180     INR (no units)   Date Value   08/02/2022 1.39 (H)     Creatinine

## 2024-12-16 ENCOUNTER — HOSPITAL ENCOUNTER (OUTPATIENT)
Dept: MRI IMAGING | Age: 79
Discharge: HOME | End: 2024-12-16
Attending: ORTHOPAEDIC SURGERY
Payer: MEDICARE

## 2024-12-16 DIAGNOSIS — M47.27 LUMBOSACRAL SPONDYLOSIS WITH RADICULOPATHY: ICD-10-CM

## 2024-12-16 PROCEDURE — 72148 MRI LUMBAR SPINE W/O DYE: CPT

## 2024-12-17 ENCOUNTER — TELEPHONE (OUTPATIENT)
Dept: ORTHOPEDIC SURGERY | Age: 79
End: 2024-12-17

## 2024-12-17 NOTE — TELEPHONE ENCOUNTER
Spoke with Jenifer, she just wanted to make sure that we got the results and were aware of the scaral fx.

## 2024-12-17 NOTE — TELEPHONE ENCOUNTER
General Question     Subject: URGENT TEST RESULTS  Patient and /or Facility Request: [facility]  Contact Number: [phone]    [facility] CALLING TO GIVE URGENT RESULTS PLEASE CALL HER BACK AT THE ABOVE NUMBER.

## 2024-12-31 RX ORDER — TOLTERODINE 4 MG/1
4 CAPSULE, EXTENDED RELEASE ORAL DAILY
Qty: 90 CAPSULE | Refills: 0 | Status: SHIPPED | OUTPATIENT
Start: 2024-12-31

## 2024-12-31 RX ORDER — FERROUS SULFATE 325(65) MG
TABLET, DELAYED RELEASE (ENTERIC COATED) ORAL
Qty: 90 TABLET | Refills: 0 | Status: SHIPPED | OUTPATIENT
Start: 2024-12-31

## 2025-01-21 RX ORDER — TERAZOSIN 5 MG/1
CAPSULE ORAL
Qty: 90 CAPSULE | Refills: 1 | Status: SHIPPED | OUTPATIENT
Start: 2025-01-21

## 2025-01-27 SDOH — ECONOMIC STABILITY: FOOD INSECURITY: WITHIN THE PAST 12 MONTHS, THE FOOD YOU BOUGHT JUST DIDN'T LAST AND YOU DIDN'T HAVE MONEY TO GET MORE.: NEVER TRUE

## 2025-01-27 SDOH — ECONOMIC STABILITY: INCOME INSECURITY: IN THE LAST 12 MONTHS, WAS THERE A TIME WHEN YOU WERE NOT ABLE TO PAY THE MORTGAGE OR RENT ON TIME?: NO

## 2025-01-27 SDOH — ECONOMIC STABILITY: FOOD INSECURITY: WITHIN THE PAST 12 MONTHS, YOU WORRIED THAT YOUR FOOD WOULD RUN OUT BEFORE YOU GOT MONEY TO BUY MORE.: NEVER TRUE

## 2025-01-27 ASSESSMENT — PATIENT HEALTH QUESTIONNAIRE - PHQ9
2. FEELING DOWN, DEPRESSED OR HOPELESS: NOT AT ALL
7. TROUBLE CONCENTRATING ON THINGS, SUCH AS READING THE NEWSPAPER OR WATCHING TELEVISION: NOT AT ALL
SUM OF ALL RESPONSES TO PHQ QUESTIONS 1-9: 1
5. POOR APPETITE OR OVEREATING: NOT AT ALL
9. THOUGHTS THAT YOU WOULD BE BETTER OFF DEAD, OR OF HURTING YOURSELF: NOT AT ALL
10. IF YOU CHECKED OFF ANY PROBLEMS, HOW DIFFICULT HAVE THESE PROBLEMS MADE IT FOR YOU TO DO YOUR WORK, TAKE CARE OF THINGS AT HOME, OR GET ALONG WITH OTHER PEOPLE: NOT DIFFICULT AT ALL
6. FEELING BAD ABOUT YOURSELF - OR THAT YOU ARE A FAILURE OR HAVE LET YOURSELF OR YOUR FAMILY DOWN: NOT AT ALL
8. MOVING OR SPEAKING SO SLOWLY THAT OTHER PEOPLE COULD HAVE NOTICED. OR THE OPPOSITE, BEING SO FIGETY OR RESTLESS THAT YOU HAVE BEEN MOVING AROUND A LOT MORE THAN USUAL: NOT AT ALL
9. THOUGHTS THAT YOU WOULD BE BETTER OFF DEAD, OR OF HURTING YOURSELF: NOT AT ALL
SUM OF ALL RESPONSES TO PHQ QUESTIONS 1-9: 1
10. IF YOU CHECKED OFF ANY PROBLEMS, HOW DIFFICULT HAVE THESE PROBLEMS MADE IT FOR YOU TO DO YOUR WORK, TAKE CARE OF THINGS AT HOME, OR GET ALONG WITH OTHER PEOPLE: NOT DIFFICULT AT ALL
SUM OF ALL RESPONSES TO PHQ QUESTIONS 1-9: 1
SUM OF ALL RESPONSES TO PHQ9 QUESTIONS 1 & 2: 0
SUM OF ALL RESPONSES TO PHQ QUESTIONS 1-9: 1
6. FEELING BAD ABOUT YOURSELF - OR THAT YOU ARE A FAILURE OR HAVE LET YOURSELF OR YOUR FAMILY DOWN: NOT AT ALL
3. TROUBLE FALLING OR STAYING ASLEEP: NOT AT ALL
SUM OF ALL RESPONSES TO PHQ QUESTIONS 1-9: 1
4. FEELING TIRED OR HAVING LITTLE ENERGY: SEVERAL DAYS
5. POOR APPETITE OR OVEREATING: NOT AT ALL
4. FEELING TIRED OR HAVING LITTLE ENERGY: SEVERAL DAYS
3. TROUBLE FALLING OR STAYING ASLEEP: NOT AT ALL
7. TROUBLE CONCENTRATING ON THINGS, SUCH AS READING THE NEWSPAPER OR WATCHING TELEVISION: NOT AT ALL
2. FEELING DOWN, DEPRESSED OR HOPELESS: NOT AT ALL
1. LITTLE INTEREST OR PLEASURE IN DOING THINGS: NOT AT ALL
1. LITTLE INTEREST OR PLEASURE IN DOING THINGS: NOT AT ALL
8. MOVING OR SPEAKING SO SLOWLY THAT OTHER PEOPLE COULD HAVE NOTICED. OR THE OPPOSITE - BEING SO FIDGETY OR RESTLESS THAT YOU HAVE BEEN MOVING AROUND A LOT MORE THAN USUAL: NOT AT ALL

## 2025-01-30 ENCOUNTER — OFFICE VISIT (OUTPATIENT)
Dept: INTERNAL MEDICINE CLINIC | Age: 80
End: 2025-01-30

## 2025-01-30 VITALS
HEIGHT: 63 IN | DIASTOLIC BLOOD PRESSURE: 80 MMHG | BODY MASS INDEX: 35.08 KG/M2 | WEIGHT: 198 LBS | SYSTOLIC BLOOD PRESSURE: 164 MMHG | HEART RATE: 76 BPM

## 2025-01-30 DIAGNOSIS — J45.40 MODERATE PERSISTENT ASTHMA WITHOUT COMPLICATION: ICD-10-CM

## 2025-01-30 DIAGNOSIS — I27.20 PULMONARY HYPERTENSION (HCC): ICD-10-CM

## 2025-01-30 DIAGNOSIS — I48.0 PAROXYSMAL ATRIAL FIBRILLATION (HCC): ICD-10-CM

## 2025-01-30 DIAGNOSIS — E11.69 HYPERLIPIDEMIA ASSOCIATED WITH TYPE 2 DIABETES MELLITUS (HCC): ICD-10-CM

## 2025-01-30 DIAGNOSIS — S09.90XA TRAUMATIC INJURY OF HEAD, INITIAL ENCOUNTER: ICD-10-CM

## 2025-01-30 DIAGNOSIS — C43.9 MALIGNANT MELANOMA, UNSPECIFIED SITE (HCC): ICD-10-CM

## 2025-01-30 DIAGNOSIS — I10 BENIGN ESSENTIAL HTN: Primary | ICD-10-CM

## 2025-01-30 DIAGNOSIS — E78.5 HYPERLIPIDEMIA ASSOCIATED WITH TYPE 2 DIABETES MELLITUS (HCC): ICD-10-CM

## 2025-01-30 DIAGNOSIS — I25.10 CORONARY ARTERY CALCIFICATION SEEN ON CT SCAN: ICD-10-CM

## 2025-01-30 DIAGNOSIS — K21.9 GASTROESOPHAGEAL REFLUX DISEASE WITHOUT ESOPHAGITIS: ICD-10-CM

## 2025-01-30 DIAGNOSIS — E11.9 TYPE 2 DIABETES MELLITUS WITHOUT COMPLICATION, WITHOUT LONG-TERM CURRENT USE OF INSULIN (HCC): ICD-10-CM

## 2025-01-30 DIAGNOSIS — E66.01 MORBID (SEVERE) OBESITY DUE TO EXCESS CALORIES: ICD-10-CM

## 2025-01-30 DIAGNOSIS — F32.5 MAJOR DEPRESSIVE DISORDER WITH SINGLE EPISODE, IN FULL REMISSION (HCC): ICD-10-CM

## 2025-01-30 DIAGNOSIS — D64.9 ANEMIA, UNSPECIFIED TYPE: ICD-10-CM

## 2025-01-30 LAB
CREAT UR-MCNC: 62 MG/DL (ref 28–259)
EST. AVERAGE GLUCOSE BLD GHB EST-MCNC: 180 MG/DL
HBA1C MFR BLD: 7.9 %
MICROALBUMIN UR DL<=1MG/L-MCNC: 14.1 MG/DL
MICROALBUMIN/CREAT UR: 227.4 MG/G (ref 0–30)

## 2025-01-30 PROCEDURE — 3079F DIAST BP 80-89 MM HG: CPT | Performed by: INTERNAL MEDICINE

## 2025-01-30 PROCEDURE — 1160F RVW MEDS BY RX/DR IN RCRD: CPT | Performed by: INTERNAL MEDICINE

## 2025-01-30 PROCEDURE — G8427 DOCREV CUR MEDS BY ELIG CLIN: HCPCS | Performed by: INTERNAL MEDICINE

## 2025-01-30 PROCEDURE — 1036F TOBACCO NON-USER: CPT | Performed by: INTERNAL MEDICINE

## 2025-01-30 PROCEDURE — 1090F PRES/ABSN URINE INCON ASSESS: CPT | Performed by: INTERNAL MEDICINE

## 2025-01-30 PROCEDURE — 1123F ACP DISCUSS/DSCN MKR DOCD: CPT | Performed by: INTERNAL MEDICINE

## 2025-01-30 PROCEDURE — 3077F SYST BP >= 140 MM HG: CPT | Performed by: INTERNAL MEDICINE

## 2025-01-30 PROCEDURE — G8417 CALC BMI ABV UP PARAM F/U: HCPCS | Performed by: INTERNAL MEDICINE

## 2025-01-30 PROCEDURE — 99214 OFFICE O/P EST MOD 30 MIN: CPT | Performed by: INTERNAL MEDICINE

## 2025-01-30 PROCEDURE — 1159F MED LIST DOCD IN RCRD: CPT | Performed by: INTERNAL MEDICINE

## 2025-01-30 PROCEDURE — G8399 PT W/DXA RESULTS DOCUMENT: HCPCS | Performed by: INTERNAL MEDICINE

## 2025-01-30 RX ORDER — BENZONATATE 200 MG/1
200 CAPSULE ORAL 3 TIMES DAILY PRN
Qty: 21 CAPSULE | Refills: 0 | Status: SHIPPED | OUTPATIENT
Start: 2025-01-30 | End: 2025-02-06

## 2025-01-30 ASSESSMENT — ENCOUNTER SYMPTOMS
NAUSEA: 0
BLOOD IN STOOL: 0
CHEST TIGHTNESS: 0
WHEEZING: 0
DIARRHEA: 0
ABDOMINAL PAIN: 0
COUGH: 0
VOMITING: 0
SHORTNESS OF BREATH: 1

## 2025-01-30 NOTE — PROGRESS NOTES
Kelli Thakur (:  1945) is a 79 y.o. female,Established patient, here for evaluation of the following chief complaint(s):  Follow-up      Assessment & Plan   ASSESSMENT/PLAN:  1. Benign essential HTN  2. Pulmonary hypertension (HCC)  3. Paroxysmal atrial fibrillation (HCC)  4. Malignant melanoma, unspecified site (HCC)  5. Hyperlipidemia associated with type 2 diabetes mellitus (HCC)  6. Coronary artery calcification seen on CT scan  7. Gastroesophageal reflux disease without esophagitis  8. Type 2 diabetes mellitus without complication, without long-term current use of insulin (HCC)  -     Hemoglobin A1C  -     Albumin/Creatinine Ratio, Urine  9. Moderate persistent asthma without complication  10. Major depressive disorder with single episode, in full remission (HCC)  11. Anemia, unspecified type  12. Morbid (severe) obesity due to excess calories (E66.01)  13. Body mass index [BMI] 35.0-35.9, adult (Z68.35)  14. Traumatic injury of head, initial encounter  -     CT HEAD WO CONTRAST; Future        DM- Stable.  continue metformin   Strict diet.       HTN.  Systolic BP is elevated   Continue calan,coreg,losartan,tfor now   Restart terazosin 5 mg nightly   Cannot take HCTZ 2 to high Ca.  Aldactone stopped for high K   Walking.  Weight loss.     Asthma  Stable on Breo,incruse and proventil     HLD.continue lipitor.  Lipids are good      Diarrhea  Advised probiotics      Urinary urge incontinence   Detrol LA 4 mg daily       Gout- continue allopurinol  No recent flareups.      Depression.  Continue  paxil 20 mg daily     Fall   Head trauma   CT head CT       GERD.  Stable on PPI.     Lumbar Spinal stenosis  nav knee OA  Has seen  Ortho  Also sees pain physician      Anemia.  2 to thal trait and iron def      Osteoporosis  Continue fosamax,calcium and vit D           Subjective   SUBJECTIVE/OBJECTIVE:  HPI  She has Type 2 DM,HTN,GERD,spinal stenosis,asthma,gout and depression.  Blood sugars are under

## 2025-02-07 RX ORDER — VERAPAMIL HYDROCHLORIDE 240 MG/1
240 TABLET, FILM COATED, EXTENDED RELEASE ORAL DAILY
Qty: 90 TABLET | Refills: 1 | Status: SHIPPED | OUTPATIENT
Start: 2025-02-07

## 2025-02-10 RX ORDER — ATORVASTATIN CALCIUM 10 MG/1
TABLET, FILM COATED ORAL
Qty: 90 TABLET | Refills: 0 | Status: SHIPPED | OUTPATIENT
Start: 2025-02-10

## 2025-02-10 RX ORDER — PAROXETINE 10 MG/1
TABLET, FILM COATED ORAL
Qty: 90 TABLET | Refills: 0 | Status: SHIPPED | OUTPATIENT
Start: 2025-02-10

## 2025-02-18 RX ORDER — FERROUS SULFATE 325(65) MG
TABLET, DELAYED RELEASE (ENTERIC COATED) ORAL
Qty: 90 TABLET | Refills: 0 | Status: SHIPPED | OUTPATIENT
Start: 2025-02-18

## 2025-03-01 ENCOUNTER — HOSPITAL ENCOUNTER (OUTPATIENT)
Dept: CT IMAGING | Age: 80
Discharge: HOME | End: 2025-03-01
Attending: INTERNAL MEDICINE
Payer: MEDICARE

## 2025-03-01 DIAGNOSIS — S09.90XA TRAUMATIC INJURY OF HEAD, INITIAL ENCOUNTER: ICD-10-CM

## 2025-03-01 PROCEDURE — 70450 CT HEAD/BRAIN W/O DYE: CPT

## 2025-03-06 ENCOUNTER — TELEPHONE (OUTPATIENT)
Dept: INTERNAL MEDICINE CLINIC | Age: 80
End: 2025-03-06

## 2025-03-06 NOTE — TELEPHONE ENCOUNTER
----- Message from Dr. Regina Quijano MD sent at 3/6/2025  3:36 PM EST -----  Contact: [phone]  It shows protein in the urine. To be expected from her diabetes  ----- Message -----  From: Sheyla Gil  Sent: 3/6/2025   2:19 PM EST  To: Regina Quijano MD    Pt requesting results of urine test from 1-30. Please advise

## 2025-03-19 ENCOUNTER — TELEPHONE (OUTPATIENT)
Dept: ORTHOPEDIC SURGERY | Age: 80
End: 2025-03-19

## 2025-04-04 RX ORDER — ALENDRONATE SODIUM 70 MG/1
TABLET ORAL
Qty: 12 TABLET | Refills: 3 | Status: SHIPPED | OUTPATIENT
Start: 2025-04-04

## 2025-04-15 RX ORDER — PAROXETINE 10 MG/1
10 TABLET, FILM COATED ORAL DAILY
Qty: 90 TABLET | Refills: 0 | Status: SHIPPED | OUTPATIENT
Start: 2025-04-15

## 2025-04-15 RX ORDER — ATORVASTATIN CALCIUM 10 MG/1
10 TABLET, FILM COATED ORAL NIGHTLY
Qty: 90 TABLET | Refills: 0 | Status: SHIPPED | OUTPATIENT
Start: 2025-04-15

## 2025-04-18 RX ORDER — FLUTICASONE FUROATE AND VILANTEROL 200; 25 UG/1; UG/1
POWDER RESPIRATORY (INHALATION)
Qty: 60 EACH | Refills: 2 | Status: SHIPPED | OUTPATIENT
Start: 2025-04-18

## 2025-04-18 RX ORDER — OMEPRAZOLE 20 MG/1
20 CAPSULE, DELAYED RELEASE ORAL 2 TIMES DAILY
Qty: 180 CAPSULE | Refills: 1 | Status: SHIPPED | OUTPATIENT
Start: 2025-04-18

## 2025-04-24 ENCOUNTER — OFFICE VISIT (OUTPATIENT)
Dept: INTERNAL MEDICINE CLINIC | Age: 80
End: 2025-04-24

## 2025-04-24 VITALS
BODY MASS INDEX: 34.55 KG/M2 | HEART RATE: 76 BPM | WEIGHT: 195 LBS | HEIGHT: 63 IN | DIASTOLIC BLOOD PRESSURE: 80 MMHG | SYSTOLIC BLOOD PRESSURE: 168 MMHG

## 2025-04-24 DIAGNOSIS — F32.5 MAJOR DEPRESSIVE DISORDER WITH SINGLE EPISODE, IN FULL REMISSION: ICD-10-CM

## 2025-04-24 DIAGNOSIS — E11.9 TYPE 2 DIABETES MELLITUS WITHOUT COMPLICATION, WITHOUT LONG-TERM CURRENT USE OF INSULIN (HCC): ICD-10-CM

## 2025-04-24 DIAGNOSIS — E11.69 HYPERLIPIDEMIA ASSOCIATED WITH TYPE 2 DIABETES MELLITUS (HCC): ICD-10-CM

## 2025-04-24 DIAGNOSIS — J45.40 MODERATE PERSISTENT ASTHMA WITHOUT COMPLICATION: ICD-10-CM

## 2025-04-24 DIAGNOSIS — I48.0 PAROXYSMAL ATRIAL FIBRILLATION (HCC): ICD-10-CM

## 2025-04-24 DIAGNOSIS — E78.5 HYPERLIPIDEMIA ASSOCIATED WITH TYPE 2 DIABETES MELLITUS (HCC): ICD-10-CM

## 2025-04-24 DIAGNOSIS — I10 BENIGN ESSENTIAL HTN: ICD-10-CM

## 2025-04-24 DIAGNOSIS — K21.9 GASTROESOPHAGEAL REFLUX DISEASE WITHOUT ESOPHAGITIS: ICD-10-CM

## 2025-04-24 DIAGNOSIS — I10 BENIGN ESSENTIAL HTN: Primary | ICD-10-CM

## 2025-04-24 LAB
ACANTHOCYTES BLD QL SMEAR: ABNORMAL
ANION GAP SERPL CALCULATED.3IONS-SCNC: 11 MMOL/L (ref 3–16)
ANISOCYTOSIS BLD QL SMEAR: ABNORMAL
BASOPHILS # BLD: 0 K/UL (ref 0–0.2)
BASOPHILS NFR BLD: 0.7 %
BUN SERPL-MCNC: 28 MG/DL (ref 7–20)
BURR CELLS BLD QL SMEAR: ABNORMAL
CALCIUM SERPL-MCNC: 9.8 MG/DL (ref 8.3–10.6)
CHLORIDE SERPL-SCNC: 106 MMOL/L (ref 99–110)
CO2 SERPL-SCNC: 22 MMOL/L (ref 21–32)
CREAT SERPL-MCNC: 0.9 MG/DL (ref 0.6–1.2)
DEPRECATED RDW RBC AUTO: 15.9 % (ref 12.4–15.4)
EOSINOPHIL # BLD: 0.1 K/UL (ref 0–0.6)
EOSINOPHIL NFR BLD: 2.2 %
EST. AVERAGE GLUCOSE BLD GHB EST-MCNC: 154.2 MG/DL
GFR SERPLBLD CREATININE-BSD FMLA CKD-EPI: 64 ML/MIN/{1.73_M2}
GLUCOSE SERPL-MCNC: 173 MG/DL (ref 70–99)
HBA1C MFR BLD: 7 %
HCT VFR BLD AUTO: 28.3 % (ref 36–48)
HGB BLD-MCNC: 8.9 G/DL (ref 12–16)
HYPOCHROMIA BLD QL SMEAR: ABNORMAL
LYMPHOCYTES # BLD: 1.1 K/UL (ref 1–5.1)
LYMPHOCYTES NFR BLD: 20.3 %
MCH RBC QN AUTO: 19.1 PG (ref 26–34)
MCHC RBC AUTO-ENTMCNC: 31.4 G/DL (ref 31–36)
MCV RBC AUTO: 60.7 FL (ref 80–100)
MONOCYTES # BLD: 0.3 K/UL (ref 0–1.3)
MONOCYTES NFR BLD: 6.1 %
NEUTROPHILS # BLD: 3.9 K/UL (ref 1.7–7.7)
NEUTROPHILS NFR BLD: 70.7 %
OVALOCYTES BLD QL SMEAR: ABNORMAL
PATH INTERP BLD-IMP: NO
PLATELET # BLD AUTO: 289 K/UL (ref 135–450)
PLATELET BLD QL SMEAR: ADEQUATE
PMV BLD AUTO: 12.5 FL (ref 5–10.5)
POIKILOCYTOSIS BLD QL SMEAR: ABNORMAL
POTASSIUM SERPL-SCNC: 4.5 MMOL/L (ref 3.5–5.1)
RBC # BLD AUTO: 4.66 M/UL (ref 4–5.2)
SCHISTOCYTES BLD QL SMEAR: ABNORMAL
SLIDE REVIEW: ABNORMAL
SODIUM SERPL-SCNC: 139 MMOL/L (ref 136–145)
TARGETS BLD QL SMEAR: ABNORMAL
WBC # BLD AUTO: 5.5 K/UL (ref 4–11)

## 2025-04-24 PROCEDURE — G8399 PT W/DXA RESULTS DOCUMENT: HCPCS | Performed by: INTERNAL MEDICINE

## 2025-04-24 PROCEDURE — 1159F MED LIST DOCD IN RCRD: CPT | Performed by: INTERNAL MEDICINE

## 2025-04-24 PROCEDURE — G8427 DOCREV CUR MEDS BY ELIG CLIN: HCPCS | Performed by: INTERNAL MEDICINE

## 2025-04-24 PROCEDURE — 1123F ACP DISCUSS/DSCN MKR DOCD: CPT | Performed by: INTERNAL MEDICINE

## 2025-04-24 PROCEDURE — 3078F DIAST BP <80 MM HG: CPT | Performed by: INTERNAL MEDICINE

## 2025-04-24 PROCEDURE — 99214 OFFICE O/P EST MOD 30 MIN: CPT | Performed by: INTERNAL MEDICINE

## 2025-04-24 PROCEDURE — 1036F TOBACCO NON-USER: CPT | Performed by: INTERNAL MEDICINE

## 2025-04-24 PROCEDURE — 3077F SYST BP >= 140 MM HG: CPT | Performed by: INTERNAL MEDICINE

## 2025-04-24 PROCEDURE — 3051F HG A1C>EQUAL 7.0%<8.0%: CPT | Performed by: INTERNAL MEDICINE

## 2025-04-24 PROCEDURE — 1160F RVW MEDS BY RX/DR IN RCRD: CPT | Performed by: INTERNAL MEDICINE

## 2025-04-24 PROCEDURE — G8417 CALC BMI ABV UP PARAM F/U: HCPCS | Performed by: INTERNAL MEDICINE

## 2025-04-24 PROCEDURE — 1090F PRES/ABSN URINE INCON ASSESS: CPT | Performed by: INTERNAL MEDICINE

## 2025-04-24 RX ORDER — VALSARTAN 320 MG/1
320 TABLET ORAL DAILY
Qty: 90 TABLET | Refills: 1 | Status: SHIPPED | OUTPATIENT
Start: 2025-04-24

## 2025-04-24 RX ORDER — TOLTERODINE 4 MG/1
4 CAPSULE, EXTENDED RELEASE ORAL DAILY
Qty: 90 CAPSULE | Refills: 1 | Status: SHIPPED | OUTPATIENT
Start: 2025-04-24

## 2025-04-24 ASSESSMENT — ENCOUNTER SYMPTOMS
SHORTNESS OF BREATH: 1
VOMITING: 0
ABDOMINAL PAIN: 0
DIARRHEA: 0
BLOOD IN STOOL: 0
COUGH: 0
CHEST TIGHTNESS: 0
NAUSEA: 0
WHEEZING: 0

## 2025-04-24 NOTE — PROGRESS NOTES
Kelli Thakur (:  1945) is a 79 y.o. female,Established patient, here for evaluation of the following chief complaint(s):  Follow-up      Assessment & Plan   ASSESSMENT/PLAN:  1. Benign essential HTN  2. Type 2 diabetes mellitus without complication, without long-term current use of insulin (HCC)  -     Basic Metabolic Panel; Future  -     Hemoglobin A1C; Future  3. Paroxysmal atrial fibrillation (HCC)  4. Gastroesophageal reflux disease without esophagitis  5. Moderate persistent asthma without complication  6. Hyperlipidemia associated with type 2 diabetes mellitus (HCC)  7. Major depressive disorder with single episode, in full remission          DM- Stable.  continue metformin   Strict diet.       HTN.  Systolic BP is elevated   Continue calan,coreg,terazosin 5 mg nightly   Change losartan to valsartan to 320 mg daily   Cannot take HCTZ 2 to high Ca.  Aldactone stopped for high K   Walking.  Weight loss.     Asthma  Stable on Breo,incruse and proventil     HLD.continue lipitor.  Lipids are good      Urinary urge incontinence   Detrol LA 4 mg daily       Gout- continue allopurinol  No recent flareups.      Depression.  Continue  paxil 20 mg daily     SORAIDA  On CPAP      GERD.  Stable on PPI.     Lumbar Spinal stenosis  nav knee OA  Has seen  Ortho  Also sees pain physician      Anemia.  2 to thal trait and iron def      Osteoporosis  Continue calcium and vit D  Has taken fosomax for 8 years or so            Subjective   SUBJECTIVE/OBJECTIVE:  HPI  She has Type 2 DM,HTN,GERD,spinal stenosis,asthma,gout and depression.  Blood sugars are under good control.Has had DM since .  Patient's BP is controlled on current medications.  Depression is stable on paxil.  Asthma- mod persistent- on Breo,incruse  and proventil.stable.  She has osteoarthritis and severe lumbar spinal stenosis.seeing pain physician.   Now on gabapentin  Has osteoposis- on fosamax.  Seeing ortho for knee arthritis- gets cortisone

## 2025-04-25 ENCOUNTER — RESULTS FOLLOW-UP (OUTPATIENT)
Dept: INTERNAL MEDICINE CLINIC | Age: 80
End: 2025-04-25

## 2025-05-22 RX ORDER — FERROUS SULFATE 325(65) MG
TABLET, DELAYED RELEASE (ENTERIC COATED) ORAL
Qty: 90 TABLET | Refills: 0 | Status: SHIPPED | OUTPATIENT
Start: 2025-05-22

## 2025-06-17 RX ORDER — ATORVASTATIN CALCIUM 10 MG/1
10 TABLET, FILM COATED ORAL NIGHTLY
Qty: 90 TABLET | Refills: 1 | Status: SHIPPED | OUTPATIENT
Start: 2025-06-17

## 2025-06-17 RX ORDER — TERAZOSIN 5 MG/1
CAPSULE ORAL
Qty: 90 CAPSULE | Refills: 1 | Status: SHIPPED | OUTPATIENT
Start: 2025-06-17

## 2025-06-17 RX ORDER — PAROXETINE 10 MG/1
10 TABLET, FILM COATED ORAL DAILY
Qty: 90 TABLET | Refills: 1 | Status: SHIPPED | OUTPATIENT
Start: 2025-06-17

## 2025-06-28 SDOH — HEALTH STABILITY: PHYSICAL HEALTH: ON AVERAGE, HOW MANY DAYS PER WEEK DO YOU ENGAGE IN MODERATE TO STRENUOUS EXERCISE (LIKE A BRISK WALK)?: 0 DAYS

## 2025-06-28 SDOH — HEALTH STABILITY: PHYSICAL HEALTH: ON AVERAGE, HOW MANY MINUTES DO YOU ENGAGE IN EXERCISE AT THIS LEVEL?: 0 MIN

## 2025-06-28 ASSESSMENT — LIFESTYLE VARIABLES
HOW MANY STANDARD DRINKS CONTAINING ALCOHOL DO YOU HAVE ON A TYPICAL DAY: PATIENT DOES NOT DRINK
HOW MANY STANDARD DRINKS CONTAINING ALCOHOL DO YOU HAVE ON A TYPICAL DAY: 0
HOW OFTEN DO YOU HAVE SIX OR MORE DRINKS ON ONE OCCASION: 1
HOW OFTEN DO YOU HAVE A DRINK CONTAINING ALCOHOL: 1
HOW OFTEN DO YOU HAVE A DRINK CONTAINING ALCOHOL: NEVER

## 2025-06-28 ASSESSMENT — SLEEP AND FATIGUE QUESTIONNAIRES
HOW LIKELY ARE YOU TO NOD OFF OR FALL ASLEEP IN A CAR, WHILE STOPPED FOR A FEW MINUTES IN TRAFFIC: WOULD NEVER DOZE
HOW LIKELY ARE YOU TO NOD OFF OR FALL ASLEEP WHILE SITTING AND READING: SLIGHT CHANCE OF DOZING
HOW LIKELY ARE YOU TO NOD OFF OR FALL ASLEEP IN A CAR, WHILE STOPPED FOR A FEW MINUTES IN TRAFFIC: WOULD NEVER DOZE
HOW LIKELY ARE YOU TO NOD OFF OR FALL ASLEEP WHILE WATCHING TV: MODERATE CHANCE OF DOZING
HOW LIKELY ARE YOU TO NOD OFF OR FALL ASLEEP WHILE SITTING QUIETLY AFTER LUNCH WITHOUT ALCOHOL: MODERATE CHANCE OF DOZING
HOW LIKELY ARE YOU TO NOD OFF OR FALL ASLEEP WHILE SITTING INACTIVE IN A PUBLIC PLACE: WOULD NEVER DOZE
HOW LIKELY ARE YOU TO NOD OFF OR FALL ASLEEP WHEN YOU ARE A PASSENGER IN A CAR FOR AN HOUR WITHOUT A BREAK: SLIGHT CHANCE OF DOZING
ESS TOTAL SCORE: 9
HOW LIKELY ARE YOU TO NOD OFF OR FALL ASLEEP WHILE SITTING AND READING: SLIGHT CHANCE OF DOZING
HOW LIKELY ARE YOU TO NOD OFF OR FALL ASLEEP WHILE SITTING AND TALKING TO SOMEONE: WOULD NEVER DOZE
HOW LIKELY ARE YOU TO NOD OFF OR FALL ASLEEP WHILE LYING DOWN TO REST IN THE AFTERNOON WHEN CIRCUMSTANCES PERMIT: HIGH CHANCE OF DOZING
HOW LIKELY ARE YOU TO NOD OFF OR FALL ASLEEP WHILE SITTING QUIETLY AFTER LUNCH WITHOUT ALCOHOL: MODERATE CHANCE OF DOZING
HOW LIKELY ARE YOU TO NOD OFF OR FALL ASLEEP WHILE SITTING INACTIVE IN A PUBLIC PLACE: WOULD NEVER DOZE
HOW LIKELY ARE YOU TO NOD OFF OR FALL ASLEEP WHILE LYING DOWN TO REST IN THE AFTERNOON WHEN CIRCUMSTANCES PERMIT: HIGH CHANCE OF DOZING
HOW LIKELY ARE YOU TO NOD OFF OR FALL ASLEEP WHILE SITTING AND TALKING TO SOMEONE: WOULD NEVER DOZE
HOW LIKELY ARE YOU TO NOD OFF OR FALL ASLEEP WHEN YOU ARE A PASSENGER IN A CAR FOR AN HOUR WITHOUT A BREAK: SLIGHT CHANCE OF DOZING
HOW LIKELY ARE YOU TO NOD OFF OR FALL ASLEEP WHILE WATCHING TV: MODERATE CHANCE OF DOZING

## 2025-06-28 ASSESSMENT — PATIENT HEALTH QUESTIONNAIRE - PHQ9
SUM OF ALL RESPONSES TO PHQ QUESTIONS 1-9: 1
2. FEELING DOWN, DEPRESSED OR HOPELESS: NOT AT ALL
SUM OF ALL RESPONSES TO PHQ QUESTIONS 1-9: 1
1. LITTLE INTEREST OR PLEASURE IN DOING THINGS: SEVERAL DAYS
SUM OF ALL RESPONSES TO PHQ QUESTIONS 1-9: 1
SUM OF ALL RESPONSES TO PHQ QUESTIONS 1-9: 1

## 2025-07-01 ENCOUNTER — OFFICE VISIT (OUTPATIENT)
Dept: PULMONOLOGY | Age: 80
End: 2025-07-01
Payer: MEDICARE

## 2025-07-01 VITALS
HEART RATE: 82 BPM | OXYGEN SATURATION: 98 % | BODY MASS INDEX: 34.09 KG/M2 | TEMPERATURE: 96.9 F | RESPIRATION RATE: 16 BRPM | WEIGHT: 192.4 LBS | DIASTOLIC BLOOD PRESSURE: 71 MMHG | SYSTOLIC BLOOD PRESSURE: 136 MMHG | HEIGHT: 63 IN

## 2025-07-01 DIAGNOSIS — J45.40 MODERATE PERSISTENT ASTHMA WITHOUT COMPLICATION: Primary | ICD-10-CM

## 2025-07-01 DIAGNOSIS — G47.30 OBSERVED SLEEP APNEA: ICD-10-CM

## 2025-07-01 DIAGNOSIS — R06.02 SOB (SHORTNESS OF BREATH): ICD-10-CM

## 2025-07-01 DIAGNOSIS — I27.20 PULMONARY HYPERTENSION (HCC): ICD-10-CM

## 2025-07-01 DIAGNOSIS — G47.33 OSA (OBSTRUCTIVE SLEEP APNEA): ICD-10-CM

## 2025-07-01 PROCEDURE — 1036F TOBACCO NON-USER: CPT | Performed by: INTERNAL MEDICINE

## 2025-07-01 PROCEDURE — G8427 DOCREV CUR MEDS BY ELIG CLIN: HCPCS | Performed by: INTERNAL MEDICINE

## 2025-07-01 PROCEDURE — G8417 CALC BMI ABV UP PARAM F/U: HCPCS | Performed by: INTERNAL MEDICINE

## 2025-07-01 PROCEDURE — G8399 PT W/DXA RESULTS DOCUMENT: HCPCS | Performed by: INTERNAL MEDICINE

## 2025-07-01 PROCEDURE — 1090F PRES/ABSN URINE INCON ASSESS: CPT | Performed by: INTERNAL MEDICINE

## 2025-07-01 PROCEDURE — 1159F MED LIST DOCD IN RCRD: CPT | Performed by: INTERNAL MEDICINE

## 2025-07-01 PROCEDURE — G2211 COMPLEX E/M VISIT ADD ON: HCPCS | Performed by: INTERNAL MEDICINE

## 2025-07-01 PROCEDURE — 1123F ACP DISCUSS/DSCN MKR DOCD: CPT | Performed by: INTERNAL MEDICINE

## 2025-07-01 PROCEDURE — 3078F DIAST BP <80 MM HG: CPT | Performed by: INTERNAL MEDICINE

## 2025-07-01 PROCEDURE — 3075F SYST BP GE 130 - 139MM HG: CPT | Performed by: INTERNAL MEDICINE

## 2025-07-01 PROCEDURE — 99215 OFFICE O/P EST HI 40 MIN: CPT | Performed by: INTERNAL MEDICINE

## 2025-07-01 NOTE — PROGRESS NOTES
MA Communication:  The following orders are received by verbal communication from Bibi Mcdonough MD    Orders include:    3 month follow up  Will call to schedule echo  Order faxed to pulmonary rehab    
12/12/2024):   79-year-old female patient with PMH of DM, PAF, asthma, pHTN, obesity, previously followed with Dr. Muniz on Breo, Incruse and albuterol was last seen by PREMA Kauffman in September 2024 and comes in for follow-up.    CPAP compliance report for the interval 11-24 till 12/1/2024 showed 97% use with 87% more than 4 hours a night.  Average use 6 hours.  Set the pressure 5 to 15 cm water with required pressure 5.7 to 9 cm water.  Median airleak of 10 L/min and AHI of 0.4.  Patient uses her CPAP device though she feels it is annoying due to frequent awakening to go to the bathroom and having to deal with that.  Asthma wise she had an asthma exacerbation/bronchitis in October 2024 that required antibiotics, steroids, cough medicine through an urgent care    The following portions of the patient's history were reviewed: past medical history, surgical history, family history, social history, current medications & allergies.       ROS:   Relevant systems reviewed and the only positive symptoms are mentioned in the history present illness.      Current Outpatient Medications:     terazosin (HYTRIN) 5 MG capsule, TAKE 1 CAPSULE BY MOUTH AT NIGHT, Disp: 90 capsule, Rfl: 1    atorvastatin (LIPITOR) 10 MG tablet, TAKE 1 TABLET BY MOUTH ONCE  DAILY AT NIGHT, Disp: 90 tablet, Rfl: 1    PARoxetine (PAXIL) 10 MG tablet, TAKE 1 TABLET BY MOUTH ONCE  DAILY, Disp: 90 tablet, Rfl: 1    metFORMIN (GLUCOPHAGE) 1000 MG tablet, TAKE 1 TABLET BY MOUTH TWICE  DAILY WITH MEALS, Disp: 180 tablet, Rfl: 1    ferrous sulfate (FE TABS 325) 325 (65 Fe) MG EC tablet, TAKE ONE TO TWO TABLETS BY MOUTH EVERY DAY, Disp: 90 tablet, Rfl: 0    tolterodine (DETROL LA) 4 MG extended release capsule, Take 1 capsule by mouth daily, Disp: 90 capsule, Rfl: 1    valsartan (DIOVAN) 320 MG tablet, Take 1 tablet by mouth daily Takes the place of losartan, Disp: 90 tablet, Rfl: 1    omeprazole (PRILOSEC) 20 MG delayed release capsule, TAKE 1

## 2025-07-01 NOTE — PATIENT INSTRUCTIONS
Continue Breo Ellipta 200 mcg, Incruse Ellipta with albuterol as needed and 15 minutes before intubated exertion  Continue CPAP therapy with the current settings.  Pulm rehab referral  Echocardiogram to reevaluate pulmonary hypertension.  If it is persistent will refer to cardiology for right heart cath  Patient will be seeing PCP for follow-up soon.  She is on vitamin supplements.  Will wait on hemoglobin level rechecked, B12, vitamin D and thyroid studies until she meets with PCP  Follow-up in 3 months      Health Maintenance/Preventive measures:        >>  Avoid smoking, vaping or secondhand exposure.  Avoid exposure to irritants, allergens as possible as well as contact with patients with infectious respiratory illness.        >>  Stay up-to-date with influenza & pneumonia vaccines, RSV, & COVID-19 vaccine as recommended by the Advisory Committee on Immunization Practices (ACIP)        >>  Healthy diet and activity as able.        >>  Acid reflux precautions: Head of bed elevation, avoiding tight clothes, avoiding big meals or snacking 3 hours before bedtime, targeting healthy weight.        >>  Practice sleep hygiene measures. Avoid driving or operating heavy machines if tired or sleepy.     [phone]

## 2025-07-03 ENCOUNTER — HOSPITAL ENCOUNTER (OUTPATIENT)
Dept: LAB | Age: 80
Discharge: HOME | End: 2025-07-03
Payer: MEDICARE

## 2025-07-03 ENCOUNTER — OFFICE VISIT (OUTPATIENT)
Dept: INTERNAL MEDICINE CLINIC | Age: 80
End: 2025-07-03

## 2025-07-03 VITALS
HEART RATE: 76 BPM | BODY MASS INDEX: 34.91 KG/M2 | DIASTOLIC BLOOD PRESSURE: 68 MMHG | SYSTOLIC BLOOD PRESSURE: 100 MMHG | WEIGHT: 197 LBS | HEIGHT: 63 IN

## 2025-07-03 DIAGNOSIS — D64.9 ANEMIA, UNSPECIFIED TYPE: ICD-10-CM

## 2025-07-03 DIAGNOSIS — I27.20 PULMONARY HTN (HCC): ICD-10-CM

## 2025-07-03 DIAGNOSIS — D56.3 THALASSEMIA TRAIT: ICD-10-CM

## 2025-07-03 DIAGNOSIS — J45.40 MODERATE PERSISTENT ASTHMA WITHOUT COMPLICATION: ICD-10-CM

## 2025-07-03 DIAGNOSIS — M48.061 SPINAL STENOSIS OF LUMBAR REGION, UNSPECIFIED WHETHER NEUROGENIC CLAUDICATION PRESENT: ICD-10-CM

## 2025-07-03 DIAGNOSIS — G47.33 OSA (OBSTRUCTIVE SLEEP APNEA): ICD-10-CM

## 2025-07-03 DIAGNOSIS — D64.9 ANEMIA, UNSPECIFIED TYPE: Primary | ICD-10-CM

## 2025-07-03 DIAGNOSIS — E11.9 TYPE 2 DIABETES MELLITUS WITHOUT COMPLICATION, WITHOUT LONG-TERM CURRENT USE OF INSULIN (HCC): ICD-10-CM

## 2025-07-03 DIAGNOSIS — E11.69 HYPERLIPIDEMIA ASSOCIATED WITH TYPE 2 DIABETES MELLITUS (HCC): ICD-10-CM

## 2025-07-03 DIAGNOSIS — Z00.00 ROUTINE GENERAL MEDICAL EXAMINATION AT A HEALTH CARE FACILITY: Primary | ICD-10-CM

## 2025-07-03 DIAGNOSIS — E78.5 HYPERLIPIDEMIA ASSOCIATED WITH TYPE 2 DIABETES MELLITUS (HCC): ICD-10-CM

## 2025-07-03 DIAGNOSIS — Z00.00 MEDICARE ANNUAL WELLNESS VISIT, SUBSEQUENT: ICD-10-CM

## 2025-07-03 DIAGNOSIS — F32.5 MAJOR DEPRESSIVE DISORDER WITH SINGLE EPISODE, IN FULL REMISSION: ICD-10-CM

## 2025-07-03 DIAGNOSIS — I10 BENIGN ESSENTIAL HTN: ICD-10-CM

## 2025-07-03 LAB
BASOPHILS # BLD: 0 K/UL (ref 0–0.2)
BASOPHILS NFR BLD: 0.2 %
DEPRECATED RDW RBC AUTO: 17.3 % (ref 12.4–15.4)
EOSINOPHIL # BLD: 0.1 K/UL (ref 0–0.6)
EOSINOPHIL NFR BLD: 1.6 %
FERRITIN SERPL IA-MCNC: 641 NG/ML (ref 15–150)
FOLATE SERPL-MCNC: 16.2 NG/ML (ref 4.78–24.2)
HCT VFR BLD AUTO: 28.5 % (ref 36–48)
HGB BLD-MCNC: 9.1 G/DL (ref 12–16)
LYMPHOCYTES # BLD: 1.4 K/UL (ref 1–5.1)
LYMPHOCYTES NFR BLD: 19 %
MCH RBC QN AUTO: 19.2 PG (ref 26–34)
MCHC RBC AUTO-ENTMCNC: 32 G/DL (ref 31–36)
MCV RBC AUTO: 59.8 FL (ref 80–100)
MONOCYTES # BLD: 0.4 K/UL (ref 0–1.3)
MONOCYTES NFR BLD: 5.2 %
NEUTROPHILS # BLD: 5.3 K/UL (ref 1.7–7.7)
NEUTROPHILS NFR BLD: 74 %
PATH INTERP BLD-IMP: NO
PLATELET # BLD AUTO: 292 K/UL (ref 135–450)
PMV BLD AUTO: 12.5 FL (ref 5–10.5)
RBC # BLD AUTO: 4.77 M/UL (ref 4–5.2)
TRANSFERRIN SERPL-MCNC: 172 MG/DL (ref 200–360)
TSH SERPL DL<=0.005 MIU/L-ACNC: 1.54 UIU/ML (ref 0.27–4.2)
VIT B12 SERPL-MCNC: 464 PG/ML (ref 211–911)
WBC # BLD AUTO: 7.2 K/UL (ref 4–11)

## 2025-07-03 PROCEDURE — 3078F DIAST BP <80 MM HG: CPT | Performed by: INTERNAL MEDICINE

## 2025-07-03 PROCEDURE — 84466 ASSAY OF TRANSFERRIN: CPT

## 2025-07-03 PROCEDURE — 3074F SYST BP LT 130 MM HG: CPT | Performed by: INTERNAL MEDICINE

## 2025-07-03 PROCEDURE — 1159F MED LIST DOCD IN RCRD: CPT | Performed by: INTERNAL MEDICINE

## 2025-07-03 PROCEDURE — G0439 PPPS, SUBSEQ VISIT: HCPCS | Performed by: INTERNAL MEDICINE

## 2025-07-03 PROCEDURE — 84443 ASSAY THYROID STIM HORMONE: CPT

## 2025-07-03 PROCEDURE — 82607 VITAMIN B-12: CPT

## 2025-07-03 PROCEDURE — 1160F RVW MEDS BY RX/DR IN RCRD: CPT | Performed by: INTERNAL MEDICINE

## 2025-07-03 PROCEDURE — 82746 ASSAY OF FOLIC ACID SERUM: CPT

## 2025-07-03 PROCEDURE — 3051F HG A1C>EQUAL 7.0%<8.0%: CPT | Performed by: INTERNAL MEDICINE

## 2025-07-03 PROCEDURE — 36415 COLL VENOUS BLD VENIPUNCTURE: CPT

## 2025-07-03 PROCEDURE — 82728 ASSAY OF FERRITIN: CPT

## 2025-07-03 PROCEDURE — 1123F ACP DISCUSS/DSCN MKR DOCD: CPT | Performed by: INTERNAL MEDICINE

## 2025-07-03 PROCEDURE — 85025 COMPLETE CBC W/AUTO DIFF WBC: CPT

## 2025-07-03 RX ORDER — VALSARTAN 320 MG/1
160 TABLET ORAL DAILY
Qty: 1 TABLET | Refills: 0
Start: 2025-07-03

## 2025-07-03 NOTE — PATIENT INSTRUCTIONS
up in the vessels that supply oxygen-rich blood to your heart muscle. This can narrow the blood vessels and reduce blood flow. A heart attack happens when blood flow is completely blocked. A high-fat diet, smoking, and other factors increase the risk of heart disease.  Your doctor has found that you have a chance of having heart disease. A heart-healthy lifestyle can help keep your heart healthy and prevent heart disease. This lifestyle includes eating healthy, being active, staying at a weight that's healthy for you, and not smoking or using tobacco. It also includes taking medicines as directed, managing other health conditions, and trying to get a healthy amount of sleep.  Follow-up care is a key part of your treatment and safety. Be sure to make and go to all appointments, and call your doctor if you are having problems. It's also a good idea to know your test results and keep a list of the medicines you take.  How can you care for yourself at home?  Diet    Use less salt when you cook and eat. This helps lower your blood pressure. Taste food before salting. Add only a little salt when you think you need it. With time, your taste buds will adjust to less salt.     Eat fewer snack items, fast foods, canned soups, and other high-salt, high-fat, processed foods.     Read food labels and try to avoid saturated and trans fats. They increase your risk of heart disease by raising cholesterol levels.     Limit the amount of solid fat--butter, margarine, and shortening--you eat. Use olive, peanut, or canola oil when you cook. Bake, broil, and steam foods instead of frying them.     Eat a variety of fruit and vegetables every day. Dark green, deep orange, red, or yellow fruits and vegetables are especially good for you. Examples include spinach, carrots, peaches, and berries.     Foods high in fiber can reduce your cholesterol and provide important vitamins and minerals. High-fiber foods include whole-grain cereals and

## 2025-07-03 NOTE — PROGRESS NOTES
Medicare Annual Wellness Visit    Kelli Thakur is here for Medicare AWV    Assessment & Plan   Routine general medical examination at a health care facility  Benign essential HTN  Type 2 diabetes mellitus without complication, without long-term current use of insulin (HCC)  Hyperlipidemia associated with type 2 diabetes mellitus (HCC)  Anemia, unspecified type  -     CBC with Auto Differential; Future  -     TSH reflex to FT4; Future  -     Vitamin B12 & Folate; Future  -     Iron and TIBC  Major depressive disorder with single episode, in full remission  Spinal stenosis of lumbar region, unspecified whether neurogenic claudication present  Thalassemia trait  Moderate persistent asthma without complication  SORAIDA (obstructive sleep apnea)  Pulmonary HTN (HCC)       AWV    DM- Stable.  continue metformin   Strict diet.       HTN.  Systolic BP is low today  Continue calan,coreg,terazosin 5 mg nightly   Decrease valsartan to 160  mg daily   Cannot take HCTZ 2 to high Ca.  Aldactone stopped for high K   Walking.  Weight loss.     Asthma  Stable on Breo,incruse and proventil     HLD.continue lipitor.  Lipids are good      Urinary urge incontinence   Detrol LA 4 mg daily       Gout- continue allopurinol  No recent flareups.    Pulm HTN   Seeing pulm  Repeat ECHO ordered       Depression.  Continue  paxil 20 mg daily      SORAIDA  On CPAP      GERD.  Stable on PPI.     Lumbar Spinal stenosis  nav knee OA  Has seen  Ortho  Also sees pain physician      Anemia.  2 to thal trait and iron def   Labs as above     Osteoporosis  Continue calcium and vit D  Has taken fosomax for 8 years or so        Subjective   The following acute and/or chronic problems were also addressed today:   Diagnosis Orders   1. Routine general medical examination at a health care facility        2. Benign essential HTN        3. Type 2 diabetes mellitus without complication, without long-term current use of insulin (HCC)        4. Hyperlipidemia

## 2025-07-07 ENCOUNTER — RESULTS FOLLOW-UP (OUTPATIENT)
Dept: INTERNAL MEDICINE CLINIC | Age: 80
End: 2025-07-07

## 2025-07-11 ENCOUNTER — TELEPHONE (OUTPATIENT)
Dept: CARDIAC REHAB | Age: 80
End: 2025-07-11

## 2025-07-14 ENCOUNTER — HOSPITAL ENCOUNTER (OUTPATIENT)
Dept: CARDIAC REHAB | Age: 80
Setting detail: THERAPIES SERIES
Discharge: HOME | End: 2025-07-14
Attending: INTERNAL MEDICINE
Payer: MEDICARE

## 2025-07-14 VITALS
RESPIRATION RATE: 18 BRPM | DIASTOLIC BLOOD PRESSURE: 62 MMHG | HEART RATE: 69 BPM | SYSTOLIC BLOOD PRESSURE: 119 MMHG | OXYGEN SATURATION: 97 %

## 2025-07-14 PROCEDURE — G0239 OTH RESP PROC, GROUP: HCPCS

## 2025-07-14 RX ORDER — FLUTICASONE FUROATE AND VILANTEROL 200; 25 UG/1; UG/1
POWDER RESPIRATORY (INHALATION)
Qty: 3 EACH | Refills: 0 | Status: SHIPPED | OUTPATIENT
Start: 2025-07-14

## 2025-07-14 RX ORDER — UMECLIDINIUM 62.5 UG/1
AEROSOL, POWDER ORAL
Qty: 3 EACH | Refills: 0 | Status: SHIPPED | OUTPATIENT
Start: 2025-07-14

## 2025-07-14 ASSESSMENT — PATIENT HEALTH QUESTIONNAIRE - PHQ9
SUM OF ALL RESPONSES TO PHQ QUESTIONS 1-9: 4
5. POOR APPETITE OR OVEREATING: NOT AT ALL
2. FEELING DOWN, DEPRESSED OR HOPELESS: SEVERAL DAYS
8. MOVING OR SPEAKING SO SLOWLY THAT OTHER PEOPLE COULD HAVE NOTICED. OR THE OPPOSITE, BEING SO FIGETY OR RESTLESS THAT YOU HAVE BEEN MOVING AROUND A LOT MORE THAN USUAL: NOT AT ALL
3. TROUBLE FALLING OR STAYING ASLEEP: SEVERAL DAYS
6. FEELING BAD ABOUT YOURSELF - OR THAT YOU ARE A FAILURE OR HAVE LET YOURSELF OR YOUR FAMILY DOWN: NOT AT ALL
SUM OF ALL RESPONSES TO PHQ QUESTIONS 1-9: 4
SUM OF ALL RESPONSES TO PHQ QUESTIONS 1-9: 4
4. FEELING TIRED OR HAVING LITTLE ENERGY: SEVERAL DAYS
SUM OF ALL RESPONSES TO PHQ QUESTIONS 1-9: 4
1. LITTLE INTEREST OR PLEASURE IN DOING THINGS: SEVERAL DAYS
9. THOUGHTS THAT YOU WOULD BE BETTER OFF DEAD, OR OF HURTING YOURSELF: NOT AT ALL
10. IF YOU CHECKED OFF ANY PROBLEMS, HOW DIFFICULT HAVE THESE PROBLEMS MADE IT FOR YOU TO DO YOUR WORK, TAKE CARE OF THINGS AT HOME, OR GET ALONG WITH OTHER PEOPLE: EXTREMELY DIFFICULT
7. TROUBLE CONCENTRATING ON THINGS, SUCH AS READING THE NEWSPAPER OR WATCHING TELEVISION: NOT AT ALL

## 2025-07-14 ASSESSMENT — PULMONARY FUNCTION TESTS
FEV1/FVC: 0.72
FEV1 (%PREDICTED): 90
FVC (LITERS): 2.3

## 2025-07-14 ASSESSMENT — EJECTION FRACTION: EF_VALUE: 60

## 2025-07-14 ASSESSMENT — LIFESTYLE VARIABLES: ALCOHOL_USE: NO

## 2025-07-14 NOTE — PLAN OF CARE
Abuse Screening     Physical abuse: Denies     Verbal abuse: Denies     Emotional abuse: Denies     Financial abuse: Denies     Sexual abuse: Denies   Utilities: Not At Risk (1/27/2025)    [facility] Utilities     Threatened with loss of utilities: No      Retired, Read Only Psychosocial  Stages of Change: Preparation    Psychosocial Intervention  Interventions: Currently under treatment for depression  Currently Taking Psychotropic Meds: Yes    Psychosocial Goals  Patient Target Goals: Assess presence or absence of depression using a valid screening tool  Goal Status: Initial    TOBACCO     NUTRITION    Nutrition Assessment  Stages of Change: Preparation  Rate Your Plate Total Score: 45  Barriers to Heart Healthy Diet: pt concerned about cost of living increasing but her income does not change.  Alcohol: No  Height : 1.6 m (5' 3\")  Weight : 89.4 kg (197 lb 1.6 oz)  BMI: 34.99    Lipid Management Assessment  Date Lipids Drawn: 06/18/24  Total: 122  HDL: 54  LDL: 49  Triglycerides: 93    Retired, Read Only Weight Management Assessment  Height : 1.6 m (5' 3\")  Weight : 89.4 kg (197 lb 1.6 oz)  BMI: 34.99    Nutrition Intervention  Dietitian Consult: No  Staff/Patient Discussion: Yes      Nutrition Goals  Patient Target Goals: Weight loss of 5-10%  Patient Treatment Goal: I will lose 10 lbs in 2025 eating healthy and exercise.  Goal Status: Initial       RISK FACTOR / EDUCATION NEEDS    Retired, Read Only Patient Education  Education: Breaking the dyspnea cycle; Pulmonary A&P, lung/gas exchange; Pulmonary medications          Provider Review and Approval of this ITP    The above treatment plan and goals have been set for your patient during Cardiac Rehabilitation. Please review and Electronically Cosign        Electronically signed by Wendy Arechiga RN on 7/14/25 at 12:46 PM EDT

## 2025-07-17 ENCOUNTER — HOSPITAL ENCOUNTER (OUTPATIENT)
Dept: CARDIAC REHAB | Age: 80
Setting detail: THERAPIES SERIES
Discharge: HOME | End: 2025-07-17
Attending: INTERNAL MEDICINE
Payer: MEDICARE

## 2025-07-17 PROCEDURE — G0239 OTH RESP PROC, GROUP: HCPCS

## 2025-07-22 ENCOUNTER — TELEPHONE (OUTPATIENT)
Dept: INTERNAL MEDICINE CLINIC | Age: 80
End: 2025-07-22

## 2025-07-22 ENCOUNTER — HOSPITAL ENCOUNTER (OUTPATIENT)
Dept: CARDIAC REHAB | Age: 80
Setting detail: THERAPIES SERIES
Discharge: HOME | End: 2025-07-22
Attending: INTERNAL MEDICINE
Payer: MEDICARE

## 2025-07-22 PROCEDURE — G0239 OTH RESP PROC, GROUP: HCPCS

## 2025-07-22 RX ORDER — UMECLIDINIUM 62.5 UG/1
AEROSOL, POWDER ORAL
Qty: 3 EACH | Refills: 3 | Status: SHIPPED | OUTPATIENT
Start: 2025-07-22

## 2025-07-22 RX ORDER — FLUTICASONE FUROATE AND VILANTEROL 200; 25 UG/1; UG/1
POWDER RESPIRATORY (INHALATION)
Qty: 3 EACH | Refills: 3 | Status: SHIPPED | OUTPATIENT
Start: 2025-07-22

## 2025-07-22 NOTE — TELEPHONE ENCOUNTER
----- Message from Sheyla FONTAINE sent at 7/22/2025 11:47 AM EDT -----  Contact: [phone]  Pt requesting paper scripts for below medication to be mailed to her. Please advise       fluticasone furoate-vilanterol (BREO ELLIPTA) 200-25 MCG/ACT AEPB inhaler      INCRUSE ELLIPTA 62.5 MCG/ACT inhaler

## 2025-07-23 RX ORDER — VERAPAMIL HYDROCHLORIDE 240 MG/1
240 TABLET, FILM COATED, EXTENDED RELEASE ORAL DAILY
Qty: 90 TABLET | Refills: 1 | Status: SHIPPED | OUTPATIENT
Start: 2025-07-23

## 2025-07-24 ENCOUNTER — HOSPITAL ENCOUNTER (OUTPATIENT)
Dept: CARDIAC REHAB | Age: 80
Setting detail: THERAPIES SERIES
Discharge: HOME | End: 2025-07-24
Attending: INTERNAL MEDICINE
Payer: MEDICARE

## 2025-07-24 PROCEDURE — G0239 OTH RESP PROC, GROUP: HCPCS

## 2025-07-29 ENCOUNTER — HOSPITAL ENCOUNTER (OUTPATIENT)
Dept: CARDIAC REHAB | Age: 80
Setting detail: THERAPIES SERIES
Discharge: HOME | End: 2025-07-29
Attending: INTERNAL MEDICINE
Payer: MEDICARE

## 2025-07-29 NOTE — CARDIO/PULMONARY
Patient called to cancel her MI appt today 7/29/2025. She thinks that she has caught a bug and been sick through the night. Plans to return 7/31/2025 if she is feeling better.

## 2025-07-31 ENCOUNTER — HOSPITAL ENCOUNTER (OUTPATIENT)
Dept: CARDIAC REHAB | Age: 80
Setting detail: THERAPIES SERIES
Discharge: HOME | End: 2025-07-31
Attending: INTERNAL MEDICINE
Payer: MEDICARE

## 2025-07-31 VITALS — WEIGHT: 194 LBS | BODY MASS INDEX: 34.37 KG/M2

## 2025-07-31 PROCEDURE — G0239 OTH RESP PROC, GROUP: HCPCS

## 2025-08-04 ENCOUNTER — TELEPHONE (OUTPATIENT)
Dept: PULMONOLOGY | Age: 80
End: 2025-08-04

## 2025-08-05 ENCOUNTER — HOSPITAL ENCOUNTER (OUTPATIENT)
Dept: CARDIAC REHAB | Age: 80
Setting detail: THERAPIES SERIES
Discharge: HOME | End: 2025-08-05
Attending: INTERNAL MEDICINE

## 2025-08-27 ENCOUNTER — OFFICE VISIT (OUTPATIENT)
Dept: INTERNAL MEDICINE CLINIC | Age: 80
End: 2025-08-27

## 2025-08-27 VITALS
HEIGHT: 63 IN | BODY MASS INDEX: 34.2 KG/M2 | DIASTOLIC BLOOD PRESSURE: 82 MMHG | HEART RATE: 68 BPM | SYSTOLIC BLOOD PRESSURE: 180 MMHG | WEIGHT: 193 LBS

## 2025-08-27 DIAGNOSIS — I10 PRIMARY HYPERTENSION: Primary | ICD-10-CM

## 2025-08-27 DIAGNOSIS — N39.41 URINARY INCONTINENCE, URGE: ICD-10-CM

## 2025-08-27 PROCEDURE — 1036F TOBACCO NON-USER: CPT | Performed by: INTERNAL MEDICINE

## 2025-08-27 PROCEDURE — 3079F DIAST BP 80-89 MM HG: CPT | Performed by: INTERNAL MEDICINE

## 2025-08-27 PROCEDURE — 1123F ACP DISCUSS/DSCN MKR DOCD: CPT | Performed by: INTERNAL MEDICINE

## 2025-08-27 PROCEDURE — 0509F URINE INCON PLAN DOCD: CPT | Performed by: INTERNAL MEDICINE

## 2025-08-27 PROCEDURE — G8399 PT W/DXA RESULTS DOCUMENT: HCPCS | Performed by: INTERNAL MEDICINE

## 2025-08-27 PROCEDURE — G8427 DOCREV CUR MEDS BY ELIG CLIN: HCPCS | Performed by: INTERNAL MEDICINE

## 2025-08-27 PROCEDURE — 1159F MED LIST DOCD IN RCRD: CPT | Performed by: INTERNAL MEDICINE

## 2025-08-27 PROCEDURE — 1160F RVW MEDS BY RX/DR IN RCRD: CPT | Performed by: INTERNAL MEDICINE

## 2025-08-27 PROCEDURE — G8417 CALC BMI ABV UP PARAM F/U: HCPCS | Performed by: INTERNAL MEDICINE

## 2025-08-27 PROCEDURE — 99214 OFFICE O/P EST MOD 30 MIN: CPT | Performed by: INTERNAL MEDICINE

## 2025-08-27 PROCEDURE — 1090F PRES/ABSN URINE INCON ASSESS: CPT | Performed by: INTERNAL MEDICINE

## 2025-08-27 PROCEDURE — 3077F SYST BP >= 140 MM HG: CPT | Performed by: INTERNAL MEDICINE

## 2025-08-27 RX ORDER — VALSARTAN 320 MG/1
320 TABLET ORAL DAILY
Qty: 90 TABLET | Refills: 1 | Status: SHIPPED | OUTPATIENT
Start: 2025-08-27

## 2025-08-27 RX ORDER — AMLODIPINE BESYLATE 5 MG/1
5 TABLET ORAL DAILY
Qty: 30 TABLET | Refills: 1 | Status: SHIPPED | OUTPATIENT
Start: 2025-08-27

## 2025-08-27 RX ORDER — VIBEGRON 75 MG/1
75 TABLET, FILM COATED ORAL DAILY
Qty: 30 TABLET | Refills: 2 | Status: SHIPPED | OUTPATIENT
Start: 2025-08-27

## 2025-08-27 RX ORDER — VALSARTAN 320 MG/1
320 TABLET ORAL DAILY
Qty: 1 TABLET | Refills: 0
Start: 2025-08-27 | End: 2025-08-27 | Stop reason: SDUPTHER

## 2025-08-27 ASSESSMENT — ENCOUNTER SYMPTOMS
COUGH: 0
BLOOD IN STOOL: 0
CHEST TIGHTNESS: 0
VOMITING: 0
NAUSEA: 0
DIARRHEA: 0
ABDOMINAL PAIN: 0
WHEEZING: 0
SHORTNESS OF BREATH: 0

## (undated) DEVICE — CATHETER IV 20GA L1.25IN PNK FEP SFTY STR HUB RADPQ DISP

## (undated) DEVICE — SILICONE I/A TIP STRAIGHT: Brand: ALCON

## (undated) DEVICE — SKIN AFFIX SURG ADHESIVE 72/CS 0.55ML: Brand: MEDLINE

## (undated) DEVICE — SURGICAL PROCEDURE PACK EYE ANDRSN

## (undated) DEVICE — ELECTRODE PT RET AD L9FT HI MOIST COND ADH HYDRGEL CORDED

## (undated) DEVICE — FILTER NEEDLE: Brand: MONOJECT

## (undated) DEVICE — BANDAGE E SELF CLSR 6INX5YD VELC SWIFTWRAP

## (undated) DEVICE — AIRLIFE™ NASAL OXYGEN CANNULA CURVED, FLARED TIP, WITH 7 FEET (2.1 M) CRUSH RESISTANT TUBING, OVER-THE-EAR STYLE: Brand: AIRLIFE™

## (undated) DEVICE — COTTON UNDERCAST PADDING,CRIMPED FINISH: Brand: WEBRIL

## (undated) DEVICE — ENDO CARRY-ON PROCEDURE KIT INCLUDES SUCTION TUBING, LUBRICANT, GAUZE, BIOHAZARD STICKER, TRANSPORT PAD AND INTERCEPT BEDSIDE KIT.: Brand: ENDO CARRY-ON PROCEDURE KIT

## (undated) DEVICE — METER ACCU-CHEK INFORM BASE GLUCOSE

## (undated) DEVICE — ELECTRODE ECG MONITR FOAM TEAR DROP ADLT RED

## (undated) DEVICE — CANNULA,OXY,ADULT,SUPERSOFT,W/7'TUB,SC: Brand: MEDLINE INDUSTRIES, INC.

## (undated) DEVICE — Device: Brand: DISPOSABLE ELECTROSURGICAL SNARE

## (undated) DEVICE — SOLUTION IV IRRIG 500ML 0.9% SODIUM CHL 2F7123

## (undated) DEVICE — NEEDLE HYPO 25GA L1.5IN BLU POLYPR HUB S STL REG BVL STR

## (undated) DEVICE — GLOVE,SURG,SENSICARE,ALOE,LF,PF,7: Brand: MEDLINE

## (undated) DEVICE — GLOVE ORANGE PI 8   MSG9080

## (undated) DEVICE — GLOVE SURG SZ 8 L12IN FNGR THK94MIL STD WHT LTX FREE

## (undated) DEVICE — 3M(TM) TRANSPORE SURGICAL TAPE 1527-1: Brand: 3M™ TRANSPORE™

## (undated) DEVICE — 3M™ STERI-DRAPE™  ISOLATION DRAPE WITH INCISE FILM AND POUCH 1017: Brand: STERI-DRAPE™

## (undated) DEVICE — YANKAUER,BULB TIP,W/O VENT,RIGID,STERILE: Brand: MEDLINE

## (undated) DEVICE — SOLUTION IRRIG 250ML STRL H2O PLAS POUR BTL USP

## (undated) DEVICE — SOLUTION IV 1000ML LAC RINGERS PH 6.5 INJ USP VIAFLX PLAS

## (undated) DEVICE — SET ADMIN PRIMING 7ML L30IN 7.35LB 20 GTT 2ND RLER CLMP

## (undated) DEVICE — SUTURE MCRYL + SZ 4-0 L18IN ABSRB UD L19MM PS-2 3/8 CIR MCP496G

## (undated) DEVICE — BIT DRL L L266MM DIA16MM FEM FLX CANN QUIK CPL

## (undated) DEVICE — 3M™ COBAN™ NL STERILE NON-LATEX SELF-ADHERENT WRAP, 2084S, 4 IN X 5 YD (10 CM X 4,5 M), 18 ROLLS/CASE: Brand: 3M™ COBAN™

## (undated) DEVICE — SET GRAV VENT NVENT CK VLV 3 NDL FREE PRT 10 GTT

## (undated) DEVICE — 3M™ TEGADERM™ TRANSPARENT FILM DRESSING FRAME STYLE, 1624W, 2-3/8 IN X 2-3/4 IN (6 CM X 7 CM), 100/CT 4CT/CASE: Brand: 3M™ TEGADERM™

## (undated) DEVICE — FORCEP BX STD CAP 240CM RAD JAW 4

## (undated) DEVICE — COVER,MAYO STAND,STERILE: Brand: MEDLINE

## (undated) DEVICE — STERILE POLYISOPRENE POWDER-FREE SURGICAL GLOVES: Brand: PROTEXIS

## (undated) DEVICE — BIT DRL L300MM DIA10MM CANN TAPR L QUIK CPL FOR DH DC TFN

## (undated) DEVICE — PADDING CAST N ADH 12X6 IN CRIMPED FINISH 100% COTTON WBRLII

## (undated) DEVICE — TOWEL,OR,DSP,ST,BLUE,STD,4/PK,20PK/CS: Brand: MEDLINE

## (undated) DEVICE — Device

## (undated) DEVICE — 3M™ TEGADERM™ TRANSPARENT FILM DRESSING FRAME STYLE, 1626W, 4 IN X 4-3/4 IN (10 CM X 12 CM), 50/CT 4CT/CASE: Brand: 3M™ TEGADERM™

## (undated) DEVICE — GUIDEWIRE ORTH L400MM DIA3.2MM FOR TFN

## (undated) DEVICE — RETRIEVER ENDOSCP L230CM DIA2.5MM NET W3XL5.5CM MIN WRK CHN

## (undated) DEVICE — GAUZE,SPONGE,4"X4",8PLY,STRL,LF,10/TRAY: Brand: MEDLINE

## (undated) DEVICE — APPLICATOR PREP 26ML 0.7% IOD POVACRYLEX 74% ISO ALC ST

## (undated) DEVICE — CIRCUIT ANES L72IN 3L BACT AND VIR FLTR EL CONN SGL LIMB

## (undated) DEVICE — NEEDLE FLTR 19GA L1.5IN WALL THK5UM BRN POLYPR HUB S STL

## (undated) DEVICE — SUTURE VCRL SZ 2-0 L18IN ABSRB UD CT-1 L36MM 1/2 CIR J839D

## (undated) DEVICE — SUTURE VCRL + SZ 0 L27IN ABSRB UD L36MM CT-1 1/2 CIR VCPP41D

## (undated) DEVICE — Z INACTIVE USE 2855128 SPONGE GZ 16 PLY WVN COT 4INX4IN  HHH

## (undated) DEVICE — MAT TRACK 40 X 72 IN ABSORBENT

## (undated) DEVICE — DRAPE C ARM UNIV W41XL74IN CLR PLAS XR VELC CLSR POLY STRP

## (undated) DEVICE — GLOVE ORANGE PI 7   MSG9070

## (undated) DEVICE — MAJOR SET UP PK

## (undated) DEVICE — CHLORAPREP 26ML ORANGE

## (undated) DEVICE — TOWEL OR BLUEE 16X26IN ST 8 PACK ORB08 16X26ORTWL

## (undated) DEVICE — DRAPE,REIN 53X77,STERILE: Brand: MEDLINE

## (undated) DEVICE — DRESSING FOAM W4XL10IN SIL RECT ADH WTRPRF FLM BK W/ BORD

## (undated) DEVICE — ALCOHOL RUBBING 16OZ 70% ISO

## (undated) DEVICE — SUTURE ABSORBABLE BRAIDED 2-0 CT-1 27 IN UD VICRYL J259H

## (undated) DEVICE — BIT DRL L330MM DIA4.2MM CALIB 100MM 3 FLUT QUIK CPL

## (undated) DEVICE — SMARTGOWN SURGICAL GOWN, XL: Brand: CONVERTORS

## (undated) DEVICE — MANIFOLD SURG NEPTUNE WST MGMT

## (undated) DEVICE — UNIVERSAL BLOCK TRAY: Brand: MEDLINE INDUSTRIES, INC.

## (undated) DEVICE — GLOVE ORANGE PI 7 1/2   MSG9075

## (undated) DEVICE — CONMED SCOPE SAVER BITE BLOCK, 20X27 MM: Brand: SCOPE SAVER

## (undated) DEVICE — ENDOSCOPIC KIT 2 12 FT OP4 DE2 GWN SYR

## (undated) DEVICE — CUFF RESTRN WR OR ANK BLU FOAM AD

## (undated) DEVICE — ELECTRODE,RADIOTRANSLUCENT,FOAM,3PK: Brand: MEDLINE

## (undated) DEVICE — TUBING, SUCTION, 3/16" X 10', STRAIGHT: Brand: MEDLINE

## (undated) DEVICE — TRAP POLYP ETRAP